# Patient Record
Sex: FEMALE | Race: WHITE | NOT HISPANIC OR LATINO | Employment: OTHER | URBAN - METROPOLITAN AREA
[De-identification: names, ages, dates, MRNs, and addresses within clinical notes are randomized per-mention and may not be internally consistent; named-entity substitution may affect disease eponyms.]

---

## 2017-01-20 ENCOUNTER — COMMUNICATION - HEALTHEAST (OUTPATIENT)
Dept: FAMILY MEDICINE | Facility: CLINIC | Age: 52
End: 2017-01-20

## 2017-01-23 ENCOUNTER — COMMUNICATION - HEALTHEAST (OUTPATIENT)
Dept: FAMILY MEDICINE | Facility: CLINIC | Age: 52
End: 2017-01-23

## 2017-01-23 ENCOUNTER — COMMUNICATION - HEALTHEAST (OUTPATIENT)
Dept: NURSING | Facility: CLINIC | Age: 52
End: 2017-01-23

## 2017-01-23 DIAGNOSIS — E11.9 TYPE 2 DIABETES MELLITUS (H): ICD-10-CM

## 2017-01-23 DIAGNOSIS — E11.65 TYPE 2 DIABETES MELLITUS WITH HYPERGLYCEMIA (H): ICD-10-CM

## 2017-01-31 ENCOUNTER — COMMUNICATION - HEALTHEAST (OUTPATIENT)
Dept: NURSING | Facility: CLINIC | Age: 52
End: 2017-01-31

## 2017-01-31 ENCOUNTER — OFFICE VISIT - HEALTHEAST (OUTPATIENT)
Dept: FAMILY MEDICINE | Facility: CLINIC | Age: 52
End: 2017-01-31

## 2017-01-31 ENCOUNTER — RECORDS - HEALTHEAST (OUTPATIENT)
Dept: GENERAL RADIOLOGY | Facility: CLINIC | Age: 52
End: 2017-01-31

## 2017-01-31 DIAGNOSIS — L84 CALLUS OF HEEL: ICD-10-CM

## 2017-01-31 DIAGNOSIS — M79.89 FOOT SWELLING: ICD-10-CM

## 2017-01-31 DIAGNOSIS — N95.2 ATROPHIC VAGINITIS: ICD-10-CM

## 2017-01-31 DIAGNOSIS — K50.90 REGIONAL ENTERITIS (H): ICD-10-CM

## 2017-01-31 DIAGNOSIS — M79.89 OTHER SPECIFIED SOFT TISSUE DISORDERS: ICD-10-CM

## 2017-01-31 DIAGNOSIS — R30.0 DYSURIA: ICD-10-CM

## 2017-01-31 DIAGNOSIS — Z71.89 COUNSELING AND COORDINATION OF CARE: ICD-10-CM

## 2017-01-31 ASSESSMENT — MIFFLIN-ST. JEOR: SCORE: 1297.92

## 2017-02-01 ENCOUNTER — COMMUNICATION - HEALTHEAST (OUTPATIENT)
Dept: FAMILY MEDICINE | Facility: CLINIC | Age: 52
End: 2017-02-01

## 2017-02-17 ENCOUNTER — COMMUNICATION - HEALTHEAST (OUTPATIENT)
Dept: NURSING | Facility: CLINIC | Age: 52
End: 2017-02-17

## 2017-02-17 DIAGNOSIS — K50.90 ARTHRITIS IN CROHN'S DISEASE (H): ICD-10-CM

## 2017-02-17 DIAGNOSIS — M07.60 ARTHRITIS IN CROHN'S DISEASE (H): ICD-10-CM

## 2017-03-10 ENCOUNTER — COMMUNICATION - HEALTHEAST (OUTPATIENT)
Dept: NURSING | Facility: CLINIC | Age: 52
End: 2017-03-10

## 2017-03-28 ENCOUNTER — RECORDS - HEALTHEAST (OUTPATIENT)
Dept: ADMINISTRATIVE | Facility: OTHER | Age: 52
End: 2017-03-28

## 2017-03-31 ENCOUNTER — COMMUNICATION - HEALTHEAST (OUTPATIENT)
Dept: NURSING | Facility: CLINIC | Age: 52
End: 2017-03-31

## 2017-03-31 ENCOUNTER — OFFICE VISIT - HEALTHEAST (OUTPATIENT)
Dept: FAMILY MEDICINE | Facility: CLINIC | Age: 52
End: 2017-03-31

## 2017-03-31 DIAGNOSIS — G56.03 BILATERAL CARPAL TUNNEL SYNDROME: ICD-10-CM

## 2017-03-31 DIAGNOSIS — E66.3 OVERWEIGHT: ICD-10-CM

## 2017-03-31 DIAGNOSIS — K50.90 REGIONAL ENTERITIS (H): ICD-10-CM

## 2017-03-31 DIAGNOSIS — M19.90 OSTEOARTHRITIS: ICD-10-CM

## 2017-03-31 DIAGNOSIS — F30.9 BIPOLAR I DISORDER, SINGLE MANIC EPISODE (H): ICD-10-CM

## 2017-04-04 ENCOUNTER — RECORDS - HEALTHEAST (OUTPATIENT)
Dept: ADMINISTRATIVE | Facility: OTHER | Age: 52
End: 2017-04-04

## 2017-04-05 ENCOUNTER — COMMUNICATION - HEALTHEAST (OUTPATIENT)
Dept: NURSING | Facility: CLINIC | Age: 52
End: 2017-04-05

## 2017-04-07 ENCOUNTER — COMMUNICATION - HEALTHEAST (OUTPATIENT)
Dept: NURSING | Facility: CLINIC | Age: 52
End: 2017-04-07

## 2017-04-13 ENCOUNTER — COMMUNICATION - HEALTHEAST (OUTPATIENT)
Dept: NURSING | Facility: CLINIC | Age: 52
End: 2017-04-13

## 2017-04-17 ENCOUNTER — AMBULATORY - HEALTHEAST (OUTPATIENT)
Dept: FAMILY MEDICINE | Facility: CLINIC | Age: 52
End: 2017-04-17

## 2017-04-17 ENCOUNTER — AMBULATORY - HEALTHEAST (OUTPATIENT)
Dept: NURSING | Facility: CLINIC | Age: 52
End: 2017-04-17

## 2017-04-17 ENCOUNTER — COMMUNICATION - HEALTHEAST (OUTPATIENT)
Dept: FAMILY MEDICINE | Facility: CLINIC | Age: 52
End: 2017-04-17

## 2017-04-17 DIAGNOSIS — M19.90 OSTEOARTHRITIS: ICD-10-CM

## 2017-04-17 DIAGNOSIS — G56.03 BILATERAL CARPAL TUNNEL SYNDROME: ICD-10-CM

## 2017-04-17 DIAGNOSIS — J45.40 MODERATE PERSISTENT ASTHMA WITHOUT COMPLICATION: ICD-10-CM

## 2017-04-17 DIAGNOSIS — E55.9 UNSPECIFIED VITAMIN D DEFICIENCY: ICD-10-CM

## 2017-04-28 ENCOUNTER — COMMUNICATION - HEALTHEAST (OUTPATIENT)
Dept: FAMILY MEDICINE | Facility: CLINIC | Age: 52
End: 2017-04-28

## 2017-05-04 ENCOUNTER — COMMUNICATION - HEALTHEAST (OUTPATIENT)
Dept: FAMILY MEDICINE | Facility: CLINIC | Age: 52
End: 2017-05-04

## 2017-05-04 DIAGNOSIS — K21.9 ACID REFLUX: ICD-10-CM

## 2017-05-04 DIAGNOSIS — E78.5 HYPERLIPEMIA: ICD-10-CM

## 2017-05-09 ENCOUNTER — OFFICE VISIT - HEALTHEAST (OUTPATIENT)
Dept: FAMILY MEDICINE | Facility: CLINIC | Age: 52
End: 2017-05-09

## 2017-05-09 ENCOUNTER — COMMUNICATION - HEALTHEAST (OUTPATIENT)
Dept: FAMILY MEDICINE | Facility: CLINIC | Age: 52
End: 2017-05-09

## 2017-05-09 DIAGNOSIS — D50.9 IRON DEFICIENCY ANEMIA, UNSPECIFIED: ICD-10-CM

## 2017-05-09 DIAGNOSIS — F30.9 BIPOLAR I DISORDER, SINGLE MANIC EPISODE (H): ICD-10-CM

## 2017-05-09 DIAGNOSIS — K50.90 REGIONAL ENTERITIS (H): ICD-10-CM

## 2017-05-09 DIAGNOSIS — E66.3 OVERWEIGHT: ICD-10-CM

## 2017-05-09 DIAGNOSIS — J45.40 MODERATE PERSISTENT ASTHMA WITHOUT COMPLICATION: ICD-10-CM

## 2017-05-09 DIAGNOSIS — E11.65 TYPE 2 DIABETES MELLITUS WITH HYPERGLYCEMIA (H): ICD-10-CM

## 2017-05-09 DIAGNOSIS — E78.2 MIXED HYPERLIPIDEMIA: ICD-10-CM

## 2017-05-09 DIAGNOSIS — Z20.2 POSSIBLE EXPOSURE TO STD: ICD-10-CM

## 2017-05-09 LAB
CHOLEST SERPL-MCNC: 185 MG/DL
FASTING STATUS PATIENT QL REPORTED: NO
HBA1C MFR BLD: 6.4 % (ref 3.5–6)
HDLC SERPL-MCNC: 52 MG/DL
HIV 1+2 AB+HIV1 P24 AG SERPL QL IA: NEGATIVE
LDLC SERPL CALC-MCNC: 107 MG/DL
TRIGL SERPL-MCNC: 131 MG/DL

## 2017-05-12 ENCOUNTER — COMMUNICATION - HEALTHEAST (OUTPATIENT)
Dept: FAMILY MEDICINE | Facility: CLINIC | Age: 52
End: 2017-05-12

## 2017-06-07 ENCOUNTER — COMMUNICATION - HEALTHEAST (OUTPATIENT)
Dept: NURSING | Facility: CLINIC | Age: 52
End: 2017-06-07

## 2017-06-20 ENCOUNTER — OFFICE VISIT - HEALTHEAST (OUTPATIENT)
Dept: FAMILY MEDICINE | Facility: CLINIC | Age: 52
End: 2017-06-20

## 2017-06-20 DIAGNOSIS — F30.9 BIPOLAR I DISORDER, SINGLE MANIC EPISODE (H): ICD-10-CM

## 2017-06-20 DIAGNOSIS — R07.89 ATYPICAL CHEST PAIN: ICD-10-CM

## 2017-06-20 DIAGNOSIS — F51.01 PRIMARY INSOMNIA: ICD-10-CM

## 2017-06-22 ENCOUNTER — COMMUNICATION - HEALTHEAST (OUTPATIENT)
Dept: NURSING | Facility: CLINIC | Age: 52
End: 2017-06-22

## 2017-06-23 ENCOUNTER — RECORDS - HEALTHEAST (OUTPATIENT)
Dept: ADMINISTRATIVE | Facility: OTHER | Age: 52
End: 2017-06-23

## 2017-06-25 ENCOUNTER — RECORDS - HEALTHEAST (OUTPATIENT)
Dept: ADMINISTRATIVE | Facility: OTHER | Age: 52
End: 2017-06-25

## 2017-06-26 ENCOUNTER — COMMUNICATION - HEALTHEAST (OUTPATIENT)
Dept: NURSING | Facility: CLINIC | Age: 52
End: 2017-06-26

## 2017-07-07 ENCOUNTER — COMMUNICATION - HEALTHEAST (OUTPATIENT)
Dept: NURSING | Facility: CLINIC | Age: 52
End: 2017-07-07

## 2017-07-07 ENCOUNTER — COMMUNICATION - HEALTHEAST (OUTPATIENT)
Dept: FAMILY MEDICINE | Facility: CLINIC | Age: 52
End: 2017-07-07

## 2017-07-07 ENCOUNTER — RECORDS - HEALTHEAST (OUTPATIENT)
Dept: ADMINISTRATIVE | Facility: OTHER | Age: 52
End: 2017-07-07

## 2017-07-07 DIAGNOSIS — J45.40 MODERATE PERSISTENT ASTHMA WITHOUT COMPLICATION: ICD-10-CM

## 2017-07-12 ENCOUNTER — RECORDS - HEALTHEAST (OUTPATIENT)
Dept: ADMINISTRATIVE | Facility: OTHER | Age: 52
End: 2017-07-12

## 2017-07-18 ENCOUNTER — COMMUNICATION - HEALTHEAST (OUTPATIENT)
Dept: FAMILY MEDICINE | Facility: CLINIC | Age: 52
End: 2017-07-18

## 2017-07-24 ENCOUNTER — COMMUNICATION - HEALTHEAST (OUTPATIENT)
Dept: NURSING | Facility: CLINIC | Age: 52
End: 2017-07-24

## 2017-07-31 ENCOUNTER — COMMUNICATION - HEALTHEAST (OUTPATIENT)
Dept: SCHEDULING | Facility: CLINIC | Age: 52
End: 2017-07-31

## 2017-08-14 ENCOUNTER — COMMUNICATION - HEALTHEAST (OUTPATIENT)
Dept: FAMILY MEDICINE | Facility: CLINIC | Age: 52
End: 2017-08-14

## 2017-08-14 DIAGNOSIS — J45.909 ASTHMA: ICD-10-CM

## 2017-08-29 ENCOUNTER — COMMUNICATION - HEALTHEAST (OUTPATIENT)
Dept: BEHAVIORAL HEALTH | Facility: CLINIC | Age: 52
End: 2017-08-29

## 2017-08-31 ENCOUNTER — COMMUNICATION - HEALTHEAST (OUTPATIENT)
Dept: NURSING | Facility: CLINIC | Age: 52
End: 2017-08-31

## 2017-09-05 ENCOUNTER — COMMUNICATION - HEALTHEAST (OUTPATIENT)
Dept: NURSING | Facility: CLINIC | Age: 52
End: 2017-09-05

## 2017-09-09 ENCOUNTER — HEALTH MAINTENANCE LETTER (OUTPATIENT)
Age: 52
End: 2017-09-09

## 2017-09-11 ENCOUNTER — COMMUNICATION - HEALTHEAST (OUTPATIENT)
Dept: NURSING | Facility: CLINIC | Age: 52
End: 2017-09-11

## 2017-09-15 ENCOUNTER — OFFICE VISIT (OUTPATIENT)
Dept: FAMILY MEDICINE | Facility: CLINIC | Age: 52
End: 2017-09-15

## 2017-09-15 VITALS
OXYGEN SATURATION: 95 % | WEIGHT: 163 LBS | HEIGHT: 61 IN | SYSTOLIC BLOOD PRESSURE: 130 MMHG | DIASTOLIC BLOOD PRESSURE: 79 MMHG | BODY MASS INDEX: 30.78 KG/M2 | TEMPERATURE: 98.2 F | HEART RATE: 75 BPM

## 2017-09-15 DIAGNOSIS — J20.9 ACUTE BRONCHITIS, UNSPECIFIED ORGANISM: Primary | ICD-10-CM

## 2017-09-15 RX ORDER — AZITHROMYCIN 250 MG/1
TABLET, FILM COATED ORAL
Qty: 6 TABLET | Refills: 0 | Status: SHIPPED | OUTPATIENT
Start: 2017-09-15 | End: 2017-09-22

## 2017-09-15 NOTE — MR AVS SNAPSHOT
"              After Visit Summary   9/15/2017    Mary Ann Olson    MRN: [id number]           Patient Information     Date Of Birth          1965        Visit Information        Provider Department      9/15/2017 1:20 PM Joanna Farah MD [facility]        Today's Diagnoses     Acute bronchitis, unspecified organism    -  1       Follow-ups after your visit        Your next 10 appointments already scheduled     Oct 03, 2017  2:00 PM CDT   PHYSICAL with Joanna Farah MD   [facility] ([facility])    [address] [place] [address]   [phone]              Who to contact     Please call your clinic at [phone] to:    Ask questions about your health    Make or cancel appointments    Discuss your medicines    Learn about your test results    Speak to your doctor   If you have compliments or concerns about an experience at your clinic, or if you wish to file a complaint, please contact [facility] Patient Relations at [phone] or email us at [email or web address]         Additional Information About Your Visit        Care EveryWhere ID     This is your Care EveryWhere ID. This could be used by other organizations to access your [place] medical records  [id number]        Your Vitals Were     Pulse Temperature Height Pulse Oximetry BMI (Body Mass Index)       75 98.2  F (36.8  C) (Oral) 5' 1\" (154.9 cm) 95% 30.8 kg/m2        Blood Pressure from Last 3 Encounters:   09/15/17 130/79   01/04/16 122/81   08/29/14 100/58    Weight from Last 3 Encounters:   09/15/17 163 lb (73.9 kg)   08/29/14 164 lb (74.4 kg)              Today, you had the following     No orders found for display         Today's Medication Changes          These changes are accurate as of: 9/15/17 11:59 PM.  If you have any questions, ask your nurse or doctor.               Start taking these medicines.        Dose/Directions    azithromycin 250 " MG tablet   Commonly known as:  ZITHROMAX   Used for:  Acute bronchitis, unspecified organism   Started by:  Joanna Farah MD        Two tablets first day, then one tablet daily for four days.   Quantity:  6 tablet   Refills:  0            Where to get your medicines      These medications were sent to [facility] [address] [place] [address] & [address] [address]    Hours:  24-hours Phone:  [phone]     azithromycin 250 MG tablet                Primary Care Provider Office Phone # Fax #    Joanna Farah [phone]       [facility] [address] [place] [address]        Equal Access to Services     LUCIANO MCKEON : Hadii aad ku hadasho Soomaali, waaxda luqadaha, qaybta kaalmada adeegyada, waxay idiin hayaan adehector khalix dimas . So [facility] [phone].    ATENCIÓN: Si habla español, tiene a judge disposición servicios gratuitos de asistencia lingüística. [facility] [phone].    We comply with applicable federal civil rights laws and [place] laws. We do not discriminate on the basis of race, color, national origin, age, disability sex, sexual orientation or gender identity.            Thank you!     Thank you for choosing [facility]  for your care. Our goal is always to provide you with excellent care. Hearing back from our patients is one way we can continue to improve our services. Please take a few minutes to complete the written survey that you may receive in the mail after your visit with us. Thank you!             Your Updated Medication List - Protect others around you: Learn how to safely use, store and throw away your medicines at www.disposemymeds.org.          This list is accurate as of: 9/15/17 11:59 PM.  Always use your most recent med list.                   Brand Name Dispense Instructions for use Diagnosis    acetaminophen 325 MG tablet    TYLENOL      Take 650 mg by mouth every 6 hours as needed        azithromycin 250 MG tablet    ZITHROMAX    6 tablet    Two tablets first day, then one tablet daily for four days.    Acute bronchitis, unspecified organism       beclomethasone 80 MCG/ACT Inhaler    QVAR     Inhale 1 puff into the lungs 2 times daily        blood glucose monitoring lancets      Test Blood glucose twice daily        blood glucose monitoring meter device kit    no brand specified     Dispense glucose meter, test strips and lancets covered by the patient insurance. Test 2 times per day.        carBAMazepine 100 MG 12 hr tablet    TEGretol XR     Take 100 mg by mouth At Bedtime        metFORMIN 500 MG tablet    GLUCOPHAGE     Take 500 mg by mouth 2 times daily (with meals)        omeprazole 20 MG CR capsule    priLOSEC     Take 20 mg by mouth daily        ONE TOUCH ULTRA test strip   Generic drug:  blood glucose monitoring      Dispense test strips covered by the patient insurance. Test 2 times per day.        PA VITAMIN D-3 2000 UNITS Caps   Generic drug:  cholecalciferol      Take 2,000 Units by mouth daily        polyethylene glycol Packet    MIRALAX/GLYCOLAX     Take 17 g by mouth daily as needed for constipation        PROAIR  (90 BASE) MCG/ACT Inhaler   Generic drug:  albuterol      Inhale 2 puffs into the lungs every 6 hours as needed for shortness of breath / dyspnea or wheezing        tacrolimus 0.1 % ointment    PROTOPIC    30 g    Apply 30 g topically 2 times daily        vitamin D 37991 UNIT capsule    ERGOCALCIFEROL     Take 50,000 Units by mouth twice a week

## 2017-09-15 NOTE — PROGRESS NOTES
HPI:       Mary Ann Olson is a 52 year old  female who presents to address the following concerns:    Cough:  -has been sick with cough for 2 weeks  -found prednisone of her boyfriends and has been taking that.  The prednisone makes it better.   -has a qvar inhaler as well as an albuterol inhaler.   -+ sweats and chills but not sure if because of menopause  -is SOB with activity    Tobacco dependence:  -smokes 1/2 ppd  -at her most she smoked 1ppd  -started smoking at 15  -has tried several times to quit but has been unsuccessful at complete smoking cessation    Chron's disease:  -Is currently on Humira and is at increased   -due for colonoscopy follow up  -[facility] primary manager of this.     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    -transferring from [facility] hx and personal hx updated today         PMHX:     There is no problem list on file for this patient.      Current Outpatient Prescriptions   Medication Sig Dispense Refill     tacrolimus (PROTOPIC) 0.1 % ointment Apply 30 g topically 2 times daily 30 g 0     carBAMazepine (TEGRETOL XR) 100 MG 12 hr tablet Take 100 mg by mouth At Bedtime       polyethylene glycol (MIRALAX/GLYCOLAX) packet Take 17 g by mouth daily as needed for constipation       omeprazole (PRILOSEC) 20 MG capsule Take 20 mg by mouth daily       vitamin D (ERGOCALCIFEROL) 68598 UNIT capsule Take 50,000 Units by mouth twice a week       acetaminophen (TYLENOL) 325 MG tablet Take 650 mg by mouth every 6 hours as needed        albuterol (ALBUTEROL) 108 (90 BASE) MCG/ACT inhaler Inhale 2 puffs into the lungs every 6 hours as needed for shortness of breath / dyspnea or wheezing        beclomethasone (QVAR) 80 MCG/ACT Inhaler Inhale 1 puff into the lungs 2 times daily        blood glucose (ONE TOUCH ULTRA) test strip  "Dispense test strips covered by the patient insurance. Test 2 times per day.       blood glucose meter (NO BRAND SPECIFIED) meter device kit Dispense glucose meter, test strips and lancets covered by the patient insurance. Test 2 times per day.       cholecalciferol (PA VITAMIN D-3) 2000 UNITS CAPS Take 2,000 Units by mouth daily        blood glucose monitoring (ONE TOUCH DELICA) lancets Test Blood glucose twice daily       metFORMIN (GLUCOPHAGE) 500 MG tablet Take 500 mg by mouth 2 times daily (with meals)             Allergies   Allergen Reactions     Keflex [Cephalexin] Shortness Of Breath and Rash     Cefuroxime Itching     Cheese GI Disturbance     Contrast Dye Hives     IV     Diatrizoate Hives     Nitrofurantoin Itching     Septra [Sulfamethoxazole W/Trimethoprim] Hives     Sulfa Drugs Hives     Quinolones Rash       No results found for this or any previous visit (from the past 24 hour(s)).    Current Outpatient Prescriptions   Medication     tacrolimus (PROTOPIC) 0.1 % ointment     carBAMazepine (TEGRETOL XR) 100 MG 12 hr tablet     polyethylene glycol (MIRALAX/GLYCOLAX) packet     omeprazole (PRILOSEC) 20 MG capsule     vitamin D (ERGOCALCIFEROL) 10144 UNIT capsule     acetaminophen (TYLENOL) 325 MG tablet     albuterol (ALBUTEROL) 108 (90 BASE) MCG/ACT inhaler     beclomethasone (QVAR) 80 MCG/ACT Inhaler     blood glucose (ONE TOUCH ULTRA) test strip     blood glucose meter (NO BRAND SPECIFIED) meter device kit     cholecalciferol (PA VITAMIN D-3) 2000 UNITS CAPS     blood glucose monitoring (ONE TOUCH DELICA) lancets     metFORMIN (GLUCOPHAGE) 500 MG tablet     No current facility-administered medications for this visit.               Review of Systems:   ROS as described above.            Physical Exam:     Vitals:    09/15/17 1314   BP: 130/79   Pulse: 75   Temp: 98.2  F (36.8  C)   TempSrc: Oral   SpO2: 95%   Weight: 163 lb (73.9 kg)   Height: 5' 1\" (154.9 cm)     Body mass index is 30.8 " kg/(m^2).    GEN: patient sitting comfortably in NAD, intermittent cough  HEEN: Head is atraumatic, normocephalic, eyes anicteric, mucous membranes moist  CV: RRR w/o M/R/G  PULM: poor air movement throughout without wheeze or rhonchi,   ABD: soft, nontender, bowel sounds present  NEURO: Alert and oriented x3.  No focal motor abnormalities.  Face symmetric.  PSYCH: appropriate  SKIN: No rashes, bruising, or other lesions      Assessment and Plan     Bronchitis-sub-acute.  Continues to be sx after prednisone of boyfriend.  Advised to come in prior to using BF med next time.  Patient in agrement.  Given recent steroids, continued sx, and risk factors (COPD, ongoing tobacco exposure, immune suppressed status with Humira) will rx a z-pack for patient.  She should return if sx persist.      Return 2-3 weeks physical    Problem List Items Addressed This Visit     None      Visit Diagnoses     Acute bronchitis, unspecified organism    -  Primary    Relevant Medications    azithromycin (ZITHROMAX) 250 MG tablet        Options for treatment and follow-up care were reviewed with the patient and/or guardian. Mary Ann Olson and/or guardian engaged in the decision making process and verbalized understanding of the options discussed and agreed with the final plan.    Joanna Farah MD

## 2017-09-22 ENCOUNTER — OFFICE VISIT (OUTPATIENT)
Dept: FAMILY MEDICINE | Facility: CLINIC | Age: 52
End: 2017-09-22

## 2017-09-22 VITALS
DIASTOLIC BLOOD PRESSURE: 82 MMHG | RESPIRATION RATE: 18 BRPM | TEMPERATURE: 98.1 F | HEART RATE: 80 BPM | SYSTOLIC BLOOD PRESSURE: 126 MMHG | OXYGEN SATURATION: 98 %

## 2017-09-22 DIAGNOSIS — J44.1 CHRONIC OBSTRUCTIVE PULMONARY DISEASE WITH ACUTE EXACERBATION (H): Primary | ICD-10-CM

## 2017-09-22 DIAGNOSIS — R05.9 COUGH: ICD-10-CM

## 2017-09-22 DIAGNOSIS — Z13.9 SCREENING FOR CONDITION: ICD-10-CM

## 2017-09-22 RX ORDER — PREDNISONE 20 MG/1
40 TABLET ORAL DAILY
Qty: 10 TABLET | Refills: 0 | Status: SHIPPED | OUTPATIENT
Start: 2017-09-22 | End: 2017-09-27

## 2017-09-22 RX ORDER — GUAIFENESIN/DEXTROMETHORPHAN 100-10MG/5
5 SYRUP ORAL EVERY 4 HOURS PRN
Qty: 560 ML | Refills: 0 | Status: SHIPPED | OUTPATIENT
Start: 2017-09-22 | End: 2017-10-18

## 2017-09-22 NOTE — PATIENT INSTRUCTIONS
Referral for Gastroenterology faxed to [facility] and pt will be contacted to schedule appointment.  KIZZY

## 2017-09-22 NOTE — MR AVS SNAPSHOT
After Visit Summary   9/22/2017    Mary Ann Olson    MRN: [id number]           Patient Information     Date Of Birth          1965        Visit Information        Provider Department      9/22/2017 4:20 PM Nova Thomas MD [facility]        Today's Diagnoses     Chronic obstructive pulmonary disease with acute exacerbation (H)    -  1    Cough        Screening for condition          Care Instructions    Referral for Gastroenterology faxed to [facility] and pt will be contacted to schedule appointment.  LK          Follow-ups after your visit        Additional Services     GASTROENTEROLOGY ADULT REF PROCEDURE ONLY       Last Lab Result: Creatinine (mg/dL)       Date                     Value                 08/29/2014               0.69             ----------  There is no height or weight on file to calculate BMI.     Needed:  No  Language:  English    Patient will be contacted to schedule procedure.     Please be aware that coverage of these services is subject to the terms and limitations of your health insurance plan.  Call member services at your health plan with any benefit or coverage questions.  Any procedures must be performed at a [place] facility OR coordinated by your clinic's referral office.    Please bring the following with you to your appointment:    (1) Any X-Rays, CTs or MRIs which have been performed.  Contact the facility where they were done to arrange for  prior to your scheduled appointment.    (2) List of current medications   (3) This referral request   (4) Any documents/labs given to you for this referral                  Your next 10 appointments already scheduled     Oct 02, 2017 11:00 AM CDT   PHYSICAL with Joanna Farah MD   [facility] ([facility])    [address] [place] [address]   [phone]              Who to contact     Please call your clinic at [phone] to:    Ask questions about  your health    Make or cancel appointments    Discuss your medicines    Learn about your test results    Speak to your doctor   If you have compliments or concerns about an experience at your clinic, or if you wish to file a complaint, please contact [facility] Patient Relations at [phone] or email us at [email or web address]         Additional Information About Your Visit        Care EveryWhere ID     This is your Care EveryWhere ID. This could be used by other organizations to access your [place] medical records  [id number]        Your Vitals Were     Pulse Temperature Respirations Pulse Oximetry          80 98.1  F (36.7  C) (Oral) 18 98%         Blood Pressure from Last 3 Encounters:   09/22/17 126/82   09/15/17 130/79   01/04/16 122/81    Weight from Last 3 Encounters:   09/15/17 163 lb (73.9 kg)   08/29/14 164 lb (74.4 kg)              We Performed the Following     GASTROENTEROLOGY ADULT REF PROCEDURE ONLY     MA SCREENING DIGITAL BILAT          Today's Medication Changes          These changes are accurate as of: 9/22/17 11:59 PM.  If you have any questions, ask your nurse or doctor.               Start taking these medicines.        Dose/Directions    guaiFENesin-dextromethorphan 100-10 MG/5ML syrup   Commonly known as:  ROBITUSSIN DM   Used for:  Cough   Started by:  Nova Thomas MD        Dose:  5 mL   Take 5 mLs by mouth every 4 hours as needed for cough   Quantity:  560 mL   Refills:  0       mometasone-formoterol 100-5 MCG/ACT oral inhaler   Commonly known as:  DULERA   Used for:  Chronic obstructive pulmonary disease with acute exacerbation (H)   Started by:  Nova Thomas MD        Dose:  2 puff   Inhale 2 puffs into the lungs 2 times daily   Quantity:  13 g   Refills:  3       predniSONE 20 MG tablet   Commonly known as:  DELTASONE   Used for:  Chronic obstructive pulmonary disease with acute exacerbation (H)   Started by:  William  Nova David MD        Dose:  40 mg   Take 2 tablets (40 mg) by mouth daily for 5 days   Quantity:  10 tablet   Refills:  0       Spacer/Aero Chamber Mouthpiece Misc   Used for:  Chronic obstructive pulmonary disease with acute exacerbation (H)   Started by:  Nova Thomas MD        Dose:  1 Units   1 Units 2 times daily   Quantity:  1 each   Refills:  1         Stop taking these medicines if you haven't already. Please contact your care team if you have questions.     azithromycin 250 MG tablet   Commonly known as:  ZITHROMAX   Stopped by:  Nova Thomas MD           beclomethasone 80 MCG/ACT Inhaler   Commonly known as:  QVAR   Stopped by:  Nova Thomas MD                Where to get your medicines      These medications [facility] to [facility] [address] [place] [address] & [address] [address]    Hours:  24-hours Phone:  [phone]     guaiFENesin-dextromethorphan 100-10 MG/5ML syrup    mometasone-formoterol 100-5 MCG/ACT oral inhaler    predniSONE 20 MG tablet    Spacer/Aero Chamber Mouthpiece Misc                Primary Care Provider Office Phone # Fax #    Joanna Polina Farah [phone]       [facility] [address] [place] [address]        Equal Access to Services     LUCIANO [facility]idianAL[facility] AH: Hadii aad ku hadasho Soomaali, waaxda luqadaha, qaybta kaalmada adeegyada, grace rowland hayalda dimas . So [facility] [phone].    ATENCIÓN: Si habla español, tiene a judge disposición servicios gratuitos de asistencia lingüística. Mark al [phone].    We comply with applicable federal civil rights laws and [place] laws. We do not discriminate on the basis of race, color, national origin, age, disability sex, sexual orientation or gender identity.            Thank you!     Thank you for choosing [facility]  for your care. Our goal is always to provide you  with excellent care. Hearing back from our patients is one way we can continue to improve our services. Please take a few minutes to complete the written survey that you may receive in the mail after your visit with us. Thank you!             Your Updated Medication List - Protect others around you: Learn how to safely use, store and throw away your medicines at www.disposemymeds.org.          This list is accurate as of: 9/22/17 11:59 PM.  Always use your most recent med list.                   Brand Name Dispense Instructions for use Diagnosis    acetaminophen 325 MG tablet    TYLENOL     Take 650 mg by mouth every 6 hours as needed        blood glucose monitoring lancets      Test Blood glucose twice daily        blood glucose monitoring meter device kit    no brand specified     Dispense glucose meter, test strips and lancets covered by the patient insurance. Test 2 times per day.        carBAMazepine 100 MG 12 hr tablet    TEGretol XR     Take 100 mg by mouth At Bedtime        guaiFENesin-dextromethorphan 100-10 MG/5ML syrup    ROBITUSSIN DM    560 mL    Take 5 mLs by mouth every 4 hours as needed for cough    Cough       metFORMIN 500 MG tablet    GLUCOPHAGE     Take 500 mg by mouth 2 times daily (with meals)        mometasone-formoterol 100-5 MCG/ACT oral inhaler    DULERA    13 g    Inhale 2 puffs into the lungs 2 times daily    Chronic obstructive pulmonary disease with acute exacerbation (H)       omeprazole 20 MG CR capsule    priLOSEC     Take 20 mg by mouth daily        ONE TOUCH ULTRA test strip   Generic drug:  blood glucose monitoring      Dispense test strips covered by the patient insurance. Test 2 times per day.        PA VITAMIN D-3 2000 UNITS Caps   Generic drug:  cholecalciferol      Take 2,000 Units by mouth daily        polyethylene glycol Packet    MIRALAX/GLYCOLAX     Take 17 g by mouth daily as needed for constipation        predniSONE 20 MG tablet    DELTASONE    10 tablet    Take 2  tablets (40 mg) by mouth daily for 5 days    Chronic obstructive pulmonary disease with acute exacerbation (H)       PROAIR  (90 BASE) MCG/ACT Inhaler   Generic drug:  albuterol      Inhale 2 puffs into the lungs every 6 hours as needed for shortness of breath / dyspnea or wheezing        Spacer/Aero Chamber Mouthpiece Misc     1 each    1 Units 2 times daily    Chronic obstructive pulmonary disease with acute exacerbation (H)       tacrolimus 0.1 % ointment    PROTOPIC    30 g    Apply 30 g topically 2 times daily        vitamin D 34644 UNIT capsule    ERGOCALCIFEROL     Take 50,000 Units by mouth twice a week

## 2017-09-22 NOTE — PROGRESS NOTES
ROCIO       Mary Ann Olson is a 52 year old female who presents for:  Chief Complaint   Patient presents with     Follow Up For     cough, not completely better     Cough  Patient was seen on 9/15/17 for a 2 week cough. Was taking qvar 1 puff bid w/o spacer, albuterol, and oral prednisone from her boyfriends friend. Tobacco use; 1/2 ppd. Given azithromycin as she (COPD, ongoing tobacco exposure, immune suppressed status with Humira).    After completing the treatment, symptoms initially improved. A few days ago symptoms returned. Dry cough, no fevers/chills. Cough keeping her up at night. Warm shower improved, but then cough returns. OTC cough syrup and hot lemon water did help, but again cough returns. She feels like there is a lot of mucus in her chest that she cannot get up. Worse when exerting herself.    Using qvar 1 puff bid  Using albuterol inhaler every 30 minutes.    ROS: Complete 6 point ROS completed and negative other than stated above.    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 [phone].         Physical Exam:     Vitals:    09/22/17 1614   BP: 126/82   Pulse: 80   Resp: 18   Temp: 98.1  F (36.7  C)   TempSrc: Oral   SpO2: 98%     There is no height or weight on file to calculate BMI.  Vitals were reviewed and were normal    General: Alert and orientated in NAD. Pleasant and cooperative.   HEENT: EOMI, sclera without injection or icterus. Mucus membranes moist  Cards: RRR, no murmurs, rubs or gallops. No lower extremity edema  Resp: diminished breath sounds bilaterally with inspiratory wheezing. Mild increased work of breathing  MSK: moving all extremities w/o difficulty or deficit  Skin: no rashes, lesions, or lacerations  Psych: mood good, affect appropriate    Assessment and Plan     1. Chronic obstructive pulmonary disease with acute exacerbation (H)  Cough worse with exertion, night-time wakening. Diminished breath sounds, inspiratory wheezing. SpO2 98% today  in clinic. Will d/c qvar and start moderate dose steroid (qvar dose was small dose steroid) + LABA for maintenance with the addition of a steroid burst for symptomatic improvement.  - discontinue Qvar  - mometasone-formoterol (DULERA) 100-5 MCG/ACT oral inhaler; Inhale 2 puffs into the lungs 2 times daily  Dispense: 13 g; Refill: 3  - predniSONE (DELTASONE) 20 MG tablet; Take 2 tablets (40 mg) by mouth daily for 5 days  Dispense: 10 tablet; Refill: 0  - Spacer/Aero Chamber Mouthpiece MISC; 1 Units 2 times daily  Dispense: 1 each; Refill: 1  - Reviewed tobacco cessation    2. Cough  Most likely d/t COPD exacerbation. Unlikely PNA (afebrile, hemodynamically stable, not hypoxic, looking well overall). Unlikely d/t PE given prolonged onset and pulmonary examination.  - guaiFENesin-dextromethorphan (ROBITUSSIN DM) 100-10 MG/5ML syrup; Take 5 mLs by mouth every 4 hours as needed for cough  Dispense: 560 mL; Refill: 0    3. Screening for condition  - GASTROENTEROLOGY ADULT REF PROCEDURE ONLY  - MA SCREENING DIGITAL BILAT      Options for treatment and follow-up care were reviewed with the patient. Mary Ann Olson  engaged in the decision making process and verbalized understanding of the options discussed and agreed with the final plan.    Precepted with: MD Nova Mcdowell MD (PGY2)  Pager: [phone]  [facility] Resident

## 2017-09-22 NOTE — NURSING NOTE
Labs due:Lipids/ hep c screen   Mammogram-referral  Colon Cancer screening-referral  Cervical Cancer Screening-will come back for CPE  Offer Flu-will see depending on how she is not well today

## 2017-09-25 NOTE — PROGRESS NOTES
Preceptor Attestation:  Patient's case reviewed and discussed with Nova Thomas MD.  Patient seen and discussed with the resident.  I agree with assessment and plan of care.  Supervising Physician:  Susana Ortiz MD  [facility]

## 2017-09-29 ENCOUNTER — COMMUNICATION - HEALTHEAST (OUTPATIENT)
Dept: FAMILY MEDICINE | Facility: CLINIC | Age: 52
End: 2017-09-29

## 2017-09-29 DIAGNOSIS — J45.40 MODERATE PERSISTENT ASTHMA WITHOUT COMPLICATION: ICD-10-CM

## 2017-10-03 ENCOUNTER — COMMUNICATION - HEALTHEAST (OUTPATIENT)
Dept: NURSING | Facility: CLINIC | Age: 52
End: 2017-10-03

## 2017-10-17 ENCOUNTER — OFFICE VISIT (OUTPATIENT)
Dept: FAMILY MEDICINE | Facility: CLINIC | Age: 52
End: 2017-10-17

## 2017-10-17 VITALS
RESPIRATION RATE: 22 BRPM | HEART RATE: 83 BPM | TEMPERATURE: 97.9 F | HEIGHT: 62 IN | SYSTOLIC BLOOD PRESSURE: 123 MMHG | DIASTOLIC BLOOD PRESSURE: 7 MMHG | BODY MASS INDEX: 30.59 KG/M2 | WEIGHT: 166.2 LBS | OXYGEN SATURATION: 96 %

## 2017-10-17 DIAGNOSIS — J44.1 CHRONIC OBSTRUCTIVE PULMONARY DISEASE WITH ACUTE EXACERBATION (H): ICD-10-CM

## 2017-10-17 DIAGNOSIS — R30.0 DYSURIA: Primary | ICD-10-CM

## 2017-10-17 LAB
BACTERIA: NORMAL
BILIRUBIN UR: NEGATIVE
BLOOD UR: NEGATIVE
CASTS: NORMAL /LPF
CLARITY, URINE: CLEAR
COLOR UR: YELLOW
CRYSTAL URINE: NORMAL /LPF
EPITHELIAL CELLS UR: NORMAL /LPF (ref 0–2)
GLUCOSE URINE: NEGATIVE
KETONES UR QL: NEGATIVE
LEUKOCYTE ESTERASE UR: ABNORMAL
MUCOUS URINE: NORMAL LPF
NITRITE UR QL STRIP: NEGATIVE
PH UR STRIP: 5.5 [PH] (ref 5–7)
PROTEIN UR: NEGATIVE
RBC URINE: NORMAL /HPF
SP GR UR STRIP: 1.01
UROBILINOGEN UR STRIP-ACNC: ABNORMAL
WBC URINE: NORMAL /HPF

## 2017-10-17 RX ORDER — DOXYCYCLINE 100 MG/1
100 CAPSULE ORAL 2 TIMES DAILY
Qty: 20 CAPSULE | Refills: 0 | Status: SHIPPED | OUTPATIENT
Start: 2017-10-17 | End: 2017-12-15

## 2017-10-17 RX ORDER — DOXYCYCLINE 100 MG/1
100 CAPSULE ORAL 2 TIMES DAILY
Qty: 20 CAPSULE | Refills: 0 | Status: SHIPPED | OUTPATIENT
Start: 2017-10-17 | End: 2017-10-17

## 2017-10-17 RX ORDER — PREDNISONE 20 MG/1
40 TABLET ORAL DAILY
Qty: 10 TABLET | Refills: 0 | Status: CANCELLED | OUTPATIENT
Start: 2017-10-17 | End: 2017-10-22

## 2017-10-17 RX ORDER — BENZONATATE 100 MG/1
100 CAPSULE ORAL 3 TIMES DAILY PRN
Qty: 42 CAPSULE | Refills: 1 | Status: SHIPPED | OUTPATIENT
Start: 2017-10-17 | End: 2017-10-17

## 2017-10-17 RX ORDER — BENZONATATE 100 MG/1
100 CAPSULE ORAL 3 TIMES DAILY PRN
Qty: 42 CAPSULE | Refills: 1 | Status: SHIPPED | OUTPATIENT
Start: 2017-10-17 | End: 2017-12-15

## 2017-10-17 NOTE — MR AVS SNAPSHOT
"              After Visit Summary   10/17/2017    Mary Ann Olson    MRN: [id number]           Patient Information     Date Of Birth          1965        Visit Information        Provider Department      10/17/2017 2:00 PM Dali Major MD [facility]        Today's Diagnoses     Dysuria    -  1    Chronic obstructive pulmonary disease with acute exacerbation (H)           Follow-ups after your visit        Follow-up notes from your care team     Return in about 2 years (around 10/17/2019).      Your next 10 appointments already scheduled     Oct 27, 2017  2:00 PM CDT   Return Visit with Joanna Farah MD   [facility] ([facility])    [address] [place] [id number]   [phone]              Who to contact     Please call your clinic at [phone] to:    Ask questions about your health    Make or cancel appointments    Discuss your medicines    Learn about your test results    Speak to your doctor   If you have compliments or concerns about an experience at your clinic, or if you wish to file a complaint, please contact [facility] Patient Relations at [phone] or email us at [email or web address]         Additional Information About Your Visit        Care EveryWhere ID     This is your Care EveryWhere ID. This could be used by other organizations to access your [place] medical records  [id number]        Your Vitals Were     Pulse Temperature Respirations Height Pulse Oximetry BMI (Body Mass Index)    83 97.9  F (36.6  C) (Oral) 22 5' 1.5\" (156.2 cm) 96% 30.89 kg/m2       Blood Pressure from Last 3 Encounters:   10/17/17 (!) 123/7   09/22/17 126/82   09/15/17 130/79    Weight from Last 3 Encounters:   10/17/17 166 lb 3.2 oz (75.4 kg)   09/15/17 163 lb (73.9 kg)   08/29/14 164 lb (74.4 kg)              We Performed the Following     Urinalysis, Micro If ([facility])     Urine Culture ([facility])     Urine Microscopic " (P )          Today's Medication Changes          These changes are accurate as of: 10/17/17 11:59 PM.  If you have any questions, ask your nurse or doctor.               Start taking these medicines.        Dose/Directions    benzonatate 100 MG capsule   Commonly known as:  TESSALON   Used for:  Chronic obstructive pulmonary disease with acute exacerbation (H)   Started by:  Dali Major MD        Dose:  100 mg   Take 1 capsule (100 mg) by mouth 3 times daily as needed for cough   Quantity:  42 capsule   Refills:  1       doxycycline 100 MG capsule   Commonly known as:  VIBRAMYCIN   Used for:  Dysuria, Chronic obstructive pulmonary disease with acute exacerbation (H)   Started by:  Dali Major MD        Dose:  100 mg   Take 1 capsule (100 mg) by mouth 2 times daily   Quantity:  20 capsule   Refills:  0            Where to get your medicines      These medications were sent to [facility] [address] [place] [address] & [address] [address]    Hours:  24-hours Phone:  [phone]     benzonatate 100 MG capsule    doxycycline 100 MG capsule                Primary Care Provider Office Phone # Fax #    Joanna Polina Farah [phone]       [facility] [address] [place] [address]        Equal Access to Services     MADHAV MCKEON AH: Hadii loreta ku hadasho Soomaali, waaxda luqadaha, qaybta kaalmada adeegyada, waxay idiin hayjessican william rm. So [facility] [phone].    ATENCIÓN: Si habla español, tiene a judge disposición servicios gratuitos de asistencia lingüística. Llame al [phone].    We comply with applicable federal civil rights laws and [place] laws. We do not discriminate on the basis of race, color, national origin, age, disability, sex, sexual orientation, or gender identity.            Thank you!     Thank you for choosing [facility]  for your care. Our goal is  always to provide you with excellent care. Hearing back from our patients is one way we can continue to improve our services. Please take a few minutes to complete the written survey that you may receive in the mail after your visit with us. Thank you!             Your Updated Medication List - Protect others around you: Learn how to safely use, store and throw away your medicines at www.disposemymeds.org.          This list is accurate as of: 10/17/17 11:59 PM.  Always use your most recent med list.                   Brand Name Dispense Instructions for use Diagnosis    acetaminophen 325 MG tablet    TYLENOL     Take 650 mg by mouth every 6 hours as needed        benzonatate 100 MG capsule    TESSALON    42 capsule    Take 1 capsule (100 mg) by mouth 3 times daily as needed for cough    Chronic obstructive pulmonary disease with acute exacerbation (H)       blood glucose monitoring lancets      Test Blood glucose twice daily        blood glucose monitoring meter device kit    no brand specified     Dispense glucose meter, test strips and lancets covered by the patient insurance. Test 2 times per day.        carBAMazepine 100 MG 12 hr tablet    TEGretol XR     Take 100 mg by mouth At Bedtime        doxycycline 100 MG capsule    VIBRAMYCIN    20 capsule    Take 1 capsule (100 mg) by mouth 2 times daily    Dysuria, Chronic obstructive pulmonary disease with acute exacerbation (H)       metFORMIN 500 MG tablet    GLUCOPHAGE     Take 500 mg by mouth 2 times daily (with meals)        mometasone-formoterol 100-5 MCG/ACT oral inhaler    DULERA    13 g    Inhale 2 puffs into the lungs 2 times daily    Chronic obstructive pulmonary disease with acute exacerbation (H)       omeprazole 20 MG CR capsule    priLOSEC     Take 20 mg by mouth daily        ONE TOUCH ULTRA test strip   Generic drug:  blood glucose monitoring      Dispense test strips covered by the patient insurance. Test 2 times per day.        PA VITAMIN D-3 2000  UNITS Caps   Generic drug:  cholecalciferol      Take 2,000 Units by mouth daily        polyethylene glycol Packet    MIRALAX/GLYCOLAX     Take 17 g by mouth daily as needed for constipation        PROAIR  (90 BASE) MCG/ACT Inhaler   Generic drug:  albuterol      Inhale 2 puffs into the lungs every 6 hours as needed for shortness of breath / dyspnea or wheezing        Spacer/Aero Chamber Mouthpiece Misc     1 each    1 Units 2 times daily    Chronic obstructive pulmonary disease with acute exacerbation (H)       tacrolimus 0.1 % ointment    PROTOPIC    30 g    Apply 30 g topically 2 times daily        vitamin D 94527 UNIT capsule    ERGOCALCIFEROL     Take 50,000 Units by mouth twice a week

## 2017-10-17 NOTE — PROGRESS NOTES
"S:  Mary Ann Olson is a 52 year old year old female who was seen last month for cough who presents to discuss:    1. Cough F/U   - Received 5 days course of steroid (9/22), which helped relief her coughs but then it returns  - Albuterol and Dulera inhaler didn't help   - Reports having dry-coughs and SOB waking her up 3-4x per night; constant coughs during the day; Sx lasting over a month now  - Used to smoke half a pack per day, now decreases to 1 cig/day due to Sx  - Now avoids smoke exposure   - Reports feeling feverish and chills, headaches, congestion, occasional nosebleed, and nightsweat   - Nobody is sick at home  - Not on Humira at this time due to Sx    Patient reports history of asthma, thinks she had spirometry many years ago.    2. UTI  - Reports having foul smell urine, increased frequency of urination, dysuria, and lower back pain for a week now  - Had hx of UTI; states current Sx are similar to previous UTI    Complete ROS otherwise negative.     O:  Vitals: BP (!) 123/7 (BP Location: Right arm, Patient Position: Sitting, Cuff Size: Adult Regular)  Pulse 83  Temp 97.9  F (36.6  C) (Oral)  Resp 22  Ht 5' 1.5\" (156.2 cm)  Wt 166 lb 3.2 oz (75.4 kg)  SpO2 96%  BMI 30.89 kg/m2    General: Alert, oriented, not in appearance distress  HEENT: PERRL, moist oral mucus membranes  Pulm: Mild inspiratory wheezing, coughs, no crackles, no tachypnea, speaking in full sentences. Coughs occasionally.  CV: RRR, no murmur  Abd: Soft, NTND, no masses  Ext: Warm, well perfused, no LE edema  Skin: mild erythema rash on upper chest  Psych: Mood appropriate to visit content, full affect, rational thought content and process      A/P:  1. Cough F/U - Cough is worsening with exertion and associated with night-time awakening and inspiratory wheezing. Sx has been persistent for over a month now. LABA + steroid and Albuterol inhalers didn't result in Sx improvement. SpO2 96% today.  At this time, recommend Tessalon for " cough suppressant and will treat COPD vs asthma exacerbation with doxycyline as patient is hesitant to take steroids again. Return to clinic in 2 weeks to assess for COPD and baseline spirometry.   -doxycycline 10 day course for COPD exacerbation   -called and requested a different medication due to insurance. I reordered previously used guafenesin dextrpmethorphan  -follow up in 2 weeks for spirometry  -would consider increasing dulera dose at next visit    2. Dysuria - consistent with UTI. Prescribe 10 days course of Doxycycline - patient had allergy to many antibiotics; may help improve her coughs if it truly is a COPD exacerbation.       This note is scribed by Padma Mistry, medical student on behalf of RONAN MACIEL.    In supervising the medical student, I saw and evaluated the patient with the student and personally performed all aspects of the history and physical.  I have reviewed and verified that the documentation is correct and complete.    Ronan Maciel MD  [facility] Resident  Pager     Precepted with: Sergo Marie MD

## 2017-10-18 ENCOUNTER — TELEPHONE (OUTPATIENT)
Dept: FAMILY MEDICINE | Facility: CLINIC | Age: 52
End: 2017-10-18

## 2017-10-18 DIAGNOSIS — R05.9 COUGH: ICD-10-CM

## 2017-10-18 PROBLEM — T74.11XA ADULT PHYSICAL ABUSE: Status: ACTIVE | Noted: 2017-10-18

## 2017-10-18 PROBLEM — T45.0X2A: Status: ACTIVE | Noted: 2017-07-15

## 2017-10-18 PROBLEM — Z14.01 ASYMPTOMATIC HEMOPHILIA A CARRIER: Status: ACTIVE | Noted: 2017-10-18

## 2017-10-18 PROBLEM — K50.90 REGIONAL ENTERITIS (H): Status: ACTIVE | Noted: 2017-10-18

## 2017-10-18 LAB — CULTURE: NORMAL

## 2017-10-18 RX ORDER — GUAIFENESIN/DEXTROMETHORPHAN 100-10MG/5
5 SYRUP ORAL EVERY 4 HOURS PRN
Qty: 560 ML | Refills: 0 | Status: SHIPPED | OUTPATIENT
Start: 2017-10-18 | End: 2017-11-28

## 2017-10-18 RX ORDER — GUAIFENESIN 600 MG/1
600 TABLET, EXTENDED RELEASE ORAL 2 TIMES DAILY PRN
Qty: 20 TABLET | Refills: 0 | Status: SHIPPED | OUTPATIENT
Start: 2017-10-18 | End: 2017-11-28

## 2017-10-18 NOTE — TELEPHONE ENCOUNTER
Sure, I sent an order for cough syrup to her pharmacy. Let me know if there are any questions.     Thanks  Dali Major

## 2017-10-18 NOTE — TELEPHONE ENCOUNTER
[facility] phone call message- medication clarification/question:    Full Medication Name: benzonatate (TESSALON) 100 MG capsule    Question: Pt states that her pharmacy is not covering this medication, and is wondering if she could prescribed her something else for her cold.     Pharmacy confirmed as    [facility] [address] [place] [address]: Yes    OK to leave a message on voice mail? Yes    Primary language: English      needed? No    Call taken on October 18, 2017 at 10:35 AM by Teresa Castañeda

## 2017-10-18 NOTE — TELEPHONE ENCOUNTER
Called to inform Mary Ann of replacement for Tessalon Perles. She states she has taken Waltussin DM without help at all. Requesting stronger medication for cough. Pasha SMITH

## 2017-10-18 NOTE — TELEPHONE ENCOUNTER
I called Mary Ann but there was no answer. I have sent a script for guafenesin tablets which should be taken with plenty of water and should help with the congestion/cough. Her inhaler will also help with cough so she should keep using that. Another helpful cough treatment is tea with honey. I would be happy to see her in clinic to discuss further if she would like.    Thanks,   Dali Major MD  [facility] Medicine Resident  Pager

## 2017-10-19 ENCOUNTER — COMMUNICATION - HEALTHEAST (OUTPATIENT)
Dept: FAMILY MEDICINE | Facility: CLINIC | Age: 52
End: 2017-10-19

## 2017-10-19 DIAGNOSIS — E11.9 TYPE 2 DIABETES MELLITUS (H): ICD-10-CM

## 2017-10-19 NOTE — TELEPHONE ENCOUNTER
Patient is calling back stating that the Rx sent yesterday is not covered through her insurance so would need something different. She state she has been having this cough for a while and wakes up at the middle of the night coughing and then hard for her sleep. She was told by the pharmacy that anything OTC her insurance does not cover. Please call and advise.

## 2017-10-19 NOTE — TELEPHONE ENCOUNTER
I called and spoke with Mary Ann. She reports that she is taking her doxycycline and not noticing any improvement in cough. The tessalon is not covered by insurance and she is declining any OTC products. I asked if she could come in to clinic sooner since she is not getting any better, and she reports that she feels this would be a waste of time. I think that the cough is a symptom of her asthma (vs COPD, spirometry not done but hx asthma documented in care everywhere) and I asked if she was using her inhalers. She says yes, but they do not help, so she has not used her albuterol in a while. I asked that she continue to take the doxycyline and try using her albuterol inhaler every 4 hours scheduled for a couple of days. I would like to see her in clinic again to assess her respiratory function and once again discuss the possibility of steroid medication for this flare (which she declined at her last visit). At that point she became frustrated and hung up.     If she calls back I would recommend that she be seen as soon as she thinks would be useful. I would also consider another burst of steroid medicine if she is willing, and if she feels that she is having difficulty breathing she should be seen in the clinic, urgent care, or emergency room.    Dali Major MD  [facility] Resident  Pager

## 2017-10-26 NOTE — PROGRESS NOTES
"Preceptor Attestation:  Patient's case reviewed and discussed with Dr Major. Patient seen and discussed with the resident.\"}.  I agree with written assessment and plan of care.  Supervising Physician:  Sergo Marie MD  [facility]    "

## 2017-10-27 ENCOUNTER — OFFICE VISIT (OUTPATIENT)
Dept: FAMILY MEDICINE | Facility: CLINIC | Age: 52
End: 2017-10-27

## 2017-10-27 VITALS
TEMPERATURE: 98.2 F | RESPIRATION RATE: 22 BRPM | WEIGHT: 170 LBS | DIASTOLIC BLOOD PRESSURE: 79 MMHG | SYSTOLIC BLOOD PRESSURE: 111 MMHG | OXYGEN SATURATION: 96 % | HEART RATE: 95 BPM | BODY MASS INDEX: 31.28 KG/M2 | HEIGHT: 62 IN

## 2017-10-27 DIAGNOSIS — R05.9 COUGH: Primary | ICD-10-CM

## 2017-10-27 LAB — BNP SERPL-MCNC: 30 PG/ML (ref 0–78)

## 2017-10-27 RX ORDER — MONTELUKAST SODIUM 10 MG/1
10 TABLET ORAL AT BEDTIME
Qty: 90 TABLET | Refills: 3 | Status: SHIPPED | OUTPATIENT
Start: 2017-10-27 | End: 2018-06-12

## 2017-10-27 RX ORDER — ALBUTEROL SULFATE 0.83 MG/ML
1 SOLUTION RESPIRATORY (INHALATION) EVERY 6 HOURS PRN
Qty: 25 VIAL | Refills: 1 | Status: SHIPPED | OUTPATIENT
Start: 2017-10-27 | End: 2018-02-12
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
## 2017-10-27 NOTE — PATIENT INSTRUCTIONS
- Start using your inhaler - dulera 2 puffs 2 time a day   - Complete your course of antibiotics  - You can use a humidifier to help clear the air  - Dr. Farah will refill your nebulizer medications  - Once you feel better, we should consider a spirometry   - Your chest film looked good and no sounds of wheezing  - Follow-up in 2 weeks           Referral for (Test): Mammogram Referral  Location/Place/Provider: [facility]    Date/Time:  11/20/17 @ 1pm  Phone: [phone]   Fax: [phone]  Additional information/prep.: No soaps, powder, deodorants, perfumes, or any other scented items.   Scheduled by: Shekhar Rod, CMA      Your medication list is printed, please keep this with you, it is helpful to bring this current list to any other medical appointments, the emergency room or hospital.    If you had lab testing today and your results are reassuring or normal they will be be mailed to you within 7 days.     If the lab tests need quick action we will call you with the results.   The phone number we will call with results is # [phone] (home) . If this is not the best number please call our clinic and change the number.    If you need any refills please call your pharmacy and they will contact us.    If you have any further concerns or wish to schedule another appointment you must call our office during normal business hours  [phone] (8-5:00 M-F)  If you have urgent medical questions that cannot wait  you may also call [phone] at any time of day.  If you have a medical emergency please call [phone].    Thank you for coming to [facility].

## 2017-10-27 NOTE — MR AVS SNAPSHOT
After Visit Summary   10/27/2017    Mary Ann Olson    MRN: [id number]           Patient Information     Date Of Birth          1965        Visit Information        Provider Department      10/27/2017 2:00 PM Joanna Farah MD [facility]        Today's Diagnoses     Cough    -  1      Care Instructions    - Start using your inhaler - dulera 2 puffs 2 time a day   - Complete your course of antibiotics  - You can use a humidifier to help clear the air  - Dr. Farah will refill your nebulizer medications  - Once you feel better, we should consider a spirometry   - Your chest film looked good and no sounds of wheezing  - Follow-up in 2 weeks           Referral for (Test): Mammogram Referral  Location/Place/Provider: [facility]    Date/Time:  11/20/17 @ 1pm  Phone: [phone]   Fax: [phone]  Additional information/prep.: No soaps, powder, deodorants, perfumes, or any other scented items.   Scheduled by: Shekhar Rod CMA      Your medication list is printed, please keep this with you, it is helpful to bring this current list to any other medical appointments, the emergency room or hospital.    If you had lab testing today and your results are reassuring or normal they will be be mailed to you within 7 days.     If the lab tests need quick action we will call you with the results.   The phone number we will call with results is # [phone] (home) . If this is not the best number please call our clinic and change the number.    If you need any refills please call your pharmacy and they will contact us.    If you have any further concerns or wish to schedule another appointment you must call our office during normal business hours  [phone] (8-5:00 M-F)  If you have urgent medical questions that cannot wait  you may also call [phone] at any time of day.  If you have a medical emergency please call [phone].    Thank you for coming to [facility].              Follow-ups after your visit        Your next 10 appointments already scheduled     Nov 10, 2017  2:20 PM CST   Return Visit with Joanna Farah MD   [facility] ([facility])    [address] [place] [address]   [phone]              Who to contact     Please call your clinic at [phone] to:    Ask questions about your health    Make or cancel appointments    Discuss your medicines    Learn about your test results    Speak to your doctor   If you have compliments or concerns about an experience at your clinic, or if you wish to file a complaint, please contact [facility] Patient Relations at [phone] or email us at [email or web address]         Additional Information About Your Visit        Care EveryWhere ID     This is your Care EveryWhere ID. This could be used by other organizations to access your [facility] medical records  [id number]        Your Vitals Were     Pulse Temperature Respirations Height Pulse Oximetry BMI (Body Mass Index)    95 98.2  F (36.8  C) (Oral) 22 5' 2\" (157.5 cm) 96% 31.09 kg/m2       Blood Pressure from Last 3 Encounters:   10/27/17 111/79   10/17/17 (!) 123/7   09/22/17 126/82    Weight from Last 3 Encounters:   10/27/17 170 lb (77.1 kg)   10/17/17 166 lb 3.2 oz (75.4 kg)   09/15/17 163 lb (73.9 kg)              We Performed the Following     BNP ([facility])     XR CHEST 2 VW          Today's Medication Changes          These changes are accurate as of: 10/27/17  2:44 PM.  If you have any questions, ask your nurse or doctor.               Start taking these medicines.        Dose/Directions    montelukast 10 MG tablet   Commonly known as:  SINGULAIR   Used for:  Cough   Started by:  Joanna Farah MD        Dose:  10 mg   Take 1 tablet (10 mg) by mouth At Bedtime   Quantity:  90 tablet   Refills:  3            Where to get your medicines      These medications were sent " to [facility] [address] [place] [address] & hospitals  [address] [place] [id number]    Hours:  24-hours Phone:  [phone]     montelukast 10 MG tablet                Primary Care Provider Office Phone # Fax #    Joanna Farah [phone]       [facility] [address] [place] [address]        Equal Access to Services     LUCIANO [facility] : Hadii aad ku hadasho Soomaali, waaxda luqadaha, qaybta kaalmada adeegyada, waxay idiin hayaan adeeg kharash la'alda . So [facility] [phone].    ATENCIÓN: Si habla español, tiene a judge disposición servicios gratuitos de asistencia lingüística. [facility] [phone].    We comply with applicable federal civil rights laws and [place] laws. We do not discriminate on the basis of race, color, national origin, age, disability, sex, sexual orientation, or gender identity.            Thank you!     Thank you for choosing [facility]  for your care. Our goal is always to provide you with excellent care. Hearing back from our patients is one way we can continue to improve our services. Please take a few minutes to complete the written survey that you may receive in the mail after your visit with us. Thank you!             Your Updated Medication List - Protect others around you: Learn how to safely use, store and throw away your medicines at www.disposemymeds.org.          This list is accurate as of: 10/27/17  2:44 PM.  Always use your most recent med list.                   Brand Name Dispense Instructions for use Diagnosis    acetaminophen 325 MG tablet    TYLENOL     Take 650 mg by mouth every 6 hours as needed        benzonatate 100 MG capsule    TESSALON    42 capsule    Take 1 capsule (100 mg) by mouth 3 times daily as needed for cough    Chronic obstructive pulmonary disease with acute exacerbation (H)       blood glucose monitoring lancets      Test Blood glucose twice  daily        blood glucose monitoring meter device kit    no brand specified     Dispense glucose meter, test strips and lancets covered by the patient insurance. Test 2 times per day.        carBAMazepine 100 MG 12 hr tablet    TEGretol XR     Take 100 mg by mouth At Bedtime        doxycycline 100 MG capsule    VIBRAMYCIN    20 capsule    Take 1 capsule (100 mg) by mouth 2 times daily    Dysuria, Chronic obstructive pulmonary disease with acute exacerbation (H)       guaiFENesin 600 MG 12 hr tablet    MUCINEX    20 tablet    Take 1 tablet (600 mg) by mouth 2 times daily as needed for congestion    Cough       guaiFENesin-dextromethorphan 100-10 MG/5ML syrup    ROBITUSSIN DM    560 mL    Take 5 mLs by mouth every 4 hours as needed for cough    Cough       metFORMIN 500 MG tablet    GLUCOPHAGE     Take 500 mg by mouth 2 times daily (with meals)        mometasone-formoterol 100-5 MCG/ACT oral inhaler    DULERA    13 g    Inhale 2 puffs into the lungs 2 times daily    Chronic obstructive pulmonary disease with acute exacerbation (H)       montelukast 10 MG tablet    SINGULAIR    90 tablet    Take 1 tablet (10 mg) by mouth At Bedtime    Cough       omeprazole 20 MG CR capsule    priLOSEC     Take 20 mg by mouth daily        ONE TOUCH ULTRA test strip   Generic drug:  blood glucose monitoring      Dispense test strips covered by the patient insurance. Test 2 times per day.        PA VITAMIN D-3 2000 UNITS Caps   Generic drug:  cholecalciferol      Take 2,000 Units by mouth daily        polyethylene glycol Packet    MIRALAX/GLYCOLAX     Take 17 g by mouth daily as needed for constipation        PROAIR  (90 BASE) MCG/ACT Inhaler   Generic drug:  albuterol      Inhale 2 puffs into the lungs every 6 hours as needed for shortness of breath / dyspnea or wheezing        Spacer/Aero Chamber Mouthpiece Misc     1 each    1 Units 2 times daily    Chronic obstructive pulmonary disease with acute exacerbation (H)        tacrolimus 0.1 % ointment    PROTOPIC    30 g    Apply 30 g topically 2 times daily        vitamin D 70346 UNIT capsule    ERGOCALCIFEROL     Take 50,000 Units by mouth twice a week

## 2017-10-27 NOTE — NURSING NOTE
10/23/2017 [facility] Previsit Plan  Pap due--set up upon arrival  Foot exam--will set up upon arrival  ACT--will give upon arrival  AAP--MD to address (none in chart)  Eye referral  Mammogram referral - Referral placed on 09/22/17 - Called and scheduled appointment 1pm at [facility]   Colonoscopy or fit test - Referral placed on 09/22/17  A1C?  TSH?  Lipid?  Microalbumin?  Creatinine?  Offer flu vaccine - Declined until feeling well   HHer, CMA

## 2017-10-27 NOTE — LETTER
October 31, 2017      Mary Ann Olson  [address]  [place] [address]        Dear Mary Ann,    Please see below for your test results.  They are normal    Resulted Orders   BNP ([facility])   Result Value Ref Range    BNP 30 0 - 78 pg/mL    Narrative    Test performed by:  [facility]  [address] [place] [address]       If you have any questions, please call the clinic to make an appointment.    Sincerely,    Joanna Farah MD

## 2017-11-28 ENCOUNTER — AMBULATORY - HEALTHEAST (OUTPATIENT)
Dept: PULMONOLOGY | Facility: OTHER | Age: 52
End: 2017-11-28

## 2017-11-28 ENCOUNTER — COMMUNICATION - HEALTHEAST (OUTPATIENT)
Dept: PULMONOLOGY | Facility: OTHER | Age: 52
End: 2017-11-28

## 2017-11-28 ENCOUNTER — OFFICE VISIT (OUTPATIENT)
Dept: FAMILY MEDICINE | Facility: CLINIC | Age: 52
End: 2017-11-28

## 2017-11-28 VITALS
DIASTOLIC BLOOD PRESSURE: 86 MMHG | RESPIRATION RATE: 18 BRPM | OXYGEN SATURATION: 95 % | HEART RATE: 86 BPM | WEIGHT: 174.8 LBS | HEIGHT: 61 IN | BODY MASS INDEX: 33 KG/M2 | SYSTOLIC BLOOD PRESSURE: 129 MMHG | TEMPERATURE: 97.8 F

## 2017-11-28 DIAGNOSIS — R06.02 SOB (SHORTNESS OF BREATH): ICD-10-CM

## 2017-11-28 DIAGNOSIS — R05.9 COUGH: Primary | ICD-10-CM

## 2017-11-28 DIAGNOSIS — E11.9 TYPE 2 DIABETES MELLITUS WITHOUT COMPLICATION, WITHOUT LONG-TERM CURRENT USE OF INSULIN (H): ICD-10-CM

## 2017-11-28 LAB
ERYTHROCYTE [DISTWIDTH] IN BLOOD BY AUTOMATED COUNT: 12.5 % (ref 11–14.5)
HBA1C MFR BLD: 6.5 % (ref 4.1–5.7)
HCT VFR BLD AUTO: 43.7 % (ref 35–47)
HGB BLD-MCNC: 14.9 G/DL (ref 12–16)
MCH RBC QN AUTO: 29.9 PG (ref 27–34)
MCHC RBC AUTO-ENTMCNC: 34.1 G/DL (ref 32–36)
MCV RBC AUTO: 88 FL (ref 80–100)
PLATELET # BLD AUTO: 241 THOU/UL (ref 140–440)
PMV BLD AUTO: 10.4 FL (ref 8.5–12.5)
RBC # BLD AUTO: 4.99 MILL/UL (ref 3.8–5.4)
WBC # BLD AUTO: 10.3 THOU/UL (ref 4–11)

## 2017-11-28 RX ORDER — PREDNISONE 20 MG/1
40 TABLET ORAL DAILY
Qty: 6 TABLET | Refills: 0 | Status: SHIPPED | OUTPATIENT
Start: 2017-11-28 | End: 2017-12-01

## 2017-11-28 RX ORDER — AZITHROMYCIN 250 MG/1
TABLET, FILM COATED ORAL
Qty: 6 TABLET | Refills: 0 | Status: SHIPPED | OUTPATIENT
Start: 2017-11-28 | End: 2018-01-02
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
## 2017-11-28 NOTE — MR AVS SNAPSHOT
After Visit Summary   11/28/2017    Mary Ann Olson    MRN: [id number]           Patient Information     Date Of Birth          1965        Visit Information        Provider Department      11/28/2017 2:00 PM Joanna Farah MD [facility]        Today's Diagnoses     Cough    -  1    Type 2 diabetes mellitus without complication, without long-term current use of insulin (H)        SOB (shortness of breath)          Care Instructions    Cough:     Your medication list is printed, please keep this with you, it is helpful to bring this current list to any other medical appointments, the emergency room or hospital.    If you had lab testing today and your results are reassuring or normal they will be be mailed to you within 7 days.     If the lab tests need quick action we will call you with the results.   The phone number we will call with results is # [phone] (home) . If this is not the best number please call our clinic and change the number.    If you need any refills please call your pharmacy and they will contact us.    If you have any further concerns or wish to schedule another appointment you must call our office during normal business hours  [phone] (8-5:00 M-F)  If you have urgent medical questions that cannot wait  you may also call [phone] at any time of day.  If you have a medical emergency please call [phone].    Thank you for coming to [facility].      Referral for ( TEST )  :      PFT  LOCATION/PLACE/Provider :    [facility]  DATE & TIME :     12- at 1:30  PHONE :     [phone]  FAX :     [phone]  ADDITIONAL INFORMATION :       Appointment made by clinic staff/:    Gabbi    Referral for ( TEST )  :      Pulmonary Medicine  LOCATION/PLACE/Provider :     [facility]  DATE & TIME :     1-3-2018 at 11:00  PHONE :     [phone]  FAX :     [phone]  ADDITIONAL INFORMATION :       Appointment made by  "clinic staff/:    Gabbi              Follow-ups after your visit        Additional Services     PULMONARY MEDICINE REFERRAL       Patient is in room number: 11    Reason for Referral: Cough, needs full PFT      needed: No  Language: English    May leave message on voicemail: Yes    ([facility] Only) Referral should be tracked Yes                  Who to contact     Please call your clinic at [phone] to:    Ask questions about your health    Make or cancel appointments    Discuss your medicines    Learn about your test results    Speak to your doctor   If you have compliments or concerns about an experience at your clinic, or if you wish to file a complaint, please contact [facility] Patient Relations at [phone] or email us at [email or web address]         Additional Information About Your Visit        IntelliWheelshart Information     iovation is an electronic gateway that provides easy, online access to your medical records. With iovation, you can request a clinic appointment, read your test results, renew a prescription or communicate with your care team.     To sign up for iovation visit the website at [email or web address]   You will be asked to enter the access code listed below, as well as some personal information. Please follow the directions to create your username and password.     Your access code is: [email or web address]  Expires: 3/15/2018  1:18 PM     Your access code will  in 90 days. If you need help or a new code, please contact your [facility] or call [phone] for assistance.        Care EveryWhere ID     This is your Care EveryWhere ID. This could be used by other organizations to access your [facility] medical records  [id number]        Your Vitals Were     Pulse Temperature Respirations Height Pulse Oximetry BMI (Body Mass Index)    86 97.8  F (36.6  C) 18 5' 1\" (154.9 cm) 95% 33.03 kg/m2       Blood Pressure " from Last 3 Encounters:   11/28/17 129/86   10/27/17 111/79   10/17/17 (!) 123/7    Weight from Last 3 Encounters:   11/28/17 174 lb 12.8 oz (79.3 kg)   10/27/17 170 lb (77.1 kg)   10/17/17 166 lb 3.2 oz (75.4 kg)              We Performed the Following     CBC w/ Plt. ([facility])     Hemoglobin A1c ([facility])     PULMONARY MEDICINE REFERRAL     XR CHEST 2 VW          Today's Medication Changes          These changes are accurate as of: 11/28/17 11:59 PM.  If you have any questions, ask your nurse or doctor.               Start taking these medicines.        Dose/Directions    azithromycin 250 MG tablet   Commonly known as:  ZITHROMAX   Used for:  SOB (shortness of breath)   Started by:  Joanna Farah MD        Two tablets first day, then one tablet daily for four days.   Quantity:  6 tablet   Refills:  0       predniSONE 20 MG tablet   Commonly known as:  DELTASONE   Used for:  SOB (shortness of breath)   Started by:  Joanna Farah MD        Dose:  40 mg   Take 2 tablets (40 mg) by mouth daily for 3 days   Quantity:  6 tablet   Refills:  0         Stop taking these medicines if you haven't already. Please contact your care team if you have questions.     benzonatate 100 MG capsule   Commonly known as:  TESSALON   Stopped by:  Joanna Farah MD           doxycycline 100 MG capsule   Commonly known as:  VIBRAMYCIN   Stopped by:  Joanna Farah MD           guaiFENesin 600 MG 12 hr tablet   Commonly known as:  MUCINEX   Stopped by:  Joanna Farah MD           guaiFENesin-dextromethorphan 100-10 MG/5ML syrup   Commonly known as:  ROBITUSSIN DM   Stopped by:  Joanna Farah MD           tacrolimus 0.1 % ointment   Commonly known as:  PROTOPIC   Stopped by:  Joanna Farah MD                Where to get your medicines      These medications were sent to [facility] [address] [place] [address]   [address] [facility] [address]    Hours:  24-hours Phone:  [phone]     azithromycin 250 MG tablet    predniSONE 20 MG tablet                Primary Care Provider Office Phone # Fax #    Joanna Farah [phone]       [facility] [address] [facility] [address]        Equal Access to Services     [facility]: Hadii aad ku hadasho Soomaali, waaxda luqadaha, qaybta kaalmada adeegyada, waxay idiin hayaan adeeg kharash la'aan ah. So [facility] [place] [phone].    ATENCIÓN: Si habla español, tiene a judge disposición servicios gratuitos de asistencia lingüística. Mark al [phone].    We comply with applicable federal civil rights laws and [place] laws. We do not discriminate on the basis of race, color, national origin, age, disability, sex, sexual orientation, or gender identity.            Thank you!     Thank you for choosing [facility]  for your care. Our goal is always to provide you with excellent care. Hearing back from our patients is one way we can continue to improve our services. Please take a few minutes to complete the written survey that you may receive in the mail after your visit with us. Thank you!             Your Updated Medication List - Protect others around you: Learn how to safely use, store and throw away your medicines at www.disposemymeds.org.          This list is accurate as of: 11/28/17 11:59 PM.  Always use your most recent med list.                   Brand Name Dispense Instructions for use Diagnosis    acetaminophen 325 MG tablet    TYLENOL     Take 650 mg by mouth every 6 hours as needed        azithromycin 250 MG tablet    ZITHROMAX    6 tablet    Two tablets first day, then one tablet daily for four days.    SOB (shortness of breath)       blood glucose monitoring lancets      Test Blood glucose twice daily        blood glucose monitoring meter device kit    no brand specified     Dispense glucose meter, test strips and  lancets covered by the patient insurance. Test 2 times per day.        carBAMazepine 100 MG 12 hr tablet    TEGretol XR     Take 100 mg by mouth At Bedtime        metFORMIN 500 MG tablet    GLUCOPHAGE     Take 500 mg by mouth 2 times daily (with meals)        mometasone-formoterol 100-5 MCG/ACT oral inhaler    DULERA    13 g    Inhale 2 puffs into the lungs 2 times daily    Chronic obstructive pulmonary disease with acute exacerbation (H)       montelukast 10 MG tablet    SINGULAIR    90 tablet    Take 1 tablet (10 mg) by mouth At Bedtime    Cough       omeprazole 20 MG CR capsule    priLOSEC     Take 20 mg by mouth daily        ONETOUCH ULTRA test strip   Generic drug:  blood glucose monitoring      Dispense test strips covered by the patient insurance. Test 2 times per day.        PA VITAMIN D-3 2000 UNITS Caps   Generic drug:  cholecalciferol      Take 2,000 Units by mouth daily        polyethylene glycol Packet    MIRALAX/GLYCOLAX     Take 17 g by mouth daily as needed for constipation        predniSONE 20 MG tablet    DELTASONE    6 tablet    Take 2 tablets (40 mg) by mouth daily for 3 days    SOB (shortness of breath)       * PROAIR  (90 BASE) MCG/ACT Inhaler   Generic drug:  albuterol      Inhale 2 puffs into the lungs every 6 hours as needed for shortness of breath / dyspnea or wheezing        * albuterol (2.5 MG/3ML) 0.083% neb solution     25 vial    Take 1 vial (2.5 mg) by nebulization every 6 hours as needed for shortness of breath / dyspnea or wheezing    Cough       Spacer/Aero Chamber Mouthpiece Misc     1 each    1 Units 2 times daily    Chronic obstructive pulmonary disease with acute exacerbation (H)       vitamin D 14482 UNIT capsule    ERGOCALCIFEROL     Take 50,000 Units by mouth twice a week        * Notice:  This list has 2 medication(s) that are the same as other medications prescribed for you. Read the directions carefully, and ask your doctor or other care provider to review them  with you.

## 2017-11-28 NOTE — PATIENT INSTRUCTIONS
Cough:     Your medication list is printed, please keep this with you, it is helpful to bring this current list to any other medical appointments, the emergency room or hospital.    If you had lab testing today and your results are reassuring or normal they will be be mailed to you within 7 days.     If the lab tests need quick action we will call you with the results.   The phone number we will call with results is # [phone] (home) . If this is not the best number please call our clinic and change the number.    If you need any refills please call your pharmacy and they will contact us.    If you have any further concerns or wish to schedule another appointment you must call our office during normal business hours  [phone] (8-5:00 M-F)  If you have urgent medical questions that cannot wait  you may also call [phone] at any time of day.  If you have a medical emergency please call [phone].    Thank you for coming to [facility].      Referral for ( TEST )  :      PFT  LOCATION/PLACE/Provider :    [facility]  DATE & TIME :     12- at 1:30  PHONE :     [phone]  FAX :     [phone]  ADDITIONAL INFORMATION :       Appointment made by clinic staff/:    Gabbi    Referral for ( TEST )  :      Pulmonary Medicine  LOCATION/PLACE/Provider :    [facility]  DATE & TIME :     1-3-2018 at 11:00  PHONE :     [phone]  FAX :     [phone]  ADDITIONAL INFORMATION :       Appointment made by clinic staff/:    Gabbi

## 2017-12-13 DIAGNOSIS — J45.40 MODERATE PERSISTENT ASTHMA WITHOUT COMPLICATION: Primary | ICD-10-CM

## 2017-12-15 RX ORDER — ALBUTEROL SULFATE 90 UG/1
2 AEROSOL, METERED RESPIRATORY (INHALATION) EVERY 6 HOURS PRN
Qty: 3 INHALER | Refills: 0 | Status: SHIPPED | OUTPATIENT
Start: 2017-12-15 | End: 2018-03-12

## 2018-01-02 ENCOUNTER — OFFICE VISIT (OUTPATIENT)
Dept: FAMILY MEDICINE | Facility: CLINIC | Age: 53
End: 2018-01-02

## 2018-01-02 VITALS
HEIGHT: 61 IN | WEIGHT: 165.2 LBS | BODY MASS INDEX: 31.19 KG/M2 | OXYGEN SATURATION: 98 % | TEMPERATURE: 98.6 F | HEART RATE: 86 BPM | SYSTOLIC BLOOD PRESSURE: 125 MMHG | RESPIRATION RATE: 20 BRPM | DIASTOLIC BLOOD PRESSURE: 79 MMHG

## 2018-01-02 DIAGNOSIS — R10.9 FLANK PAIN: ICD-10-CM

## 2018-01-02 DIAGNOSIS — E11.9 TYPE 2 DIABETES MELLITUS WITHOUT COMPLICATION, WITHOUT LONG-TERM CURRENT USE OF INSULIN (H): ICD-10-CM

## 2018-01-02 DIAGNOSIS — K59.00 CONSTIPATION, UNSPECIFIED CONSTIPATION TYPE: Primary | ICD-10-CM

## 2018-01-02 LAB
BACTERIA: NORMAL
BILIRUBIN UR: NEGATIVE
BLOOD UR: NEGATIVE
CASTS: NORMAL /LPF
CLARITY, URINE: CLEAR
COLOR UR: YELLOW
CRYSTAL URINE: NORMAL /LPF
EPITHELIAL CELLS UR: NORMAL /LPF (ref 0–2)
GLUCOSE URINE: NEGATIVE
KETONES UR QL: NEGATIVE
LEUKOCYTE ESTERASE UR: ABNORMAL
MUCOUS URINE: NORMAL LPF
NITRITE UR QL STRIP: NEGATIVE
PH UR STRIP: 7 [PH] (ref 5–7)
PROTEIN UR: NEGATIVE
RBC URINE: NORMAL /HPF
SP GR UR STRIP: 1.01
UROBILINOGEN UR STRIP-ACNC: ABNORMAL
WBC URINE: NORMAL /HPF

## 2018-01-02 RX ORDER — POLYETHYLENE GLYCOL 3350 17 G/17G
1 POWDER, FOR SOLUTION ORAL DAILY
Qty: 510 G | Refills: 1 | Status: SHIPPED | OUTPATIENT
Start: 2018-01-02 | End: 2018-07-06
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
## 2018-01-02 NOTE — NURSING NOTE
Pap--pt will come back for CPE  Offer flu vaccine--will discuss with MD if want it  Mammogram / Colonoscopy / fit test--pt decline all  PHQ9 / ACT--pt decline all since did not bring glasses

## 2018-01-03 ENCOUNTER — TELEPHONE (OUTPATIENT)
Dept: FAMILY MEDICINE | Facility: CLINIC | Age: 53
End: 2018-01-03

## 2018-01-03 NOTE — PROGRESS NOTES
Preceptor Attestation:  Patient's case reviewed and discussed with Venita Garcia MD resident and I evaluated the patient. I agree with written assessment and plan of care.  Supervising Physician:  MILAN WHITMAN MD  [facility]

## 2018-01-03 NOTE — TELEPHONE ENCOUNTER
[facility] phone call message-patient reporting a symptom:     Symptom: chills    Same Day Visit Offered: no more openings    Additional comments: Calling to speak to a Nurse regarding symptoms, above. She states she came in yesterday and got a flu shot but now having chills and never had that before so she is wondering if it normal? Please call and advise.     OK to leave message on voice mail? Yes    Primary language: English      needed? No    Call taken on January 3, 2018 at 9:41 AM by Marshall Weeks

## 2018-01-03 NOTE — TELEPHONE ENCOUNTER
C/o chills throughout body, no measured temp. Reassurance given, reviewed s/s of common side effects from influenza vaccination- this can be experienced as the body is responding to vaccine. Common side effects from the flu shot include:    Soreness, redness, and/or swelling from the shot  Headache  Fever  Nausea  Muscle aches  fatigue    If she should experience Signs of a severe allergic reaction can include:    Difficulty breathing  Hoarseness or wheezing  Swelling around the eyes or lips  Hives  Paleness  Weakness  A fast heart beat or dizziness  She will need to be seen. Mary Ann states understanding. No further concerns. Pasha SMITH

## 2018-01-05 ENCOUNTER — COMMUNICATION - HEALTHEAST (OUTPATIENT)
Dept: FAMILY MEDICINE | Facility: CLINIC | Age: 53
End: 2018-01-05

## 2018-01-05 DIAGNOSIS — J45.40 MODERATE PERSISTENT ASTHMA WITHOUT COMPLICATION: ICD-10-CM

## 2018-01-19 ENCOUNTER — TELEPHONE (OUTPATIENT)
Dept: FAMILY MEDICINE | Facility: CLINIC | Age: 53
End: 2018-01-19

## 2018-01-19 NOTE — TELEPHONE ENCOUNTER
I called and spoke with Mary Ann. Finished a course of prednisone from [facility] three days ago and is wondering if it could still be effecting her blood sugar. Has been higher than usual at 150-170 after meals today. I told her I don't think the prednisone would still be having an effect 3 days out, but that she should continue her metformin and continue checking her blood sugar 1-2x per day. If it is elevated above 200 and not coming down I asked her to give us a call at the clinic.     Dali Major MD  [facility] Medicine Resident  Pager

## 2018-01-19 NOTE — TELEPHONE ENCOUNTER
Reason for call: Patient states that her blood sugars have been high, and that she is worried. They have been at 154, and that is after taking her Metformin.     Return call needed: Yes    OK to leave a message on voice mail? Yes    Primary language: English      needed? No    Call taken on January 19, 2018 at 1:48 PM by Teresa Castañeda

## 2018-01-19 NOTE — TELEPHONE ENCOUNTER
Karenjose alberto was concerned as to how long Prednisone would affect her bs levels. Was seen at [place] [facility] 1/11/2018 given a 5 days Prednisone course, last dose 1/16/2018. This morning fasting bs was 123, two hours after ate a bowl of cereal for breakfast bs was 154. Will route to STEPHANIE Major for review. Pasha SMITH

## 2018-01-23 ENCOUNTER — OFFICE VISIT (OUTPATIENT)
Dept: FAMILY MEDICINE | Facility: CLINIC | Age: 53
End: 2018-01-23

## 2018-01-23 VITALS
DIASTOLIC BLOOD PRESSURE: 92 MMHG | TEMPERATURE: 98 F | SYSTOLIC BLOOD PRESSURE: 142 MMHG | HEIGHT: 61 IN | HEART RATE: 96 BPM | OXYGEN SATURATION: 96 % | WEIGHT: 169 LBS | BODY MASS INDEX: 31.91 KG/M2

## 2018-01-23 DIAGNOSIS — B37.31 CANDIDIASIS OF VULVA AND VAGINA: Primary | ICD-10-CM

## 2018-01-23 PROBLEM — T14.91XA SUICIDE ATTEMPT (H): Status: ACTIVE | Noted: 2017-07-16

## 2018-01-23 PROBLEM — S09.90XA HEAD INJURY: Status: ACTIVE | Noted: 2018-01-23

## 2018-01-23 PROBLEM — F06.30 MOOD DISORDER DUE TO OLD HEAD INJURY: Status: ACTIVE | Noted: 2018-01-23

## 2018-01-23 PROBLEM — K58.9 IRRITABLE BOWEL SYNDROME: Status: ACTIVE | Noted: 2018-01-23

## 2018-01-23 PROBLEM — N20.0 NEPHROLITHIASIS: Status: ACTIVE | Noted: 2018-01-23

## 2018-01-23 PROBLEM — M19.90 OSTEOARTHROSIS: Status: ACTIVE | Noted: 2018-01-23

## 2018-01-23 PROBLEM — E66.3 OVERWEIGHT: Status: ACTIVE | Noted: 2018-01-23

## 2018-01-23 PROBLEM — N95.2 ATROPHIC VAGINITIS: Status: ACTIVE | Noted: 2017-01-31

## 2018-01-23 PROBLEM — S09.90XS MOOD DISORDER DUE TO OLD HEAD INJURY: Status: ACTIVE | Noted: 2018-01-23

## 2018-01-23 PROBLEM — T50.901A OVERDOSE: Status: ACTIVE | Noted: 2017-07-16

## 2018-01-23 PROBLEM — F17.200 NICOTINE DEPENDENCE: Status: ACTIVE | Noted: 2018-01-23

## 2018-01-23 PROBLEM — D50.9 IRON DEFICIENCY ANEMIA, UNSPECIFIED: Status: ACTIVE | Noted: 2018-01-23

## 2018-01-23 LAB
BACTERIA: NORMAL
CLUE CELLS: NEGATIVE
MOTILE TRICHOMONAS: NEGATIVE
ODOR: NEGATIVE
PH WET PREP: NORMAL
WBC WET PREP: NORMAL
YEAST: NEGATIVE

## 2018-01-23 RX ORDER — FLUCONAZOLE 150 MG/1
150 TABLET ORAL ONCE
Qty: 1 TABLET | Refills: 0 | Status: SHIPPED | OUTPATIENT
Start: 2018-01-23 | End: 2018-01-23

## 2018-01-23 NOTE — LETTER
January 24, 2018      Mary Ann Olson  [address]  [place] [address]        Dear Mary Ann,    We tested for sexually transmitted diseases called gonorrhea and chlamydia when you were in clinic and those tests were negative. We did not find any other type of infection besides the skin rash that you and I talked about. If you continue to have symptoms please return to clinic.     Please see below for your test results.    Resulted Orders   Wet Prep ([facility])   Result Value Ref Range    Yeast Wet Prep NEGATIVE none    Motile Trichomonas Wet Prep NEGATIVE Negative    Clue Cells Wet Prep NEGATIVE NONE    WBC WET PREP 10-25 2 - 5    Bacteria Wet Prep Few None    pH Wet Prep Not performed 3.8 - 4.5    Odor Wet Prep NEGATIVE NONE   Chlamydia/Gono Amplified ([facility])   Result Value Ref Range    Chlamydia trac,Amplified Prb Negative Negative    N gonorrhoeae,Amplified Prb Negative Negative    Narrative    Test performed by:  [facility]  [address] [place] [address]       Sincerely,    Valencia Brown MD
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
## 2018-01-23 NOTE — MR AVS SNAPSHOT
"              After Visit Summary   2018    Mary Ann Olson    MRN: [id number]           Patient Information     Date Of Birth          1965        Visit Information        Provider Department      2018 2:40 PM Valencia Brown MD [facility]        Today's Diagnoses     Candidiasis of vulva and vagina    -  1       Follow-ups after your visit        Who to contact     Please call your clinic at [phone] to:    Ask questions about your health    Make or cancel appointments    Discuss your medicines    Learn about your test results    Speak to your doctor   If you have compliments or concerns about an experience at your clinic, or if you wish to file a complaint, please contact [facility] Patient Relations at [phone] or email us at [email or web address]         Additional Information About Your Visit        MyChart Information     [employer] is an electronic gateway that provides easy, online access to your medical records. With [employer], you can request a clinic appointment, read your test results, renew a prescription or communicate with your care team.     To sign up for [employer] visit the website at [email or web address]   You will be asked to enter the access code listed below, as well as some personal information. Please follow the directions to create your username and password.     Your access code is: [email or web address]  Expires: 3/15/2018  1:18 PM     Your access code will  in 90 days. If you need help or a new code, please contact your [facility] Clinic or call [phone] for assistance.        Care EveryWhere ID     This is your Care EveryWhere ID. This could be used by other organizations to access your [facility] medical records  [id number]        Your Vitals Were     Pulse Temperature Height Pulse Oximetry BMI (Body Mass Index)       96 98  F (36.7  C) (Oral) 5' 1\" (154.9 cm) 96% 31.93 kg/m2        " Blood Pressure from Last 3 Encounters:   01/23/18 (!) 142/92   01/02/18 125/79   11/28/17 129/86    Weight from Last 3 Encounters:   01/23/18 169 lb (76.7 kg)   01/02/18 165 lb 3.2 oz (74.9 kg)   11/28/17 174 lb 12.8 oz (79.3 kg)              We Performed the Following     Chlamydia/Gono Amplified ([facility])     Wet Prep (P )          Today's Medication Changes          These changes are accurate as of: 1/23/18  4:13 PM.  If you have any questions, ask your nurse or doctor.               Start taking these medicines.        Dose/Directions    fluconazole 150 MG tablet   Commonly known as:  DIFLUCAN   Used for:  Candidiasis of vulva and vagina   Started by:  Valencia Brown MD        Dose:  150 mg   Take 1 tablet (150 mg) by mouth once for 1 dose   Quantity:  1 tablet   Refills:  0            Where to get your medicines      These medications were sent to [facility] [address] [place] [address] & [address] [address]    Hours:  24-hours Phone:  [phone]     fluconazole 150 MG tablet                Primary Care Provider Office Phone # Fax #    Joanna Polina Farah [phone]       [facility] [address] [place] [address]        Equal Access to Services     [facility]: Hadii aad ku hadasho Soomaali, waaxda luqadaha, qaybta kaalmada adeegyada, grace ajin hayalda dimas . So [facility] [phone].    ATENCIÓN: Si habla español, tiene a judge disposición servicios gratuitos de asistencia lingüística. Mark al [phone].    We comply with applicable federal civil rights laws and [place] laws. We do not discriminate on the basis of race, color, national origin, age, disability, sex, sexual orientation, or gender identity.            Thank you!     Thank you for choosing [facility]  for your care. Our goal is always to provide you with excellent care. Hearing back from  our patients is one way we can continue to improve our services. Please take a few minutes to complete the written survey that you may receive in the mail after your visit with us. Thank you!             Your Updated Medication List - Protect others around you: Learn how to safely use, store and throw away your medicines at www.disposemymeds.org.          This list is accurate as of: 1/23/18  4:13 PM.  Always use your most recent med list.                   Brand Name Dispense Instructions for use Diagnosis    acetaminophen 325 MG tablet    TYLENOL     Take 650 mg by mouth every 6 hours as needed        * albuterol (2.5 MG/3ML) 0.083% neb solution     25 vial    Take 1 vial (2.5 mg) by nebulization every 6 hours as needed for shortness of breath / dyspnea or wheezing    Cough       * albuterol 108 (90 BASE) MCG/ACT Inhaler    PROAIR HFA    3 Inhaler    Inhale 2 puffs into the lungs every 6 hours as needed for shortness of breath / dyspnea or wheezing    Moderate persistent asthma without complication       blood glucose monitoring lancets      Test Blood glucose twice daily        blood glucose monitoring meter device kit    no brand specified     Dispense glucose meter, test strips and lancets covered by the patient insurance. Test 2 times per day.        carBAMazepine 100 MG 12 hr tablet    TEGretol XR     Take 100 mg by mouth At Bedtime        fluconazole 150 MG tablet    DIFLUCAN    1 tablet    Take 1 tablet (150 mg) by mouth once for 1 dose    Candidiasis of vulva and vagina       metFORMIN 500 MG tablet    GLUCOPHAGE    180 tablet    Take 2 tablets (1,000 mg) by mouth 2 times daily (with meals)    Type 2 diabetes mellitus without complication, without long-term current use of insulin (H)       mometasone-formoterol 100-5 MCG/ACT oral inhaler    DULERA    13 g    Inhale 2 puffs into the lungs 2 times daily    Chronic obstructive pulmonary disease with acute exacerbation (H)       montelukast 10 MG tablet     SINGULAIR    90 tablet    Take 1 tablet (10 mg) by mouth At Bedtime    Cough       omeprazole 20 MG CR capsule    priLOSEC     Take 20 mg by mouth daily        ONETOUCH ULTRA test strip   Generic drug:  blood glucose monitoring      Dispense test strips covered by the patient insurance. Test 2 times per day.        PA VITAMIN D-3 2000 UNITS Caps   Generic drug:  cholecalciferol      Take 2,000 Units by mouth daily        * polyethylene glycol Packet    MIRALAX/GLYCOLAX     Take 17 g by mouth daily as needed for constipation        * polyethylene glycol powder    MIRALAX    510 g    Take 17 g (1 capful) by mouth daily    Constipation, unspecified constipation type       psyllium 0.52 G capsule     540 capsule    Take 1 capsule (0.52 g) by mouth daily    Constipation, unspecified constipation type       Spacer/Aero Chamber Mouthpiece Misc     1 each    1 Units 2 times daily    Chronic obstructive pulmonary disease with acute exacerbation (H)       vitamin D 02194 UNIT capsule    ERGOCALCIFEROL     Take 50,000 Units by mouth twice a week        * Notice:  This list has 4 medication(s) that are the same as other medications prescribed for you. Read the directions carefully, and ask your doctor or other care provider to review them with you.

## 2018-01-24 DIAGNOSIS — E11.9 TYPE 2 DIABETES MELLITUS WITHOUT COMPLICATION, WITHOUT LONG-TERM CURRENT USE OF INSULIN (H): Primary | ICD-10-CM

## 2018-01-24 LAB
C TRACH RRNA CVX QL NAA+PROBE: NEGATIVE
N GONORRHOEA RRNA SPEC QL NAA+PROBE: NEGATIVE

## 2018-01-24 NOTE — PROGRESS NOTES
Please send a letter to the patient with the following message:    Dear Mary Ann,    We tested for sexually transmitted diseases called gonorrhea and chlamydia when you were in clinic and those tests were negative. We did not find any other type of infection besides the skin rash that you and I talked about. If you continue to have symptoms please return to clinic.     Please call the clinic if you have any questions.     Sincerely,  Dr. Valencia Brown

## 2018-01-25 ENCOUNTER — TELEPHONE (OUTPATIENT)
Dept: FAMILY MEDICINE | Facility: CLINIC | Age: 53
End: 2018-01-25

## 2018-01-25 NOTE — TELEPHONE ENCOUNTER
Returned Mary Ann's call and spoke about symptoms. Mary Ann stated she is now in the bathroom having a bowel movement but stool is still passing with difficulty. Mary Ann stated she is aware of not straining during bowel movements. Mary Ann just began taking metamucil capsule to assist in passing stool, I advised her to continue taking and to give the medication a couple more days to work. I also advised to continue drinking prune juice and water. Asked Mary Ann to call the clinic if abdominal pain occurs, and to follow up on Monday to see if symptoms resolved. Mary Ann verbalized understanding. I will follow-up via phone on Monday if Mary Ann does not follow-up before then. Basilio SMITH

## 2018-01-25 NOTE — TELEPHONE ENCOUNTER
[facility] phone call message-patient reporting a symptom:     Symptom: Complains of not having a bowel movement since yesterday. Hard stool yesterday. States that she has been drinking some prune juice, taking miralax and metamucial type capsule. Drinking enough water. Patients doesn't have any current abdominal pain. Not sure what to do next.     Same Day Visit Offered: would like to speak to someone first    Additional comments:     OK to leave message on voice mail? Yes    Primary language: English      needed? No    Call taken on January 25, 2018 at 8:45 AM by Xiomara Willingham

## 2018-01-29 NOTE — TELEPHONE ENCOUNTER
Followed-up with Mary Ann via telephone regarding difficulty passing stool. Mary Ann stated she is still experiencing difficulties and is now having stabbing stomach pain. She reported the stool that she is passing is very small and hard. Mary Ann reports a bloated abdomen that is hard to the touch. I offered Mary Ann n appointment today but she only wanted to see  however  is unavailable today. Mary Ann stated she does not want to see any other physician at [facility]. I advised Mary Ann, due to abdomen being distended and hard, to seek Urgent Care or ER. Mary Ann stated she will go to [facility] after scheduling a ride through her insurance. Basilio SMITH

## 2018-02-12 ENCOUNTER — COMMUNICATION - HEALTHEAST (OUTPATIENT)
Dept: FAMILY MEDICINE | Facility: CLINIC | Age: 53
End: 2018-02-12

## 2018-02-12 DIAGNOSIS — E11.9 TYPE 2 DIABETES MELLITUS (H): ICD-10-CM

## 2018-02-16 ENCOUNTER — COMMUNICATION - HEALTHEAST (OUTPATIENT)
Dept: FAMILY MEDICINE | Facility: CLINIC | Age: 53
End: 2018-02-16

## 2018-02-16 DIAGNOSIS — E11.9 TYPE 2 DIABETES MELLITUS (H): ICD-10-CM

## 2018-03-12 ENCOUNTER — OFFICE VISIT (OUTPATIENT)
Dept: FAMILY MEDICINE | Facility: CLINIC | Age: 53
End: 2018-03-12
Payer: COMMERCIAL

## 2018-03-12 VITALS
DIASTOLIC BLOOD PRESSURE: 92 MMHG | SYSTOLIC BLOOD PRESSURE: 147 MMHG | BODY MASS INDEX: 31.6 KG/M2 | HEIGHT: 61 IN | RESPIRATION RATE: 20 BRPM | TEMPERATURE: 98.4 F | WEIGHT: 167.4 LBS | HEART RATE: 93 BPM | OXYGEN SATURATION: 97 %
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
## 2018-03-12 DIAGNOSIS — J45.40 MODERATE PERSISTENT ASTHMA WITHOUT COMPLICATION: ICD-10-CM

## 2018-03-12 DIAGNOSIS — J44.1 COPD EXACERBATION (H): ICD-10-CM

## 2018-03-12 RX ORDER — ALBUTEROL SULFATE 90 UG/1
2 AEROSOL, METERED RESPIRATORY (INHALATION) EVERY 6 HOURS PRN
Qty: 3 INHALER | Refills: 0 | Status: SHIPPED | OUTPATIENT
Start: 2018-03-12 | End: 2018-06-12

## 2018-03-12 RX ORDER — AZITHROMYCIN 250 MG/1
TABLET, FILM COATED ORAL
Qty: 6 TABLET | Refills: 0 | Status: SHIPPED | OUTPATIENT
Start: 2018-03-12 | End: 2018-06-12
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
## 2018-03-13 ENCOUNTER — TELEPHONE (OUTPATIENT)
Dept: FAMILY MEDICINE | Facility: CLINIC | Age: 53
End: 2018-03-13

## 2018-03-13 DIAGNOSIS — J45.40 MODERATE PERSISTENT ASTHMA WITHOUT COMPLICATION: ICD-10-CM

## 2018-03-13 NOTE — TELEPHONE ENCOUNTER
Albuterol inhaler not covered, preferred alternative is centolin HFA under plan.  Please send in new rx.

## 2018-03-14 RX ORDER — ALBUTEROL SULFATE 90 UG/1
2 AEROSOL, METERED RESPIRATORY (INHALATION) EVERY 6 HOURS PRN
Qty: 1 INHALER | Refills: 1 | Status: SHIPPED | OUTPATIENT
Start: 2018-03-14 | End: 2018-06-12

## 2018-03-19 ENCOUNTER — TELEPHONE (OUTPATIENT)
Dept: FAMILY MEDICINE | Facility: CLINIC | Age: 53
End: 2018-03-19

## 2018-03-19 DIAGNOSIS — J44.1 COPD EXACERBATION (H): Primary | ICD-10-CM

## 2018-03-19 RX ORDER — PREDNISONE 20 MG/1
40 TABLET ORAL DAILY
Qty: 10 TABLET | Refills: 0 | Status: SHIPPED | OUTPATIENT
Start: 2018-03-19 | End: 2018-03-24
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
## 2018-03-20 NOTE — TELEPHONE ENCOUNTER
Called patient to follow-up on conversation with . Patient did not answer, left message to call clinic back. Basilio SMITH

## 2018-03-21 NOTE — TELEPHONE ENCOUNTER
Called patient to follow up from no-show and to see if symptoms have improved. Mary Ann stated the prednisone has helped and she has been taking that. She states she is still experiencing SOB but not nearly as bad. Mary Ann was able to speak in full, long sentences without sounds SOB, no wheezing noted by patient. I advised Mary Ann to go to ED if symptoms don't continue to improve or if they worsen. Mary Ann verbalized understanding. Will route to both PCP and . Basilio SMITH

## 2018-03-22 ENCOUNTER — COMMUNICATION - HEALTHEAST (OUTPATIENT)
Dept: FAMILY MEDICINE | Facility: CLINIC | Age: 53
End: 2018-03-22

## 2018-03-22 DIAGNOSIS — N95.2 ATROPHIC VAGINITIS: ICD-10-CM

## 2018-03-25 ENCOUNTER — RECORDS - HEALTHEAST (OUTPATIENT)
Dept: ADMINISTRATIVE | Facility: OTHER | Age: 53
End: 2018-03-25

## 2018-03-27 ENCOUNTER — TELEPHONE (OUTPATIENT)
Dept: FAMILY MEDICINE | Facility: CLINIC | Age: 53
End: 2018-03-27

## 2018-03-27 NOTE — TELEPHONE ENCOUNTER
Patient is calling to ask for sleep aid to help with insomnia which has been an issues for number of weeks. She has tried mindfulness and good sleep hygiene without relief. Have tried over the counter sleep aid without relief and is ask specifically for Ambien. Denied any hallucination, suicidal or homicidal ideation. No other acute issues other than sleep. Discuss with patient importance of sleep hygiene and mindfulness which she will continue to work on. Discuss with patient that Ambien would need to be discuss with her PCP who she would like to see but unable to make appointment due to busy schedule. Will have  reach out to schedule appointment with Dr. Farah.    Lui Beltran MD  [facility]  Pager: [phone]

## 2018-04-15 ENCOUNTER — COMMUNICATION - HEALTHEAST (OUTPATIENT)
Dept: FAMILY MEDICINE | Facility: CLINIC | Age: 53
End: 2018-04-15

## 2018-04-15 DIAGNOSIS — E11.9 TYPE 2 DIABETES MELLITUS (H): ICD-10-CM

## 2018-04-16 DIAGNOSIS — E11.9 TYPE 2 DIABETES MELLITUS WITHOUT COMPLICATION, WITHOUT LONG-TERM CURRENT USE OF INSULIN (H): Primary | ICD-10-CM

## 2018-04-17 ENCOUNTER — TELEPHONE (OUTPATIENT)
Dept: FAMILY MEDICINE | Facility: CLINIC | Age: 53
End: 2018-04-17

## 2018-04-17 NOTE — TELEPHONE ENCOUNTER
I got the pt ED discharge paperwork, I called to check up on the pt and help setup a ED follow up.  The pt was at [place] ED for breathing issue.  Pt stated that she wants to follow up with her doctor, but she has some stuff that she has to take care of.  Pt stated that while in the ED they informed her that it had something to do with her COPD overlapping.  Pt stated that she needs some steroids, and was wondering if  will be willing to prescribe her some without her coming in to be seen.  I told her that I do not think that  would prescribe her steroids without her coming in.  Pt stated that she will call later, to setup a follow up.

## 2018-05-03 ENCOUNTER — TELEPHONE (OUTPATIENT)
Dept: FAMILY MEDICINE | Facility: CLINIC | Age: 53
End: 2018-05-03

## 2018-05-03 NOTE — TELEPHONE ENCOUNTER
Seen at [facility] ED on 4/29/2018, informed symptoms were related to Crohns flare up. [facility] saw patient in ED per pt report, they advised she increase her Miralax dose. Has not been seen in clinic for follow up. Yesterday used Humira for her Crohns associates abdomen feeling worse after administered Humira.  C/o very bloated and abdomen feels hard. C/o increase stress at work. BM- daily but very small amount only, soft formed. Denies nausea. Barriers to coming into clinic for follow up, transportation. During conversation, pt would prefer to be seen in ED, states her medical ride will take her only to ED visits promptly. Declined going into Urgent care as well.  Pasha SMITH

## 2018-05-04 ENCOUNTER — TELEPHONE (OUTPATIENT)
Dept: FAMILY MEDICINE | Facility: CLINIC | Age: 53
End: 2018-05-04

## 2018-05-04 NOTE — TELEPHONE ENCOUNTER
Ed follow up    Called to follow up  Pt was seen at [facility]    Left message to call and schedule appt    shavon

## 2018-05-04 NOTE — TELEPHONE ENCOUNTER
Ed follow up      Pt called back to say she is still feeling lousy - went to ed again she reports, said they gave her a laxative of sorts not sure what kind of a lemony seltzer water she has not yet begun to take it    She reports not feeling well, encouraged her to come in and see her pcp, she was receptive but wanted to call when she is at home     Lbetz

## 2018-05-06 ENCOUNTER — TELEPHONE (OUTPATIENT)
Dept: FAMILY MEDICINE | Facility: CLINIC | Age: 53
End: 2018-05-06

## 2018-05-06 DIAGNOSIS — K59.00 CONSTIPATION, UNSPECIFIED CONSTIPATION TYPE: Primary | ICD-10-CM

## 2018-05-06 NOTE — TELEPHONE ENCOUNTER
After hours clinic phone call for constipation.    Seen at [facility] ED on 4/29 for abdominal pain thought to be d/t Crohns flare up. [facility] advised increasing miralax.    Per review, she also went to [facility] [place] [facility] on 5/3 for abdominal bloating, given miralax and magnesium citrate. She has not received the magnesium citrate, but she has been using 51mg of miralax bid    Still having severe constipation. Has also been given suppositories by GI (last used last night) with no relief.    No N/V and has been able to eat an drink normally. No abdominal pain. Has been having small amounts of loose stool, but still feels very constipation. In the last 2 weeks the bloating has been getting worse. Has also been having chills.    In discussion she is unable to get to a pharmacy to  additional medication. Given her hx of Crohns disease and subjective chills, I advised her to be seen in the ED for possible infectious work up and bowel clean out. She was in agreement and understanding.    Nova Thomas MD ([id number])  Pager: [phone]  [facility]

## 2018-05-14 ENCOUNTER — TELEPHONE (OUTPATIENT)
Dept: FAMILY MEDICINE | Facility: CLINIC | Age: 53
End: 2018-05-14

## 2018-05-14 NOTE — TELEPHONE ENCOUNTER
Offered an appt for today, her transportation is not available to bring her in at the given appt time availability today. Scheduled an appt for tomorrow afternoon as requested. C/o cough associated with some shortness of breath, has been taking her Singulair, Dulera and using Albuterol neb soln. Denies fever, no chest discomfort/heaviness or tightness.  Cough- dry. Advised if breathing worsened to present to Urgent Care. Otherwise Mary Ann will plan on attending her scheduled appt at [facility]. Advised if needed to use Albuterol neb soln as prescribed every 6 hours as needed for shortness of breath/ wheezing. Pasha SMITH

## 2018-05-19 ENCOUNTER — TRANSFERRED RECORDS (OUTPATIENT)
Dept: HEALTH INFORMATION MANAGEMENT | Facility: CLINIC | Age: 53
End: 2018-05-19

## 2018-05-22 ENCOUNTER — COMMUNICATION - HEALTHEAST (OUTPATIENT)
Dept: FAMILY MEDICINE | Facility: CLINIC | Age: 53
End: 2018-05-22

## 2018-05-22 DIAGNOSIS — E78.5 HYPERLIPEMIA: ICD-10-CM

## 2018-05-22 DIAGNOSIS — K21.9 ACID REFLUX: ICD-10-CM

## 2018-05-25 ENCOUNTER — MEDICAL CORRESPONDENCE (OUTPATIENT)
Dept: HEALTH INFORMATION MANAGEMENT | Facility: CLINIC | Age: 53
End: 2018-05-25

## 2018-06-07 ENCOUNTER — TRANSFERRED RECORDS (OUTPATIENT)
Dept: HEALTH INFORMATION MANAGEMENT | Facility: CLINIC | Age: 53
End: 2018-06-07

## 2018-06-11 ENCOUNTER — COMMUNICATION - HEALTHEAST (OUTPATIENT)
Dept: FAMILY MEDICINE | Facility: CLINIC | Age: 53
End: 2018-06-11

## 2018-06-11 DIAGNOSIS — E55.9 VITAMIN D DEFICIENCY: ICD-10-CM

## 2018-06-12 ENCOUNTER — OFFICE VISIT (OUTPATIENT)
Dept: FAMILY MEDICINE | Facility: CLINIC | Age: 53
End: 2018-06-12
Payer: COMMERCIAL

## 2018-06-12 VITALS
BODY MASS INDEX: 29.64 KG/M2 | DIASTOLIC BLOOD PRESSURE: 89 MMHG | RESPIRATION RATE: 18 BRPM | HEART RATE: 96 BPM | HEIGHT: 61 IN | SYSTOLIC BLOOD PRESSURE: 132 MMHG | WEIGHT: 157 LBS | TEMPERATURE: 97.6 F | OXYGEN SATURATION: 95 %

## 2018-06-12 DIAGNOSIS — F60.9 PERSONALITY DISORDER (H): ICD-10-CM

## 2018-06-12 DIAGNOSIS — R45.851 SUICIDAL IDEATION: Primary | ICD-10-CM

## 2018-06-12 DIAGNOSIS — E11.9 TYPE 2 DIABETES MELLITUS WITHOUT COMPLICATION, WITHOUT LONG-TERM CURRENT USE OF INSULIN (H): ICD-10-CM

## 2018-06-12 DIAGNOSIS — Z11.1 SCREENING FOR TUBERCULOSIS: ICD-10-CM

## 2018-06-12 DIAGNOSIS — J45.40 MODERATE PERSISTENT ASTHMA WITHOUT COMPLICATION: ICD-10-CM

## 2018-06-12 DIAGNOSIS — R05.9 COUGH: ICD-10-CM

## 2018-06-12 DIAGNOSIS — J44.1 CHRONIC OBSTRUCTIVE PULMONARY DISEASE WITH ACUTE EXACERBATION (H): ICD-10-CM

## 2018-06-12 RX ORDER — ALBUTEROL SULFATE 90 UG/1
2 AEROSOL, METERED RESPIRATORY (INHALATION) EVERY 6 HOURS PRN
Qty: 1 INHALER | Refills: 1 | Status: SHIPPED | OUTPATIENT
Start: 2018-06-12 | End: 2018-08-30

## 2018-06-12 RX ORDER — CARBAMAZEPINE 200 MG/1
200 TABLET, EXTENDED RELEASE ORAL 2 TIMES DAILY
COMMUNITY
Start: 2018-06-09 | End: 2018-07-05

## 2018-06-12 RX ORDER — MONTELUKAST SODIUM 10 MG/1
10 TABLET ORAL AT BEDTIME
Qty: 90 TABLET | Refills: 3 | Status: SHIPPED | OUTPATIENT
Start: 2018-06-12 | End: 2018-07-16

## 2018-06-12 RX ORDER — ACETAMINOPHEN 325 MG/1
650 TABLET ORAL EVERY 6 HOURS PRN
Qty: 100 TABLET | Refills: 1 | Status: SHIPPED | OUTPATIENT
Start: 2018-06-12 | End: 2019-03-15

## 2018-06-12 RX ORDER — ALBUTEROL SULFATE 0.83 MG/ML
2.5 SOLUTION RESPIRATORY (INHALATION) EVERY 6 HOURS PRN
Qty: 30 VIAL | Refills: 1 | Status: SHIPPED | OUTPATIENT
Start: 2018-06-12 | End: 2018-09-21

## 2018-06-12 NOTE — PATIENT INSTRUCTIONS
-         Your medication list is printed, please keep this with you, it is helpful to bring this current list to any other medical appointments, the emergency room or hospital.    If you had lab testing today and your results are reassuring or normal they will be be mailed to you within 7 days.     If the lab tests need quick action we will call you with the results.   The phone number we will call with results is # [phone] (home) . If this is not the best number please call our clinic and change the number.    If you need any refills please call your pharmacy and they will contact us.    If you have any further concerns or wish to schedule another appointment you must call our office during normal business hours  [phone] (8-5:00 M-F)  If you have urgent medical questions that cannot wait  you may also call [phone] at any time of day.  If you have a medical emergency please call [phone].    Thank you for coming to [facility].

## 2018-06-12 NOTE — MR AVS SNAPSHOT
After Visit Summary   6/12/2018    Mary Ann Olson    MRN: [id number]           Patient Information     Date Of Birth          1965        Visit Information        Provider Department      6/12/2018 2:20 PM Joanna Farah MD [facility]        Today's Diagnoses     Screening for tuberculosis        Cough        Chronic obstructive pulmonary disease with acute exacerbation (H)        Moderate persistent asthma without complication        Type 2 diabetes mellitus without complication, without long-term current use of insulin (H)          Care Instructions    -         Your medication list is printed, please keep this with you, it is helpful to bring this current list to any other medical appointments, the emergency room or hospital.    If you had lab testing today and your results are reassuring or normal they will be be mailed to you within 7 days.     If the lab tests need quick action we will call you with the results.   The phone number we will call with results is # [phone] (home) . If this is not the best number please call our clinic and change the number.    If you need any refills please call your pharmacy and they will contact us.    If you have any further concerns or wish to schedule another appointment you must call our office during normal business hours  [phone] (8-5:00 M-F)  If you have urgent medical questions that cannot wait  you may also call [phone] at any time of day.  If you have a medical emergency please call [phone].    Thank you for coming to [facility].            Follow-ups after your visit        Your next 10 appointments already scheduled     Jun 27, 2018 11:00 AM CDT   PHYSICAL with Joanna Farah MD   [facility] ([facility])    [address] [place] [address]   [phone]              Who to contact     Please call your clinic at [phone] to:    Ask questions about your  "health    Make or cancel appointments    Discuss your medicines    Learn about your test results    Speak to your doctor            Additional Information About Your Visit        [facility] Information     [facility] is an electronic gateway that provides easy, online access to your medical records. With [facility], you can request a clinic appointment, read your test results, renew a prescription or communicate with your care team.     To sign up for [facility] visit the website at [email or web address]   You will be asked to enter the access code listed below, as well as some personal information. Please follow the directions to create your username and password.     Your access code is: [email or web address]  Expires: 2018  9:00 PM     Your access code will  in 90 days. If you need help or a new code, please contact your [facility] or call [phone] for assistance.        Care EveryWhere ID     This is your Care EveryWhere ID. This could be used by other organizations to access your [facility] medical records  [id number]        Your Vitals Were     Pulse Temperature Respirations Height Pulse Oximetry BMI (Body Mass Index)    96 97.6  F (36.4  C) (Oral) 18 5' 1\" (154.9 cm) 95% 29.66 kg/m2       Blood Pressure from Last 3 Encounters:   18 132/89   18 (!) 147/92   18 (!) 142/92    Weight from Last 3 Encounters:   18 157 lb (71.2 kg)   18 167 lb 6.4 oz (75.9 kg)   18 169 lb (76.7 kg)              We Performed the Following     Myc Tuberc Qtferon (britebill)          Today's Medication Changes          These changes are accurate as of 18  3:05 PM.  If you have any questions, ask your nurse or doctor.               These medicines have changed or have updated prescriptions.        Dose/Directions    * albuterol 108 (90 Base) MCG/ACT Inhaler   Commonly known as:  PROAIR HFA/PROVENTIL HFA/VENTOLIN HFA   This may have changed:  Another medication with " the same name was removed. Continue taking this medication, and follow the directions you see here.   Used for:  Moderate persistent asthma without complication   Changed by:  Joanna Farah MD        Dose:  2 puff   Inhale 2 puffs into the lungs every 6 hours as needed for shortness of breath / dyspnea or wheezing   Quantity:  1 Inhaler   Refills:  1       * albuterol (2.5 MG/3ML) 0.083% neb solution   This may have changed:  Another medication with the same name was removed. Continue taking this medication, and follow the directions you see here.   Used for:  Cough   Changed by:  Joanna Farah MD        Dose:  2.5 mg   Take 1 vial (2.5 mg) by nebulization every 6 hours as needed for shortness of breath / dyspnea or wheezing   Quantity:  30 vial   Refills:  1       carBAMazepine 200 MG 12 hr tablet   Commonly known as:  TEGretol XR   This may have changed:  Another medication with the same name was removed. Continue taking this medication, and follow the directions you see here.   Changed by:  Joanna Farah MD        Dose:  200 mg   Take 200 mg by mouth 2 times daily   Refills:  0       * Notice:  This list has 2 medication(s) that are the same as other medications prescribed for you. Read the directions carefully, and ask your doctor or other care provider to review them with you.      Stop taking these medicines if you haven't already. Please contact your care team if you have questions.     azithromycin 250 MG tablet   Commonly known as:  ZITHROMAX   Stopped by:  Joanna Farah MD PA VITAMIN D-3 2000 units Caps   Generic drug:  cholecalciferol   Stopped by:  Joanna Farah MD                Where to get your medicines      These medications were [place] [facility] [place] [address], [place] [address]     Phone:  [phone]     acetaminophen 325 MG tablet    albuterol (2.5 MG/3ML) 0.083% neb  solution    albuterol 108 (90 Base) MCG/ACT Inhaler    metFORMIN 500 MG tablet    mometasone-formoterol 100-5 MCG/ACT oral inhaler    montelukast 10 MG tablet    Spacer/Aero Chamber Mouthpiece [facility]                Primary Care Provider Office Phone # Fax #    Joanna Polina Farah [phone]       [facility] [address] [place] [address]        Equal Access to Services     [facility] : Hadii aad ku hadasho Soomaali, waaxda luqadaha, qaybta kaalmada adeegyada, waxay idiin hayaan adeeg kharash la'jessican ah. So [facility] [phone].    ATENCIÓN: Si linus espmaddie, tiene a judge disposición servicios gratuitos de asistencia lingüística. [facility] [phone].    We comply with applicable federal civil rights laws and [place] laws. We do not discriminate on the basis of race, color, national origin, age, disability, sex, sexual orientation, or gender identity.            Thank you!     Thank you for choosing [facility]  for your care. Our goal is always to provide you with excellent care. Hearing back from our patients is one way we can continue to improve our services. Please take a few minutes to complete the written survey that you may receive in the mail after your visit with us. Thank you!             Your Updated Medication List - Protect others around you: Learn how to safely use, store and throw away your medicines at www.disposemymeds.org.          This list is accurate as of 6/12/18  3:05 PM.  Always use your most recent med list.                   Brand Name Dispense Instructions for use Diagnosis    acetaminophen 325 MG tablet    TYLENOL    100 tablet    Take 2 tablets (650 mg) by mouth every 6 hours as needed    Cough       * albuterol 108 (90 Base) MCG/ACT Inhaler    PROAIR HFA/PROVENTIL HFA/VENTOLIN HFA    1 Inhaler    Inhale 2 puffs into the lungs every 6 hours as needed for shortness of breath / dyspnea or wheezing    Moderate persistent asthma without complication        * albuterol (2.5 MG/3ML) 0.083% neb solution     30 vial    Take 1 vial (2.5 mg) by nebulization every 6 hours as needed for shortness of breath / dyspnea or wheezing    Cough       blood glucose monitoring lancets     100 each    Test Blood glucose twice daily    Type 2 diabetes mellitus without complication, without long-term current use of insulin (H)       blood glucose monitoring meter device kit    no brand specified     Dispense glucose meter, test strips and lancets covered by the patient insurance. Test 2 times per day.        carBAMazepine 200 MG 12 hr tablet    TEGretol XR     Take 200 mg by mouth 2 times daily        metFORMIN 500 MG tablet    GLUCOPHAGE    180 tablet    Take 2 tablets (1,000 mg) by mouth 2 times daily (with meals)    Type 2 diabetes mellitus without complication, without long-term current use of insulin (H)       mometasone-formoterol 100-5 MCG/ACT oral inhaler    DULERA    13 g    Inhale 2 puffs into the lungs 2 times daily    Chronic obstructive pulmonary disease with acute exacerbation (H)       montelukast 10 MG tablet    SINGULAIR    90 tablet    Take 1 tablet (10 mg) by mouth At Bedtime    Cough       omeprazole 20 MG CR capsule    priLOSEC     Take 20 mg by mouth daily        ONETOUCH ULTRA test strip   Generic drug:  blood glucose monitoring      Dispense test strips covered by the patient insurance. Test 2 times per day.        * polyethylene glycol Packet    MIRALAX/GLYCOLAX     Take 17 g by mouth daily as needed for constipation        * polyethylene glycol powder    MIRALAX    510 g    Take 17 g (1 capful) by mouth daily    Constipation, unspecified constipation type       psyllium 0.52 g capsule     540 capsule    Take 1 capsule (0.52 g) by mouth daily    Constipation, unspecified constipation type       Spacer/Aero Chamber Mouthpiece Misc     1 each    1 Units 2 times daily    Chronic obstructive pulmonary disease with acute exacerbation (H)       STATIN NOT  PRESCRIBED (INTENTIONAL)      Please choose reason not prescribed, below    Type 2 diabetes mellitus without complication, without long-term current use of insulin (H)       vitamin D 37307 UNIT capsule    ERGOCALCIFEROL     Take 50,000 Units by mouth twice a week        * Notice:  This list has 4 medication(s) that are the same as other medications prescribed for you. Read the directions carefully, and ask your doctor or other care provider to review them with you.
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
## 2018-06-12 NOTE — LETTER
June 18, 2018      Mary Ann Olson  [address]   [facility] [address]        Dear Mary Ann,    Please see below for your test results.  Your screen for TB was negative.      Resulted Orders   Myc Tuberc Qtferon ([facility])   Result Value Ref Range    QTF Result Negative Negative    QTF Interpretation       No interferon-gamma response to M. tuberculosis antigens was detected.  Infecton with M.   tuberculosis is unlikely.  A negative result alone does not exclude infection with M.   tuberculosis      QTF Nil 0.07 IU/mL    QTF TB Antigen _ Nil 0.01 IU/mL    QTF Mitogen - Nil 7.11 IU/mL    Narrative    Test performed by:  [facility]  [address] [place] [address]       If you have any questions, please call the clinic to make an appointment.    Sincerely,    Joanna Farah MD

## 2018-06-15 LAB
QTF INTERPRETATION: NORMAL
QTF MITOGEN - NIL: 7.11 IU/ML
QTF NIL: 0.07 IU/ML
QTF RESULT: NEGATIVE
QTF TB ANTIGEN - NIL: 0.01 IU/ML

## 2018-06-18 ENCOUNTER — TELEPHONE (OUTPATIENT)
Dept: FAMILY MEDICINE | Facility: CLINIC | Age: 53
End: 2018-06-18

## 2018-06-18 NOTE — TELEPHONE ENCOUNTER
C/o painful sexual intercourse a couple of nights ago, has not had this before in the past but has also not been sexually active for a while. Denies vaginal discharge. C/o mild, vaginal itching-unable to determine if internal or external itching, increase in urination x 1 week, experienced dysuria only x 1 then no problems thereafter. No hematura. C/o mild low abdominal discomfort. No fever. Recommend and offered clinic appt, barrier- transportation. I called [employer] [facility], transportation has been arranged for tomorrow afternoon. Mary Ann informed [facility] [employer] will arrive at her home for  from 1- 1:30pm, round trip.  Unable to come in today for further assessment, has a home visit from a . Pasha SMITH

## 2018-06-18 NOTE — TELEPHONE ENCOUNTER
[facility] phone call message-patient reporting a symptom:     Symptom: Stinging during sex and frequent urination    Same Day Visit Offered: Yes, declined, no transportation    Additional comments: Pt states she would like to speak to nurse about symptoms above. Offered appt but states she has no transportation because medical rides need 2 days advance notice. Please call and advise.     OK to leave message on voice mail? Yes    Primary language: English      needed? No    Call taken on June 18, 2018 at 8:25 AM by Celia Villarreal

## 2018-06-19 ENCOUNTER — OFFICE VISIT (OUTPATIENT)
Dept: FAMILY MEDICINE | Facility: CLINIC | Age: 53
End: 2018-06-19
Payer: COMMERCIAL

## 2018-06-19 VITALS
SYSTOLIC BLOOD PRESSURE: 114 MMHG | HEART RATE: 92 BPM | BODY MASS INDEX: 29.12 KG/M2 | TEMPERATURE: 97.7 F | WEIGHT: 154.25 LBS | RESPIRATION RATE: 20 BRPM | HEIGHT: 61 IN | OXYGEN SATURATION: 96 % | DIASTOLIC BLOOD PRESSURE: 79 MMHG

## 2018-06-19 DIAGNOSIS — N89.8 VAGINAL DISCHARGE: ICD-10-CM

## 2018-06-19 DIAGNOSIS — N76.0 BACTERIAL VAGINOSIS: Primary | ICD-10-CM

## 2018-06-19 DIAGNOSIS — R30.0 DYSURIA: ICD-10-CM

## 2018-06-19 DIAGNOSIS — Z78.0 POSTMENOPAUSAL STATUS: ICD-10-CM

## 2018-06-19 DIAGNOSIS — N95.2 ATROPHIC VAGINITIS: ICD-10-CM

## 2018-06-19 DIAGNOSIS — B96.89 BACTERIAL VAGINOSIS: Primary | ICD-10-CM

## 2018-06-19 LAB
BACTERIA: NORMAL
BILIRUBIN UR: NEGATIVE
BLOOD UR: NEGATIVE
CLUE CELLS: NORMAL
GLUCOSE URINE: NEGATIVE
KETONES UR QL: NEGATIVE
LEUKOCYTE ESTERASE UR: ABNORMAL
MOTILE TRICHOMONAS: NEGATIVE
NITRITE UR QL STRIP: NEGATIVE
ODOR: NORMAL
PH UR STRIP: 7 [PH] (ref 5–7)
PH WET PREP: NORMAL
PROTEIN UR: NEGATIVE
SP GR UR STRIP: 1.01
UROBILINOGEN UR STRIP-ACNC: ABNORMAL
WBC WET PREP: NORMAL
YEAST: NORMAL

## 2018-06-19 RX ORDER — ERGOCALCIFEROL 1.25 MG/1
50000 CAPSULE, LIQUID FILLED ORAL
Qty: 30 CAPSULE | Refills: 3 | Status: SHIPPED | OUTPATIENT
Start: 2018-06-21 | End: 2018-10-01

## 2018-06-19 RX ORDER — METRONIDAZOLE 500 MG/1
500 TABLET ORAL 2 TIMES DAILY
Qty: 14 TABLET | Refills: 0 | Status: SHIPPED | OUTPATIENT
Start: 2018-06-19 | End: 2018-06-23

## 2018-06-19 NOTE — LETTER
June 28, 2018      Mary Ann Olson  [address]   [place] [address]        Dear Mary Ann,    Dear Mary Ann,    It was a pleasure to see you at clinic. As we discussed in clinic, your stinging and itching is likely due to bacterial vaginosis. The additional testing we did, gonorrhea and chlamydia were negative. Please complete the full course of your metronidazole. If you continue to have symptoms following completion, please let our clinic know and we may try the estrogen cream again.     Please call our clinic with any questions. We look forward to seeing you again    Please see below for your test results.    Resulted Orders   Urinalysis ([facility])   Result Value Ref Range    Specific Gravity Urine 1.010 1.005 - 1.030    pH Urine 7.0 4.5 - 8.0    Leukocyte Esterase UR 1+ (A) NEGATIVE    Nitrite Urine Negative NEGATIVE    Protein UR Negative NEGATIVE    Glucose Urine Negative NEGATIVE    Ketones Urine Negative NEGATIVE    Urobilinogen mg/dL 0.2 E.U./dL 0.2 E.U./dL    Bilirubin UR Negative NEGATIVE    Blood UR Negative NEGATIVE   Urine Culture ([facility])   Result Value Ref Range    Culture SEE RESULTS BELOW       Comment:      CULTURE, URINE   SOURCE: Urine, Random   CULTURE RESULTS:    No Growth      Narrative    Test performed by:  [facility]  [address] [place] [address]   Chlamydia/Gono Amplified ([facility])   Result Value Ref Range    Chlamydia trac,Amplified Prb Negative Negative    N gonorrhoeae,Amplified Prb Negative Negative    Narrative    Test performed by:  [facility] [address] [place] [address]   Wet Prep ([facility])   Result Value Ref Range    Yeast Wet Prep None none    Motile Trichomonas Wet Prep Negative Negative    Clue Cells Wet Prep Present >20% NONE    WBC WET PREP 5-10 2 - 5    Bacteria Wet Prep Moderate None    pH Wet Prep Not performed 3.8 - 4.5    Odor Wet Prep None NONE       If you have any questions, please call the clinic to make an  appointment.    Sincerely,    Ema Leal, DO

## 2018-06-19 NOTE — MR AVS SNAPSHOT
After Visit Summary   2018    Mary Ann Olson    MRN: [id number]           Patient Information     Date Of Birth          1965        Visit Information        Provider Department      2018 2:00 PM Ema Leal DO [facility]        Today's Diagnoses     Dysuria    -  1    Vaginal discharge        Atrophic vaginitis        Bacterial vaginosis          Care Instructions      Bacterial Vaginosis    You have a vaginal infection called bacterial vaginosis (BV). Both good and bad bacteria are present in a healthy vagina. BV occurs when these bacteria get out of balance. The number of bad bacteria increase. And the number of good bacteria decrease. Although BV is associated with sexual activity, it is not a sexually transmitted disease.  BV may or may not cause symptoms. If symptoms do occur, they can include:    Thin, gray, milky-white, or sometimes green discharge    Unpleasant odor or  fishy  smell    Itching, burning, or pain in or around the vagina  It is not known what causes BV, but certain factors can make the problem more likely. This can include:    Douching    Having sex with a new partner    Having sex with more than one partner  BV will sometimes go away on its own. But treatment is usually recommended. This is because untreated BV can increase the risk of more serious health problems such as:    Pelvic inflammatory disease (PID)     delivery (giving birth to a baby early if you re pregnant)    HIV and certain other sexually transmitted diseases (STDs)    Infection after surgery on the reproductive organs  Home care  General care    BV is most often treated with medicines called antibiotics. These may be given as pills or as a vaginal cream. If antibiotics are prescribed, be sure to use them exactly as directed. Also, be sure to complete all of the medicine, even if your symptoms go away.    Don't douche or having sex during treatment.    If you have sex with  a female partner, ask your healthcare provider if she should also be treated.  Prevention    Don't douche.    Don't have sex. If you do have sex, then take steps to lower your risk:  ? Use condoms when having sex.  ? Limit the number of sexual partners you have.  Follow-up care  Follow up with your healthcare provider, or as advised.  When to seek medical advice  Call your healthcare provider right away if:    You have a fever of 100.4 F (38 C) or higher, or as directed by your provider.    Your symptoms worsen, or they don t go away within a few days of starting treatment.    You have new pain in the lower belly or pelvic region.    You have side effects that bother you or a reaction to the pills or cream you re prescribed.    You or any partners you have sex with have new symptoms, such as a rash, joint pain, or sores.  Date Last Reviewed: 10/1/2017    4165-7098 [employer]. [address] [place] [address]. All rights reserved. This information is not intended as a substitute for professional medical care. Always follow your healthcare professional's instructions.                Follow-ups after your visit        Follow-up notes from your care team     Return if symptoms worsen or fail to improve.      Your next 10 appointments already scheduled     Jun 27, 2018 11:00 AM CDT   PHYSICAL with Joanna Farah MD   [facility] ([facility])    [address] [place] [address]   [phone]              Who to contact     Please call your clinic at [phone] to:    Ask questions about your health    Make or cancel appointments    Discuss your medicines    Learn about your test results    Speak to your doctor            Additional Information About Your Visit        Care EveryWhere ID     This is your Care EveryWhere ID. This could be used by other organizations to access your [place] medical records  [id number]        Your Vitals Were     Pulse  "Temperature Respirations Height Pulse Oximetry BMI (Body Mass Index)    92 97.7  F (36.5  C) (Oral) 20 5' 1.02\" (155 cm) 96% 29.12 kg/m2       Blood Pressure from Last 3 Encounters:   06/19/18 114/79   06/12/18 132/89   03/12/18 (!) 147/92    Weight from Last 3 Encounters:   06/19/18 154 lb 4 oz (70 kg)   06/12/18 157 lb (71.2 kg)   03/12/18 167 lb 6.4 oz (75.9 kg)              We Performed the Following     Chlamydia/Gono Amplified ([facility])     Urinalysis ([facility])     Urine Culture ([facility])     Wet Prep ([facility])          Today's Medication Changes          These changes are accurate as of 6/19/18  3:00 PM.  If you have any questions, ask your nurse or doctor.               Start taking these medicines.        Dose/Directions    metroNIDAZOLE 500 MG tablet   Commonly known as:  FLAGYL   Used for:  Bacterial vaginosis   Started by:  Ema Leal DO        Dose:  500 mg   Take 1 tablet (500 mg) by mouth 2 times daily   Quantity:  14 tablet   Refills:  0            Where to get your medicines      These medications were [place] [facility] [place] [address], [place] [address]     Phone:  [phone]     metroNIDAZOLE 500 MG tablet                Primary Care Provider Office Phone # Fax #    Joanna Polina Farah [phone]       [facility] [address] [place] [address]        Equal Access to Services     [facility]: Hadii aad ku hadasho Soomaali, waaxda luqadaha, qaybta kaalmada adeegyada, waxay kashif rm. So [facility] [phone].    ATENCIÓN: Si habla español, tiene a judge disposición servicios gratuitos de asistencia lingüística. Llame al [phone].    We comply with applicable federal civil rights laws and [place] laws. We do not discriminate on the basis of race, color, national origin, age, disability, sex, sexual orientation, or gender identity.            Thank you!     Thank you " for choosing [facility]  for your care. Our goal is always to provide you with excellent care. Hearing back from our patients is one way we can continue to improve our services. Please take a few minutes to complete the written survey that you may receive in the mail after your visit with us. Thank you!             Your Updated Medication List - Protect others around you: Learn how to safely use, store and throw away your medicines at www.disposemymeds.org.          This list is accurate as of 6/19/18  3:00 PM.  Always use your most recent med list.                   Brand Name Dispense Instructions for use Diagnosis    acetaminophen 325 MG tablet    TYLENOL    100 tablet    Take 2 tablets (650 mg) by mouth every 6 hours as needed    Cough       * albuterol 108 (90 Base) MCG/ACT Inhaler    PROAIR HFA/PROVENTIL HFA/VENTOLIN HFA    1 Inhaler    Inhale 2 puffs into the lungs every 6 hours as needed for shortness of breath / dyspnea or wheezing    Moderate persistent asthma without complication       * albuterol (2.5 MG/3ML) 0.083% neb solution     30 vial    Take 1 vial (2.5 mg) by nebulization every 6 hours as needed for shortness of breath / dyspnea or wheezing    Cough       blood glucose monitoring lancets     100 each    Test Blood glucose twice daily    Type 2 diabetes mellitus without complication, without long-term current use of insulin (H)       blood glucose monitoring meter device kit    no brand specified     Dispense glucose meter, test strips and lancets covered by the patient insurance. Test 2 times per day.        carBAMazepine 200 MG 12 hr tablet    TEGretol XR     Take 200 mg by mouth 2 times daily        metFORMIN 500 MG tablet    GLUCOPHAGE    180 tablet    Take 2 tablets (1,000 mg) by mouth 2 times daily (with meals)    Type 2 diabetes mellitus without complication, without long-term current use of insulin (H)       metroNIDAZOLE 500 MG tablet    FLAGYL    14 tablet    Take 1 tablet  (500 mg) by mouth 2 times daily    Bacterial vaginosis       mometasone-formoterol 100-5 MCG/ACT oral inhaler    DULERA    13 g    Inhale 2 puffs into the lungs 2 times daily    Chronic obstructive pulmonary disease with acute exacerbation (H)       montelukast 10 MG tablet    SINGULAIR    90 tablet    Take 1 tablet (10 mg) by mouth At Bedtime    Cough       omeprazole 20 MG CR capsule    priLOSEC     Take 20 mg by mouth daily        ONETOUCH ULTRA test strip   Generic drug:  blood glucose monitoring      Dispense test strips covered by the patient insurance. Test 2 times per day.        * polyethylene glycol Packet    MIRALAX/GLYCOLAX     Take 17 g by mouth daily as needed for constipation        * polyethylene glycol powder    MIRALAX    510 g    Take 17 g (1 capful) by mouth daily    Constipation, unspecified constipation type       psyllium 0.52 g capsule     540 capsule    Take 1 capsule (0.52 g) by mouth daily    Constipation, unspecified constipation type       Spacer/Aero Chamber Mouthpiece Misc     1 each    1 Units 2 times daily    Chronic obstructive pulmonary disease with acute exacerbation (H)       STATIN NOT PRESCRIBED (INTENTIONAL)      Please choose reason not prescribed, below    Type 2 diabetes mellitus without complication, without long-term current use of insulin (H)       vitamin D 61401 UNIT capsule    ERGOCALCIFEROL     Take 50,000 Units by mouth twice a week        * Notice:  This list has 4 medication(s) that are the same as other medications prescribed for you. Read the directions carefully, and ask your doctor or other care provider to review them with you.

## 2018-06-19 NOTE — PATIENT INSTRUCTIONS
Bacterial Vaginosis    You have a vaginal infection called bacterial vaginosis (BV). Both good and bad bacteria are present in a healthy vagina. BV occurs when these bacteria get out of balance. The number of bad bacteria increase. And the number of good bacteria decrease. Although BV is associated with sexual activity, it is not a sexually transmitted disease.  BV may or may not cause symptoms. If symptoms do occur, they can include:    Thin, gray, milky-white, or sometimes green discharge    Unpleasant odor or  fishy  smell    Itching, burning, or pain in or around the vagina  It is not known what causes BV, but certain factors can make the problem more likely. This can include:    Douching    Having sex with a new partner    Having sex with more than one partner  BV will sometimes go away on its own. But treatment is usually recommended. This is because untreated BV can increase the risk of more serious health problems such as:    Pelvic inflammatory disease (PID)     delivery (giving birth to a baby early if you re pregnant)    HIV and certain other sexually transmitted diseases (STDs)    Infection after surgery on the reproductive organs  Home care  General care    BV is most often treated with medicines called antibiotics. These may be given as pills or as a vaginal cream. If antibiotics are prescribed, be sure to use them exactly as directed. Also, be sure to complete all of the medicine, even if your symptoms go away.    Don't douche or having sex during treatment.    If you have sex with a female partner, ask your healthcare provider if she should also be treated.  Prevention    Don't douche.    Don't have sex. If you do have sex, then take steps to lower your risk:  ? Use condoms when having sex.  ? Limit the number of sexual partners you have.  Follow-up care  Follow up with your healthcare provider, or as advised.  When to seek medical advice  Call your healthcare provider right away if:    You  have a fever of 100.4 F (38 C) or higher, or as directed by your provider.    Your symptoms worsen, or they don t go away within a few days of starting treatment.    You have new pain in the lower belly or pelvic region.    You have side effects that bother you or a reaction to the pills or cream you re prescribed.    You or any partners you have sex with have new symptoms, such as a rash, joint pain, or sores.  Date Last Reviewed: 10/1/2017    2645-5911 [employer]. [address] [place] [address]. All rights reserved. This information is not intended as a substitute for professional medical care. Always follow your healthcare professional's instructions.
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
## 2018-06-20 LAB
C TRACH RRNA CVX QL NAA+PROBE: NEGATIVE
CULTURE: NORMAL
N GONORRHOEA RRNA SPEC QL NAA+PROBE: NEGATIVE

## 2018-06-21 PROBLEM — F39 EPISODIC MOOD DISORDER (H): Status: ACTIVE | Noted: 2018-06-08

## 2018-06-21 PROBLEM — F11.21 OPIOID USE DISORDER, SEVERE, IN SUSTAINED REMISSION (H): Status: ACTIVE | Noted: 2018-06-08

## 2018-06-21 PROBLEM — T50.901A OVERDOSE: Status: RESOLVED | Noted: 2017-07-16 | Resolved: 2018-06-21

## 2018-06-21 PROBLEM — F14.21 COCAINE USE DISORDER, SEVERE, IN SUSTAINED REMISSION (H): Status: ACTIVE | Noted: 2018-06-08

## 2018-06-21 PROBLEM — F60.9 PERSONALITY DISORDER (H): Status: ACTIVE | Noted: 2018-06-08

## 2018-06-21 PROBLEM — R45.851 SUICIDAL IDEATION: Status: ACTIVE | Noted: 2018-06-08

## 2018-06-21 PROBLEM — N20.0 NEPHROLITHIASIS: Status: RESOLVED | Noted: 2018-01-23 | Resolved: 2018-06-21

## 2018-06-21 RX ORDER — HYDROXYZINE HYDROCHLORIDE 50 MG/1
50 TABLET, FILM COATED ORAL 2 TIMES DAILY PRN
COMMUNITY
Start: 2018-06-16 | End: 2018-08-21

## 2018-06-21 RX ORDER — CARBAMAZEPINE 100 MG/1
300 TABLET, EXTENDED RELEASE ORAL 2 TIMES DAILY
COMMUNITY
Start: 2018-06-16 | End: 2018-07-06

## 2018-06-21 NOTE — PROGRESS NOTES
Preceptor Attestation:   Patient seen, evaluated and discussed with the resident. I have verified the content of the note, which accurately reflects my assessment of the patient and the plan of care.    Supervising Physician:Mian Gudino MD    [facility]

## 2018-06-22 ENCOUNTER — TELEPHONE (OUTPATIENT)
Dept: FAMILY MEDICINE | Facility: CLINIC | Age: 53
End: 2018-06-22

## 2018-06-22 NOTE — TELEPHONE ENCOUNTER
[facility] phone call message- general phone call:    Reason for call: BS reading 8:42am 126, then ate breakfast, checked her BS again and it was at 238 a few minutes ago. Did take her meds. No headache/dizziness    Return call needed: Yes    OK to leave a message on voice mail? Yes    Primary language: English      needed? No    Call taken on June 22, 2018 at 10:16 AM by Xiomara Willingham

## 2018-06-22 NOTE — TELEPHONE ENCOUNTER
Checked fasting bs this morning- 128 before ate breakfast. Ate a bowl of cereal- All Bran with Soymilk, two hours later checked her blood sugar 238. Took her Metformin at 840 am. Feels otherwise ok, no headache, no increase thirst, abdominal pain, fatigue or dizziness. Reviewing Mary Ann's chart she is due to come in for a follow up on her diabetes to ensure her current regimen is still appropriate for her. Unaaravind agrees an appointment is scheduled for 6/27/2018 with Dr Diallo. Pasha SMITH    One hour later received another call from Mary Ann, c/o tiredness. Had Mary Ann recheck her bs level while on the phone with her.  three hours after breakfast. Reassurance given. Mary Ann feels comfortable waiting until her appt time to review DM regimen. Pasha SMITH

## 2018-06-23 ENCOUNTER — TELEPHONE (OUTPATIENT)
Dept: FAMILY MEDICINE | Facility: CLINIC | Age: 53
End: 2018-06-23

## 2018-06-23 DIAGNOSIS — N76.0 BACTERIAL VAGINOSIS: Primary | ICD-10-CM

## 2018-06-23 DIAGNOSIS — T78.40XA ALLERGIC REACTION, INITIAL ENCOUNTER: ICD-10-CM

## 2018-06-23 DIAGNOSIS — B96.89 BACTERIAL VAGINOSIS: Primary | ICD-10-CM

## 2018-06-23 RX ORDER — CLINDAMYCIN HCL 300 MG
300 CAPSULE ORAL 2 TIMES DAILY
Qty: 14 CAPSULE | Refills: 0 | Status: SHIPPED | OUTPATIENT
Start: 2018-06-23 | End: 2018-07-06

## 2018-06-23 NOTE — TELEPHONE ENCOUNTER
"Patient called clinic line regarding allergic reaction to antibiotic. Started metronidazole for BV on 6/19. Noticed itching last night and this am, all over. Last night had red rash, now resolved. Is having some SOB; wonders if due to asthma. Did try taking albuterol with some benefit. Recommended going to ED due to allergic reaction with SOB. Patient is not agreeable to going to ED and does not think her SOB is related. On further discussion, she stopped taking her singulair about 5 days ago because it was \"drying her out\", and she noticed her breathing got worse around that time. Currently, it is not worse than it has been for the past 5 days. She denies swelling of lips or tongue or throat.   Her BV symptoms have not resolved after 3-4 days of metronidazole. She has many antibiotic allergies.   - recommended calling 911 if any worsening of SOB or mouth swelling/itching  - d/c metronidazole and added to allergy list  - started clindamycin 300 mg bid x 7 d for BV; patient declined vaginal clindamycin  - called assisted living ([phone]) and discussed with staff. They are not able to take verbal order.   - called patient's pharmacy; they will fax an order for benadryl 25-50 mg q6h prn for itching #15 to assisted living ([phone])    Lou Ha MD, MPH  [facility] Medicine Resident, PGY2      "

## 2018-06-29 ENCOUNTER — TELEPHONE (OUTPATIENT)
Dept: FAMILY MEDICINE | Facility: CLINIC | Age: 53
End: 2018-06-29

## 2018-06-29 DIAGNOSIS — Z78.0 POSTMENOPAUSAL STATUS: ICD-10-CM

## 2018-06-29 NOTE — TELEPHONE ENCOUNTER
[facility] Medicine phone call message- medication clarification/question:    Full Medication Name: Simvastatin   Dose:     Question:  Wondering if the patient is supposed to be on this medication. There was a med list sent to them yesterday stating that statin is not prescribed intentional please see reason below....   Will send over a med reconcile list as well.     Pharmacy confirmed as [facility] [place] [facility] [place] - [address]: Yes    OK to leave a message on voice mail? Yes    Primary language: English      needed? No    Call taken on June 29, 2018 at 10:19 AM by Xiomara Willingham

## 2018-06-29 NOTE — TELEPHONE ENCOUNTER
Per Dr. Farah called pt to verify if taking simvastatin since we do not have rx in med list unless prescribe by another provider. Will call again if no return call. --Varsha, CMA

## 2018-07-03 NOTE — TELEPHONE ENCOUNTER
"Caller called again, states that she hasn't hear anything back yet. Please call and advise on the statement on the patient's printed copy of med list. Statement is \"statin not prescribed, intentional, then on the right side it says please choose reason not prescribed below.\"  "

## 2018-07-05 DIAGNOSIS — F39 MOOD DISORDER (H): Primary | ICD-10-CM

## 2018-07-05 RX ORDER — CARBAMAZEPINE 200 MG/1
200 TABLET, EXTENDED RELEASE ORAL 2 TIMES DAILY
Qty: 46 TABLET | Refills: 0 | Status: SHIPPED | OUTPATIENT
Start: 2018-07-05 | End: 2018-08-02

## 2018-07-06 DIAGNOSIS — K21.9 GASTROESOPHAGEAL REFLUX DISEASE, ESOPHAGITIS PRESENCE NOT SPECIFIED: Primary | ICD-10-CM

## 2018-07-06 RX ORDER — DIPHENHYDRAMINE HCL 25 MG
25-50 TABLET ORAL EVERY 6 HOURS PRN
COMMUNITY
Start: 2018-07-06 | End: 2018-07-16

## 2018-07-06 NOTE — TELEPHONE ENCOUNTER
"Medication reconciliation completed with Evie at crisis center. She reports patient had stayed there x10 days and now back at apartment / supported living with [employer].     Patient had been taking Simvastatin. But then developed adverse effects. Refuses to take. Medication list accurate as \"statin not prescribed\".    Medication list updated locally. Faxed updated list to Evie at [phone]. She will relay to [facility].       "

## 2018-07-13 ENCOUNTER — TELEPHONE (OUTPATIENT)
Dept: FAMILY MEDICINE | Facility: CLINIC | Age: 53
End: 2018-07-13

## 2018-07-13 NOTE — TELEPHONE ENCOUNTER
Attempted to reach patient to follow up from recent discharge for COPD, LM for RC    Date of discharge: 7/12/2018  Facility of discharge: [facility]  Patient concerns about condition: No concerns at this time.  Patient concerns about medications: No concerns at this time.  Full med reconciliation will be completed at clinic visit.  Patient concerns about transitioning: Concerns include that she is worried about the mold in her home. She states that she has reported it to staff but has not been dealt with..  Clinic office visit appointment date: 7/16/2018  Dr Diallo and Valeria  Patient reminded to bring all medications (prescription and over-the-counter) to clinic appointment: Yes  Patient states that she cannot bring her medications. Call to [facility] and requested that they fax her med list to me.    Using the date of discharge as day 1, the 30th day post discharge is 8/11/2018.

## 2018-07-16 ENCOUNTER — TELEPHONE (OUTPATIENT)
Dept: FAMILY MEDICINE | Facility: CLINIC | Age: 53
End: 2018-07-16

## 2018-07-16 ENCOUNTER — RECORDS - HEALTHEAST (OUTPATIENT)
Dept: ADMINISTRATIVE | Facility: OTHER | Age: 53
End: 2018-07-16

## 2018-07-16 ENCOUNTER — OFFICE VISIT (OUTPATIENT)
Dept: FAMILY MEDICINE | Facility: CLINIC | Age: 53
End: 2018-07-16
Payer: COMMERCIAL

## 2018-07-16 ENCOUNTER — OFFICE VISIT (OUTPATIENT)
Dept: PHARMACY | Facility: CLINIC | Age: 53
End: 2018-07-16
Payer: COMMERCIAL

## 2018-07-16 VITALS
DIASTOLIC BLOOD PRESSURE: 76 MMHG | TEMPERATURE: 98.1 F | BODY MASS INDEX: 30 KG/M2 | SYSTOLIC BLOOD PRESSURE: 113 MMHG | WEIGHT: 163 LBS | OXYGEN SATURATION: 93 % | HEIGHT: 62 IN | HEART RATE: 103 BPM

## 2018-07-16 DIAGNOSIS — Z71.89 ENCOUNTER FOR MEDICATION REVIEW AND COUNSELING: Primary | ICD-10-CM

## 2018-07-16 DIAGNOSIS — J44.1 CHRONIC OBSTRUCTIVE PULMONARY DISEASE WITH ACUTE EXACERBATION (H): Primary | ICD-10-CM

## 2018-07-16 DIAGNOSIS — E11.9 TYPE 2 DIABETES MELLITUS WITHOUT COMPLICATION, WITHOUT LONG-TERM CURRENT USE OF INSULIN (H): ICD-10-CM

## 2018-07-16 DIAGNOSIS — E78.2 MIXED HYPERLIPIDEMIA: ICD-10-CM

## 2018-07-16 LAB
CHOLEST SERPL-MCNC: 234 MG/DL
CHOLEST/HDLC SERPL: 2.6 RATIO
HBA1C MFR BLD: 6.3 % (ref 4.1–5.7)
HDLC SERPL-MCNC: 92 MG/DL
LDLC SERPL CALC-MCNC: 123 MG/DL (ref 0–99)
TRIGL SERPL-MCNC: 98 MG/DL
VLDL-CHOLESTEROL: 20 MG/DL (ref 7–32)

## 2018-07-16 RX ORDER — BISACODYL 10 MG
10 SUPPOSITORY, RECTAL RECTAL DAILY PRN
COMMUNITY
End: 2019-01-14
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
## 2018-07-16 NOTE — PROGRESS NOTES
"    Hospitalization Follow-up Visit         HPI       Hospital Follow-up Visit:    Hospital:  [facility]   Date of Admission: 7/11/2018  Date of Discharge: 7/12/2018  Reason(s) for Admission: COPD exacerbation             Problems taking medications regularly:  Yes, patient often forgets and refuses medication by group home staff.        Post Discharge Medication Reconciliation: discharge medications reconciled and changed, per note/orders (see AVS).       Problems adhering to non-medication therapy:  Yes, still smoking, little activity.        Medications reviewed by: by PharmD    Summary of hospitalization: [facility] discharge summary reviewed. Patient admitted with COPD exacerbation. She was discharged on her home dulera, montelukast, and albuterol, along with a 5 day course of oral prednisone (40mg daily).   Diagnostic Tests/Treatments reviewed.  Follow up needed: PFTs  Other Healthcare Providers Involved in Patient s Care:         None  Update since discharge: improved.   Plan of care communicated with patient            Review of Systems:   CONSTITUTIONAL: no fatigue, no unexpected change in weight  SKIN: no worrisome rashes, no worrisome moles, no worrisome lesions  EYES: no acute vision problems or changes  ENT: no ear problems, no mouth problems, no throat problems  RESP: no significant cough, +SOB  CV: no chest pain, no palpitations, no new or worsening peripheral edema  GI: no nausea, no vomiting, no constipation, no diarrhea            Physical Exam:     Vitals:    07/16/18 1522   BP: 113/76   Pulse: 103   Temp: 98.1  F (36.7  C)   TempSrc: Oral   SpO2: 93%   Weight: 163 lb (73.9 kg)   Height: 5' 1.61\" (156.5 cm)     Body mass index is 30.19 kg/(m^2).    GENERAL: alert, well nourished, well hydrated, no distress  RESP: lungs clear to auscultation - no rales, no rhonchi, no wheezes  CV: regular rates and rhythm, normal S1 S2, no murmur  PSYCH: appears slightly delayed, rational though " processing, normal affect         Results:   Results from the last 24 hours No results found for this or any previous visit (from the past 24 hour(s)).    Assessment and Plan   1. Chronic obstructive pulmonary disease with acute exacerbation (H)  Patient satting low 90s on room air, lungs are clear on exam without wheezing. Symptoms improved slightly from hospital admission.  Significant medical non-compliance and inappropriate use of rescue inhaler. Unclear diagnosis of asthma vs COPD vs mix of both. She continues to use tobacco products, despite encouragement to quit. Initial plan was to maximize current therapy and take dulera BID (instead of intermittently as patient had been doing). However, patient became very upset and tearful stating it does not work and she needs something else. After long discussion it was agreed to try the Spiriva inhaler and follow back up in 2 weeks.  -Finish prednisone course  - nicotine polacrilex ([employer]S NICOTINE POLACRILEX) 4 MG lozenge; Place 1 lozenge (4 mg) inside cheek as needed for smoking cessation  Dispense: 360 tablet; Refill: 3  - Spacer/Aero Chamber Mouthpiece MISC; 1 Units 2 times daily  Dispense: 1 each; Refill: 0  - tiotropium (SPIRIVA RESPIMAT) 2.5 MCG/ACT inhalation aerosol; Inhale 2 puffs into the lungs daily  Dispense: 12 g; Refill: 3  -Stop singulair (had been refusing it)  -Obtain PFTs at beginning of next visit     2. Mixed hyperlipidemia: Last lipids were in 2014. She had been prescribed simvastatin but has not been taking it due to adverse side effects (lower extremity edema, muscle pains). Will get lipid panel to reassess need. Could try a different statin if needed.   - Lipid Panel ([facility])    3. Type 2 diabetes mellitus without complication, without long-term current use of insulin (H): Hemoglobin A1c 6.5 on 11/28/2017. She is prescribed metformin 1000 mg BID but per her group home records, she has been primarily refusing it. Obtain Hgb A1c to assess.  -  Hemoglobin A1c ([facility])    4. Bipolar disorder: History of bipolar and is prescribed carbamazepine, however, patient has been refusing this medication. She is supposed to be following with psychiatry. Next visit, will need to clarify psych history and medication regimen.    E&M code to be billed if TCM cannot be: 85588    Type of decision making: Moderate complexity (10811)    Options for treatment and follow-up care were reviewed with the patient  Mary Ann Olson   engaged in the decision making process and verbalized understanding of the options discussed and agreed with the final plan.      Shanthi Diallo DO    Precepted patient with Dr. Brown

## 2018-07-16 NOTE — PROGRESS NOTES
Preceptor Attestation:   Patient seen, evaluated and discussed with the resident. I have verified the content of the note, which accurately reflects my assessment of the patient and the plan of care.    Supervising Physician:Valencia Brown MD    [facility]

## 2018-07-16 NOTE — MR AVS SNAPSHOT
"              After Visit Summary   7/16/2018    Mary Ann Olson    MRN: [id number]           Patient Information     Date Of Birth          1965        Visit Information        Provider Department      7/16/2018 3:20 PM Shanthi Diallo DO [facility]        Today's Diagnoses     Mixed hyperlipidemia    -  1    Chronic obstructive pulmonary disease with acute exacerbation (H)        Type 2 diabetes mellitus without complication, without long-term current use of insulin (H)          Care Instructions    Start using your inhalers (Dulera and spiriva) in the morning with your breakfast  Keep trying to take the Dulera again in the evening.    You do not need to check your blood sugars regularly. Only check if you are having symptoms.       Medicine changes:  - Stop Omeprazole  - Stop Singulair  - Stop nicotine patch  - Stop Simvastatin  -Start spiriva daily  - Start nicotine lozenge PRN          Follow-ups after your visit        Follow-up notes from your care team     Return in about 2 weeks (around 7/30/2018).      Your next 10 appointments already scheduled     Aug 28, 2018  2:00 PM CDT   PHYSICAL with Joanna Farah MD   [facility] ([facility])    [address] [place] [address]   [phone]              Who to contact     Please call your clinic at [phone] to:    Ask questions about your health    Make or cancel appointments    Discuss your medicines    Learn about your test results    Speak to your doctor            Additional Information About Your Visit        Care EveryWhere ID     This is your Care EveryWhere ID. This could be used by other organizations to access your [facility] medical records  [id number]        Your Vitals Were     Pulse Temperature Height Pulse Oximetry BMI (Body Mass Index)       103 98.1  F (36.7  C) (Oral) 5' 1.61\" (156.5 cm) 93% 30.19 kg/m2        Blood Pressure from Last 3 Encounters:   07/16/18 113/76   06/19/18 114/79 "   06/12/18 132/89    Weight from Last 3 Encounters:   07/16/18 163 lb (73.9 kg)   06/19/18 154 lb 4 oz (70 kg)   06/12/18 157 lb (71.2 kg)              We Performed the Following     Hemoglobin A1c ([facility])     Lipid Panel ([facility])          Today's Medication Changes          These changes are accurate as of 7/16/18  4:56 PM.  If you have any questions, ask your nurse or doctor.               Start taking these medicines.        Dose/Directions    nicotine polacrilex 4 MG lozenge   Commonly known as:  [employer] NICOTINE POLACRILEX   Used for:  Chronic obstructive pulmonary disease with acute exacerbation (H)   Started by:  Shanthi Diallo DO        Dose:  4 mg   Place 1 lozenge (4 mg) inside cheek as needed for smoking cessation   Quantity:  360 tablet   Refills:  3       tiotropium 2.5 MCG/ACT inhalation aerosol   Commonly known as:  SPIRIVA RESPIMAT   Used for:  Chronic obstructive pulmonary disease with acute exacerbation (H)   Started by:  Shanthi Diallo DO        Dose:  2 puff   Inhale 2 puffs into the lungs daily   Quantity:  12 g   Refills:  3            Where to get your medicines      These medications were sent to [facility] [id number] - [place], [place] - [address], [place] [address]     Phone:  [phone]     nicotine polacrilex 4 MG lozenge    Spacer/Aero Chamber Mouthpiece Misc    tiotropium 2.5 MCG/ACT inhalation aerosol                Primary Care Provider Office Phone # Fax #    Joanna Polina Farah [phone]       [facility] [address] [facility] [address]        Equal Access to Services     LUCIANO MCKEON AH: Hadii aad ku hadasho Soomaali, waaxda luqadaha, qaybta kaalmada adeegyada, grace rm. So [facility] [phone].    ATENCIÓN: Si habla español, tiene a judge disposición servicios gratuitos de asistencia lingüística. Llame al [phone].    We comply with applicable federal civil rights laws and [place]  laws. We do not discriminate on the basis of race, color, national origin, age, disability, sex, sexual orientation, or gender identity.            Thank you!     Thank you for choosing [facility]  for your care. Our goal is always to provide you with excellent care. Hearing back from our patients is one way we can continue to improve our services. Please take a few minutes to complete the written survey that you may receive in the mail after your visit with us. Thank you!             Your Updated Medication List - Protect others around you: Learn how to safely use, store and throw away your medicines at www.disposemymeds.org.          This list is accurate as of 7/16/18  4:56 PM.  Always use your most recent med list.                   Brand Name Dispense Instructions for use Diagnosis    acetaminophen 325 MG tablet    TYLENOL    100 tablet    Take 2 tablets (650 mg) by mouth every 6 hours as needed    Cough       adalimumab 40 MG/0.8ML pen kit    HUMIRA     Inject 0.8 mLs (40 mg) Subcutaneous every 14 days        * albuterol 108 (90 Base) MCG/ACT Inhaler    PROAIR HFA/PROVENTIL HFA/VENTOLIN HFA    1 Inhaler    Inhale 2 puffs into the lungs every 6 hours as needed for shortness of breath / dyspnea or wheezing    Moderate persistent asthma without complication       * albuterol (2.5 MG/3ML) 0.083% neb solution     30 vial    Take 1 vial (2.5 mg) by nebulization every 6 hours as needed for shortness of breath / dyspnea or wheezing    Cough       bisacodyl 10 MG Suppository    DULCOLAX     Place 10 mg rectally daily as needed        calcium-vitamin D 600-400 MG-UNIT per tablet    calcium 600 + D    60 tablet    Take 1 tablet by mouth 2 times daily    Postmenopausal status       carBAMazepine 200 MG 12 hr tablet    TEGretol XR    46 tablet    Take 1 tablet (200 mg) by mouth 2 times daily    Mood disorder (H)       hydrOXYzine 50 MG tablet    ATARAX     Take 50 mg by mouth 2 times daily as needed         metFORMIN 500 MG tablet    GLUCOPHAGE    180 tablet    Take 2 tablets (1,000 mg) by mouth 2 times daily (with meals)    Type 2 diabetes mellitus without complication, without long-term current use of insulin (H)       mometasone-formoterol 100-5 MCG/ACT oral inhaler    DULERA    13 g    Inhale 2 puffs into the lungs 2 times daily    Chronic obstructive pulmonary disease with acute exacerbation (H)       nicotine polacrilex 4 MG lozenge    CVS NICOTINE POLACRILEX    360 tablet    Place 1 lozenge (4 mg) inside cheek as needed for smoking cessation    Chronic obstructive pulmonary disease with acute exacerbation (H)       polyethylene glycol Packet    MIRALAX/GLYCOLAX     Take 17 g by mouth daily as needed for constipation        Spacer/Aero Chamber Mouthpiece Misc     1 each    1 Units 2 times daily    Chronic obstructive pulmonary disease with acute exacerbation (H)       STATIN NOT PRESCRIBED (INTENTIONAL)      Please choose reason not prescribed, below    Type 2 diabetes mellitus without complication, without long-term current use of insulin (H)       tiotropium 2.5 MCG/ACT inhalation aerosol    SPIRIVA RESPIMAT    12 g    Inhale 2 puffs into the lungs daily    Chronic obstructive pulmonary disease with acute exacerbation (H)       vitamin D 73648 UNIT capsule    ERGOCALCIFEROL    30 capsule    Take 1 capsule (50,000 Units) by mouth twice a week    Postmenopausal status       * Notice:  This list has 2 medication(s) that are the same as other medications prescribed for you. Read the directions carefully, and ask your doctor or other care provider to review them with you.

## 2018-07-16 NOTE — Clinical Note
Just as a slight adjustment- we don't do PFTs here but we do spirometry so we could obtain spirometry next visit. If you want formal PFTs on someone they do that in pulm clinic. :) Nice job with a hard patient encounter.

## 2018-07-16 NOTE — PATIENT INSTRUCTIONS
Start using your inhalers (Dulera and spiriva) in the morning with your breakfast  Keep trying to take the Dulera again in the evening.    You do not need to check your blood sugars regularly. Only check if you are having symptoms.       Medicine changes:  - Stop Omeprazole  - Stop Singulair  - Stop nicotine patch  - Stop Simvastatin  -Start spiriva daily  - Start nicotine lozenge PRN

## 2018-07-16 NOTE — TELEPHONE ENCOUNTER
[facility] phone call message- medication clarification/question:    Full Medication Name: omeprazole 20 mg capsules, simvastatin 40 mg tablet, Nicoderm patch 14 mg patch, fiber laxtiv capsule 0.52 mg    Strength:     Have you contacted your pharmacy about this refill request?     If  Yes,  which pharmacy?    When did you contact the pharmacy?    Additional comments/concerns from call to pharmacy:    Reason for call to clinic: Calling to get order for above to discontinue medications as Patient refused to take them. Needs order in paper fax to . Told her it could take up to two business days for a response. Please call and advise.       OK to leave a message on voice mail?     Primary language: English      needed? No    Call taken on July 16, 2018 at 3:38 PM by Marshall Weeks

## 2018-07-16 NOTE — MR AVS SNAPSHOT
After Visit Summary   7/16/2018    Mary Ann Olson    MRN: [id number]           Patient Information     Date Of Birth          1965        Visit Information        Provider Department      7/16/2018 3:20 PM Valeria Govea RHIANNON [facility]        Today's Diagnoses     Encounter for medication review and counseling    -  1       Follow-ups after your visit        Your next 10 appointments already scheduled     Jul 30, 2018  3:40 PM CDT   Return Visit with Shanthi Diallo DO   [facility] ([facility])    [address] [place] [address]   [phone]            Aug 28, 2018  2:00 PM CDT   PHYSICAL with Joanna Farah MD   [facility] ([facility])    [address] [place] [address]   [phone]              Who to contact     Please call your clinic at [phone] to:    Ask questions about your health    Make or cancel appointments    Discuss your medicines    Learn about your test results    Speak to your doctor            Additional Information About Your Visit        Care EveryWhere ID     This is your Care EveryWhere ID. This could be used by other organizations to access your [facility] medical records  [id number]         Blood Pressure from Last 3 Encounters:   07/16/18 113/76   06/19/18 114/79   06/12/18 132/89    Weight from Last 3 Encounters:   07/16/18 163 lb (73.9 kg)   06/19/18 154 lb 4 oz (70 kg)   06/12/18 157 lb (71.2 kg)              Today, you had the following     No orders found for display         Today's Medication Changes          These changes are accurate as of 7/16/18 11:59 PM.  If you have any questions, ask your nurse or doctor.               Start taking these medicines.        Dose/Directions    nicotine polacrilex 4 MG lozenge   Commonly known as:  [employer] NICOTINE POLACRILEX   Used for:  Chronic obstructive pulmonary disease with acute exacerbation (H)   Started by:  Shanthi Diallo DO         Dose:  4 mg   Place 1 lozenge (4 mg) inside cheek as needed for smoking cessation   Quantity:  360 tablet   Refills:  3       tiotropium 2.5 MCG/ACT inhalation aerosol   Commonly known as:  SPIRIVA RESPIMAT   Used for:  Chronic obstructive pulmonary disease with acute exacerbation (H)   Started by:  Shanthi Diallo DO        Dose:  2 puff   Inhale 2 puffs into the lungs daily   Quantity:  12 g   Refills:  3            Where to get your medicines      These medications were sent to [facility] [id number] - [place], [place] - [address], [place] [address]     Phone:  [phone]     nicotine polacrilex 4 MG lozenge    Spacer/Aero Chamber Mouthpiece Misc    tiotropium 2.5 MCG/ACT inhalation aerosol                Primary Care Provider Office Phone # Fax #    Joanna Farah [phone]       [facility] [address] [facility] [address]        Equal Access to Services     [facility] Center[facility] AH: Hadii aad ku hadasho Soomaali, waaxda luqadaha, qaybta kaalmada adeegyada, waxay idiin hayaan adeeg kharash la'jessican . So [facility] [phone].    ATENCIÓN: Si habla español, tiene a judge disposición servicios gratuitos de asistencia lingüística. Llame al [phone].    We comply with applicable federal civil rights laws and [place] laws. We do not discriminate on the basis of race, color, national origin, age, disability, sex, sexual orientation, or gender identity.            Thank you!     Thank you for choosing [facility]  for your care. Our goal is always to provide you with excellent care. Hearing back from our patients is one way we can continue to improve our services. Please take a few minutes to complete the written survey that you may receive in the mail after your visit with us. Thank you!             Your Updated Medication List - Protect others around you: Learn how to safely use, store and throw away your medicines at  www.disposemymeds.org.          This list is accurate as of 7/16/18 11:59 PM.  Always use your most recent med list.                   Brand Name Dispense Instructions for use Diagnosis    acetaminophen 325 MG tablet    TYLENOL    100 tablet    Take 2 tablets (650 mg) by mouth every 6 hours as needed    Cough       adalimumab 40 MG/0.8ML pen kit    HUMIRA     Inject 0.8 mLs (40 mg) Subcutaneous every 14 days        * albuterol 108 (90 Base) MCG/ACT Inhaler    PROAIR HFA/PROVENTIL HFA/VENTOLIN HFA    1 Inhaler    Inhale 2 puffs into the lungs every 6 hours as needed for shortness of breath / dyspnea or wheezing    Moderate persistent asthma without complication       * albuterol (2.5 MG/3ML) 0.083% neb solution     30 vial    Take 1 vial (2.5 mg) by nebulization every 6 hours as needed for shortness of breath / dyspnea or wheezing    Cough       bisacodyl 10 MG Suppository    DULCOLAX     Place 10 mg rectally daily as needed        calcium-vitamin D 600-400 MG-UNIT per tablet    calcium 600 + D    60 tablet    Take 1 tablet by mouth 2 times daily    Postmenopausal status       carBAMazepine 200 MG 12 hr tablet    TEGretol XR    46 tablet    Take 1 tablet (200 mg) by mouth 2 times daily    Mood disorder (H)       hydrOXYzine 50 MG tablet    ATARAX     Take 50 mg by mouth 2 times daily as needed        metFORMIN 500 MG tablet    GLUCOPHAGE    180 tablet    Take 2 tablets (1,000 mg) by mouth 2 times daily (with meals)    Type 2 diabetes mellitus without complication, without long-term current use of insulin (H)       mometasone-formoterol 100-5 MCG/ACT oral inhaler    DULERA    13 g    Inhale 2 puffs into the lungs 2 times daily    Chronic obstructive pulmonary disease with acute exacerbation (H)       nicotine polacrilex 4 MG lozenge    CVS NICOTINE POLACRILEX    360 tablet    Place 1 lozenge (4 mg) inside cheek as needed for smoking cessation    Chronic obstructive pulmonary disease with acute exacerbation (H)        polyethylene glycol Packet    MIRALAX/GLYCOLAX     Take 17 g by mouth daily as needed for constipation        Spacer/Aero Chamber Mouthpiece Misc     1 each    1 Units 2 times daily    Chronic obstructive pulmonary disease with acute exacerbation (H)       STATIN NOT PRESCRIBED (INTENTIONAL)      Please choose reason not prescribed, below    Type 2 diabetes mellitus without complication, without long-term current use of insulin (H)       tiotropium 2.5 MCG/ACT inhalation aerosol    SPIRIVA RESPIMAT    12 g    Inhale 2 puffs into the lungs daily    Chronic obstructive pulmonary disease with acute exacerbation (H)       vitamin D 15244 UNIT capsule    ERGOCALCIFEROL    30 capsule    Take 1 capsule (50,000 Units) by mouth twice a week    Postmenopausal status       * Notice:  This list has 2 medication(s) that are the same as other medications prescribed for you. Read the directions carefully, and ask your doctor or other care provider to review them with you.

## 2018-07-17 ASSESSMENT — ASTHMA QUESTIONNAIRES: ACT_TOTALSCORE: 5

## 2018-07-17 NOTE — PROGRESS NOTES
Lipid panel returned and patient has a calculated 10-year ASCVD risk score of 4.8%. Her Hemoglobin A1c also returned at 6.3 which is in the pre-diabetic range. These labs reflect being essentially off her medications (she has been refusing metformin and statin at the group home). In order to simplify her medication regimen we stopped her statin at the last visit due to lower extremity swelling/myalgias. Given her ASCVD risk, she can likely stay off the statin and recheck lipids in 5 years. With regards to her diabetes, she technically is in the pre-diabetic range. It should be discussed with the patient at her follow up visit in 2 weeks whether to stop/decrease the metformin (given risk for hypoglycemia if she were to actually take the prescribed dose).     Shanthi Diallo DO  [facility] Resident  Pager #[phone]

## 2018-07-17 NOTE — TELEPHONE ENCOUNTER
Mariajose calling back stating she called yesterday requesting an order of discontinuation for 4 medications and received a fax yesterday but a signature was missing. States that on that fax it was stated that Singulair was discontinued but that was not one she had requested. Please call and advise.

## 2018-07-23 ENCOUNTER — TELEPHONE (OUTPATIENT)
Dept: FAMILY MEDICINE | Facility: CLINIC | Age: 53
End: 2018-07-23

## 2018-07-23 NOTE — TELEPHONE ENCOUNTER
TCM APPOINTMENT FOLLOW UP    Language:English    Medication questions: no    Specialist follow up: no    Concerns since last visit: no    Next appointment at [facility]:Yes    Comments: Pt stated that she is not feeling any better,  She stated that she scheduled to come back in to be seen on 07/26/18 at 11:20am with .      @\[facility] [place] [facility]\""ignacio@ Yes   Prashanth Toney

## 2018-07-24 NOTE — TELEPHONE ENCOUNTER
Home care has my verbal permission to discontinue the stated medications.     Valencia Brown MD  Family Medicine

## 2018-07-25 NOTE — TELEPHONE ENCOUNTER
Routed message to  who is seeing patient tomorrow. Requested the discontinued medications be added to printed AVS. Called marlene and was routed to nurse line. No answer, left VM with detailed information that we will update discontinue orders at patient's appointment tomorrow. Basilio SMITH

## 2018-07-25 NOTE — TELEPHONE ENCOUNTER
Calling regarding message below, she states she didn't get a call back yet about it. Please call and advise.

## 2018-07-26 ENCOUNTER — TELEPHONE (OUTPATIENT)
Dept: FAMILY MEDICINE | Facility: CLINIC | Age: 53
End: 2018-07-26

## 2018-07-26 DIAGNOSIS — J44.1 COPD EXACERBATION (H): Primary | ICD-10-CM

## 2018-07-26 RX ORDER — GUAIFENESIN 200 MG/1
400 TABLET ORAL EVERY 6 HOURS PRN
Qty: 30 TABLET | Refills: 0 | Status: SHIPPED | OUTPATIENT
Start: 2018-07-26 | End: 2019-01-28

## 2018-07-26 NOTE — TELEPHONE ENCOUNTER
Mary Ann had an appointment scheduled for this morning with Dr Leal for EDF follow up (seen 7/21/2018 by ED)  on cough/COPD/shortness of breath. No transportation available to come in for appointment, had to reschedule for Monday, 7/30/2018 with Dr Diallo. Continues to take Doxycycline 100 mg twice a day, today is the last day of Prednisone. C/o some chest tightness, minimal relief with use of Albuterol neb solution. Continues to use her Dulera and Spiriva daily as prescribed. No noted fever but no thermometer to check temp. There is a nurse on site at her resident will have nurse when available assess her lungs and O2 sats. C/o some difficulty breathing worse at night time with coughing which interrupts or keeps her up at night. Since she is not able to attend her clinic appt today, can she be prescribed a cough medication that will help loosen or bring up phlegm. Cough- majority of the time dry, occasionally will be able to bring up phlegm- brown to yellowish green.     Informed Mary Ann if her breathing should worsen, have fever she will need to return to ED for re- evaluation and not wait until her scheduled clinic appt. Mary Ann states understanding, would like to request for cough medication be sent to her local pharmacy. Encounter sent to Urgent Task Physician- Dr Ocampo.  Pasha SMITH

## 2018-07-26 NOTE — TELEPHONE ENCOUNTER
Spoke to Mary Ann, informed of cough medication prescribed. Mary Ann informs me her symptoms have worsened, with occasional feeling burning sensation in her lung. Advised- go to ED. Pasha SMITH

## 2018-07-26 NOTE — TELEPHONE ENCOUNTER
Patient canceled today. Please forward to whoever is seeing her next.  (Routing comment) ]  Routed message to  who is seeing the patient Monday. Basilio SMITH

## 2018-07-27 ENCOUNTER — TELEPHONE (OUTPATIENT)
Dept: FAMILY MEDICINE | Facility: CLINIC | Age: 53
End: 2018-07-27

## 2018-07-28 NOTE — TELEPHONE ENCOUNTER
Patient called regarding excessive coughing after recent COPD exacerbation. Was seen on 7/21 in ED and started on 10 days doxycycline and 5 days prednisone, which she has been taking. Cough and shortness of breath have been worsening since then, bringing up brown sputum, thinks she may have a fever. No chest pain. Gets short of breath talking on the phone. She was advised to go to the ED yesterday, but didn't go. Requested a cough medicine and guaifenesin was sent but she didn't get it. She has been using her neb machine at home without much benefit.     I advised patient that she needs to be evaluated in the ED. She lives alone and does not drive, so I encouraged her to call EMS if she can't easily get a ride. She was very frustrated with this as she is tired and doesn't want to be up all night in the ED. I explained concern for possibly needing inpatient treatment for COPD exacerbation and for ruling out pneumonia. She reluctantly agreed to go to ED.     Lou Ha MD, MPH  [facility] Resident, PGY3

## 2018-07-31 ENCOUNTER — TELEPHONE (OUTPATIENT)
Dept: FAMILY MEDICINE | Facility: CLINIC | Age: 53
End: 2018-07-31

## 2018-07-31 NOTE — TELEPHONE ENCOUNTER
Attempted to reach patient to follow up from recent discharge for COPD, patients phone is not active. Called and LM for nurse at [facility] [phone] as sounds as if patient is involved with [employer]

## 2018-08-02 ENCOUNTER — TELEPHONE (OUTPATIENT)
Dept: FAMILY MEDICINE | Facility: CLINIC | Age: 53
End: 2018-08-02

## 2018-08-02 DIAGNOSIS — F39 MOOD DISORDER (H): ICD-10-CM

## 2018-08-02 NOTE — TELEPHONE ENCOUNTER
Message left from Nurse at [facility] stating that patient is refusing to take her antibiotics and steroids prescribed at the hospital. I have been unable to reach her to follow up from discharge. LM for RC from patient.

## 2018-08-02 NOTE — TELEPHONE ENCOUNTER
[facility] phone call message- patient requesting a refill:    Full Medication Name: Carbamazepine    Dose: 200mg    Pharmacy confirmed as   [facility] [place] [address] - [place], [place] - [address] [place] [address]  Phone: [phone] Fax: [phone]  : Yes    Additional Comments:      OK to leave a message on voice mail? Yes    Primary language: English      needed? No    Call taken on August 2, 2018 at 9:03 AM by Xiomara Willingham

## 2018-08-03 NOTE — TELEPHONE ENCOUNTER
LM for RC from patient. LM for Nurse that I have sent a note to the provider and that should she be able to assist with scheduling an appointment for patient to please call me back as I have had no response from patient.

## 2018-08-05 ENCOUNTER — TELEPHONE (OUTPATIENT)
Dept: FAMILY MEDICINE | Facility: CLINIC | Age: 53
End: 2018-08-05

## 2018-08-05 NOTE — TELEPHONE ENCOUNTER
At [facility]. Calling to let me know that she was not able to get her meds yet today. She overslept this am and when she called the pharmacy, they were willing to delivery her morning meds but not the evenings meds as it would be too close together. She was angry and refused this. Now she has to get them by crossing the street, but is concerned her breathing will worsen due to heat and humidity. She is currently on bid doxycycline and every day steroids for COPD exacerbation. Due to already being afternoon, I encouraged her to take her meds just once today, as soon as she is able, and to call and see if they will deliver them now. Patient agrees to do this and plans to call clinic tomorrow to have all meds sent to a different pharmacy where she can be responsible for distributing them herself. The number she called from today is [phone] (noted recent note that number had been changed in chart, and this number is not the one listed).   Lou Ha MD, MPH  [facility] Resident

## 2018-08-06 RX ORDER — CARBAMAZEPINE 200 MG/1
200 TABLET, EXTENDED RELEASE ORAL 2 TIMES DAILY
Qty: 60 TABLET | Refills: 0 | Status: SHIPPED | OUTPATIENT
Start: 2018-08-06 | End: 2018-08-21

## 2018-08-08 ENCOUNTER — TELEPHONE (OUTPATIENT)
Dept: FAMILY MEDICINE | Facility: CLINIC | Age: 53
End: 2018-08-08

## 2018-08-08 DIAGNOSIS — R06.02 SHORTNESS OF BREATH: Primary | ICD-10-CM

## 2018-08-09 ENCOUNTER — OFFICE VISIT (OUTPATIENT)
Dept: FAMILY MEDICINE | Facility: CLINIC | Age: 53
End: 2018-08-09
Payer: COMMERCIAL

## 2018-08-09 ENCOUNTER — RECORDS - HEALTHEAST (OUTPATIENT)
Dept: ADMINISTRATIVE | Facility: OTHER | Age: 53
End: 2018-08-09

## 2018-08-09 ENCOUNTER — OFFICE VISIT (OUTPATIENT)
Dept: PHARMACY | Facility: CLINIC | Age: 53
End: 2018-08-09
Payer: COMMERCIAL

## 2018-08-09 VITALS
OXYGEN SATURATION: 93 % | WEIGHT: 169.2 LBS | HEIGHT: 61 IN | BODY MASS INDEX: 31.95 KG/M2 | RESPIRATION RATE: 20 BRPM | DIASTOLIC BLOOD PRESSURE: 80 MMHG | SYSTOLIC BLOOD PRESSURE: 130 MMHG | TEMPERATURE: 98.2 F | HEART RATE: 98 BPM

## 2018-08-09 DIAGNOSIS — J45.901 PERSISTENT ASTHMA WITH ACUTE EXACERBATION, UNSPECIFIED ASTHMA SEVERITY: ICD-10-CM

## 2018-08-09 DIAGNOSIS — R05.9 COUGH: ICD-10-CM

## 2018-08-09 DIAGNOSIS — Z71.89 ENCOUNTER FOR MEDICATION REVIEW AND COUNSELING: Primary | ICD-10-CM

## 2018-08-09 DIAGNOSIS — J45.41 MODERATE PERSISTENT ASTHMA WITH ACUTE EXACERBATION: Primary | ICD-10-CM

## 2018-08-09 DIAGNOSIS — K21.9 GASTROESOPHAGEAL REFLUX DISEASE, ESOPHAGITIS PRESENCE NOT SPECIFIED: ICD-10-CM

## 2018-08-09 DIAGNOSIS — Z12.39 SCREENING FOR BREAST CANCER: ICD-10-CM

## 2018-08-09 RX ORDER — BENZONATATE 100 MG/1
100 CAPSULE ORAL 3 TIMES DAILY PRN
Qty: 42 CAPSULE | Refills: 0 | Status: SHIPPED | OUTPATIENT
Start: 2018-08-09 | End: 2019-02-16

## 2018-08-09 RX ORDER — PREDNISONE 10 MG/1
TABLET ORAL
COMMUNITY
Start: 2018-07-30 | End: 2018-08-28

## 2018-08-09 NOTE — LETTER
2018    Regarding:  Mary Ann Olson  [address]   [facility] [address]  : 2018      To: [facility]    Requested changes to medication administration:     Elena is allowed:  - To take Prednisone dose late. She can take this any time during the day.  - To take the Doxycycline doses late. She can take the morning dose up until 12 pm and the evening dose up until 12 am.   - To take any morning medicine can be given up until 12 pm  - To take any evening medicine can be given up until 12 am    Discontinue the following medications:  - Omeprazole  - Famotidine  - Montelukast (Singulair)  - Simvastatin    Please see the enclosed medication list.     Sincerely,    MD Valeria Winston, [facility]

## 2018-08-09 NOTE — TELEPHONE ENCOUNTER
Patient called on-call provided for SOB. Trouble breathing, productive cough, waking up in the middle of the night hyperventilating. She thinks she has dust in her apartment that is exacerbating her symptoms. When asked if she could stay with a friend tonight, she replied she had no where else to stay. She has been using albuterol nebulizer multiple times per day with some relief. Also has been using OTC cough suppressant. Currently on doxycycline and prednisone taper since being discharged from the hospital on 7/30/18 for COPD. No fevers, chills, chest pain or lower extremity edema. History of poorly controlled COPD and numerous ED visits for SOB. Patient has an appointment at [facility] tomorrow, 8/9/18. On the phone patient was speaking in lengthy sentences without dyspnea. Discussed to continue daily inhaler/predinisone/antibiotics and use albuterol every 4 hours until seen in clinic tomorrow. If patient remains short of breath despite albuterol, advised patient to go to the ED. Patient was agreeable to plan.     Shanthi Diallo DO  [facility] Resident  Pager #[phone]

## 2018-08-09 NOTE — MR AVS SNAPSHOT
After Visit Summary   8/9/2018    Mary Ann Olson    MRN: [id number]           Patient Information     Date Of Birth          1965        Visit Information        Provider Department      8/9/2018 1:00 PM Valeria Govea RHIANNON [facility]        Today's Diagnoses     Encounter for medication review and counseling    -  1    Persistent asthma with acute exacerbation, unspecified asthma severity        Gastroesophageal reflux disease, esophagitis presence not specified           Follow-ups after your visit        Your next 10 appointments already scheduled     Aug 28, 2018  2:00 PM CDT   PHYSICAL with Joanna Farah MD   [facility] ([facility])    [address] [place] [address]   [phone]              Who to contact     Please call your clinic at [phone] to:    Ask questions about your health    Make or cancel appointments    Discuss your medicines    Learn about your test results    Speak to your doctor            Additional Information About Your Visit        Care EveryWhere ID     This is your Care EveryWhere ID. This could be used by other organizations to access your [facility] medical records  [id number]         Blood Pressure from Last 3 Encounters:   08/09/18 130/80   07/16/18 113/76   06/19/18 114/79    Weight from Last 3 Encounters:   08/09/18 169 lb 3.2 oz (76.7 kg)   07/16/18 163 lb (73.9 kg)   06/19/18 154 lb 4 oz (70 kg)              Today, you had the following     No orders found for display         Today's Medication Changes          These changes are accurate as of 8/9/18 11:59 PM.  If you have any questions, ask your nurse or doctor.               Start taking these medicines.        Dose/Directions    benzonatate 100 MG capsule   Commonly known as:  TESSALON   Used for:  Cough   Started by:  Marysol Collins MD        Dose:  100 mg   Take 1 capsule (100 mg) by mouth 3 times daily as needed for cough   Quantity:   42 capsule   Refills:  0            Where to get your medicines      These medications were sent to [facility] [id number] [address] AT [facility]  [address] [facility] [address]     Phone:  [phone]     benzonatate 100 MG capsule                Primary Care Provider Office Phone # Fax #    Joanna Polina Farah [phone]       [facility] [address] [place] [address]        Equal Access to Services     LUCIANO [facility] : Hadii aad ku hadasho Soomaali, waaxda luqadaha, qaybta kaalmada adeegyada, waxay idiin hayaan adeeg kharanelsy dimas . So [facility] [phone].    ATENCIÓN: Si habla español, tiene a judge disposición servicios gratuitos de asistencia lingüística. [facility] [phone].    We comply with applicable federal civil rights laws and [place] laws. We do not discriminate on the basis of race, color, national origin, age, disability, sex, sexual orientation, or gender identity.            Thank you!     Thank you for choosing [facility]  for your care. Our goal is always to provide you with excellent care. Hearing back from our patients is one way we can continue to improve our services. Please take a few minutes to complete the written survey that you may receive in the mail after your visit with us. Thank you!             Your Updated Medication List - Protect others around you: Learn how to safely use, store and throw away your medicines at www.disposemymeds.org.          This list is accurate as of 8/9/18 11:59 PM.  Always use your most recent med list.                   Brand Name Dispense Instructions for use Diagnosis    acetaminophen 325 MG tablet    TYLENOL    100 tablet    Take 2 tablets (650 mg) by mouth every 6 hours as needed    Cough       adalimumab 40 MG/0.8ML pen kit    HUMIRA     Inject 0.8 mLs (40 mg) Subcutaneous every 14 days        * albuterol 108 (90 Base) MCG/ACT inhaler    PROAIR HFA/PROVENTIL  HFA/VENTOLIN HFA    1 Inhaler    Inhale 2 puffs into the lungs every 6 hours as needed for shortness of breath / dyspnea or wheezing    Moderate persistent asthma without complication       * albuterol (2.5 MG/3ML) 0.083% neb solution     30 vial    Take 1 vial (2.5 mg) by nebulization every 6 hours as needed for shortness of breath / dyspnea or wheezing    Cough       benzonatate 100 MG capsule    TESSALON    42 capsule    Take 1 capsule (100 mg) by mouth 3 times daily as needed for cough    Cough       bisacodyl 10 MG Suppository    DULCOLAX     Place 10 mg rectally daily as needed        calcium-vitamin D 600-400 MG-UNIT per tablet    calcium 600 + D    60 tablet    Take 1 tablet by mouth 2 times daily    Postmenopausal status       carBAMazepine 200 MG 12 hr tablet    TEGretol XR    60 tablet    Take 1 tablet (200 mg) by mouth 2 times daily    Mood disorder (H)       guaiFENesin 200 MG Tabs tablet    ORGANIDIN    30 tablet    Take 2 tablets (400 mg) by mouth every 6 hours as needed for cough    COPD exacerbation (H)       hydrOXYzine 50 MG tablet    ATARAX     Take 50 mg by mouth 2 times daily as needed        metFORMIN 500 MG tablet    GLUCOPHAGE    180 tablet    Take 2 tablets (1,000 mg) by mouth 2 times daily (with meals)    Type 2 diabetes mellitus without complication, without long-term current use of insulin (H)       mometasone-formoterol 100-5 MCG/ACT oral inhaler    DULERA    13 g    Inhale 2 puffs into the lungs 2 times daily    Chronic obstructive pulmonary disease with acute exacerbation (H)       polyethylene glycol Packet    MIRALAX/GLYCOLAX     Take 17 g by mouth daily as needed for constipation        predniSONE 10 MG tablet    DELTASONE     Take 4 tabs daily for 2 days, then 3 tabs daily for 3 days, then 2 tabs daily for 3 days, then 1 tab daily for 3 days, then stop.        tiotropium 2.5 MCG/ACT inhalation aerosol    SPIRIVA RESPIMAT    12 g    Inhale 2 puffs into the lungs daily    Chronic  obstructive pulmonary disease with acute exacerbation (H)       vitamin D 26917 UNIT capsule    ERGOCALCIFEROL    30 capsule    Take 1 capsule (50,000 Units) by mouth twice a week    Postmenopausal status       * Notice:  This list has 2 medication(s) that are the same as other medications prescribed for you. Read the directions carefully, and ask your doctor or other care provider to review them with you.

## 2018-08-09 NOTE — MR AVS SNAPSHOT
After Visit Summary   8/9/2018    Mary Ann Olson    MRN: [id number]           Patient Information     Date Of Birth          1965        Visit Information        Provider Department      8/9/2018 1:20 PM Marysol Collins MD [facility]        Today's Diagnoses     Screening for breast cancer    -  1    Moderate persistent asthma with acute exacerbation        Cough          Care Instructions    Keep using your nebulizer when you are home and having trouble breathing. Try to complete the entire treatment to ensure you get the full dose.     Put your Dulera by your Spiriva. Use these together. These are the two most important medicines to keep your breathing under control.     Keep up the great work not smoking!!    Please return to clinic in about 1-2 weeks so we can follow up on your breathing. We will discuss scheduling the lung function tests at that time.           Follow-ups after your visit        Follow-up notes from your care team     Return in about 2 weeks (around 8/23/2018).      Your next 10 appointments already scheduled     Aug 28, 2018  2:00 PM CDT   PHYSICAL with Joanna Farah MD   [facility] ([facility])    [address] [place] [address]   [phone]              Future tests that were ordered for you today     Open Future Orders        Priority Expected Expires Ordered    MA SCREENING DIGITAL BILAT Routine  8/9/2019 8/9/2018            Who to contact     Please call your clinic at [phone] to:    Ask questions about your health    Make or cancel appointments    Discuss your medicines    Learn about your test results    Speak to your doctor            Additional Information About Your Visit        Care EveryWhere ID     This is your Care EveryWhere ID. This could be used by other organizations to access your [facility] medical records  [id number]        Your Vitals Were     Pulse Temperature Respirations Height Pulse Oximetry  "BMI (Body Mass Index)    98 98.2  F (36.8  C) 20 5' 1\" (154.9 cm) 93% 31.97 kg/m2       Blood Pressure from Last 3 Encounters:   08/09/18 130/80   07/16/18 113/76   06/19/18 114/79    Weight from Last 3 Encounters:   08/09/18 169 lb 3.2 oz (76.7 kg)   07/16/18 163 lb (73.9 kg)   06/19/18 154 lb 4 oz (70 kg)                 Today's Medication Changes          These changes are accurate as of 8/9/18  2:50 PM.  If you have any questions, ask your nurse or doctor.               Start taking these medicines.        Dose/Directions    benzonatate 100 MG capsule   Commonly known as:  TESSALON   Used for:  Cough   Started by:  Marysol Collins MD        Dose:  100 mg   Take 1 capsule (100 mg) by mouth 3 times daily as needed for cough   Quantity:  42 capsule   Refills:  0            Where to get your medicines      These medications were sent to [facility] [address] [place] [address] & [address] [address]     Phone:  [phone]     benzonatate 100 MG capsule                Primary Care Provider Office Phone # Fax #    Joanna Polina Farah [phone]       [facility] [address] [place] [address]        Equal Access to Services     LUCIANO MCKOEN AH: Hadii loreta ku hadasho Soomaali, waaxda luqadaha, qaybta kaalmada adeegyada, grace dimas . So [facility] [phone].    ATENCIÓN: Si habla español, tiene a judge disposición servicios gratuitos de asistencia lingüística. Llame al [phone].    We comply with applicable federal civil rights laws and [place] laws. We do not discriminate on the basis of race, color, national origin, age, disability, sex, sexual orientation, or gender identity.            Thank you!     Thank you for choosing [facility]  for your care. Our goal is always to provide you with excellent care. Hearing back from our patients is one way we can continue to improve our services. " Please take a few minutes to complete the written survey that you may receive in the mail after your visit with us. Thank you!             Your Updated Medication List - Protect others around you: Learn how to safely use, store and throw away your medicines at www.disposemymeds.org.          This list is accurate as of 8/9/18  2:50 PM.  Always use your most recent med list.                   Brand Name Dispense Instructions for use Diagnosis    acetaminophen 325 MG tablet    TYLENOL    100 tablet    Take 2 tablets (650 mg) by mouth every 6 hours as needed    Cough       adalimumab 40 MG/0.8ML pen kit    HUMIRA     Inject 0.8 mLs (40 mg) Subcutaneous every 14 days        * albuterol 108 (90 Base) MCG/ACT Inhaler    PROAIR HFA/PROVENTIL HFA/VENTOLIN HFA    1 Inhaler    Inhale 2 puffs into the lungs every 6 hours as needed for shortness of breath / dyspnea or wheezing    Moderate persistent asthma without complication       * albuterol (2.5 MG/3ML) 0.083% neb solution     30 vial    Take 1 vial (2.5 mg) by nebulization every 6 hours as needed for shortness of breath / dyspnea or wheezing    Cough       benzonatate 100 MG capsule    TESSALON    42 capsule    Take 1 capsule (100 mg) by mouth 3 times daily as needed for cough    Cough       bisacodyl 10 MG Suppository    DULCOLAX     Place 10 mg rectally daily as needed        calcium-vitamin D 600-400 MG-UNIT per tablet    calcium 600 + D    60 tablet    Take 1 tablet by mouth 2 times daily    Postmenopausal status       carBAMazepine 200 MG 12 hr tablet    TEGretol XR    60 tablet    Take 1 tablet (200 mg) by mouth 2 times daily    Mood disorder (H)       guaiFENesin 200 MG Tabs tablet    ORGANIDIN    30 tablet    Take 2 tablets (400 mg) by mouth every 6 hours as needed for cough    COPD exacerbation (H)       hydrOXYzine 50 MG tablet    ATARAX     Take 50 mg by mouth 2 times daily as needed        metFORMIN 500 MG tablet    GLUCOPHAGE    180 tablet    Take 2 tablets  (1,000 mg) by mouth 2 times daily (with meals)    Type 2 diabetes mellitus without complication, without long-term current use of insulin (H)       mometasone-formoterol 100-5 MCG/ACT oral inhaler    DULERA    13 g    Inhale 2 puffs into the lungs 2 times daily    Chronic obstructive pulmonary disease with acute exacerbation (H)       polyethylene glycol Packet    MIRALAX/GLYCOLAX     Take 17 g by mouth daily as needed for constipation        predniSONE 10 MG tablet    DELTASONE     Take 4 tabs daily for 2 days, then 3 tabs daily for 3 days, then 2 tabs daily for 3 days, then 1 tab daily for 3 days, then stop.        tiotropium 2.5 MCG/ACT inhalation aerosol    SPIRIVA RESPIMAT    12 g    Inhale 2 puffs into the lungs daily    Chronic obstructive pulmonary disease with acute exacerbation (H)       vitamin D 77750 UNIT capsule    ERGOCALCIFEROL    30 capsule    Take 1 capsule (50,000 Units) by mouth twice a week    Postmenopausal status       * Notice:  This list has 2 medication(s) that are the same as other medications prescribed for you. Read the directions carefully, and ask your doctor or other care provider to review them with you.
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
## 2018-08-09 NOTE — PATIENT INSTRUCTIONS
Keep using your nebulizer when you are home and having trouble breathing. Try to complete the entire treatment to ensure you get the full dose.     Put your Dulera by your Spiriva. Use these together. These are the two most important medicines to keep your breathing under control.     Keep up the great work not smoking!!    Please return to clinic in about 1-2 weeks so we can follow up on your breathing. We will discuss scheduling the lung function tests at that time.
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
## 2018-08-10 ASSESSMENT — ASTHMA QUESTIONNAIRES: ACT_TOTALSCORE: 6

## 2018-08-17 ENCOUNTER — CARE COORDINATION (OUTPATIENT)
Dept: FAMILY MEDICINE | Facility: CLINIC | Age: 53
End: 2018-08-17

## 2018-08-17 RX ORDER — FAMOTIDINE 20 MG/1
20 TABLET, FILM COATED ORAL 2 TIMES DAILY
Qty: 60 TABLET | OUTPATIENT
Start: 2018-08-17

## 2018-08-17 NOTE — PROGRESS NOTES
Date of discharge: 8/16/2018  Facility of discharge: [facility]  Patient concerns about condition: No concerns at this time.  Patient concerns about medications: Patient has not yet seen new med from discharge. Call to [facility], they have not received any new Rx's.  Patient also requests Hydroxyzine 50 mg 1 bid prn for anxiety. Will page Dr Beltran as discharging provider. .  Full med reconciliation will be completed at clinic visit.  Call from Dr Beltran who states that patient was provided with hard copy Rx's as well as a coupon for the Insulin. LM for patient and nurse at [facility] to call me should they not be able to find them. Dr Beltran did discontinue her Hydroxyzine and will need to see Dr Farah to determine if appropriate. I nor the nurses at [facility] can see the quantity prescribed on the new Rx's,  Paged Dr Beltran to please resend these to [facility]. Verbal quantities given and called Rx's to [facility], Delsym and Humulin  cannot be delivered until Monday, Advised not to deliver prednisone until the others are delivered. RC to patient. She now states that she had prednisone and Humulin filled at [facility]. Cancelled at [facility]   Patient concerns about transitioning: No concerns at this time.  Clinic office visit appointment date: 8/21/2018  Dr Cespedes  Patient reminded to bring all medications (prescription and over-the-counter) to clinic appointment: No   Nursing staff holds meds, unable to bring in physical bottles but will bring list.     Using the date of discharge as day 1, the 30th day post discharge is 9/15/2018.

## 2018-08-20 NOTE — TELEPHONE ENCOUNTER
Called and informed the [place] pharmacy for the denied of Famotidine and patient has appointment on 08/21/18 the provider will discuss with patient at that visit

## 2018-08-21 ENCOUNTER — RECORDS - HEALTHEAST (OUTPATIENT)
Dept: ADMINISTRATIVE | Facility: OTHER | Age: 53
End: 2018-08-21

## 2018-08-21 ENCOUNTER — OFFICE VISIT (OUTPATIENT)
Dept: FAMILY MEDICINE | Facility: CLINIC | Age: 53
End: 2018-08-21
Payer: COMMERCIAL

## 2018-08-21 VITALS
WEIGHT: 167 LBS | OXYGEN SATURATION: 96 % | BODY MASS INDEX: 31.53 KG/M2 | TEMPERATURE: 98.5 F | RESPIRATION RATE: 18 BRPM | HEART RATE: 92 BPM | SYSTOLIC BLOOD PRESSURE: 118 MMHG | DIASTOLIC BLOOD PRESSURE: 82 MMHG | HEIGHT: 61 IN

## 2018-08-21 DIAGNOSIS — R06.02 SOB (SHORTNESS OF BREATH): Primary | ICD-10-CM

## 2018-08-21 DIAGNOSIS — F41.9 ANXIETY: ICD-10-CM

## 2018-08-21 DIAGNOSIS — E66.09 CLASS 1 OBESITY DUE TO EXCESS CALORIES WITH SERIOUS COMORBIDITY AND BODY MASS INDEX (BMI) OF 31.0 TO 31.9 IN ADULT: ICD-10-CM

## 2018-08-21 DIAGNOSIS — T14.8XXA WOUND OF SKIN: ICD-10-CM

## 2018-08-21 DIAGNOSIS — R06.81 EPISODE OF APNEA: ICD-10-CM

## 2018-08-21 DIAGNOSIS — F39 MOOD DISORDER (H): ICD-10-CM

## 2018-08-21 DIAGNOSIS — R53.83 FATIGUE, UNSPECIFIED TYPE: ICD-10-CM

## 2018-08-21 DIAGNOSIS — E11.9 TYPE 2 DIABETES MELLITUS WITHOUT COMPLICATION, WITHOUT LONG-TERM CURRENT USE OF INSULIN (H): ICD-10-CM

## 2018-08-21 DIAGNOSIS — E66.811 CLASS 1 OBESITY DUE TO EXCESS CALORIES WITH SERIOUS COMORBIDITY AND BODY MASS INDEX (BMI) OF 31.0 TO 31.9 IN ADULT: ICD-10-CM

## 2018-08-21 RX ORDER — HYDROXYZINE HYDROCHLORIDE 50 MG/1
25 TABLET, FILM COATED ORAL 2 TIMES DAILY PRN
Qty: 60 TABLET | Refills: 0 | Status: SHIPPED | OUTPATIENT
Start: 2018-08-21 | End: 2018-08-28

## 2018-08-21 NOTE — MR AVS SNAPSHOT
After Visit Summary   8/21/2018    Mary Ann Olson    MRN: [id number]           Patient Information     Date Of Birth          1965        Visit Information        Provider Department      8/21/2018 11:00 AM Bernice Cespedes, DO [facility]        Today's Diagnoses     SOB (shortness of breath)    -  1    Type 2 diabetes mellitus without complication, without long-term current use of insulin (H)        Episode of apnea        Fatigue, unspecified type        Class 1 obesity due to excess calories with serious comorbidity and body mass index (BMI) of 31.0 to 31.9 in adult        Anxiety        Wound of skin          Care Instructions    Referral for ( TEST )  :      Sleep Evaluation & Management   LOCATION/PLACE/Provider :     [facility] Center  [address] [place] [address], [address], [place] [address]  DATE & TIME :     10-3-2018 at 11:00  PHONE :     [phone]  FAX :     [phone]  Appointment made by clinic staff/:    Gabbi            Follow-ups after your visit        Additional Services     DIABETES EDUCATOR REFERRAL       Date of Diagnosis: remote  A1c 6.7 on 8/14/18    Diabetes Education Order:  Diabetes Self Management Class  One on One with Diabetes Educator:  Medical Nutrition Therapy, Meal Planning  and Weight Management/Exercise      needed: No  Language: English    Recent labs including A1C, Lipid panel, FBS or casual glucose or GCT:    Lab Results       Component                Value               Date                       A1C                      6.3                 07/16/2018                 A1C                      6.5                 11/28/2017            Lab Results       Component                Value               Date                       CHOL                     234.0               07/16/2018            Lab Results       Component                Value               Date                       HDL                      92.0                07/16/2018             Lab Results       Component                Value               Date                       LDL                      123.0               07/16/2018            Lab Results       Component                Value               Date                       TRIG                     98.0                07/16/2018            Lab Results       Component                Value               Date                       CHOLHDLRATIO             2.6                 07/16/2018            No results found for: GLC]    ([facility]) Referral should be tracked (Yes/No)?            Pulmonary Function Test (PFT) Referral       Reason for Referral: shortness of breath, recent dyspnea, history of tobacco use     needed: No  Language: English    May leave message on voicemail: unknown    ([facility] Only) Referral should be tracked (Yes/No)? - YES            SLEEP EVALUATION & MANAGEMENT REFERRAL - ADULT -Other (Respond in commments) ([facility])       Please be aware that coverage of these services is subject to the terms and limitations of your health insurance plan.  Call member services at your health plan with any benefit or coverage questions.      Please bring the following to your appointment:    >>   List of current medications   >>   This referral request   >>   Any documents/labs given to you for this referral                      Follow-up notes from your care team     Return in about 1 week (around 8/28/2018) for Visit with PCP - discuss difficulty swallowing/incontinence.      Your next 10 appointments already scheduled     Aug 28, 2018  2:00 PM CDT   PHYSICAL with Joanna Farah MD   [facility] ([facility])    [address] [place] [address]   [phone]              Future tests that were ordered for you today     Open Future Orders        Priority Expected Expires Ordered    SLEEP EVALUATION & MANAGEMENT REFERRAL - ADULT -Other (Respond in commments) ([facility]) Routine   "8/21/2019 8/21/2018    Pulmonary Function Test (PFT) Referral Routine  8/21/2019 8/21/2018    DIABETES EDUCATOR REFERRAL Routine  8/21/2019 8/21/2018            Who to contact     Please call your clinic at [phone] to:    Ask questions about your health    Make or cancel appointments    Discuss your medicines    Learn about your test results    Speak to your doctor            Additional Information About Your Visit        Care EveryWhere ID     This is your Care EveryWhere ID. This could be used by other organizations to access your [facility] medical records  [id number]        Your Vitals Were     Pulse Temperature Respirations Height Pulse Oximetry BMI (Body Mass Index)    92 98.5  F (36.9  C) (Oral) 18 5' 1.02\" (155 cm) 96% 31.53 kg/m2       Blood Pressure from Last 3 Encounters:   08/21/18 118/82   08/09/18 130/80   07/16/18 113/76    Weight from Last 3 Encounters:   08/21/18 167 lb (75.8 kg)   08/09/18 169 lb 3.2 oz (76.7 kg)   07/16/18 163 lb (73.9 kg)                 Today's Medication Changes          These changes are accurate as of 8/21/18  2:52 PM.  If you have any questions, ask your nurse or doctor.               These medicines have changed or have updated prescriptions.        Dose/Directions    hydrOXYzine 50 MG tablet   Commonly known as:  ATARAX   This may have changed:  how much to take   Used for:  Anxiety   Changed by:  Bernice Cespedes,         Dose:  25 mg   Take 0.5 tablets (25 mg) by mouth 2 times daily as needed   Quantity:  60 tablet   Refills:  0            Where to get your medicines      These medications were sent to [id number] - [place], [place] - [address] [place] [address]  [address], [place] [address]     Phone:  [phone]     hydrOXYzine 50 MG tablet                Primary Care Provider Office Phone # Fax #    Joanna Farah [phone]       [facility] [address] [facility] [address]        Equal Access to Services "     [facility] : Hadii aad ku nikhil Wang, waaxda luqadaha, qaybta kaalmada adejosué, grace madaiin hayaaesperanza quanhector engel wing . So [facility] [phone].    ATENCIÓN: Si habla español, tiene a judge disposición servicios gratuitos de asistencia lingüística. Llame al [phone].    We comply with applicable federal civil rights laws and [place] laws. We do not discriminate on the basis of race, color, national origin, age, disability, sex, sexual orientation, or gender identity.            Thank you!     Thank you for choosing [facility]  for your care. Our goal is always to provide you with excellent care. Hearing back from our patients is one way we can continue to improve our services. Please take a few minutes to complete the written survey that you may receive in the mail after your visit with us. Thank you!             Your Updated Medication List - Protect others around you: Learn how to safely use, store and throw away your medicines at www.disposemymeds.org.          This list is accurate as of 8/21/18  2:52 PM.  Always use your most recent med list.                   Brand Name Dispense Instructions for use Diagnosis    acetaminophen 325 MG tablet    TYLENOL    100 tablet    Take 2 tablets (650 mg) by mouth every 6 hours as needed    Cough       adalimumab 40 MG/0.8ML pen kit    HUMIRA     Inject 0.8 mLs (40 mg) Subcutaneous every 14 days        * albuterol 108 (90 Base) MCG/ACT inhaler    PROAIR HFA/PROVENTIL HFA/VENTOLIN HFA    1 Inhaler    Inhale 2 puffs into the lungs every 6 hours as needed for shortness of breath / dyspnea or wheezing    Moderate persistent asthma without complication       * albuterol (2.5 MG/3ML) 0.083% neb solution     30 vial    Take 1 vial (2.5 mg) by nebulization every 6 hours as needed for shortness of breath / dyspnea or wheezing    Cough       benzonatate 100 MG capsule    TESSALON    42 capsule    Take 1 capsule (100 mg) by mouth 3 times daily as needed for  cough    Cough       bisacodyl 10 MG Suppository    DULCOLAX     Place 10 mg rectally daily as needed        calcium-vitamin D 600-400 MG-UNIT per tablet    calcium 600 + D    60 tablet    Take 1 tablet by mouth 2 times daily    Postmenopausal status       carBAMazepine 200 MG 12 hr tablet    TEGretol XR    60 tablet    Take 1 tablet (200 mg) by mouth 2 times daily    Mood disorder (H)       guaiFENesin 200 MG Tabs tablet    ORGANIDIN    30 tablet    Take 2 tablets (400 mg) by mouth every 6 hours as needed for cough    COPD exacerbation (H)       hydrOXYzine 50 MG tablet    ATARAX    60 tablet    Take 0.5 tablets (25 mg) by mouth 2 times daily as needed    Anxiety       metFORMIN 500 MG tablet    GLUCOPHAGE    180 tablet    Take 2 tablets (1,000 mg) by mouth 2 times daily (with meals)    Type 2 diabetes mellitus without complication, without long-term current use of insulin (H)       mometasone-formoterol 100-5 MCG/ACT oral inhaler    DULERA    13 g    Inhale 2 puffs into the lungs 2 times daily    Chronic obstructive pulmonary disease with acute exacerbation (H)       polyethylene glycol Packet    MIRALAX/GLYCOLAX     Take 17 g by mouth daily as needed for constipation        predniSONE 10 MG tablet    DELTASONE     Take 4 tabs daily for 2 days, then 3 tabs daily for 3 days, then 2 tabs daily for 3 days, then 1 tab daily for 3 days, then stop.        tiotropium 2.5 MCG/ACT inhalation aerosol    SPIRIVA RESPIMAT    12 g    Inhale 2 puffs into the lungs daily    Chronic obstructive pulmonary disease with acute exacerbation (H)       vitamin D 24729 UNIT capsule    ERGOCALCIFEROL    30 capsule    Take 1 capsule (50,000 Units) by mouth twice a week    Postmenopausal status       * Notice:  This list has 2 medication(s) that are the same as other medications prescribed for you. Read the directions carefully, and ask your doctor or other care provider to review them with you.

## 2018-08-21 NOTE — NURSING NOTE
Eye Exam referral  Labs due:Hep C and HIV screen, Microalbumin  DM Foot exam  AAP  Mammogram- ave number again   Cervical Cancer Screening- has appt already

## 2018-08-21 NOTE — PATIENT INSTRUCTIONS
Referral for ( TEST )  :      Sleep Evaluation & Management   LOCATION/PLACE/Provider :     [facility] Center  [address], [address], [place] [address]  DATE & TIME :     10-3-2018 at 11:00  PHONE :     [phone]  FAX :     [phone]  Appointment made by clinic staff/:    Gabbi      Referral for Diabetes Educator along with OV notes and labs faxed to  [facility]  [phone]  [phone]  Clinic will contact patient to schedule appointment

## 2018-08-21 NOTE — PROGRESS NOTES
Preceptor Attestation:   Patient seen, evaluated and discussed with the resident, Dr. Bernice Cespedes. I have verified the content of the note, which accurately reflects my assessment of the patient and the plan of care.  Supervising Physician:Susana Ortiz MD  [facility]

## 2018-08-21 NOTE — PROGRESS NOTES
Hospitalization Follow-up Visit         HPI       Hospital Follow-up Visit:    Hospital:  [facility]   Date of Admission: 8/14/18  Date of Discharge: 8/16/18  Reason(s) for Admission: asthma exacerbation            Problems taking medications regularly:  Reports difficulty swallowing large pills, but is able to swallow, no other concerns.        Post Discharge Medication Reconciliation: discharge medications reconciled, continue medications without change.       Problems adhering to non-medication therapy:  None       Medications reviewed by: by myself.    Summary of hospitalization:  [facility] discharge summary reviewed  Diagnostic Tests/Treatments reviewed.  Follow up needed: Sleep study, diabetic educator  Other Healthcare Providers Involved in Patient s Care:         None  Update since discharge: improved.   Plan of care communicated with patient     1. Shortness of Breath: Breathing improved. Has completed course of prednisone burst. Reports compliance with Dulera and Spiriva. Is also taking Singulair. Utilizing albuterol 1-2 times per day. No acute shortness of breath, cough, or wheeze. Never picked up cough syrup as pharmacy would not cover this. Is not using Tessalon Perrles. Cough has resolved. Has been referred to pulm clinic, but has not gone due to ride issues. Denies completing PFT's in the past.     2. Diabetes/Hyperglycemia: Has completed NPH coverage of prednisone burst. Did have some bruising at site of insulin injection. No other local irritation. Is not regularly checking her blood sugars, sometimes checks 1-2 times per day. Is compliant with metformin. A1c checked in hospital and was 6.7.     3. Requests refill of hydroxyzine, utilizes this for her anxiety.     4. Reports small sore in skin fold inferior to left breast. No drainage. Mildly sore.     5. Reports difficulty swallowing large pills per above. Reflux well controlled. Taking both omeprazole and ranitidine.     6. Reports  "urinary incontinence, wondering if she can get some incontinence pads.     7. Not ready to stop smoking.     8. Reports episodes in hospital of waking up gasping for breath. STOPBANG questionnaire reviewed, score of 4.          Review of Systems:   CONSTITUTIONAL: No fevers, no chills.   SKIN: Lesion below breast per above.   EYES: no acute vision problems or changes  ENT: no ear problems, no mouth problems, no throat problems  RESP: no significant cough, no shortness of breath, no wheezing  CV: no chest pain, no palpitations, no new or worsening peripheral edema  GI: difficulty swallowing per above. No abdominal pain, no changes in bowels.   : Urinary frequency and incontinence.   HEME: Bruising at site of NPH injection.   PSYCHIATRIC: NEGATIVE for changes in mood or affect  SLEEP: Waking up gasping for breath in hospital, daytime sleepiness.             Physical Exam:     Vitals:    08/21/18 1122   BP: 118/82   Pulse: 92   Resp: 18   Temp: 98.5  F (36.9  C)   TempSrc: Oral   SpO2: 96%   Weight: 167 lb (75.8 kg)   Height: 5' 1.02\" (155 cm)     Body mass index is 31.53 kg/(m^2).    GENERAL: Mary Ann is a pleasant obese middle aged white female, appears stated age, in no acute distress.  HEART: Regular rate and rhythm, no murmurs.   LUNGS: Clear to auscultation bilaterally, unlabored, good air movement. No wheezes or crackles present.   ABDOMEN: Obese  DERM: ecchymosis present RLQ abdomen, ulcerated lesion present inframammary fold without drainage, warmth, or erythema.   EXTREMITIES: No lower extremity edema.   PSYCH: Mood is good, affect congruent with mood.          Results:   Reviewed CXR results from hospital 8/15/18  - negative chest.   Reviewed A1c from hospital 8/14/18 - 6.7     Assessment and Plan      1. SOB (shortness of breath)  - Pulmonary Function Test (PFT) Referral; Future    Was hospitalized for \"asthma exacerbation\". Possible mixed picture. Seems to have allergic component with Singulair, history of " tobacco abuse. I do not see history of formal PFT testing being completed. Will refer back for formal PFT's. Continue Dulera, Spiriva, Albuterol PRN, and Singulair. Encouraged to quit smoking, declines resources at this time. Reports stressful court dates coming up. Will consider quitting after court dates.     2. Type 2 diabetes mellitus without complication, without long-term current use of insulin (H)  - DIABETES EDUCATOR REFERRAL; Future\    Diabetes is well controlled with maximum metformin monotherapy. Desires diabetic educator referral, so will refer for further education regarding disease process, possible complications, and lifestyle modification recommendations.     3. Episode of apnea  4. Fatigue, unspecified type  5. Class 1 obesity due to excess calories with serious comorbidity and body mass index (BMI) of 31.0 to 31.9 in adult  - SLEEP EVALUATION & MANAGEMENT REFERRAL - ADULT -Other (Respond in commments) ([facility]); Future    Was recommended to have referral for sleep apnea during hospitalization. STOP BANG score 4, intermediate risk of BE. Will refer for sleep study. Consider cardiac workup if negative sleep apnea, may have underlying pulmonary congestion as source of paroxysmal nocturnal dyspnea.     6. Anxiety  - hydrOXYzine (ATARAX) 50 MG tablet; Take 0.5 tablets (25 mg) by mouth 2 times daily as needed  Dispense: 60 tablet; Refill: 0     Requests refill of hydroxyzine at end of visit today, stating she is out. Will renew. Did lower dose however and gave 1 month supply given problem list including previous antihistamine overdose with intention for self-harm. May discuss further with PCP.     7. Wound of Skin  Does not seem consistent with yeast/intertrigo. Suspect irritation in hospital, possibly from EKG electrode placement. Encouraged to keep area clean, air out as able. Apply topical bacitracin to help with barrier. Monitor for erythema, drainage, warmth, spreading redness. Re-evaluate with  Dr. Farah next week vs return sooner if worsening.     Encouraged Mary Ann to address difficulty swallowing and urinary incontinence with Dr. Farah at visit next week.     E&M code to be billed if TCM cannot be: 67772    Type of decision making: High complexity (61837)    Options for treatment and follow-up care were reviewed with the patient  Mary Ann Olson   engaged in the decision making process and verbalized understanding of the options discussed and agreed with the final plan.    Staffed with Dr. Susana Cespedes,

## 2018-08-21 NOTE — Clinical Note
I saw Mary Ann today for hospital follow up. We covered a lot. Just wanted to touch base with you as I sent multiple referrals and she had a few unaddressed concerns today.  - Sent referral for PFT's, don't see this was ever done for formal eval.  - Sent for sleep study, was recommended at discharge, reports of episodes waking up gasping for breath - referred to diabetic ed. Her diabetes is actually well controlled, but she requested referral, reasonable given obesity, discuss lifestyle changes.  - She is concerned about urinary incontinence and dysphagia. Did not address today as not related to hospital stay. - She requested refill of hydroxyzine. I was hesitant given problem list reports history of intentional overdose. I decreased dose and gave 1 month supply, as I don't know her well.   Let me know if you have any feedback.   Thanks,  Bernice

## 2018-08-22 ENCOUNTER — COMMUNICATION - HEALTHEAST (OUTPATIENT)
Dept: ADMINISTRATIVE | Facility: CLINIC | Age: 53
End: 2018-08-22

## 2018-08-22 RX ORDER — CARBAMAZEPINE 200 MG/1
200 TABLET, EXTENDED RELEASE ORAL 2 TIMES DAILY
Qty: 60 TABLET | Refills: 1 | Status: SHIPPED | OUTPATIENT
Start: 2018-08-22 | End: 2018-08-28

## 2018-08-23 ENCOUNTER — TELEPHONE (OUTPATIENT)
Dept: FAMILY MEDICINE | Facility: CLINIC | Age: 53
End: 2018-08-23

## 2018-08-23 NOTE — TELEPHONE ENCOUNTER
[facility] phone call message- medication clarification/question:    Full Medication Name: Omeprazole, famotidine, montelukasat, simvastatin   Dose:     Question: These medication were listed from when she was in the hospital at the beginning of August. States that the patient says she is supposed to take them. Supportive living facility can't give them if they don't have the order/doctors okay. Wondering if we can fax the med's to them. Patient usually doesn't give them information(AVS) after she has been seen by the doctor.     Pharmacy confirmed as    [id number] [address] AT [address] [facility] [place] [place] [id number] - [place], [place] - [address]: Yes    OK to leave a message on voice mail? Yes    Primary language: English      needed? No    Call taken on August 23, 2018 at 10:49 AM by Xiomara Willingham

## 2018-08-24 NOTE — TELEPHONE ENCOUNTER
No fax number left on initial message. Called, left Mariajose a message, need to obtain fax number. Pasha RN

## 2018-08-24 NOTE — TELEPHONE ENCOUNTER
Mariajose returned call, fax number is [phone]. Faxed medication list as of 8/21/2018 to attn Mariajose. Pasha RN

## 2018-08-28 ENCOUNTER — OFFICE VISIT (OUTPATIENT)
Dept: FAMILY MEDICINE | Facility: CLINIC | Age: 53
End: 2018-08-28
Payer: COMMERCIAL

## 2018-08-28 ENCOUNTER — TELEPHONE (OUTPATIENT)
Dept: FAMILY MEDICINE | Facility: CLINIC | Age: 53
End: 2018-08-28

## 2018-08-28 VITALS
WEIGHT: 168 LBS | HEART RATE: 91 BPM | TEMPERATURE: 98.6 F | BODY MASS INDEX: 31.72 KG/M2 | HEIGHT: 61 IN | RESPIRATION RATE: 18 BRPM | OXYGEN SATURATION: 95 % | DIASTOLIC BLOOD PRESSURE: 77 MMHG | SYSTOLIC BLOOD PRESSURE: 114 MMHG

## 2018-08-28 DIAGNOSIS — N39.46 MIXED STRESS AND URGE URINARY INCONTINENCE: Primary | ICD-10-CM

## 2018-08-28 DIAGNOSIS — F41.9 ANXIETY: ICD-10-CM

## 2018-08-28 DIAGNOSIS — Z00.00 ROUTINE GENERAL MEDICAL EXAMINATION AT A HEALTH CARE FACILITY: ICD-10-CM

## 2018-08-28 DIAGNOSIS — E11.9 TYPE 2 DIABETES MELLITUS WITHOUT COMPLICATION, WITHOUT LONG-TERM CURRENT USE OF INSULIN (H): ICD-10-CM

## 2018-08-28 DIAGNOSIS — F39 MOOD DISORDER (H): ICD-10-CM

## 2018-08-28 DIAGNOSIS — K21.9 GASTROESOPHAGEAL REFLUX DISEASE, ESOPHAGITIS PRESENCE NOT SPECIFIED: Primary | ICD-10-CM

## 2018-08-28 DIAGNOSIS — K64.4 EXTERNAL HEMORRHOIDS: ICD-10-CM

## 2018-08-28 LAB
HBA1C MFR BLD: 6.6 % (ref 4.1–5.7)
TSH SERPL DL<=0.05 MIU/L-ACNC: 1.42 UIU/ML (ref 0.3–5)

## 2018-08-28 RX ORDER — MONTELUKAST SODIUM 10 MG/1
10 TABLET ORAL AT BEDTIME
Qty: 90 TABLET | Refills: 1 | Status: SHIPPED | OUTPATIENT
Start: 2018-08-28 | End: 2019-02-11

## 2018-08-28 RX ORDER — WHEAT DEXTRIN 3 G/3.8 G
POWDER (GRAM) ORAL
Qty: 500 G | Refills: 1 | Status: SHIPPED | OUTPATIENT
Start: 2018-08-28 | End: 2018-09-13

## 2018-08-28 RX ORDER — ATORVASTATIN CALCIUM 40 MG/1
40 TABLET, FILM COATED ORAL DAILY
Qty: 90 TABLET | Refills: 3 | Status: SHIPPED | OUTPATIENT
Start: 2018-08-28 | End: 2019-09-03

## 2018-08-28 RX ORDER — HYDROXYZINE HYDROCHLORIDE 50 MG/1
25 TABLET, FILM COATED ORAL 2 TIMES DAILY PRN
Qty: 60 TABLET | Refills: 0 | Status: SHIPPED | OUTPATIENT
Start: 2018-08-28 | End: 2019-02-11

## 2018-08-28 RX ORDER — CARBAMAZEPINE 200 MG/1
200 TABLET, EXTENDED RELEASE ORAL 2 TIMES DAILY
Qty: 60 TABLET | Refills: 1 | Status: SHIPPED | OUTPATIENT
Start: 2018-08-28 | End: 2018-10-01

## 2018-08-28 RX ORDER — FAMOTIDINE 40 MG/1
40 TABLET, FILM COATED ORAL AT BEDTIME
Qty: 90 TABLET | Refills: 1 | Status: SHIPPED | OUTPATIENT
Start: 2018-08-28 | End: 2019-02-11

## 2018-08-28 NOTE — TELEPHONE ENCOUNTER
TCM APPOINTMENT FOLLOW UP    Language:English    Medication questions: yes:  Explain: Wondering if a humidifier could be prescribed or covered by insurance, or maybe something for dryness mouth and nose    Specialist follow up: no    Concerns since last visit: yes:  Explain: Dryness, hard to breathe    Next appointment at [facility]:Yes- today    Comments: Being seen today by Dr Farah    [facility]   [place] No
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
## 2018-08-28 NOTE — PATIENT INSTRUCTIONS
I recommend that you take your Humira today        Preventive Health Recommendations  Female Ages 50 - 64    Yearly exam: See your health care provider every year in order to  o Review health changes.   o Discuss preventive care.    o Review your medicines if your doctor has prescribed any.      Get a Pap test every three years (unless you have an abnormal result and your provider advises testing more often).    If you get Pap tests with HPV test, you only need to test every 5 years, unless you have an abnormal result.     You do not need a Pap test if your uterus was removed (hysterectomy) and you have not had cancer.    You should be tested each year for STDs (sexually transmitted diseases) if you're at risk.     Have a mammogram every 1 to 2 years.    Have a colonoscopy at age 50, or have a yearly FIT test (stool test). These exams screen for colon cancer.      Have a cholesterol test every 5 years, or more often if advised.    Have a diabetes test (fasting glucose) every three years. If you are at risk for diabetes, you should have this test more often.     If you are at risk for osteoporosis (brittle bone disease), think about having a bone density scan (DEXA).    Shots: Get a flu shot each year. Get a tetanus shot every 10 years.    Nutrition:     Eat at least 5 servings of fruits and vegetables each day.    Eat whole-grain bread, whole-wheat pasta and brown rice instead of white grains and rice.    Get adequate Calcium and Vitamin D.     Lifestyle    Exercise at least 150 minutes a week (30 minutes a day, 5 days a week). This will help you control your weight and prevent disease.    Limit alcohol to one drink per day.    No smoking.     Wear sunscreen to prevent skin cancer.     See your dentist every six months for an exam and cleaning.    See your eye doctor every 1 to 2 years.

## 2018-08-28 NOTE — LETTER
August 30, 2018      Mary Ann Olson  [address]   [facility] [address]        Dear Mary Ann,  Your blood type is A+.  Everything else is stable.    Please see below for your test results.    Resulted Orders   Hemoglobin A1c (P )   Result Value Ref Range    Hemoglobin A1C 6.6 (H) 4.1 - 5.7 %   TSH  Sensitive ([facility])   Result Value Ref Range    TSH 1.42 0.30 - 5.00 uIU/mL    Narrative    Test performed by:  [facility]  [address] [place] [address]   ABO/Rh Type-HML ([facility])   Result Value Ref Range    ABO/Rh(D) A POS     Repeat ABO/Rh Typing (L) A POS     Narrative    Test performed by:   [facility]  [address], [place] [address]       If you have any questions, please call the clinic to make an appointment.    Sincerely,    Joanna Farah MD

## 2018-08-28 NOTE — LETTER
September 4, 2018      Mary Ann Olson  [address]   [facility] [address]        Dear Mary Ann,  Tested positive for high risk HPV today. We'll need an appointment for colposcopy in clinic. Please call as soon as possible to set that up. Thank you               Please see below for your test results.    Resulted Orders   Hemoglobin A1c ([facility])   Result Value Ref Range    Hemoglobin A1C 6.6 (H) 4.1 - 5.7 %   TSH  Sensitive ([facility])   Result Value Ref Range    TSH 1.42 0.30 - 5.00 uIU/mL    Narrative    Test performed by:  [facility]  [address] [place] [address]   ABO/Rh Type-HML ([facility])   Result Value Ref Range    ABO/Rh(D) A POS     Repeat ABO/Rh Typing ([facility]) A POS     Narrative    Test performed by:   [facility]  [address], [place] [address]   HPV Wolfe PCR ([facility])   Result Value Ref Range    HPV Source SurePath     HPV 16 DNA Negative NEG    HPV 18 DNA Negative NEG    Other HR HPV Positive (A) NEG    Final Diagnosis SEE NOTES       Comment:      This patient's sample is positive for other HR HPV DNA (types 31, 33, 35, 39,   45,  51, 52, 56, 58, 59, 66 or 68), not HPV 16 or HPV 18 DNA. This result requires  clinical correlation with concurrent cytology findings.  This test was developed and its performance characteristics determined by the  [facility]. It  has not been cleared or approved by the FDA. The laboratory is regulated under  CLIA as qualified to perform high-complexity testing. This test is used for  clinical purposes. It should not be regarded as investigational or for  research.  (Note)  METHODOLOGY:  The Roche ezra 4800 system uses automated extraction,  simultaneous amplification of HPV (L1 region) and beta-globin,  followed by  real time detection of fluorescent labeled HPV and beta  globin using specific oligonucleotide probes . The test specifically  identifies types HPV 16 DNA  and HPV 18 DNA while concurrently  detecting the rest of the high risk t  ypes (31, 33, 35, 39, 45, 51,  52, 56, 58, 59, 66 or 68).     COMMENTS:  This test is not intended for use as a screening device  for women under age 30 with normal cervical cytology.  Results should  be correlated with cytologic and histologic findings. Close clinical  followup is recommended.         Specimen Description Cervical Cells       Comment:      PERFORMED AT  [place] [id number]       Narrative    Test performed by:  [facility]  [address], [place] [address]       If you have any questions, please call the clinic to make an appointment.    Sincerely,    Joanna Farah MD

## 2018-08-28 NOTE — TELEPHONE ENCOUNTER
[facility] phone call message- medication clarification/question:    Full Medication Name: Poise Pads     Question: Pt calling stating she wanted to know if Dr Farah was going to order the pads for incontinence for her. She stated they spoke about it but doesn't remember if she said if she was going to order them or not. Please call and advise.      Pharmacy confirmed as      OK to leave a message on voice mail? Yes    Primary language: English      needed? No    Call taken on August 28, 2018 at 5:02 PM by Celia Villarreal

## 2018-08-28 NOTE — MR AVS SNAPSHOT
After Visit Summary   8/28/2018    Mary Ann Olson    MRN: [id number]           Patient Information     Date Of Birth          1965        Visit Information        Provider Department      8/28/2018 2:00 PM Joanna Farah MD [facility]        Today's Diagnoses     Gastroesophageal reflux disease, esophagitis presence not specified    -  1    Anxiety        Mood disorder (H)        Type 2 diabetes mellitus without complication, without long-term current use of insulin (H)        Routine general medical examination at a health care facility        External hemorrhoids          Care Instructions    I recommend that you take your Humira today        Preventive Health Recommendations  Female Ages 50 - 64    Yearly exam: See your health care provider every year in order to  o Review health changes.   o Discuss preventive care.    o Review your medicines if your doctor has prescribed any.      Get a Pap test every three years (unless you have an abnormal result and your provider advises testing more often).    If you get Pap tests with HPV test, you only need to test every 5 years, unless you have an abnormal result.     You do not need a Pap test if your uterus was removed (hysterectomy) and you have not had cancer.    You should be tested each year for STDs (sexually transmitted diseases) if you're at risk.     Have a mammogram every 1 to 2 years.    Have a colonoscopy at age 50, or have a yearly FIT test (stool test). These exams screen for colon cancer.      Have a cholesterol test every 5 years, or more often if advised.    Have a diabetes test (fasting glucose) every three years. If you are at risk for diabetes, you should have this test more often.     If you are at risk for osteoporosis (brittle bone disease), think about having a bone density scan (DEXA).    Shots: Get a flu shot each year. Get a tetanus shot every 10 years.    Nutrition:     Eat at least 5 servings of  "fruits and vegetables each day.    Eat whole-grain bread, whole-wheat pasta and brown rice instead of white grains and rice.    Get adequate Calcium and Vitamin D.     Lifestyle    Exercise at least 150 minutes a week (30 minutes a day, 5 days a week). This will help you control your weight and prevent disease.    Limit alcohol to one drink per day.    No smoking.     Wear sunscreen to prevent skin cancer.     See your dentist every six months for an exam and cleaning.    See your eye doctor every 1 to 2 years.                Follow-ups after your visit        Future tests that were ordered for you today     Open Future Orders        Priority Expected Expires Ordered    Fecal Occult Blood - FIT, iFOB (P ) Routine  9/4/2018 8/28/2018            Who to contact     Please call your clinic at [phone] to:    Ask questions about your health    Make or cancel appointments    Discuss your medicines    Learn about your test results    Speak to your doctor            Additional Information About Your Visit        Care EveryWhere ID     This is your Care EveryWhere ID. This could be used by other organizations to access your [place] medical records  [id number]        Your Vitals Were     Pulse Temperature Respirations Height Pulse Oximetry BMI (Body Mass Index)    91 98.6  F (37  C) (Oral) 18 5' 0.83\" (154.5 cm) 95% 31.92 kg/m2       Blood Pressure from Last 3 Encounters:   08/28/18 114/77   08/21/18 118/82   08/09/18 130/80    Weight from Last 3 Encounters:   08/28/18 168 lb (76.2 kg)   08/21/18 167 lb (75.8 kg)   08/09/18 169 lb 3.2 oz (76.7 kg)              We Performed the Following     ABO/Rh Type-HML ([facility])     GYN Cytology ([facility])     Hemoglobin A1c ([facility])     TSH  Sensitive ([facility])          Today's Medication Changes          These changes are accurate as of 8/28/18  2:53 PM.  If you have any questions, ask your nurse or doctor.               Start taking these medicines.        " Dose/Directions    atorvastatin 40 MG tablet   Commonly known as:  LIPITOR   Used for:  Type 2 diabetes mellitus without complication, without long-term current use of insulin (H)   Started by:  Joanna Farah MD        Dose:  40 mg   Take 1 tablet (40 mg) by mouth daily   Quantity:  90 tablet   Refills:  3       BENEFIBER Powd   Used for:  External hemorrhoids   Started by:  Joanna Farah MD        Dissolve one cap in 8oz liquid and drink once daily   Quantity:  500 g   Refills:  1       famotidine 40 MG tablet   Commonly known as:  PEPCID   Used for:  Gastroesophageal reflux disease, esophagitis presence not specified   Started by:  Joanna Farah MD        Dose:  40 mg   Take 1 tablet (40 mg) by mouth At Bedtime   Quantity:  90 tablet   Refills:  1       montelukast 10 MG tablet   Commonly known as:  SINGULAIR   Used for:  Gastroesophageal reflux disease, esophagitis presence not specified   Started by:  Joanna Farah MD        Dose:  10 mg   Take 1 tablet (10 mg) by mouth At Bedtime   Quantity:  90 tablet   Refills:  1       omeprazole 20 MG CR capsule   Commonly known as:  priLOSEC   Used for:  Gastroesophageal reflux disease, esophagitis presence not specified   Started by:  Joanna Farah MD        Dose:  20 mg   Take 1 capsule (20 mg) by mouth daily   Quantity:  90 capsule   Refills:  1         Stop taking these medicines if you haven't already. Please contact your care team if you have questions.     predniSONE 10 MG tablet   Commonly known as:  DELTASONE   Stopped by:  Joanna Farah MD                Where to get your medicines      These medications were sent to [facility] [place] [place] [facility] - [place] - [address], [place] [address]     Phone:  [phone]     atorvastatin 40 MG tablet    BENEFIBER Powd    carBAMazepine 200 MG 12 hr tablet    famotidine 40 MG tablet    hydrOXYzine 50 MG tablet    montelukast  10 MG tablet    omeprazole 20 MG CR capsule                Primary Care Provider Office Phone # Fax #    Joanna Farah [phone]       [facility] [address] [place] [address]        Equal Access to Services     MADHAV MIKE : Hadii aad ku hadasho Soomaali, waaxda luqadaha, qaybta kaalmada adeegyada, waxay idiin hayaan adehector engel laAliaalda mr. So [facility] [phone].    ATENCIÓN: Si habla español, tiene a judge disposición servicios gratuitos de asistencia lingüística. Llame al [phone].    We comply with applicable federal civil rights laws and [place] laws. We do not discriminate on the basis of race, color, national origin, age, disability, sex, sexual orientation, or gender identity.            Thank you!     Thank you for choosing [facility]  for your care. Our goal is always to provide you with excellent care. Hearing back from our patients is one way we can continue to improve our services. Please take a few minutes to complete the written survey that you may receive in the mail after your visit with us. Thank you!             Your Updated Medication List - Protect others around you: Learn how to safely use, store and throw away your medicines at www.disposemymeds.org.          This list is accurate as of 8/28/18  2:53 PM.  Always use your most recent med list.                   Brand Name Dispense Instructions for use Diagnosis    acetaminophen 325 MG tablet    TYLENOL    100 tablet    Take 2 tablets (650 mg) by mouth every 6 hours as needed    Cough       adalimumab 40 MG/0.8ML pen kit    HUMIRA     Inject 0.8 mLs (40 mg) Subcutaneous every 14 days        * albuterol 108 (90 Base) MCG/ACT inhaler    PROAIR HFA/PROVENTIL HFA/VENTOLIN HFA    1 Inhaler    Inhale 2 puffs into the lungs every 6 hours as needed for shortness of breath / dyspnea or wheezing    Moderate persistent asthma without complication       * albuterol (2.5 MG/3ML) 0.083% neb solution      30 vial    Take 1 vial (2.5 mg) by nebulization every 6 hours as needed for shortness of breath / dyspnea or wheezing    Cough       atorvastatin 40 MG tablet    LIPITOR    90 tablet    Take 1 tablet (40 mg) by mouth daily    Type 2 diabetes mellitus without complication, without long-term current use of insulin (H)       BENEFIBER Powd     500 g    Dissolve one cap in 8oz liquid and drink once daily    External hemorrhoids       benzonatate 100 MG capsule    TESSALON    42 capsule    Take 1 capsule (100 mg) by mouth 3 times daily as needed for cough    Cough       bisacodyl 10 MG Suppository    DULCOLAX     Place 10 mg rectally daily as needed        calcium carbonate 600 mg-vitamin D 400 units 600-400 MG-UNIT per tablet    calcium 600 + D    60 tablet    Take 1 tablet by mouth 2 times daily    Postmenopausal status       carBAMazepine 200 MG 12 hr tablet    TEGretol XR    60 tablet    Take 1 tablet (200 mg) by mouth 2 times daily    Mood disorder (H)       famotidine 40 MG tablet    PEPCID    90 tablet    Take 1 tablet (40 mg) by mouth At Bedtime    Gastroesophageal reflux disease, esophagitis presence not specified       guaiFENesin 200 MG Tabs tablet    ORGANIDIN    30 tablet    Take 2 tablets (400 mg) by mouth every 6 hours as needed for cough    COPD exacerbation (H)       hydrOXYzine 50 MG tablet    ATARAX    60 tablet    Take 0.5 tablets (25 mg) by mouth 2 times daily as needed    Anxiety       metFORMIN 500 MG tablet    GLUCOPHAGE    180 tablet    Take 2 tablets (1,000 mg) by mouth 2 times daily (with meals)    Type 2 diabetes mellitus without complication, without long-term current use of insulin (H)       mometasone-formoterol 100-5 MCG/ACT oral inhaler    DULERA    13 g    Inhale 2 puffs into the lungs 2 times daily    Chronic obstructive pulmonary disease with acute exacerbation (H)       montelukast 10 MG tablet    SINGULAIR    90 tablet    Take 1 tablet (10 mg) by mouth At Bedtime     Gastroesophageal reflux disease, esophagitis presence not specified       omeprazole 20 MG CR capsule    priLOSEC    90 capsule    Take 1 capsule (20 mg) by mouth daily    Gastroesophageal reflux disease, esophagitis presence not specified       polyethylene glycol Packet    MIRALAX/GLYCOLAX     Take 17 g by mouth daily as needed for constipation        tiotropium 2.5 MCG/ACT inhalation aerosol    SPIRIVA RESPIMAT    12 g    Inhale 2 puffs into the lungs daily    Chronic obstructive pulmonary disease with acute exacerbation (H)       vitamin D 09210 UNIT capsule    ERGOCALCIFEROL    30 capsule    Take 1 capsule (50,000 Units) by mouth twice a week    Postmenopausal status       * Notice:  This list has 2 medication(s) that are the same as other medications prescribed for you. Read the directions carefully, and ask your doctor or other care provider to review them with you.

## 2018-08-29 LAB
ABO + RH BLD: NORMAL
FINAL DIAGNOSIS: ABNORMAL
HPV 16 DNA: NEGATIVE
HPV 18 DNA: NEGATIVE
HPV SOURCE: ABNORMAL
OTHER HR HPV: POSITIVE
REPEAT ABO/RH TYPING (HML): NORMAL
SPECIMEN DESCRIPTION: ABNORMAL

## 2018-08-30 DIAGNOSIS — J45.40 MODERATE PERSISTENT ASTHMA WITHOUT COMPLICATION: ICD-10-CM

## 2018-09-04 RX ORDER — ALBUTEROL SULFATE 90 UG/1
2 AEROSOL, METERED RESPIRATORY (INHALATION) EVERY 6 HOURS PRN
Qty: 18 G | Refills: 1 | Status: SHIPPED | OUTPATIENT
Start: 2018-09-04 | End: 2018-11-28

## 2018-09-05 ENCOUNTER — RECORDS - HEALTHEAST (OUTPATIENT)
Dept: ADMINISTRATIVE | Facility: OTHER | Age: 53
End: 2018-09-05

## 2018-09-05 LAB
CYTOLOGY CVX/VAG DOC THIN PREP: NORMAL
HIGH RISK?: NO
HPV REFLEX?: NORMAL
LAB AP ABNORMAL BLEEDING: NO
LAB AP BIRTH CONTROL/HORMONES: NORMAL
LAB AP CASE REPORT: NORMAL
LAB AP CERVICAL APPEARANCE: NORMAL
LAB AP MALIGNANT?: NORMAL
LAB AP PATIENT STATUS: NORMAL
LAB AP PREVIOUS ABNL: NORMAL
LAB AP PREVIOUS NORMAL: NORMAL
LAST MENS PERIOD: NORMAL
SPECIMEN ADEQUACY:: NORMAL

## 2018-09-12 ENCOUNTER — TELEPHONE (OUTPATIENT)
Dept: FAMILY MEDICINE | Facility: CLINIC | Age: 53
End: 2018-09-12

## 2018-09-12 NOTE — TELEPHONE ENCOUNTER
[facility] phone call message-patient reporting a symptom:     Symptom: DIARRHEA, BLOATED, NAUSEA      Same Day Visit Offered: None available     Additional comments:  has had symptoms for 2 days. States is having hard time eatIng. Would like a call back for advice on what to do. Also has question about fiber medication. States she was recently informed she had a positive pap and wanted to know if she needs to schedule that right away or can it wait to be discussed wocarin Farah at her next appointment. Please call and advise.     OK to leave message on voice mail? Yes    Primary language: English      needed? No    Call taken on September 12, 2018 at 11:08 AM by Celia Villarreal

## 2018-09-12 NOTE — TELEPHONE ENCOUNTER
Tried calling given return phone number and the number listed in demographics information- both are not working numbers. RLee RN

## 2018-09-13 NOTE — TELEPHONE ENCOUNTER
Tried calling Mary Ann again, this morning phone number [phone] was valid. Call went to a voicemail, left message for Mary Ann to call me back. Pasha SMITH

## 2018-09-21 DIAGNOSIS — E11.9 TYPE 2 DIABETES MELLITUS WITHOUT COMPLICATION, WITHOUT LONG-TERM CURRENT USE OF INSULIN (H): Primary | ICD-10-CM

## 2018-09-21 DIAGNOSIS — R05.9 COUGH: ICD-10-CM

## 2018-09-21 RX ORDER — ALBUTEROL SULFATE 0.83 MG/ML
2.5 SOLUTION RESPIRATORY (INHALATION) EVERY 6 HOURS PRN
Qty: 90 VIAL | Refills: 11 | Status: SHIPPED | OUTPATIENT
Start: 2018-09-21 | End: 2019-09-13

## 2018-09-21 NOTE — TELEPHONE ENCOUNTER
[facility] phone call message- patient requesting a refill:    Full Medication Name: one touch delica lancets & albuterol neb solution 0.083%    Dose:     Pharmacy confirmed as   [facility] [address] [facility] [address] [place] [address] AT [facility] [address]  [address] [facility] [address]  Phone: [phone] Fax: [phone]    [facility] [place] [facility] - [place] - [address] [place] [address]  Phone: [phone] Fax: [phone]  : Yes    Additional Comments: Patient is out of the medications above.      OK to leave a message on voice mail? Yes    Primary language: English      needed? No    Call taken on September 21, 2018 at 10:45 AM by Xiomara Willingham

## 2018-09-21 NOTE — TELEPHONE ENCOUNTER
Message to physician:     Date of last visit: 8/28/2018     Date of next visit if scheduled: Visit date 09/25/18    Last Comprehensive Metabolic Panel:  Creatinine   Date Value Ref Range Status   08/29/2014 0.69 0.52 - 1.04 mg/dL Final     GFR Estimate   Date Value Ref Range Status   08/29/2014 90 >60 mL/min/1.7m2 Final     Comment:     Non  GFR Calc       BP Readings from Last 3 Encounters:   08/28/18 114/77   08/21/18 118/82   08/09/18 130/80       Lab Results   Component Value Date    A1C 6.6 08/28/2018    A1C 6.3 07/16/2018    A1C 6.5 11/28/2017                Please complete refill and CLOSE ENCOUNTER.  Closing the encounter signifies the refill is complete.

## 2018-09-21 NOTE — TELEPHONE ENCOUNTER
Patient is calling for refill below, she states she is out of her nebulizer solution and needs it asap. Please call and advise.

## 2018-09-25 ENCOUNTER — OFFICE VISIT (OUTPATIENT)
Dept: FAMILY MEDICINE | Facility: CLINIC | Age: 53
End: 2018-09-25
Payer: COMMERCIAL

## 2018-09-25 VITALS
HEIGHT: 61 IN | HEART RATE: 96 BPM | TEMPERATURE: 98.1 F | WEIGHT: 172.4 LBS | OXYGEN SATURATION: 97 % | SYSTOLIC BLOOD PRESSURE: 116 MMHG | RESPIRATION RATE: 16 BRPM | DIASTOLIC BLOOD PRESSURE: 79 MMHG | BODY MASS INDEX: 32.55 KG/M2

## 2018-09-25 DIAGNOSIS — E11.9 TYPE 2 DIABETES MELLITUS WITHOUT COMPLICATION, WITHOUT LONG-TERM CURRENT USE OF INSULIN (H): ICD-10-CM

## 2018-09-25 DIAGNOSIS — J30.81 DANDER (ANIMAL) ALLERGY: Primary | ICD-10-CM

## 2018-09-25 DIAGNOSIS — F31.81 BIPOLAR II DISORDER (H): ICD-10-CM

## 2018-09-25 DIAGNOSIS — N39.46 MIXED STRESS AND URGE URINARY INCONTINENCE: ICD-10-CM

## 2018-09-25 RX ORDER — CETIRIZINE HYDROCHLORIDE 10 MG/1
10 TABLET ORAL EVERY EVENING
Qty: 90 TABLET | Refills: 1 | Status: SHIPPED | OUTPATIENT
Start: 2018-09-25 | End: 2019-01-14

## 2018-09-25 NOTE — MR AVS SNAPSHOT
After Visit Summary   9/25/2018    Mary Ann Olson    MRN: [id number]           Patient Information     Date Of Birth          1965        Visit Information        Provider Department      9/25/2018 2:00 PM Joanna Farah MD [facility]        Today's Diagnoses     Dander (animal) allergy    -  1    Mixed stress and urge urinary incontinence        Type 2 diabetes mellitus without complication, without long-term current use of insulin (H)        Bipolar II disorder (H)          Care Instructions    Recommended moving jennifer (cockatool) into a room you do not sleep in.    Singular - try using zyrtec ([employer] have them in bulk for cheap)     Dulera- make sure you ALWAYS take 2 puffs, two times a day (you can do 2 puff in morning and 2 puff in evening)               Follow-ups after your visit        Additional Services     MENTAL HEALTH REFERRAL  -       Use this form for in clinic and community psychiatry and behavioral health consults. The referral coordinator will help to determine whether patients are best served by clinic behavioral health staff or by community providers.    Reason for referral: medication management,     Please provide data for below screening tools if available.   PHQ-9 Score:   Bipolar Screen:   ROHAN & Score:  PC-PTSD Score:   MMSE:   Carney (ADHD):   MCHAT (Autism Screen):  Pediatric Symptom Checklist (PSC):   Other Screening measures used:     Type of referral requested (indicate all that apply):    Adult Psychiatry--for diagnosis and medication management.  Hx suicidal ideation and bipolar disorder.       needed: No  Language: English  ([facility] Only) Referral should be tracked (Yes/No)?                  Who to contact     Please call your clinic at [phone] to:    Ask questions about your health    Make or cancel appointments    Discuss your medicines    Learn about your test results    Speak to your doctor             "Additional Information About Your Visit        Care EveryWhere ID     This is your Care EveryWhere ID. This could be used by other organizations to access your [facility] medical records  [id number]        Your Vitals Were     Pulse Temperature Respirations Height Pulse Oximetry BMI (Body Mass Index)    96 98.1  F (36.7  C) (Oral) 16 5' 1.22\" (155.5 cm) 97% 32.34 kg/m2       Blood Pressure from Last 3 Encounters:   09/25/18 116/79   08/28/18 114/77   08/21/18 118/82    Weight from Last 3 Encounters:   09/25/18 172 lb 6.4 oz (78.2 kg)   08/28/18 168 lb (76.2 kg)   08/21/18 167 lb (75.8 kg)              We Performed the Following     C FOOT EXAM  NO CHARGE     MENTAL HEALTH REFERRAL  -          Today's Medication Changes          These changes are accurate as of 9/25/18  2:36 PM.  If you have any questions, ask your nurse or doctor.               Start taking these medicines.        Dose/Directions    cetirizine 10 MG tablet   Commonly known as:  zyrTEC   Used for:  Dander (animal) allergy   Started by:  Joanna Farah MD        Dose:  10 mg   Take 1 tablet (10 mg) by mouth every evening   Quantity:  90 tablet   Refills:  1         These medicines have changed or have updated prescriptions.        Dose/Directions    order for DME   This may have changed:  additional instructions   Used for:  Mixed stress and urge urinary incontinence   Changed by:  Joanna Farah MD        Equipment being ordered: incontinence pads  Please fax to [facility]   Quantity:  1 Box   Refills:  3            Where to get your medicines      These medications were sent to [facility] [id number] - [place], [place] - [address], [place] [address]     Phone:  [phone]     cetirizine 10 MG tablet         Some of these will need a paper prescription and others can be bought over the counter.  Ask your nurse if you have questions.     Bring a paper prescription for each of these medications "     order for DME                Primary Care Provider Office Phone # Fax #    Joanna Farah [phone]       [facility] [address] [place] [address]        Equal Access to Services     MADHAV MCKEON : Hadii aad ku hadjagdeepo Soomaali, waaxda luqadaha, qaybta kaalmada adeegyada, waxguero idiin everardon adehector engel laAliaalda rm. So [facility] [phone].    ATENCIÓN: Si habla español, tiene a judge disposición servicios gratuitos de asistencia lingüística. Llame al [phone].    We comply with applicable federal civil rights laws and [place] laws. We do not discriminate on the basis of race, color, national origin, age, disability, sex, sexual orientation, or gender identity.            Thank you!     Thank you for choosing [facility]  for your care. Our goal is always to provide you with excellent care. Hearing back from our patients is one way we can continue to improve our services. Please take a few minutes to complete the written survey that you may receive in the mail after your visit with us. Thank you!             Your Updated Medication List - Protect others around you: Learn how to safely use, store and throw away your medicines at www.disposemymeds.org.          This list is accurate as of 9/25/18  2:36 PM.  Always use your most recent med list.                   Brand Name Dispense Instructions for use Diagnosis    acetaminophen 325 MG tablet    TYLENOL    100 tablet    Take 2 tablets (650 mg) by mouth every 6 hours as needed    Cough       adalimumab 40 MG/0.8ML pen kit    HUMIRA     Inject 0.8 mLs (40 mg) Subcutaneous every 14 days        * albuterol 108 (90 Base) MCG/ACT inhaler    PROAIR HFA/PROVENTIL HFA/VENTOLIN HFA    18 g    Inhale 2 puffs into the lungs every 6 hours as needed for shortness of breath / dyspnea or wheezing    Moderate persistent asthma without complication       * albuterol (2.5 MG/3ML) 0.083% neb solution     90 vial    Take 1 vial (2.5  mg) by nebulization every 6 hours as needed for shortness of breath / dyspnea or wheezing    Cough       atorvastatin 40 MG tablet    LIPITOR    90 tablet    Take 1 tablet (40 mg) by mouth daily    Type 2 diabetes mellitus without complication, without long-term current use of insulin (H)       benzonatate 100 MG capsule    TESSALON    42 capsule    Take 1 capsule (100 mg) by mouth 3 times daily as needed for cough    Cough       bisacodyl 10 MG Suppository    DULCOLAX     Place 10 mg rectally daily as needed        blood glucose monitoring lancets     100 each    Use to test blood sugars 4 times daily or as directed.    Type 2 diabetes mellitus without complication, without long-term current use of insulin (H)       calcium carbonate 600 mg-vitamin D 400 units 600-400 MG-UNIT per tablet    calcium 600 + D    60 tablet    Take 1 tablet by mouth 2 times daily    Postmenopausal status       carBAMazepine 200 MG 12 hr tablet    TEGretol XR    60 tablet    Take 1 tablet (200 mg) by mouth 2 times daily    Mood disorder (H)       cetirizine 10 MG tablet    zyrTEC    90 tablet    Take 1 tablet (10 mg) by mouth every evening    Dander (animal) allergy       famotidine 40 MG tablet    PEPCID    90 tablet    Take 1 tablet (40 mg) by mouth At Bedtime    Gastroesophageal reflux disease, esophagitis presence not specified       guaiFENesin 200 MG Tabs tablet    ORGANIDIN    30 tablet    Take 2 tablets (400 mg) by mouth every 6 hours as needed for cough    COPD exacerbation (H)       hydrOXYzine 50 MG tablet    ATARAX    60 tablet    Take 0.5 tablets (25 mg) by mouth 2 times daily as needed    Anxiety       metFORMIN 500 MG tablet    GLUCOPHAGE    180 tablet    Take 2 tablets (1,000 mg) by mouth 2 times daily (with meals)    Type 2 diabetes mellitus without complication, without long-term current use of insulin (H)       mometasone-formoterol 100-5 MCG/ACT oral inhaler    DULERA    13 g    Inhale 2 puffs into the lungs 2 times  daily    Chronic obstructive pulmonary disease with acute exacerbation (H)       montelukast 10 MG tablet    SINGULAIR    90 tablet    Take 1 tablet (10 mg) by mouth At Bedtime    Gastroesophageal reflux disease, esophagitis presence not specified       omeprazole 20 MG CR capsule    priLOSEC    90 capsule    Take 1 capsule (20 mg) by mouth daily    Gastroesophageal reflux disease, esophagitis presence not specified       order for DME     1 Box    Equipment being ordered: incontinence pads  [facility] fax [facility]    Mixed stress and urge urinary incontinence       polyethylene glycol Packet    MIRALAX/GLYCOLAX     Take 17 g by mouth daily as needed for constipation        tiotropium 2.5 MCG/ACT inhalation aerosol    SPIRIVA RESPIMAT    12 g    Inhale 2 puffs into the lungs daily    Chronic obstructive pulmonary disease with acute exacerbation (H)       vitamin D 90710 UNIT capsule    ERGOCALCIFEROL    30 capsule    Take 1 capsule (50,000 Units) by mouth twice a week    Postmenopausal status       * Notice:  This list has 2 medication(s) that are the same as other medications prescribed for you. Read the directions carefully, and ask your doctor or other care provider to review them with you.
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
## 2018-09-25 NOTE — PATIENT INSTRUCTIONS
Recommended moving jennifer (cockatool) into a room you do not sleep in.    Singular - try using zyrtec ([employer] have them in bulk for cheap)     Dulera- make sure you ALWAYS take 2 puffs, two times a day (you can do 2 puff in morning and 2 puff in evening)

## 2018-10-01 ENCOUNTER — OFFICE VISIT (OUTPATIENT)
Dept: PSYCHOLOGY | Facility: CLINIC | Age: 53
End: 2018-10-01
Payer: COMMERCIAL

## 2018-10-01 ENCOUNTER — DOCUMENTATION ONLY (OUTPATIENT)
Dept: FAMILY MEDICINE | Facility: CLINIC | Age: 53
End: 2018-10-01

## 2018-10-01 ENCOUNTER — TELEPHONE (OUTPATIENT)
Dept: FAMILY MEDICINE | Facility: CLINIC | Age: 53
End: 2018-10-01

## 2018-10-01 VITALS
DIASTOLIC BLOOD PRESSURE: 82 MMHG | SYSTOLIC BLOOD PRESSURE: 116 MMHG | OXYGEN SATURATION: 96 % | RESPIRATION RATE: 18 BRPM | HEART RATE: 99 BPM | TEMPERATURE: 97.3 F

## 2018-10-01 DIAGNOSIS — S06.9XAS MOOD DISORDER AS LATE EFFECT OF TRAUMATIC BRAIN INJURY (H): Primary | ICD-10-CM

## 2018-10-01 DIAGNOSIS — F39 MOOD DISORDER (H): ICD-10-CM

## 2018-10-01 DIAGNOSIS — F06.30 MOOD DISORDER AS LATE EFFECT OF TRAUMATIC BRAIN INJURY (H): Primary | ICD-10-CM

## 2018-10-01 DIAGNOSIS — Z78.0 POSTMENOPAUSAL STATUS: ICD-10-CM

## 2018-10-01 RX ORDER — CARBAMAZEPINE 200 MG/1
200 TABLET, EXTENDED RELEASE ORAL DAILY
Qty: 30 TABLET | Refills: 1 | Status: SHIPPED | OUTPATIENT
Start: 2018-10-01 | End: 2018-12-21

## 2018-10-01 RX ORDER — LURASIDONE HYDROCHLORIDE 20 MG/1
20 TABLET, FILM COATED ORAL AT BEDTIME
Qty: 30 TABLET | Refills: 1 | Status: SHIPPED | OUTPATIENT
Start: 2018-10-01 | End: 2018-12-11

## 2018-10-01 NOTE — PROGRESS NOTES
Referral for (Test): Neuropsychology Testing   Location/Place/Provider: [facility], [address] [place] [address] [address], [place] [address]  Date/Time: 11/05/18 at 10:00am with Jason Osorio   Phone: [phone]   Fax:   Additional information/prep.: The pt  Kaylie Dinh will schedule this appointment, and call me with the time and date.    Scheduled by: VALERIO Peña

## 2018-10-01 NOTE — TELEPHONE ENCOUNTER
Patient requesting letter specifically stating all medication changes on the written letter. Patient stated AVS is not accepted. Patient also stated letter can only be signed by a physician and not an RN on behalf of physician. She requested the letter be printed and faxed but unable to provide fax number to facility. Phone number to facility is [phone]. Advised patient once letter is complete I will call facility and retrieve fax number. Will route to initial physician who wrote the letter for review to write new letter. Basilio SMITH

## 2018-10-01 NOTE — MR AVS SNAPSHOT
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 The referral coordinator will help to determine whether patients are best served by clinic behavioral health staff or by community providers.    Type of referral(s) requested (indicate all that apply):  Adult Psychiatry--for diagnosis and medication management    Reason for referral: Saw Dr. Jason at [facility]. Wants patient to be reffered to community psychiatrist for assessment and long term medication management =. Diagnosis not clear at this time but empirically treating for bipolar with starting latuda and titrating down tegretal.     Currently receiving mental health services (if 'Yes', what services and why today's referral?): Yes: Sees therapist but does not know where  Currently having suicidal thoughts: Yes  Previous psych hospitalization: Yes    Please provide data for below screening tools if available.   PHQ-9 Score: unknown  GAD7 Score: unknown  PC-PTSD Score:  Bipolar Screen:  Ramy (ADHD):   MCHAT (Autism Screen):   Pediatric Symptom Checklist (PSC):      needed: No  Language: English            NEUROPSYCHOLOGY REFERRAL       Your provider has referred you to:    [facility]: Adult Neuropsychology Clinic - [place] [phone] Preferred Provider: No preferred provider   [email or web address]    Hx of fall after heat stroke as child. Would like full neuropsych assessment including MMPI testing.     All  scheduling is subject to the client's specific insurance plan & benefits, provider/location availability, and provider clinical specialities.  Please arrive 15 minutes early for your first appointment and bring your completed paperwork.    Please be aware that coverage of these services is subject to the terms and limitations of your health insurance plan.  Call member services at your health plan with any benefit or coverage questions.    Please bring the following to your appointment:  >>   Any x-rays, CTs or MRIs which have been performed.  Contact the facility where they were done to arrange for  prior to your scheduled appointment.  Any new CT, MRI or other procedures ordered by your specialist must be performed at a [facility] facility or coordinated by your clinic's referral office.    >>   List of current medications   >>   This referral request   >>   Any documents/labs given to you for this referral                  Your next 10 appointments already scheduled     Oct 15, 2018 10:20 AM CDT   COLPOSCOPY with Joanna Farah MD, [facility] COLPOSCOPY ROOM   [facility] ([facility])    [address] [place] [address]   [phone]              Who to contact     Please call your clinic at [phone] to:    Ask questions about your health    Make or cancel appointments    Discuss your medicines    Learn about your test results    Speak to your doctor            Additional Information About Your Visit        Care EveryWhere ID     This is your Care EveryWhere ID. This could be used by other organizations to access your [facility] medical records  [id number]        Your Vitals Were     Pulse Temperature Respirations Pulse Oximetry          99 97.3  F (36.3  C) (Oral) 18 96%         Blood Pressure from Last 3 Encounters:   10/01/18 116/82   09/25/18 116/79   08/28/18 114/77    Weight from Last 3 Encounters:   09/25/18 172 lb 6.4 oz (78.2 kg)   08/28/18 168 lb (76.2 kg)    08/21/18 167 lb (75.8 kg)              We Performed the Following     MENTAL HEALTH REFERRAL  -     NEUROPSYCHOLOGY REFERRAL          Today's Medication Changes          These changes are accurate as of 10/1/18 11:59 PM.  If you have any questions, ask your nurse or doctor.               Start taking these medicines.        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        Dose/Directions    carBAMazepine 200 MG 12 hr tablet   Commonly known as:  TEGretol XR   This may have changed:  when to take this   Used for:  Mood disorder (H)   Changed by:  Marlene Jason MD        Dose:  200 mg   Take 1 tablet (200 mg) by mouth daily   Quantity:  30 tablet   Refills:  1       vitamin D 86960 UNIT capsule   Commonly known as:  ERGOCALCIFEROL   This may have changed:  when to take this   Used for:  Postmenopausal status   Changed by:  Felicity Harris RN        Dose:  23012 Units   Take 1 capsule (50,000 Units) by mouth once a week   Quantity:  12 capsule   Refills:  3            Where to get your medicines      These medications were sent to [facility] [place] [id number] - [place], [place] - [address], [place] [address]     Phone:  [phone]     carBAMazepine 200 MG 12 hr tablet    lurasidone 20 MG Tabs tablet    vitamin D 91639 UNIT capsule                Primary Care Provider Office Phone # Fax #    Joanna Polina Farah [phone]       [facility] [address] [facility] [address]        Equal Access to Services     MADHAV MCKEON AH: Karly Wang, mackenzie luqopal, qaybta kagrace cardona. So [facility]  [phone].    ATENCIÓN: Si linus winters, tiene a judge disposición servicios gratuitos de asistencia lingüística. Mark galindo [phone].    We comply with applicable federal civil rights laws and [place] laws. We do not discriminate on the basis of race, color, national origin, age, disability, sex, sexual orientation, or gender identity.            Thank you!     Thank you for choosing [facility]  for your care. Our goal is always to provide you with excellent care. Hearing back from our patients is one way we can continue to improve our services. Please take a few minutes to complete the written survey that you may receive in the mail after your visit with us. Thank you!             Your Updated Medication List - Protect others around you: Learn how to safely use, store and throw away your medicines at www.disposemymeds.org.          This list is accurate as of 10/1/18 11:59 PM.  Always use your most recent med list.                   Brand Name Dispense Instructions for use Diagnosis    acetaminophen 325 MG tablet    TYLENOL    100 tablet    Take 2 tablets (650 mg) by mouth every 6 hours as needed    Cough       adalimumab 40 MG/0.8ML pen kit    HUMIRA     Inject 0.8 mLs (40 mg) Subcutaneous every 14 days        * albuterol 108 (90 Base) MCG/ACT inhaler    PROAIR HFA/PROVENTIL HFA/VENTOLIN HFA    18 g    Inhale 2 puffs into the lungs every 6 hours as needed for shortness of breath / dyspnea or wheezing    Moderate persistent asthma without complication       * albuterol (2.5 MG/3ML) 0.083% neb solution     90 vial    Take 1 vial (2.5 mg) by nebulization every 6 hours as needed for shortness of breath / dyspnea or wheezing    Cough       atorvastatin 40 MG tablet    LIPITOR    90 tablet    Take 1 tablet (40 mg) by mouth daily    Type 2 diabetes mellitus without complication, without long-term current use of insulin (H)       benzonatate 100 MG capsule    TESSALON    42 capsule    Take 1 capsule (100  mg) by mouth 3 times daily as needed for cough    Cough       bisacodyl 10 MG Suppository    DULCOLAX     Place 10 mg rectally daily as needed        blood glucose monitoring lancets     100 each    Use to test blood sugars 4 times daily or as directed.    Type 2 diabetes mellitus without complication, without long-term current use of insulin (H)       calcium carbonate 600 mg-vitamin D 400 units 600-400 MG-UNIT per tablet    calcium 600 + D    60 tablet    Take 1 tablet by mouth 2 times daily    Postmenopausal status       carBAMazepine 200 MG 12 hr tablet    TEGretol XR    30 tablet    Take 1 tablet (200 mg) by mouth daily    Mood disorder (H)       cetirizine 10 MG tablet    zyrTEC    90 tablet    Take 1 tablet (10 mg) by mouth every evening    Dander (animal) allergy       famotidine 40 MG tablet    PEPCID    90 tablet    Take 1 tablet (40 mg) by mouth At Bedtime    Gastroesophageal reflux disease, esophagitis presence not specified       guaiFENesin 200 MG Tabs tablet    ORGANIDIN    30 tablet    Take 2 tablets (400 mg) by mouth every 6 hours as needed for cough    COPD exacerbation (H)       hydrOXYzine 50 MG tablet    ATARAX    60 tablet    Take 0.5 tablets (25 mg) by mouth 2 times daily as needed    Anxiety       lurasidone 20 MG Tabs tablet    LATUDA    30 tablet    Take 1 tablet (20 mg) by mouth At Bedtime    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)       metFORMIN 500 MG tablet    GLUCOPHAGE    180 tablet    Take 2 tablets (1,000 mg) by mouth 2 times daily (with meals)    Type 2 diabetes mellitus without complication, without long-term current use of insulin (H)       mometasone-formoterol 100-5 MCG/ACT oral inhaler    DULERA    13 g    Inhale 2 puffs into the lungs 2 times daily    Chronic obstructive pulmonary disease with acute exacerbation (H)       montelukast 10 MG tablet    SINGULAIR    90 tablet    Take 1 tablet (10 mg) by mouth At Bedtime    Gastroesophageal reflux disease,  esophagitis presence not specified       omeprazole 20 MG CR capsule    priLOSEC    90 capsule    Take 1 capsule (20 mg) by mouth daily    Gastroesophageal reflux disease, esophagitis presence not specified       order for DME     1 Box    Equipment being ordered: incontinence pads  Please fax to [facility]    Mixed stress and urge urinary incontinence       polyethylene glycol Packet    MIRALAX/GLYCOLAX     Take 17 g by mouth daily as needed for constipation        tiotropium 2.5 MCG/ACT inhalation aerosol    SPIRIVA RESPIMAT    12 g    Inhale 2 puffs into the lungs daily    Chronic obstructive pulmonary disease with acute exacerbation (H)       vitamin D 17627 UNIT capsule    ERGOCALCIFEROL    12 capsule    Take 1 capsule (50,000 Units) by mouth once a week    Postmenopausal status       * Notice:  This list has 2 medication(s) that are the same as other medications prescribed for you. Read the directions carefully, and ask your doctor or other care provider to review them with you.

## 2018-10-01 NOTE — LETTER
October 1, 2018      Mary Ann Olson  [address]   [facility] [address]        To whom it may concern,    Mary Ann was seen at [facility] on 10/1/18. She had various med changes as noted in her AVS including starting 20 mg Latuda daily. Please allow her greater time to come access her medications in order to take them consistently.        Sincerely,    Dr. Armando Joseph

## 2018-10-01 NOTE — PROGRESS NOTES
Procedure Requested        9019.000 NEUROPSYCHOLOGY REFERRAL              [#[id number]]         Priority: Routine  Class: External referral         Comment:Your provider has referred you to:                    [facility]: Adult Neuropsychology Clinic - [place] [phone]                   Preferred Provider: No preferred provider                     [email or web address]                                    Hx of fall after heat stroke as child. Would like full neuropsych                   assessment including MMPI testing.                                     All scheduling is subject to the client's specific insurance plan                   & benefits, provider/location availability, and provider clinical                   specialities.  Please arrive 15 minutes early for your first                   appointment and bring your completed paperwork.                                    Please be aware that coverage of these services is subject to the                   terms and limitations of your health insurance plan.  Call member                   services at your health plan with any benefit or coverage                   questions.                                    Please bring the following to your appointment:                  >>   Any x-rays, CTs or MRIs which have been performed.  Contact                   the facility where they were done to arrange for  prior to                   your scheduled appointment.  Any new CT, MRI or other procedures                   ordered by your specialist must be performed at a [facility]                   facility or coordinated by your clinic's referral office.                    

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
## 2018-10-02 ENCOUNTER — TELEPHONE (OUTPATIENT)
Dept: FAMILY MEDICINE | Facility: CLINIC | Age: 53
End: 2018-10-02

## 2018-10-02 DIAGNOSIS — R05.9 COUGH: Primary | ICD-10-CM

## 2018-10-02 RX ORDER — ERGOCALCIFEROL 1.25 MG/1
50000 CAPSULE, LIQUID FILLED ORAL WEEKLY
Qty: 12 CAPSULE | Refills: 3 | Status: SHIPPED | OUTPATIENT
Start: 2018-10-02 | End: 2019-02-11

## 2018-10-02 NOTE — TELEPHONE ENCOUNTER
[facility] phone call message- general phone call:    Reason for call: Pt called requesting if PCP could send a prescription to pharmacy for a cough she has. Notified pt if she could come in today to be seen, pt declined due to not having a ride today. Notified pt I would put a message in, pt was upset that I offered appointment, she stated she just wanted a prescription for cough medicine and isn't able to be seen and hung up. Please call and advise.     Return call needed:     OK to leave a message on voice mail? Yes    Primary language: English      needed? No    Call taken on October 2, 2018 at 11:13 AM by Sayra Mueller

## 2018-10-04 RX ORDER — DEXTROMETHORPHAN POLISTIREX 30 MG/5ML
30 SUSPENSION ORAL 2 TIMES DAILY
Qty: 89 ML | Refills: 0 | Status: SHIPPED | OUTPATIENT
Start: 2018-10-04 | End: 2019-01-14

## 2018-10-04 NOTE — PROGRESS NOTES
Referral for (Test): Psychiatry   Location/Place/Provider: [facility] [address], [place] [address]    Date/Time: 10/17/18 at 10:30am    Phone: [phone]  Fax:   Additional information/prep.: Pt stated that her  from [facility] had scheduled this appointment, she is not sure what time the appointment is, but will call her .    Scheduled by: VALERIO Peña

## 2018-10-05 NOTE — TELEPHONE ENCOUNTER
Letter sent to [facility] fax# [phone] as listed in chart. Called [facility] RN to verify if all they need was a letter from the physician, no answer and left VM to call clinic back. Basilio SMITH

## 2018-10-05 NOTE — TELEPHONE ENCOUNTER
"Worker from facility called back, stated she received the fax but still needs a prior authorization for the medication \"Latuda\". Call back number is [phone].  "

## 2018-10-10 ENCOUNTER — TELEPHONE (OUTPATIENT)
Dept: FAMILY MEDICINE | Facility: CLINIC | Age: 53
End: 2018-10-10

## 2018-10-10 NOTE — TELEPHONE ENCOUNTER
I got a call from the pt today, pt called about her neuropsychology testing for 11/05/18 at 10:00am with [facility].  The pt stated that this will not work for her, she does not do any morning appointments.  Pt wanted the phone number to call and schedule it herself, but did not want to deal with it, right now.  Pt stated that she will have her psychiatrist deal with it.  Pt wanted me to call and cancel this appointment.

## 2018-10-12 ENCOUNTER — TELEPHONE (OUTPATIENT)
Dept: FAMILY MEDICINE | Facility: CLINIC | Age: 53
End: 2018-10-12

## 2018-10-12 NOTE — TELEPHONE ENCOUNTER
PA information provided per CMM:     Indications and Usage:    LATUDA  (lurasidone hydrochloride) is indicated for treatment of adult and adolescent patients age 13 to 17 years with schizophrenia and in adult patients with major depressive episodes associated with bipolar I disorder (bipolar depression) as monotherapy and as adjunctive therapy with lithium or valproate.      WARNING: INCREASED MORTALITY IN ELDERLY PATIENTS WITH DEMENTIA-RELATED PSYCHOSIS; and SUICIDAL THOUGHTS AND BEHAVIORS    Increased Mortality in Elderly Patients with Dementia-Related Psychosis    Elderly patients with dementia-related psychosis treated with antipsychotic drugs are at an increased risk of death. LATUDA is not approved for the treatment of patients with dementia-related psychosis.    Suicidal Thoughts and Behaviors    Antidepressants increased the risk of suicidal thoughts and behaviors in patients aged 24 years and younger. Monitor for clinical worsening and emergence of suicidal thoughts and behavior. LATUDA is not approved for use in pediatric patients with depression.      Dosage and Administration:    LATUDA should be taken with food (at least 350 calories). Administration with food substantially increases the absorption of LATUDA    Schizophrenia - Recommended starting dose is 40 mg per day, and the maximum recommended dose is 160 mg per day.    Schizophrenia in adolescent (13 to 17 years) - Recommended starting dose is 40 mg per day, and the maximum recommended dose is 80 mg per day.    Bipolar Depression - Recommended starting dose is 20 mg per day, and the maximum recommended dose is 120 mg per day.    The effectiveness of LATUDA for longer-term use, that is, for more than 6 weeks, has not been established in controlled studies. Therefore, the physician who elects to use LATUDA for extended periods should periodically re-evaluate the long-term usefulness of the drug for the individual patient.    The efficacy of LATUDA in  the treatment of izzy associated with bipolar disorder has not been established.    Dosage Forms and Strengths:    Tablets: 20 mg, 40 mg, 60 mg, 80 mg and 120 mg      Previously Tried and Failed:    Please indicate the patient s previous medication history in the free text field above (if available) for any of the following medications:    Aripiprazole (Abilify)  Clozapine (Clozaril)  Haloperidol (Haldol)  Olanzapine (Zyprexa)  Paliperidone ER (Invega)  Quetiapine IR/ER (Seroquel, Seroquel XR)  Risperidone (Risperdal)  Ziprasidone (Geodon)  Other antipsychotic agent
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
## 2018-10-12 NOTE — TELEPHONE ENCOUNTER
Routed to Dr. Jason to assist in completing PA.     Valeria Hall, PharmD  [facility]  Phone: [phone]  October 12, 2018 at 2:22 PM

## 2018-10-12 NOTE — TELEPHONE ENCOUNTER
Prior Authorization needed on:      Medication:  Latuda Dose:  20 mg    Pharmacy confirmed as   [facility] [id number] - [place], [place] - [address] [place] [address]  Phone: [phone] Fax: [phone]  : Yes    Insurance Name:  Herb Govea October 12, 2018 at 1:59 PM

## 2018-10-14 ENCOUNTER — MEDICAL CORRESPONDENCE (OUTPATIENT)
Dept: HEALTH INFORMATION MANAGEMENT | Facility: CLINIC | Age: 53
End: 2018-10-14

## 2018-10-15 ENCOUNTER — OFFICE VISIT (OUTPATIENT)
Dept: FAMILY MEDICINE | Facility: CLINIC | Age: 53
End: 2018-10-15
Payer: COMMERCIAL

## 2018-10-15 VITALS
SYSTOLIC BLOOD PRESSURE: 137 MMHG | HEART RATE: 88 BPM | WEIGHT: 169 LBS | TEMPERATURE: 98.1 F | OXYGEN SATURATION: 97 % | HEIGHT: 61 IN | DIASTOLIC BLOOD PRESSURE: 81 MMHG | BODY MASS INDEX: 31.91 KG/M2

## 2018-10-15 DIAGNOSIS — Z78.0 POSTMENOPAUSAL STATUS: ICD-10-CM

## 2018-10-15 DIAGNOSIS — B97.7 HIGH RISK HPV INFECTION: Primary | ICD-10-CM

## 2018-10-15 LAB — HCG UR QL: NEGATIVE

## 2018-10-15 NOTE — TELEPHONE ENCOUNTER
Message to physician:     Date of last visit: 10/15/2018     Date of next visit if scheduled: Visit date 10/30/18    Last Comprehensive Metabolic Panel:  Creatinine   Date Value Ref Range Status   08/29/2014 0.69 0.52 - 1.04 mg/dL Final     GFR Estimate   Date Value Ref Range Status   08/29/2014 90 >60 mL/min/1.7m2 Final     Comment:     Non  GFR Calc       BP Readings from Last 3 Encounters:   10/15/18 137/81   10/01/18 116/82   09/25/18 116/79       Lab Results   Component Value Date    A1C 6.6 08/28/2018    A1C 6.3 07/16/2018    A1C 6.5 11/28/2017                Please complete refill and CLOSE ENCOUNTER.  Closing the encounter signifies the refill is complete.

## 2018-10-15 NOTE — LETTER
October 31, 2018      Mary Ann Olson  [address]   [facility] [address]        Dear Mary Ann,    Your biopsy was negative for cancer but positive for persistence of HPV.  Continue to work on your smoking cessation and we will recheck a pap smear in 1 year.     Please see below for your test results.    Resulted Orders   HCG Qualitative Urine (UPT)  ([facility])   Result Value Ref Range    HCG Qual Urine NEGATIVE Negative   Pathology  ([facility])   Result Value Ref Range    Lab AP Case Report SEE RESULTS BELOW       Comment:      [facility] Surgical Pathology                            Case: [id number]                                      Authorizing Provider:  oJanna Farah MD Collected:             10/15/2018 1210              Pathologist:           Mich Eduardo MD     Received:              10/15/2018 1342              Specimens:   A) - Cervix @ 06:00, 6:00 BX                                                                          B) - Cervix, Endocervical, ENDOCERVIX BRUSH                                                  Lab AP Final Diagnosis SEE RESULTS BELOW       Comment:      A) CERVICAL BIOPSY AT 6 O'CLOCK:       - ECTOCERVICAL EPITHELIUM SHOWING ATYPIA WITH FOCAL KOILOCYTOSIS,   SUSPICIOUS           BUT NOT DIAGNOSTIC FOR HPV CYTOPATHIC EFFECT       - NEGATIVE FOR HIGH-GRADE DYSPLASIA       - NO ENDOCERVICAL EPITHELIUM OR GLANDS PRESENT IN BIOPSY     B) ENDOCERVICAL BRUSHINGS:       - FRAGMENTS OF ECTOCERVICAL EPITHELIUM WITH FOCAL ATYPIA       - SMALL PARTICLES OF NORMAL ENDOCERVICAL EPITHELIUM  Electronically signed by Mich Eduardo MD on 10/17/2018 at  4:55 PM      Lab AP Diagnosis Comment SEE RESULTS BELOW       Comment:      HISTOLOGY-CYTOLOGY CORRELATION:  No atypical or dysplastic cells were identified in Pap smear [id number], but   the Pap smear material was positive for high-risk HPV DNA. Atypical epithelium   is found in the cervical biopsy and the endocervical brushings,  "and in the   biopsy tissue the appearance is suspicious for the presence of HPV.      Lab AP Microscopic Description       Microscopic examination performed, substantiating the above diagnosis.    Lab AP Clinical Information SEE RESULTS BELOW       Comment:      Clinical history: Abnl Pap +High Risk HPV  Reason for procedure: Abnl Pap +High Risk HPV      Lab AP Gross Description SEE RESULTS BELOW       Comment:      A) Submitted in formalin in a container labeled with the patient's name, and   designated \"6:00,\" are scanty fragments of gray-tan tissue, in aggregate   measuring 0.4 x 0.3 x 0.1 cm in greatest dimensions. TE-1C     B) Submitted in formalin in a container labeled with the patient's name, and   designated \"endocervix brush,\" is a biopsy brush with embedded, blood-tinged,   tan-maroon to pink tissue. The tissue is scraped from the biopsy brush, and   the fragments are found to measure 0.5 x 0.5 x 0.2 cm in greatest aggregate   dimensions. F&TE-1C  [facility]:latrell      Lab AP Charges SEE RESULTS BELOW       Comment:      CPT: 88305 x2  ICD-10: B97.7      Lab AP Malignant? See Note. Normal      Comment:         SPECIMEN PROCESSING:     All histology slide preparation and stains; and cytology slide preparation,   staining, and cytotechnologist screening done at [facility] are performed at   [facility], [address] [place] [address], [facility], with final   interpretation, frozen section analysis, and cytology adequacy assessment at   indicated laboratory.         Narrative    Test performed by:  [facility] [address] [place] [address]       If you have any questions, please call the clinic to make an appointment.    Sincerely,    Joanna Farah MD  "

## 2018-10-15 NOTE — NURSING NOTE
"Chief Complaint   Patient presents with     Colposcopy     Medication Reconciliation     not completed.        /81  Pulse 88  Temp 98.1  F (36.7  C) (Oral)  Ht 5' 1.02\" (155 cm)  Wt 169 lb (76.7 kg)  SpO2 97%  BMI 31.91 kg/m2    "

## 2018-10-15 NOTE — MR AVS SNAPSHOT
"              After Visit Summary   10/15/2018    Mary Ann Olson    MRN: [id number]           Patient Information     Date Of Birth          1965        Visit Information        Provider Department      10/15/2018 10:20 AM Joanna Farah MD; [facility] COLPOSCOPY ROOM [facility]        Today's Diagnoses     Abnormal Pap smear of cervix    -  1       Follow-ups after your visit        Your next 10 appointments already scheduled     Oct 30, 2018 11:00 AM CDT   Return Visit with Joanna Farah MD   [facility] ([facility])    [address] [place], [place] [id number] [phone]              Who to contact     Please call your clinic at [phone] to:    Ask questions about your health    Make or cancel appointments    Discuss your medicines    Learn about your test results    Speak to your doctor            Additional Information About Your Visit        Care EveryWhere ID     This is your Care EveryWhere ID. This could be used by other organizations to access your [facility] medical records  [id number]        Your Vitals Were     Pulse Temperature Height Pulse Oximetry BMI (Body Mass Index)       88 98.1  F (36.7  C) (Oral) 5' 1.02\" (155 cm) 97% 31.91 kg/m2        Blood Pressure from Last 3 Encounters:   10/15/18 137/81   10/01/18 116/82   09/25/18 116/79    Weight from Last 3 Encounters:   10/15/18 169 lb (76.7 kg)   09/25/18 172 lb 6.4 oz (78.2 kg)   08/28/18 168 lb (76.2 kg)              We Performed the Following     HCG Qualitative Urine (UPT)  ([facility])        Primary Care Provider Office Phone # Fax #    Joanna Farah [phone]       [facility] [address] [facility] [address]        Equal Access to Services     MADHAV MCKEON : Hadii loreta palencia hadasho Soomaali, waaxda luqadaha, qaybta kaalmada adeegyada, grace mr. So [facility] [phone].    ATENCIÓN: Si vanessa preston a judge " disposición servicios gratuitos de asistencia lingüística. Mark galindo [phone].    We comply with applicable federal civil rights laws and [place] laws. We do not discriminate on the basis of race, color, national origin, age, disability, sex, sexual orientation, or gender identity.            Thank you!     Thank you for choosing [facility]  for your care. Our goal is always to provide you with excellent care. Hearing back from our patients is one way we can continue to improve our services. Please take a few minutes to complete the written survey that you may receive in the mail after your visit with us. Thank you!             Your Updated Medication List - Protect others around you: Learn how to safely use, store and throw away your medicines at www.disposemymeds.org.          This list is accurate as of 10/15/18 11:49 AM.  Always use your most recent med list.                   Brand Name Dispense Instructions for use Diagnosis    acetaminophen 325 MG tablet    TYLENOL    100 tablet    Take 2 tablets (650 mg) by mouth every 6 hours as needed    Cough       adalimumab 40 MG/0.8ML pen kit    HUMIRA     Inject 0.8 mLs (40 mg) Subcutaneous every 14 days        * albuterol 108 (90 Base) MCG/ACT inhaler    PROAIR HFA/PROVENTIL HFA/VENTOLIN HFA    18 g    Inhale 2 puffs into the lungs every 6 hours as needed for shortness of breath / dyspnea or wheezing    Moderate persistent asthma without complication       * albuterol (2.5 MG/3ML) 0.083% neb solution     90 vial    Take 1 vial (2.5 mg) by nebulization every 6 hours as needed for shortness of breath / dyspnea or wheezing    Cough       atorvastatin 40 MG tablet    LIPITOR    90 tablet    Take 1 tablet (40 mg) by mouth daily    Type 2 diabetes mellitus without complication, without long-term current use of insulin (H)       benzonatate 100 MG capsule    TESSALON    42 capsule    Take 1 capsule (100 mg) by mouth 3 times daily as needed for cough    Cough        bisacodyl 10 MG Suppository    DULCOLAX     Place 10 mg rectally daily as needed        blood glucose monitoring lancets     100 each    Use to test blood sugars 4 times daily or as directed.    Type 2 diabetes mellitus without complication, without long-term current use of insulin (H)       calcium carbonate 600 mg-vitamin D 400 units 600-400 MG-UNIT per tablet    calcium 600 + D    60 tablet    Take 1 tablet by mouth 2 times daily    Postmenopausal status       carBAMazepine 200 MG 12 hr tablet    TEGretol XR    30 tablet    Take 1 tablet (200 mg) by mouth daily    Mood disorder (H)       cetirizine 10 MG tablet    zyrTEC    90 tablet    Take 1 tablet (10 mg) by mouth every evening    Dander (animal) allergy       dextromethorphan 30 MG/5ML liquid    DELSYM    89 mL    Take 5 mLs (30 mg) by mouth 2 times daily    Cough       famotidine 40 MG tablet    PEPCID    90 tablet    Take 1 tablet (40 mg) by mouth At Bedtime    Gastroesophageal reflux disease, esophagitis presence not specified       guaiFENesin 200 MG Tabs tablet    ORGANIDIN    30 tablet    Take 2 tablets (400 mg) by mouth every 6 hours as needed for cough    COPD exacerbation (H)       hydrOXYzine 50 MG tablet    ATARAX    60 tablet    Take 0.5 tablets (25 mg) by mouth 2 times daily as needed    Anxiety       lurasidone 20 MG Tabs tablet    LATUDA    30 tablet    Take 1 tablet (20 mg) by mouth At Bedtime    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)       metFORMIN 500 MG tablet    GLUCOPHAGE    180 tablet    Take 2 tablets (1,000 mg) by mouth 2 times daily (with meals)    Type 2 diabetes mellitus without complication, without long-term current use of insulin (H)       mometasone-formoterol 100-5 MCG/ACT oral inhaler    DULERA    13 g    Inhale 2 puffs into the lungs 2 times daily    Chronic obstructive pulmonary disease with acute exacerbation (H)       montelukast 10 MG tablet    SINGULAIR    90 tablet    Take 1 tablet (10 mg) by  mouth At Bedtime    Gastroesophageal reflux disease, esophagitis presence not specified       omeprazole 20 MG CR capsule    priLOSEC    90 capsule    Take 1 capsule (20 mg) by mouth daily    Gastroesophageal reflux disease, esophagitis presence not specified       order for DME     1 Box    Equipment being ordered: incontinence pads  Please fax to [facility]    Mixed stress and urge urinary incontinence       polyethylene glycol Packet    MIRALAX/GLYCOLAX     Take 17 g by mouth daily as needed for constipation        tiotropium 2.5 MCG/ACT inhalation aerosol    SPIRIVA RESPIMAT    12 g    Inhale 2 puffs into the lungs daily    Chronic obstructive pulmonary disease with acute exacerbation (H)       vitamin D 74053 UNIT capsule    ERGOCALCIFEROL    12 capsule    Take 1 capsule (50,000 Units) by mouth once a week    Postmenopausal status       * Notice:  This list has 2 medication(s) that are the same as other medications prescribed for you. Read the directions carefully, and ask your doctor or other care provider to review them with you.

## 2018-10-15 NOTE — PROGRESS NOTES
Colposcopy Procedure Note    History:  Mary Ann Olson is a patient of Joanna Henderson that  presents for colposcopy for advanced age and + high risk HPV    Contraception  none  Smoking?  Yes  Taking folate?   No  ASA in last week? No  NSAID taken today? No    Consent: Affirmation of informed consent signed and scanned into medical record. Risks, benefits and alternatives discussed. Patient's questions were elicited and answered.  Procedure safety checklist was completed:  Yes  Time Out (Pause for the Cause) completed: Yes    Labs:   UPT:  Negative    Procedure:  Patient positioned for colposcopy. Acetic acid applied. Lugol's applied.   SCJ seen entirely? Yes  [place] was satisfactory with biopsy and with ECC  cervix swabbed with Lugol's solution, SCJ visualized 360 degrees without lesions, endocervical curettage performed, cervical biopsies taken at 6 o'clock and specimen labelled and sent to pathology.  NOTE: stenotic external os.  Limited ECC  Bleeding controlled with: with simple pressure  EBL: <5ml    Findings:   Vagina   vaginal colposcopy not performed    Vulva  vulvar colposcopy not performed    Cervix  Color:  Zero (0) Points - Shiny, snow white.  Transient, indistinct acetowhite, semitransparent.  Margin: Zero (0) Points - Condylomatous or micropapillary contour.  Indistinct borders.   Vessels: Zero (0) Points - Fine, punctuation or fine mosaic.  Uniform, fine caliber, nondilated capillary loops.  Narrow intercapillary distance.  Lugol's: Zero (0) Points - Positive iodine uptake, producing a jose brown color OR Negative iodine uptake (mustard yellow) of a jose brown color lesion recognized as low grade by above criteria.  Total Pts: 0      (0-2 mild, 3-5 mod, 6-8 severe)     Colposcopic Impression: Dysplasia None    Tolerance:   Patient tolerated procedure well    Plan:  Follow up in 2 weeks for biopsy results.  Discouraged smoking - Quit Plan Referral Yes    Discharge instructions  discussed including RTC or call if bleeding >1pph, fever, abdominal pain or foul smelling discharge.       Resident: Joanna Farah  Faculty: Joanna Farah MD present for and supervised this entire procedure

## 2018-10-16 ENCOUNTER — TRANSFERRED RECORDS (OUTPATIENT)
Dept: HEALTH INFORMATION MANAGEMENT | Facility: CLINIC | Age: 53
End: 2018-10-16

## 2018-10-16 NOTE — PROGRESS NOTES
I have personally reviewed the history and examination as documented by Dr. Jason.  I was present during key portions of the visit and agree with the assessment and plan as documented for 53 yr old female with likely bipolar 2 disorder here for psych eval.   Case reviewed w/ Dr Collado of psychiatry over the phone as well. Will add Latuda to current regimen. Precautions given. Anticipatory guidance given.     Kenneth Jackson MD  October 16, 2018  5:15 PM

## 2018-10-17 ENCOUNTER — RECORDS - HEALTHEAST (OUTPATIENT)
Dept: ADMINISTRATIVE | Facility: OTHER | Age: 53
End: 2018-10-17

## 2018-10-17 LAB
LAB AP CASE REPORT: NORMAL
LAB AP CHARGES: NORMAL
LAB AP CLINICAL INFORMATION: NORMAL
LAB AP DIAGNOSIS COMMENT: NORMAL
LAB AP FINAL DIAGNOSIS: NORMAL
LAB AP GROSS DESCRIPTION: NORMAL
LAB AP MALIGNANT?: NORMAL
LAB AP MICROSCOPIC DESCRIPTION: NORMAL

## 2018-10-18 ENCOUNTER — TELEPHONE (OUTPATIENT)
Dept: FAMILY MEDICINE | Facility: CLINIC | Age: 53
End: 2018-10-18

## 2018-10-18 NOTE — TELEPHONE ENCOUNTER
ED follow up- abe covarrubias    Pt was seen at [facility] for tonisilitis  Reports throat and ear are still bothersome, but has only been on amox for a couple of days, encouraged her to continue and if worses to call us and we can get her in.    Reminded on 24 hr line    Molly

## 2018-10-24 PROBLEM — S09.90XA HEAD INJURY: Status: RESOLVED | Noted: 2018-01-23 | Resolved: 2018-10-24

## 2018-10-24 PROBLEM — D50.9 IRON DEFICIENCY ANEMIA, UNSPECIFIED: Status: RESOLVED | Noted: 2018-01-23 | Resolved: 2018-10-24

## 2018-10-24 PROBLEM — T45.0X2A: Status: RESOLVED | Noted: 2017-07-15 | Resolved: 2018-10-24

## 2018-10-24 PROBLEM — S09.90XS MOOD DISORDER DUE TO OLD HEAD INJURY: Status: RESOLVED | Noted: 2018-01-23 | Resolved: 2018-10-24

## 2018-10-24 PROBLEM — F06.30 MOOD DISORDER DUE TO OLD HEAD INJURY: Status: RESOLVED | Noted: 2018-01-23 | Resolved: 2018-10-24

## 2018-10-24 PROBLEM — F39 EPISODIC MOOD DISORDER (H): Status: RESOLVED | Noted: 2018-06-08 | Resolved: 2018-10-24

## 2018-10-24 PROBLEM — T14.91XA SUICIDE ATTEMPT (H): Status: RESOLVED | Noted: 2017-07-16 | Resolved: 2018-10-24

## 2018-10-24 PROBLEM — E66.3 OVERWEIGHT: Status: RESOLVED | Noted: 2018-01-23 | Resolved: 2018-10-24

## 2018-10-24 PROBLEM — N95.2 ATROPHIC VAGINITIS: Status: RESOLVED | Noted: 2017-01-31 | Resolved: 2018-10-24

## 2018-10-24 PROBLEM — F17.200 NICOTINE DEPENDENCE: Status: RESOLVED | Noted: 2018-01-23 | Resolved: 2018-10-24

## 2018-10-24 PROBLEM — K50.90 REGIONAL ENTERITIS (H): Status: RESOLVED | Noted: 2017-10-18 | Resolved: 2018-10-24

## 2018-10-24 NOTE — PROGRESS NOTES
Please call patient with result    Birgid: your biopsy was negative for cancer but positive for persistence of HPV.  COntinue to work on your smoking cessation and we will recheck a pap smear in 1 year.

## 2018-10-24 NOTE — TELEPHONE ENCOUNTER
Prior Authorization: Approved (Case ID: [id number])    Approved as of: 09/24/2018, through 10/24/2019    Valeria Govea October 24, 2018 at 2:32 PM

## 2018-10-24 NOTE — TELEPHONE ENCOUNTER
Message from Dr. Jason: We chose Latuda because it is first line for bipolar 2 depression (which is the diagnosis we will continue to work with). She has tried mood stabilizers (depakote, lamictial), antipsychotics (risperidone, quetiapine), and antidepressants (Wellbutrin).     Completed PA in CMM.

## 2018-10-26 ENCOUNTER — TELEPHONE (OUTPATIENT)
Dept: FAMILY MEDICINE | Facility: CLINIC | Age: 53
End: 2018-10-26

## 2018-10-26 NOTE — TELEPHONE ENCOUNTER
[facility] phone call message- general phone call:    Reason for call: Pt calling in requesting to speak to nurse.  was in ED today at [facility] and they informed her they suspect she has c. Diff. States she was referred ti Gastroenterology but she doesn't have time right now. States she wants to go and spend time with someone but wants to know if and how contagious it is. Please call and advise.     Return call needed: Yes    Primary language: English      needed? No    Call taken on October 26, 2018 at 3:55 PM by Celia Villarreal

## 2018-10-26 NOTE — TELEPHONE ENCOUNTER
Attempted to call patient, busy signal during call not able to leave VM. Will attempt again today. Basilio SMITH

## 2018-10-30 ENCOUNTER — OFFICE VISIT (OUTPATIENT)
Dept: FAMILY MEDICINE | Facility: CLINIC | Age: 53
End: 2018-10-30
Payer: COMMERCIAL

## 2018-10-30 VITALS
TEMPERATURE: 97.7 F | OXYGEN SATURATION: 96 % | HEIGHT: 62 IN | SYSTOLIC BLOOD PRESSURE: 117 MMHG | DIASTOLIC BLOOD PRESSURE: 78 MMHG | BODY MASS INDEX: 30.59 KG/M2 | WEIGHT: 166.2 LBS | RESPIRATION RATE: 18 BRPM | HEART RATE: 86 BPM

## 2018-10-30 DIAGNOSIS — R93.89 THICKENED ENDOMETRIUM: Primary | ICD-10-CM

## 2018-10-30 NOTE — MR AVS SNAPSHOT
"              After Visit Summary   10/30/2018    Mary Ann Olson    MRN: [id number]           Patient Information     Date Of Birth          1965        Visit Information        Provider Department      10/30/2018 11:00 AM Joanna Farah MD [facility]        Today's Diagnoses     Thickened endometrium    -  1      Care Instructions    Referral           Follow-ups after your visit        Additional Services     OB/GYN REFERRAL       Thickened endometrial strip, pelvic pain with intercourse.  Note some stenosis external os.                  Who to contact     Please call your clinic at [phone] to:    Ask questions about your health    Make or cancel appointments    Discuss your medicines    Learn about your test results    Speak to your doctor            Additional Information About Your Visit        Care EveryWhere ID     This is your Care EveryWhere ID. This could be used by other organizations to access your [facility] medical records  [id number]        Your Vitals Were     Pulse Temperature Respirations Height Pulse Oximetry BMI (Body Mass Index)    86 97.7  F (36.5  C) (Oral) 18 5' 1.5\" (156.2 cm) 96% 30.89 kg/m2       Blood Pressure from Last 3 Encounters:   10/30/18 117/78   10/15/18 137/81   10/01/18 116/82    Weight from Last 3 Encounters:   10/30/18 166 lb 3.2 oz (75.4 kg)   10/15/18 169 lb (76.7 kg)   09/25/18 172 lb 6.4 oz (78.2 kg)               Primary Care Provider Office Phone # Fax #    Joanna Farah [phone]       [facility] [address] [place] [address]        Equal Access to Services     [facility] AH: Hadii aad ku hadasho Soomaali, waaxda luqadaha, qaybta kaalmada adeegyada, waxay idiin hayjessican william tolliveraranelsy la'alda . So [facility] [phone].    ATENCIÓN: Si habla español, tiene a judge disposición servicios gratuitos de asistencia lingüística. Llame al [phone].    We comply with applicable federal civil rights laws and " [place] laws. We do not discriminate on the basis of race, color, national origin, age, disability, sex, sexual orientation, or gender identity.            Thank you!     Thank you for choosing [facility]  for your care. Our goal is always to provide you with excellent care. Hearing back from our patients is one way we can continue to improve our services. Please take a few minutes to complete the written survey that you may receive in the mail after your visit with us. Thank you!             Your Updated Medication List - Protect others around you: Learn how to safely use, store and throw away your medicines at www.disposemymeds.org.          This list is accurate as of 10/30/18 11:59 PM.  Always use your most recent med list.                   Brand Name Dispense Instructions for use Diagnosis    acetaminophen 325 MG tablet    TYLENOL    100 tablet    Take 2 tablets (650 mg) by mouth every 6 hours as needed    Cough       adalimumab 40 MG/0.8ML prefilled syringe kit    HUMIRA     Inject 40 mg Subcutaneous every 14 days        * albuterol 108 (90 Base) MCG/ACT inhaler    PROAIR HFA/PROVENTIL HFA/VENTOLIN HFA    18 g    Inhale 2 puffs into the lungs every 6 hours as needed for shortness of breath / dyspnea or wheezing    Moderate persistent asthma without complication       * albuterol (2.5 MG/3ML) 0.083% neb solution     90 vial    Take 1 vial (2.5 mg) by nebulization every 6 hours as needed for shortness of breath / dyspnea or wheezing    Cough       atorvastatin 40 MG tablet    LIPITOR    90 tablet    Take 1 tablet (40 mg) by mouth daily    Type 2 diabetes mellitus without complication, without long-term current use of insulin (H)       benzonatate 100 MG capsule    TESSALON    42 capsule    Take 1 capsule (100 mg) by mouth 3 times daily as needed for cough    Cough       bisacodyl 10 MG Suppository    DULCOLAX     Place 10 mg rectally daily as needed        blood glucose monitoring lancets     100  each    Use to test blood sugars 4 times daily or as directed.    Type 2 diabetes mellitus without complication, without long-term current use of insulin (H)       calcium carbonate 600 mg-vitamin D 400 units 600-400 MG-UNIT per tablet    calcium 600 + D    60 tablet    Take 1 tablet by mouth 2 times daily    Postmenopausal status       carBAMazepine 200 MG 12 hr tablet    TEGretol XR    30 tablet    Take 1 tablet (200 mg) by mouth daily    Mood disorder (H)       cetirizine 10 MG tablet    zyrTEC    90 tablet    Take 1 tablet (10 mg) by mouth every evening    Dander (animal) allergy       dextromethorphan 30 MG/5ML liquid    DELSYM    89 mL    Take 5 mLs (30 mg) by mouth 2 times daily    Cough       famotidine 40 MG tablet    PEPCID    90 tablet    Take 1 tablet (40 mg) by mouth At Bedtime    Gastroesophageal reflux disease, esophagitis presence not specified       guaiFENesin 200 MG Tabs tablet    ORGANIDIN    30 tablet    Take 2 tablets (400 mg) by mouth every 6 hours as needed for cough    COPD exacerbation (H)       hydrOXYzine 50 MG tablet    ATARAX    60 tablet    Take 0.5 tablets (25 mg) by mouth 2 times daily as needed    Anxiety       lurasidone 20 MG Tabs tablet    LATUDA    30 tablet    Take 1 tablet (20 mg) by mouth At Bedtime    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)       metFORMIN 500 MG tablet    GLUCOPHAGE    180 tablet    Take 2 tablets (1,000 mg) by mouth 2 times daily (with meals)    Type 2 diabetes mellitus without complication, without long-term current use of insulin (H)       mometasone-formoterol 100-5 MCG/ACT oral inhaler    DULERA    13 g    Inhale 2 puffs into the lungs 2 times daily    Chronic obstructive pulmonary disease with acute exacerbation (H)       montelukast 10 MG tablet    SINGULAIR    90 tablet    Take 1 tablet (10 mg) by mouth At Bedtime    Gastroesophageal reflux disease, esophagitis presence not specified       omeprazole 20 MG CR capsule    priLOSEC     90 capsule    Take 1 capsule (20 mg) by mouth daily    Gastroesophageal reflux disease, esophagitis presence not specified       order for DME     1 Box    Equipment being ordered: incontinence pads  Please fax to [facility]    Mixed stress and urge urinary incontinence       polyethylene glycol Packet    MIRALAX/GLYCOLAX     Take 17 g by mouth daily as needed for constipation        tiotropium 2.5 MCG/ACT inhalation aerosol    SPIRIVA RESPIMAT    12 g    Inhale 2 puffs into the lungs daily    Chronic obstructive pulmonary disease with acute exacerbation (H)       vitamin D2 30731 UNIT capsule    ERGOCALCIFEROL    12 capsule    Take 1 capsule (50,000 Units) by mouth once a week    Postmenopausal status       * Notice:  This list has 2 medication(s) that are the same as other medications prescribed for you. Read the directions carefully, and ask your doctor or other care provider to review them with you.
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
## 2018-10-30 NOTE — NURSING NOTE
10/29/2018 PCS Previsit Plan   DUE FOR:  Declined HM  Juan  Foot ex  Eye ref  MACR urine  AAP  ACT-given  HIV screen  Hep c screen  Mammogram  Colon/FIT  RODRIGO Martines    W/ girl friend Aditi today

## 2018-10-30 NOTE — PATIENT INSTRUCTIONS
Referral for OB/GYN along with OV notes and labs faxed to [facility]  [phone]  [phone]  Clinic will contact patient to schedule appointment.

## 2018-11-11 ENCOUNTER — TELEPHONE (OUTPATIENT)
Dept: FAMILY MEDICINE | Facility: CLINIC | Age: 53
End: 2018-11-11

## 2018-11-11 DIAGNOSIS — E11.9 TYPE 2 DIABETES MELLITUS WITHOUT COMPLICATION, WITHOUT LONG-TERM CURRENT USE OF INSULIN (H): ICD-10-CM

## 2018-11-11 DIAGNOSIS — E11.9 TYPE 2 DIABETES MELLITUS WITHOUT COMPLICATION, WITH LONG-TERM CURRENT USE OF INSULIN (H): Primary | ICD-10-CM

## 2018-11-11 DIAGNOSIS — Z79.4 TYPE 2 DIABETES MELLITUS WITHOUT COMPLICATION, WITH LONG-TERM CURRENT USE OF INSULIN (H): Primary | ICD-10-CM

## 2018-11-11 NOTE — TELEPHONE ENCOUNTER
Patient called on-call provider. States she misplaced her glucometer and hasn't checked blood sugars for 2 days. Also recently ran out of her metformin. Order sent to pharmacy for glucometer, test strips, and metformin refill.    Shanthi Diallo DO  [facility] Medicine Resident  Pager #[phone]

## 2018-12-11 DIAGNOSIS — F06.30 MOOD DISORDER AS LATE EFFECT OF TRAUMATIC BRAIN INJURY (H): ICD-10-CM

## 2018-12-11 DIAGNOSIS — S06.9XAS MOOD DISORDER AS LATE EFFECT OF TRAUMATIC BRAIN INJURY (H): ICD-10-CM

## 2018-12-11 NOTE — TELEPHONE ENCOUNTER
Message to physician: N/A  Date of last visit: 10/30/2018     Date of next visit if scheduled: Visit date not found      Last Comprehensive Metabolic Panel:  Creatinine   Date Value Ref Range Status   08/29/2014 0.69 0.52 - 1.04 mg/dL Final     GFR Estimate   Date Value Ref Range Status   08/29/2014 90 >60 mL/min/1.7m2 Final     Comment:     Non  GFR Calc       BP Readings from Last 3 Encounters:   10/30/18 117/78   10/15/18 137/81   10/01/18 116/82       Lab Results   Component Value Date    A1C 6.6 08/28/2018    A1C 6.3 07/16/2018    A1C 6.5 11/28/2017                Please complete refill and CLOSE ENCOUNTER.  Closing the encounter signifies the refill is complete.

## 2018-12-19 RX ORDER — LURASIDONE HYDROCHLORIDE 20 MG/1
20 TABLET, FILM COATED ORAL AT BEDTIME
Qty: 30 TABLET | Refills: 0 | Status: SHIPPED | OUTPATIENT
Start: 2018-12-19 | End: 2019-01-05

## 2018-12-21 DIAGNOSIS — Z78.0 POSTMENOPAUSAL STATUS: ICD-10-CM

## 2018-12-21 DIAGNOSIS — F39 MOOD DISORDER (H): ICD-10-CM

## 2018-12-26 ENCOUNTER — TELEPHONE (OUTPATIENT)
Dept: FAMILY MEDICINE | Facility: CLINIC | Age: 53
End: 2018-12-26

## 2018-12-26 DIAGNOSIS — F06.30 MOOD DISORDER AS LATE EFFECT OF TRAUMATIC BRAIN INJURY (H): ICD-10-CM

## 2018-12-26 DIAGNOSIS — S06.9XAS MOOD DISORDER AS LATE EFFECT OF TRAUMATIC BRAIN INJURY (H): ICD-10-CM

## 2018-12-26 NOTE — TELEPHONE ENCOUNTER
[facility] phone call message- general phone call:    Reason for call: Calling asking to speak to a Nurse regarding her blood sugar, she states it is high, has been at 230 since 11:58 am and has not gone down. Please call and advise.     Return call needed: Yes    OK to leave a message on voice mail?     Primary language: English      needed? No    Call taken on December 26, 2018 at 1:09 PM by Marshall Weeks

## 2018-12-26 NOTE — TELEPHONE ENCOUNTER
[facility] Medicine phone call message - order or referral request for patient:     Order or referral being requested: Looking for orders to start her on the latuda and discontinue the tegratol.       Additional Comments: Calling for order above. Mariajose states Patient doesn't want to take the Tegratol. Needs order fax if changed to . Please call and advise.    OK to leave a message on voice mail? Yes    Primary language: English      needed? No    Call taken on December 26, 2018 at 10:50 AM by Marshall Weeks

## 2018-12-26 NOTE — TELEPHONE ENCOUNTER
Spoke with Mary Ann regarding a blood glucose concern noted in other encounter. Mary Ann also wanted to know what time of day to take the Latuda. Advised Latuda may cause drowsiness, verbalized nighttime is best if she notices drowsiness after taking. Also advised to take with food to help with possible upset stomach. Mary Ann verbalized understanding. Basilio SMITH

## 2018-12-27 RX ORDER — CARBAMAZEPINE 200 MG/1
200 TABLET, EXTENDED RELEASE ORAL DAILY
Qty: 30 TABLET | Refills: 1 | Status: SHIPPED | OUTPATIENT
Start: 2018-12-27 | End: 2019-01-09

## 2019-01-03 ENCOUNTER — TELEPHONE (OUTPATIENT)
Dept: FAMILY MEDICINE | Facility: CLINIC | Age: 54
End: 2019-01-03

## 2019-01-03 NOTE — TELEPHONE ENCOUNTER
Calling back regarding message from 12/26, still waiting on the order to start latuda and discontinue tegratol. Please call and advise.

## 2019-01-05 RX ORDER — CARBAMAZEPINE 200 MG/1
200 TABLET, EXTENDED RELEASE ORAL 2 TIMES DAILY
Qty: 180 TABLET | Refills: 3 | Status: SHIPPED | OUTPATIENT
Start: 2019-01-05 | End: 2019-01-09

## 2019-01-05 RX ORDER — LURASIDONE HYDROCHLORIDE 20 MG/1
20 TABLET, FILM COATED ORAL AT BEDTIME
Qty: 60 TABLET | Refills: 0 | Status: SHIPPED | OUTPATIENT
Start: 2019-01-05 | End: 2019-03-15

## 2019-01-05 NOTE — TELEPHONE ENCOUNTER
Patient should continue Latuda 20mg daily.  She should continue her Tegretol 200mg BID.  Called and left message with nurse for [employer]

## 2019-01-07 ENCOUNTER — TELEPHONE (OUTPATIENT)
Dept: FAMILY MEDICINE | Facility: CLINIC | Age: 54
End: 2019-01-07

## 2019-01-07 NOTE — TELEPHONE ENCOUNTER
Patient called, left a voice mail to call her back on her cell phone and not the home phone regarding her medications. Please call and advise.

## 2019-01-07 NOTE — TELEPHONE ENCOUNTER
Called Mary Ann to discuss. Unaaravind stated she is not going to take tegretol and will refuse it when offered. Wants another alternative from PCP. Will route to PCP for review, Mary Ann coming in 01/14 but wants this taken care of before then. Basilio SMITH

## 2019-01-09 NOTE — TELEPHONE ENCOUNTER
Please pass along this physician order:  -Patient should continue Latuda as prescribed:       20mg oral daily (currently taken at bedtime)  -Patient may discontinue Tegretol.  We will reassess at next clinic visit    Thank you.      Joanna Farah

## 2019-01-09 NOTE — TELEPHONE ENCOUNTER
Faxed order to discontinue to Mariajose at [employer] to fax # [phone]. Basilio SMITH

## 2019-01-14 ENCOUNTER — OFFICE VISIT (OUTPATIENT)
Dept: FAMILY MEDICINE | Facility: CLINIC | Age: 54
End: 2019-01-14
Payer: COMMERCIAL

## 2019-01-14 ENCOUNTER — RECORDS - HEALTHEAST (OUTPATIENT)
Dept: ADMINISTRATIVE | Facility: OTHER | Age: 54
End: 2019-01-14

## 2019-01-14 VITALS
HEART RATE: 86 BPM | DIASTOLIC BLOOD PRESSURE: 85 MMHG | TEMPERATURE: 98.5 F | HEIGHT: 61 IN | BODY MASS INDEX: 32.06 KG/M2 | SYSTOLIC BLOOD PRESSURE: 125 MMHG | OXYGEN SATURATION: 100 % | RESPIRATION RATE: 18 BRPM | WEIGHT: 169.8 LBS

## 2019-01-14 DIAGNOSIS — K59.04 CHRONIC IDIOPATHIC CONSTIPATION: Primary | ICD-10-CM

## 2019-01-14 DIAGNOSIS — Z00.00 PREVENTATIVE HEALTH CARE: ICD-10-CM

## 2019-01-14 DIAGNOSIS — T78.40XA ALLERGIC REACTION, INITIAL ENCOUNTER: ICD-10-CM

## 2019-01-14 RX ORDER — DOCUSATE SODIUM 100 MG/1
100 CAPSULE, LIQUID FILLED ORAL 2 TIMES DAILY
Qty: 180 CAPSULE | Refills: 0 | Status: SHIPPED | OUTPATIENT
Start: 2019-01-14 | End: 2019-01-14

## 2019-01-14 RX ORDER — DOCUSATE SODIUM 100 MG/1
100 CAPSULE, LIQUID FILLED ORAL 2 TIMES DAILY PRN
Qty: 180 CAPSULE | Refills: 0 | Status: SHIPPED | OUTPATIENT
Start: 2019-01-14 | End: 2019-09-30

## 2019-01-14 ASSESSMENT — MIFFLIN-ST. JEOR: SCORE: 1307.97
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
## 2019-01-14 NOTE — LETTER
February 19, 2019      Mary Ann Olson  [address]   [facility] [address]        Dear Mary Ann,  The testing we did for allergies last month showed a moderate response to hazlenuts.  I would avoid these nuts for now.  You can go to an allergist to further understand your possible nut allergy.  Until then I can send an epi-pen for severe reactions if they occur.     Please see below for your test results.    Resulted Orders   Misc. Wild Test ([facility])   Result Value Ref Range    Department Chemistry Reference Test     Note: Allergens, Food, Nuts      Performing Lab SEE RESULTS BELOW       Comment:      [facility]  [address] [place] [address]      Scan Result See [facility] Miscellaneous Test for results     Narrative    Test performed by:  [facility]ORAT[facility]  [address] [place] [address]   [facility] Miscellaneous Test ([facility])   Result Value Ref Range    [facility] SEE [facility]       Comment:      Test name                     Result Flag Units  RefIntvl    -------------------------------------------------------------  Allergen, Food, [place] IgE     <0.10        kU/L <=0.34  Allergen, Food, [place] Nut IgE                                 <0.10        kU/L <=0.34      Allergen, Food, Cashew IgE     <0.10        kU/L <=0.34     Allergen, Food, Hazelnut IgE    1.01 H      kU/L <=0.34     Allergen, Food, Peanut IgE     <0.10        kU/L <=0.34     Allergen, Food, Pecan IgE      <0.10        kU/L <=0.34     Allergen, Food, [place] IgE   <0.10        kU/L <=0.34     Allergen, Food, [place] (Juglans spp) IgE                                 <0.10        kU/L <=0.34      Allergen, Interp, Immunocap Score IgE                              See Note                         REFERENCE INTERVAL: Allergen, Interpretation      Less than 0.10 kU/L......Class 0.....No significant level detected   0.10-0.34 kU/L...........Class 0/1...Clinical relevance   undetermined   0.35-0.70  kU/L...........Class   1.....Low   0.71-3.50 kU/L...........Class 2.....Moderate   3.51-17.50 kU/L..........Class 3.....High   17.51-50.00 kU/L.........Class 4.....Very High   50..00 kU/L........Class 5.....Very High   Greater than 100.00kU/L..Class 6.....Very High     Allergen results of 0.10-0.34 kU/L are intended for specialist use   as the clinical relevance is undetermined. Even though increasing   ranges are reflective of increasing concentrations of   allergen-specific IgE, these concentrations may not correlate with   the degree of clinical response or skin testing results when   challenged with a specific allergen. The correlation of allergy   laboratory results with clinical history and in vivo reactivity to   specific allergens is essential. A negative test may not rule out   clinical allergy or even anaphylaxis.  Performed by [facility],  [address] [place] [address] [id number] [phone]  [email or web address], Ian Kemp MD, Lab. Director      Narrative    Test performed by:  [email or web address]  [address] [place] [id number]       If you have any questions, please call the clinic to make an appointment.    Sincerely,    Joanna Farah MD

## 2019-01-14 NOTE — LETTER
January 14, 2019      Mary Ann Olson  [address]   [facility] [address]      Dear Mary Ann,    To whom it may concern, Karli is no longer taking Tegretol or any allergy pill.      Sincerely,    Joanna Farah MD

## 2019-01-15 LAB — ARUP MISCELLANEOUS TEST: ABNORMAL

## 2019-01-16 LAB
DEPARTMENT: NORMAL
PERFORMING LAB: NORMAL
SCAN RESULT: NORMAL
SEE NOTE: NORMAL

## 2019-01-22 ENCOUNTER — MEDICAL CORRESPONDENCE (OUTPATIENT)
Dept: HEALTH INFORMATION MANAGEMENT | Facility: CLINIC | Age: 54
End: 2019-01-22

## 2019-01-24 ENCOUNTER — TELEPHONE (OUTPATIENT)
Dept: FAMILY MEDICINE | Facility: CLINIC | Age: 54
End: 2019-01-24

## 2019-01-24 DIAGNOSIS — R06.02 SHORTNESS OF BREATH: ICD-10-CM

## 2019-01-24 DIAGNOSIS — R04.2 HEMOPTYSIS: Primary | ICD-10-CM

## 2019-01-25 NOTE — TELEPHONE ENCOUNTER
"Patient called on-call provider because she is having difficulty breathing. She states she has been going into coughing spells and she began coughing up blood. It started as streaks of blood but now it has become thicker which is scary to her. She has been sick \"for awhile\" with nasal and sinus congestion. She is unsure if she has associated fevers. She received her flu shot this year. While on the phone, patient is noted to have audible increased respiratory effort with expiratory wheezing. She is speaking in short phrases. She has history of COPD and states her inhalers haven't been helping her dyspnea. She continues to smoke. Discussed with patient that she should be evaluated and patient agreed to go to the ED for further workup.     Shanthi Diallo DO  [facility] Resident  Pager #[phone]    "

## 2019-01-25 NOTE — TELEPHONE ENCOUNTER
Reviewed.      PV yellow pool.  Please call patient to check on status.    She can be seen in clinic if she did not go to ED.

## 2019-01-28 ENCOUNTER — OFFICE VISIT (OUTPATIENT)
Dept: FAMILY MEDICINE | Facility: CLINIC | Age: 54
End: 2019-01-28
Payer: COMMERCIAL

## 2019-01-28 VITALS
DIASTOLIC BLOOD PRESSURE: 80 MMHG | HEART RATE: 90 BPM | BODY MASS INDEX: 32.02 KG/M2 | OXYGEN SATURATION: 96 % | TEMPERATURE: 98.1 F | HEIGHT: 61 IN | RESPIRATION RATE: 18 BRPM | SYSTOLIC BLOOD PRESSURE: 123 MMHG | WEIGHT: 169.6 LBS

## 2019-01-28 DIAGNOSIS — J45.40 MODERATE PERSISTENT ASTHMA WITHOUT COMPLICATION: ICD-10-CM

## 2019-01-28 RX ORDER — ALBUTEROL SULFATE 90 UG/1
2 AEROSOL, METERED RESPIRATORY (INHALATION) EVERY 6 HOURS PRN
Qty: 18 G | Refills: 0 | Status: SHIPPED | OUTPATIENT
Start: 2019-01-28 | End: 2019-01-31

## 2019-01-28 ASSESSMENT — MIFFLIN-ST. JEOR: SCORE: 1306.68

## 2019-01-28 NOTE — NURSING NOTE
1/25/2019 PCS Previsit Plan   Discuss with MD  DUE FOR:  Declined HM many times in the past  RODRIGO Martines

## 2019-01-29 NOTE — PROGRESS NOTES
53-year-old female who presents in follow-up of a asthma exacerbation diagnosed in the emergency department on January 24, 2019.    She reports a week of cough, and developed blood-streaked sputum last Thursday which prompted her emergency visit the.  She noted at the time of occasional nosebleeds which lasted less than a minute.  She was coughing frequently and coughing up sputum.  She was evaluated in the emergency department with a chest x-ray which did not show an infiltrate.  A negative d-dimer.  A negative pro calcitonin.  And a normal basic metabolic profile.    She was discharged from the emergency department with a prescription for prednisone 40 mg x 5 days and instructions to use albuterol inhaler as needed.  She has been using and them butyryl inhaler until yesterday when she lost her inhaler.    Her cough has significantly improved since last Thursday.  She has an occasional nonproductive cough.  She has not had blood streaked sputum in over 2 days.  Her shortness of breath has resolved.  No fevers or chills.    Review of systems: No nosebleeds in the last 3 days.  Eating a normal diet.  Blood glucoses have been in the 160s 170s since starting prednisone.  No polyuria    Exam  Vitals are reviewed and are normal, with normal oxygen saturations  General: Alert and in no distress  HEENT: Eyes sclera nonicteric noninjected.  Nose shows slightly swollen inferior nasal turbinates.  No tonsillar hypertrophy or exudate.  Moist mucous membranes.  No cervical adenopathy  Cardiovascular: Regular rate and rhythm without murmur  Respiratory: Lungs clear bilaterally  Extremities are warm    Assessment and plan  1) asthma exacerbation: Significantly improved after course of prednisone.  I have refilled her albuterol inhaler she may use 2 puffs 3 times daily scheduled until her cough resolves, then back to as needed.  Recommend smoking cessation    2) Type 2 diabetes: I anticipate her blood glucoses will reduce as she  completes her prednisone course tomorrow.  I filled out a [facility] food assistance form for her stating the benefits of a diabetic diet

## 2019-01-31 DIAGNOSIS — J45.40 MODERATE PERSISTENT ASTHMA WITHOUT COMPLICATION: ICD-10-CM

## 2019-01-31 RX ORDER — ALBUTEROL SULFATE 90 UG/1
2 AEROSOL, METERED RESPIRATORY (INHALATION) EVERY 6 HOURS PRN
Qty: 18 G | Refills: 0 | Status: SHIPPED | OUTPATIENT
Start: 2019-01-31 | End: 2019-02-11

## 2019-01-31 NOTE — TELEPHONE ENCOUNTER
Patient is calling to see if anyone can refill medication for her as she had lost this medication and need it today. She did ask if Dr. Farah is in today and told her that she is not. Patient wondering if any other doctor is able to do it as it's urgent, told her I am not sure but will take a message. Please call and advise.

## 2019-02-08 DIAGNOSIS — K21.9 GASTROESOPHAGEAL REFLUX DISEASE, ESOPHAGITIS PRESENCE NOT SPECIFIED: ICD-10-CM

## 2019-02-09 LAB — HBA1C MFR BLD: 6.7 % (ref 0–5.6)

## 2019-02-11 ENCOUNTER — OFFICE VISIT (OUTPATIENT)
Dept: FAMILY MEDICINE | Facility: CLINIC | Age: 54
End: 2019-02-11
Payer: COMMERCIAL

## 2019-02-11 ENCOUNTER — COMMUNICATION - HEALTHEAST (OUTPATIENT)
Dept: PULMONOLOGY | Facility: OTHER | Age: 54
End: 2019-02-11

## 2019-02-11 ENCOUNTER — TELEPHONE (OUTPATIENT)
Dept: FAMILY MEDICINE | Facility: CLINIC | Age: 54
End: 2019-02-11

## 2019-02-11 VITALS
HEIGHT: 61 IN | RESPIRATION RATE: 20 BRPM | DIASTOLIC BLOOD PRESSURE: 79 MMHG | SYSTOLIC BLOOD PRESSURE: 121 MMHG | HEART RATE: 103 BPM | BODY MASS INDEX: 32.21 KG/M2 | WEIGHT: 170.6 LBS | TEMPERATURE: 98.5 F | OXYGEN SATURATION: 97 %

## 2019-02-11 DIAGNOSIS — J45.40 MODERATE PERSISTENT ASTHMA WITHOUT COMPLICATION: ICD-10-CM

## 2019-02-11 DIAGNOSIS — R30.0 DYSURIA: Primary | ICD-10-CM

## 2019-02-11 DIAGNOSIS — F06.30 MOOD DISORDER AS LATE EFFECT OF TRAUMATIC BRAIN INJURY (H): ICD-10-CM

## 2019-02-11 DIAGNOSIS — K21.9 GASTROESOPHAGEAL REFLUX DISEASE, ESOPHAGITIS PRESENCE NOT SPECIFIED: ICD-10-CM

## 2019-02-11 DIAGNOSIS — Z71.6 ENCOUNTER FOR TOBACCO USE CESSATION COUNSELING: ICD-10-CM

## 2019-02-11 DIAGNOSIS — F41.9 ANXIETY: ICD-10-CM

## 2019-02-11 DIAGNOSIS — Z78.0 POSTMENOPAUSAL STATUS: ICD-10-CM

## 2019-02-11 DIAGNOSIS — S06.9XAS MOOD DISORDER AS LATE EFFECT OF TRAUMATIC BRAIN INJURY (H): ICD-10-CM

## 2019-02-11 LAB
BILIRUBIN UR: NEGATIVE
BLOOD UR: NEGATIVE
GLUCOSE URINE: NEGATIVE
KETONES UR QL: NEGATIVE
LEUKOCYTE ESTERASE UR: ABNORMAL
NITRITE UR QL STRIP: NEGATIVE
PH UR STRIP: 5.5 [PH] (ref 5–7)
PROTEIN UR: NEGATIVE
SP GR UR STRIP: 1.01
UROBILINOGEN UR STRIP-ACNC: ABNORMAL

## 2019-02-11 RX ORDER — HYDROXYZINE HYDROCHLORIDE 50 MG/1
25 TABLET, FILM COATED ORAL 2 TIMES DAILY PRN
Qty: 60 TABLET | Refills: 0 | Status: SHIPPED | OUTPATIENT
Start: 2019-02-11 | End: 2019-04-16

## 2019-02-11 RX ORDER — ALBUTEROL SULFATE 90 UG/1
2 AEROSOL, METERED RESPIRATORY (INHALATION) EVERY 6 HOURS PRN
Qty: 18 G | Refills: 0 | Status: SHIPPED | OUTPATIENT
Start: 2019-02-11 | End: 2019-03-26

## 2019-02-11 RX ORDER — ERGOCALCIFEROL 1.25 MG/1
50000 CAPSULE, LIQUID FILLED ORAL WEEKLY
Qty: 12 CAPSULE | Refills: 3 | Status: SHIPPED | OUTPATIENT
Start: 2019-02-11 | End: 2019-09-04

## 2019-02-11 RX ORDER — FAMOTIDINE 40 MG/1
40 TABLET, FILM COATED ORAL AT BEDTIME
Qty: 90 TABLET | Refills: 1 | Status: SHIPPED | OUTPATIENT
Start: 2019-02-11 | End: 2019-08-26

## 2019-02-11 RX ORDER — MONTELUKAST SODIUM 10 MG/1
10 TABLET ORAL AT BEDTIME
Qty: 90 TABLET | Refills: 1 | Status: SHIPPED | OUTPATIENT
Start: 2019-02-11 | End: 2019-03-06

## 2019-02-11 ASSESSMENT — MIFFLIN-ST. JEOR: SCORE: 1311.22

## 2019-02-11 NOTE — LETTER
February 13, 2019      Mary Ann Olson  [address]   [facility] [address]        Dear Mary Ann,    Please see below for your test results.  Your urine tests were normal    Resulted Orders   Urinalysis (P )   Result Value Ref Range    Specific Gravity Urine 1.010 1.005 - 1.030    pH Urine 5.5 4.5 - 8.0    Leukocyte Esterase UR 2+ (A) NEGATIVE    Nitrite Urine Negative NEGATIVE    Protein UR Negative NEGATIVE    Glucose Urine Negative NEGATIVE    Ketones Urine Negative NEGATIVE    Urobilinogen mg/dL 0.2 E.U./dL 0.2 E.U./dL    Bilirubin UR Negative NEGATIVE    Blood UR Negative NEGATIVE   Urine Culture ([facility])   Result Value Ref Range    Culture SEE RESULTS BELOW       Comment:      CULTURE, URINE   SOURCE: Urine, Clean Catch   CULTURE RESULTS:    No Growth      Narrative    Test performed by:  [facility]  [address] [place] [address]       If you have any questions, please call the clinic to make an appointment.    Sincerely,    Joanna Farah MD
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
## 2019-02-11 NOTE — TELEPHONE ENCOUNTER
[facility] Family Medicine phone call message-patient reporting a symptom:     Symptom: Dizzy spells    Same Day Visit Offered: 02/11/19 at 2:20PM    Additional comments: Patient states she is having dizzy spells. When she checked her blood sugar this morning it was at 70, she then had half a cup of orange juice and her blood sugar went up to 90. She states was told to eat something by a nurse. She states she ate something but still feels tired and dizzy. She states she was not able to do her laundry since she's having dizzy spells. She states she had an appointment already scheduled for today with Dr. Farah in the afternoon and would like a call back from a nurse to see what is recommended in the mean time. Please call and advise.     OK to leave message on voice mail? Yes    Primary language: English      needed? No    Call taken on February 11, 2019 at 8:59 AM by Sayra Mueller

## 2019-02-12 LAB — CULTURE: NORMAL

## 2019-02-16 DIAGNOSIS — T78.40XA ALLERGIC REACTION, INITIAL ENCOUNTER: Primary | ICD-10-CM

## 2019-02-16 RX ORDER — EPINEPHRINE 0.3 MG/.3ML
0.3 INJECTION SUBCUTANEOUS PRN
Qty: 0.6 ML | Refills: 0 | Status: SHIPPED | OUTPATIENT
Start: 2019-02-16 | End: 2019-09-30

## 2019-02-19 ENCOUNTER — OFFICE VISIT (OUTPATIENT)
Dept: FAMILY MEDICINE | Facility: CLINIC | Age: 54
End: 2019-02-19
Payer: COMMERCIAL

## 2019-02-19 VITALS
TEMPERATURE: 98.3 F | BODY MASS INDEX: 32.89 KG/M2 | HEIGHT: 61 IN | HEART RATE: 90 BPM | SYSTOLIC BLOOD PRESSURE: 128 MMHG | RESPIRATION RATE: 20 BRPM | OXYGEN SATURATION: 97 % | WEIGHT: 174.2 LBS | DIASTOLIC BLOOD PRESSURE: 81 MMHG

## 2019-02-19 DIAGNOSIS — J45.901 PERSISTENT ASTHMA WITH ACUTE EXACERBATION, UNSPECIFIED ASTHMA SEVERITY: Primary | ICD-10-CM

## 2019-02-19 DIAGNOSIS — F17.200 TOBACCO USE DISORDER: ICD-10-CM

## 2019-02-19 ASSESSMENT — MIFFLIN-ST. JEOR: SCORE: 1327.55
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
## 2019-02-28 ENCOUNTER — TELEPHONE (OUTPATIENT)
Dept: FAMILY MEDICINE | Facility: CLINIC | Age: 54
End: 2019-02-28

## 2019-02-28 NOTE — TELEPHONE ENCOUNTER
[facility] Medicine phone call message- medication clarification/question:    Full Medication Name: Zyrtec   Strength: 10 mg tablet take 1 every evening    Have you contacted your pharmacy about this refill request?     If  Yes,  which pharmacy?    When did you contact the pharmacy?    Additional comments/concerns from call to pharmacy:    Reason for call to clinic: Calling to check if this is a current medication for Patient. Told her it could take up to two business days for a response. Please call and advise.       OK to leave a message on voice mail? Yes    Primary language: English      needed? No    Call taken on February 28, 2019 at 3:22 PM by Marshall Weeks

## 2019-03-01 NOTE — TELEPHONE ENCOUNTER
Patient was discontinued on 01/14/2019 from ztec. Mariajose stated they received another prescription from pharmacy, and need another order to discontinue the medication. Will route to PCP for review. Basilio SMITH

## 2019-03-01 NOTE — TELEPHONE ENCOUNTER
[facility] phone call message- medication clarification/question:    Full Medication Name: montelukast (SINGULAIR)    Dose: 10 MG tablet    Question: Caller states she needs a discontinued order for the medication listed above. She states a new order was placed on 2/11/19 and needs a discontinued order. Please call and advise.     Pharmacy confirmed as Data Unavailable: No    OK to leave a message on voice mail? Yes    Primary language: English      needed? No    Call taken on March 1, 2019 at 10:29 AM by Sayra Mueller

## 2019-03-04 RX ORDER — LURASIDONE HYDROCHLORIDE 20 MG/1
20 TABLET, FILM COATED ORAL AT BEDTIME
Qty: 60 TABLET | Refills: 0 | Status: CANCELLED | OUTPATIENT
Start: 2019-03-04

## 2019-03-04 RX ORDER — CETIRIZINE HYDROCHLORIDE 10 MG/1
10 TABLET ORAL DAILY
COMMUNITY
End: 2019-03-06

## 2019-03-04 NOTE — TELEPHONE ENCOUNTER
Message to physician:     Date of last visit: 1/28/2019     Date of next visit if scheduled: 2/11/2019       Last Comprehensive Metabolic Panel:  Creatinine   Date Value Ref Range Status   08/29/2014 0.69 0.52 - 1.04 mg/dL Final     GFR Estimate   Date Value Ref Range Status   08/29/2014 90 >60 mL/min/1.7m2 Final     Comment:     Non  GFR Calc       BP Readings from Last 3 Encounters:   02/19/19 128/81   02/11/19 121/79   01/28/19 123/80       Lab Results   Component Value Date    A1C 6.6 08/28/2018    A1C 6.3 07/16/2018    A1C 6.5 11/28/2017                Please complete refill and CLOSE ENCOUNTER.  Closing the encounter signifies the refill is complete.

## 2019-03-05 ENCOUNTER — TELEPHONE (OUTPATIENT)
Dept: FAMILY MEDICINE | Facility: CLINIC | Age: 54
End: 2019-03-05

## 2019-03-05 NOTE — TELEPHONE ENCOUNTER
[facility] phone call message- medication clarification/question:    Full Medication Name: Montelukast   Dose: 10mg    Question: Needs an order to discontinue this per the patient's request.  Send it to fax number [phone]    Pharmacy confirmed as Data Unavailable: Yes    OK to leave a message on voice mail? Yes    Primary language: English      needed? No    Call taken on March 5, 2019 at 8:44 AM by Xiomara Willingham

## 2019-03-07 ENCOUNTER — COMMUNICATION - HEALTHEAST (OUTPATIENT)
Dept: RESPIRATORY THERAPY | Facility: HOSPITAL | Age: 54
End: 2019-03-07

## 2019-03-07 ENCOUNTER — COMMUNICATION - HEALTHEAST (OUTPATIENT)
Dept: FAMILY MEDICINE | Facility: CLINIC | Age: 54
End: 2019-03-07

## 2019-03-07 ENCOUNTER — TELEPHONE (OUTPATIENT)
Dept: FAMILY MEDICINE | Facility: CLINIC | Age: 54
End: 2019-03-07

## 2019-03-07 DIAGNOSIS — E11.9 TYPE 2 DIABETES MELLITUS WITHOUT COMPLICATION, WITH LONG-TERM CURRENT USE OF INSULIN (H): ICD-10-CM

## 2019-03-07 DIAGNOSIS — Z79.4 TYPE 2 DIABETES MELLITUS WITHOUT COMPLICATION, WITH LONG-TERM CURRENT USE OF INSULIN (H): ICD-10-CM

## 2019-03-07 NOTE — TELEPHONE ENCOUNTER
Date of discharge: 03/06/2019  Facility of discharge: [facility]  Patient concerns about condition: No concerns at this time.  Patient concerns about medications: No concerns at this time.  Full med reconciliation will be completed at clinic visit.  Patient concerns about transitioning: No concerns at this time.  Clinic office visit appointment date: 03/12/2019  Patient reminded to bring all medications (prescription and over-the-counter) to clinic appointment: Yes    Using the date of discharge as day 1, the 30th day post discharge is 04/06/2019.

## 2019-03-07 NOTE — TELEPHONE ENCOUNTER
Printed discontinued order from Dr Farah and current medication list as of 3/6/2019 to [phone]. Pasha RN

## 2019-03-08 ENCOUNTER — COMMUNICATION - HEALTHEAST (OUTPATIENT)
Dept: RESPIRATORY THERAPY | Facility: HOSPITAL | Age: 54
End: 2019-03-08

## 2019-03-13 ENCOUNTER — COMMUNICATION - HEALTHEAST (OUTPATIENT)
Dept: RESPIRATORY THERAPY | Facility: HOSPITAL | Age: 54
End: 2019-03-13

## 2019-03-15 ENCOUNTER — COMMUNICATION - HEALTHEAST (OUTPATIENT)
Dept: RESPIRATORY THERAPY | Facility: HOSPITAL | Age: 54
End: 2019-03-15

## 2019-03-15 ENCOUNTER — OFFICE VISIT (OUTPATIENT)
Dept: PHARMACY | Facility: CLINIC | Age: 54
End: 2019-03-15
Payer: COMMERCIAL

## 2019-03-15 ENCOUNTER — OFFICE VISIT (OUTPATIENT)
Dept: FAMILY MEDICINE | Facility: CLINIC | Age: 54
End: 2019-03-15
Payer: COMMERCIAL

## 2019-03-15 VITALS
HEIGHT: 61 IN | HEART RATE: 99 BPM | WEIGHT: 171 LBS | OXYGEN SATURATION: 96 % | RESPIRATION RATE: 22 BRPM | DIASTOLIC BLOOD PRESSURE: 81 MMHG | TEMPERATURE: 98 F | BODY MASS INDEX: 32.28 KG/M2 | SYSTOLIC BLOOD PRESSURE: 139 MMHG

## 2019-03-15 DIAGNOSIS — J44.9 COPD (CHRONIC OBSTRUCTIVE PULMONARY DISEASE) (H): ICD-10-CM

## 2019-03-15 DIAGNOSIS — J41.0 SIMPLE CHRONIC BRONCHITIS (H): ICD-10-CM

## 2019-03-15 DIAGNOSIS — F31.81 BIPOLAR II DISORDER (H): ICD-10-CM

## 2019-03-15 DIAGNOSIS — R05.9 COUGH: ICD-10-CM

## 2019-03-15 DIAGNOSIS — F41.9 ANXIETY: ICD-10-CM

## 2019-03-15 DIAGNOSIS — E11.9 TYPE 2 DIABETES MELLITUS WITHOUT COMPLICATION, WITHOUT LONG-TERM CURRENT USE OF INSULIN (H): ICD-10-CM

## 2019-03-15 DIAGNOSIS — E55.9 HYPOVITAMINOSIS D: Primary | ICD-10-CM

## 2019-03-15 DIAGNOSIS — Z71.89 ENCOUNTER FOR MEDICATION REVIEW AND COUNSELING: Primary | ICD-10-CM

## 2019-03-15 DIAGNOSIS — R79.89 LOW VITAMIN D LEVEL: ICD-10-CM

## 2019-03-15 RX ORDER — ACETAMINOPHEN 500 MG
1000 TABLET ORAL EVERY 6 HOURS PRN
Qty: 100 TABLET | Refills: 1 | Status: SHIPPED | OUTPATIENT
Start: 2019-03-15 | End: 2020-03-30

## 2019-03-15 ASSESSMENT — MIFFLIN-ST. JEOR: SCORE: 1313.41
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
## 2019-03-15 NOTE — PATIENT INSTRUCTIONS
Call and schedule appointment with Rheumatology    Call Valeria on 3/20/19:  -tell her how many times you missed your Dulera    Keep up the good work with your smoking cessation     If you notice your breathing is getting bad you can double your Dulera for 3-5 days until things calm down.
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
## 2019-03-16 ENCOUNTER — TELEPHONE (OUTPATIENT)
Dept: FAMILY MEDICINE | Facility: CLINIC | Age: 54
End: 2019-03-16

## 2019-03-16 NOTE — TELEPHONE ENCOUNTER
After hours clinic call.     Patient calling stating her facility is refusing to give her her metformin because she was too late to go get her medications this morning. She is wondering if she is able to take legal action.    I discussed with patient that I am a medical professional and am unable to assess this issue. I advised her to try to get to her medications on time next time. She threatened to leave the facility and I encouraged her to stay as it seems she was otherwise receiving good care there. I advised that she talk to her  which she will do about this issue.    Nova Thomas MD ([id number])  Pager: [phone]  [facility] Resident

## 2019-03-19 ENCOUNTER — COMMUNICATION - HEALTHEAST (OUTPATIENT)
Dept: RESPIRATORY THERAPY | Facility: HOSPITAL | Age: 54
End: 2019-03-19

## 2019-03-19 LAB — 25(OH)D3 SERPL-MCNC: 43.3 NG/ML (ref 30–80)

## 2019-03-21 ENCOUNTER — TELEPHONE (OUTPATIENT)
Dept: FAMILY MEDICINE | Facility: CLINIC | Age: 54
End: 2019-03-21

## 2019-03-21 NOTE — TELEPHONE ENCOUNTER
Date of discharge: 03/21/2019  Facility of discharge: [facility]  Patient concerns about condition: No concerns at this time.  Patient concerns about medications: No concerns at this time.  Full med reconciliation will be completed at clinic visit.  Patient concerns about transitioning: No concerns at this time.  Clinic office visit appointment date: 03/25/2019  Patient reminded to bring all medications (prescription and over-the-counter) to clinic appointment: Yes    Using the date of discharge as day 1, the 30th day post discharge is 04/21/2019.

## 2019-03-22 ENCOUNTER — COMMUNICATION - HEALTHEAST (OUTPATIENT)
Dept: RESPIRATORY THERAPY | Facility: HOSPITAL | Age: 54
End: 2019-03-22

## 2019-03-25 ENCOUNTER — COMMUNICATION - HEALTHEAST (OUTPATIENT)
Dept: RESPIRATORY THERAPY | Facility: HOSPITAL | Age: 54
End: 2019-03-25

## 2019-03-25 ENCOUNTER — TELEPHONE (OUTPATIENT)
Dept: FAMILY MEDICINE | Facility: CLINIC | Age: 54
End: 2019-03-25

## 2019-03-25 PROBLEM — F41.9 ANXIETY: Status: ACTIVE | Noted: 2019-03-25

## 2019-03-25 PROBLEM — J41.0 SIMPLE CHRONIC BRONCHITIS (H): Status: ACTIVE | Noted: 2019-03-25

## 2019-03-25 NOTE — TELEPHONE ENCOUNTER
Mary Ann was seen in ED 3/23/2019 for shortness of breath, also given dx of UTI. Prescribed Augmentin and Prednisone course. Started both Augmentin and Prednisone 3/24/2019. C/o generalized moderate to severe itching throughout body last night, few hours after had taken 2nd dose yesterday- full day dose. Itching relived on its own. Denies use of new products- body lotion,laundry detergent, shampoo or soaps. No apparent rash. No changes in breathing,denies throat irritation/itching or tightening. No previous allergic reaction to penicillin, does have multiple medications listed in drug allergy list. She has taken first dose of Augmentin today about 30 mins ago and has noticed any itching yet. No appts available in clinic for today. Barrier if there were clinic appts available, no transportation. Will send encounter to urgent task physician to review and further advise/give recommendations. Thank you for your help Dr Gonzales. Pasha SMITH

## 2019-03-25 NOTE — TELEPHONE ENCOUNTER
Sergo Gonzales MD Lee, Renette, RN   Caller: Unspecified (Today, 10:40 AM)             I reviewed the patient's chart and her urine culture from the ED did not grow bacteria - if her problems persist I would recommend stopping the Augmentin, otherwise finish the Augmentin and Prednisone as instructed from the ED. I do not believe this would be from the Prednisone as she has tolerated this many times in the past.      Elena informed of above information, she will stop taking Augmentin to see if she continues symptom of itchiness. Pasha SMITH

## 2019-03-25 NOTE — TELEPHONE ENCOUNTER
[facility] phone call message-patient reporting a symptom:     Symptom: itchy    Same Day Visit Offered: Yes, but Patient cancel due to no ride scheduled    Additional comments: Patient request to take a message for a Nurse to call her regarding symptom above. She thinks it's an allergic reaction to medication. Please call and advise.     OK to leave message on voice mail?     Primary language: English      needed? No    Call taken on March 25, 2019 at 10:40 AM by Marshall Weeks

## 2019-03-29 ENCOUNTER — DOCUMENTATION ONLY (OUTPATIENT)
Dept: FAMILY MEDICINE | Facility: CLINIC | Age: 54
End: 2019-03-29

## 2019-03-29 NOTE — PROGRESS NOTES
"Received call after hours from patient while on call. Describes dizziness for the last hour or so. Was cleaning her bathroom with \"chemicals\". No new medications given at recent admission, discharged today. Feels like she may pass out at times when she changes positions. Does not describe vertigo symptoms. Denies nausea, chest pain, shortness of breath, nausea/vomiting, or worsening of breathing symptoms.    Recommended resting, drinking water, and getting fresh air away from any fumes in her bathroom. If not improving and is persistent  would recommend re-evaluation as this is not her typical anxiety/breathing related symptoms.    Duane Feldman MD  [facility] Resident  Pager# [phone]    "

## 2019-04-01 ENCOUNTER — TELEPHONE (OUTPATIENT)
Dept: FAMILY MEDICINE | Facility: CLINIC | Age: 54
End: 2019-04-01

## 2019-04-01 NOTE — TELEPHONE ENCOUNTER
Date of discharge: 03/29/2019  Facility of discharge: [facility]  Patient concerns about condition: No concerns at this time.  Patient concerns about medications: No concerns at this time.  Full med reconciliation will be completed at clinic visit.  Patient concerns about transitioning: No concerns at this time.  Clinic office visit appointment date: 04/02/2019  Patient reminded to bring all medications (prescription and over-the-counter) to clinic appointment: Yes    Using the date of discharge as day 1, the 30th day post discharge is 04/29/2019.

## 2019-04-02 ENCOUNTER — COMMUNICATION - HEALTHEAST (OUTPATIENT)
Dept: RESPIRATORY THERAPY | Facility: HOSPITAL | Age: 54
End: 2019-04-02

## 2019-04-05 ENCOUNTER — COMMUNICATION - HEALTHEAST (OUTPATIENT)
Dept: RESPIRATORY THERAPY | Facility: HOSPITAL | Age: 54
End: 2019-04-05

## 2019-04-06 ENCOUNTER — TELEPHONE (OUTPATIENT)
Dept: FAMILY MEDICINE | Facility: CLINIC | Age: 54
End: 2019-04-06

## 2019-04-07 NOTE — TELEPHONE ENCOUNTER
Patient called stating blood glucose >300 last night, >200 today. States her facility is not giving her Metformin. She is on Prednisone given to her in the ED 4/2.    She is now done with the prednisone, following her last admission she has gotten a new nebulizer, Duo-Neb vials, and has Atarax PRN in her room. She just took an Atarax now as she is feeling anxious.    While we were on the phone, a voice asked if she wanted to set up her medications and I told her that now that she is off steroids she should just take her Metformin and her glucoses should improve.    Patient then called back about 1 hour later and stated that her facility is not giving her Metformin. Apparently there is no number for me to call, they want something faxed to them at [phone]. I don't know what the problem would be or why they are not giving her scheduled DM2 medications, unable to contact anyone at her facility and I have no idea what I would need to fax. Will route to clinic to get taken care of during business hours Monday.    Sergo Gonzales MD  [facility] Residency Program, PGY-2

## 2019-04-12 ENCOUNTER — COMMUNICATION - HEALTHEAST (OUTPATIENT)
Dept: RESPIRATORY THERAPY | Facility: HOSPITAL | Age: 54
End: 2019-04-12

## 2019-04-16 ENCOUNTER — OFFICE VISIT (OUTPATIENT)
Dept: FAMILY MEDICINE | Facility: CLINIC | Age: 54
End: 2019-04-16
Payer: COMMERCIAL

## 2019-04-16 ENCOUNTER — TELEPHONE (OUTPATIENT)
Dept: FAMILY MEDICINE | Facility: CLINIC | Age: 54
End: 2019-04-16

## 2019-04-16 VITALS
OXYGEN SATURATION: 94 % | TEMPERATURE: 97.9 F | RESPIRATION RATE: 22 BRPM | BODY MASS INDEX: 33.23 KG/M2 | HEIGHT: 61 IN | DIASTOLIC BLOOD PRESSURE: 94 MMHG | SYSTOLIC BLOOD PRESSURE: 146 MMHG | WEIGHT: 176 LBS | HEART RATE: 125 BPM

## 2019-04-16 DIAGNOSIS — F41.9 ANXIETY: Primary | ICD-10-CM

## 2019-04-16 RX ORDER — HYDROXYZINE HYDROCHLORIDE 25 MG/1
25 TABLET, FILM COATED ORAL 2 TIMES DAILY PRN
Qty: 28 TABLET | Refills: 0 | Status: SHIPPED | OUTPATIENT
Start: 2019-04-16 | End: 2019-06-25

## 2019-04-16 RX ORDER — HYDROXYZINE PAMOATE 25 MG/1
25 CAPSULE ORAL 2 TIMES DAILY PRN
Qty: 28 CAPSULE | Refills: 0 | Status: SHIPPED | OUTPATIENT
Start: 2019-04-16 | End: 2019-04-16

## 2019-04-16 ASSESSMENT — MIFFLIN-ST. JEOR: SCORE: 1335.71
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
## 2019-04-16 NOTE — TELEPHONE ENCOUNTER
[facility] phone call message- general phone call:    Reason for call: Nursing supervisor wants to know how much medication to give patient as she is being discharged from the program today 4/16/19. Patient has a history of overdose attempts.     Return call needed: Yes    OK to leave a message on voice mail? Yes    Primary language: English      needed? No    Call taken on April 16, 2019 at 11:36 AM by Noah Felipe
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
## 2019-04-16 NOTE — TELEPHONE ENCOUNTER
Called nursing supervisor at [facility] to discuss concerns. She stated Mary Ann made statements about taking all of her medications upon discharge from the program. Nursing supervisor stated she will not send her home with her Tylenol and hydroxyzine r/t suicidal threats and history of attempts. Patient is scheduled to see PCP today in clinic, will route to PCP for review and FYI. To contact staff at the facility we are to call [phone] and follow prompt to speak to any staff. Called will not be available after 12:30pm today. Basilio SMITH

## 2019-04-16 NOTE — PATIENT INSTRUCTIONS
Bring all of your medicines to your next appointment so we can work together to figure out what refills you need!

## 2019-04-17 ENCOUNTER — TELEPHONE (OUTPATIENT)
Dept: FAMILY MEDICINE | Facility: CLINIC | Age: 54
End: 2019-04-17

## 2019-04-17 NOTE — TELEPHONE ENCOUNTER
Spoke with patient regarding symptoms. Symptoms began approximately two hours ago, constant left sided sharp chest pain with no other symptoms. Patient denies referred pain, SOB, dizziness, palpitations, headache, numbness, tingling. No cardiac hx, no family hx of sudden cardiac death. Patient asked if her anxiety can cause chest pain, advised anxiety can bring on chest pain as a symptom. Asked patient if she took her hydroxyzine yet, patient stated she has not. Asked patient about recent stressors, patient stated her anxiety has been increased the last couple days r/t leaving her program. Advised patient to take one tablet of hydroxyzine and call the clinic back if symptoms do not resolve after her medication. Patient verbalized understanding. Basilio SMITH

## 2019-04-17 NOTE — TELEPHONE ENCOUNTER
Patient called and stated she is experiencing chest pain, Notified SARAH Blevins. Call was transferred.

## 2019-04-18 ENCOUNTER — TELEPHONE (OUTPATIENT)
Dept: FAMILY MEDICINE | Facility: CLINIC | Age: 54
End: 2019-04-18

## 2019-04-18 DIAGNOSIS — K59.00 CONSTIPATION, UNSPECIFIED CONSTIPATION TYPE: Primary | ICD-10-CM

## 2019-04-18 RX ORDER — POLYETHYLENE GLYCOL 3350 17 G/17G
1 POWDER, FOR SOLUTION ORAL DAILY PRN
Qty: 10 PACKET | Refills: 3 | Status: SHIPPED | OUTPATIENT
Start: 2019-04-18 | End: 2019-09-09

## 2019-04-18 NOTE — TELEPHONE ENCOUNTER
Called regarding constipation. Taken colace twice today with 1 hard stool. No bleeding. Feeling slightly bloated. Would like something else. Ordered miralax to confirmed pharmacy. Instructed on use. Patient will pick it up.    Duane Feldman MD  [facility] Medicine Resident  Pager# [phone]

## 2019-04-18 NOTE — TELEPHONE ENCOUNTER
On call doctor took call from the patient. She is concerned about her sugars even though she is taking Metformin 1000mg BID.    Reports morning sugars ~140s-180s and 2 hour postprandials around 200s. Eating only 2 meals daily.    She is wondering if she should take more Metformin.    I told her to keep taking her prescribed dose as this is the maximum dose and it would be harmful to take more. I saw she is being seen Monday and that this is appropriate, her sugar levels are not dangerous.    Sergo Gonzales MD  [facility] Res[facility] Program, PGY-2

## 2019-04-18 NOTE — TELEPHONE ENCOUNTER
Spoke with patient regarding blood sugars. Explained how metformin works, and her blood sugars are higher than desired but not dangerous. Advised patient to continue to log sugars and bring to appointment on Monday, patient verbalized understanding.  Patient stated she needs more test strips. Will route to PCP to refill.   Basilio SMITH

## 2019-04-18 NOTE — TELEPHONE ENCOUNTER
Patient states she would like to speak with a nurse. She states her blood sugar won't go down and she does not understand why. Please call and advise.

## 2019-04-19 ENCOUNTER — TELEPHONE (OUTPATIENT)
Dept: FAMILY MEDICINE | Facility: CLINIC | Age: 54
End: 2019-04-19

## 2019-04-19 NOTE — TELEPHONE ENCOUNTER
I called to check up on the pt, pt stated that she is doing not so well.  Pt stated that she is coming in this Monday the 22nd to see .  Pt stated that she has some questions, but will wait to ask  on Monday.  Pt is doing ok, for now.  Pt does not have any other issue or concerns right now.

## 2019-04-22 ENCOUNTER — OFFICE VISIT (OUTPATIENT)
Dept: FAMILY MEDICINE | Facility: CLINIC | Age: 54
End: 2019-04-22
Payer: COMMERCIAL

## 2019-04-22 ENCOUNTER — OFFICE VISIT (OUTPATIENT)
Dept: PHARMACY | Facility: CLINIC | Age: 54
End: 2019-04-22
Payer: COMMERCIAL

## 2019-04-22 ENCOUNTER — RECORDS - HEALTHEAST (OUTPATIENT)
Dept: PULMONOLOGY | Facility: OTHER | Age: 54
End: 2019-04-22

## 2019-04-22 ENCOUNTER — OFFICE VISIT - HEALTHEAST (OUTPATIENT)
Dept: PULMONOLOGY | Facility: OTHER | Age: 54
End: 2019-04-22

## 2019-04-22 ENCOUNTER — RECORDS - HEALTHEAST (OUTPATIENT)
Dept: ADMINISTRATIVE | Facility: OTHER | Age: 54
End: 2019-04-22

## 2019-04-22 ENCOUNTER — AMBULATORY - HEALTHEAST (OUTPATIENT)
Dept: SLEEP MEDICINE | Facility: CLINIC | Age: 54
End: 2019-04-22

## 2019-04-22 VITALS
OXYGEN SATURATION: 95 % | WEIGHT: 170.6 LBS | SYSTOLIC BLOOD PRESSURE: 119 MMHG | HEART RATE: 79 BPM | DIASTOLIC BLOOD PRESSURE: 79 MMHG | BODY MASS INDEX: 32.21 KG/M2 | RESPIRATION RATE: 20 BRPM | HEIGHT: 61 IN | TEMPERATURE: 98.8 F

## 2019-04-22 DIAGNOSIS — E11.9 TYPE 2 DIABETES MELLITUS WITHOUT COMPLICATION, WITHOUT LONG-TERM CURRENT USE OF INSULIN (H): ICD-10-CM

## 2019-04-22 DIAGNOSIS — F41.9 ANXIETY: ICD-10-CM

## 2019-04-22 DIAGNOSIS — J45.40 MODERATE PERSISTENT ASTHMA WITHOUT COMPLICATION: ICD-10-CM

## 2019-04-22 DIAGNOSIS — G47.30 SLEEP APNEA: ICD-10-CM

## 2019-04-22 DIAGNOSIS — J44.9 CHRONIC OBSTRUCTIVE PULMONARY DISEASE, UNSPECIFIED (H): ICD-10-CM

## 2019-04-22 DIAGNOSIS — Z87.891 PERSONAL HISTORY OF TOBACCO USE, PRESENTING HAZARDS TO HEALTH: ICD-10-CM

## 2019-04-22 DIAGNOSIS — Z53.21 PATIENT LEFT WITHOUT BEING SEEN: Primary | ICD-10-CM

## 2019-04-22 DIAGNOSIS — R94.2 DECREASED DIFFUSION CAPACITY: ICD-10-CM

## 2019-04-22 DIAGNOSIS — J45.40 MODERATE PERSISTENT ASTHMA WITHOUT COMPLICATION: Primary | ICD-10-CM

## 2019-04-22 DIAGNOSIS — R14.0 ABDOMINAL BLOATING: ICD-10-CM

## 2019-04-22 ASSESSMENT — MIFFLIN-ST. JEOR: SCORE: 1311.22

## 2019-04-22 NOTE — PATIENT INSTRUCTIONS
Rheumatology appt: Tuesday 5/21/19 at 8:20 AM with Dr. Connell at  [facility] [phone]      Referral for ( TEST )  :      Sleep Eval  LOCATION/PLACE/Provider :     [facility]   DATE & TIME :     7- at 1:00  PHONE :     [phone]  FAX :     [phone]  Appointment made by clinic staff/:    Gabbi
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
## 2019-04-22 NOTE — PROGRESS NOTES
Preceptor Attestation:   Patient seen, evaluated and discussed with the resident. I have verified the content of the note, which accurately reflects my assessment of the patient and the plan of care.  Supervising Physician:Pacheco Garcia MD  [facility]

## 2019-04-29 ENCOUNTER — COMMUNICATION - HEALTHEAST (OUTPATIENT)
Dept: RESPIRATORY THERAPY | Facility: HOSPITAL | Age: 54
End: 2019-04-29

## 2019-05-29 DIAGNOSIS — E11.9 TYPE 2 DIABETES MELLITUS WITHOUT COMPLICATION, WITHOUT LONG-TERM CURRENT USE OF INSULIN (H): ICD-10-CM

## 2019-05-29 NOTE — TELEPHONE ENCOUNTER
[facility] Medicine phone call message- patient requesting a refill:    Full Medication Name: metFORMIN (GLUCOPHAGE)     Dose: 1000 MG tablet    Pharmacy confirmed as   [id number] - [place], [place] - [address] [place] [address] & [address] [place] [address]  Phone: [phone] Fax: [phone]    [facility] [address] [place] [address] & [address] [address]  Phone: [phone] Fax: [phone]  : No    [facility] [place] [address]:  YES    Additional Comments: Patient is requesting a medication refill for the medication listed above. She states she is taking the last of this medication right now. She states if Dr. Farah is unable to fill the medication that if someone else can send a prescription for her otherwise her blood sugar will go up. Please call and advise.       OK to leave a message on voice mail? Yes    Primary language: English      needed? No    Call taken on May 29, 2019 at 4:41 PM by Sayra Mueller

## 2019-05-31 ENCOUNTER — COMMUNICATION - HEALTHEAST (OUTPATIENT)
Dept: RESPIRATORY THERAPY | Facility: HOSPITAL | Age: 54
End: 2019-05-31

## 2019-06-06 DIAGNOSIS — E11.9 TYPE 2 DIABETES MELLITUS WITHOUT COMPLICATION, WITH LONG-TERM CURRENT USE OF INSULIN (H): ICD-10-CM

## 2019-06-06 DIAGNOSIS — Z79.4 TYPE 2 DIABETES MELLITUS WITHOUT COMPLICATION, WITH LONG-TERM CURRENT USE OF INSULIN (H): ICD-10-CM

## 2019-06-07 ENCOUNTER — TRANSFERRED RECORDS (OUTPATIENT)
Dept: HEALTH INFORMATION MANAGEMENT | Facility: CLINIC | Age: 54
End: 2019-06-07

## 2019-06-07 ENCOUNTER — TELEPHONE (OUTPATIENT)
Dept: FAMILY MEDICINE | Facility: CLINIC | Age: 54
End: 2019-06-07

## 2019-06-07 NOTE — TELEPHONE ENCOUNTER
Called patient to discuss concerns. Patient c/o brown discharge and abdominal pain for the last few days. Patient stated she is going to the emergency room and wanted to know her medication allergies. Provided all drug allergies to patient including correct spelling with read-back. Basilio SMITH

## 2019-06-07 NOTE — TELEPHONE ENCOUNTER
[facility] Family Medicine phone call message-patient reporting a symptom:     Symptom: Pain, abdominal pain, discharge    Same Day Visit Offered: No, patient wants a nurse to call back    Additional comments: Patient states she is having pain and abdominal pain with discharge. Patient states she needs a call back as soon as possible. She states she also needs to know her medication allergies. Please call and advise.     OK to leave message on voice mail?     Primary language: English      needed? No    Call taken on June 7, 2019 at 4:38 PM by Sayra Mueller

## 2019-06-10 ENCOUNTER — TELEPHONE (OUTPATIENT)
Dept: FAMILY MEDICINE | Facility: CLINIC | Age: 54
End: 2019-06-10

## 2019-06-10 DIAGNOSIS — E11.9 TYPE 2 DIABETES MELLITUS WITHOUT COMPLICATION, WITHOUT LONG-TERM CURRENT USE OF INSULIN (H): ICD-10-CM

## 2019-06-10 NOTE — TELEPHONE ENCOUNTER
[facility] Family Medicine phone call message- medication clarification/question:    Full Medication Name: blood glucose (NO BRAND SPECIFIED) test strip   Dose: 100 strips     Question: Pharmacy called stating there was no direction as to how many times a day the patient needs to use the strip for, and if you could either call pharmacy or send over a new rx informing how patient should use strip    Pharmacy confirmed as    [id number] - [place], [place] - [address] AT [facility] [address] [facility] [address] [place] AT [facility] [address] [place], [place] - [address] AT [facility]: No, USE [facility] to leave a message on voice mail? Yes    Primary language: English      needed? No    Call taken on Serina 10, 2019 at 1:29 PM by Sheri Weeks

## 2019-06-12 ENCOUNTER — TELEPHONE (OUTPATIENT)
Dept: FAMILY MEDICINE | Facility: CLINIC | Age: 54
End: 2019-06-12

## 2019-06-12 NOTE — TELEPHONE ENCOUNTER
Called to f/u on pt's 6/7/2019 ED visit to [place]. Went straight to . Left message w/ clinic contact info.

## 2019-06-12 NOTE — TELEPHONE ENCOUNTER
Caller wants to know what is going on with the status of this. Caller would like a new Rx sent over as patient has already tried to  medication yesterday 6/11/19 and would likely try to again today 6/12/19. Caller needs to know the frequency of these test strips.

## 2019-06-13 NOTE — TELEPHONE ENCOUNTER
Called to f/u on pt's 6/7/2019 ED visit to [place]. Went straight to . Left message w/ clinic contact info.

## 2019-06-13 NOTE — TELEPHONE ENCOUNTER
Called and confirmed with pharmacy. Attempted to contact patient, went to . Left VM to call clinic back, unidentified VM so unable to leave detailed message. Basilio SMITH

## 2019-06-13 NOTE — TELEPHONE ENCOUNTER
Changed Rx for test strips to once daily as likely to be covered amount per insurance. Please call and inform patient of change.     Valeria Hall, PharmD  [facility]  Phone: [phone]  June 13, 2019 at 9:02 AM

## 2019-06-14 NOTE — TELEPHONE ENCOUNTER
Spoke w/ pt who said she is doing fine since her 6/7/2019 ED visit to [place]. Pt reported feeling fine since her discharge and declined a f/u in clinic.

## 2019-06-25 ENCOUNTER — TELEPHONE (OUTPATIENT)
Dept: FAMILY MEDICINE | Facility: CLINIC | Age: 54
End: 2019-06-25

## 2019-06-25 DIAGNOSIS — F41.9 ANXIETY: ICD-10-CM

## 2019-06-25 NOTE — TELEPHONE ENCOUNTER
[facility] Family Medicine phone call message-patient reporting a symptom:     Symptom: Abdominal pain, sore left side, bloated, might have fever    Same Day Visit Offered: Yes, declined    Additional comments: Patient states she wants to speak with a nurse regarding her symptoms. She states she has been having abdominal pain, sore left side, feeling bloated, abdomin hurts when eating and might have a fever. Notified patient if able to schedule an appointment but patient states due to transportation she is unable to. Please call and advise.    OK to leave message on voice mail? Yes    Primary language: English      needed? No    Call taken on June 25, 2019 at 8:57 AM by Sayra Mueller

## 2019-06-25 NOTE — TELEPHONE ENCOUNTER
Called patient to discuss concerns. Patient stated symptoms began 6 days ago. Patient stated she was seen in the ED for the same issues at the beginning of the month. History of chron's disease. Advised patient to be seen in clinic, patient stated transportation is difficult and she only wants to see a female faculty provider. No other female faculty available until Friday, requested patient see female resident tomorrow. Patient agreeable and stated she needs to be out of the clinic by 5:15 r/t medical ride. Patient stated if she needs medications, she will need time after the appointment before 5:15 to  the medications. Basilio SMITH

## 2019-06-26 ENCOUNTER — OFFICE VISIT (OUTPATIENT)
Dept: FAMILY MEDICINE | Facility: CLINIC | Age: 54
End: 2019-06-26
Payer: COMMERCIAL

## 2019-06-26 VITALS
TEMPERATURE: 98.8 F | OXYGEN SATURATION: 96 % | SYSTOLIC BLOOD PRESSURE: 133 MMHG | HEART RATE: 95 BPM | HEIGHT: 61 IN | RESPIRATION RATE: 20 BRPM | DIASTOLIC BLOOD PRESSURE: 75 MMHG | WEIGHT: 161 LBS | BODY MASS INDEX: 30.4 KG/M2

## 2019-06-26 DIAGNOSIS — A59.01 TRICHOMONAL VAGINITIS: ICD-10-CM

## 2019-06-26 DIAGNOSIS — N89.8 VAGINAL DISCHARGE: ICD-10-CM

## 2019-06-26 DIAGNOSIS — R30.0 DYSURIA: ICD-10-CM

## 2019-06-26 DIAGNOSIS — R10.32 ABDOMINAL PAIN, LEFT LOWER QUADRANT: Primary | ICD-10-CM

## 2019-06-26 PROBLEM — J96.01 ACUTE RESPIRATORY FAILURE WITH HYPOXEMIA (H): Status: ACTIVE | Noted: 2019-02-08

## 2019-06-26 LAB
BACTERIA: NORMAL
BACTERIA: NORMAL
BILIRUBIN UR: ABNORMAL
BLOOD UR: ABNORMAL
CASTS: NORMAL /LPF
CLARITY, URINE: CLEAR
CLUE CELLS: NORMAL
COLOR UR: YELLOW
CRYSTAL URINE: NORMAL /LPF
EPITHELIAL CELLS UR: NORMAL /LPF (ref 0–2)
GLUCOSE URINE: NEGATIVE
KETONES UR QL: ABNORMAL
LEUKOCYTE ESTERASE UR: ABNORMAL
MOTILE TRICHOMONAS: NEGATIVE
MUCOUS URINE: NORMAL LPF
NITRITE UR QL STRIP: NEGATIVE
ODOR: NORMAL
PH UR STRIP: 5 [PH] (ref 4.5–8)
PH WET PREP: NORMAL (ref 3.8–4.5)
PROTEIN UR: NEGATIVE
RBC URINE: NORMAL /HPF
SP GR UR STRIP: >=1.03 (ref 1–1.03)
UROBILINOGEN UR STRIP-ACNC: ABNORMAL
WBC URINE: NORMAL /HPF
WBC WET PREP: NORMAL (ref 2–5)
YEAST: NORMAL

## 2019-06-26 RX ORDER — DIPHENHYDRAMINE HCL 25 MG
50 TABLET ORAL EVERY 6 HOURS PRN
Qty: 30 TABLET | Refills: 0 | Status: SHIPPED | OUTPATIENT
Start: 2019-06-26 | End: 2019-09-14

## 2019-06-26 RX ORDER — METRONIDAZOLE 500 MG/1
2000 TABLET ORAL ONCE
Qty: 4 TABLET | Refills: 0 | Status: SHIPPED | OUTPATIENT
Start: 2019-06-26 | End: 2019-08-13

## 2019-06-26 RX ORDER — HYDROXYZINE HYDROCHLORIDE 25 MG/1
25 TABLET, FILM COATED ORAL 2 TIMES DAILY PRN
Qty: 28 TABLET | Refills: 1 | Status: SHIPPED | OUTPATIENT
Start: 2019-06-26 | End: 2019-09-14

## 2019-06-26 ASSESSMENT — PAIN SCALES - GENERAL: PAINLEVEL: SEVERE PAIN (6)

## 2019-06-26 ASSESSMENT — MIFFLIN-ST. JEOR: SCORE: 1262.67
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
## 2019-06-26 NOTE — PROGRESS NOTES
ACT 14, no recent ED/hospitalizations, smokes 0.5-1.5 ppd, triggers: animals, air pollution, exercise, pollens, weather.    PHQ9= 24, no SI/HI

## 2019-06-26 NOTE — PROGRESS NOTES
Preceptor Attestation:   Patient seen, evaluated and discussed with the resident. I have verified the content of the note, which accurately reflects my assessment of the patient and the plan of care.  Supervising Physician:Rafiq Krause MD  [facility]

## 2019-06-27 DIAGNOSIS — F41.9 ANXIETY: Primary | ICD-10-CM

## 2019-06-27 ASSESSMENT — PATIENT HEALTH QUESTIONNAIRE - PHQ9: SUM OF ALL RESPONSES TO PHQ QUESTIONS 1-9: 24

## 2019-06-27 NOTE — TELEPHONE ENCOUNTER
Message to physician: HYDROXYZINE TABS IS ON BACK ORDER AND THE PHARMACY HAS THE CAPSULE IN STOCK.    Date of last visit: 6/26/2019     Date of next visit if scheduled: Visit date not found      Last Comprehensive Metabolic Panel:  Creatinine   Date Value Ref Range Status   08/29/2014 0.69 0.52 - 1.04 mg/dL Final     GFR Estimate   Date Value Ref Range Status   08/29/2014 90 >60 mL/min/1.7m2 Final     Comment:     Non  GFR Calc       BP Readings from Last 3 Encounters:   06/26/19 133/75   04/22/19 119/79   04/16/19 (!) 146/94       Lab Results   Component Value Date    A1C 6.7 02/09/2019    A1C 6.6 08/28/2018    A1C 6.3 07/16/2018    A1C 6.5 11/28/2017                Please complete refill and CLOSE ENCOUNTER.  Closing the encounter signifies the refill is complete.

## 2019-06-28 ASSESSMENT — ASTHMA QUESTIONNAIRES: ACT_TOTALSCORE: 12

## 2019-07-01 RX ORDER — HYDROXYZINE PAMOATE 25 MG/1
25 CAPSULE ORAL 3 TIMES DAILY PRN
Qty: 28 CAPSULE | Refills: 1 | Status: SHIPPED | OUTPATIENT
Start: 2019-07-01 | End: 2019-09-09

## 2019-08-08 DIAGNOSIS — E11.9 TYPE 2 DIABETES MELLITUS WITHOUT COMPLICATION, WITHOUT LONG-TERM CURRENT USE OF INSULIN (H): ICD-10-CM

## 2019-08-08 NOTE — TELEPHONE ENCOUNTER
[facility] Family Medicine phone call message- patient requesting a refill:    Full Medication Name: Glucose Monitor    Dose:     Pharmacy confirmed as   [facility] #[id number] - [place], [place] - [address] [place] [address] & [address] [place] [address]  Phone: [phone] Fax: [phone]    [facility][id number] [address] & [address] [address]  Phone: [phone] Fax: [phone]    [facility] #[id number] - [facility] [address] [address]  Phone: [phone] Fax: [phone]  : No:     Please send to [facility] on [address] [facility]    Additional Comments: Patient states she lost her glucose monitor and needs a new one. Please call and advise.      OK to leave a message on voice mail? Yes    Primary language: English      needed? No    Call taken on August 8, 2019 at 10:44 AM by Sayra Mueller

## 2019-08-13 ENCOUNTER — OFFICE VISIT (OUTPATIENT)
Dept: FAMILY MEDICINE | Facility: CLINIC | Age: 54
End: 2019-08-13
Payer: COMMERCIAL

## 2019-08-13 VITALS
OXYGEN SATURATION: 95 % | WEIGHT: 165 LBS | DIASTOLIC BLOOD PRESSURE: 72 MMHG | SYSTOLIC BLOOD PRESSURE: 125 MMHG | HEART RATE: 87 BPM | HEIGHT: 61 IN | RESPIRATION RATE: 18 BRPM | BODY MASS INDEX: 31.15 KG/M2 | TEMPERATURE: 98 F

## 2019-08-13 DIAGNOSIS — Z00.00 HEALTHCARE MAINTENANCE: ICD-10-CM

## 2019-08-13 DIAGNOSIS — Z02.9 ADMINISTRATIVE ENCOUNTER: Primary | ICD-10-CM

## 2019-08-13 DIAGNOSIS — M79.672 LEFT FOOT PAIN: ICD-10-CM

## 2019-08-13 ASSESSMENT — MIFFLIN-ST. JEOR: SCORE: 1280.82
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
## 2019-08-14 ENCOUNTER — RECORDS - HEALTHEAST (OUTPATIENT)
Dept: ADMINISTRATIVE | Facility: OTHER | Age: 54
End: 2019-08-14

## 2019-08-26 DIAGNOSIS — K21.9 GASTROESOPHAGEAL REFLUX DISEASE, ESOPHAGITIS PRESENCE NOT SPECIFIED: ICD-10-CM

## 2019-08-27 ENCOUNTER — TELEPHONE (OUTPATIENT)
Dept: FAMILY MEDICINE | Facility: CLINIC | Age: 54
End: 2019-08-27

## 2019-08-27 DIAGNOSIS — Z78.0 POSTMENOPAUSAL STATUS: ICD-10-CM

## 2019-08-27 DIAGNOSIS — E11.9 TYPE 2 DIABETES MELLITUS WITHOUT COMPLICATION, WITHOUT LONG-TERM CURRENT USE OF INSULIN (H): Primary | ICD-10-CM

## 2019-08-27 DIAGNOSIS — K21.9 GASTROESOPHAGEAL REFLUX DISEASE, ESOPHAGITIS PRESENCE NOT SPECIFIED: ICD-10-CM

## 2019-08-27 RX ORDER — FAMOTIDINE 40 MG/1
40 TABLET, FILM COATED ORAL AT BEDTIME
Qty: 90 TABLET | Refills: 3 | Status: SHIPPED | OUTPATIENT
Start: 2019-08-27 | End: 2020-03-30

## 2019-08-27 NOTE — TELEPHONE ENCOUNTER
[facility] phone call message- medication clarification/question:    Full Medication Name: New kit instead of one touch ultra kit        Question: Patient states [employer] will no longer cover the brand one touch ultra. Patient states pharmacy told her she would need to change the kit brand. Patient states she has been unable to check her sugars since she can't get a refill. Please call and advise.     Pharmacy confirmed as    [facility] #[id number] - [place], [place] - [address]: Yes    OK to leave a message on voice mail? Yes    Primary language: English      needed? No    Call taken on August 27, 2019 at 2:46 PM by Sayra Mueller

## 2019-08-27 NOTE — TELEPHONE ENCOUNTER
[facility] Family Medicine phone call message- patient requesting a refill:    Full Medication Name: vitamin D2 (ERGOCALCIFEROL) 34498 units (1250 mcg) capsule AND omeprazole (PRILOSEC) 20 MG DR capsule    Dose:     Pharmacy confirmed as   [facility] #[id number] - [place], [place] - [address] AT [facility] & [facility]  [address] [place] [address]  Phone: [phone] Fax: [phone]    [facility] #[id number] - [facility] [address] [facility] [address] [address]  Phone: [phone] Fax: [phone]    [facility] #[id number] - [place], [place] - [address] [facility] AT [facility] [address] [place] [address] [place]  [address] [facility] [address]  Phone: [phone] Fax: [phone]    [facility] #[id number] - [place], [place] - [address]  [address]  [place] [address]  Phone: [phone] Fax: [phone]  : Yes    Additional Comments: Patient states she needs a refill for the medications listed above. Notified it may take 2 business days for a response. Please call and advise.      OK to leave a message on voice mail? Yes    Primary language: English      needed? No    Call taken on August 27, 2019 at 2:43 PM by Sayra Mueller

## 2019-08-29 RX ORDER — ERGOCALCIFEROL 1.25 MG/1
50000 CAPSULE, LIQUID FILLED ORAL WEEKLY
Qty: 12 CAPSULE | Status: CANCELLED | OUTPATIENT
Start: 2019-08-29

## 2019-08-29 NOTE — TELEPHONE ENCOUNTER
"Patient wants to know why this has not been done yet as she stated she was \"promised\" to get this  by today. Patient would like a call back to her nurse at [phone] Marlene as they are currently together. Patient wants PCP to know that it has been a few days since she is able to check her blood sugar and that they want this right away.   "

## 2019-08-29 NOTE — TELEPHONE ENCOUNTER
Generic rxs populated for you. Mary Ann called again this morning for follow up. Thank you. Pasha SMITH

## 2019-09-01 ENCOUNTER — TELEPHONE (OUTPATIENT)
Dept: FAMILY MEDICINE | Facility: CLINIC | Age: 54
End: 2019-09-01

## 2019-09-01 NOTE — TELEPHONE ENCOUNTER
"Returned call regarding    Fever, congestion, poor appetite.    For the last day has been very stuffy and congested in her sinuses, having frequent harsh cough, and feels like she has a poor appetite. Feeling subjectively febrile. Denies shortness of breath except harder to breath through her nose. Tried chamomille tea \"to cure it\" but has not felt better.     Seems likely to be viral URI with cough. Does sound congested over the phone. Recommended maintaining adequate fluids and diet as tolerated. Had concerns with her diabetes, noted she is not on insulin and can continue to monitor blood sugars. Recommended alternating tylenol and ibuprofen. Reviewed reasons to seek emergency medical attentions and she understood.     Benjamin Rosenstein, MD, MA  [facility]  P: [phone]    "

## 2019-09-03 DIAGNOSIS — E11.9 TYPE 2 DIABETES MELLITUS WITHOUT COMPLICATION, WITHOUT LONG-TERM CURRENT USE OF INSULIN (H): ICD-10-CM

## 2019-09-04 RX ORDER — ATORVASTATIN CALCIUM 40 MG/1
40 TABLET, FILM COATED ORAL DAILY
Qty: 90 TABLET | Refills: 3 | Status: SHIPPED | OUTPATIENT
Start: 2019-09-04 | End: 2020-03-30

## 2019-09-04 RX ORDER — ERGOCALCIFEROL 1.25 MG/1
50000 CAPSULE, LIQUID FILLED ORAL WEEKLY
Qty: 12 CAPSULE | Refills: 3 | Status: SHIPPED | OUTPATIENT
Start: 2019-09-04 | End: 2020-03-30

## 2019-09-09 ENCOUNTER — OFFICE VISIT (OUTPATIENT)
Dept: FAMILY MEDICINE | Facility: CLINIC | Age: 54
End: 2019-09-09
Payer: COMMERCIAL

## 2019-09-09 VITALS
WEIGHT: 158 LBS | BODY MASS INDEX: 29.83 KG/M2 | DIASTOLIC BLOOD PRESSURE: 70 MMHG | RESPIRATION RATE: 20 BRPM | HEIGHT: 61 IN | OXYGEN SATURATION: 98 % | SYSTOLIC BLOOD PRESSURE: 118 MMHG | TEMPERATURE: 98.8 F | HEART RATE: 99 BPM

## 2019-09-09 DIAGNOSIS — J06.9 VIRAL UPPER RESPIRATORY TRACT INFECTION: ICD-10-CM

## 2019-09-09 DIAGNOSIS — E11.9 TYPE 2 DIABETES MELLITUS WITHOUT COMPLICATION, WITHOUT LONG-TERM CURRENT USE OF INSULIN (H): ICD-10-CM

## 2019-09-09 DIAGNOSIS — K59.00 CONSTIPATION, UNSPECIFIED CONSTIPATION TYPE: ICD-10-CM

## 2019-09-09 DIAGNOSIS — F41.9 ANXIETY: ICD-10-CM

## 2019-09-09 DIAGNOSIS — G56.02 CARPAL TUNNEL SYNDROME OF LEFT WRIST: Primary | ICD-10-CM

## 2019-09-09 RX ORDER — HYDROXYZINE PAMOATE 25 MG/1
25 CAPSULE ORAL 3 TIMES DAILY PRN
Qty: 28 CAPSULE | Refills: 0 | Status: SHIPPED | OUTPATIENT
Start: 2019-09-09 | End: 2019-10-24

## 2019-09-09 RX ORDER — POLYETHYLENE GLYCOL 3350 17 G/17G
1 POWDER, FOR SOLUTION ORAL DAILY PRN
Qty: 10 PACKET | Refills: 0 | Status: SHIPPED | OUTPATIENT
Start: 2019-09-09 | End: 2019-11-20

## 2019-09-09 ASSESSMENT — MIFFLIN-ST. JEOR: SCORE: 1243.18

## 2019-09-10 ENCOUNTER — RECORDS - HEALTHEAST (OUTPATIENT)
Dept: MAMMOGRAPHY | Facility: CLINIC | Age: 54
End: 2019-09-10

## 2019-09-10 DIAGNOSIS — Z12.31 ENCOUNTER FOR SCREENING MAMMOGRAM FOR MALIGNANT NEOPLASM OF BREAST: ICD-10-CM

## 2019-09-12 ENCOUNTER — TELEPHONE (OUTPATIENT)
Dept: FAMILY MEDICINE | Facility: CLINIC | Age: 54
End: 2019-09-12

## 2019-09-12 DIAGNOSIS — R05.9 COUGH: ICD-10-CM

## 2019-09-12 NOTE — TELEPHONE ENCOUNTER
[facility] Family Medicine phone call message- general phone call:    Reason for call: Patient state seeing Dr. Kong few days ago and was told to give it a week for her virus to clear up but patient is stating she can not wait another week. Patient feels the virus is straining her energy. Patient would like a return call to discuss further.    Return call needed: Yes    OK to leave a message on voice mail? Yes    Primary language: English      needed? No    Call taken on September 12, 2019 at 11:49 AM by Noah Felipe

## 2019-09-13 RX ORDER — ALBUTEROL SULFATE 0.83 MG/ML
2.5 SOLUTION RESPIRATORY (INHALATION) EVERY 6 HOURS PRN
Qty: 90 VIAL | Refills: 11 | Status: SHIPPED | OUTPATIENT
Start: 2019-09-13 | End: 2019-11-18

## 2019-09-13 NOTE — TELEPHONE ENCOUNTER
Called patient, phone went to identified VM. Left VM stating nebulizer solution was prescribed and sent. Advise to be seen at urgent care for assessment per 's request. No appointments available today in clinic. Also requested call back with questions or concerns. Will attempt again later today to reach patient. Basilio SMITH

## 2019-09-13 NOTE — TELEPHONE ENCOUNTER
I reordered nebs but recommend patient be seen in clinic in next 24 hours if possible.  History of multiple hospitalizations for respiratory issues.

## 2019-09-14 NOTE — PROGRESS NOTES
History  54-year-old female with a complex past medical history.  She presents with 3 concerns today.  She reports a past history of bilateral carpal tunnel syndrome.  In the past she reports good relief using braces a.  Her left wrist has been sore over the past 2 weeks with some tingling into her thumb and index finger.  She previously had a brace but lost it during a move.  She is interested in using a removable Velcro wrist splint.  She denies any strength change in her hand or wrist.  No injury.    She has used hydroxyzine 25 mg tablets for intermittent anxiety in the past.  She lost her bottle of hydroxyzine.  She does not currently feel anxious, and uses this infrequently but she would like to have a refill.  She has not noticed any association of hydroxyzine use with constipation.    She has intermittent constipation.  She has used MiraLAX in the past which is been effective for softening her stools.  She would like to get a refill of MiraLAX.    History of type 2 diabetes, requests refill on her Metformin.  She does not have a glucometer or glucose log with her today.    Over the last 3 days she has had nasal congestion.  She has had clear to yellow runny nose.  She has occasionally coughed up some yellow phlegm.  She has a history of COPD but denies any shortness of breath or difficulty with breathing.  No fevers or chills.  No chest pain.  No orthopnea, palpitations, racing heart.  She has not tried using albuterol over the past couple of days.    Review of systems:  No fever chills  No abdominal pain, nausea or vomiting.  No blood in stool or black stool  No neck pain.  No weakness in the hands.  Remainder of the review of systems outlined above    Social history: She has had past difficulty finding a stable living arrangement.  She has been in a shelter over the past approximately month which is an independent apartment.  She appreciates the independent apartment but has some concerns about the  .    She continues to smoke cigarettes    Exam  Vitals are as above, she is satting 98% on room air  General: She is alert and in no distress.  Her breathing is nonlabored.  HEENT: Head is normocephalic.  Eyes sclera nonicteric noninjected.  Ear canals are normal.  Tympanic membranes partially obstructed by cerumen, visible portion white.  Moderate congestion of the inferior nasal turbinates with clear rhinorrhea.  No tonsillar hypertrophy or exudate.  No cervical adenopathy.  Respiratory: Lungs are clear bilaterally with fair air exchange.  Cardiovascular: Regular rate and rhythm without murmur  Abdomen: Soft and nontender  Examination of the left wrist shows no gross deformity.  She has intact flexion and extension.  Intact  strength, wrist flexion, extension, supination and pronation.  Phalen's exam elicits wrist pain and tingling, Tinel's is negative.    Assessment and plan  1) carpal tunnel, left wrist: She previously had good symptomatic relief with a removable cock-up wrist splint.  We discussed options and she elected to restart splint.  She was placed in a removable Velcro splint.  I recommend using this on a daily basis over the next 3 to 4 weeks then reevaluate.  Earlier reevaluation if she develops increasing symptoms, weakness or other concerns    2) constipation: Likely multifactorial.  Trial of MiraLAX 17 g as needed.  If this is not effective for softening stools within 1 week recommend reevaluation.  Discussed that medications like Benadryl and hydroxyzine can exacerbate constipation.    3) upper respiratory infection: Despite her history of COPD she has minimal respiratory symptoms today..  Symptomatic cares for now with fluids and close monitoring.  Recommend use of albuterol up to 4 times daily as needed.  She should seek prompt reevaluation if she develops cough, shortness of breath, fevers, chills or other new concerns.  She is somewhat reluctant to pursue prednisone  due to concerns of increased blood sugars.    4) intermittent anxiety: She requests refill of hydroxyzine 25 mg tablets.  She is use this on an as needed basis in the past.  Recommended not using more than every 8 hours, and if she is needing the medication more than a couple of times per week reconsider treatment plan    5) DM2:  She is on Metformin monotherapy.  Last A1c 6.7% in Feb.   Refilled Metformin today, but recommend more comprehensive diabetic visit within the next month, bring glucometer.

## 2019-09-16 ENCOUNTER — TRANSFERRED RECORDS (OUTPATIENT)
Dept: HEALTH INFORMATION MANAGEMENT | Facility: CLINIC | Age: 54
End: 2019-09-16

## 2019-09-30 ENCOUNTER — OFFICE VISIT (OUTPATIENT)
Dept: FAMILY MEDICINE | Facility: CLINIC | Age: 54
End: 2019-09-30
Payer: COMMERCIAL

## 2019-09-30 VITALS
DIASTOLIC BLOOD PRESSURE: 75 MMHG | RESPIRATION RATE: 20 BRPM | HEART RATE: 91 BPM | SYSTOLIC BLOOD PRESSURE: 111 MMHG | BODY MASS INDEX: 28.32 KG/M2 | WEIGHT: 150 LBS | OXYGEN SATURATION: 99 % | TEMPERATURE: 98.2 F | HEIGHT: 61 IN

## 2019-09-30 DIAGNOSIS — J44.1 CHRONIC OBSTRUCTIVE PULMONARY DISEASE WITH ACUTE EXACERBATION (H): ICD-10-CM

## 2019-09-30 DIAGNOSIS — J45.40 MODERATE PERSISTENT ASTHMA WITHOUT COMPLICATION: Primary | ICD-10-CM

## 2019-09-30 DIAGNOSIS — T78.40XA ALLERGIC REACTION, INITIAL ENCOUNTER: ICD-10-CM

## 2019-09-30 RX ORDER — ALBUTEROL SULFATE 90 UG/1
2 AEROSOL, METERED RESPIRATORY (INHALATION) EVERY 6 HOURS PRN
Qty: 18 G | Refills: 1 | Status: SHIPPED | OUTPATIENT
Start: 2019-09-30 | End: 2020-03-30

## 2019-09-30 RX ORDER — EPINEPHRINE 0.3 MG/.3ML
0.3 INJECTION SUBCUTANEOUS PRN
Qty: 0.6 ML | Refills: 0 | Status: SHIPPED | OUTPATIENT
Start: 2019-09-30 | End: 2022-11-17

## 2019-09-30 ASSESSMENT — MIFFLIN-ST. JEOR: SCORE: 1210.03
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
## 2019-10-01 LAB
C TRACH RRNA SPEC QL NAA+PROBE: NEGATIVE
N GONORRHOEA RRNA SPEC QL NAA+PROBE: NEGATIVE

## 2019-10-10 ENCOUNTER — TELEPHONE (OUTPATIENT)
Dept: FAMILY MEDICINE | Facility: CLINIC | Age: 54
End: 2019-10-10

## 2019-10-10 NOTE — TELEPHONE ENCOUNTER
[facility] phone call message- medication clarification/question:    Full Medication Name: antibiotic   Dose:     Question: Patient state she thinks she has a bladder infection. When patient is urinating patient states it burns and stinks and has had it for the last couple of days. Patient wants PCP aware that patient is allergic to many antibiotics so a call back would be great to discuss what she is allergic to.      Pharmacy confirmed as    [facility] #[id number] - [place], [place] - [address] AT [facility] #[id number] - [place], [place] - [address] AT [facility] #[id number] [place], [place] - [address] AT SEC OF [facility] #[id number] - [place], [place] - [address] AT SEC OF [address]: Yes    OK to leave a message on voice mail? Yes    Primary language: English      needed? No    Call taken on October 10, 2019 at 11:39 AM by Noah Fleipe

## 2019-10-10 NOTE — TELEPHONE ENCOUNTER
"Called patient to discuss concerns. Advised would require an appointment in clinic. Patient began yelling on the phone and stated \" transportation takes two days, I cannot get there. I will not come into clinic when I know I have a bladder infection.\" Advised patient PCP is not in clinic. Patient began yelling again \"send to it whoever, I already told the  I will not come in\". Advised can route to covering physician but cannot guarantee antibiotics will be prescribed. Patient then stated \" fine, if not I will just go to the emergency room\". Patient then stated \"I am sick your clinic and making me come in for this stuff, blah tom santos, walk a day in my shoes\". Patient then ended call. Will route to covering physician for review of symptoms and further advisement. Note, patient has significant antibiotic allergies. Basilio SMITH  "

## 2019-10-10 NOTE — TELEPHONE ENCOUNTER
Reviewed patient's chart and numerous cultures with no growth for the last couple years. Am not sure this represents a UTI. Needs Appt.     Dr. Armando Joseph

## 2019-10-13 ENCOUNTER — TELEPHONE (OUTPATIENT)
Dept: FAMILY MEDICINE | Facility: CLINIC | Age: 54
End: 2019-10-13
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
## 2019-10-24 DIAGNOSIS — F41.9 ANXIETY: ICD-10-CM

## 2019-10-24 DIAGNOSIS — Z78.0 POSTMENOPAUSAL STATUS: ICD-10-CM

## 2019-10-24 NOTE — TELEPHONE ENCOUNTER
[facility] phone call message- medication clarification/question:    Full Medication Name: Hydroxyzine 25 mg and calcium carbonate 600 mg vitamin D   Strength:     Have you contacted your pharmacy about this refill request? Yes    If  Yes,  which [facility]    When did you contact the pharmacy? toay    Additional comments/concerns from call to pharmacy:    Reason for call to clinic: Calling to request refill on medication she states someone took her medications from the house. Told her it could take up to two business days for a response. Please call and advsie.       Pharmacy confirmed as    [facility] #[id number] - [place], [place] - [address] AT [address] OF [address]: Yes    OK to leave a message on voice mail?     Primary language: English      needed? No    Call taken on October 24, 2019 at 2:29 PM by Marshall Weeks

## 2019-10-25 RX ORDER — HYDROXYZINE PAMOATE 25 MG/1
25 CAPSULE ORAL 3 TIMES DAILY PRN
Qty: 28 CAPSULE | Refills: 3 | Status: SHIPPED | OUTPATIENT
Start: 2019-10-25 | End: 2020-03-30

## 2019-11-14 ENCOUNTER — TELEPHONE (OUTPATIENT)
Dept: FAMILY MEDICINE | Facility: CLINIC | Age: 54
End: 2019-11-14

## 2019-11-14 NOTE — TELEPHONE ENCOUNTER
Patient c/o chronic arthritic pain in left and right hand, endorses left worse than right. Patient has appointment this Monday 11/18/19 with PCP for the concern, endorses it has been ongoing but intermittent for months. Advised can try hot and cold therapy, and also advised to use hands once loose from hot and cold therapy. Patient verbalized some numbness in left hand when it is at its worst. Denies any other symptoms. Advised to call clinic if numbness worsens or radiates up the arm, or if new symptoms occur. Patient verbalized understanding.

## 2019-11-14 NOTE — TELEPHONE ENCOUNTER
[facility] phone call message- general phone call:    Reason for call: Patient state that her hands are hardening and painful. Possible arthritis per patient. Patient is working tomorrow doing a dishwashing position and wants to know what she should do. Patient state that she is having sharp pain and almost dropping things.     Return call needed: Yes    OK to leave a message on voice mail? Yes    Primary language: English      needed? No    Call taken on November 14, 2019 at 4:04 PM by Noah Felipe

## 2019-11-18 ENCOUNTER — OFFICE VISIT (OUTPATIENT)
Dept: FAMILY MEDICINE | Facility: CLINIC | Age: 54
End: 2019-11-18
Payer: COMMERCIAL

## 2019-11-18 VITALS
WEIGHT: 147.4 LBS | OXYGEN SATURATION: 97 % | RESPIRATION RATE: 16 BRPM | HEART RATE: 102 BPM | DIASTOLIC BLOOD PRESSURE: 86 MMHG | HEIGHT: 61 IN | BODY MASS INDEX: 27.83 KG/M2 | TEMPERATURE: 99.1 F | SYSTOLIC BLOOD PRESSURE: 124 MMHG

## 2019-11-18 DIAGNOSIS — Z13.9 SCREENING FOR CONDITION: ICD-10-CM

## 2019-11-18 DIAGNOSIS — J44.1 CHRONIC OBSTRUCTIVE PULMONARY DISEASE WITH ACUTE EXACERBATION (H): ICD-10-CM

## 2019-11-18 DIAGNOSIS — E11.9 TYPE 2 DIABETES MELLITUS WITHOUT COMPLICATION, WITHOUT LONG-TERM CURRENT USE OF INSULIN (H): Primary | ICD-10-CM

## 2019-11-18 DIAGNOSIS — R05.9 COUGH: ICD-10-CM

## 2019-11-18 DIAGNOSIS — Z23 NEED FOR PROPHYLACTIC VACCINATION AND INOCULATION AGAINST INFLUENZA: ICD-10-CM

## 2019-11-18 LAB
BUN SERPL-MCNC: 14 MG/DL (ref 7–30)
CALCIUM SERPL-MCNC: 9.6 MG/DL (ref 8.5–10.4)
CHLORIDE SERPLBLD-SCNC: 104 MMOL/L (ref 94–109)
CO2 SERPL-SCNC: 25 MMOL/L (ref 20–32)
CREAT SERPL-MCNC: 0.3 MG/DL (ref 0.6–1.3)
EGFR CALCULATED (BLACK REFERENCE): >90 ML/MIN
EGFR CALCULATED (NON BLACK REFERENCE): >90 ML/MIN
GLUCOSE SERPL-MCNC: 109 MG/DL (ref 60–109)
HBA1C MFR BLD: 6.3 % (ref 4.1–5.7)
POTASSIUM SERPL-SCNC: 3.3 MMOL/L (ref 3.4–5.3)
SODIUM SERPL-SCNC: 140 MMOL/L (ref 133–144)

## 2019-11-18 RX ORDER — ALBUTEROL SULFATE 0.83 MG/ML
2.5 SOLUTION RESPIRATORY (INHALATION) EVERY 6 HOURS PRN
Qty: 90 VIAL | Refills: 11 | Status: SHIPPED | OUTPATIENT
Start: 2019-11-18 | End: 2020-03-30

## 2019-11-18 ASSESSMENT — MIFFLIN-ST. JEOR: SCORE: 1198.23
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
## 2019-11-18 NOTE — LETTER
November 19, 2019      Mary Ann Olson  [address] [place] [address]        Dear Mary Ann,    Your kidney function is stable and your A1c is 6.3 (goal less than 7).  You should keep taking your metformin and check you sugars if you feel like it helps you keep you sugars in control.  Goal sugars are between 90 and 150.     Please see below for your test results.    Resulted Orders   Hemoglobin A1c ([facility])   Result Value Ref Range    Hemoglobin A1C 6.3 (H) 4.1 - 5.7 %   Basic Metabolic Panel ([facility])   Result Value Ref Range    Glucose 109.0 60.0 - 109.0 mg/dL    Urea Nitrogen 14.0 7.0 - 30.0 mg/dL    Creatinine 0.3 (L) 0.6 - 1.3 mg/dL    Sodium 140.0 133.0 - 144.0 mmol/L    Potassium 3.3 (L) 3.4 - 5.3 mmol/L    Chloride 104.0 94.0 - 109.0 mmol/L    Carbon Dioxide 25.0 20.0 - 32.0 mmol/L    Calcium 9.6 8.5 - 10.4 mg/dL    eGFR Calculated (Non Black Reference) >90 >60.0 mL/min    eGFR Calculated (Black Reference) >90 >60.0 mL/min       If you have any questions, please call the clinic to make an appointment.    Sincerely,    Joanna Farah MD

## 2019-11-19 DIAGNOSIS — K59.00 CONSTIPATION, UNSPECIFIED CONSTIPATION TYPE: ICD-10-CM

## 2019-11-19 NOTE — TELEPHONE ENCOUNTER
Message to physician:     Date of last visit: 11/18/2019     Date of next visit if scheduled: Visit date not found       Last Comprehensive Metabolic Panel:  Sodium   Date Value Ref Range Status   11/18/2019 140.0 133.0 - 144.0 mmol/L Final     Potassium   Date Value Ref Range Status   11/18/2019 3.3 (L) 3.4 - 5.3 mmol/L Final     Chloride   Date Value Ref Range Status   11/18/2019 104.0 94.0 - 109.0 mmol/L Final     Carbon Dioxide   Date Value Ref Range Status   11/18/2019 25.0 20.0 - 32.0 mmol/L Final     Glucose   Date Value Ref Range Status   11/18/2019 109.0 60.0 - 109.0 mg/dL Final     Urea Nitrogen   Date Value Ref Range Status   11/18/2019 14.0 7.0 - 30.0 mg/dL Final     Creatinine   Date Value Ref Range Status   11/18/2019 0.3 (L) 0.6 - 1.3 mg/dL Final     GFR Estimate   Date Value Ref Range Status   08/29/2014 90 >60 mL/min/1.7m2 Final     Comment:     Non  GFR Calc     Calcium   Date Value Ref Range Status   11/18/2019 9.6 8.5 - 10.4 mg/dL Final       BP Readings from Last 3 Encounters:   11/18/19 124/86   09/30/19 111/75   09/09/19 118/70       Lab Results   Component Value Date    A1C 6.3 11/18/2019    A1C 6.7 02/09/2019    A1C 6.6 08/28/2018    A1C 6.3 07/16/2018    A1C 6.5 11/28/2017                Please complete refill and CLOSE ENCOUNTER.  Closing the encounter signifies the refill is complete.

## 2019-11-20 RX ORDER — POLYETHYLENE GLYCOL 3350 17 G/17G
1 POWDER, FOR SOLUTION ORAL DAILY PRN
Qty: 30 PACKET | Refills: 0 | Status: SHIPPED | OUTPATIENT
Start: 2019-11-20 | End: 2020-03-30

## 2019-11-24 ENCOUNTER — TELEPHONE (OUTPATIENT)
Dept: FAMILY MEDICINE | Facility: CLINIC | Age: 54
End: 2019-11-24

## 2019-11-24 NOTE — TELEPHONE ENCOUNTER
Patient called answering service to discuss vaginal spotting despite no longer getting a period.     Attempted to call patient x3 without response.     Left voicemail for patient to call the answering service again if she would like to discuss tonight or call the clinic in the morning to get an appointment to be seen.    Marysol Sanders MD

## 2019-11-25 ENCOUNTER — TELEPHONE (OUTPATIENT)
Dept: FAMILY MEDICINE | Facility: CLINIC | Age: 54
End: 2019-11-25

## 2019-11-25 NOTE — TELEPHONE ENCOUNTER
[facility] phone call message-patient reporting a symptom:     Symptom: Spotting    Same Day Visit Offered: Yes, declined    Additional comments: Patient states she started spotting yesterday. She states she is still spotting today and is confused why she is spotting since she doesn't get her menstrual cycle no more. Patient declined an appointment and prefers to speak with a nurse first. Please call and advise.     OK to leave message on voice mail? Yes    Primary language: English      needed? No    Call taken on November 25, 2019 at 12:44 PM by Sayra Mueller

## 2019-11-25 NOTE — TELEPHONE ENCOUNTER
Called patient and advised an appointment is required. Advised could be something such as UTI or polyps. Advised patient would require assessment. Patient would like female MD only, declines resident physician. Scheduled patient for 12/03 with PCP. Advised to call clinic if experiencing abdominal cramping, fever, or increased bleeding. Patient verbalized understanding. Basilio SMITH

## 2019-12-03 ENCOUNTER — OFFICE VISIT (OUTPATIENT)
Dept: FAMILY MEDICINE | Facility: CLINIC | Age: 54
End: 2019-12-03
Payer: COMMERCIAL

## 2019-12-03 VITALS
WEIGHT: 152.2 LBS | RESPIRATION RATE: 16 BRPM | OXYGEN SATURATION: 97 % | DIASTOLIC BLOOD PRESSURE: 71 MMHG | TEMPERATURE: 98.1 F | BODY MASS INDEX: 28.01 KG/M2 | HEART RATE: 78 BPM | SYSTOLIC BLOOD PRESSURE: 110 MMHG | HEIGHT: 62 IN

## 2019-12-03 DIAGNOSIS — N89.8 VAGINAL DISCHARGE: Primary | ICD-10-CM

## 2019-12-03 DIAGNOSIS — A59.09 TRICHOMONAL CERVICITIS: ICD-10-CM

## 2019-12-03 DIAGNOSIS — Z12.4 SCREENING FOR CERVICAL CANCER: ICD-10-CM

## 2019-12-03 LAB
BACTERIA: NORMAL
CLUE CELLS: NORMAL
HCG UR QL: NEGATIVE
MOTILE TRICHOMONAS: POSITIVE
ODOR: POSITIVE
PH WET PREP: >4.5 (ref 3.8–4.5)
WBC WET PREP: NORMAL (ref 2–5)
YEAST: NORMAL

## 2019-12-03 ASSESSMENT — MIFFLIN-ST. JEOR: SCORE: 1238.62

## 2019-12-03 NOTE — LETTER
December 13, 2019      Mary Ann Olson  [address] [place] [address]        Dear Mary Ann,    Please see below for your test results.  THey were normal with exception of trichomonas.  Your pap was also normal.  We need to repeat co-testing in 3 years.     Resulted Orders   Wet Prep ([facility])   Result Value Ref Range    Yeast Wet Prep None none    Motile Trichomonas Wet Prep Positive Negative    Clue Cells Wet Prep Present <20% NONE    WBC WET PREP 25-50 2 - 5    Bacteria Wet Prep Many None    pH Wet Prep >4.5 3.8 - 4.5    Odor Wet Prep Positive NONE   HCG Qualitative Urine ([facility])   Result Value Ref Range    HCG Qual Urine Negative Negative   [facility]   Result Value Ref Range    Chlamydia trac,Amplified Prb Negative Negative    N gonorrhoeae,Amplified Prb Negative Negative    Narrative    Test performed by:  [facility] [address] [place] [address]   GYN Cytology ([facility])   Result Value Ref Range    Lab AP Case Report SEE RESULTS BELOW       Comment:      Gynecologic Cytology Report                       Case: [id number]                                     Authorizing Provider:  Joanna Farah,   Collected:             12/03/2019 1148                                     MD                                                                             Ordering Location:      [facility] Received:              12/04/2019 0837              First Screen:          Le White CT                                                                                 (ASCP)                                                                         Rescreen:              Joanie Yeager CT                                                                              (ASCP)                                                                         Specimen:    SUREPATH PAP, SCREENING, Endocervical/cervical                                                Lab AP Gyn Interpretation SEE RESULTS BELOW Negative for squamous intraepithelial le      Comment:      Negative for squamous intraepithelial lesion or malignancy  Electronically signed by Joanie Yeager CT (ASCP) on 12/11/2019 at    9:43 AM      Lab AP Malignant? Normal Normal    Lab AP Gyn Other Findings Trichomonas vaginalis     Specimen adequacy:       Satisfactory for evaluation, endocervical/transformation zone component present    HPV Reflex? Yes regardless of result     High Risk? No     Last Mens Period UNKNOWN     Lab AP Abnormal Bleeding Yes     Lab AP Patient Status NA     Lab AP Birth Control/Hormones None     Lab AP Previous Normal reports + HPV     Lab AP Previous Abnl UNKNOWN     Lab AP Cervical Appearance NORMAL, SOME BLEEDING WITH COLLECTION     Narrative    Test performed by:  [facility]  [address] [place] [address]   HPV [place] PCR (in2apps)   Result Value Ref Range    HPV Source SurePath     HPV 16 DNA Negative NEG    HPV 18 DNA Negative NEG    Other HR HPV Negative NEG    Final Diagnosis SEE NOTES       Comment:      This patient's sample is negative for HPV DNA.  This test was developed and its performance characteristics determined by the  [facility]. It  has not been cleared or approved by the FDA. The laboratory is regulated under  CLIA as qualified to perform high-complexity testing. This test is used for  clinical purposes. It should not be regarded as investigational or for  research.  (Note)  METHODOLOGY:  The Roche ezra 4800 system uses automated extraction,  simultaneous amplification of HPV (L1 region) and beta-globin,  followed by  real time detection of fluorescent labeled HPV and beta  globin using specific oligonucleotide probes . The test specifically  identifies types HPV 16 DNA and HPV 18 DNA while concurrently  detecting the rest of the high risk types (31, 33, 35, 39, 45,  51,  52, 56, 58, 59, 66 or 68).     COMMENTS:  This test is not intended for use as a screening device  for women under age 30 with normal cervical   cytology.  Results should  be correlated with cytologic and histologic findings. Close clinical  followup is recommended.         Specimen Description Cervical Cells       Comment:         Performed and/or entered by:  [address] [address]       Narrative    Test performed by:  [facility]  [address], [place] [address]       If you have any questions, please call the clinic to make an appointment.    Sincerely,    Joanna Farah MD
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
## 2019-12-04 ENCOUNTER — TELEPHONE (OUTPATIENT)
Dept: FAMILY MEDICINE | Facility: CLINIC | Age: 54
End: 2019-12-04

## 2019-12-04 DIAGNOSIS — A59.9 TRICHIMONIASIS: Primary | ICD-10-CM

## 2019-12-04 LAB
C TRACH RRNA CVX QL NAA+PROBE: NEGATIVE
FINAL DIAGNOSIS: NORMAL
HPV 16 DNA: NEGATIVE
HPV 18 DNA: NEGATIVE
HPV SOURCE: NORMAL
N GONORRHOEA RRNA SPEC QL NAA+PROBE: NEGATIVE
OTHER HR HPV: NEGATIVE
SPECIMEN DESCRIPTION: NORMAL

## 2019-12-04 RX ORDER — METRONIDAZOLE 500 MG/1
2000 TABLET ORAL ONCE
Qty: 4 TABLET | Refills: 0 | Status: SHIPPED | OUTPATIENT
Start: 2019-12-04 | End: 2020-03-12

## 2019-12-04 NOTE — TELEPHONE ENCOUNTER
[facility] phone call message- patient requesting results:    Test: Lab    Date of test: 12/03/2019    Additional Comments: Calling to check on her results to see if they are in? Told her it could take up to two business days for a response. Please call and advise.     OK to leave a message on voice mail? Yes    Primary language: English      needed? No    Call taken on December 4, 2019 at 10:31 AM by Marshall Weeks

## 2019-12-04 NOTE — TELEPHONE ENCOUNTER
Please call patient.  Test was positive for trichomonas which is a sexually transmitted disease.  Patient and her partner should be treated.  I have sent prescription for Birgid Her partner should be seen and treated before they have sex again.

## 2019-12-06 ENCOUNTER — TELEPHONE (OUTPATIENT)
Dept: FAMILY MEDICINE | Facility: CLINIC | Age: 54
End: 2019-12-06

## 2019-12-06 NOTE — TELEPHONE ENCOUNTER
[facility] phone call message- medication clarification/question:    Full Medication Name: metFORMIN (GLUCOPHAGE)   Dose: 1000 MG tablet     Question: Patient states she is having a colonoscopy done on 12/13/19 at 10AM. She would like to know if she needs to take her metformin or skip it that morning since the medication needs to be taken with food. Please call and advise.      Pharmacy confirmed as [id number] - [place], [place] - [address]: Yes    OK to leave a message on voice mail? Yes    Primary language: English      needed? No    Call taken on December 6, 2019 at 2:17 PM by Sayra Mueller

## 2019-12-11 ENCOUNTER — RECORDS - HEALTHEAST (OUTPATIENT)
Dept: ADMINISTRATIVE | Facility: OTHER | Age: 54
End: 2019-12-11

## 2019-12-11 LAB
CYTOLOGY CVX/VAG DOC THIN PREP: NORMAL
HIGH RISK?: NO
HPV REFLEX?: NORMAL
LAB AP ABNORMAL BLEEDING: YES
LAB AP BIRTH CONTROL/HORMONES: NORMAL
LAB AP CERVICAL APPEARANCE: NORMAL
LAB AP GYN OTHER FINDINGS: NORMAL
LAB AP PATIENT STATUS: NORMAL
LAB AP PREVIOUS ABNL: NORMAL
LAB AP PREVIOUS NORMAL: NORMAL
LAST MENS PERIOD: NORMAL
PATH REPORT.COMMENTS IMP SPEC: NORMAL
PATH REPORT.COMMENTS IMP SPEC: NORMAL
SPECIMEN ADEQUACY:: NORMAL

## 2019-12-19 ENCOUNTER — MEDICAL CORRESPONDENCE (OUTPATIENT)
Dept: HEALTH INFORMATION MANAGEMENT | Facility: CLINIC | Age: 54
End: 2019-12-19

## 2019-12-30 ENCOUNTER — TELEPHONE (OUTPATIENT)
Dept: FAMILY MEDICINE | Facility: CLINIC | Age: 54
End: 2019-12-30

## 2019-12-30 ENCOUNTER — OFFICE VISIT - HEALTHEAST (OUTPATIENT)
Dept: FAMILY MEDICINE | Facility: CLINIC | Age: 54
End: 2019-12-30

## 2019-12-30 DIAGNOSIS — M79.644 PAIN OF FINGER OF RIGHT HAND: ICD-10-CM

## 2019-12-30 DIAGNOSIS — T74.91XA DOMESTIC VIOLENCE OF ADULT, INITIAL ENCOUNTER: ICD-10-CM

## 2019-12-30 DIAGNOSIS — S62.654A CLOSED NONDISPLACED FRACTURE OF MIDDLE PHALANX OF RIGHT RING FINGER, INITIAL ENCOUNTER: ICD-10-CM
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
## 2019-12-30 NOTE — TELEPHONE ENCOUNTER
Called patient to discuss concerns. Patient c/o middle finger pain. Stated it is black and blue, swollen, and painful. Denies change of temperature to extremity. Advised okay to come into clinic, patient then stated she cannot get a ride and can only go to emergency room. Advised would need to assess if finger is broken, patient stated she will go to the emergency room. Basilio SMITH

## 2019-12-31 ENCOUNTER — COMMUNICATION - HEALTHEAST (OUTPATIENT)
Dept: FAMILY MEDICINE | Facility: CLINIC | Age: 54
End: 2019-12-31

## 2020-01-16 ENCOUNTER — OFFICE VISIT (OUTPATIENT)
Dept: FAMILY MEDICINE | Facility: CLINIC | Age: 55
End: 2020-01-16
Payer: COMMERCIAL

## 2020-01-16 VITALS
BODY MASS INDEX: 27.72 KG/M2 | OXYGEN SATURATION: 98 % | WEIGHT: 146.8 LBS | HEIGHT: 61 IN | DIASTOLIC BLOOD PRESSURE: 92 MMHG | TEMPERATURE: 97.7 F | RESPIRATION RATE: 18 BRPM | SYSTOLIC BLOOD PRESSURE: 140 MMHG | HEART RATE: 98 BPM

## 2020-01-16 DIAGNOSIS — E11.9 TYPE 2 DIABETES MELLITUS WITHOUT COMPLICATION, WITHOUT LONG-TERM CURRENT USE OF INSULIN (H): Primary | ICD-10-CM

## 2020-01-16 DIAGNOSIS — N89.8 VAGINAL ITCHING: ICD-10-CM

## 2020-01-16 LAB
BACTERIA: NORMAL
CLUE CELLS: NORMAL
MOTILE TRICHOMONAS: NEGATIVE
ODOR: NORMAL
PH WET PREP: NORMAL (ref 3.8–4.5)
WBC WET PREP: NORMAL (ref 2–5)
YEAST: NORMAL

## 2020-01-16 RX ORDER — HYDROXYZINE HYDROCHLORIDE 25 MG/1
TABLET, FILM COATED ORAL
Refills: 0 | COMMUNITY
Start: 2019-04-16 | End: 2020-03-30

## 2020-01-16 RX ORDER — LANCETS 23 GAUGE
EACH MISCELLANEOUS
COMMUNITY
Start: 2014-05-28 | End: 2021-04-05

## 2020-01-16 RX ORDER — ONDANSETRON 4 MG/1
4 TABLET, ORALLY DISINTEGRATING ORAL
COMMUNITY
Start: 2019-12-04 | End: 2020-03-30

## 2020-01-16 RX ORDER — OMEPRAZOLE 20 MG/1
20 TABLET, DELAYED RELEASE ORAL
COMMUNITY
End: 2020-03-30

## 2020-01-16 RX ORDER — NEBULIZER ACCESSORIES
KIT MISCELLANEOUS
COMMUNITY
Start: 2019-03-31 | End: 2023-01-16

## 2020-01-16 RX ORDER — POLYETHYLENE GLYCOL-3350 AND ELECTROLYTES 236; 6.74; 5.86; 2.97; 22.74 G/274.31G; G/274.31G; G/274.31G; G/274.31G; G/274.31G
POWDER, FOR SOLUTION ORAL
COMMUNITY
Start: 2020-01-02 | End: 2020-03-30

## 2020-01-16 RX ORDER — LANCING DEVICE/LANCETS
KIT MISCELLANEOUS SEE ADMIN INSTRUCTIONS
Refills: 0 | COMMUNITY
Start: 2019-08-09 | End: 2022-12-26

## 2020-01-16 RX ORDER — PEAK FLOW METER
EACH MISCELLANEOUS
COMMUNITY
Start: 2018-06-27 | End: 2023-01-16

## 2020-01-16 RX ORDER — PEAK FLOW METER
EACH MISCELLANEOUS
Refills: 0 | COMMUNITY
Start: 2019-10-06 | End: 2023-01-16

## 2020-01-16 ASSESSMENT — ASTHMA QUESTIONNAIRES
QUESTION_3 LAST FOUR WEEKS HOW OFTEN DID YOUR ASTHMA SYMPTOMS (WHEEZING, COUGHING, SHORTNESS OF BREATH, CHEST TIGHTNESS OR PAIN) WAKE YOU UP AT NIGHT OR EARLIER THAN USUAL IN THE MORNING: NOT AT ALL
QUESTION_5 LAST FOUR WEEKS HOW WOULD YOU RATE YOUR ASTHMA CONTROL: SOMEWHAT CONTROLLED
QUESTION_4 LAST FOUR WEEKS HOW OFTEN HAVE YOU USED YOUR RESCUE INHALER OR NEBULIZER MEDICATION (SUCH AS ALBUTEROL): TWO OR THREE TIMES PER WEEK
ACT_TOTALSCORE: 18
QUESTION_1 LAST FOUR WEEKS HOW MUCH OF THE TIME DID YOUR ASTHMA KEEP YOU FROM GETTING AS MUCH DONE AT WORK, SCHOOL OR AT HOME: SOME OF THE TIME
QUESTION_2 LAST FOUR WEEKS HOW OFTEN HAVE YOU HAD SHORTNESS OF BREATH: ONCE OR TWICE A WEEK

## 2020-01-16 ASSESSMENT — MIFFLIN-ST. JEOR: SCORE: 1198.26
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
## 2020-01-17 LAB
C TRACH RRNA CVX QL NAA+PROBE: NEGATIVE
N GONORRHOEA RRNA SPEC QL NAA+PROBE: NEGATIVE

## 2020-01-17 ASSESSMENT — ASTHMA QUESTIONNAIRES: ACT_TOTALSCORE: 18

## 2020-01-19 PROBLEM — R94.31 PROLONGED Q-T INTERVAL ON ECG: Status: ACTIVE | Noted: 2019-07-01

## 2020-01-19 PROBLEM — J44.9 COPD (CHRONIC OBSTRUCTIVE PULMONARY DISEASE) (H): Status: ACTIVE | Noted: 2018-07-28

## 2020-01-19 PROBLEM — F19.10 POLYSUBSTANCE ABUSE (H): Status: ACTIVE | Noted: 2019-07-01

## 2020-01-20 ENCOUNTER — COMMUNICATION - HEALTHEAST (OUTPATIENT)
Dept: PULMONOLOGY | Facility: OTHER | Age: 55
End: 2020-01-20

## 2020-01-20 ENCOUNTER — TELEPHONE (OUTPATIENT)
Dept: FAMILY MEDICINE | Facility: CLINIC | Age: 55
End: 2020-01-20

## 2020-01-20 DIAGNOSIS — J45.40 MODERATE PERSISTENT ASTHMA WITHOUT COMPLICATION: ICD-10-CM

## 2020-01-20 NOTE — TELEPHONE ENCOUNTER
Called patient to discuss referral, she stated she can't do that right now and that she will take care of it.   States that she need's refills on her inhaler and etc, she said she called the pharmacy and that they would request it.

## 2020-01-20 NOTE — TELEPHONE ENCOUNTER
[facility] phone call message- medication clarification/question:    Full Medication Name:benzocaine-resorcinol (VAGISIL)      Dose:     Question: Patient does not like cream as it gets messy and wants to request for pills    Pharmacy confirmed as [id number] - [place], [place] - [address] AT [address]: Yes    OK to leave a message on voice mail? Yes    Primary language: English      needed? No    Call taken on January 20, 2020 at 8:38 AM by Noah Felipe

## 2020-01-21 NOTE — PROGRESS NOTES
Preceptor Attestation:   Patient seen, evaluated and discussed with the resident. I have verified the content of the note, which accurately reflects my assessment of the patient and the plan of care.  Supervising Physician:Venita Sorenson DO [facility]

## 2020-01-24 ENCOUNTER — RECORDS - HEALTHEAST (OUTPATIENT)
Dept: ADMINISTRATIVE | Facility: OTHER | Age: 55
End: 2020-01-24

## 2020-01-26 ENCOUNTER — TELEPHONE (OUTPATIENT)
Dept: FAMILY MEDICINE | Facility: CLINIC | Age: 55
End: 2020-01-26

## 2020-01-28 ENCOUNTER — OFFICE VISIT (OUTPATIENT)
Dept: FAMILY MEDICINE | Facility: CLINIC | Age: 55
End: 2020-01-28
Payer: COMMERCIAL

## 2020-01-28 VITALS
BODY MASS INDEX: 26.09 KG/M2 | OXYGEN SATURATION: 96 % | HEIGHT: 62 IN | DIASTOLIC BLOOD PRESSURE: 77 MMHG | SYSTOLIC BLOOD PRESSURE: 111 MMHG | TEMPERATURE: 98 F | WEIGHT: 141.8 LBS | HEART RATE: 101 BPM | RESPIRATION RATE: 16 BRPM

## 2020-01-28 DIAGNOSIS — J06.9 VIRAL UPPER RESPIRATORY TRACT INFECTION: Primary | ICD-10-CM

## 2020-01-28 DIAGNOSIS — F33.40 RECURRENT MAJOR DEPRESSIVE DISORDER, IN REMISSION (H): ICD-10-CM

## 2020-01-28 RX ORDER — FLUTICASONE PROPIONATE 50 MCG
1 SPRAY, SUSPENSION (ML) NASAL DAILY
Qty: 11.1 ML | Refills: 1 | Status: SHIPPED | OUTPATIENT
Start: 2020-01-28 | End: 2020-03-30

## 2020-01-28 ASSESSMENT — MIFFLIN-ST. JEOR: SCORE: 1191.58
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
## 2020-01-28 NOTE — LETTER
RETURN TO WORK/SCHOOL FORM    1/28/2020    Re: Mary Ann Olson  1965      To Whom It May Concern:     Mary Ann Olson was seen in clinic today..  She may return to work without restrictions on 2/3/20.      Joanna Farah MD  1/28/2020 12:16 PM

## 2020-01-31 ENCOUNTER — DOCUMENTATION ONLY (OUTPATIENT)
Dept: PSYCHOLOGY | Facility: CLINIC | Age: 55
End: 2020-01-31

## 2020-01-31 NOTE — PROGRESS NOTES
Per PCP, patient has long hx struggling with impulse control and inappropriately escalating situations. She has a history of bipolar disorder and is primarily depressed, lonely. She struggles with transportation so is interested more in in-home therapy. Please consider the following:    [facility]  [address] [place] [address], [address]  [place], [place]   [address]  Office:   [phone]  Fax:   [phone]    [facility]  [phone]  Option available to medical assistance recipients      [facility]is [facility] Zoraida  [phone]            ===View-only below this line===  ----- Message -----  From: Mavis Farah MD  Sent: 2020  11:59 AM CST  To:  Mental Health  Subject: Order for GUADALUPE FRANKLIN          [id number]               : 1965  F     [address] [place] [address] [address]                             PCP: [id number] [id number]*  [place] [address]                                CTR: [facility]        Name: GUADALUPE FRANKLIN Date: 2020    Home: [phone]    Payor:              [facility]  Plan:                 ARE CONNECT MA ONLY  Sponsor Code:       [id number]  Subscriber ID:      [id number]  Subscriber Name:    GUADALUPE FRANKLIN  Subscriber Address: [address] [place] [address] [address]                      [place] [address]    Effective From:     18  Effective To:         Group Number:       [id number]  Group Name  : Not Available      Date       Provider                   Department   Center         2020  20113-MAPRRMAVIS FARAH*[facility] Owned    Order Date:2020  Ordering User:MAVIS FARAH [[id number]]  Encounter Provider:Mavis Farah MD [[id number]]  Authorizing Provider: Mavis Farah MD [[id number]]  Department:[facility][[id number]]    Ordering Provider NPI: [id number]  Mavis Farah  [facility]~[address], [place] [address]  Phone:  [phone]        Procedure   Requested    9035.005 [facility] REFER* [#[id number]]      Priority: Routine  Class: External referral      Comment: needed: No               Language: English                              Patient requesting in home therapy.  Major depression    Associated Diagnoses      F33.40 Recurrent major depressive disorder, in remission (H)      Reason for Referral:  Depression         Adult or Chil  d/Adolescent:  Adult         Type of Referral (Indicate all that apply):  Psychotherapy - for diagnosis                                                   and non-pharmacological                                                   treatment         Currently receiving mental health services (if 'Yes', use free franca box -   what services and why today's referral?)  No         Previous psych hospitalization:  No         Mary Ann Olson          [id number]  [id number]               : 1965  F     [address]                             PCP: MAVIS ROMO  [place] [address]                                CTR: [facility]      Comments

## 2020-02-03 ENCOUNTER — TELEPHONE (OUTPATIENT)
Dept: FAMILY MEDICINE | Facility: CLINIC | Age: 55
End: 2020-02-03

## 2020-02-04 NOTE — TELEPHONE ENCOUNTER
SW found Special Diet form in pt's media. Attempted to fax form to Luisa ([phone]) but fax would not go through.     Called Luisa ([phone]) and verified Sw had correct fax #. Also verified pt needed to submit special diet form and account verification.

## 2020-02-04 NOTE — TELEPHONE ENCOUNTER
Incoming call from pt, who said she could not make an appt due to inactive insurance. Received contact info for pt's financial worker, Luisa Weeks (P: [phone], F: [phone]).     Pt said Luisa needed special diet form before MA is back active. SW said he was unsure of this form, but would call pt back.

## 2020-02-05 ENCOUNTER — TELEPHONE (OUTPATIENT)
Dept: FAMILY MEDICINE | Facility: CLINIC | Age: 55
End: 2020-02-05

## 2020-02-05 NOTE — LETTER
February 13, 2020       TO: Mary Ann Olson  [address]  [place] [address]         Dear Mary Ann,    Here is some information for your review in regards to the mental health referral we discussed on 2/13/20. I have also included the [place] Dept [place] Human [place] Eligibility form showing you are eligible for services until 2/29/20. Please contact me directly to discuss.      [facility]  [address], [address]  [place], [place]   [address]  Office:   [phone]       [facility]  [address] [place] [address] [address], [place] [address]  Office: [phone]          [facility]  [address] [place] [address] [address], [place] [address]  Office: [phone]    Sincerely,      Meeta Woods CMA, Care Coordinator  Direct Phone: [phone]

## 2020-02-06 NOTE — TELEPHONE ENCOUNTER
Called to provide pt w/ contact info for mental health clinics. Pt said she did not need any help and would just go to [facility] for a mental emergency. Pt then hung up the phone.

## 2020-02-06 NOTE — TELEPHONE ENCOUNTER
Patient returned call but did not want to be transferred to [facility] because she stated she already left a voicemail. Patient states she would like a call back. Please call and advise.

## 2020-02-12 DIAGNOSIS — Z78.0 POSTMENOPAUSAL STATUS: ICD-10-CM

## 2020-02-13 NOTE — TELEPHONE ENCOUNTER
Out going call made to Mary Ann to f/u on mental health referral. Patient very upset as she reports not having any insurance and wants help however, she has no money to pay. Offered to reach out to her [facility] worker Luisa to see if I could provided any assistance getting the insurance active, patient is agreeable and would like me to call her back to inform her with what happened.     Mario VARELA () accessed [place] Department of Human Services to review eligibility, report reflects active date from 07/01/2018 to 02/29/2020. Return call made to Norm to inform. Patient verbalized understanding and is interested in mental health therapy but she is not available to discuss right now and has requested referral information be mailed to confirmed home address listed in chart. Mary Ann will contact me once reviewed.

## 2020-02-18 ENCOUNTER — OFFICE VISIT (OUTPATIENT)
Dept: FAMILY MEDICINE | Facility: CLINIC | Age: 55
End: 2020-02-18

## 2020-02-18 VITALS
TEMPERATURE: 98.2 F | OXYGEN SATURATION: 97 % | WEIGHT: 136.6 LBS | RESPIRATION RATE: 18 BRPM | DIASTOLIC BLOOD PRESSURE: 87 MMHG | BODY MASS INDEX: 25.79 KG/M2 | SYSTOLIC BLOOD PRESSURE: 125 MMHG | HEIGHT: 61 IN | HEART RATE: 101 BPM

## 2020-02-18 DIAGNOSIS — M79.645 PAIN OF LEFT THUMB: Primary | ICD-10-CM

## 2020-02-18 ASSESSMENT — MIFFLIN-ST. JEOR: SCORE: 1152.37

## 2020-02-18 NOTE — LETTER
RETURN TO WORK/SCHOOL FORM    2/18/2020    Re: Mary Ann Olson  1965      To Whom It May Concern:     Mary Ann Olson was seen in clinic after missing work 2/17/2020.  She was experiencing some hand numbness which has improved.  I have evaluated the patient and she is safe to return to work without restrictions      Joanna Farah MD  2/18/2020 3:27 PM
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
## 2020-03-06 DIAGNOSIS — J44.1 CHRONIC OBSTRUCTIVE PULMONARY DISEASE WITH ACUTE EXACERBATION (H): ICD-10-CM

## 2020-03-07 ENCOUNTER — TELEPHONE (OUTPATIENT)
Dept: FAMILY MEDICINE | Facility: CLINIC | Age: 55
End: 2020-03-07

## 2020-03-07 NOTE — TELEPHONE ENCOUNTER
On call returning page regarding: sever pain, need to talk to on call physician    Says she has been having worsening abdominal pain for past 2-3 days. Overnight had trouble sleeping and was crouched over on the couch. Notes she has Crohn's disease and does feel like a flare. Having subjective fevers and chills - though noted going through menopause. Also having some blood in stool. Has had pretty poor appetite. Says pain is not improving.     Giving these findings, I recommended in person evaluation for possibility of Crohn's flare versus other intra-abdominal process (stricture, abscess, GIB). She agreed with this and will go to [facility].    Benjamin Rosenstein, MD, MA  [facility]  P: [phone]

## 2020-03-12 ENCOUNTER — TELEPHONE (OUTPATIENT)
Dept: FAMILY MEDICINE | Facility: CLINIC | Age: 55
End: 2020-03-12

## 2020-03-12 ENCOUNTER — OFFICE VISIT (OUTPATIENT)
Dept: FAMILY MEDICINE | Facility: CLINIC | Age: 55
End: 2020-03-12

## 2020-03-12 VITALS
SYSTOLIC BLOOD PRESSURE: 113 MMHG | WEIGHT: 130.4 LBS | HEART RATE: 104 BPM | OXYGEN SATURATION: 95 % | BODY MASS INDEX: 24.62 KG/M2 | RESPIRATION RATE: 20 BRPM | TEMPERATURE: 98.1 F | DIASTOLIC BLOOD PRESSURE: 88 MMHG

## 2020-03-12 DIAGNOSIS — R68.83 CHILLS (WITHOUT FEVER): ICD-10-CM

## 2020-03-12 DIAGNOSIS — J10.1 INFLUENZA A: ICD-10-CM

## 2020-03-12 DIAGNOSIS — R05.9 COUGH: Primary | ICD-10-CM

## 2020-03-12 DIAGNOSIS — K50.10 CROHN'S DISEASE OF LARGE INTESTINE WITHOUT COMPLICATION (H): ICD-10-CM

## 2020-03-12 PROBLEM — K58.9 IRRITABLE BOWEL SYNDROME: Status: RESOLVED | Noted: 2018-01-23 | Resolved: 2020-03-12

## 2020-03-12 LAB
FLUAV AG UPPER RESP QL IA.RAPID: POSITIVE
FLUBV AG UPPER RESP QL IA.RAPID: NEGATIVE

## 2020-03-12 RX ORDER — OSELTAMIVIR PHOSPHATE 75 MG/1
75 CAPSULE ORAL 2 TIMES DAILY
Qty: 10 CAPSULE | Refills: 0 | Status: SHIPPED | OUTPATIENT
Start: 2020-03-12 | End: 2020-03-30

## 2020-03-12 RX ORDER — DEXTROMETHORPHAN POLISTIREX 30 MG/5ML
60 SUSPENSION ORAL 2 TIMES DAILY
Qty: 89 ML | Refills: 0 | Status: SHIPPED | OUTPATIENT
Start: 2020-03-12 | End: 2020-03-30

## 2020-03-12 RX ORDER — PREDNISONE 20 MG/1
TABLET ORAL
COMMUNITY
Start: 2020-03-07 | End: 2020-03-30

## 2020-03-12 NOTE — LETTER
RETURN TO WORK FORM    3/12/2020    Re: Mary Ann Olson  1965      To Whom It May Concern:     Mary Ann Olson was seen in clinic today. Please excuse missed work due to illness  She may return to work without restrictions on 3/18/20          Restrictions:  None      Berta Leonard MD  3/12/2020 5:20 PM

## 2020-03-12 NOTE — RESULT ENCOUNTER NOTE
Informed at visit,as symptoms began 48 hours ago and with underlying lung disease and humira will treat.
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
## 2020-03-20 ENCOUNTER — DOCUMENTATION ONLY (OUTPATIENT)
Dept: FAMILY MEDICINE | Facility: CLINIC | Age: 55
End: 2020-03-20

## 2020-03-20 NOTE — PROGRESS NOTES
"Patient calling clinic with concern of brown phlegm.  Last seen here 3/12/20 by Dr. Leonard for dyspnea.  CXR was negative.  Tamiflu initiated at that time for Influenza A.  Today states that she is no longer coughing, just occasionally brings up this brown phlegm.  Finished Tamiflu course.  Quit smoking cigarettes 1 week ago, and congratulated for this.  Admits weakness.  Feels a little short of breath, which she states is new, although review of last clinic note reveals dyspnea at that time on and off for weeks.  No chest pain.  States that she went to the store yesterday for cat litter and wonders if she picked something up in light of current COVID-19 pandemic.  Given symptoms are mild at this time, only new complaint is brown phlegm, and no new fever, advised patient remain at home, rest and stay well-hydrated.  Patient states staff at [facility] will bring groceries to her.  Given current COVID-19 pandemic, would not recommend clinic visit.  Patient reassured brown phlegm may be from resolving infection in the setting of recent prior tobacco use.    Patient also reports she has been on prednisone 21-day course for Crohn's flare, that she has one week remaining and she would \"just like to be done.\"  I advised that she complete the prednisone course.  Patient advised to call clinic again if new or worsening symptoms.    Negar Patel MD  [facility] Medicine Resident          "

## 2020-03-30 ENCOUNTER — VIRTUAL VISIT (OUTPATIENT)
Dept: FAMILY MEDICINE | Facility: CLINIC | Age: 55
End: 2020-03-30

## 2020-03-30 DIAGNOSIS — R10.32 ABDOMINAL PAIN, LEFT LOWER QUADRANT: ICD-10-CM

## 2020-03-30 DIAGNOSIS — Z78.0 POSTMENOPAUSAL STATUS: ICD-10-CM

## 2020-03-30 DIAGNOSIS — R05.9 COUGH: ICD-10-CM

## 2020-03-30 DIAGNOSIS — J06.9 VIRAL UPPER RESPIRATORY TRACT INFECTION: ICD-10-CM

## 2020-03-30 DIAGNOSIS — F41.9 ANXIETY: ICD-10-CM

## 2020-03-30 DIAGNOSIS — E11.9 TYPE 2 DIABETES MELLITUS WITHOUT COMPLICATION, WITHOUT LONG-TERM CURRENT USE OF INSULIN (H): ICD-10-CM

## 2020-03-30 DIAGNOSIS — K59.01 SLOW TRANSIT CONSTIPATION: Primary | ICD-10-CM

## 2020-03-30 DIAGNOSIS — J44.1 CHRONIC OBSTRUCTIVE PULMONARY DISEASE WITH ACUTE EXACERBATION (H): ICD-10-CM

## 2020-03-30 DIAGNOSIS — K59.00 CONSTIPATION, UNSPECIFIED CONSTIPATION TYPE: ICD-10-CM

## 2020-03-30 DIAGNOSIS — J45.40 MODERATE PERSISTENT ASTHMA WITHOUT COMPLICATION: ICD-10-CM

## 2020-03-30 RX ORDER — ALBUTEROL SULFATE 0.83 MG/ML
2.5 SOLUTION RESPIRATORY (INHALATION) EVERY 6 HOURS PRN
Qty: 90 VIAL | Refills: 11 | Status: SHIPPED | OUTPATIENT
Start: 2020-03-30 | End: 2020-07-03

## 2020-03-30 RX ORDER — FLUTICASONE PROPIONATE 50 MCG
1 SPRAY, SUSPENSION (ML) NASAL DAILY
Qty: 11.1 ML | Refills: 3 | Status: SHIPPED | OUTPATIENT
Start: 2020-03-30 | End: 2020-09-09

## 2020-03-30 RX ORDER — ATORVASTATIN CALCIUM 40 MG/1
40 TABLET, FILM COATED ORAL DAILY
Qty: 90 TABLET | Refills: 3 | Status: SHIPPED | OUTPATIENT
Start: 2020-03-30 | End: 2021-01-25

## 2020-03-30 RX ORDER — ERGOCALCIFEROL 1.25 MG/1
50000 CAPSULE, LIQUID FILLED ORAL WEEKLY
Qty: 12 CAPSULE | Refills: 3 | Status: SHIPPED | OUTPATIENT
Start: 2020-03-30 | End: 2020-05-29

## 2020-03-30 RX ORDER — ALBUTEROL SULFATE 90 UG/1
2 AEROSOL, METERED RESPIRATORY (INHALATION) EVERY 6 HOURS PRN
Qty: 18 G | Refills: 3 | Status: SHIPPED | OUTPATIENT
Start: 2020-03-30 | End: 2020-11-16

## 2020-03-30 RX ORDER — POLYETHYLENE GLYCOL 3350 17 G/17G
1 POWDER, FOR SOLUTION ORAL DAILY PRN
Qty: 30 PACKET | Refills: 0 | Status: SHIPPED | OUTPATIENT
Start: 2020-03-30 | End: 2023-01-16

## 2020-03-30 RX ORDER — ACETAMINOPHEN 500 MG
1000 TABLET ORAL EVERY 6 HOURS PRN
Qty: 100 TABLET | Refills: 3 | Status: SHIPPED | OUTPATIENT
Start: 2020-03-30 | End: 2020-06-04

## 2020-03-30 RX ORDER — AMOXICILLIN 250 MG
1 CAPSULE ORAL 2 TIMES DAILY
Qty: 60 TABLET | Refills: 0 | Status: SHIPPED | OUTPATIENT
Start: 2020-03-30 | End: 2020-04-17

## 2020-03-30 RX ORDER — HYDROXYZINE PAMOATE 25 MG/1
25 CAPSULE ORAL 3 TIMES DAILY PRN
Qty: 28 CAPSULE | Refills: 3 | Status: SHIPPED | OUTPATIENT
Start: 2020-03-30 | End: 2021-04-05

## 2020-03-30 RX ORDER — OMEPRAZOLE 20 MG/1
20 TABLET, DELAYED RELEASE ORAL DAILY
Qty: 30 TABLET | Refills: 3 | Status: SHIPPED | OUTPATIENT
Start: 2020-03-30 | End: 2020-06-04

## 2020-03-30 NOTE — PROGRESS NOTES
"Family Medicine Telephone Visit Note           Telephone Visit Consent   Patient was verbally read the following and verbal consent was obtained.  \"I understand that I may revoke this request for a phone visit at any time.  This consent will automatically  3 months from the signed date and time.\"    Name person giving consent:  Patient   Date verbal consent given:  3/30/2020  Time verbal consent given:  8:54 AM      Chief Complaint   Patient presents with     Colitis     acting up, cant poop, no appitite, weight loss. abdominal pain     Medication Reconciliation     completed, needs attention      Medication Request     refill on albuterol     Current Outpatient Medications   Medication Sig Dispense Refill     acetaminophen (TYLENOL) 500 MG tablet Take 2 tablets (1,000 mg) by mouth every 6 hours as needed for pain 100 tablet 1     adalimumab (HUMIRA) 40 MG/0.8ML prefilled syringe kit Inject 0.8 mLs (40 mg) Subcutaneous every 14 days       albuterol (PROAIR HFA/PROVENTIL HFA/VENTOLIN HFA) 108 (90 Base) MCG/ACT inhaler Inhale 2 puffs into the lungs every 6 hours as needed for shortness of breath / dyspnea or wheezing 18 g 1     albuterol (PROVENTIL) (2.5 MG/3ML) 0.083% neb solution Take 1 vial (2.5 mg) by nebulization every 6 hours as needed for shortness of breath / dyspnea or wheezing 90 vial 11     atorvastatin (LIPITOR) 40 MG tablet Take 1 tablet (40 mg) by mouth daily 90 tablet 3     blood glucose (NO BRAND SPECIFIED) test strip Use to test blood sugar 1 times daily or as directed. 50 each 11     blood glucose (NO BRAND SPECIFIED) test strip Use to test blood sugars as directed. 100 strip 1     blood glucose (ONE TOUCH DELICA) lancing device See Admin Instructions  0     blood glucose monitoring (NO BRAND SPECIFIED) meter device kit Use to test blood sugar 1 times daily or as directed. 1 kit 0     blood glucose monitoring (NO BRAND SPECIFIED) meter device kit Preferred blood glucose meter OR supplies to " accompany: Blood Glucose Monitor Brands: One Touch Delica 1 kit 0     blood glucose monitoring (ONE TOUCH DELICA) lancets Use to test blood sugars 1 time daily 50 each 11     calcium carbonate 600 mg-vitamin D 400 units (CALCIUM 600 + D) 600-400 MG-UNIT per tablet Take 1 tablet by mouth 2 times daily 60 tablet 1     EPINEPHrine (EPIPEN/ADRENACLICK/OR ANY BX GENERIC EQUIV) 0.3 MG/0.3ML injection 2-pack Inject 0.3 mLs (0.3 mg) into the muscle as needed for anaphylaxis 0.6 mL 0     fluticasone (FLONASE) 50 MCG/ACT nasal spray Spray 1 spray into both nostrils daily 11.1 mL 1     hydrOXYzine (VISTARIL) 25 MG capsule Take 1 capsule (25 mg) by mouth 3 times daily as needed for itching 28 capsule 3     lancets 28G MISC Test 2 times per day.       metFORMIN (GLUCOPHAGE) 1000 MG tablet Take 1 tablet (1,000 mg) by mouth 2 times daily (with meals) 300 tablet 0     mometasone-formoterol (DULERA) 100-5 MCG/ACT inhaler Inhale 2 puffs into the lungs 2 times daily 13 g 3     Nebulizers (VIOS AEROSOL DELIVERY SYSTEM) [facility] USE UTD  0     Nebulizers (VIOS LC SPRINT DELUXE) MISC Needs nebulizer and all accessories.       omeprazole (PRILOSEC OTC) 20 MG EC tablet Take 20 mg by mouth       order for DME Equipment being ordered: Nebulizer Machine with mask and tubing. 1 Units 0     order for DME Equipment being ordered: incontinence pads    Please fax to [facility] 1 Box 3     polyethylene glycol (MIRALAX/GLYCOLAX) packet Take 17 g by mouth daily as needed for constipation 30 packet 0     Respiratory Therapy Supplies (CHIDI BABY CONVERSION KIT) MISC To use with albuterol solution       senna-docusate (SENOKOT-S/PERICOLACE) 8.6-50 MG tablet Take 1 tablet by mouth 2 times daily 60 tablet 0     tiotropium (SPIRIVA RESPIMAT) 2.5 MCG/ACT inhaler Inhale 2 puffs into the lungs daily 12 g 3     vitamin D2 (ERGOCALCIFEROL) 77982 units (1250 mcg) capsule Take 1 capsule (50,000 Units) by mouth once a week 12 capsule 3     acetaminophen (TYLENOL)  160 MG/5ML elixir as needed.       blood glucose (NO BRAND SPECIFIED) lancets standard Use to test blood sugar 1 times daily or as directed. 50 each 11     Calcium-Phosphorus-Vitamin D (GELATIN/CALCIUM/VITAMIN D OR) 50,000 Units       hydrOXYzine (ATARAX) 25 MG tablet   0     Allergies   Allergen Reactions     Keflex [Cephalexin] Shortness Of Breath and Rash     Aspirin      Cefuroxime Itching     Cheese GI Disturbance     Contrast Dye Hives     IV     Diagnostic X-Ray Materials Hives     Diatrizoate Hives     Gadolinium Derivatives Hives     Hazelnuts [Nuts]      Iodixanol Unknown     Nitrofurantoin Itching     Septra [Sulfamethoxazole W/Trimethoprim] Hives     Sulfa Drugs Hives     Metronidazole Itching and Rash     Quinolones Rash             HPI   Patients name: Mary Ann  Appointment start time:  9:08 AM    ABDOMINAL   Pain - abdominal pain started at the beginning of March, problems having a bowel movement, weight loss, just finished 21 day course of prednisone     Onset: Started a few weeks ago, when patient's last normal BM. Patient has noticed some weight loss as well. Went to the ER at the beginning of March. GI started prednisone for Crohn's flare. Patient had a GI appointment this morning but canceled it due to the coronavirus.    Description:   Character: Sharp  Location: left upper quadrant left lower quadrant  Radiation: None    Intensity: 10/10 this morning, now it is more of a 7/10    Progression of Symptoms:  same and constant    Accompanying Signs & Symptoms:  Fever/Chills?: No   Gas/Bloating:  YES - lots of gas  Dysuria:No   Hematuria: No   Nausea: No   Vomiting: No   Diarrhea:No   Constipation: YES - last normal bowel movement was several weeks ago, and patient has been extremely constipated since then. States that she sees undigested food in her bowel movements, and she has had to wipe stool out of her with toilet paper.     Therapies Tried and outcome: Patient has tried Miralax but has not tried  this for a while      Patient has not been moving around much, and she feels like her diet has been very poor as well. Has had trouble getting to the pharmacy due to transportation issues. Patient is very afraid that she has cancer. Tried to get a colonoscopy but was not able to do the prep.         Assessment and Plan   (K59.01) Slow transit constipation  (primary encounter diagnosis)  Comment: Patient has had constipation for the last 2-3 weeks, abdominal pain likely related to this. Some weight loss related to poor appetite, and patient has had poor diet and low water intake. Has also recently finished 21 day course of prednisone for Crohn's flare which may be contributing.   Plan: senna-docusate (SENOKOT-S/PERICOLACE) 8.6-50 MG        Tablet, scheduled BID        Increase water and fiber intake    (R10.32) Abdominal pain, left lower quadrant  Comment: Abdominal pain started around the time patient's constipation began. Has not had any blood in her bowel movements and is constipated rather than having diarrhea, which argues against crohn's flare. Most likely related to constipation and inability to have normal BM. Patient canceled her GI appointment scheduled for today due to fear of coronavirus.   Plan: Patient will contact GI providers and discuss abdominal pain if this worsens to determine whether she needs to reschedule her appointment/get additional dedicated imaging or colonoscopy.    Refilled medications that would be required in the next 3 months.     After Visit Information:  Will print and mail AVS     Appointment end time: 9:36 AM  This is a telephone visit that took 28 minutes.      Clinician location:  [facility]    GREER MONTEIRO      Precepted with Dr. Jackson.

## 2020-03-30 NOTE — PROGRESS NOTES
I have personally reviewed the telephone encounter as documented by Dr. Mustafa.  I spoke with the patient and agree with the assessment and plan as documented for 54 yr old female with hx of Crohn's dz evaluated for constipation, abdominal pain. Is s/p 21 day taper of prednisone for suspected Crohn's flare. Had appt w/ GI this am which she cancelled. Denies blood in stool. Able to tolerate PO. Will trial bowel regimen. Precautions given to have office visit with us or GI is pain worsens. Anticipatory guidance given.     Kenneth Jackson MD  March 30, 2020  9:40 AM

## 2020-04-09 ENCOUNTER — TELEPHONE (OUTPATIENT)
Dept: FAMILY MEDICINE | Facility: CLINIC | Age: 55
End: 2020-04-09

## 2020-04-09 ENCOUNTER — VIRTUAL VISIT (OUTPATIENT)
Dept: FAMILY MEDICINE | Facility: CLINIC | Age: 55
End: 2020-04-09
Payer: COMMERCIAL

## 2020-04-09 DIAGNOSIS — E11.9 TYPE 2 DIABETES MELLITUS WITHOUT COMPLICATION, WITHOUT LONG-TERM CURRENT USE OF INSULIN (H): ICD-10-CM

## 2020-04-09 DIAGNOSIS — F41.1 ANXIETY IN ACUTE STRESS REACTION: ICD-10-CM

## 2020-04-09 DIAGNOSIS — F43.0 ANXIETY IN ACUTE STRESS REACTION: ICD-10-CM

## 2020-04-09 DIAGNOSIS — R63.4 WEIGHT LOSS: ICD-10-CM

## 2020-04-09 DIAGNOSIS — F31.81 BIPOLAR II DISORDER (H): ICD-10-CM

## 2020-04-09 DIAGNOSIS — K50.10 CROHN'S DISEASE OF LARGE INTESTINE WITHOUT COMPLICATION (H): ICD-10-CM

## 2020-04-09 DIAGNOSIS — R10.84 ABDOMINAL PAIN, GENERALIZED: Primary | ICD-10-CM

## 2020-04-09 ASSESSMENT — PATIENT HEALTH QUESTIONNAIRE - PHQ9: SUM OF ALL RESPONSES TO PHQ QUESTIONS 1-9: 16

## 2020-04-09 NOTE — PATIENT INSTRUCTIONS
Call [facility] as we discussed. They will help arrange follow-up as they are able    See us in about 1 week for a lab visit and weight. Or, if your symptoms are not better, make an in person visit and we will do labs as well.     You can use vistaril as needed for worsening anxiety. Use Headspace to try guided meditation. Contact the therapy group for follow-up

## 2020-04-09 NOTE — PROGRESS NOTES
"Family Medicine Telephone Visit Note           Telephone Visit Consent   Patient was verbally read the following and verbal consent was obtained.  \"I understand that I may revoke this request for a phone visit at any time.  This consent will automatically  3 months from the signed date and time.\"    Name person giving consent:  Patient   Date verbal consent given:  2020  Time verbal consent given:  9:57 AM         HPI   Patients name: Mary Ann  Appointment start time:  10:06 AM      Chief Complaint   Patient presents with     RECHECK     f/u stomach pain Pt stated she is still struggling with constipation and is still loosing weight     Stomach Pain  -Reports still having belly pain, similar to last week  -Does say belly pain seemed to get worse after restarting Humira in February. Says she has stopped it, last dose was about . Was receiving it every 2 weeks (has missed 2 doses)  -Was recently on 21 day steroid taper due to concerns for Crohn's flare when seen in ED on 3/7/2020. Has completed this and happy too as messed with her sleep.  -She tried the senna from last visit but did not like it  -Says feels constipated still. Last BM about 2 days ago  -States it was watery, like diarrhea, because she had had both prune juice and used miralax  -Uses miralax as needed, never taken it daily.   -Because of belly pain and constipation, reports decreased appetite. Concerning about this with recent weight loss - though doesn't have a scale at home.   -Denies any melena or hematochezia  -Takes omeprazole daily. Denies heartburn, nausea, or vomiting  -Does feel the abdominal pain is worse following meal and especially after eating vegetables  -No dysuria or heamturia  -Has not had follow-up with GI recently    -Does report feeling dehydrated. States is trying to drink plenty of water  -Urinating more frequently as well  -Only takes metformin for diabetes   -Says 114 before breakfast today. Will skip meals due " to loss of appetite though too  -No fevers or chills. Recently was diagnosed with Flu A on 3/20    Mood  -States mood recently down   -Mainly due to symptoms above, but also with anxiety around COVID19  -Is currently not working as well and feels more isolated than normal, and she says she likes to be around people  -Has not been on medications for this, says she's very concerned about side-effects  -Reports [facility], has not yet started      Current Outpatient Medications   Medication Sig Dispense Refill     acetaminophen (TYLENOL) 500 MG tablet Take 2 tablets (1,000 mg) by mouth every 6 hours as needed for pain 100 tablet 3     adalimumab (HUMIRA) 40 MG/0.8ML prefilled syringe kit Inject 0.8 mLs (40 mg) Subcutaneous every 14 days       albuterol (PROAIR HFA/PROVENTIL HFA/VENTOLIN HFA) 108 (90 Base) MCG/ACT inhaler Inhale 2 puffs into the lungs every 6 hours as needed for shortness of breath / dyspnea or wheezing 18 g 3     albuterol (PROVENTIL) (2.5 MG/3ML) 0.083% neb solution Take 1 vial (2.5 mg) by nebulization every 6 hours as needed for shortness of breath / dyspnea or wheezing 90 vial 11     atorvastatin (LIPITOR) 40 MG tablet Take 1 tablet (40 mg) by mouth daily 90 tablet 3     blood glucose (NO BRAND SPECIFIED) lancets standard Use to test blood sugar 1 times daily or as directed. 50 each 11     blood glucose (NO BRAND SPECIFIED) test strip Use to test blood sugar 1 times daily or as directed. 50 each 11     blood glucose (NO BRAND SPECIFIED) test strip Use to test blood sugars as directed. 100 strip 1     blood glucose (ONE TOUCH DELICA) lancing device See Admin Instructions  0     blood glucose monitoring (NO BRAND SPECIFIED) meter device kit Use to test blood sugar 1 times daily or as directed. 1 kit 0     blood glucose monitoring (NO BRAND SPECIFIED) meter device kit Preferred blood glucose meter OR supplies to accompany: Blood Glucose Monitor Brands: One Touch Delica 1  kit 0     blood glucose monitoring (ONE TOUCH DELICA) lancets Use to test blood sugars 1 time daily 50 each 11     calcium carbonate 600 mg-vitamin D 400 units (CALCIUM 600 + D) 600-400 MG-UNIT per tablet Take 1 tablet by mouth 2 times daily 60 tablet 3     Calcium-Phosphorus-Vitamin D (GELATIN/CALCIUM/VITAMIN D OR) 50,000 Units       EPINEPHrine (EPIPEN/ADRENACLICK/OR ANY BX GENERIC EQUIV) 0.3 MG/0.3ML injection 2-pack Inject 0.3 mLs (0.3 mg) into the muscle as needed for anaphylaxis 0.6 mL 0     fluticasone (FLONASE) 50 MCG/ACT nasal spray Spray 1 spray into both nostrils daily 11.1 mL 3     hydrOXYzine (VISTARIL) 25 MG capsule Take 1 capsule (25 mg) by mouth 3 times daily as needed for itching 28 capsule 3     lancets 28G MISC Test 2 times per day.       metFORMIN (GLUCOPHAGE) 1000 MG tablet Take 1 tablet (1,000 mg) by mouth 2 times daily (with meals) 300 tablet 3     mometasone-formoterol (DULERA) 100-5 MCG/ACT inhaler Inhale 2 puffs into the lungs 2 times daily 13 g 3     Nebulizers (VIOS AEROSOL DELIVERY SYSTEM) [facility] USE UTD  0     Nebulizers (VIOS LC SPRINT DELUXE) MISC Needs nebulizer and all accessories.       omeprazole (PRILOSEC OTC) 20 MG EC tablet Take 1 tablet (20 mg) by mouth daily 30 tablet 3     order for DME Equipment being ordered: Nebulizer Machine with mask and tubing. 1 Units 0     order for DME Equipment being ordered: incontinence pads    Please fax to [facility] 1 Box 3     polyethylene glycol (MIRALAX) packet Take 17 g by mouth daily as needed for constipation 30 packet 0     Respiratory Therapy Supplies (CHIDI BABY CONVERSION KIT) MISC To use with albuterol solution       senna-docusate (SENOKOT-S/PERICOLACE) 8.6-50 MG tablet Take 1 tablet by mouth 2 times daily 60 tablet 0     tiotropium (SPIRIVA RESPIMAT) 2.5 MCG/ACT inhaler Inhale 2 puffs into the lungs daily 12 g 3     vitamin D2 (ERGOCALCIFEROL) 79881 units (1250 mcg) capsule Take 1 capsule (50,000 Units) by mouth once a week 12  capsule 3     Allergies   Allergen Reactions     Keflex [Cephalexin] Shortness Of Breath and Rash     Aspirin      Cefuroxime Itching     Cheese GI Disturbance     Contrast Dye Hives     IV     Diagnostic X-Ray Materials Hives     Diatrizoate Hives     Gadolinium Derivatives Hives     Hazelnuts [Nuts]      Iodixanol Unknown     Nitrofurantoin Itching     Septra [Sulfamethoxazole W/Trimethoprim] Hives     Sulfa Drugs Hives     Metronidazole Itching and Rash     Quinolones Rash              Review of Systems:     5 point ROS negative except as noted above in HPI, including Gen., Resp, CV, GI &  system review.         Assessment and Plan     Mary Ann Olson is a 54 year old female with complex medical hx including Crohn's, T2DM, substance use, and bipolar who is evaluated over phone for multiple concerns as addressed below.     1. Abdominal pain, generalized  Etiology unclear. Possibly due to constipation. However, concern for possible subacute symptoms of Crohn's (not flare) given timing since stopping Humira. Reviewed CT from 3/7/2020 from ED visit and reassuring read with no acute findings such as mass or obstruction though noted stool burden.    -Recommend to take miralax daily until having regular BM daily (she was concerned about using more than this)  -Should follow-up in clinic either here or with GI if symptoms continue  -See next    2. Crohn's disease of large intestine without complication (H)  Unclear if current symptoms related to underlying Crohn's disease and uncontrolled symptoms. Also concern for stricture or polyp given stooling symtpoms, though CT was reassuring. Unclear when last colonoscopy was performed in our chart. Recommended she randy [facility] today to discuss follow-up and resumption of Humira    3. Type 2 diabetes mellitus without complication, without long-term current use of insulin (H)  Reports recent worsening of thirst and polyuria. Last A1c in 11/2019 at 6.3%. Possibly worsening diabetes  leading to dehydration which may contribute to primary concern. Advised to have lab visit.  - Basic Metabolic Panel ([facility]) - Results < 1 hr; Future  - Hemoglobin A1c ([facility]); Future    4. Weight loss  Concern for relation to Crohn's (either directly, poor intake, or even neoplasm) vs. Worsening diabetes as above. Will obtain weight at lab visit  - Basic Metabolic Panel ([facility]) - Results < 1 hr; Future  - Hemoglobin A1c ([facility]); Future    5. Bipolar II disorder (H)  6. Anxiety in acute stress reaction  Currently not on any medication. Is trying to establish therapy. Would need to have specific visit for better evaluation, but would not start serotonin specific reuptake inhibitor in context of bipolar without better established follow-up. Recommended vistaril as needed for severe anxiety and discussed use of free aspects of Headspace marcel. Recommended contacting therapy group again as likely able to perform virtual visits now.       Refilled medications that would be required in the next 3 months.     After Visit Information:  Will print and mail AVS     Appointment end time: 10:27 AM  This is a telephone visit that took 21 minutes.      Clinician location:  [facility]     Benjamin Rosenstein, MD, MA  [facility]  P: [phone]    Precepted today with: Mian Gudino III, MD

## 2020-04-09 NOTE — PROGRESS NOTES
Preceptor Attestation:  I discussed the patient with the resident. I have verified the content of the note, which accurately reflects my assessment of the patient and the plan of care.  Supervising Physician:Mian Gudino MD  [facility]

## 2020-04-10 ENCOUNTER — TRANSFERRED RECORDS (OUTPATIENT)
Dept: HEALTH INFORMATION MANAGEMENT | Facility: CLINIC | Age: 55
End: 2020-04-10

## 2020-04-10 ENCOUNTER — TELEPHONE (OUTPATIENT)
Dept: FAMILY MEDICINE | Facility: CLINIC | Age: 55
End: 2020-04-10

## 2020-04-10 NOTE — TELEPHONE ENCOUNTER
Reviewed the patient's chart, it appears that she had an US 10/2018 which showed the below:    Interface, Rad Results In - 10/27/2018  7:49 PM CDT  US PELVIS WITH TRANSVAGINAL NON OB  10/27/2018 7:39 PM    INDICATION: Llq pain  TECHNIQUE: Transabdominal scans were performed. Endovaginal ultrasound was performed to better visualize the adnexa.  COMPARISON: None.     FINDINGS:  Uterus measures 5.9 x 3.5 x 4 point cm. Normal uterus with no masses.    Endometrial thickness is 11 mm. The endometrial stripe appears heterogeneous with multiple cystic areas. Internal blood flow is seen within the endometrial stripe.    Right ovary measures 2.4 x 1.5 x 1.9 cm. Normal right ovary. Normal arterial duplex.    Left ovary measures 2.2 x 1.8 x 1.4 cm. Normal left ovary. Normal arterial duplex.    A trace of free fluid is present, within normal physiologic limits.    IMPRESSION:   CONCLUSION:  1.  Abnormal appearing endometrial stripe. Clinical correlation is recommended. Consider endometrial biopsy.    It looks like following this, she was referred to OB 10/30/2018 by Dr. Farah but there is no note from OB.    Dr. Farah called the patient and discussed the above, she (the patient) does not think she ever went to OB.    Patient is coming into clinic on Monday and we will be attempting endometrial biopsy.    Sergo Gonzales MD  [facility], PGY-3

## 2020-04-10 NOTE — TELEPHONE ENCOUNTER
[facility] Family Medicine phone call message- general phone call:    Reason for call: Patient called stating she had telephone visit with Provider yesterday 4/8/2020 and today she notice vaginal bleeding when using the bathroom and feels warm. Patient was unable to get temperature and states she no longer has her menstrual period.Please call and advise.    Return call needed: Yes    OK to leave a message on voice mail? Yes    Primary language: English      needed? No    Call taken on April 10, 2020 at 11:44 AM by Sheri Weeks

## 2020-04-13 ENCOUNTER — OFFICE VISIT (OUTPATIENT)
Dept: FAMILY MEDICINE | Facility: CLINIC | Age: 55
End: 2020-04-13
Payer: COMMERCIAL

## 2020-04-13 VITALS
RESPIRATION RATE: 20 BRPM | OXYGEN SATURATION: 99 % | TEMPERATURE: 98 F | DIASTOLIC BLOOD PRESSURE: 85 MMHG | HEART RATE: 81 BPM | SYSTOLIC BLOOD PRESSURE: 123 MMHG

## 2020-04-13 DIAGNOSIS — R93.89 ENDOMETRIAL THICKENING ON ULTRASOUND: Primary | ICD-10-CM

## 2020-04-13 DIAGNOSIS — Z11.4 SCREENING FOR HIV (HUMAN IMMUNODEFICIENCY VIRUS): ICD-10-CM

## 2020-04-13 DIAGNOSIS — E11.9 TYPE 2 DIABETES MELLITUS WITHOUT COMPLICATION, WITHOUT LONG-TERM CURRENT USE OF INSULIN (H): ICD-10-CM

## 2020-04-13 DIAGNOSIS — N95.0 POSTMENOPAUSAL BLEEDING: ICD-10-CM

## 2020-04-13 DIAGNOSIS — R63.4 WEIGHT LOSS: ICD-10-CM

## 2020-04-13 LAB
BUN SERPL-MCNC: 13 MG/DL (ref 7–30)
CALCIUM SERPL-MCNC: 10 MG/DL (ref 8.5–10.4)
CHLORIDE SERPLBLD-SCNC: 102 MMOL/L (ref 94–109)
CO2 SERPL-SCNC: 28 MMOL/L (ref 20–32)
CREAT SERPL-MCNC: 0.3 MG/DL (ref 0.6–1.3)
EGFR CALCULATED (BLACK REFERENCE): >90 ML/MIN
EGFR CALCULATED (NON BLACK REFERENCE): >90 ML/MIN
GLUCOSE SERPL-MCNC: 88 MG/DL (ref 60–109)
HBA1C MFR BLD: 6 % (ref 4.1–5.7)
HIV 1+2 AB+HIV1 P24 AG SERPL QL IA: NEGATIVE
POTASSIUM SERPL-SCNC: 3.5 MMOL/L (ref 3.4–5.3)
SODIUM SERPL-SCNC: 139 MMOL/L (ref 133–144)

## 2020-04-13 NOTE — PROGRESS NOTES
PRE-OP DIAGNOSIS: Postmenopausal vaginal bleeding; thickened endometrial stripe on US  POST-OP DIAGNOSIS: Same   PROCEDURE: endometrial biopsy   Performing Physician: Ema Leal DO    Supervising Physician (if applicable): Joanna Farah MD     PROCEDURE:   Consent was obtained prior to procedure after discussion of risks and benefits   A timeout protocol was performed prior to initiating the procedure   Bimanual examination was done to determine the position of the uterus.   Vaginal speculum was inserted, and the cervix was visualized.   The cervix was cleansed with antiseptic solution.   The device was inserted through the cervical canal, into the uterine cavity, and up to the fundus. A tenaculum may be placed on the cervix.   The depth of the uterus was determined with the instrument   The instrument was withdrawn as it was rotated 3-4 times to obtain the sample which was sent in formalin to pathology     Followup: The patient tolerated the procedure well without complications. Standard post-procedure care is explained and return precautions are given.      Dr. Joanna Farah was present during the entire procedure     Ema Leal DO (PGY3)  [facility] Resident  Pager: [phone]

## 2020-04-14 NOTE — RESULT ENCOUNTER NOTE
Called and left voicemail regarding results. Advised to call with further questions.     Benjamin Rosenstein, MD, MA

## 2020-04-14 NOTE — RESULT ENCOUNTER NOTE
Called and LM with negative results patient requested from her visit yesterday. To call me back if needed.     [address] ([facility]) [place] [facility] [address] [place] [id number] [phone]

## 2020-04-15 ENCOUNTER — RECORDS - HEALTHEAST (OUTPATIENT)
Dept: ADMINISTRATIVE | Facility: OTHER | Age: 55
End: 2020-04-15

## 2020-04-15 ENCOUNTER — VIRTUAL VISIT (OUTPATIENT)
Dept: FAMILY MEDICINE | Facility: CLINIC | Age: 55
End: 2020-04-15
Payer: COMMERCIAL

## 2020-04-15 DIAGNOSIS — K50.10 CROHN'S DISEASE OF LARGE INTESTINE WITHOUT COMPLICATION (H): Primary | ICD-10-CM

## 2020-04-15 LAB
PATH REPORT.COMMENTS IMP SPEC: NORMAL
PATH REPORT.FINAL DX SPEC: NORMAL
PATH REPORT.GROSS SPEC: NORMAL
PATH REPORT.RELEVANT HX SPEC: NORMAL

## 2020-04-15 ASSESSMENT — PATIENT HEALTH QUESTIONNAIRE - PHQ9: SUM OF ALL RESPONSES TO PHQ QUESTIONS 1-9: 21

## 2020-04-15 NOTE — PROGRESS NOTES
"Family Medicine Telephone Visit Note           Telephone Visit Consent   Patient was verbally read the following and verbal consent was obtained.    \"This telephone visit will be conducted via a call between you and your physician/provider. We have found that certain health care needs can be provided without the need for a physical exam.  This service lets us provide the care you need with a short phone conversation.  If a prescription is necessary we can send it directly to your pharmacy.  If lab work is needed we can place an order for that and you can then stop by our lab to have the test done at a later time.    Telephone visits are billed at different rates depending on your insurance coverage. During this emergency period, for some insurers they may be billed the same as an in-person visit.  Please reach out to your insurance provider with any questions.    If during the course of the call the physician/provider feels a telephone visit is not appropriate, you will not be charged for this service.\"    Name person giving consent:  Patient   Date verbal consent given:  4/15/2020  Time verbal consent given:  9:58 AM       No chief complaint on file.           HPI   Patients name: Mary Ann  Appointment start time:  10:15    Mood:  Feeling depressed and alone.  Does not feel like hurting herself.  Very agitated by the corona virus.  Feels like she is being treated differently by the staff.  Reports that they are taking others to the food shelf but not her.  When asked if she has asked if she has asked to go to the food shelf, she reports that she does not want to go to the foodshelf because she doesn't cook.      Reports that staff can only go grocery shopping once every 2 weeks.      Doesn't have anyone to talk to and \"no one\" calls.    Has been admitted to hospital in the past for SI but denies currently.  Reports that she is not willing to take medications for mood.      History of Chron's Weight Loss:   On chart " review patient was seen by GI with [facility] 1/10/2020.   Recommendation at that time was to obtain repeat EGD and Colonoscopy which patient did not complete.  Also was instructed to take Humira but never initiated. Thinks she was seen by [facility] a few years ago but cannot remember by whom or when.  Patient is convinced that she has cancer because of her recent weight loss.      Does not get diarrhea when she eats.  Unsure if she has blood in her stool.  Left side hurts after she eats.  Eating 2-3 times per day.  Usually a TV dinner.  Poor appetite and restricted access to food.  Denies bloating or fullness.     Recent Endometrial Biopsy:   History hyperplasia on US 10/2019 did not follow up with OB.  Recent vaginal bleeding.  Recommended endometrial biopsy which was completed in clinic 4/13/20.  Results from biopsy are pending.     Current Outpatient Medications   Medication Sig Dispense Refill     acetaminophen (TYLENOL) 500 MG tablet Take 2 tablets (1,000 mg) by mouth every 6 hours as needed for pain 100 tablet 3     adalimumab (HUMIRA) 40 MG/0.8ML prefilled syringe kit Inject 0.8 mLs (40 mg) Subcutaneous every 14 days       albuterol (PROAIR HFA/PROVENTIL HFA/VENTOLIN HFA) 108 (90 Base) MCG/ACT inhaler Inhale 2 puffs into the lungs every 6 hours as needed for shortness of breath / dyspnea or wheezing 18 g 3     albuterol (PROVENTIL) (2.5 MG/3ML) 0.083% neb solution Take 1 vial (2.5 mg) by nebulization every 6 hours as needed for shortness of breath / dyspnea or wheezing 90 vial 11     atorvastatin (LIPITOR) 40 MG tablet Take 1 tablet (40 mg) by mouth daily 90 tablet 3     blood glucose (NO BRAND SPECIFIED) lancets standard Use to test blood sugar 1 times daily or as directed. 50 each 11     blood glucose (NO BRAND SPECIFIED) test strip Use to test blood sugar 1 times daily or as directed. 50 each 11     blood glucose (NO BRAND SPECIFIED) test strip Use to test blood sugars as directed. 100 strip 1     blood glucose (ONE  TOUCH DELICA) lancing device See Admin Instructions  0     blood glucose monitoring (NO BRAND SPECIFIED) meter device kit Use to test blood sugar 1 times daily or as directed. 1 kit 0     blood glucose monitoring (NO BRAND SPECIFIED) meter device kit Preferred blood glucose meter OR supplies to accompany: Blood Glucose Monitor Brands: One Touch Delica 1 kit 0     blood glucose monitoring (ONE TOUCH DELICA) lancets Use to test blood sugars 1 time daily 50 each 11     calcium carbonate 600 mg-vitamin D 400 units (CALCIUM 600 + D) 600-400 MG-UNIT per tablet Take 1 tablet by mouth 2 times daily 60 tablet 3     Calcium-Phosphorus-Vitamin D (GELATIN/CALCIUM/VITAMIN D OR) 50,000 Units       EPINEPHrine (EPIPEN/ADRENACLICK/OR ANY BX GENERIC EQUIV) 0.3 MG/0.3ML injection 2-pack Inject 0.3 mLs (0.3 mg) into the muscle as needed for anaphylaxis 0.6 mL 0     fluticasone (FLONASE) 50 MCG/ACT nasal spray Spray 1 spray into both nostrils daily 11.1 mL 3     hydrOXYzine (VISTARIL) 25 MG capsule Take 1 capsule (25 mg) by mouth 3 times daily as needed for itching 28 capsule 3     lancets 28G MISC Test 2 times per day.       metFORMIN (GLUCOPHAGE) 1000 MG tablet Take 1 tablet (1,000 mg) by mouth 2 times daily (with meals) 300 tablet 3     mometasone-formoterol (DULERA) 100-5 MCG/ACT inhaler Inhale 2 puffs into the lungs 2 times daily 13 g 3     Nebulizers (VIOS AEROSOL DELIVERY SYSTEM) [facility] USE UTD  0     Nebulizers (VIOS LC SPRINT DELUXE) MISC Needs nebulizer and all accessories.       omeprazole (PRILOSEC OTC) 20 MG EC tablet Take 1 tablet (20 mg) by mouth daily 30 tablet 3     order for DME Equipment being ordered: Nebulizer Machine with mask and tubing. 1 Units 0     order for DME Equipment being ordered: incontinence pads    Please fax to [facility] 1 Box 3     polyethylene glycol (MIRALAX) packet Take 17 g by mouth daily as needed for constipation 30 packet 0     Respiratory Therapy Supplies (CHIDI BABY CONVERSION KIT) MISC To  "use with albuterol solution       senna-docusate (SENOKOT-S/PERICOLACE) 8.6-50 MG tablet Take 1 tablet by mouth 2 times daily 60 tablet 0     tiotropium (SPIRIVA RESPIMAT) 2.5 MCG/ACT inhaler Inhale 2 puffs into the lungs daily 12 g 3     vitamin D2 (ERGOCALCIFEROL) 72401 units (1250 mcg) capsule Take 1 capsule (50,000 Units) by mouth once a week 12 capsule 3     Allergies   Allergen Reactions     Keflex [Cephalexin] Shortness Of Breath and Rash     Aspirin      Cefuroxime Itching     Cheese GI Disturbance     Contrast Dye Hives     IV     Diagnostic X-Ray Materials Hives     Diatrizoate Hives     Gadolinium Derivatives Hives     Hazelnuts [Nuts]      Iodixanol Unknown     Nitrofurantoin Itching     Septra [Sulfamethoxazole W/Trimethoprim] Hives     Sulfa Drugs Hives     Metronidazole Itching and Rash     Quinolones Rash              Review of Systems:     ROS COMP: see above         Physical Exam:     There were no vitals taken for this visit.  Estimated body mass index is 24.62 kg/m  as calculated from the following:    Height as of 2/18/20: 1.55 m (5' 1.02\").    Weight as of 3/12/20: 59.1 kg (130 lb 6.4 oz).     Wt Readings from Last 10 Encounters:   03/12/20 59.1 kg (130 lb 6.4 oz)   02/18/20 62 kg (136 lb 9.6 oz)   01/28/20 64.3 kg (141 lb 12.8 oz)   01/16/20 66.6 kg (146 lb 12.8 oz)   12/03/19 69 kg (152 lb 3.2 oz)   11/18/19 66.9 kg (147 lb 6.4 oz)   09/30/19 68 kg (150 lb)   09/09/19 71.7 kg (158 lb)   08/13/19 74.8 kg (165 lb)   06/26/19 73 kg (161 lb)         Exam:  Constitutional: healthy, alert and moderate distress  Psychiatric: mentation appears normal and affect normal/bright    Interface, Powerscribe - 03/07/2020  7:38 PM CST  EXAM: CT ABDOMEN PELVIS WO  LOCATION: [facility] MEDICAL IMAG[facility]  DATE/TIME: 3/7/2020 7:15 PM    INDICATION: Abdominal Pain  COMPARISON: 06/07/2019  TECHNIQUE: CT scan of the abdomen and pelvis was performed without IV contrast.  Multiplanar reformats were obtained. Dose reduction " techniques were used.  CONTRAST: None.    FINDINGS:   LOWER CHEST: Normal.    HEPATOBILIARY: Normal.    PANCREAS: Normal.    SPLEEN: Normal.    ADRENAL GLANDS: In the left adrenal gland, there is a 2.0 cm nodule measuring -7  Hounsfield units.    KIDNEYS/BLADDER: Normal.    BOWEL: Stomach and small bowel unremarkable. Appendix is visualized, and within  normal limits. Mild stool burden in the colon.    LYMPH NODES: No abdominopelvic lymphadenopathy. No ascites. No free air.    VASCULATURE: No abdominal aortic aneurysm.    PELVIC ORGANS: Urinary bladder is unremarkable. Uterus is within normal limits.    MUSCULOSKELETAL: Normal.    IMPRESSION:   1.  By CT, no acute findings in the visualized abdomen or pelvis.    2.  No change in a 2.0 cm left adrenal adenoma, not requiring additional  follow-up imaging.        Assessment and Plan   Mood: denies SI today.  Could not convince patient to start medicine for mood.  Reviewed actions by staff and helped reset interpretation as many changes made by staff are covid related and not personal.  This seemed to help patient.  Strongly encourage ongoing psychotherapy  -referral for MH has been placed  -messaged [facility] to help set up virtual therapy    History of Chron's: accompanied by recent weight loss.  Discussed with patient that recommendation will likely be to take Humira and to follow through with plan for colonoscopy and EGD daniela given documented weight loss.  Recent CT with no obvious mass but this does not rule out smaller lesions.     Vaginal bleeding/endometrial hyperplasia:   Endometrial biopsy pending.     After Visit Information:  None today     Appointment end time: 10:35  This is a telephone visit that took 20 minutes.      Clinician location:  [facility]    MD AUSTIN Causey

## 2020-04-15 NOTE — PROGRESS NOTES
Preceptor Attestation:  I was present for and supervised entirety of uncomplicated procedure as documented by resident physician.  Please see note for details.  Supervising Physician:  Joanna Farah MD  [facility]

## 2020-04-16 NOTE — PATIENT INSTRUCTIONS
Referral for :     Gastroenterology    LOCATION/PLACE/Provider :    [facility]   DATE & TIME :    Faxed referral, location will reach out to patient   PHONE :     [phone]  FAX :    [phone]  Appointment made by clinic staff/:    Xiomara

## 2020-04-17 ENCOUNTER — VIRTUAL VISIT (OUTPATIENT)
Dept: FAMILY MEDICINE | Facility: CLINIC | Age: 55
End: 2020-04-17
Payer: COMMERCIAL

## 2020-04-17 ENCOUNTER — TELEPHONE (OUTPATIENT)
Dept: FAMILY MEDICINE | Facility: CLINIC | Age: 55
End: 2020-04-17

## 2020-04-17 DIAGNOSIS — R42 LIGHTHEADEDNESS: Primary | ICD-10-CM

## 2020-04-17 NOTE — PROGRESS NOTES
"Family Medicine Telephone Visit Note           Telephone Visit Consent   Patient was verbally read the following and verbal consent was obtained.    \"This telephone visit will be conducted via a call between you and your physician/provider. We have found that certain health care needs can be provided without the need for a physical exam.  This service lets us provide the care you need with a short phone conversation.  If a prescription is necessary we can send it directly to your pharmacy.  If lab work is needed we can place an order for that and you can then stop by our lab to have the test done at a later time.    Telephone visits are billed at different rates depending on your insurance coverage. During this emergency period, for some insurers they may be billed the same as an in-person visit.  Please reach out to your insurance provider with any questions.    If during the course of the call the physician/provider feels a telephone visit is not appropriate, you will not be charged for this service.\"    Name person giving consent:  Patient   Date verbal consent given:  4/17/2020  Time verbal consent given:  3:04 PM     Chief Complaint   Patient presents with     Dizziness     x days, when getting up the room spins and when walking around     Medication Reconciliation     completed needs attention      Biopsy     stopped bleeding now bleeding again from monday, Endo Bx 4/13 bleeding more after                HPI   Patients name: Mary Ann  Appointment start time:  3:15 PM    Dizziness:  -Onset: \"A couple days ago\"  -Thought maybe could be related to melatonin that she has been taking, took some this morning. Taking 2 at night, 2 per dose (5 mg dosing). Lightheadedness is somewhat related to time of day when she takes melatonin  -Describes dizziness as lightheadedness  -Happens when going from sitting to standing  -Denies syncope  -Denies that it happens when sitting still  -Did have recent endometrial biopsy on 4/13, " and cramping  -Before biopsy, light bleeding, since the biopsy, it has been more heavy bleeding and cramping  -Has been going through about 2 pads per day (minimal amount)  -States that she has not been drinking much water, feels dehydrated, maybe related to cramping  -No rectal bleeding, no melena  -Checked BG on the phone and was 121      Current Outpatient Medications   Medication Sig Dispense Refill     acetaminophen (TYLENOL) 500 MG tablet Take 2 tablets (1,000 mg) by mouth every 6 hours as needed for pain 100 tablet 3     adalimumab (HUMIRA) 40 MG/0.8ML prefilled syringe kit Inject 0.8 mLs (40 mg) Subcutaneous every 14 days       albuterol (PROAIR HFA/PROVENTIL HFA/VENTOLIN HFA) 108 (90 Base) MCG/ACT inhaler Inhale 2 puffs into the lungs every 6 hours as needed for shortness of breath / dyspnea or wheezing 18 g 3     albuterol (PROVENTIL) (2.5 MG/3ML) 0.083% neb solution Take 1 vial (2.5 mg) by nebulization every 6 hours as needed for shortness of breath / dyspnea or wheezing 90 vial 11     atorvastatin (LIPITOR) 40 MG tablet Take 1 tablet (40 mg) by mouth daily 90 tablet 3     blood glucose (NO BRAND SPECIFIED) lancets standard Use to test blood sugar 1 times daily or as directed. 50 each 11     blood glucose (NO BRAND SPECIFIED) test strip Use to test blood sugar 1 times daily or as directed. 50 each 11     blood glucose (NO BRAND SPECIFIED) test strip Use to test blood sugars as directed. 100 strip 1     blood glucose (ONE TOUCH DELICA) lancing device See Admin Instructions  0     blood glucose monitoring (NO BRAND SPECIFIED) meter device kit Use to test blood sugar 1 times daily or as directed. 1 kit 0     blood glucose monitoring (NO BRAND SPECIFIED) meter device kit Preferred blood glucose meter OR supplies to accompany: Blood Glucose Monitor Brands: One Touch Delica 1 kit 0     blood glucose monitoring (ONE TOUCH DELICA) lancets Use to test blood sugars 1 time daily 50 each 11     calcium carbonate 600  mg-vitamin D 400 units (CALCIUM 600 + D) 600-400 MG-UNIT per tablet Take 1 tablet by mouth 2 times daily 60 tablet 3     Calcium-Phosphorus-Vitamin D (GELATIN/CALCIUM/VITAMIN D OR) 50,000 Units       EPINEPHrine (EPIPEN/ADRENACLICK/OR ANY BX GENERIC EQUIV) 0.3 MG/0.3ML injection 2-pack Inject 0.3 mLs (0.3 mg) into the muscle as needed for anaphylaxis 0.6 mL 0     fluticasone (FLONASE) 50 MCG/ACT nasal spray Spray 1 spray into both nostrils daily 11.1 mL 3     hydrOXYzine (VISTARIL) 25 MG capsule Take 1 capsule (25 mg) by mouth 3 times daily as needed for itching 28 capsule 3     lancets 28G MISC Test 2 times per day.       metFORMIN (GLUCOPHAGE) 1000 MG tablet Take 1 tablet (1,000 mg) by mouth 2 times daily (with meals) 300 tablet 3     mometasone-formoterol (DULERA) 100-5 MCG/ACT inhaler Inhale 2 puffs into the lungs 2 times daily 13 g 3     Nebulizers (Jelly HQ AEROSOL DELIVERY SYSTEM) [facility] USE UTD  0     Nebulizers (VIOS LC SPRINT DELUXE) MISC Needs nebulizer and all accessories.       omeprazole (PRILOSEC OTC) 20 MG EC tablet Take 1 tablet (20 mg) by mouth daily 30 tablet 3     order for DME Equipment being ordered: Nebulizer Machine with mask and tubing. 1 Units 0     order for DME Equipment being ordered: incontinence pads    Please fax to [facility] 1 Box 3     polyethylene glycol (MIRALAX) packet Take 17 g by mouth daily as needed for constipation 30 packet 0     Respiratory Therapy Supplies (CHIDI BABY CONVERSION KIT) MISC To use with albuterol solution       senna-docusate (SENOKOT-S/PERICOLACE) 8.6-50 MG tablet Take 1 tablet by mouth 2 times daily 60 tablet 0     tiotropium (SPIRIVA RESPIMAT) 2.5 MCG/ACT inhaler Inhale 2 puffs into the lungs daily 12 g 3     vitamin D2 (ERGOCALCIFEROL) 55112 units (1250 mcg) capsule Take 1 capsule (50,000 Units) by mouth once a week 12 capsule 3     Allergies   Allergen Reactions     Keflex [Cephalexin] Shortness Of Breath and Rash     Aspirin      Cefuroxime Itching      "Cheese GI Disturbance     Contrast Dye Hives     IV     Diagnostic X-Ray Materials Hives     Diatrizoate Hives     Gadolinium Derivatives Hives     Hazelnuts [Nuts]      Iodixanol Unknown     Nitrofurantoin Itching     Septra [Sulfamethoxazole W/Trimethoprim] Hives     Sulfa Drugs Hives     Metronidazole Itching and Rash     Quinolones Rash              Review of Systems:     ROS COMP: A 10 point review of systems including constitutional, cardiovascular, respiratory, HEENT, GI, , neurological, MSK, skin, endocrine, is negative unless stated in HPI           Physical Exam:     There were no vitals taken for this visit.  Estimated body mass index is 24.62 kg/m  as calculated from the following:    Height as of 2/18/20: 1.55 m (5' 1.02\").    Weight as of 3/12/20: 59.1 kg (130 lb 6.4 oz).    Exam:  Constitutional: healthy, alert and no distress  Psychiatric: mentation appears normal and affect normal/bright          Assessment and Plan   1. Lightheadedness  Patient describes orthostatic hypotension. Not on any anti-HTNs. BG good. Low intake of fluids since biopsy, possibly 2/2 to cramping, so possibly dehydration. Discussed increasing fluids. Doubt anemia, with normal Hgb 3/31/2020, and what sounds like an insignificant amount of bleeding since biopsy. Certainly could be related to excess melatonin + vistaril. Discussed onlly using melatonin at night. Call back or to the ED if symptoms worsen. She will call in to schedule telephone follow up next week.    After Visit Information:  Will print and mail AVS     No follow-ups on file.    Appointment end time: 3:35 PM  This is a telephone visit that took 20 minutes.      Clinician location:  [place]    Mich Plasencia MD  I precepted today with Dr. Jackson.        "

## 2020-04-17 NOTE — TELEPHONE ENCOUNTER
[facility] Family Medicine phone call message- general phone call:    Reason for call: Patient state she had a Biopsy done recently and since then she has been spotting. Patient states that she is bleeding and not sure if it has to do with the biopsy. Patient also indicate that she is starting to have dizziness as well.    Return call needed: Yes    OK to leave a message on voice mail? Yes    Primary language: English      needed? No    Call taken on April 17, 2020 at 2:07 PM by Noah Felipe

## 2020-04-20 ENCOUNTER — VIRTUAL VISIT (OUTPATIENT)
Dept: FAMILY MEDICINE | Facility: CLINIC | Age: 55
End: 2020-04-20
Payer: COMMERCIAL

## 2020-04-20 DIAGNOSIS — N95.0 POSTMENOPAUSAL BLEEDING: ICD-10-CM

## 2020-04-20 DIAGNOSIS — R63.4 WEIGHT LOSS: Primary | ICD-10-CM

## 2020-04-20 NOTE — PROGRESS NOTES
I have personally reviewed the telephone encounter as documented by Dr. Plasencia.  I agree with the assessment and plan as documented for 54 yr old female evaluated for dizziness. Possible orthostasis vs dehydration. Is s/p endometrial bx w/ minimal bleeding. Blood sugars stable. Recent normal Hgb. Could be related to meds. Precautions given. Anticipatory guidance given. Will follow-up via phone visit in 1 week.    Kenneth Jackson MD  April 20, 2020  10:43 AM

## 2020-04-20 NOTE — TELEPHONE ENCOUNTER
Gabbi, would you be bale to add Mary Ann on for phone therapy? She currently has MA or should I move forward and discuss your earlier recommendations?

## 2020-04-20 NOTE — PROGRESS NOTES
"Family Medicine Telephone Visit Note           Telephone Visit Consent   Patient was verbally read the following and verbal consent was obtained.    \"This telephone visit will be conducted via a call between you and your physician/provider. We have found that certain health care needs can be provided without the need for a physical exam.  This service lets us provide the care you need with a short phone conversation.  If a prescription is necessary we can send it directly to your pharmacy.  If lab work is needed we can place an order for that and you can then stop by our lab to have the test done at a later time.    Telephone visits are billed at different rates depending on your insurance coverage. During this emergency period, for some insurers they may be billed the same as an in-person visit.  Please reach out to your insurance provider with any questions.    If during the course of the call the physician/provider feels a telephone visit is not appropriate, you will not be charged for this service.\"    Name person giving consent:  Patient   Date verbal consent given:  4/20/2020  Time verbal consent given:  3:55 PM       Chief Complaint   Patient presents with     mucus     no cough, drainage in back of throat     Results     Biopsy      Breathing Problem     when laying down harder to breathe, took mucinex but didnt help               HPI   Patients name: Mary Ann  Appointment start time:  4:00    Increase mucous production:  Increased mucous in the chest that is making it harder for her to breathe.  Nebs don't help.  Feels like she needs to cough it up.  Is feeling short of breath when the mucous is bad but not when walking around.  When she lays down she's worried she is going to \"choke to death\".  Sputum is brown/green.  Reports that she is not SOB with activity today but was on previous phone call.  Reports that she cannot come in for assessment today or tomorrow but could do a phone call.  Denies fevers.  "     Has been taking inhalers as prescribed which have not helped much.  Has been using mucinex without significant relief.     Follow up biopsy results:  -endometrial bx inconclusive    Follow up GI:  -patient had virtual visit with GI  -TSH recommend  -upper endoscopy and colonoscopy recommended       Current Outpatient Medications   Medication Sig Dispense Refill     acetaminophen (TYLENOL) 500 MG tablet Take 2 tablets (1,000 mg) by mouth every 6 hours as needed for pain 100 tablet 3     adalimumab (HUMIRA) 40 MG/0.8ML prefilled syringe kit Inject 0.8 mLs (40 mg) Subcutaneous every 14 days       albuterol (PROAIR HFA/PROVENTIL HFA/VENTOLIN HFA) 108 (90 Base) MCG/ACT inhaler Inhale 2 puffs into the lungs every 6 hours as needed for shortness of breath / dyspnea or wheezing 18 g 3     albuterol (PROVENTIL) (2.5 MG/3ML) 0.083% neb solution Take 1 vial (2.5 mg) by nebulization every 6 hours as needed for shortness of breath / dyspnea or wheezing 90 vial 11     atorvastatin (LIPITOR) 40 MG tablet Take 1 tablet (40 mg) by mouth daily 90 tablet 3     blood glucose (NO BRAND SPECIFIED) lancets standard Use to test blood sugar 1 times daily or as directed. 50 each 11     blood glucose (NO BRAND SPECIFIED) test strip Use to test blood sugar 1 times daily or as directed. 50 each 11     blood glucose (NO BRAND SPECIFIED) test strip Use to test blood sugars as directed. 100 strip 1     blood glucose (ONE TOUCH DELICA) lancing device See Admin Instructions  0     blood glucose monitoring (NO BRAND SPECIFIED) meter device kit Use to test blood sugar 1 times daily or as directed. 1 kit 0     blood glucose monitoring (NO BRAND SPECIFIED) meter device kit Preferred blood glucose meter OR supplies to accompany: Blood Glucose Monitor Brands: One Touch Delica 1 kit 0     blood glucose monitoring (ONE TOUCH DELICA) lancets Use to test blood sugars 1 time daily 50 each 11     calcium carbonate 600 mg-vitamin D 400 units (CALCIUM 600 +  D) 600-400 MG-UNIT per tablet Take 1 tablet by mouth 2 times daily 60 tablet 3     Calcium-Phosphorus-Vitamin D (GELATIN/CALCIUM/VITAMIN D OR) 50,000 Units       EPINEPHrine (EPIPEN/ADRENACLICK/OR ANY BX GENERIC EQUIV) 0.3 MG/0.3ML injection 2-pack Inject 0.3 mLs (0.3 mg) into the muscle as needed for anaphylaxis 0.6 mL 0     fluticasone (FLONASE) 50 MCG/ACT nasal spray Spray 1 spray into both nostrils daily 11.1 mL 3     hydrOXYzine (VISTARIL) 25 MG capsule Take 1 capsule (25 mg) by mouth 3 times daily as needed for itching 28 capsule 3     lancets 28G MISC Test 2 times per day.       metFORMIN (GLUCOPHAGE) 1000 MG tablet Take 1 tablet (1,000 mg) by mouth 2 times daily (with meals) 300 tablet 3     mometasone-formoterol (DULERA) 100-5 MCG/ACT inhaler Inhale 2 puffs into the lungs 2 times daily 13 g 3     Nebulizers (Admify AEROSOL DELIVERY SYSTEM) [facility] USE UTD  0     Nebulizers (VIOS LC SPRINT DELUXE) MISC Needs nebulizer and all accessories.       omeprazole (PRILOSEC OTC) 20 MG EC tablet Take 1 tablet (20 mg) by mouth daily 30 tablet 3     order for DME Equipment being ordered: Nebulizer Machine with mask and tubing. 1 Units 0     order for DME Equipment being ordered: incontinence pads    Please fax to [facility] 1 Box 3     polyethylene glycol (MIRALAX) packet Take 17 g by mouth daily as needed for constipation 30 packet 0     Respiratory Therapy Supplies (CHIDI BABY CONVERSION KIT) MISC To use with albuterol solution       tiotropium (SPIRIVA RESPIMAT) 2.5 MCG/ACT inhaler Inhale 2 puffs into the lungs daily 12 g 3     vitamin D2 (ERGOCALCIFEROL) 95532 units (1250 mcg) capsule Take 1 capsule (50,000 Units) by mouth once a week 12 capsule 3     Allergies   Allergen Reactions     Keflex [Cephalexin] Shortness Of Breath and Rash     Aspirin      Cefuroxime Itching     Cheese GI Disturbance     Contrast Dye Hives     IV     Diagnostic X-Ray Materials Hives     Diatrizoate Hives     Gadolinium Derivatives Hives      "Hazelnuts [Nuts]      Iodixanol Unknown     Nitrofurantoin Itching     Septra [Sulfamethoxazole W/Trimethoprim] Hives     Sulfa Drugs Hives     Metronidazole Itching and Rash     Quinolones Rash              Review of Systems:     ROS COMP: see above         Physical Exam:     There were no vitals taken for this visit.  Estimated body mass index is 24.62 kg/m  as calculated from the following:    Height as of 2/18/20: 1.55 m (5' 1.02\").    Weight as of 3/12/20: 59.1 kg (130 lb 6.4 oz).    Exam:  Constitutional: healthy, alert and no distress  PULM: speaking in full sentences and rapidly without SOB.  Psychiatric: mentation appears normal and affect normal/bright          Assessment and Plan   Increased mucous production:   cannot rule out COPD exacerbation.  COVID would also be on the differential. Discussed antibiotics but patient with numerous listed allergies some of which include SOB with administration for third generation cephalosporins, reports severed reaction to levofloxacin, does have prolonged QT which would make azithromycin more risky.  Also could take doxycyline but reports that this made her feel more ill last time she took it.  On the phone patient is not in respiratory distress but I am limited by my inability to do a physical exam.  Speaking in full sentences without needing to take breaks or demonstrating SOB.  Patient coughed only once in our 20 minute visit.  Offered in patient visist tomorrow morning but patient reports she has no way to get here.  Did consent to phone visit.  Until then will increase dose of dulera.  Recommend she call EMS if her condition were to deteriorate.     Postmenopausal bleeding:  Referral to OB/GYN placed for vaginal bleeding and history thickened endometrial stripe    History chrons/weight loss:  Encouraged patient to follow through with recommendations from GI    Can obtain TSH here.  Given history of group housing and intermittent homelessness should also consider " a TB quant daniela given recent weight loss, this has been ordered.       Refilled medications that would be required in the next 3 months.     After Visit Information:  Patient will have visit tomorrow    Appointment end time: 420pm  This is a telephone visit that took 40 minutes.      Clinician location:  WENCESLAO Farah MD

## 2020-04-21 ENCOUNTER — TELEPHONE (OUTPATIENT)
Dept: FAMILY MEDICINE | Facility: CLINIC | Age: 55
End: 2020-04-21

## 2020-04-21 ENCOUNTER — VIRTUAL VISIT (OUTPATIENT)
Dept: FAMILY MEDICINE | Facility: CLINIC | Age: 55
End: 2020-04-21
Payer: COMMERCIAL

## 2020-04-21 DIAGNOSIS — J44.9 CHRONIC OBSTRUCTIVE PULMONARY DISEASE, UNSPECIFIED COPD TYPE (H): Primary | ICD-10-CM

## 2020-04-21 RX ORDER — DOCUSATE SODIUM 50 MG AND SENNOSIDES 8.6 MG 8.6; 5 MG/1; MG/1
TABLET, FILM COATED ORAL
COMMUNITY
Start: 2020-03-30 | End: 2020-11-16

## 2020-04-21 NOTE — PROGRESS NOTES
"Family Medicine Telephone Visit Note    I called pt            Telephone Visit Consent   Patient was verbally read the following and verbal consent was obtained.    \"This telephone visit will be conducted via a call between you and your physician/provider. We have found that certain health care needs can be provided without the need for a physical exam.  This service lets us provide the care you need with a short phone conversation.  If a prescription is necessary we can send it directly to your pharmacy.  If lab work is needed we can place an order for that and you can then stop by our lab to have the test done at a later time.    Telephone visits are billed at different rates depending on your insurance coverage. During this emergency period, for some insurers they may be billed the same as an in-person visit.  Please reach out to your insurance provider with any questions.    If during the course of the call the physician/provider feels a telephone visit is not appropriate, you will not be charged for this service.\"    Name person giving consent:  Patient   Date verbal consent given:  4/21/2020  Time verbal consent given:  11:20 AM    Chief Complaint   Patient presents with     Cough     Coughing up mucus, shortness of breathe, headache     Due to patient being non-English speaking/uses sign language, an  was used for this visit. Only for face-to-face interpretation by an external agency, date and length of interpretation can be found on the scanned worksheet.         HPI   Patients name: Mary Ann  Appointment start time:  11:25 AM    Pt talked to Dr. Farah yesterday and has been spitting up a lot of mucous.  She quit smoking a month ago and has been having trouble breathing because of the spitting up mucous.  Her breathing is a little bit better since yesterday.  Denies fevers, maybe some chills.  Mucous is brown/green but spitting out but more clear/milky in the last few days.  Still feels it in her " lungs.  Doubled up Mucinex recently and it has been helping.    Current Outpatient Medications   Medication Sig Dispense Refill     acetaminophen (TYLENOL) 500 MG tablet Take 2 tablets (1,000 mg) by mouth every 6 hours as needed for pain 100 tablet 3     adalimumab (HUMIRA) 40 MG/0.8ML prefilled syringe kit Inject 0.8 mLs (40 mg) Subcutaneous every 14 days       albuterol (PROAIR HFA/PROVENTIL HFA/VENTOLIN HFA) 108 (90 Base) MCG/ACT inhaler Inhale 2 puffs into the lungs every 6 hours as needed for shortness of breath / dyspnea or wheezing 18 g 3     albuterol (PROVENTIL) (2.5 MG/3ML) 0.083% neb solution Take 1 vial (2.5 mg) by nebulization every 6 hours as needed for shortness of breath / dyspnea or wheezing 90 vial 11     atorvastatin (LIPITOR) 40 MG tablet Take 1 tablet (40 mg) by mouth daily 90 tablet 3     blood glucose (NO BRAND SPECIFIED) lancets standard Use to test blood sugar 1 times daily or as directed. 50 each 11     blood glucose (NO BRAND SPECIFIED) test strip Use to test blood sugar 1 times daily or as directed. 50 each 11     blood glucose (NO BRAND SPECIFIED) test strip Use to test blood sugars as directed. 100 strip 1     blood glucose (ONE TOUCH DELICA) lancing device See Admin Instructions  0     blood glucose monitoring (NO BRAND SPECIFIED) meter device kit Use to test blood sugar 1 times daily or as directed. 1 kit 0     blood glucose monitoring (NO BRAND SPECIFIED) meter device kit Preferred blood glucose meter OR supplies to accompany: Blood Glucose Monitor Brands: One Touch Delica 1 kit 0     blood glucose monitoring (ONE TOUCH DELICA) lancets Use to test blood sugars 1 time daily 50 each 11     calcium carbonate 600 mg-vitamin D 400 units (CALCIUM 600 + D) 600-400 MG-UNIT per tablet Take 1 tablet by mouth 2 times daily 60 tablet 3     Calcium-Phosphorus-Vitamin D (GELATIN/CALCIUM/VITAMIN D OR) 50,000 Units       EPINEPHrine (EPIPEN/ADRENACLICK/OR ANY BX GENERIC EQUIV) 0.3 MG/0.3ML injection  2-pack Inject 0.3 mLs (0.3 mg) into the muscle as needed for anaphylaxis 0.6 mL 0     fluticasone (FLONASE) 50 MCG/ACT nasal spray Spray 1 spray into both nostrils daily 11.1 mL 3     hydrOXYzine (VISTARIL) 25 MG capsule Take 1 capsule (25 mg) by mouth 3 times daily as needed for itching 28 capsule 3     lancets 28G MISC Test 2 times per day.       metFORMIN (GLUCOPHAGE) 1000 MG tablet Take 1 tablet (1,000 mg) by mouth 2 times daily (with meals) 300 tablet 3     mometasone-formoterol (DULERA) 100-5 MCG/ACT inhaler Inhale 2 puffs into the lungs 2 times daily 13 g 3     Nebulizers (Rentobo AEROSOL DELIVERY SYSTEM) [facility] USE UTD  0     Nebulizers (VIOS LC SPRINT DELUXE) MISC Needs nebulizer and all accessories.       omeprazole (PRILOSEC OTC) 20 MG EC tablet Take 1 tablet (20 mg) by mouth daily 30 tablet 3     order for DME Equipment being ordered: Nebulizer Machine with mask and tubing. 1 Units 0     order for DME Equipment being ordered: incontinence pads    Please fax to [facility] 1 Box 3     polyethylene glycol (MIRALAX) packet Take 17 g by mouth daily as needed for constipation 30 packet 0     Respiratory Therapy Supplies (CHIDI BABY CONVERSION KIT) MISC To use with albuterol solution       STIMULANT LAXATIVE 8.6-50 MG tablet        tiotropium (SPIRIVA RESPIMAT) 2.5 MCG/ACT inhaler Inhale 2 puffs into the lungs daily 12 g 3     vitamin D2 (ERGOCALCIFEROL) 70749 units (1250 mcg) capsule Take 1 capsule (50,000 Units) by mouth once a week 12 capsule 3     Allergies   Allergen Reactions     Keflex [Cephalexin] Shortness Of Breath and Rash     Aspirin      Cefuroxime Itching     Cheese GI Disturbance     Contrast Dye Hives     IV     Diagnostic X-Ray Materials Hives     Diatrizoate Hives     Gadolinium Derivatives Hives     Hazelnuts [Nuts]      Iodixanol Unknown     Nitrofurantoin Itching     Septra [Sulfamethoxazole W/Trimethoprim] Hives     Sulfa Drugs Hives     Metronidazole Itching and Rash     Quinolones Rash  "             Review of Systems:     ROS COMP: Constitutional, HEENT, cardiovascular, pulmonary, gi and gu systems are negative, except as otherwise noted.         Physical Exam:     There were no vitals taken for this visit.  Estimated body mass index is 24.62 kg/m  as calculated from the following:    Height as of 2/18/20: 1.55 m (5' 1.02\").    Weight as of 3/12/20: 59.1 kg (130 lb 6.4 oz).    Exam:  Constitutional: healthy, alert and no distress  Psychiatric: mentation appears normal and affect normal/bright     Results from the last 24 hoursNo results found for this or any previous visit (from the past 24 hour(s)).        Assessment and Plan   (J44.9) Chronic obstructive pulmonary disease, unspecified COPD type (H)  (primary encounter diagnosis)  Comment: Patient seems to be slightly improved since yesterday and has no signs or symptoms of an acute disorder such as pneumonia that would require an in-person visit or hospitalization.  Recommended taking Dulera 3 times daily and follow-up phone visit next week.  Plan: As above.    Refilled medications that would be required in the next 3 months.     After Visit Information:  Will print and mail AVS     No follow-ups on file.    Appointment end time: 11:34 AM  This is a telephone visit that took 9 minutes.      Clinician location:  [facility].    Charlie Moon MD  I precepted today with Dr. Joanna Farah.  "

## 2020-04-21 NOTE — PATIENT INSTRUCTIONS
Referral for :     OB/GYN    LOCATION/PLACE/Provider :    [facility]  DATE & TIME :    Faxed referral and notes. Called patient, left message to help schedule.  PHONE :     [phone]  FAX :   [phone]  Appointment made by clinic staff/:    Xiomara

## 2020-04-22 ENCOUNTER — TELEPHONE (OUTPATIENT)
Dept: FAMILY MEDICINE | Facility: CLINIC | Age: 55
End: 2020-04-22

## 2020-04-22 NOTE — TELEPHONE ENCOUNTER
Mary Ann Olson called to schedule an appointment for TB screening.        TB Screening questions  1. Have you had recent contact with a person with active tuberculosis (TB)?  No, continue to next question.  2. Have you ever been treated for tuberculosis (TB) or latent TB before?  No, continue to next question.  3. Has a county worker or another healthcare worker (not your employer) told you to come in to be tested for TB? Patient was told by Dr. Farah to come in for her TB per telephone visit 04/20.  Yes or patient unsure, screening questions stopped and provider visit scheduled.  4. Have you had a live vaccine (smallpox, flumist, MMR, varicella, oral polio and/or yellow fever) in the last 4 weeks?  No, lab visit scheduled.       Lab visit scheduled for 04/27/2020.    Marshall Weeks

## 2020-04-24 NOTE — PROGRESS NOTES
Preceptor Attestation:  Patient's case reviewed and discussed with  Patient seen and discussed with the resident..  I agree with written assessment and plan of care.  Supervising Physician:  Joanna Farah MD  [facility]

## 2020-04-27 DIAGNOSIS — R63.4 WEIGHT LOSS: ICD-10-CM

## 2020-04-27 LAB — TSH SERPL DL<=0.05 MIU/L-ACNC: 2.29 UIU/ML (ref 0.3–5)

## 2020-04-27 NOTE — LETTER
May 4, 2020      Mary Ann Olson  [address]  [place] [address]        Dear ,  Your screen for tuberculosis was negative      Resulted Orders   TSH  Sensitive (myinfoQ)   Result Value Ref Range    TSH 2.29 0.30 - 5.00 uIU/mL    Narrative    Test performed by:  [facility]  [address] [place] [address]   TB Quantiferon Gold Plus (Campus [facility])   Result Value Ref Range    QTF Result Negative Negative    QTF Interpretation       No interferon-gamma response to M. tuberculosis antigens was detected.  Infecton with M.   tuberculosis is unlikely.  A negative result alone does not exclude infection with M.   tuberculosis      QTF Nil 0.01 IU/mL    QTF Antigen TB1-NIL 0.00 IU/mL    QTF Antigen TB2-NIL 0.00 IU/mL    QTF Mitogen - Nil 9.05 IU/mL    Narrative    Test performed by:  [facility]  [address] [place] [address]       If you have any questions or concerns, please call the clinic at the number listed above.       Sincerely,        [facility]

## 2020-04-27 NOTE — LETTER
April 28, 2020      Mary Ann Olson  [address] [place] [address]        Dear ,    We are writing to inform you of your test results.    Your thyroid function was normal last visit.      Dr Farah     Resulted Orders   TSH  Sensitive ([facility])   Result Value Ref Range    TSH 2.29 0.30 - 5.00 uIU/mL    Narrative    Test performed by:  [facility] [address] [place] [address]       If you have any questions or concerns, please call the clinic at the number listed above.       Sincerely,        [facility]

## 2020-04-28 NOTE — TELEPHONE ENCOUNTER
Spoke to patient when she was in clinic, she states that she wants to wait on this referral. Notified her to just let us know when she is ready to schedule.

## 2020-04-29 ENCOUNTER — TELEPHONE (OUTPATIENT)
Dept: FAMILY MEDICINE | Facility: CLINIC | Age: 55
End: 2020-04-29

## 2020-05-01 LAB
QTF ANTIGEN TB1-NIL: 0 IU/ML
QTF ANTIGEN TB2-NIL: 0 IU/ML
QTF INTERPRETATION: NORMAL
QTF MITOGEN - NIL: 9.05 IU/ML
QTF NIL: 0.01 IU/ML
QTF RESULT: NEGATIVE

## 2020-05-20 ENCOUNTER — VIRTUAL VISIT (OUTPATIENT)
Dept: FAMILY MEDICINE | Facility: CLINIC | Age: 55
End: 2020-05-20
Payer: COMMERCIAL

## 2020-05-20 DIAGNOSIS — S80.12XA CONTUSION OF LEFT LEG, INITIAL ENCOUNTER: ICD-10-CM

## 2020-05-20 DIAGNOSIS — J30.1 SEASONAL ALLERGIC RHINITIS DUE TO POLLEN: Primary | ICD-10-CM

## 2020-05-20 RX ORDER — CETIRIZINE HYDROCHLORIDE 10 MG/1
10 TABLET ORAL DAILY
Qty: 90 TABLET | Refills: 1 | Status: SHIPPED | OUTPATIENT
Start: 2020-05-20 | End: 2021-03-23

## 2020-05-20 NOTE — PROGRESS NOTES
"Family Medicine Telephone Visit Note         Telephone Visit Consent   Patient was verbally read the following and verbal consent was obtained.    \"Telephone visits are billed at different rates depending on your insurance coverage. During this emergency period, for some insurers they may be billed the same as an in-person visit.  Please reach out to your insurance provider with any qSestions.  If during the course of the call the physician/provider feels a telephone visit is not appropriate, you will not be charged for this service.\"    Name person giving consent:  Patient   Date verbal consent given:  5/20/2020  Time verbal consent given:  1:19 PM      Chief Complaint   Patient presents with     Musculoskeletal Problem     left leg pain, bump. hit the  earlier     Allergies     requesting allergy meds. sneezing, itchy eyes, stuffy nose.      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   - History of seasonal allergies this time of year   - Has taken Zyrtec in past, which was helpful for her symptoms     Left leg bump   - Walked into a mowing trailer and hit her shin on the metal when she was at work    - Hit the anterior portion of her lower leg and now has a bump in the region   - Has been able to walk since that time   - Is able to move toes normally and sensation is normal   - Has been putting ice on it        Current Outpatient Medications   Medication Sig Dispense Refill     acetaminophen (TYLENOL) 500 MG tablet Take 2 tablets (1,000 mg) by mouth every 6 hours as needed for pain 100 tablet 3     adalimumab (HUMIRA) 40 MG/0.8ML prefilled syringe kit Inject 0.8 mLs (40 mg) Subcutaneous every 14 days       albuterol (PROAIR HFA/PROVENTIL HFA/VENTOLIN HFA) 108 (90 Base) " MCG/ACT inhaler Inhale 2 puffs into the lungs every 6 hours as needed for shortness of breath / dyspnea or wheezing 18 g 3     albuterol (PROVENTIL) (2.5 MG/3ML) 0.083% neb solution Take 1 vial (2.5 mg) by nebulization every 6 hours as needed for shortness of breath / dyspnea or wheezing 90 vial 11     atorvastatin (LIPITOR) 40 MG tablet Take 1 tablet (40 mg) by mouth daily 90 tablet 3     blood glucose (NO BRAND SPECIFIED) lancets standard Use to test blood sugar 1 times daily or as directed. 50 each 11     blood glucose (NO BRAND SPECIFIED) test strip Use to test blood sugar 1 times daily or as directed. 50 each 11     blood glucose (NO BRAND SPECIFIED) test strip Use to test blood sugars as directed. 100 strip 1     blood glucose (ONE TOUCH DELICA) lancing device See Admin Instructions  0     blood glucose monitoring (NO BRAND SPECIFIED) meter device kit Use to test blood sugar 1 times daily or as directed. 1 kit 0     blood glucose monitoring (NO BRAND SPECIFIED) meter device kit Preferred blood glucose meter OR supplies to accompany: Blood Glucose Monitor Brands: One Touch Delica 1 kit 0     blood glucose monitoring (ONE TOUCH DELICA) lancets Use to test blood sugars 1 time daily 50 each 11     calcium carbonate 600 mg-vitamin D 400 units (CALCIUM 600 + D) 600-400 MG-UNIT per tablet Take 1 tablet by mouth 2 times daily 60 tablet 3     Calcium-Phosphorus-Vitamin D (GELATIN/CALCIUM/VITAMIN D OR) 50,000 Units       EPINEPHrine (EPIPEN/ADRENACLICK/OR ANY BX GENERIC EQUIV) 0.3 MG/0.3ML injection 2-pack Inject 0.3 mLs (0.3 mg) into the muscle as needed for anaphylaxis 0.6 mL 0     fluticasone (FLONASE) 50 MCG/ACT nasal spray Spray 1 spray into both nostrils daily 11.1 mL 3     hydrOXYzine (VISTARIL) 25 MG capsule Take 1 capsule (25 mg) by mouth 3 times daily as needed for itching 28 capsule 3     lancets 28G MISC Test 2 times per day.       metFORMIN (GLUCOPHAGE) 1000 MG tablet Take 1 tablet (1,000 mg) by mouth 2  "times daily (with meals) 300 tablet 3     mometasone-formoterol (DULERA) 100-5 MCG/ACT inhaler Inhale 2 puffs into the lungs 2 times daily 13 g 3     Nebulizers (VIOS AEROSOL DELIVERY SYSTEM) [facility] USE UTD  0     Nebulizers (VIOS LC SPRINT DELUXE) MISC Needs nebulizer and all accessories.       omeprazole (PRILOSEC OTC) 20 MG EC tablet Take 1 tablet (20 mg) by mouth daily 30 tablet 3     order for DME Equipment being ordered: Nebulizer Machine with mask and tubing. 1 Units 0     order for DME Equipment being ordered: incontinence pads    Please fax to [employer] 1 Box 3     polyethylene glycol (MIRALAX) packet Take 17 g by mouth daily as needed for constipation 30 packet 0     Respiratory Therapy Supplies (CHIDI BABY CONVERSION KIT) MISC To use with albuterol solution       STIMULANT LAXATIVE 8.6-50 MG tablet        tiotropium (SPIRIVA RESPIMAT) 2.5 MCG/ACT inhaler Inhale 2 puffs into the lungs daily 12 g 3     vitamin D2 (ERGOCALCIFEROL) 48471 units (1250 mcg) capsule Take 1 capsule (50,000 Units) by mouth once a week 12 capsule 3     Allergies   Allergen Reactions     Keflex [Cephalexin] Shortness Of Breath and Rash     Aspirin      Cefuroxime Itching     Cheese GI Disturbance     Contrast Dye Hives     IV     Diagnostic X-Ray Materials Hives     Diatrizoate Hives     Gadolinium Derivatives Hives     Hazelnuts [Nuts]      Iodixanol Unknown     Nitrofurantoin Itching     Septra [Sulfamethoxazole W/Trimethoprim] Hives     Sulfa Drugs Hives     Metronidazole Itching and Rash     Quinolones Rash            Review of Systems:     Constitutional, HEENT, cardiovascular, pulmonary, gi and gu systems are negative, except as otherwise noted.         Physical Exam:     There were no vitals taken for this visit.  Estimated body mass index is 24.62 kg/m  as calculated from the following:    Height as of 2/18/20: 1.55 m (5' 1.02\").    Weight as of 3/12/20: 59.1 kg (130 lb 6.4 oz).    Exam:  Constitutional: healthy, alert and " no distress  Psychiatric: mentation appears normal and affect normal/bright        Assessment and Plan     1. Seasonal allergic rhinitis due to pollen  Has had improvement from zyrtec in the past with seasonal allergies so will restart this today.    - cetirizine (ZYRTEC) 10 MG tablet; Take 1 tablet (10 mg) by mouth daily  Dispense: 90 tablet; Refill: 1    2. Contusion of left leg, initial encounter  History most consistent with contusion of left anterior shin. Able to ambulate and no red flag symptoms over the phone concerning for immediate imaging or evaluation in person. Discussed with patient that as this injury occurred at work it needs to be filed as workers comp, patient refuses to go to her employer for it to be billed as such. Discussed that this may then not be covered by her healthcare insurance. Reviewed precautions and indications to present to clinic including acute worsening of symptoms and inability to walk on leg.   - Follow up next week with PCP       After Visit Information:  Patient declined AVS     Appointment end time: 2:47 PM  This is a telephone visit that took 11 minutes.      Clinician location:  [facility]     Ema Leal DO (PGY3)  [facility] Resident  Pager: [phone]    I precepted today with Rafiq Krause MD

## 2020-05-20 NOTE — PROGRESS NOTES
Preceptor Attestation:  I discussed the patient with the resident. I have verified the content of the note, which accurately reflects my assessment of the patient and the plan of care.  Supervising Physician:Rafiq Krause MD  [facility]

## 2020-05-28 DIAGNOSIS — Z78.0 POSTMENOPAUSAL STATUS: ICD-10-CM

## 2020-05-28 NOTE — TELEPHONE ENCOUNTER
Message to physician:     Date of last visit: 4/13/2020     Date of next visit if scheduled: Visit date not found       Last Comprehensive Metabolic Panel:  Sodium   Date Value Ref Range Status   04/13/2020 139.0 133.0 - 144.0 mmol/L Final     Potassium   Date Value Ref Range Status   04/13/2020 3.5 3.4 - 5.3 mmol/L Final     Chloride   Date Value Ref Range Status   04/13/2020 102.0 94.0 - 109.0 mmol/L Final     Carbon Dioxide   Date Value Ref Range Status   04/13/2020 28.0 20.0 - 32.0 mmol/L Final     Glucose   Date Value Ref Range Status   04/13/2020 88.0 60.0 - 109.0 mg/dL Final     Urea Nitrogen   Date Value Ref Range Status   04/13/2020 13.0 7.0 - 30.0 mg/dL Final     Creatinine   Date Value Ref Range Status   04/13/2020 0.3 (L) 0.6 - 1.3 mg/dL Final     GFR Estimate   Date Value Ref Range Status   08/29/2014 90 >60 mL/min/1.7m2 Final     Comment:     Non  GFR Calc     Calcium   Date Value Ref Range Status   04/13/2020 10.0 8.5 - 10.4 mg/dL Final       BP Readings from Last 3 Encounters:   04/13/20 123/85   03/12/20 113/88   02/18/20 125/87       Lab Results   Component Value Date    A1C 6.0 04/13/2020    A1C 6.3 11/18/2019    A1C 6.7 02/09/2019    A1C 6.6 08/28/2018    A1C 6.3 07/16/2018                Please complete refill and CLOSE ENCOUNTER.  Closing the encounter signifies the refill is complete.

## 2020-05-29 DIAGNOSIS — Z78.0 POSTMENOPAUSAL STATUS: ICD-10-CM

## 2020-05-29 RX ORDER — ERGOCALCIFEROL 1.25 MG/1
50000 CAPSULE, LIQUID FILLED ORAL WEEKLY
Qty: 12 CAPSULE | Refills: 3 | Status: SHIPPED | OUTPATIENT
Start: 2020-05-29 | End: 2020-12-22

## 2020-06-01 RX ORDER — ERGOCALCIFEROL 1.25 MG/1
50000 CAPSULE, LIQUID FILLED ORAL WEEKLY
Qty: 12 CAPSULE | Refills: 3 | Status: SHIPPED | OUTPATIENT
Start: 2020-06-01 | End: 2020-11-16

## 2020-06-02 ENCOUNTER — OFFICE VISIT (OUTPATIENT)
Dept: FAMILY MEDICINE | Facility: CLINIC | Age: 55
End: 2020-06-02
Payer: COMMERCIAL

## 2020-06-02 VITALS
BODY MASS INDEX: 26.88 KG/M2 | HEART RATE: 92 BPM | WEIGHT: 142.4 LBS | DIASTOLIC BLOOD PRESSURE: 78 MMHG | HEIGHT: 61 IN | SYSTOLIC BLOOD PRESSURE: 115 MMHG | TEMPERATURE: 98.4 F | OXYGEN SATURATION: 96 % | RESPIRATION RATE: 16 BRPM

## 2020-06-02 DIAGNOSIS — E11.9 TYPE 2 DIABETES MELLITUS WITHOUT COMPLICATION, WITHOUT LONG-TERM CURRENT USE OF INSULIN (H): ICD-10-CM

## 2020-06-02 ASSESSMENT — MIFFLIN-ST. JEOR: SCORE: 1181.79
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
## 2020-06-04 DIAGNOSIS — R10.32 ABDOMINAL PAIN, LEFT LOWER QUADRANT: ICD-10-CM

## 2020-06-04 DIAGNOSIS — J44.1 CHRONIC OBSTRUCTIVE PULMONARY DISEASE WITH ACUTE EXACERBATION (H): ICD-10-CM

## 2020-06-04 DIAGNOSIS — R05.9 COUGH: ICD-10-CM

## 2020-06-04 DIAGNOSIS — Z78.0 POSTMENOPAUSAL STATUS: ICD-10-CM

## 2020-06-05 RX ORDER — ACETAMINOPHEN 500 MG
1000 TABLET ORAL EVERY 6 HOURS PRN
Qty: 100 TABLET | Refills: 1 | Status: SHIPPED | OUTPATIENT
Start: 2020-06-05 | End: 2020-08-17

## 2020-06-05 RX ORDER — MECOBALAMIN 5000 MCG
15 TABLET,DISINTEGRATING ORAL DAILY
Qty: 90 CAPSULE | Refills: 3 | Status: SHIPPED | OUTPATIENT
Start: 2020-06-05 | End: 2020-12-22

## 2020-06-09 ENCOUNTER — COMMUNICATION - HEALTHEAST (OUTPATIENT)
Dept: PULMONOLOGY | Facility: OTHER | Age: 55
End: 2020-06-09

## 2020-06-09 DIAGNOSIS — J45.40 MODERATE PERSISTENT ASTHMA WITHOUT COMPLICATION: ICD-10-CM

## 2020-06-10 ENCOUNTER — TELEPHONE (OUTPATIENT)
Dept: FAMILY MEDICINE | Facility: CLINIC | Age: 55
End: 2020-06-10

## 2020-06-10 NOTE — TELEPHONE ENCOUNTER
"On call physician returning clinic page from [phone] \"I am experiencing some chest pain. Now I have been consuming a lot of caffeine today, but I'm unsure if it's related. I'm concerned as it won't go away.\"    Discussed with the patient, she states she drank 2-3 cups this AM, 2 more at work and then a large energy drink too. The chest pain started right after she finished a 16oz Red Bull at 1500. The chest pain is located on the L side of her chest, she describes it as a sharp pain \"like an ice pick in me\". She would rate it as 8-9/10. No radiation. Since starting, the chest pain is unchanged. She has not tried anything to treat this. The pain is constant. She feels like she may have some palpitations. She endorses SOB, more than usual. She feels the chest pain gets a bit worse with walking or other activity.    No documented episodes of past ACS.    Discussed with the patient that she does have risk factors in that she is diabetic (although last A1c 6.0 4/2020) and has elevated LDL (123 7/2018) and that I cannot rule out MI over the phone, she would need to go to the ED for bloodwork.    However, I did say it is encouraging that it started right after the energy drink.    I gave her the option of calling 911 to go to the ED now (she does not have other transportation) vs giving it some additional time and trying Tylenol/Ibuprofen. Patient wants to wait until 2200 after some Tylenol and if not significantly improved she will page/call me back which I am in agreement with.    Sergo Gonzales MD  [facility], PGY-3          "

## 2020-06-15 ENCOUNTER — NURSE TRIAGE (OUTPATIENT)
Dept: FAMILY MEDICINE | Facility: CLINIC | Age: 55
End: 2020-06-15

## 2020-06-15 NOTE — TELEPHONE ENCOUNTER
"  Additional Information    Negative: Severe difficulty breathing (e.g., struggling for each breath, speaks in single words)    Negative: Passed out (i.e., fainted, collapsed and was not responding)    Negative: Chest pain lasting longer than 5 minutes and ANY of the following:* Over 50 years old* Over 30 years old and at least one cardiac risk factor (i.e., high blood pressure, diabetes, high cholesterol, obesity, smoker or strong family history of heart disease)* Pain is crushing, pressure-like, or heavy * Took nitroglycerin and chest pain was not relieved* History of heart disease (i.e., angina, heart attack, bypass surgery, angioplasty, CHF)    Negative: Visible sweat on face or sweat dripping down face    Negative: Sounds like a life-threatening emergency to the triager    Negative: Followed an injury to chest    Dizziness or lightheadedness    Intermittent chest pain and pain has been increasing in severity or frequency    Chest pain lasting longer than 5 minutes    Negative: Coughing up blood    Negative: Patient sounds very sick or weak to the triager    Answer Assessment - Initial Assessment Questions  1. LOCATION: midsternum to left chest    2. RADIATION: occasional radiate to left arm. Denies radiation to jaw/neck    3. ONSET: has been constantly experienced since onset 3 days ago.    4. PATTERN: reports constant sharp pain in chest.    5. DURATION: ongoing x 3 days.    6. SEVERITY: \"How bad is the pain?\"  (e.g., Scale 1-10; mild, moderate, or severe)     - SEVERE (8-10): excruciating pain, unable to do any normal activities          7. CARDIAC RISK FACTORS: smoking- has been increased for past week, smoking more= 1ppd and drinking more caffeine= 1/2 pot/ 6 cups a day x 1 week, + family medical- mother had heart attack    8. PULMONARY RISK FACTORS:  Hx- asthma, tried use of albuterol inhaler without much effectiveness    9. 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 Self propelling mower no longer working.    10. OTHER SYMPTOMS:  C/o dizziness, has not been drinking a good amount of water. May drink one bottle a day.          11. PREGNANCY: no    Protocols used: CHEST PAIN-A-OH

## 2020-06-15 NOTE — TELEPHONE ENCOUNTER
[facility] phone call message-patient reporting a symptom:     Symptom: CHEST PAIN ALMOST EVERY DAY AND A LOT OF SOB    Same Day Visit Offered:Yes declined she states she is govind of at work.     Additional comments: Calling to schedule an appt with Dr. Farah for reasons above. Tried to get an RN to speak to her but no one was avail. Scheduled an appt with Dr. Leonard on Thursday 6/18 as she can't wait that long to see her and 6/23 next week with Dr. Farah doesn't work for her. Patient states her family has history of heart problem. Please call and advise.     OK to leave message on voice mail?     Primary language: English      needed? No    Call taken on Serina 15, 2020 at 12:54 PM by Marshall Weeks

## 2020-06-18 ENCOUNTER — OFFICE VISIT (OUTPATIENT)
Dept: FAMILY MEDICINE | Facility: CLINIC | Age: 55
End: 2020-06-18
Payer: COMMERCIAL

## 2020-06-18 VITALS
WEIGHT: 144 LBS | BODY MASS INDEX: 27.19 KG/M2 | OXYGEN SATURATION: 97 % | TEMPERATURE: 99 F | HEIGHT: 61 IN | HEART RATE: 84 BPM | DIASTOLIC BLOOD PRESSURE: 83 MMHG | SYSTOLIC BLOOD PRESSURE: 123 MMHG | RESPIRATION RATE: 16 BRPM

## 2020-06-18 DIAGNOSIS — R07.89 OTHER CHEST PAIN: Primary | ICD-10-CM

## 2020-06-18 DIAGNOSIS — E11.9 TYPE 2 DIABETES MELLITUS WITHOUT COMPLICATION, WITHOUT LONG-TERM CURRENT USE OF INSULIN (H): ICD-10-CM

## 2020-06-18 DIAGNOSIS — Z13.9 SCREENING FOR CONDITION: ICD-10-CM

## 2020-06-18 LAB
CREAT UR-MCNC: 56.1 MG/DL
MICROALBUMIN UR-MCNC: 3.74 MG/DL (ref 0–1.99)
MICROALBUMIN/CREAT UR: 66.7 MG/G

## 2020-06-18 ASSESSMENT — MIFFLIN-ST. JEOR: SCORE: 1185.94
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
## 2020-06-19 LAB — HCV AB SER QL: NEGATIVE

## 2020-06-19 NOTE — RESULT ENCOUNTER NOTE
Called but no answer. Left message on identified voicemail.    LANE Stringer (Tarah) [facility] [address] [place] [address]  [phone]

## 2020-06-19 NOTE — RESULT ENCOUNTER NOTE
Call patient:    Good results: never been exposed to hepatitis C virus.  Urine studies show very mild diabetic kidneys.  Keep up the good diabetes control.

## 2020-06-26 NOTE — RESULT ENCOUNTER NOTE
Indication:Chest pain    Interpretation: Normal Sinus Rhythm, rate 85,  First degree heart block, normal axis, no acute ST/T changes c/w ischemia, no change from previous: first degree heart block present.    Patient informed at visit.

## 2020-06-30 ENCOUNTER — OFFICE VISIT (OUTPATIENT)
Dept: FAMILY MEDICINE | Facility: CLINIC | Age: 55
End: 2020-06-30
Payer: COMMERCIAL

## 2020-06-30 VITALS
RESPIRATION RATE: 20 BRPM | WEIGHT: 142.4 LBS | HEIGHT: 61 IN | HEART RATE: 93 BPM | BODY MASS INDEX: 26.88 KG/M2 | DIASTOLIC BLOOD PRESSURE: 82 MMHG | TEMPERATURE: 98.3 F | OXYGEN SATURATION: 95 % | SYSTOLIC BLOOD PRESSURE: 128 MMHG

## 2020-06-30 DIAGNOSIS — G56.03 BILATERAL CARPAL TUNNEL SYNDROME: Primary | ICD-10-CM

## 2020-06-30 ASSESSMENT — MIFFLIN-ST. JEOR: SCORE: 1173.3
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
## 2020-06-30 NOTE — PATIENT INSTRUCTIONS
For your breathing:  -Set a timer for your Dulera.  Need to take twice a day  -can use as a rescue inhaler if needed  -get your cat OUT OF YOUR BEDROOM  -You need to quit smoking.  TRY COLD TURKEY    We are referring you to orthopedics for your hands

## 2020-07-02 ENCOUNTER — NURSE TRIAGE (OUTPATIENT)
Dept: FAMILY MEDICINE | Facility: CLINIC | Age: 55
End: 2020-07-02

## 2020-07-02 DIAGNOSIS — R05.9 COUGH: ICD-10-CM

## 2020-07-02 NOTE — TELEPHONE ENCOUNTER
Additional Information    Negative: Bluish (or gray) lips or face    Negative: Severe difficulty breathing (e.g., struggling for each breath, speaks in single words)    Negative: Rapid onset of cough and has hives    Negative: Coughing started suddenly after medicine, an allergic food or bee sting    Negative: Difficulty breathing after exposure to flames, smoke, or fumes    Negative: Sounds like a life-threatening emergency to the triager    Negative: Previous asthma attacks and this feels like asthma attack    Negative: Chest pain present when not coughing    Negative: Difficulty breathing    Negative: Passed out (i.e., fainted, collapsed and was not responding)    Negative: Patient sounds very sick or weak to the triager    Negative: Coughed up > 1 tablespoon (15 ml) blood (Exception: blood-tinged sputum)    Negative: Fever > 103 F (39.4 C)    Negative: Fever > 101 F (38.3 C) and over 60 years of age    Negative: Fever > 100.0 F (37.8 C) and has diabetes mellitus or a weak immune system (e.g., HIV positive, cancer chemotherapy, organ transplant, splenectomy, chronic steroids)    Negative: Fever > 100.0 F (37.8 C) and bedridden (e.g., nursing home patient, stroke, chronic illness, recovering from surgery)    Negative: Increasing ankle swelling    Negative: Wheezing is present    Negative: SEVERE coughing spells (e.g., whooping sound after coughing, vomiting after coughing)    Negative: Coughing up jak-colored (reddish-brown) or blood-tinged sputum    Negative: Fever present > 3 days (72 hours)    Negative: Fever returns after gone for over 24 hours and symptoms worse or not improved    Negative: Using nasal washes and pain medicine > 24 hours and sinus pain persists    Negative: Known COPD or other severe lung disease (i.e., bronchiectasis, cystic fibrosis, lung surgery) and worsening symptoms (i.e., increased sputum purulence or amount, increased breathing difficulty)    Negative: Continuous (nonstop)  coughing interferes with work or school and no improvement using cough treatment per Care Advice    Negative: Patient wants to be seen    Negative: Cough has been present for > 3 weeks    Negative: Allergy symptoms are also present (e.g., itchy eyes, clear nasal discharge, postnasal drip)    Negative: Nasal discharge present > 10 days    Negative: Exposure to TB (Tuberculosis)    Negative: Taking an ACE Inhibitor medication (e.g., benazepril/LOTENSIN, captopril/CAPOTEN, enalapril/VASOTEC, lisinopril/ZESTRIL)    Cough with no complications    Answer Assessment - Initial Assessment Questions  1. ONSET: intermittent ongoing, hx COPD and Asthma, recently seen for asthma follow up 6/2020.    2. SEVERITY: cough not currently of concern. Reporting x 1 hemoptysis this morning.    3. RESPIRATORY DISTRESS: stable, has had more shortness of breath which was reported to physician 6/23/2020, secondary to asthma. Reports has been smoking more.    4. FEVER: none    5. HEMOPTYSIS: small amount but was concerning to patient has it was bright red.    6. TREATMENT: use inhalers and neb treatment as prescribed.  Denies recent nose bleeds, no mouth sores or soreness of throat.    7. CARDIAC HISTORY: no    8. LUNG HISTORY: asthma, COPD and is a smoker    9. PE RISK FACTORS: no    10. OTHER SYMPTOMS: denies chest pain/discomfort. Has had head, nasal and facial congestion. Reports use of Afrin and Flonase alternating every other day x last month.    11. PREGNANCY: no    12. TRAVEL: no    Protocols used: COUGH-A-OH

## 2020-07-02 NOTE — TELEPHONE ENCOUNTER
[facility] phone call message- general phone call:    Reason for call: Patient request nurse call back stating she woke up to spit and had bloody mucus.     Return call needed: Yes    OK to leave a message on voice mail? Yes    Primary language: English      needed? No    Call taken on July 2, 2020 at 9:13 AM by Noah Felipe

## 2020-07-02 NOTE — TELEPHONE ENCOUNTER
Message to physician:     Date of last visit: 6/30/2020     Date of next visit if scheduled: Visit date not found       Last Comprehensive Metabolic Panel:  Sodium   Date Value Ref Range Status   04/13/2020 139.0 133.0 - 144.0 mmol/L Final     Potassium   Date Value Ref Range Status   04/13/2020 3.5 3.4 - 5.3 mmol/L Final     Chloride   Date Value Ref Range Status   04/13/2020 102.0 94.0 - 109.0 mmol/L Final     Carbon Dioxide   Date Value Ref Range Status   04/13/2020 28.0 20.0 - 32.0 mmol/L Final     Glucose   Date Value Ref Range Status   04/13/2020 88.0 60.0 - 109.0 mg/dL Final     Urea Nitrogen   Date Value Ref Range Status   04/13/2020 13.0 7.0 - 30.0 mg/dL Final     Creatinine   Date Value Ref Range Status   04/13/2020 0.3 (L) 0.6 - 1.3 mg/dL Final     GFR Estimate   Date Value Ref Range Status   08/29/2014 90 >60 mL/min/1.7m2 Final     Comment:     Non  GFR Calc     Calcium   Date Value Ref Range Status   04/13/2020 10.0 8.5 - 10.4 mg/dL Final       BP Readings from Last 3 Encounters:   06/30/20 128/82   06/18/20 123/83   06/02/20 115/78       Lab Results   Component Value Date    A1C 6.0 04/13/2020    A1C 6.3 11/18/2019    A1C 6.7 02/09/2019    A1C 6.6 08/28/2018    A1C 6.3 07/16/2018                Please complete refill and CLOSE ENCOUNTER.  Closing the encounter signifies the refill is complete.

## 2020-07-03 RX ORDER — ALBUTEROL SULFATE 0.83 MG/ML
2.5 SOLUTION RESPIRATORY (INHALATION) EVERY 6 HOURS PRN
Qty: 90 VIAL | Refills: 11 | Status: SHIPPED | OUTPATIENT
Start: 2020-07-03 | End: 2021-01-06

## 2020-07-06 ENCOUNTER — TELEPHONE (OUTPATIENT)
Dept: FAMILY MEDICINE | Facility: CLINIC | Age: 55
End: 2020-07-06

## 2020-07-06 NOTE — TELEPHONE ENCOUNTER
[facility] phone call message- medication clarification/question:    Full Medication Name: LANsoprazole (PREVACID) 15 MG DR capsule AND omeprazole (PRILOSEC) 20 MG DR capsule       Question: Pharmacy states they need clarification on whether the patient should be taking both of the medication listed. Please call and advise.     Pharm[facility] confirmed as    [id number] [place], [place] - [address] AT [facility] [address]  [facility] [address] [facility]: Yes    OK to leave a message on voice mail? Yes    Primary language: English      needed? No    Call taken on July 6, 2020 at 10:13 AM by Sayra Mueller

## 2020-07-06 NOTE — TELEPHONE ENCOUNTER
Please verify which medication Birgid should continue Prevacid was on patient's med list 6/30/2020 during visit med reconciliation, on 7/3/2020 Omeprazole refilled. Pasha SMITH

## 2020-07-09 NOTE — TELEPHONE ENCOUNTER
"Called pharmacy to advise will need to await PCP return on Monday. Pharmacy techBess, stated \"So patient needs to wait until Monday for any medication?\" Advised patient should still have plenty prevacid left as she was prescribed 90 tablets last month with one refill and patient to only take one daily. Bess then stated \"so she should go without this new prescription until Monday\". Advised okay to wait until Monday since these refill requests were sent from the pharmacy. Will route again to PCP for review. Basilio SMITH  "

## 2020-07-09 NOTE — TELEPHONE ENCOUNTER
Called and spoke with pharmacy tech, Aimee, and advised to discontinue omeprazole and continue prevacid. Aimee verbalized understanding with read-back method. Basilio SMITH

## 2020-07-31 DIAGNOSIS — Z78.0 POSTMENOPAUSAL STATUS: ICD-10-CM

## 2020-07-31 DIAGNOSIS — J44.1 CHRONIC OBSTRUCTIVE PULMONARY DISEASE WITH ACUTE EXACERBATION (H): ICD-10-CM

## 2020-08-04 ENCOUNTER — TELEPHONE (OUTPATIENT)
Dept: FAMILY MEDICINE | Facility: CLINIC | Age: 55
End: 2020-08-04

## 2020-08-04 ENCOUNTER — VIRTUAL VISIT (OUTPATIENT)
Dept: FAMILY MEDICINE | Facility: CLINIC | Age: 55
End: 2020-08-04
Payer: COMMERCIAL

## 2020-08-04 DIAGNOSIS — G47.9 SLEEP DIFFICULTIES: Primary | ICD-10-CM

## 2020-08-04 NOTE — TELEPHONE ENCOUNTER
Out going call made to patient to assist scheduling a diabetic clinic visit. Patient is due for A1C, referral for diabetic eye exam, diabetic foot exam. LMTCB to schedule visit.

## 2020-08-04 NOTE — PROGRESS NOTES
"Family Medicine Telephone Visit Note           Telephone Visit Consent   Patient was verbally read the following and verbal consent was obtained.    \"Telephone visits are billed at different rates depending on your insurance coverage. During this emergency period, for some insurers they may be billed the same as an in-person visit.  Please reach out to your insurance provider with any questions.  If during the course of the call the physician/provider feels a telephone visit is not appropriate, you will not be charged for this service.\"    Name person giving consent:  Patient   Date verbal consent given:  8/4/2020  Time verbal consent given:  2:11 PM     Chief Complaint   Patient presents with     RECHECK     breathing- is better but thinks its from smoking      Referral     sleep study wakes up alot and snore talks in sleep not sleeping well     Medication Reconciliation     not completed       patient at work unable to obtain vitals         HPI   Patients name: Mary Ann  Appointment start time:  2:16pm    Sleeping issues.  Feeling drained all the time.  Wakes from 8 hours of rest fatigued.  Stress level is high lately.  Reports that she has tried to go to sleep earlier but just wakes up earlier instead.   Reports that she has been drinking \"a lot\" of caffeine.  Has moved to Red Bull and has been drinking throughout the day.     Goal from last visit was to quit smoking.  Reports that her breathing is stable.     Has a new job making face masks    Current Outpatient Medications   Medication Sig Dispense Refill     acetaminophen (TYLENOL) 500 MG tablet Take 2 tablets (1,000 mg) by mouth every 6 hours as needed for pain 100 tablet 1     adalimumab (HUMIRA) 40 MG/0.8ML prefilled syringe kit Inject 0.8 mLs (40 mg) Subcutaneous every 14 days       albuterol (PROAIR HFA/PROVENTIL HFA/VENTOLIN HFA) 108 (90 Base) MCG/ACT inhaler Inhale 2 puffs into the lungs every 6 hours as needed for shortness of breath / dyspnea or " wheezing 18 g 3     albuterol (PROVENTIL) (2.5 MG/3ML) 0.083% neb solution Take 1 vial (2.5 mg) by nebulization every 6 hours as needed for shortness of breath / dyspnea or wheezing 90 vial 11     atorvastatin (LIPITOR) 40 MG tablet Take 1 tablet (40 mg) by mouth daily 90 tablet 3     blood glucose (NO BRAND SPECIFIED) lancets standard Use to test blood sugar 1 times daily or as directed. 50 each 11     blood glucose (NO BRAND SPECIFIED) test strip Use to test blood sugar 1 times daily or as directed. 50 each 11     blood glucose (NO BRAND SPECIFIED) test strip Use to test blood sugars as directed. 100 strip 1     blood glucose (ONE TOUCH DELICA) lancing device See Admin Instructions  0     blood glucose monitoring (NO BRAND SPECIFIED) meter device kit Use to test blood sugar 1 times daily or as directed. 1 kit 0     blood glucose monitoring (NO BRAND SPECIFIED) meter device kit Preferred blood glucose meter OR supplies to accompany: Blood Glucose Monitor Brands: One Touch Delica 1 kit 0     blood glucose monitoring (ONE TOUCH DELICA) lancets Use to test blood sugars 1 time daily 50 each 11     calcium carbonate 600 mg-vitamin D 400 units (CALCIUM 600 + D) 600-400 MG-UNIT per tablet Take 1 tablet by mouth 2 times daily 60 tablet 3     Calcium-Phosphorus-Vitamin D (GELATIN/CALCIUM/VITAMIN D OR) 50,000 Units       cetirizine (ZYRTEC) 10 MG tablet Take 1 tablet (10 mg) by mouth daily 90 tablet 1     EPINEPHrine (EPIPEN/ADRENACLICK/OR ANY BX GENERIC EQUIV) 0.3 MG/0.3ML injection 2-pack Inject 0.3 mLs (0.3 mg) into the muscle as needed for anaphylaxis 0.6 mL 0     fluticasone (FLONASE) 50 MCG/ACT nasal spray Spray 1 spray into both nostrils daily 11.1 mL 3     hydrOXYzine (VISTARIL) 25 MG capsule Take 1 capsule (25 mg) by mouth 3 times daily as needed for itching 28 capsule 3     lancets 28G MISC Test 2 times per day.       LANsoprazole (PREVACID) 15 MG DR capsule Take 1 capsule (15 mg) by mouth daily 90 capsule 3      "metFORMIN (GLUCOPHAGE) 1000 MG tablet Take 1 tablet (1,000 mg) by mouth 2 times daily (with meals) 180 tablet 3     mometasone-formoterol (DULERA) 100-5 MCG/ACT inhaler Inhale 2 puffs into the lungs 2 times daily 13 g 3     Nebulizers (VIOS AEROSOL DELIVERY SYSTEM) [facility] [place]  0     Nebulizers (VIOS LC SPRINT DELUXE) MISC Needs nebulizer and all accessories.       order for DME Equipment being ordered: Nebulizer Machine with mask and tubing. 1 Units 0     order for DME Equipment being ordered: incontinence pads    Please fax to [employer] 1 Box 3     polyethylene glycol (MIRALAX) packet Take 17 g by mouth daily as needed for constipation 30 packet 0     Respiratory Therapy Supplies (CHIDI BABY CONVERSION KIT) MISC To use with albuterol solution       STIMULANT LAXATIVE 8.6-50 MG tablet        tiotropium (SPIRIVA RESPIMAT) 2.5 MCG/ACT inhaler Inhale 2 puffs into the lungs daily 12 g 3     vitamin D2 (ERGOCALCIFEROL) 70966 units (1250 mcg) capsule Take 1 capsule (50,000 Units) by mouth once a week 12 capsule 3     vitamin D2 (ERGOCALCIFEROL) 93973 units (1250 mcg) capsule Take 1 capsule (50,000 Units) by mouth once a week 12 capsule 3     Allergies   Allergen Reactions     Keflex [Cephalexin] Shortness Of Breath and Rash     Aspirin      Cefuroxime Itching     Cheese GI Disturbance     Contrast Dye Hives     IV     Diagnostic X-Ray Materials Hives     Diatrizoate Hives     Gadolinium Derivatives Hives     Hazelnuts [Nuts]      Iodixanol Unknown     Nitrofurantoin Itching     Septra [Sulfamethoxazole W/Trimethoprim] Hives     Sulfa Drugs Hives     Metronidazole Itching and Rash     Quinolones Rash              Review of Systems:     Denies new cough, sob, fever         Physical Exam:     There were no vitals taken for this visit.  Estimated body mass index is 26.91 kg/m  as calculated from the following:    Height as of 6/30/20: 1.549 m (5' 1\").    Weight as of 6/30/20: 64.6 kg (142 lb 6.4 " oz).    Exam:  Constitutional: healthy, alert and no distress  Psychiatric: mentation appears normal and affect normal/bright          Assessment and Plan     Sleep issues:  This is new issue for patient  No red flag sx for BE and with difficulty obtaining sleep study with COVID recommend that we start with sleep hygeine and a sleep diary.  Patient OK with this.  Will mail to patient.  Patient also with significant caffeine consumption likely affecting sleep.  Recommend that she reduce caffeine intake and abstain from caffeine after 12pm.     Tobacco dependence:  -ongoing    COPD/Asthma overlap:  Reportedly stable today  No recent hospitalizations or acute care visits for breathing    Refilled medications that would be required in the next 3 months.     After Visit Information:  Will mail sleep diary to patient    Appointment end time: 2:26pm  This is a telephone visit that took 10 minutes.      Clinician location:  [facility]     Joanna Farah MD  I

## 2020-08-12 DIAGNOSIS — R05.9 COUGH: ICD-10-CM

## 2020-08-14 ENCOUNTER — TELEPHONE (OUTPATIENT)
Dept: FAMILY MEDICINE | Facility: CLINIC | Age: 55
End: 2020-08-14

## 2020-08-14 NOTE — TELEPHONE ENCOUNTER
"Incoming call received from patient wanting to establish care via phone with Alvarez Canseco for depression and anxiety. Patient reports increased stress levels due to current  living conditions and reports having a \"break down\" at work. Patient has been scheduled with Dr. Alvarez Canseco for phone visit, denies having video ability.   Mary Ann would also like to be rescheduled for sleep study as she was unable to make it to one she had scheduled \"not long ago\".    Patient has been redirected to continue working with her [facility] , [facility] or [employer] worker for assistance w/ finding a solution.   "

## 2020-08-14 NOTE — TELEPHONE ENCOUNTER
[facility] Family Medicine phone call message - order or referral request for patient:     Order or referral being requested: hose/tube for nebulizer      Additional Comments: Patient states the hose/tube that connects to her nebulizer is ruined and would like a new one asap if possible. Patient wanted to rush order but I let patient know would have to go through pcp first. Please call and advise. Send to [facility] on [address] if approved.    OK to leave a message on voice mail? Yes    Primary language: English      needed? No    Call taken on August 14, 2020 at 2:05 PM by Sheri Weeks

## 2020-08-17 RX ORDER — ACETAMINOPHEN 500 MG
1000 TABLET ORAL EVERY 6 HOURS PRN
Qty: 100 TABLET | Refills: 1 | Status: SHIPPED | OUTPATIENT
Start: 2020-08-17 | End: 2021-05-03

## 2020-08-17 NOTE — TELEPHONE ENCOUNTER
I received message from Meeta asking to help schedule a sleep study for the patient, could you put in a order for her to do so.   Thanks!

## 2020-08-25 ENCOUNTER — TELEPHONE (OUTPATIENT)
Dept: FAMILY MEDICINE | Facility: CLINIC | Age: 55
End: 2020-08-25

## 2020-08-25 DIAGNOSIS — J44.1 CHRONIC OBSTRUCTIVE PULMONARY DISEASE WITH ACUTE EXACERBATION (H): Primary | ICD-10-CM

## 2020-08-25 NOTE — TELEPHONE ENCOUNTER
[facility] Family Medicine phone call message- general phone call:    Reason for call: Patient states she needs new tubing for neb machine. She states her old tubing was ruined and needs a new as soon as possible. Patient states she called in on the 14th and has not heard back yet. Please call and advise.     Return call needed: Yes    OK to leave a message on voice mail? Yes    Primary language: English      needed? No    Call taken on August 25, 2020 at 2:56 PM by Sayra Mueller

## 2020-08-26 ENCOUNTER — VIRTUAL VISIT (OUTPATIENT)
Dept: PSYCHOLOGY | Facility: CLINIC | Age: 55
End: 2020-08-26
Payer: COMMERCIAL

## 2020-08-26 DIAGNOSIS — F33.1 MODERATE EPISODE OF RECURRENT MAJOR DEPRESSIVE DISORDER (H): Primary | ICD-10-CM
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
## 2020-08-31 DIAGNOSIS — J44.1 CHRONIC OBSTRUCTIVE PULMONARY DISEASE WITH ACUTE EXACERBATION (H): ICD-10-CM

## 2020-09-09 ENCOUNTER — OFFICE VISIT (OUTPATIENT)
Dept: FAMILY MEDICINE | Facility: CLINIC | Age: 55
End: 2020-09-09
Payer: COMMERCIAL

## 2020-09-09 VITALS
DIASTOLIC BLOOD PRESSURE: 76 MMHG | SYSTOLIC BLOOD PRESSURE: 123 MMHG | OXYGEN SATURATION: 96 % | RESPIRATION RATE: 18 BRPM | TEMPERATURE: 98.2 F | HEART RATE: 97 BPM

## 2020-09-09 DIAGNOSIS — M79.672 LEFT FOOT PAIN: Primary | ICD-10-CM

## 2020-09-09 DIAGNOSIS — J01.90 ACUTE SINUSITIS WITH SYMPTOMS > 10 DAYS: ICD-10-CM

## 2020-09-09 DIAGNOSIS — J06.9 VIRAL UPPER RESPIRATORY TRACT INFECTION: ICD-10-CM

## 2020-09-09 DIAGNOSIS — E11.9 TYPE 2 DIABETES MELLITUS WITHOUT COMPLICATION, WITHOUT LONG-TERM CURRENT USE OF INSULIN (H): ICD-10-CM

## 2020-09-09 LAB — GLUCOSE CASUAL: 141 MG/DL (ref 51–200)

## 2020-09-09 RX ORDER — DOXYCYCLINE 100 MG/1
100 CAPSULE ORAL 2 TIMES DAILY
Qty: 14 CAPSULE | Refills: 0 | Status: SHIPPED | OUTPATIENT
Start: 2020-09-13 | End: 2020-11-16

## 2020-09-09 RX ORDER — FLUTICASONE PROPIONATE 50 MCG
1 SPRAY, SUSPENSION (ML) NASAL DAILY
Qty: 11.1 ML | Refills: 3 | Status: SHIPPED | OUTPATIENT
Start: 2020-09-09 | End: 2021-06-25
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
## 2020-09-15 NOTE — PROGRESS NOTES
Preceptor Attestation:   Patient seen, evaluated and discussed with the resident. I personally reviewed the imaging and agree with the interpretation documented by the resident. I have verified the content of the note, which accurately reflects my assessment of the patient and the plan of care.  Supervising Physician:Mian Gudino MD  [facility]

## 2020-09-16 ENCOUNTER — TELEPHONE (OUTPATIENT)
Dept: PSYCHOLOGY | Facility: CLINIC | Age: 55
End: 2020-09-16

## 2020-09-16 NOTE — TELEPHONE ENCOUNTER
Attempted telephone contact with patient at appx 3:00-3:05PM; no answer; left message    Attempted telephone contact again with patient at appx 3:15-3:20PM; she picked up, said that she just woke up and could not talk, and then hung up.

## 2020-09-28 ENCOUNTER — DOCUMENTATION ONLY (OUTPATIENT)
Dept: FAMILY MEDICINE | Facility: CLINIC | Age: 55
End: 2020-09-28

## 2020-09-28 ENCOUNTER — TELEPHONE (OUTPATIENT)
Dept: FAMILY MEDICINE | Facility: CLINIC | Age: 55
End: 2020-09-28

## 2020-09-28 NOTE — TELEPHONE ENCOUNTER
[facility] phone call message- general phone call:    Reason for call: Patient states [facility]. Please call and advise.     Return call needed: Yes    OK to leave a message on voice mail? Yes    Primary language: English      needed? No    Call taken on September 28, 2020 at 1:12 PM by Sayra Mueller

## 2020-09-28 NOTE — TELEPHONE ENCOUNTER
I got a message routed to me from Felicity about the pt. It seems like the pt was having some mouse urine in her apartment building and did not know who to call about it. She has tried telling the , but it seems to not help. I called the pt back to give her [facility] [employer]pection phone number. I left a vm for the pt with their phone number to call.

## 2020-09-28 NOTE — PROGRESS NOTES
Patient had c/o mouse urine throughout her apartment building and now exhibiting worsening breathing, fatigue, weakness. Advised of hantavirus and correlation to mouse urine. Patient unsure how to report these findings to the appropriate resources to have mice eradicated from apartment complex, patient stated landlord is not helping. Advised can discuss with SW on resources and avenues patient can take to get the mouse issue resolved. Basilio SMITH

## 2020-09-28 NOTE — TELEPHONE ENCOUNTER
Spoke with PCP who agrees with recommendation. Called patient and advised, patient scheduled appointment for this Wednesday (09/30). Advised to call the clinic if symptoms worsen or new symptoms arise. Patient verbalized understanding. Basilio SMITH

## 2020-09-28 NOTE — TELEPHONE ENCOUNTER
Called patient to discuss concerns. Patient requesting referral to OBGYn r/t new onset cramping of her vaginal muscles which began two days ago. Patient stated it is a stabbingfeeling. Patient also endorses new onset urinary urgency as of the last week. Patient also endorses foul odor that has been present for one week. Denies fever, cough, SOB, or flank pain. Advised patient sounds like UTI and would want her in clinic for a UA/UC for possible antibiotics. Patient declined and requested this be routed to PCP first.     Patient also had c/o mouse urine throughout her apartment building and now exhibiting worsening breathing, fatigue, weakness. Advised of hantavirus and correlation to mouse urine. Patient unsure how to report these findings to the appropriate resources to have mice eradicated from apartment complex, patient stated landlord is not helping. Advised can discuss with SW on resources and avenues patient can take to get the mouse issue resolved. Will route to PCP for symptoms and SW for mouse urine concerns. Basilio SMITH

## 2020-09-28 NOTE — TELEPHONE ENCOUNTER
[facility] Family [facility] phone call message- general phone call:    Reason for call: Patient states she needs to speak with a nurse regarding female concerns. Please call and advise.     Return call needed: Yes    OK to leave a message on voice mail? Yes    Primary language: English      needed? No    Call taken on September 28, 2020 at 1:29 PM by Sayra Mueller

## 2020-09-30 ENCOUNTER — OFFICE VISIT (OUTPATIENT)
Dept: FAMILY MEDICINE | Facility: CLINIC | Age: 55
End: 2020-09-30
Payer: COMMERCIAL

## 2020-09-30 VITALS
DIASTOLIC BLOOD PRESSURE: 88 MMHG | BODY MASS INDEX: 26.15 KG/M2 | RESPIRATION RATE: 18 BRPM | OXYGEN SATURATION: 100 % | TEMPERATURE: 98 F | HEART RATE: 100 BPM | WEIGHT: 138.4 LBS | SYSTOLIC BLOOD PRESSURE: 123 MMHG

## 2020-09-30 DIAGNOSIS — R30.0 DYSURIA: Primary | ICD-10-CM

## 2020-09-30 LAB
BACTERIA: NORMAL
BILIRUBIN UR: NEGATIVE MG/DL
BLOOD UR: ABNORMAL MG/DL
CASTS: NORMAL /LPF
CLARITY, URINE: CLEAR
COLOR UR: YELLOW
CRYSTAL URINE: NORMAL /LPF
EPITHELIAL CELLS UR: NORMAL /LPF (ref 0–2)
GLUCOSE URINE: NEGATIVE
KETONES UR QL: NEGATIVE MG/DL
LEUKOCYTE ESTERASE UR: ABNORMAL
MUCOUS URINE: NORMAL LPF
NITRITE UR QL STRIP: NEGATIVE MG/DL
PH UR STRIP: 7 [PH] (ref 4.5–8)
PROTEIN UR: NEGATIVE MG/DL
RBC URINE: <2 /HPF
SP GR UR STRIP: 1.02 (ref 1–1.03)
UROBILINOGEN UR STRIP-ACNC: ABNORMAL E.U./DL
WBC URINE: NORMAL /HPF

## 2020-09-30 NOTE — PROGRESS NOTES
Pt left prior to being seen. U/A sent. Await urine cx. Will contact pt w/ tx lisa Jackson MD  September 30, 2020  3:34 PM

## 2020-09-30 NOTE — RESULT ENCOUNTER NOTE
(R30.0) Dysuria  (primary encounter diagnosis)    Noted the UA results. Patient left the before being seen.    Plan:  -Dr. Jackson (attending) is aware of this  -Will await urine culture results; if positive will follow-up with patient regarding antibiotic treatment    Babita Del Rio MD, MPH (PGY 3)  [facility] Resident  Pager: [phone]

## 2020-09-30 NOTE — PROGRESS NOTES
Patient left before being seen. No charge to visit.    Discussed with: MD Babita Luz MD, MPH (PGY 3)  [facility] Family Medicine Resident  Pager: [phone]

## 2020-10-01 LAB — CULTURE: NORMAL

## 2020-10-15 ENCOUNTER — TELEPHONE (OUTPATIENT)
Dept: FAMILY MEDICINE | Facility: CLINIC | Age: 55
End: 2020-10-15

## 2020-10-15 NOTE — TELEPHONE ENCOUNTER
I got the pt ED discharge paperwork, I called to check up on the pt and help her setup a ED follow up. The pt was at [facility] for finger pain. I talked to the pt, pt stated that she is doing ok. Pt stated that she only wants to see , but there was no visit available. I checked and she was doing virtual visit, but did not have any available. Pt had some concerns that she wants to talk to  about. She stated that if  can give her a call. Pt stated that whoever prescribed her, her inhaler, they need to prescribed it so that she gets it one a month. I told the pt that I will send a message to , and what she can do. Pt also stated that she needs another referral for OB/GYN.

## 2020-10-16 ENCOUNTER — TELEPHONE (OUTPATIENT)
Dept: FAMILY MEDICINE | Facility: CLINIC | Age: 55
End: 2020-10-16

## 2020-10-16 NOTE — TELEPHONE ENCOUNTER
----- Message from Joanna Farah MD sent at 10/16/2020 10:35 AM CDT -----  Please call and put patient on my schedule for next week.  Currently held because of pregnancy but I think would be OK to have as phone visit on Monday or Tuesday next week.       KB

## 2020-10-19 NOTE — TELEPHONE ENCOUNTER
Called patient, per patient she is having some personal problems and is not able to schedule at the moment. Notified patient that Dr. Farah would like her to schedule a telephone visit. Patient states she will reach out tomorrow morning to schedule appt since she has an incoming call right now.

## 2020-10-20 ENCOUNTER — VIRTUAL VISIT (OUTPATIENT)
Dept: FAMILY MEDICINE | Facility: CLINIC | Age: 55
End: 2020-10-20
Payer: COMMERCIAL

## 2020-10-20 DIAGNOSIS — F31.81 BIPOLAR II DISORDER (H): ICD-10-CM

## 2020-10-20 DIAGNOSIS — J44.1 CHRONIC OBSTRUCTIVE PULMONARY DISEASE WITH ACUTE EXACERBATION (H): Primary | ICD-10-CM

## 2020-10-20 DIAGNOSIS — J41.0 SIMPLE CHRONIC BRONCHITIS (H): ICD-10-CM

## 2020-10-20 DIAGNOSIS — F60.9 PERSONALITY DISORDER (H): ICD-10-CM

## 2020-10-20 DIAGNOSIS — Z87.891 PERSONAL HISTORY OF TOBACCO USE, PRESENTING HAZARDS TO HEALTH: ICD-10-CM

## 2020-10-20 PROCEDURE — 99213 OFFICE O/P EST LOW 20 MIN: CPT | Mod: 95 | Performed by: STUDENT IN AN ORGANIZED HEALTH CARE EDUCATION/TRAINING PROGRAM
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
## 2020-10-27 ENCOUNTER — TELEPHONE (OUTPATIENT)
Dept: FAMILY MEDICINE | Facility: CLINIC | Age: 55
End: 2020-10-27

## 2020-10-27 RX ORDER — MONTELUKAST SODIUM 10 MG/1
10 TABLET ORAL AT BEDTIME
Qty: 90 TABLET | Refills: 1 | Status: SHIPPED | OUTPATIENT
Start: 2020-10-27 | End: 2020-11-16

## 2020-10-27 NOTE — TELEPHONE ENCOUNTER
----- Message from Joanna Farah MD sent at 10/27/2020  9:40 AM CDT -----  Please call to offer patient visit with someone on the yellow team to check in on mood and breathing. Anytime the patient would like a visit would be fine.  Dr Farah out on leave     Patient prefers female.

## 2020-10-28 ENCOUNTER — TELEPHONE (OUTPATIENT)
Dept: FAMILY MEDICINE | Facility: CLINIC | Age: 55
End: 2020-10-28

## 2020-10-28 NOTE — TELEPHONE ENCOUNTER
Upon further chart review, noted April 2020 per Xiomara's note, pt wanted to hold off on referral. Now would like to fulfill referral. Will route to referral,Xiomara Willingham. Thank you. Pasha SMITH

## 2020-10-28 NOTE — TELEPHONE ENCOUNTER
[facility] phone call message - order or referral request for patient:     Order or referral being requested: [facility]      Additional Comments: Patient states she spoke with Dr. Farah at her last visit about getting a referral to [facility] and has not heard back. Please call and advise.     OK to leave a message on voice mail? Yes    Primary language: English      needed? No    Call taken on October 28, 2020 at 2:07 PM by Sayra Mueller

## 2020-10-30 NOTE — TELEPHONE ENCOUNTER
Patient left me a message that she would like to go to [facility]. I will fax over her referral to the location, they will reach out to her to schedule an appointment.

## 2020-11-03 ENCOUNTER — VIRTUAL VISIT (OUTPATIENT)
Dept: FAMILY MEDICINE | Facility: CLINIC | Age: 55
End: 2020-11-03
Payer: COMMERCIAL

## 2020-11-03 DIAGNOSIS — Z53.9 NO SHOW: Primary | ICD-10-CM

## 2020-11-03 PROCEDURE — 99207 PR NO BILLABLE SERVICE THIS VISIT: CPT | Mod: TEL | Performed by: FAMILY MEDICINE

## 2020-11-16 ENCOUNTER — TELEPHONE (OUTPATIENT)
Dept: FAMILY MEDICINE | Facility: CLINIC | Age: 55
End: 2020-11-16

## 2020-11-16 ENCOUNTER — OFFICE VISIT (OUTPATIENT)
Dept: FAMILY MEDICINE | Facility: CLINIC | Age: 55
End: 2020-11-16
Payer: COMMERCIAL

## 2020-11-16 VITALS
BODY MASS INDEX: 26.45 KG/M2 | WEIGHT: 140 LBS | SYSTOLIC BLOOD PRESSURE: 119 MMHG | HEART RATE: 87 BPM | OXYGEN SATURATION: 98 % | TEMPERATURE: 98.6 F | DIASTOLIC BLOOD PRESSURE: 81 MMHG

## 2020-11-16 DIAGNOSIS — L30.9 ECZEMA, UNSPECIFIED TYPE: ICD-10-CM

## 2020-11-16 DIAGNOSIS — Z01.818 PRE-OP EXAM: Primary | ICD-10-CM

## 2020-11-16 DIAGNOSIS — S06.9XAS MOOD DISORDER AS LATE EFFECT OF TRAUMATIC BRAIN INJURY (H): ICD-10-CM

## 2020-11-16 DIAGNOSIS — F06.30 MOOD DISORDER AS LATE EFFECT OF TRAUMATIC BRAIN INJURY (H): ICD-10-CM

## 2020-11-16 DIAGNOSIS — F31.81 BIPOLAR II DISORDER (H): ICD-10-CM

## 2020-11-16 DIAGNOSIS — F11.21 OPIOID USE DISORDER, SEVERE, IN SUSTAINED REMISSION (H): ICD-10-CM

## 2020-11-16 DIAGNOSIS — K50.10 CROHN'S DISEASE OF LARGE INTESTINE WITHOUT COMPLICATION (H): ICD-10-CM

## 2020-11-16 DIAGNOSIS — J45.40 MODERATE PERSISTENT ASTHMA WITHOUT COMPLICATION: ICD-10-CM

## 2020-11-16 DIAGNOSIS — Z87.891 PERSONAL HISTORY OF TOBACCO USE, PRESENTING HAZARDS TO HEALTH: ICD-10-CM

## 2020-11-16 DIAGNOSIS — J44.1 CHRONIC OBSTRUCTIVE PULMONARY DISEASE WITH ACUTE EXACERBATION (H): ICD-10-CM

## 2020-11-16 PROBLEM — J96.01 ACUTE RESPIRATORY FAILURE WITH HYPOXEMIA (H): Status: RESOLVED | Noted: 2019-02-08 | Resolved: 2020-11-16

## 2020-11-16 LAB
ALBUMIN SERPL-MCNC: 3.7 G/DL (ref 3.2–4.5)
ALP SERPL-CCNC: 60 U/L (ref 40–150)
ALT SERPL-CCNC: 34 U/L (ref 0–45)
AST SERPL-CCNC: 31 U/L (ref 0–45)
BILIRUB SERPL-MCNC: 0.5 MG/DL (ref 0.2–1.3)
BUN SERPL-MCNC: 18 MG/DL (ref 7–30)
CALCIUM SERPL-MCNC: 9.6 MG/DL (ref 8.5–10.4)
CHLORIDE SERPLBLD-SCNC: 100 MMOL/L (ref 94–109)
CO2 SERPL-SCNC: 30 MMOL/L (ref 20–32)
COVID-19 VIRUS PCR TO U OF MN - SOURCE: NORMAL
CREAT SERPL-MCNC: 0.5 MG/DL (ref 0.6–1.3)
EGFR CALCULATED (BLACK REFERENCE): >90 ML/MIN
EGFR CALCULATED (NON BLACK REFERENCE): >90 ML/MIN
GLUCOSE SERPL-MCNC: 113 MG/DL (ref 60–109)
HBA1C MFR BLD: 5.9 % (ref 4.1–5.7)
HCT VFR BLD AUTO: 42.6 % (ref 35–47)
HEMOGLOBIN: 13.6 G/DL (ref 11.7–15.7)
MCH RBC QN AUTO: 30.6 PG (ref 26.5–35)
MCHC RBC AUTO-ENTMCNC: 31.9 G/DL (ref 32–36)
MCV RBC AUTO: 95.7 FL (ref 78–100)
PLATELET # BLD AUTO: 223 K/UL (ref 150–450)
POTASSIUM SERPL-SCNC: 4.5 MMOL/L (ref 3.4–5.3)
PROT SERPL-MCNC: 6.7 G/DL (ref 6.6–8.8)
RBC # BLD AUTO: 4.45 M/UL (ref 3.8–5.2)
SARS-COV-2 RNA SPEC QL NAA+PROBE: NOT DETECTED
SODIUM SERPL-SCNC: 140 MMOL/L (ref 133–144)
WBC # BLD AUTO: 6.6 K/UL (ref 4–11)

## 2020-11-16 PROCEDURE — 93000 ELECTROCARDIOGRAM COMPLETE: CPT | Performed by: FAMILY MEDICINE

## 2020-11-16 PROCEDURE — 83036 HEMOGLOBIN GLYCOSYLATED A1C: CPT | Performed by: FAMILY MEDICINE

## 2020-11-16 PROCEDURE — 99214 OFFICE O/P EST MOD 30 MIN: CPT | Performed by: FAMILY MEDICINE

## 2020-11-16 PROCEDURE — 85027 COMPLETE CBC AUTOMATED: CPT | Performed by: FAMILY MEDICINE

## 2020-11-16 PROCEDURE — 36415 COLL VENOUS BLD VENIPUNCTURE: CPT | Performed by: FAMILY MEDICINE

## 2020-11-16 PROCEDURE — 87635 SARS-COV-2 COVID-19 AMP PRB: CPT | Performed by: FAMILY MEDICINE

## 2020-11-16 PROCEDURE — 80053 COMPREHEN METABOLIC PANEL: CPT | Performed by: FAMILY MEDICINE

## 2020-11-16 RX ORDER — TRIAMCINOLONE ACETONIDE 1 MG/G
CREAM TOPICAL 2 TIMES DAILY
Qty: 80 G | Refills: 1 | Status: SHIPPED | OUTPATIENT
Start: 2020-11-16 | End: 2023-01-16

## 2020-11-16 RX ORDER — ALBUTEROL SULFATE 90 UG/1
2 AEROSOL, METERED RESPIRATORY (INHALATION) EVERY 6 HOURS PRN
Qty: 18 G | Refills: 3 | Status: SHIPPED | OUTPATIENT
Start: 2020-11-16 | End: 2020-12-22

## 2020-11-16 NOTE — PROGRESS NOTES
[facility] Referral    ?Ortho on 11-5-2020, carpal tunnel release, is this for the same pain she went to ED for on 10-, right thumb pain
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
## 2020-11-16 NOTE — TELEPHONE ENCOUNTER
Out going call follow up and assist scheduling mental health Consult with Dr. Nance. Patient has already been scheduled by .

## 2020-11-18 ENCOUNTER — TELEPHONE (OUTPATIENT)
Dept: FAMILY MEDICINE | Facility: CLINIC | Age: 55
End: 2020-11-18

## 2020-11-18 NOTE — TELEPHONE ENCOUNTER
Normal results from 11/16/2020 given to patient. Patient is calling to get covid test result and her other labs, gave her negative and normal lab result.

## 2020-11-19 ENCOUNTER — DOCUMENTATION ONLY (OUTPATIENT)
Dept: PSYCHOLOGY | Facility: CLINIC | Age: 55
End: 2020-11-19

## 2020-11-19 NOTE — PROGRESS NOTES
Per note:    Plan to meet with Dr. Nance at our clinic in between her surgeries. She is not currently on a mood stabilizer and she feels she may benefit from onePlan to meet with Dr. Nance at our clinic in between her surgeries. She is not currently on a mood stabilizer and she feels she may benefit from one.      ===View-only below this line===  ----- Message -----  From: Venita Sorenson DO  Sent: 2020  10:43 AM CST  To:  Mental Health  Subject: Order for GUADALUPE FRANKLIN          [id number]               : 1965  F     [address]                             PCP: MAVIS ROMO  [place] [address]                                CTR: [facility]        Name: GUADALUPE FRANKLIN Date: 2020    Home: [phone]    Payor:              [facility]  Plan:                 UCARE CONNECT [place] ONLY  Sponsor Code:       [id number]  Subscriber ID:      [id number]  Subscriber Name:    GUADALUPE FRANKLIN  Subscriber Address: [address] [place] [address] [address]                      [place] [address]    Effective From:     20  Effective To:         Group Number:       [id number]  Group Name  : Not Available      Date       Provider                   Department   Center         2020 [id number] SORENSONFA [facility]    Order Date:2020  Ordering User:VENITA SORENSON [[email or web address]]  Encounter Provider:Venita Sorenson DO [[id number]]  Authorizing Provider: Venita Sorenson DO [[id number]]  Department:\[facility]\"" FAMILY MEDICINE[[id number]]    Ordering Provider NPI: [id number]  Venita [facility]~[address], [place]   [address]  Phone: [phone]          BProcedure Requested    6931.196 [facility] REFER* [#[id number]]      Priority: Routine  Class: External referral      Comment: needed: No               Language: English                              Would like to see  Dr. Nance    Associated Diagnoses      F31.81 Bipolar II disorder (H)      Reason for Referral:  Bipolar         Mary Ann Olson          [id number]               :   F     [address]                             PCP: MAVIS ROMO  [place] [address]                                CTR: [facility]      Comments

## 2020-11-20 ENCOUNTER — TRANSFERRED RECORDS (OUTPATIENT)
Dept: HEALTH INFORMATION MANAGEMENT | Facility: CLINIC | Age: 55
End: 2020-11-20

## 2020-11-20 ENCOUNTER — TEAM CONFERENCE (OUTPATIENT)
Dept: FAMILY MEDICINE | Facility: CLINIC | Age: 55
End: 2020-11-20

## 2020-11-21 NOTE — TELEPHONE ENCOUNTER
Patient called clinic answering service about post-op pain.    She had endoscopic carpal tunnel release surgery today.  The base of her palm is black and blue, and her hand is in pain.  She wants to know if this is anything serious.  Discussed that this is to be expected after this type of surgery.  Continue with current cares recommended by surgeon.    Alexey Bowie MD  PGY-3

## 2020-11-22 ENCOUNTER — NURSE TRIAGE (OUTPATIENT)
Dept: NURSING | Facility: CLINIC | Age: 55
End: 2020-11-22

## 2020-11-22 NOTE — TELEPHONE ENCOUNTER
"Patient calling reporting having carpel tunnel surgery on left wrist 11/20/20.  Patient reporting, \"It is extremely swollen.\" Increased pain starting yesterday. Stating her pain level is a \"10\" on 1-10 pain scale. Patient took 2 Tylenol tablets 30 minutes ago with no improvement. Stating she is shaking from pain. Reports pain was worse yesterday then today. Patient reports sleeping through night.   Afebrile. Left wrist has ace wrap in place. Patient denies any visible drainage through bandage.   Patient is questioning if swelling is normal and to know if something more could be prescribed for pain control?    Patient is followed by Dr Syed Shepherd through [facility] for carpel tunnel surgery.    Provided patient with phone number to Dr Avila office/after hours line to reach on call provider.    Giovanna Rodriguez RN  [facility]      COVID 19 Nurse Triage Plan/Patient Instructions    Please be aware that novel coronavirus (COVID-19) may be circulating in the community. If you develop symptoms such as fever, cough, or SOB or if you have concerns about the presence of another infection including coronavirus (COVID-19), please contact your health care provider or visit [email or web address].     Disposition/Instructions    Additional COVID19 information to add for patients.   How can I protect others?  If you have symptoms (fever, cough, body aches or trouble breathing): Stay home and away from others (self-isolate) until:    At least 10 days have passed since your symptoms started, And     You ve had no fever--and no medicine that reduces fever--for 1 full day (24 hours), And      Your other symptoms have resolved (gotten better).     If you don t have symptoms, but a test showed that you have COVID-19 (you tested positive):    Stay home and away from others (self-isolate). Follow the tips under \"How do I self-isolate?\" below for 10 days (20 days if you have a weak immune system).    You don't need to " be retested for COVID-19 before going back to school or work. As long as you're fever-free and feeling better, you can go back to school, work and other activities after waiting the 10 or 20 days.     How do I self-isolate?    Stay in your own room, even for meals. Use your own bathroom if you can.     Stay away from others in your home. No hugging, kissing or shaking hands. No visitors.    Don t go to work, school or anywhere else.     Clean  high touch  surfaces often (doorknobs, counters, handles, etc.). Use a household cleaning spray or wipes. You ll find a full list on the EPA website:  www.epa.gov/pesticide-registration/list-n-disinfectants-use-against-sars-cov-2.    Cover your mouth and nose with a mask, tissue or washcloth to avoid spreading germs.    Wash your hands and face often. Use soap and water.    Caregivers in these groups are at risk for severe illness due to COVID-19:  o People 65 years and older  o People who live in a nursing home or long-term care facility  o People with chronic disease (lung, heart, cancer, diabetes, kidney, liver, immunologic)  o People who have a weakened immune system, including those who:  - Are in cancer treatment  - Take medicine that weakens the immune system, such as corticosteroids  - Had a bone marrow or organ transplant  - Have an immune deficiency  - Have poorly controlled HIV or AIDS  - Are obese (body mass index of 40 or higher)  - Smoke regularly    Caregivers should wear gloves while washing dishes, handling laundry and cleaning bedrooms and bathrooms.    Use caution when washing and drying laundry: Don t shake dirty laundry, and use the warmest water setting that you can.    For more tips, go to www.cdc.gov/coronavirus/2019-ncov/downloads/10Things.pdf.    How can I take care of myself?  1. Get lots of rest. Drink extra fluids (unless a doctor has told you not to).     2. Take Tylenol (acetaminophen) for fever or pain. If you have liver or kidney problems, ask  your family doctor if it s okay to take Tylenol.     Adults can take either:     650 mg (two 325 mg pills) every 4 to 6 hours, or     1,000 mg (two 500 mg pills) every 8 hours as needed.     Note: Don t take more than 3,000 mg in one day.   Acetaminophen is found in many medicines (both prescribed and over-the-counter medicines). Read all labels to be sure you don t take too much.     For children, check the Tylenol bottle for the right dose. The dose is based on the child s age or weight.    3. If you have other health problems (like cancer, heart failure, an organ transplant or severe kidney disease): Call your specialty clinic if you don t feel better in the next 2 days.    4. Know when to call 911: Emergency warning signs include:    Trouble breathing or shortness of breath    Pain or pressure in the chest that doesn t go away    Feeling confused like you haven t felt before, or not being able to wake up    Bluish-colored lips or face    What are the symptoms of COVID-19?     The most common symptoms are cough, fever and trouble breathing.     Less common symptoms include body aches, chills, diarrhea (loose, watery poops), fatigue (feeling very tired), headache, runny nose, sore throat and loss of smell.    COVID-19 can cause severe coughing (bronchitis) and lung infection (pneumonia).    How does it spread?     The virus may spread when a person coughs or sneezes into the air. The virus can travel about 6 feet this way, and it can live on surfaces.      Common  (household disinfectants) will kill the virus.    Who is at risk?  Anyone can catch COVID-19 if they re around someone who has the virus.    How can others protect themselves?     Stay away from people who have COVID-19 (or symptoms of COVID-19).    Wash hands often with soap and water. Or, use hand  with at least 60% alcohol.    Avoid touching the eyes, nose or mouth.     Wear a face mask when you go out in public, when sick or when  caring for a sick person.    Where can I get more information?    [facility]: About COVID-19: [email or web address]    CDC: What to Do If You re Sick: www.cdc.gov/coronavirus/2019-ncov/about/steps-when-sick.html    CDC: Ending Home Isolation: www.cdc.gov/coronavirus/2019-ncov/hcp/disposition-in-home-patients.html     CDC: Caring for Someone: www.cdc.gov/coronavirus/2019-ncov/if-you-are-sick/care-for-someone.html     [facility]: Interim Guidance for Hospital Discharge to Home: www.[facility]/diseases/coronavirus/hcp/hospdischarge.pdf    [facility] clinical trials (COVID-19 research studies): clinicalaffairs.[facility]-clinical-trials     Below are the COVID-19 hotlines at the [place] Department of [facility] ([facility]). Interpreters are available.   o For health questions: Call 263-501-0888 or 1-218.438.3574 (7 a.m. to 7 p.m.)  o For questions about schools and childcare: Call 330-075-3173 or 1-823.690.6859 (7 a.m. to 7 p.m.)          Thank you for taking steps to prevent the spread of this virus.  o Limit your contact with others.  o Wear a simple mask to cover your cough.  o Wash your hands well and often.    Resources    [facility]: About COVID-19: [email or web address]    CDC: What to Do If You're Sick: www.cdc.gov/coronavirus/2019-ncov/about/steps-when-sick.html    CDC: Ending Home Isolation: www.cdc.gov/coronavirus/2019-ncov/hcp/disposition-in-home-patients.html     CDC: Caring for Someone: www.cdc.gov/coronavirus/2019-ncov/if-you-are-sick/care-for-someone.html     [facility]: Interim Guidance for Hospital Discharge to Home: www.[facility]/diseases/coronavirus/hcp/hospdischarge.pdf    [facility] clinical trials (COVID-19 research studies): clinicalaffairs.[facility]-clinical-trials     Below are the COVID-19 hotlines at the [facility] ([facility]). Interpreters are available.   o For health questions: Call 017-353-4668 or 1-931.403.3182 (7  a.m. to 7 p.m.)  o For questions about schools and childcare: Call 009-728-1308 or 1-557.191.6805 (7 a.m. to 7 p.m.)                      Additional Information    Negative: Sounds like a life-threatening emergency to the triager    Negative: Chest pain    Negative: Difficulty breathing    Negative: Surgical incision symptoms and questions    Negative: Discomfort (pain, burning or stinging) when passing urine and male    Negative: Discomfort (pain, burning or stinging) when passing urine and female    Negative: Constipation    Negative: New or worsening leg (calf, thigh) pain    Negative: New or worsening leg swelling    Negative: Dizziness is severe, or persists > 24 hours after surgery    Negative: Symptoms arising from use of a urinary catheter (Beckwith or Coude)    Negative: Cast problems or questions    Negative: Bright red, wide-spread, sunburn-like rash    Negative: SEVERE headache and after spinal (epidural) anesthesia    Negative: Vomiting and persists > 4 hours    Negative: Vomiting and abdomen looks much more swollen than usual    Negative: Drinking very little and dehydration suspected (e.g., no urine > 12 hours, very dry mouth, very lightheaded)    Negative: Patient sounds very sick or weak to the triager    Negative: Sounds like a serious complication to the triager    Negative: Fever > 100.4 F (38.0 C)    Negative: Caller has URGENT question and triager unable to answer question    SEVERE post-op pain (e.g., excruciating, pain scale 8-10) that is not controlled with pain medications    Protocols used: POST-OP SYMPTOMS AND [id number]

## 2020-11-23 ENCOUNTER — TELEPHONE (OUTPATIENT)
Dept: FAMILY MEDICINE | Facility: CLINIC | Age: 55
End: 2020-11-23

## 2020-11-23 DIAGNOSIS — J44.1 CHRONIC OBSTRUCTIVE PULMONARY DISEASE WITH ACUTE EXACERBATION (H): ICD-10-CM

## 2020-11-23 NOTE — TELEPHONE ENCOUNTER
[facility] phone call message- general phone call:    Reason for call: Called Mary Ann to inform that pre-op appt for 11/23/20 is not needed as insurance may not cover and all she needs to do is sched for covid test 3 days before surgery. Patient also wanted a flu shot so we can sched her on the ann for flu shot but not for today.     Return call needed: Yes    OK to leave a message on voice mail? Yes    Primary language: English      needed? No    Call taken on November 23, 2020 at 10:36 AM by Wendy Castillo

## 2020-11-24 NOTE — TELEPHONE ENCOUNTER
Notified patient of Wendy's message below, patient states she no longer wants the flu shot and will like a call back to schedule a covid test since she had an important call coming in.

## 2020-12-02 ENCOUNTER — TELEPHONE (OUTPATIENT)
Dept: FAMILY MEDICINE | Facility: CLINIC | Age: 55
End: 2020-12-02

## 2020-12-02 NOTE — TELEPHONE ENCOUNTER
[facility] phone call message- patient requesting a refill:    Full Medication Name: mometasone-formoterol (DULERA)     Dose: 100-5 MCG/ACT inhaler    Pharmacy confirmed as   [id number] - [place], [place] - [address]  [address]  [place] [address]  Phone: [phone] Fax: [phone]    [facility] [address] [address]  Phone: [phone] Fax: [phone]  : Yes    Additional Comments: Patient called to get appt time and date. Patient also requesting refill for med listed above as she is completely out of them. Pls call if needed.     OK to leave a message on voice mail? Yes    Primary language: English      needed? No    Call taken on December 2, 2020 at 4:03 PM by Wendy Castillo

## 2020-12-04 DIAGNOSIS — Q87.89: Primary | ICD-10-CM

## 2020-12-04 DIAGNOSIS — Z20.822 COVID-19 RULED OUT: ICD-10-CM

## 2020-12-04 DIAGNOSIS — Z20.822 EXPOSURE TO 2019 NOVEL CORONAVIRUS: ICD-10-CM

## 2020-12-04 LAB
COVID-19 VIRUS PCR TO U OF MN - SOURCE: NORMAL
SARS-COV-2 RNA SPEC QL NAA+PROBE: NOT DETECTED

## 2020-12-04 PROCEDURE — 99207 PR NO BILLABLE SERVICE THIS VISIT: CPT

## 2020-12-04 PROCEDURE — 87635 SARS-COV-2 COVID-19 AMP PRB: CPT | Mod: 90

## 2020-12-07 ENCOUNTER — TELEPHONE (OUTPATIENT)
Dept: FAMILY MEDICINE | Facility: CLINIC | Age: 55
End: 2020-12-07

## 2020-12-07 NOTE — TELEPHONE ENCOUNTER
Normal results from 12/04/2020 given to patient. Patient calling for covid result, gave her negative result.

## 2020-12-22 DIAGNOSIS — J45.40 MODERATE PERSISTENT ASTHMA WITHOUT COMPLICATION: ICD-10-CM

## 2020-12-22 DIAGNOSIS — J44.1 CHRONIC OBSTRUCTIVE PULMONARY DISEASE WITH ACUTE EXACERBATION (H): ICD-10-CM

## 2020-12-22 DIAGNOSIS — Z78.0 POSTMENOPAUSAL STATUS: ICD-10-CM

## 2020-12-22 DIAGNOSIS — R10.32 ABDOMINAL PAIN, LEFT LOWER QUADRANT: ICD-10-CM

## 2020-12-26 RX ORDER — ERGOCALCIFEROL 1.25 MG/1
50000 CAPSULE, LIQUID FILLED ORAL WEEKLY
Qty: 12 CAPSULE | Refills: 1 | Status: SHIPPED | OUTPATIENT
Start: 2020-12-26 | End: 2021-04-05

## 2020-12-26 RX ORDER — ALBUTEROL SULFATE 90 UG/1
2 AEROSOL, METERED RESPIRATORY (INHALATION) EVERY 6 HOURS PRN
Qty: 18 G | Refills: 1 | Status: SHIPPED | OUTPATIENT
Start: 2020-12-26 | End: 2021-01-28

## 2020-12-26 RX ORDER — MECOBALAMIN 5000 MCG
15 TABLET,DISINTEGRATING ORAL DAILY
Qty: 90 CAPSULE | Refills: 0 | Status: SHIPPED | OUTPATIENT
Start: 2020-12-26 | End: 2021-11-15

## 2021-01-06 DIAGNOSIS — R05.9 COUGH: ICD-10-CM

## 2021-01-06 NOTE — TELEPHONE ENCOUNTER
Message to physician:     Date of last visit: 11/16/2020     Date of next visit if scheduled:none    Last Comprehensive Metabolic Panel:  Sodium   Date Value Ref Range Status   11/16/2020 140.0 133.0 - 144.0 mmol/L Final     Potassium   Date Value Ref Range Status   11/16/2020 4.5 3.4 - 5.3 mmol/L Final     Chloride   Date Value Ref Range Status   11/16/2020 100.0 94.0 - 109.0 mmol/L Final     Carbon Dioxide   Date Value Ref Range Status   11/16/2020 30.0 20.0 - 32.0 mmol/L Final     Glucose   Date Value Ref Range Status   11/16/2020 113.0 (H) 60.0 - 109.0 mg/dL Final     Urea Nitrogen   Date Value Ref Range Status   11/16/2020 18.0 7.0 - 30.0 mg/dL Final     Creatinine   Date Value Ref Range Status   11/16/2020 0.5 (L) 0.6 - 1.3 mg/dL Final     GFR Estimate   Date Value Ref Range Status   08/29/2014 90 >60 mL/min/1.7m2 Final     Comment:     Non  GFR Calc     Calcium   Date Value Ref Range Status   11/16/2020 9.6 8.5 - 10.4 mg/dL Final       BP Readings from Last 3 Encounters:   11/16/20 119/81   09/30/20 123/88   09/09/20 123/76       Lab Results   Component Value Date    A1C 5.9 11/16/2020    A1C 6.0 04/13/2020    A1C 6.3 11/18/2019    A1C 6.7 02/09/2019    A1C 6.6 08/28/2018                Please complete refill and CLOSE ENCOUNTER.  Closing the encounter signifies the refill is complete.

## 2021-01-08 RX ORDER — ALBUTEROL SULFATE 0.83 MG/ML
2.5 SOLUTION RESPIRATORY (INHALATION) EVERY 6 HOURS PRN
Qty: 90 VIAL | Refills: 1 | Status: SHIPPED | OUTPATIENT
Start: 2021-01-08 | End: 2021-11-16

## 2021-01-18 ENCOUNTER — OFFICE VISIT (OUTPATIENT)
Dept: FAMILY MEDICINE | Facility: CLINIC | Age: 56
End: 2021-01-18
Payer: COMMERCIAL

## 2021-01-18 VITALS
SYSTOLIC BLOOD PRESSURE: 125 MMHG | TEMPERATURE: 98.1 F | HEART RATE: 90 BPM | DIASTOLIC BLOOD PRESSURE: 85 MMHG | OXYGEN SATURATION: 99 %

## 2021-01-18 DIAGNOSIS — Z11.52 ENCOUNTER FOR SCREENING FOR COVID-19: Primary | ICD-10-CM

## 2021-01-18 DIAGNOSIS — J01.90 ACUTE SINUSITIS WITH SYMPTOMS > 10 DAYS: ICD-10-CM

## 2021-01-18 DIAGNOSIS — J43.9 PULMONARY EMPHYSEMA, UNSPECIFIED EMPHYSEMA TYPE (H): ICD-10-CM

## 2021-01-18 DIAGNOSIS — R05.9 COUGH: ICD-10-CM

## 2021-01-18 LAB
SARS-COV-2 RNA RESP QL NAA+PROBE: NOT DETECTED
SPECIMEN SOURCE: NORMAL

## 2021-01-18 PROCEDURE — 87635 SARS-COV-2 COVID-19 AMP PRB: CPT | Performed by: FAMILY MEDICINE

## 2021-01-18 PROCEDURE — 99213 OFFICE O/P EST LOW 20 MIN: CPT | Mod: CS | Performed by: FAMILY MEDICINE

## 2021-01-18 RX ORDER — DOXYCYCLINE 100 MG/1
100 CAPSULE ORAL 2 TIMES DAILY
Qty: 10 CAPSULE | Refills: 0 | Status: SHIPPED | OUTPATIENT
Start: 2021-01-18 | End: 2021-01-23

## 2021-01-18 NOTE — PROGRESS NOTES
Assessment & Plan     Encounter for screening for COVID-19  Cough  Pulmonary emphysema, unspecified type  2+ weeks of dry cough and congestion with known exposure from her boyfriend with similar symptoms. COVID is in the differential, though this may represent another viral respiratory infection as well. Minimal increased wheezing and albuterol does not seem helpful at alleviating the cough, which she describes as a tickle and seems to be more in the upper respiratory tract. Benign lung exam today and normal range vitals.  - COVID-19 Virus PCR [facility] ([facility])  - Continue albuterol as needed and regular Dulera.   - Encouraged smoking cessation.  - Work letter provided.    Acute sinusitis with symptoms > 10 days  Worsening frontal sinus pain after initial improvement is concerning for bacterial sinusitis. Given the patient's numerous allergies, will complete a short course of doxycycline.   - doxycycline hyclate (VIBRAMYCIN) 100 MG capsule; Take 1 capsule (100 mg) by mouth 2 times daily for 5 days  - Reviewed other symptomatic cares.       23 minutes spent on the date of the encounter doing chart review, patient visit and documentation          Tobacco Cessation:   reports that she has been smoking cigarettes. She has been smoking about 0.50 packs per day. She has never used smokeless tobacco.  Tobacco Cessation Action Plan: Information offered: Patient not interested at this time    Follow-up if symptoms are not improving as expected.     Monique Mcghee MD  [facility]    Rasheeda Reese is a 55 year old with a PMH significant for COPD, DM II, Crohn's disease on Humira, and multiple medication allergies who presents to clinic today for the following health issues:    HPI      She reports 2+ weeks of congestion in her nose and pressure in her forehead. Some dry cough that is not worsening but is not getting better. Feels like a tickle. Minimal wheezing. Some chills and sweats  but no fever. She thought that she was getting better and now it seems to be getting worse, especially the facial pain. She has a lot of phlegm dripping down the back of her throat. Mucinex has not helped. She wonders if she has a mouse problem at home that could be making this all worse. She has been using her albuterol with minimal relief. Is otherwise taking her medications. Energy level has been low, has SAD. She is still smoking but hasn't been able to handle her cigarettes as much due to the cough.     Boyfriend had similar symptoms 2 weeks ago. He was not tested for COVID. He still has a cough.    She[place] in [place] [employer] [place]. She's missed work related to this illness and requests a letter.    Review of Systems   As per HPI      Objective    /85   Pulse 90   Temp 98.1  F (36.7  C) (Oral)   SpO2 99%   There is no height or weight on file to calculate BMI.  Physical Exam   GENERAL: healthy, alert and no distress  HEENT: Normocephalic. PERRL. EOMI. No conjunctival inflammation. Nares patent with slightly boggy and swollen turbinates. Moist mucous membranes. Oropharynx clear. Tenderness to palpation and light repercussion of bilateral frontal sinuses. Maxillary sinuses unremarkable. Increased frontal sinus discomfort when bending forward.  NECK: no adenopathy, supple.  RESP: lungs clear to auscultation - no rales, rhonchi or wheezes. Good air movement to bases. No coughing during assessment.  CV: regular rate and rhythm, normal S1 S2, no S3 or S4, no murmur, click or rub, no peripheral edema and peripheral pulses strong.  ABDOMEN: soft, nontender, no hepatosplenomegaly, no masses and bowel sounds normal  MS: no gross musculoskeletal defects noted, no edema

## 2021-01-18 NOTE — LETTER
January 18, 2021      Mary Ann Olson  [address] [place] [address]        To Whom It May Concern:    Mary Ann Olson  was seen on 1/18/2021 and received a test for COVID-19.  Please excuse her  until she receives her results due to illness. She can return 24 hours after symptoms improve and the results of her COVID-19 test will determine next steps.        Sincerely,        Monique Mcghee MD

## 2021-01-19 ENCOUNTER — TELEPHONE (OUTPATIENT)
Dept: FAMILY MEDICINE | Facility: CLINIC | Age: 56
End: 2021-01-19

## 2021-01-19 NOTE — TELEPHONE ENCOUNTER
[facility] Medicine phone call message- patient requesting results:    Test: Covid-19 test    Date of test: 01/18/2021    Additional Comments: Patient states she would like a call back once her covid-19 results are available. Please call and advise.     OK to leave a message on voice mail? Yes    Primary language: English      needed? No    Call taken on January 19, 2021 at 8:14 AM by Sayra Mueller

## 2021-01-20 NOTE — TELEPHONE ENCOUNTER
Normal results from 01/18/2021 given to patient. Patient calling to see if result is back, gave her negative covid result.

## 2021-01-25 DIAGNOSIS — E11.9 TYPE 2 DIABETES MELLITUS WITHOUT COMPLICATION, WITHOUT LONG-TERM CURRENT USE OF INSULIN (H): ICD-10-CM

## 2021-01-25 DIAGNOSIS — Z78.0 POSTMENOPAUSAL STATUS: ICD-10-CM

## 2021-01-25 NOTE — TELEPHONE ENCOUNTER
Pharmacy also requesting calcium  600+d (400u) tabs one tab by mouth twice daily- I am unable to pend it as its historical for you

## 2021-01-26 RX ORDER — ATORVASTATIN CALCIUM 40 MG/1
40 TABLET, FILM COATED ORAL DAILY
Qty: 90 TABLET | Refills: 0 | Status: SHIPPED | OUTPATIENT
Start: 2021-01-26 | End: 2021-03-24

## 2021-01-28 DIAGNOSIS — J45.40 MODERATE PERSISTENT ASTHMA WITHOUT COMPLICATION: ICD-10-CM

## 2021-01-29 RX ORDER — ALBUTEROL SULFATE 90 UG/1
2 AEROSOL, METERED RESPIRATORY (INHALATION) EVERY 6 HOURS PRN
Qty: 18 G | Refills: 0 | Status: SHIPPED | OUTPATIENT
Start: 2021-01-29 | End: 2021-04-09

## 2021-02-02 ENCOUNTER — TELEPHONE (OUTPATIENT)
Dept: FAMILY MEDICINE | Facility: CLINIC | Age: 56
End: 2021-02-02

## 2021-02-02 NOTE — TELEPHONE ENCOUNTER
[facility] Medicine phone call message- patient requesting a refill:    Full Medication Name: calcium carbonate 600 mg-vitamin D 400 units (CALCIUM 600 + D) 600-400 MG-UNIT per tablet      Pharmacy confirmed as  [facility][id number] - [place], [place] - [address]  [address]  [place] [address]  Phone: [phone] Fax: [phone]   : Yes    Additional Comments: Patient states refill is needed, Please call and advise. Notified it may take 2 business days for a response.      OK to leave a message on voice mail? Yes    Primary language: English      needed? No    Call taken on February 2, 2021 at 8:34 AM by Sayra Mueller

## 2021-02-24 ENCOUNTER — OFFICE VISIT (OUTPATIENT)
Dept: FAMILY MEDICINE | Facility: CLINIC | Age: 56
End: 2021-02-24
Payer: COMMERCIAL

## 2021-02-24 VITALS
OXYGEN SATURATION: 96 % | RESPIRATION RATE: 20 BRPM | TEMPERATURE: 97.6 F | DIASTOLIC BLOOD PRESSURE: 84 MMHG | BODY MASS INDEX: 28.17 KG/M2 | WEIGHT: 149.2 LBS | SYSTOLIC BLOOD PRESSURE: 118 MMHG | HEART RATE: 107 BPM | HEIGHT: 61 IN

## 2021-02-24 DIAGNOSIS — S63.501A SPRAIN OF RIGHT WRIST, INITIAL ENCOUNTER: Primary | ICD-10-CM

## 2021-02-24 DIAGNOSIS — L21.9 SEBORRHEIC DERMATITIS: ICD-10-CM

## 2021-02-24 PROCEDURE — 99214 OFFICE O/P EST MOD 30 MIN: CPT | Mod: GC | Performed by: STUDENT IN AN ORGANIZED HEALTH CARE EDUCATION/TRAINING PROGRAM

## 2021-02-24 ASSESSMENT — ANXIETY QUESTIONNAIRES
3. WORRYING TOO MUCH ABOUT DIFFERENT THINGS: NEARLY EVERY DAY
IF YOU CHECKED OFF ANY PROBLEMS ON THIS QUESTIONNAIRE, HOW DIFFICULT HAVE THESE PROBLEMS MADE IT FOR YOU TO DO YOUR WORK, TAKE CARE OF THINGS AT HOME, OR GET ALONG WITH OTHER PEOPLE: EXTREMELY DIFFICULT
GAD7 TOTAL SCORE: 13
7. FEELING AFRAID AS IF SOMETHING AWFUL MIGHT HAPPEN: NOT AT ALL
5. BEING SO RESTLESS THAT IT IS HARD TO SIT STILL: SEVERAL DAYS
1. FEELING NERVOUS, ANXIOUS, OR ON EDGE: NEARLY EVERY DAY
2. NOT BEING ABLE TO STOP OR CONTROL WORRYING: NEARLY EVERY DAY
6. BECOMING EASILY ANNOYED OR IRRITABLE: MORE THAN HALF THE DAYS

## 2021-02-24 ASSESSMENT — PATIENT HEALTH QUESTIONNAIRE - PHQ9
5. POOR APPETITE OR OVEREATING: SEVERAL DAYS
SUM OF ALL RESPONSES TO PHQ QUESTIONS 1-9: 22

## 2021-02-24 ASSESSMENT — MIFFLIN-ST. JEOR: SCORE: 1204.15

## 2021-02-24 NOTE — PROGRESS NOTES
Assessment & Plan     Sprain of right wrist, initial encounter  Likely right ulnar carpi extensor ligament strain.   - Recommend wearing a splint for a couple of weeks with activity and/or at night if waking up with pain.   - Continue Tylenol PRN for mild-moderate pain  - Reassured strain should resolve in a couple of weeks as long as the wrist is not re-injured.     Seborrheic dermatitis, minimal  - Recommend switching shampoo/conditioner to Head and Shoulders which contains zinc oxide.     Follow up as needed if wrist pain worsens or fails to improve    Lazara Bush, MS4    I was present with the medical student who participated in the service and in the documentation of the note. I have verified the history and personally performed the physical exam and medical decision making, and have verified the content of the note, which accurately reflects my assessment of the patient and the plan of care    Options for treatment and follow-up care were reviewed with the patient and/or guardian. Pt and/or guardian engaged in the decision making process and verbalized understanding of the options discussed and agreed with the final plan.    Precepted today with: MD Babita Luz MD, MPH (PGY 3)  [facility] Family Medicine Resident  Pager: [phone] [facility] Reese is a 55 year old who presents for the following health issues     HPI   Musculoskeletal problem/pain  Onset/Duration: 3-4 days ago she was holding her dog's leash, black lab, when the dog pulled suddenly to greet another person.   Description  Location: R wrist, distal ulna   Joint Swelling: at first, resolved  Redness: no  Pain: YES  Warmth: no  Intensity:  Moderate, now better.   Progression of Symptoms: improving  Accompanying signs and symptoms:   Fevers: no  Numbness/tingling/weakness: no  History  Trauma to the area: YES  Recent illness:   "no  Previous similar problem: no  Previous evaluation:  no  Precipitating or alleviating factors:  Aggravating factors include: rotating wrist  Therapies tried and outcome: Ace wrap and Tylenol helps    Skin Lesion  Patient was told she has eczema at her posterior hairline. She wants this re-evaluated. Patient states area is itchy, so she frequently scratches at it. Uses Suave 2 in 1 shampoo/conditioner.     Review of Systems   Constitutional, HEENT, cardiovascular, pulmonary, gi and gu systems are negative, except as otherwise noted.      Objective    /84   Pulse 107   Temp 97.6  F (36.4  C) (Oral)   Resp 20   Ht 1.549 m (5' 1\")   Wt 67.7 kg (149 lb 3.2 oz)   SpO2 96%   BMI 28.19 kg/m    Body mass index is 28.19 kg/m .  Physical Exam   GENERAL: healthy, alert and no distress  RESP: lungs clear to auscultation - no rales, rhonchi or wheezes  CV: regular rate and rhythm, normal S1 S2, no S3 or S4, no murmur, click or rub, no peripheral edema and peripheral pulses strong  MS: Point tenderness with palpation of the distal ulna of the right wrist. No bruising, erythema, swelling, popping, cracking, or crepitus. Normal ROM, but pain with extension and ulnar deviation. Bilateral hand and arm strength +5/5.   SKIN: 2-3 cm erythematous plaque with minimal scaling of the posterior hairline. Appears to be recently scratched with linear erythematous areas.     No labs or imaging this visit.   "

## 2021-02-24 NOTE — PATIENT INSTRUCTIONS
dPatient Education     Hand Sprain  A sprain is a stretching or tearing of the ligaments that hold a joint together. There are no broken bones. Sprains take 3 to 6 weeks, or longer to heal. A sprained hand may be treated with a splint or elastic wrap for support.  Home care    Keep your arm elevated to reduce pain and swelling. This is most important during the first 48 hours.    Apply an ice pack over the injured area for 15 to 20 minutes every 3 to 6 hours. You should do this for the first 24 to 48 hours. You can make an ice pack by filling a plastic bag that seals at the top with ice cubes and then wrapping it with a thin towel. Continue the use of ice packs for relief of pain and swelling as needed. As the ice melts, be careful to avoid getting any wrap or splint wet. After 48 hours, apply heat (warm shower or warm bath) for 15 to 20 minutes several times a day, or alternate ice and heat.    You may use over-the-counter pain medicine to control pain, unless another pain medicine was prescribed. If you have chronic liver or kidney disease or ever had a stomach ulcer or gastrointestinal bleeding, talk with your healthcare provider before using these medicines.    If you were given a splint or elastic wrap, wear it until your pain improves.  Follow-up care  Follow up with your healthcare provider, or as advised. Sometimes fractures don t show up on the first X-ray. Bruises and sprains can sometimes hurt as much as a fracture. These injuries can take time to heal completely. If your symptoms don t improve or they get worse, talk with your healthcare provider. You may need a repeat X-ray or other tests.  When to seek medical advice  Call your healthcare provider right away if any of these occur:    Pain or swelling increases    Fingers or hand becomes cold, blue, numb, or tingly  [employer] last reviewed this educational content on 5/1/2018 2000-2020 [employer]. [address] [facility] [address], [place]  [address]. All rights reserved. This information is not intended as a substitute for professional medical care. Always follow your healthcare professional's instructions.

## 2021-02-24 NOTE — PROGRESS NOTES
I have personally reviewed the history and examination as documented by Dr. Del Rio and [facility].  I was present during key portions of the visit and agree with the assessment and plan as documented for 55 yr old female here for R forearm strain. Exam consistent w/ ECU strain. Will immobilize for short course. Precautions given. Anticipatory guidance given.     Kenneth Jackson MD  February 24, 2021  2:34 PM

## 2021-02-25 ASSESSMENT — ANXIETY QUESTIONNAIRES: GAD7 TOTAL SCORE: 13

## 2021-03-23 ENCOUNTER — OFFICE VISIT (OUTPATIENT)
Dept: FAMILY MEDICINE | Facility: CLINIC | Age: 56
End: 2021-03-23
Payer: COMMERCIAL

## 2021-03-23 VITALS
BODY MASS INDEX: 27.64 KG/M2 | HEART RATE: 95 BPM | OXYGEN SATURATION: 94 % | HEIGHT: 61 IN | RESPIRATION RATE: 20 BRPM | TEMPERATURE: 98.1 F | SYSTOLIC BLOOD PRESSURE: 122 MMHG | DIASTOLIC BLOOD PRESSURE: 81 MMHG | WEIGHT: 146.4 LBS

## 2021-03-23 DIAGNOSIS — F06.30 MOOD DISORDER AS LATE EFFECT OF TRAUMATIC BRAIN INJURY (H): Primary | ICD-10-CM

## 2021-03-23 DIAGNOSIS — F31.81 BIPOLAR II DISORDER (H): ICD-10-CM

## 2021-03-23 DIAGNOSIS — S06.9XAS MOOD DISORDER AS LATE EFFECT OF TRAUMATIC BRAIN INJURY (H): Primary | ICD-10-CM

## 2021-03-23 DIAGNOSIS — J30.1 SEASONAL ALLERGIC RHINITIS DUE TO POLLEN: ICD-10-CM

## 2021-03-23 DIAGNOSIS — E11.9 TYPE 2 DIABETES MELLITUS WITHOUT COMPLICATION, WITHOUT LONG-TERM CURRENT USE OF INSULIN (H): ICD-10-CM

## 2021-03-23 DIAGNOSIS — R09.81 CONGESTION OF PARANASAL SINUS: ICD-10-CM

## 2021-03-23 DIAGNOSIS — J44.1 CHRONIC OBSTRUCTIVE PULMONARY DISEASE WITH ACUTE EXACERBATION (H): ICD-10-CM

## 2021-03-23 PROCEDURE — 99214 OFFICE O/P EST MOD 30 MIN: CPT | Performed by: STUDENT IN AN ORGANIZED HEALTH CARE EDUCATION/TRAINING PROGRAM

## 2021-03-23 RX ORDER — CETIRIZINE HYDROCHLORIDE 10 MG/1
10 TABLET ORAL DAILY
Qty: 90 TABLET | Refills: 1 | Status: SHIPPED | OUTPATIENT
Start: 2021-03-23 | End: 2021-06-25

## 2021-03-23 RX ORDER — GUAIFENESIN AND DEXTROMETHORPHAN HYDROBROMIDE 600; 30 MG/1; MG/1
1 TABLET, EXTENDED RELEASE ORAL EVERY 12 HOURS
Qty: 20 TABLET | Refills: 0 | Status: SHIPPED | OUTPATIENT
Start: 2021-03-23 | End: 2021-06-25

## 2021-03-23 ASSESSMENT — MIFFLIN-ST. JEOR: SCORE: 1191.45

## 2021-03-23 ASSESSMENT — PATIENT HEALTH QUESTIONNAIRE - PHQ9: SUM OF ALL RESPONSES TO PHQ QUESTIONS 1-9: 22
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
## 2021-03-24 DIAGNOSIS — E11.9 TYPE 2 DIABETES MELLITUS WITHOUT COMPLICATION, WITHOUT LONG-TERM CURRENT USE OF INSULIN (H): ICD-10-CM

## 2021-03-24 DIAGNOSIS — J44.1 CHRONIC OBSTRUCTIVE PULMONARY DISEASE WITH ACUTE EXACERBATION (H): ICD-10-CM

## 2021-03-24 ASSESSMENT — ASTHMA QUESTIONNAIRES: ACT_TOTALSCORE: 14

## 2021-03-26 ENCOUNTER — TELEPHONE (OUTPATIENT)
Dept: FAMILY MEDICINE | Facility: CLINIC | Age: 56
End: 2021-03-26

## 2021-03-26 ENCOUNTER — DOCUMENTATION ONLY (OUTPATIENT)
Dept: PSYCHOLOGY | Facility: CLINIC | Age: 56
End: 2021-03-26

## 2021-03-26 RX ORDER — ATORVASTATIN CALCIUM 40 MG/1
40 TABLET, FILM COATED ORAL DAILY
Qty: 90 TABLET | Refills: 1 | Status: SHIPPED | OUTPATIENT
Start: 2021-03-26 | End: 2022-05-16

## 2021-03-26 NOTE — TELEPHONE ENCOUNTER
Referral for (Test): Mental Health  Location/Place/Provider:Video   Date/Time: 04/12/2021 at 10:00am with    Phone: [phone]  Fax:   Additional information/prep.:   Scheduled by: VALERIO ePña

## 2021-03-26 NOTE — PROGRESS NOTES
Patient with long history of mental health concerns, personality disorder. Currently worsening depressive symptoms and not on any medications. At appt with Dr. Farah, patient was amenable to 1x appt with Dr. Nance for medication management suggestions. Patient is very well known to Dr. Farah.    Patient Demographics    Patient Name   Mary Ann Olson MRN   [id number] Sex   Female    1965 [facility]   [id number] Address   [address]   [place] [address] Phone   [phone] (Home)   [phone] (Mobile) *Preferred*   Primary Visit Coverage    Payer Plan Sponsor Code Group Number Group Name Payer Address Payer Phone   [facility] [address] [facility]   [phone]   Primary Visit Coverage Subscriber    Subscriber ID Subscriber Name Subscriber SSN Subscriber Address   [id number] MARY ANN OLSON [id number] [facility]      [place] [address]   Order Information    Date Department Ordering/Authorizing   3/23/2021 [facility] [place] Joanna Farah MD   Order Providers    Authorizing Provider Encounter Provider   Joanna Farah MD Brown, Kathryn Michelle, MD   Associated Diagnoses    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

## 2021-04-05 DIAGNOSIS — J44.1 CHRONIC OBSTRUCTIVE PULMONARY DISEASE WITH ACUTE EXACERBATION (H): ICD-10-CM

## 2021-04-05 DIAGNOSIS — E11.9 TYPE 2 DIABETES MELLITUS WITHOUT COMPLICATION, WITHOUT LONG-TERM CURRENT USE OF INSULIN (H): Primary | ICD-10-CM

## 2021-04-05 DIAGNOSIS — Z78.0 POSTMENOPAUSAL STATUS: ICD-10-CM

## 2021-04-05 DIAGNOSIS — F41.9 ANXIETY: ICD-10-CM

## 2021-04-06 RX ORDER — ERGOCALCIFEROL 1.25 MG/1
50000 CAPSULE, LIQUID FILLED ORAL WEEKLY
Qty: 12 CAPSULE | Refills: 0 | Status: SHIPPED | OUTPATIENT
Start: 2021-04-06 | End: 2021-07-05

## 2021-04-06 RX ORDER — LANCETS 23 GAUGE
EACH MISCELLANEOUS
Qty: 60 EACH | Refills: 1 | Status: SHIPPED | OUTPATIENT
Start: 2021-04-06 | End: 2022-12-26

## 2021-04-06 RX ORDER — HYDROXYZINE PAMOATE 25 MG/1
25 CAPSULE ORAL 3 TIMES DAILY PRN
Qty: 28 CAPSULE | Refills: 1 | Status: SHIPPED | OUTPATIENT
Start: 2021-04-06 | End: 2023-01-16

## 2021-04-07 ENCOUNTER — NURSE TRIAGE (OUTPATIENT)
Dept: NURSING | Facility: CLINIC | Age: 56
End: 2021-04-07

## 2021-04-07 ENCOUNTER — COMMUNICATION - HEALTHEAST (OUTPATIENT)
Dept: SCHEDULING | Facility: CLINIC | Age: 56
End: 2021-04-07

## 2021-04-07 NOTE — TELEPHONE ENCOUNTER
Seen at ED at [facility] and is now wondering what her risk of COVID19 is from being there.     States she followed precautions and so did staff.     No symptoms    Triaged to a disposition of Home Care. Advice given. Patient is agreeable.     COVID 19 Nurse Triage Plan/Patient Instructions    Please be aware that novel coronavirus (COVID-19) may be circulating in the community. If you develop symptoms such as fever, cough, or SOB or if you have concerns about the presence of another infection including coronavirus (COVID-19), please contact your health care provider or visit [email or web address].     Disposition/Instructions    Home care recommended. Follow home care protocol based instructions.    Thank you for taking steps to prevent the spread of this virus.  o Limit your contact with others.  o Wear a simple mask to cover your cough.  o Wash your hands well and often.    Resources    [facility]: About COVID-19: [email or web address]    CDC: What to Do If You're Sick: www.cdc.gov/coronavirus/2019-ncov/about/steps-when-sick.html    CDC: Ending Home Isolation: www.cdc.gov/coronavirus/2019-ncov/hcp/disposition-in-home-patients.html     CDC: Caring for Someone: www.cdc.gov/coronavirus/2019-ncov/if-you-are-sick/care-for-someone.html     [facility]: Interim Guidance for Hospital Discharge to Home: www.health.[facility].[place].us/diseases/coronavirus/hcp/hospdischarge.pdf    [facility] clinical trials (COVID-19 research studies): [email or web address]     Below are the COVID-19 hotlines at the [facility] of Health ([facility]). Interpreters are available.   o For health questions: Call 386-234-3593 or 1-532.123.9039 (7 a.m. to 7 p.m.)  o For questions about schools and childcare: Call 838-343-0741 or 1-110.579.8971 (7 a.m. to 7 p.m.)   Reason for Disposition    [1] Living in area with community spread (identified by local PHD) BUT [2] NO symptoms    Additional  Information    Negative: COVID-19 lab test positive    Negative: [1] Lives with someone known to have influenza (flu test positive) AND [2] flu-like symptoms (e.g., cough, runny nose, sore throat, SOB; with or without fever)    Negative: [1] Symptoms of COVID-19 (e.g., cough, fever, SOB, or others) AND [2] HCP diagnosed COVID-19 based on symptoms    Negative: [1] Symptoms of COVID-19 (e.g., cough, fever, SOB, or others) AND [2] lives in an area with community spread    Negative: [1] Symptoms of COVID-19 (e.g., cough, fever, SOB, or others) AND [2] within 14 days of EXPOSURE (close contact) with diagnosed or suspected COVID-19 patient    Negative: [1] Symptoms of COVID-19 (e.g., cough, fever, SOB, or others) AND [2] within 14 days of travel from high-risk area for COVID-19 community spread (identified by CDC)    Negative: [1] Difficulty breathing (shortness of breath) occurs AND [2] onset > 14 days after COVID-19 EXPOSURE (Close Contact)    Negative: [1] Dry cough occurs AND [2] onset > 14 days after COVID-19 EXPOSURE    Negative: [1] Wet cough (i.e., white-yellow, yellow, green, or jak colored sputum) AND [2] onset > 14 days after COVID-19 EXPOSURE    Negative: [1] Common cold symptoms AND [2] onset > 14 days after COVID-19 EXPOSURE    Negative: COVID-19 vaccine reaction suspected (e.g., fever, headache, muscle aches) occurring during days 1-3 after getting vaccine    Negative: COVID-19 vaccine, questions about    Negative: [1] CLOSE CONTACT COVID-19 EXPOSURE within last 14 days AND [2] needs COVID-19 lab test to return to work AND [3] NO symptoms    Negative: [1] CLOSE CONTACT COVID-19 EXPOSURE within last 14 days AND [2] exposed person is a  (e.g., police or paramedic) AND [3] NO symptoms    Negative: [1] CLOSE CONTACT COVID-19 EXPOSURE within last 14 days AND [2] exposed person is a healthcare worker who was NOT using all recommended personal protective equipment (i.e., a respirator-N95 mask, eye  protection, gloves, and gown) AND [3] NO symptoms    Negative: [1] Living or working in a correctional facility, long-term care facility, or shelter (i.e., congregate setting; densely populated) AND [2] where an outbreak has occurred AND [3] NO symptoms    Negative: [1] CLOSE CONTACT COVID-19 EXPOSURE within last 14 days AND [2] NO symptoms    Negative: [1] COVID-19 EXPOSURE AND [2] 15 or more days ago AND [3] NO symptoms    Protocols used: CORONAVIRUS (COVID-19) EXPOSURE-A- 1.3    Marlene Starks RN on 4/7/2021 at 7:10 AM

## 2021-04-09 DIAGNOSIS — J45.40 MODERATE PERSISTENT ASTHMA WITHOUT COMPLICATION: ICD-10-CM

## 2021-04-09 RX ORDER — ALBUTEROL SULFATE 90 UG/1
2 AEROSOL, METERED RESPIRATORY (INHALATION) EVERY 6 HOURS PRN
Qty: 18 G | Refills: 0 | Status: SHIPPED | OUTPATIENT
Start: 2021-04-09 | End: 2021-05-02
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
## 2021-04-12 ENCOUNTER — VIRTUAL VISIT (OUTPATIENT)
Dept: PSYCHOLOGY | Facility: CLINIC | Age: 56
End: 2021-04-12
Payer: COMMERCIAL

## 2021-04-12 DIAGNOSIS — F41.9 ANXIETY: Primary | ICD-10-CM

## 2021-04-12 PROCEDURE — 99207 PR NO BILLABLE SERVICE THIS VISIT: CPT | Performed by: STUDENT IN AN ORGANIZED HEALTH CARE EDUCATION/TRAINING PROGRAM

## 2021-04-12 NOTE — PROGRESS NOTES
I called Mary Ann for our scheduled Psychiatry consultation today. She stated she had other obligations this morning, and she would prefer to reschedule our visit for next week. I will see her next Monday at 10 am.    Ajit Nance MD  Psychiatry PGY4

## 2021-05-02 ENCOUNTER — TELEPHONE (OUTPATIENT)
Dept: FAMILY MEDICINE | Facility: CLINIC | Age: 56
End: 2021-05-02

## 2021-05-02 DIAGNOSIS — J45.40 MODERATE PERSISTENT ASTHMA WITHOUT COMPLICATION: ICD-10-CM

## 2021-05-02 RX ORDER — ALBUTEROL SULFATE 90 UG/1
2 AEROSOL, METERED RESPIRATORY (INHALATION) EVERY 6 HOURS PRN
Qty: 18 G | Refills: 3 | Status: SHIPPED | OUTPATIENT
Start: 2021-05-02 | End: 2021-07-14

## 2021-05-02 NOTE — TELEPHONE ENCOUNTER
House officer responding to after hours clinic page.   Ran out of pro-air rescue inhaler.   Was outside walking her dog, the humid air has triggered her asthma. Unfortunately she doesn't have a pro-air inhaler so was calling to get a refill   Reassuring that she is talking in full sentences, laughing, so does not seem to be in respiratory distress.   I did educate her on signs and symptoms of when to go to the ED for sob  Refill sent to her pharmacy of choice      Da Bergeron MD   [facility] Resident   Pager: [phone]

## 2021-05-03 DIAGNOSIS — R05.9 COUGH: ICD-10-CM

## 2021-05-03 RX ORDER — ACETAMINOPHEN 500 MG
1000 TABLET ORAL EVERY 6 HOURS PRN
Qty: 100 TABLET | Refills: 1 | Status: SHIPPED | OUTPATIENT
Start: 2021-05-03 | End: 2022-06-07

## 2021-05-10 ENCOUNTER — VIRTUAL VISIT (OUTPATIENT)
Dept: PSYCHOLOGY | Facility: CLINIC | Age: 56
End: 2021-05-10
Payer: COMMERCIAL

## 2021-05-10 DIAGNOSIS — F31.81 BIPOLAR II DISORDER (H): Primary | ICD-10-CM

## 2021-05-10 PROCEDURE — 99205 OFFICE O/P NEW HI 60 MIN: CPT | Mod: 95 | Performed by: STUDENT IN AN ORGANIZED HEALTH CARE EDUCATION/TRAINING PROGRAM
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
## 2021-05-11 DIAGNOSIS — F31.81 BIPOLAR II DISORDER (H): Primary | ICD-10-CM

## 2021-05-11 NOTE — PROGRESS NOTES
Preceptor Attestation:  Patient's case reviewed and discussed with the resident, Ajit Nance MD, and I personally evaluated the patient. I agree with written assessment and plan of care.    Supervising Physician:  Monique Mcghee MD   [facility]

## 2021-05-13 ENCOUNTER — TELEPHONE (OUTPATIENT)
Dept: FAMILY MEDICINE | Facility: CLINIC | Age: 56
End: 2021-05-13

## 2021-05-13 NOTE — TELEPHONE ENCOUNTER
Prior Authorization needed on:  5/13/21    Medication:  Vraylar Dose:  1.5mg    Pharmacy confirmed as   [id number] - [place], [place] - [address]  [address]  [place] [address]  Phone: [phone] Fax: [phone]  : Yes    Insurance Name:    Insurance Phone:   Insurance Patient ID:     Alternatives Suggested:  Aripirazole, Fanapt, Latuda, Olanzapine, Quetiapine, Risperidone    Julienne Mccall MA May 13, 2021 at 2:11 PM

## 2021-05-14 NOTE — TELEPHONE ENCOUNTER
Per Mary Ann's recent visit with Dr. Nance, she has previously trialed the following medications: lurasidone, lamotrigine, tegretol, quetiapine, olanzapine, fluoxetine, risperidone, ziprasidone, wellbutrin, remeron, and citalopram. Rather than another trial, she would like to continue to pursue a prior authorization for Dev.     Monique Mcghee MD

## 2021-05-17 NOTE — TELEPHONE ENCOUNTER
Central Prior Authorization Team   Phone: [phone]    PA Initiation    Medication: Vraylar 1.5 mg capsule  Insurance Company: [employer] - Phone [phone] Fax [phone]  Pharmacy Filling the Rx: [id number] - [place], [place] - [facility]  Filling Pharmacy Phone: [phone]  Filling Pharmacy Fax: [phone]  Start Date: 5/17/2021

## 2021-05-20 ENCOUNTER — TELEPHONE (OUTPATIENT)
Dept: FAMILY MEDICINE | Facility: CLINIC | Age: 56
End: 2021-05-20

## 2021-05-20 ENCOUNTER — DOCUMENTATION ONLY (OUTPATIENT)
Dept: PSYCHOLOGY | Facility: CLINIC | Age: 56
End: 2021-05-20

## 2021-05-20 NOTE — TELEPHONE ENCOUNTER
Outreach Date: 05/20/21 Time: 0951.  Able to be Reached: No  Voicemail Left: Yes  Reason for Referral: Psychiatry and DBT program  Location of appointment: TBD  Appointment Date: TBD  Appointment Time: TBD  Phone number of location:   Fax number of location:   Additional Information: VM left for patient to call back regarding referral. Basilio SMITH

## 2021-05-20 NOTE — PROGRESS NOTES
Patient recently seen by consulting psychiatrist in primary care (please see attached note from that encounter). Recommendation made to establish with psychiatry full time as well as begin a DBT program. Please assist getting her connected with the following locations that offer both psychiatry and DBT programming:    DBT Referrals:     [facility] (Multiple locations available)  [address]  [address]  [place] [address]  [phone]     [address] , [address]   [place] [address]  [phone]      [facility]  [address] [place] [address].  [address]  [place], [place] [address]  [phone]        Patient Demographics    Patient Name   Guadalupe Franklin MRN   [id number] Sex   Female    1965 [facility]   [id number] Address   [address] [place] [address] [address]   [place] [address] Phone   [phone] (Home)   [phone] (Mobile) *Preferred*   Primary Coverage    Payer Plan Sponsor Code Group Number Group Name Payer Address Payer Phone   [facility] [address] [facility]   [phone]   Primary Subscriber    Subscriber ID Subscriber Name Subscriber SSN Subscriber Address   [id number] GUADALUPE FRANKLIN [id number] [address] [place] [address] [address]      [place] [address]   Order Information    Date Department Ordering/Authorizing   2021 [facility] Gabbi Durham LMFT   Order Providers    Authorizing Provider Encounter Provider   Gabbi Durham LMFT Zak-Hunter, Lisa M, LMFT   Associated Diagnoses    Bipolar II disorder (H)  - Primary       Comments     needed: No   Language: English          Order Questions    Question Answer Comment   Reason for Referral: Bipolar long history of depression with possible elevated mood, TBI, BPD traits, and suididal ideation and attempt   Adult or Child/Adolescent: Adult    Type of Referral (Indicate all that apply): Psychiatry - for diagnosis and medication management     Psychotherapy -  for diagnosis and non-pharmacological treatment    Currently receiving mental health services (if 'Yes', use free franca box - what services and why today's referral?) No    Previous psych hospitalization:

## 2021-05-20 NOTE — LETTER
May 28, 2021      Mary Ann Olson  [address]  [place] [address]        Dear Mary Ann,    We have been trying to contact you regarding a mental health referral. There are a couple locations you could go to. Please review the options below and choose a location you would like to be seen at. The phone numbers are provided for you to call and schedule (they will not allow us to schedule on your behalf). Please contact me once you choose a location so I can fax the information to that location ([phone]).     [facility] (Multiple locations available)  [address]  [address]  [place] [address]  [phone]      [address] [facility]   [place] [address]  [phone]        [facility]  [address]  [address]  [place], [place] [address] [phone]    Sincerely,    Felicity Harris RN

## 2021-05-20 NOTE — TELEPHONE ENCOUNTER
Patient returning Phillip's call, told her she is not available will take a message for a call back. Patient states to call her back tomorrow. Please call and advise.

## 2021-05-21 NOTE — TELEPHONE ENCOUNTER
Prior Authorization Approval    Authorization Effective Date: 4/19/2021  Authorization Expiration Date: 5/19/2022  Medication: Vraylar 1.5 mg capsule-APPROVED  Approved Dose/Quantity:    Reference #:     Insurance Company: [employer] - Phone [phone] Fax [phone]  Expected CoPay:       CoPay Card Available:      Foundation Assistance Needed:    Which Pharmacy is filling the prescription (Not needed for infusion/clinic administered): [id number] - [place], [place] - [address]  Pharmacy Notified: Yes  Patient Notified: Yes  **Instructed pharmacy to notify patient when script is ready to /ship.**

## 2021-05-26 ENCOUNTER — RECORDS - HEALTHEAST (OUTPATIENT)
Dept: ADMINISTRATIVE | Facility: CLINIC | Age: 56
End: 2021-05-26

## 2021-05-26 NOTE — TELEPHONE ENCOUNTER
COPD Education    Called patient and left message. Told patient to call if she had any questions and that we would call again next Monday.   Our phone number is [phone].    BRANDY Montgomery, COPD educator

## 2021-05-27 ENCOUNTER — RECORDS - HEALTHEAST (OUTPATIENT)
Dept: ADMINISTRATIVE | Facility: CLINIC | Age: 56
End: 2021-05-27

## 2021-05-27 NOTE — TELEPHONE ENCOUNTER
COPD Education    Called patient and left message. Told patient to call if she had any questions and that we would call again next week.   Our phone number is [phone].    BRANDY Montgomery, COPD educator

## 2021-05-28 ENCOUNTER — RECORDS - HEALTHEAST (OUTPATIENT)
Dept: ADMINISTRATIVE | Facility: CLINIC | Age: 56
End: 2021-05-28
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
## 2021-05-28 NOTE — PATIENT INSTRUCTIONS - HE
Thank you for coming in for your appointment to discuss your lung disease. Based on testing you have asthma. It seems to be made worse by your smoking and your anxiety.     Your lung function testing showed that you have asthma, normal lung volumes, & reduced diffusion capacity (measure of how well oxygen diffuses through your lungs). I think that this might be due to possible sleep problem that should be checked out.    If you have any questions or concerns, please, call our clinic at [phone].     Asthma Action Plan    Patient Name: Mary Ann Olson  Patient YOB: 1965    Doctor's Name: Claritza Herrera    Emergency Contact:              Severity Classification: Persistent    What triggers my asthma: colds, dust, smoke and exercise    GREEN ZONE: Doing WellQuick Reli   No cough, wheeze, chest tightness or shortness of breath during the day or night  Can do your usual activities    Take these long-term-control medicines every day  Medicine How Much to Take When to take it   Spiriva 1 puff Once daily   Dulera 2 puffs 2 times daily     Take these medicines before exercise if your asthma is exercise-induced:  Medicine How Much to Take When to take it   albuterol  (also known as ProAir, Ventolin and Proventil) 2 puffs 15-30 minutes prior to exercise or sports     YELLOW ZONE: Asthma is Getting Worse   Cough, wheeze, chest tightness or shortness of breath or  Waking at night due to asthma, or  Can do some, but not all, usual activities.    Keep taking green zone medications and add quick-relief medicine:  Quick Relief Medicine How Much to Take When to take it   Albuterol  Reuq  (also known as ProAir, Ventolin and Proventil) 2 puffs every 4 hours as needed     If you do not feel better and your symptoms do not return to the green zone after one hour of the quick relief medication, then:    Take quick relief treatment again. Call your clinician within 1 hour.    Contact your clinician if  you are using quick relief medication more than 2 times per week.    RED ZONE: Medical Alert!   Very short of breath, or  Quick relief medications have not helped, or  Cannot do usual activities, or  Symptoms are same or worse after 24 hours in the Yellow Zone.    Continue green zone medicines and add:  Quick Relief Medicine Dose When to take it   albuterol  (also known as ProAir, Ventolin and Proventil) 2 puffs may repeat every 20 minutes for up to 1 hour       IF ANY OF THESE ARE HAPPENING, SEEK EMERGENCY HELP AND CALL 911!   You are struggling to breathe and are uncomfortable or  There is simply no clear improvement and you are worried about how to get through the next 30 minutes or  Trouble walking and talking due to shortness of breath, or  Lips or fingernails are blue    Provider signature:  Electronically Signed by Claritza Herrera   Date: 04/22/19

## 2021-05-28 NOTE — TELEPHONE ENCOUNTER
Letter sent to patient after three attempts to contact. Letter contains referral information. Basilio SMITH

## 2021-05-29 ENCOUNTER — RECORDS - HEALTHEAST (OUTPATIENT)
Dept: ADMINISTRATIVE | Facility: CLINIC | Age: 56
End: 2021-05-29

## 2021-05-29 NOTE — TELEPHONE ENCOUNTER
COPD Education Note    5/31/2019    Attempted to call [employer] today but both phones are not in service.    SHU MontgomeryT, COPD Educator

## 2021-05-30 ENCOUNTER — RECORDS - HEALTHEAST (OUTPATIENT)
Dept: ADMINISTRATIVE | Facility: CLINIC | Age: 56
End: 2021-05-30

## 2021-05-30 VITALS — BODY MASS INDEX: 33.17 KG/M2 | WEIGHT: 177 LBS

## 2021-05-30 VITALS — HEIGHT: 61 IN | WEIGHT: 169 LBS | BODY MASS INDEX: 31.91 KG/M2

## 2021-05-30 VITALS — WEIGHT: 176.3 LBS | BODY MASS INDEX: 33.31 KG/M2

## 2021-05-31 ENCOUNTER — RECORDS - HEALTHEAST (OUTPATIENT)
Dept: ADMINISTRATIVE | Facility: CLINIC | Age: 56
End: 2021-05-31

## 2021-05-31 VITALS — WEIGHT: 163.8 LBS | BODY MASS INDEX: 30.95 KG/M2

## 2021-06-01 ENCOUNTER — RECORDS - HEALTHEAST (OUTPATIENT)
Dept: ADMINISTRATIVE | Facility: CLINIC | Age: 56
End: 2021-06-01

## 2021-06-01 VITALS — BODY MASS INDEX: 32.12 KG/M2 | WEIGHT: 170 LBS

## 2021-06-02 ENCOUNTER — RECORDS - HEALTHEAST (OUTPATIENT)
Dept: ADMINISTRATIVE | Facility: CLINIC | Age: 56
End: 2021-06-02

## 2021-06-02 VITALS — WEIGHT: 173.3 LBS | BODY MASS INDEX: 32.74 KG/M2

## 2021-06-04 VITALS
SYSTOLIC BLOOD PRESSURE: 105 MMHG | BODY MASS INDEX: 27.33 KG/M2 | HEART RATE: 84 BPM | OXYGEN SATURATION: 96 % | TEMPERATURE: 98.7 F | DIASTOLIC BLOOD PRESSURE: 73 MMHG | WEIGHT: 149.4 LBS

## 2021-06-04 NOTE — TELEPHONE ENCOUNTER
Controlled substance will not be refilled.  Patient will need to follow-up with her primary care provider if she has ongoing need for pain medication.    Anastacio Guerra MD 01/02/20 7:55 AM

## 2021-06-04 NOTE — TELEPHONE ENCOUNTER
Medication Question or Clarification  Who is calling: Patient  What medication are you calling about? (include dose and sig)   oxyCODONE (ROXICODONE) 5 MG immediate release tablet 12 tablet 0 12/30/2019     Sig - Route: Take 1 tablet (5 mg total) by mouth every 6 (six) hours as needed for pain. - Oral      Who prescribed the medication?:   Donna Bundy MD  What is your question/concern?: Patient states she accidentally dropped her bottle of pill in toilet requesting for ne Rx to send [facility]   . Please advise   Pharmacy: [facility] # [id number]  Okay to leave a detailed message?: No  Site CMT - Please call the pharmacy to obtain any additional needed information.

## 2021-06-04 NOTE — TELEPHONE ENCOUNTER
Call and left message for patient to return call regarding provider's message. Callback number provided.    RODRIGO Evans   11:48 AM

## 2021-06-08 ENCOUNTER — TELEPHONE (OUTPATIENT)
Dept: FAMILY MEDICINE | Facility: CLINIC | Age: 56
End: 2021-06-08

## 2021-06-08 DIAGNOSIS — Z00.00 PREVENTATIVE HEALTH CARE: Primary | ICD-10-CM
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
## 2021-06-08 NOTE — TELEPHONE ENCOUNTER
[facility] phone call message- general phone call:    Reason for call: Patient called stating she has been waiting for a call to schedule 2 appointments, one was for ultrasound and the other was a lucille. Please call and advise.     Return call needed: Yes    OK to leave a message on voice mail? Yes    Primary language: English      needed? No    Call taken on June 8, 2021 at 2:50 PM by Sheri Weeks

## 2021-06-08 NOTE — PROGRESS NOTES
1/23/17: patient left a message stating she needed refills on her test strips  Checking blood sugars bid due to fluctuating results  [facility]  Gave the information to Dr. Ashley Khan's Clinical Assistant    Possible housing option is [employer]  Has not begun packing yet  Has to be out by 12/31/16         Pending Appointments:   None  _______________________________________  Planned Outreach Frequency: once a month  Preferred Phone Number: [phone]    Chronic Medical Diagnosis:  Pancreatitis  Diabetes Mellitis  Sacroiliitis  Asthma  Chron's Disease  Pyogenic Granuloma    Mental Health/Stress:  Bipolar Disorder  Mental Health Clinic: [facility]  Therapist: Name Unknown  Psychiatry: Jacquelyn Galarza RN, CNS     Housing:  Rents an apartment   Section 8 voucher     Financial:  Rep-Payee began in March 2016  Rep-Payee: Xavier from [employer]  Energy Assistance approved 1/2016    Substance:  Smokes cigarettes  Smokes 2 ppd (5/4/15)    Exercise:  Has membership at [employer]  Will [facility] appointments at [facility]    Medications:  Preferred Pharmacy: [facility]--meds to be delivered to patient    Medical Supplies--filled at [facility]  Adult Brief-Large    Preventative Measures:  Health Insurance: [facility]  Dental Clinic: [facility]  Annual Physical Exam: 9/24/14  Diabetic Eye Exam: 7/16/15  Colonoscopy: Due (patient has declined)    Current Services/Support:  Mental Health Targeted : Venita Schofield  Phone: [phone]  \[facility]\"" Worker: Adelaida Zavala from [facility]  Phone: [phone]  [facility]  through [place]: Viola Hanley  Phone: [phone]  [place] Care Coordinator: Zachary (currently on a leave of absence) covering [place] Care Coordinator: Maggy Gleason Phone: [phone]

## 2021-06-08 NOTE — PROGRESS NOTES
Moved into an apartment through [employer]  Is not happy with the place  Discussed focusing on the positives  Completed the goal around finding a place to live    Dr. Ramirez would like to see the patient back in 6 weeks  Patient requested the Care Guide follow up with the patient at that time as well  Assisted in scheduling joint appointments for the patient to see Dr. Ramirez and myself         Pending Appointments:   2/14/17 with [facility]  3/10/17 at 1:00 with Dr. Ramirez and Padma, Clinic Care Guide  _______________________________________  Planned Outreach Frequency: every 6 weeks  Preferred Phone Number: [phone]    Chronic Medical Diagnosis:  Pancreatitis  Diabetes Mellitis  Sacroiliitis  Asthma  Chron's Disease  Pyogenic Granuloma    Mental Health/Stress:  Bipolar Disorder    M[facility] Health Clinic: [facility]  Therapist: therapist left, not seeing anyone at this time  Psychiatry: Jacquelyn Galarza RN, CNS (declines medications, no longer seeing Jacquelyn)     Housing:  [employer]     Financial:  Rep-Payee began in March 2016  Rep-Payee: Xavier from [employer]  Energy Assistance approved 1/2016    Substance:  Smokes cigarettes  Smokes 2 ppd (5/4/15)    Exercise:  Has membership at [employer]  Will walk to appointments at [facility]    Medications:  Preferred Pharmacy: [facility]--meds to be delivered to patient    Medical Supplies--filled at [facility]  Adult Brief-Large    Preventative Measures:  Health Insurance: [facility]  Dental Clinic: [facility]  Annual Physical Exam: 9/24/14  Diabetic Eye Exam: 7/16/15  Colonoscopy: Due (patient has declined)    Current Services/Support:  Mental Health Targeted : Venita Schofield  Phone: [phone]  [facility] Worker: Adelaida Zavala from [facility]von Bergeron  Phone: [phone]  [facility]  through [place]: Viola Hanley  Phone: [phone]  [place] Care Coordinator: Zachary (currently on a leave of absence)  covering [place] Care Coordinator: Maggy Gleason Phone: [phone]
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
## 2021-06-09 NOTE — PROGRESS NOTES
Saw Dr. Joanie Connell, Rheumatologist with [facility]  Obtained NORIS's for Dr. Connell    Has pending appointment with ANIKA on 4/4/17    Boyfriend is looking into purchasing a house  Patient may plan on moving in with him    Plans on beginning school next month for her GED         Pending Appointments:   4/4/17 with ANIKA  6/5/17 at 1:00 with Dr. Ramirez  _______________________________________  Planned Outreach Frequency: every 6 weeks  Preferred Phone Number: [phone]    Chronic Medical Diagnosis:  Pancreatitis  Diabetes Mellitis  Sacroiliitis  Asthma  Chron's Disease  Pyogenic Granuloma    Mental Health/Stress:  Bipolar Disorder    Mental Health Clinic: N/A  Therapist: None  Psychiatry: declines medications     Housing:  [employer]     Financial:  Rep-Payee began in March 2016  Rep-Payee: Xavier from [employer]  Energy Assistance approved 1/2016    Substance:  Smokes cigarettes  Smokes 2 ppd (5/4/15)    Exercise:  Has membership at [employer]  Will walk to appointments at [facility]    Medications:  Preferred Pharmacy: [facility]--meds to be delivered to patient    Medical Supplies--filled at [facility]  Adult Brief-Large    Preventative Measures:  Health Insurance: [employer]  Dental Clinic: [facility]  Annual Physical Exam: 9/24/14  Diabetic Eye Exam: 7/16/15  Colonoscopy: Due (patient has declined)    Current Services/Support:  Mental Health Targeted : Venita Schofield  Phone: [phone]  [facility] Worker: Adelaida Zavala from [facility]von Bergeron  Phone: [phone]  [facility]  through [place]: Viola Hanley  Phone: [phone]  [place] Care Coordinator: Zachary (currently on a leave of absence) covering [place] Care Coordinator: Maggy Gleason Phone: [phone]

## 2021-06-09 NOTE — PROGRESS NOTES
Patient cut her finger on Sunday, 4/2/17  Hurts  Discussed it is too late for [facility]  Explained she should call the main clinic number and speak to a triage nurse for medical concerns  Informed her she should be seen if it begins to look infected  Patient acknowledged this    Patient went to the [facility] appointment yesterday  Is scheduled for her colonoscopy    Has not receivied a call in regards to the left wrist brace or the knee brace  Would like to know the status of these  Informed her I will speak with Dr. Ramirez's assistant         Pending Appointments:   6/5/17 at 1:00 with Dr. Ramirez  6/15/17 with [facility] for Colonoscopy  _______________________________________  Planned Outreach Frequency: every 6 weeks  Preferred Phone Number: [phone]    Chronic Medical Diagnosis:  Pancreatitis  Diabetes Mellitis  Sacroiliitis  Asthma  Chron's Disease  Pyogenic Granuloma    Mental Health/Stress:  Bipolar Disorder    Mental Health Clinic: N/A  Therapist: None  Psychiatry: declines medications     Housing:  [employer]     Financial:  Rep-Payee began in March 2016  Rep-Payee: Xavier from [employer]  Energy Assistance approved 1/2016    Substance:  Smokes cigarettes  Smokes 2 ppd (5/4/15)    Exercise:  Has membership at [employer]  Will walk to appointments at [facility]    Medications:  Preferred Pharmacy: [facility]--meds to be delivered to patient    Medical Supplies--filled at [facility]  Adult Brief-Large    Preventative Measures:  Health Insurance: [facility]  Dental Clinic: [facility]  Annual Physical Exam: 9/24/14  Diabetic Eye Exam: 7/16/15  Colonoscopy: pending for June 2017    Current Services/Support:  Mental Health Targeted : Venita Schofield  Phone: [phone]  [facility] Worker: Adelaida Zavala from [facility]von Bergeron  Phone: [phone]  [facility]  through [place]: Viola Hanley  Phone: [phone]  [place] Care Coordinator: Zachary (currently on a leave of absence) covering [place] Care  Coordinator: Maggy Gleason Phone: [phone]

## 2021-06-10 NOTE — TELEPHONE ENCOUNTER
I can't help the patient as she doesn't have any of these orders. If she is do for a mammogram, she can always call them to schedule (Phone number [phone] option 1).   And I don't see anything for any US currently, she would need to talk to her PCP in regards to that.

## 2021-06-10 NOTE — PROGRESS NOTES
11:54 AM patient left a message on my voicemail stating:    She hasn't received a call back in regards to the left wrist brace or knee brace    Is waking up in the middle of the night due to the pain in her wrist    She also mentioned something in regards to refills on medications (unsure what she was exactly requesting)    I spoke to Karrie Patrick, Clinical Assistant  The telephone encounter I started on 4/7/17 is still pending  Requested her to assist with it    Upon viewing the telephone encounter shortly after the conversation with Gudelia Yoon CMT attempted to contact the patient   No answer  She scheduled the patient for a nurse only appointment on 4/17/17 at 2:30 so she can be fitted for the braces    I called and left a detailed message for the patient re-iterating what Gudelia documented in the telephone encounter  Also mentioned in my message that if she needs medication refills, the best thing to do is contact her pharmacy  They will know if there are additional refills on the prescription or if the doctor needs to write a new Rx         Pending Appointments:   6/5/17 at 1:00 with Dr. Ramirez  6/15/17 with [facility] for Colonoscopy  _______________________________________  Planned Outreach Frequency: every 6 weeks  Preferred Phone Number: [phone]    Chronic Medical Diagnosis:  Pancreatitis  Diabetes Mellitis  Sacroiliitis  Asthma  Chron's Disease  Pyogenic Granuloma    Mental Health/Stress:  Bipolar Disorder    Mental Health Clinic: N/A  Therapist: None  Psychiatry: declines medications     Housing:  [employer]     Financial:  Rep-Payee began in March 2016  Rep-Payee: Xavier from [employer]  Energy Assistance approved 1/2016    Substance:  Smokes cigarettes  Smokes 2 ppd (5/4/15)    Exercise:  Has membership at [employer]  Will walk [facility] appointments at [facility]    Medications:  Preferred Pharmacy: [facility]--meds to be delivered to patient    Medical Supplies--filled at [facility]  Adult  Brief-Large    Preventative Measures:  Health Insurance: Herb RAMIREZ  Dental Clinic: [facility]  Annual Physical Exam: 9/24/14  Diabetic Eye Exam: 7/16/15  Colonoscopy: pending for June 2017    Current Services/Support:  Mental Health Targeted : Venita Schofield  Phone: [phone]  [facility] Worker: Adelaida Zavala from Yvon Rubio  Phone: [phone]  [facility]  through [place]: Viola Hanley  Phone: [phone]  [place] Care Coordinator: Zachary (currently on a leave of absence) covering [place] Care Coordinator: Maggy Gleason Phone: [phone]
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
## 2021-06-10 NOTE — PROGRESS NOTES
Juan M wrist brace Size Medium Left side fitted today with patient.  Elastic knee support band fitted today with patient size Medium.

## 2021-06-11 NOTE — PROGRESS NOTES
On 6/20/17 at 8:50 patient left a message for me stating:  Has an appointment with Dr. Ramirez today (6/20/17) and would like for me to attend it    Scheduled Follow Up Call: Attempt 1  Care Guide called the patient.  If this patient is returning my call, please transfer the patient to me, Padma Romero, at [phone].         Pending Appointments:   6/15/17 with [facility] for Colonoscopy  _______________________________________  Planned Outreach Frequency: every 6 weeks  Preferred Phone Number: [phone]    Chronic Medical Diagnosis:  Pancreatitis  Diabetes Mellitis  Sacroiliitis  Asthma  Chron's Disease  Pyogenic Granuloma    Mental Health/Stress:  Bipolar Disorder    Mental Health Clinic: N/A  Therapist: None  Psychiatry: declines medications     Housing:  [employer]     Financial:  Rep-Payee began in March 2016  Rep-Payee: Xavier from [employer]  Energy Assistance approved 1/2016    Substance:  Smokes cigarettes  Smokes 2 ppd (5/4/15)    Exercise:  Has membership at [employer]  Will walk to appointments at [facility]    Medications:  Preferred Pharmacy: [facility]--meds to be delivered to patient    Medical Supplies--filled at [facility]  Adult Brief-Large    Preventative Measures:  Health Insurance: [facility]  Dental Clinic: [facility]  Annual Physical Exam: 9/24/14  Diabetic Eye Exam: 7/16/15  Colonoscopy: pending for June 2017    Current Services/Support:  Mental Health Targeted : Venita Schofield  Phone: [phone]  [facility] Worker: Adelaida Zavala from Yvon Bergeron  Phone: [phone]  [facility]  through [place]: Viola Hanley  Phone: [phone]  [place] Care Coordinator: Zachary (currently on a leave of absence) covering [place] Care Coordinator: Maggy Gleason Phone: [phone]

## 2021-06-11 NOTE — PROGRESS NOTES
Scheduled Follow Up Call: Attempt 1  Care Guide called the patient.  She was not available to talk at this time and requested I call her back.  Patient and I agreed to touch base on 6/14/17 to review her goals  Patient also mentioned she would like a prescription sent to [facility] for Poise Bladder Control Pads  Will need to know quantity prior to sending the request to Dr. Ramirez  Informed patient we can discuss this further on the 14th         Pending Appointments:   6/15/17 with [facility] for Colonoscopy  _______________________________________  Planned Outreach Frequency: every 6 weeks  Preferred Phone Number: [phone]    Chronic Medical Diagnosis:  Pancreatitis  Diabetes Mellitis  Sacroiliitis  Asthma  Chron's Disease  Pyogenic Granuloma    Mental Health/Stress:  Bipolar Disorder    Mental Health Clinic: N/A  Therapist: None  Psychiatry: declines medications     Housing:  [employer]     Financial:  Rep-Payee began in March 2016  Rep-Payee: Xavier from [employer]  Energy Assistance approved 1/2016    Substance:  Smokes cigarettes  Smokes 2 ppd (5/4/15)    Exercise:  Has membership at [employer]  Will walk to appointments at [facility]    Medications:  Preferred Pharmacy: [facility]--meds to be delivered to patient    Medical Supplies--filled at [facility]  Adult Brief-Large    Preventative Measures:  Health Insurance: [facility]  [facility]  Annual Physical Exam: 9/24/14  Diabetic Eye Exam: 7/16/15  Colonoscopy: pending for June 2017    Current Services/Support:  Mental Health Targeted : Venita Schofield  Phone: [phone]  [facility] Worker: Adelaida Zavala from Yvon Bergeron  Phone: [phone]  [facility]  through [place]: Viola Hanley  Phone: [phone]  [place] Care Coordinator: Zachary (currently on a leave of absence) covering [place] Care Coordinator: Maggy Gleason Phone: [phone]

## 2021-06-11 NOTE — PROGRESS NOTES
"Patient states she has a new Targeted , Kelsey  She is replacing Venita Rusher  I have updated her Care Team with this information    Did not go to the Colonoscopy, didn't complete the prep    Placed her notice for her apartment  Has to be out by 7/31/17  No one is assisting her with finding housing  Researching places on her own  States the current place is a \"slum\"    Hasn't gotten her Humira shot in a while  Needs to contact [facility]; however, doesn't have the phone number  Checked online and found the phone number to [facility] [phone]  Patient will call the number and see what she can find out in regards to her shots         Pending Appointments:   None  _______________________________________  Planned Outreach Frequency: every 6 weeks  Preferred Phone Number: [phone]    Chronic Medical Diagnosis:  Pancreatitis  Diabetes Mellitis  Sacroiliitis  Asthma  Chron's Disease  Pyogenic Granuloma    Mental Health/Stress:  Bipolar Disorder    Mental Health Clinic: N/A  Therapist: None  Psychiatry: declines medications     Housing:  [employer]     Financial:  Rep-Payee began in March 2016  Rep-Payee: Xavier from [employer]  Energy Assistance approved 1/2016    Substance:  Smokes cigarettes  Smokes 2 ppd (5/4/15)    Exercise:  Has membership at [employer]  Will walk to appointments at [facility]    Medications:  [facility]--meds to be delivered to patient    Medical Supplies--filled at [facility]  Adult Brief-Large    Preventative Measures:  Health Insurance: [facility]  Dental Clinic: [facility]  Annual Physical Exam: 9/24/14  Diabetic Eye Exam: 7/16/15  Colonoscopy: pending for June 2017    Current Services/Support:  Mental Health Targeted : Kelsey  Phone: [phone]  [facility] Worker: Adelaida Zavala from Yvon Bergeron  Phone: [phone]  [facility]  through [place]: Viola Hanley  Phone: [phone]  [place] Care " Coordinator: Zachary (currently on a leave of absence) covering [place] Care Coordinator: Maggy Gleason Phone: [phone]
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
## 2021-06-12 NOTE — PROGRESS NOTES
Scheduled Follow Up Call: Attempt 1  Care Guide attempted to call the patient at [phone]; however, an automated message stated the number was not in service.  If the patient tries to call me, please transfer her to me, Padma Sierra, at [phone].    Per Triage note dated 7/31/17, patient moved on 7/31/17  Need an updated address         Pending Appointments:   8/29/17 at 1:00 with Maria Ines Dinh @ [facility] (initial appt)--[facility]  _______________________________________  Planned Outreach Frequency: every 6 weeks  Preferred Phone Number: [phone]    Chronic Medical Diagnosis:  Pancreatitis  Diabetes Mellitis  Sacroiliitis  Asthma  Chron's Disease  Pyogenic Granuloma    Mental Health/Stress:  Bipolar Disorder    Mental Health Clinic: N/A  Therapist: None  Psychiatry: declines medications     Housing:  [employer]     Financial:  Rep-Payee began in March 2016  Rep-Payee: Xavier from [employer]  Energy Assistance approved 1/2016    Substance:  Smokes cigarettes  Smokes 2 ppd (5/4/15)    Exercise:  Has membership at [employer]  Will walk to appointments at [facility]    Medications:  Preferred Pharmacy: [facility]--meds to be delivered to patient    Medical Supplies--filled at [facility]  Adult Brief-Large  Poise Pads through [employer]    Preventative Measures:  Health Insurance: [facility]  Dental Clinic: [facility]  Annual Physical Exam: 9/24/14  Diabetic Eye Exam: 7/16/15  Colonoscopy: pending for June 2017    Current Services/Support:  Mental Health Targeted : [place]  Phone: [phone]  [facility] Worker: Adelaida Zavala from [employer]  Phone: [phone]  [facility]  through [place]: Viola Hanley  Phone: [phone]  [place] Care Coordinator: Zachary (currently on a leave of absence) covering [place] Care Coordinator: Maggy Gleason Phone: [phone]

## 2021-06-12 NOTE — PROGRESS NOTES
Scheduled Follow Up Call: Attempt 2  Care Guide attempted to call the patient at [phone]; however, an automated message stated the number was not in service.  If the patient tries to call me, please transfer her to me, Padma Sierra, at [phone].    Per Triage note dated 7/31/17, patient moved on 7/31/17  Need an updated address         Pending Appointments:   8/29/17 at 1:00 with Maria Ines Dinh @ [facility] (initial appt)--[facility]  _______________________________________  Planned Outreach Frequency: every 6 weeks  Preferred Phone Number: [phone]    Chronic Medical Diagnosis:  Pancreatitis  Diabetes Mellitis  Sacroiliitis  Asthma  Chron's Disease  Pyogenic Granuloma    Mental Health/Stress:  Bipolar Disorder    Mental Health Clinic: N/A  Therapist: None  Psychiatry: declines medications     Housing:  [employer]     Financial:  Rep-Payee began in March 2016  Rep-Payee: Xavier from [employer]  [employer] Assistance approved 1/2016    Substance:  Smokes cigarettes  Smokes 2 ppd (5/4/15)    Exercise:  Has membership at [employer]  Will walk to appointments at [facility]    Medications:  Preferred Pharmacy: [facility]--meds to be delivered to patient    Medical Supplies--filled at [facility]  Adult Brief-Large  Poise Pads through [employer]    Preventative Measures:  Health Insurance: [facility]  Dental Clinic: [facility]  Annual Physical Exam: 9/24/14  Diabetic Eye Exam: 7/16/15  Colonoscopy: pending for June 2017    Current Services/Support:  Mental Health Targeted : [place]  Phone: [phone]  [facility] Worker: Adelaida Zavala from [employer]  Phone: [phone]  [facility]  through [place]: Viola Hanley  Phone: [phone]  [place] Care Coordinator: Zachary (currently on a leave of absence) covering [place] Care Coordinator: Maggy Gleason Phone: [phone]

## 2021-06-12 NOTE — PROGRESS NOTES
7/20/17 at 3:38 PM  Patient left a message stating:    Hasn't gotten the poise pads yet    Called patient and left a detailed message stating:    I [facility] with Jhony at [facility] on 7/17/17    Was informed they were shipped on 7/14/17    Once shipped, they should arrive within a day or two    If she hasn't received them, she should call [facility]    Their phone number is [phone]    If she has any difficulties, she can call me and we can do a conference call to [facility]         Pending Appointments:   8/29/17 at 1:00 with Maria Ines Dinh @ [facility] (initial appt)  _______________________________________  Planned Outreach Frequency: every 6 weeks  Preferred Phone Number: [phone]    Chronic Medical Diagnosis:  Pancreatitis  Diabetes Mellitis  Sacroiliitis  Asthma  Chron's Disease  Pyogenic Granuloma    Mental Health/Stress:  Bipolar Disorder    Mental Health Clinic: N/A  Therapist: None  Psychiatry: declines medications     Housing:  [employer]     Financial:  Rep-Payee began in March 2016  Rep-Payee: Xavier from [employer]  Energy Assistance approved 1/2016    Substance:  Smokes cigarettes  Smokes 2 ppd (5/4/15)    Exercise:  Has membership at [employer]  Will walk to appointments at [facility]    Medications:  Preferred Pharmacy: [facility]--meds to be delivered to patient    Medical Supplies--filled at [facility]  Adult Brief-Large    Preventative Measures:  Health Insurance: [facility]  Dental Clinic: [facility]  Annual Physical Exam: 9/24/14  Diabetic Eye Exam: 7/16/15  Colonoscopy: pending for June 2017    Current Services/Support:  Mental Health Targeted : [place]  Phone: [phone]  [facility] Worker: Adelaida Zavala from [employer]  Phone: [phone]  [facility]  through [place]: Viola Hanley  Phone: [phone]  [place] Care Coordinator: Zachary (currently on a leave of absence) covering [place] Care Coordinator: Maggy Gleason Phone:  [phone]

## 2021-06-12 NOTE — PROGRESS NOTES
Scheduled Follow Up Call: Attempt 3  Care Guide attempted to call the patient at [phone]; however, an automated message stated the number was not in service.  If the patient is returning my call, please transfer her to me, Padma Sierra, at [phone].  I have attempted to call the patient 3 times over the past two weeks and have been unsuccessful in reaching her.  The patient has not tried calling me.  I will continue attempting to reach out to the patient in one month.  I will also check the patient's chart for upcoming appointments, ER reports, or any other recent activity that may contain a new phone number.    Per Triage note dated 7/31/17, patient moved on 7/31/17  Need an updated address         Pending Appointments:   8/29/17 at 1:00 with Maria Ines Dinh @ [facility] (initial appt)--[facility]  _______________________________________  Planned Outreach Frequency: every 6 weeks  Preferred Phone Number: [phone]    Chronic Medical Diagnosis:  Pancreatitis  Diabetes Mellitis  Sacroiliitis  Asthma  Chron's Disease  Pyogenic Granuloma    Mental Health/Stress:  Bipolar Disorder    Mental Health Clinic: N/A  Therapist: None  Psychiatry: declines medications     Housing:  [employer]     Financial:  Rep-Payee began in March 2016  Rep-Payee: Xavier from [employer]  Energy Assistance approved 1/2016    Substance:  Smokes cigarettes  Smokes 2 ppd (5/4/15)    Exercise:  Has membership at [facility]  Will walk to appointments at [facility]    Medications:  Preferred Pharmacy: [facility]--meds to be delivered to patient    Medical Supplies--filled at [facility]  Adult Brief-Large  Poise Pads through [employer]    Preventative Measures:  Health Insurance: [facility]  Dental Clinic: [facility]  Annual Physical Exam: 9/24/14  Diabetic Eye Exam: 7/16/15  Colonoscopy: pending for June 2017    Current Services/Support:  Mental Health Targeted : [place]  Phone: [phone]  [facility] Worker:  Adelaida Zavala from [facility]  Phone: [phone]  [facility]  through [place]: Viola Hanley  Phone: [phone]  [place] Care Coordinator: Zachary (currently on a leave of absence) covering [place] Care Coordinator: Maggy Gleason Phone: [phone]

## 2021-06-13 NOTE — PROGRESS NOTES
Patient was recently in the ER  Upon reviewing the chart, it seems the patient's PCP has changed to Dr. Joanna Farah at [facility]    Called the patient  Patient confirmed she has changed clinics  I informed her I will discharge her from Clinic Care Coordination and notify Dr. Ramirez   _______________________________________  Planned Outreach Frequency: every 6 weeks  Preferred Phone Number: [phone]    Chronic Medical Diagnosis:  Pancreatitis  Diabetes Mellitis  Sacroiliitis  Asthma  Chron's Disease  Pyogenic Granuloma    Mental Health/Stress:  Bipolar Disorder    Mental Health Clinic: N/A  Therapist: None  Psychiatry: declines medications     Housing:  [employer]     Financial:  Rep-Payee began in March 2016  Rep-Payee: Xavier from [employer]  Energy Assistance approved 1/2016    Substance:  Smokes cigarettes  Smokes 2 ppd (5/4/15)    Exercise:  Has membership at [employer]  Will walk to appointments at [facility]    Medications:  Preferred Pharmacy: [facility]--meds to be delivered to patient    Medical Supplies--filled at [facility]  Adult Brief-Large  Poise Pads through [employer]    Preventative Measures:  Health Insurance: [facility]  [facility]  Annual Physical Exam: 9/24/14  Diabetic Eye Exam: 7/16/15  Colonoscopy: pending for June 2017    Current Services/Support:  Mental Health Targeted : [place]  Phone: [phone]  [facility] Worker: Adelaida Zavala from [employer]  Phone: [phone]  [facility]  through [place]: Viola Hanley  Phone: [phone]  [place] Care Coordinator: Zachary (currently on a leave of absence) covering [place] Care Coordinator: Maggy Gleason Phone: [phone]

## 2021-06-14 ENCOUNTER — RECORDS - HEALTHEAST (OUTPATIENT)
Dept: SCHEDULING | Facility: CLINIC | Age: 56
End: 2021-06-14

## 2021-06-14 ENCOUNTER — RECORDS - HEALTHEAST (OUTPATIENT)
Dept: ADMINISTRATIVE | Facility: OTHER | Age: 56
End: 2021-06-14

## 2021-06-14 DIAGNOSIS — Z12.31 VISIT FOR SCREENING MAMMOGRAM: ICD-10-CM

## 2021-06-14 DIAGNOSIS — Z00.00 PREVENTATIVE HEALTH CARE: ICD-10-CM

## 2021-06-14 NOTE — TELEPHONE ENCOUNTER
Referral for :     Mammogram    LOCATION/PLACE/Provider :    [facility]   DATE & TIME :    faxed referral, location will call pt.   PHONE :     [phone]  FAX :    [phone]  Appointment instructions: No deodorant, lotion or perfume   Appointment made by clinic staff/:    Xiomara

## 2021-06-17 NOTE — PATIENT INSTRUCTIONS - HE
Patient Instructions by Donna Bundy MD at 12/30/2019 11:20 AM     Author: Donna Bundy MD Service: -- Author Type: Physician    Filed: 12/30/2019  2:23 PM Encounter Date: 12/30/2019 Status: Addendum    : Donna Bundy MD (Physician)    Related Notes: Original Note by Donna Bundy MD (Physician) filed at 12/30/2019  2:23 PM       1. Avoid any activity which aggravates the pain   2. Call [facility] for an appointment as soon as possible at [phone]  3. May take 2 tabs of tylenol every 6 hours as needed for pain  4. Keep your appointment with primary care on 1/14  5. Call clinic number with any questions - answered 24/7    Patient Education     Finger Fracture, Closed  You have a broken finger (fracture). This causes local pain, swelling, and bruising. This injury usually takes about 4 to 6 weeks to heal, but can take longer in some cases. Finger injuries are often treated with a splint or cast, or by taping the injured finger to the next one (buddy taping). This protects the injured finger and holds the bone in position while it heals. More serious fractures may need surgery.     If the fingernail has been severely injured, it will probably fall off in 1 to 2 weeks. A new fingernail will usually start to grow back within a month.  Home care  Follow these guidelines when caring for yourself at home:    Keep your hand elevated to reduce pain and swelling. When sitting or lying down keep your arm above the level of your heart. You can do this by placing your arm on a pillow that rests on your chest or on a pillow at your side. This is most important during the first 2 days (48 hours) after the injury.    Put an ice pack on the injured area. Do this for 20 minutes every 1 to 2 hours the first day for pain relief. You can make an ice pack by wrapping a plastic bag of ice cubes in a thin towel. As the ice melts, be careful that the cast or splint doesnt get wet. Continue using the ice  pack 3 to 4 times a day until the pain and swelling go away.    Keep the cast or splint completely dry at all times. Bathe with your cast or splint out of the water. Protect it with a large plastic bag, rubber-banded at the top end. If a fiberglass cast or splint gets wet, you can dry it with a hair dryer.    If shaila tape was put on and it becomes wet or dirty, change it. You may replace it with paper, plastic, or cloth tape. Cloth tape and paper tapes must be kept dry. Keep the shaila tape in place for at least 4 weeks.    You may use acetaminophen or ibuprofen to control pain, unless another pain medicine was prescribed. If you have chronic liver or kidney disease, talk with your healthcare provider before using these medicines. Also talk with your provider if youve had a stomach ulcer or gastrointestinal bleeding.    Dont put creams or objects under the cast if you have itching.  Follow-up care  Follow up with your healthcare provider, or as advised. This is to make sure the bone is healing the way it should.  X-rays may be taken. You will be told of any new findings that may affect your care.  When to seek medical advice  Call your healthcare provider right away if any of these occur:    The plaster cast or splint becomes wet or soft    The cast or splint cracks    The fiberglass cast or splint stays wet for more than 24 hours    Pain or swelling gets worse    Redness, warmth, swelling, drainage from the wound, or foul odor from a cast or splint    Finger becomes more cold, blue, numb, or tingly    You cant move your finger    The skin around the cast or splint becomes red    Fever of 100.4 F (38 C) or higher, or as directed by your healthcare provider  Date Last Reviewed: 2/1/2017 2000-2017 [employer]. [address] [place] [address], [place] [address]. All rights reserved. This information is not intended as a substitute for professional medical care. Always follow your healthcare professional's  instructions.         Patient Education     Domestic Violence  If you are a victim of domestic violence (emotional, physical, or sexual abuse, or threat of such abuse), you may be feeling confused, frightened, sad, angry, or ashamed. You are not alone. Unfortunately, what happened to you is very common. Once it starts, domestic violence usually does not go away without help. It tends to get worse and more frequent over time.  There are people who can help you! If you want to begin talking about this problem, or need a safe place to stay, or want legal advice, contact our staff for a referral. Domestic violence is a crime and as a victim you have legal rights. If the police have not yet been involved, consider calling the police for assistance. You can also obtain a court order prohibiting your partner from contacting you in any way (including in person or by phone). Contact a local domestic violence program or an  for more information.  Before you leave here  Decide if it is safe to return home. If you know the situation is so dangerous that your life is in danger, let our staff know so that we can call one of the local domestic violence shelters or help you arrange to stay with a friend or relative. Domestic violence shelters can help with issues related to the safety of children and pets, housing, and financial issues.  When you get home    Develop an exit plan or a safety plan in advance. Know exactly where you could go even in the middle of the night.    Pack an overnight bag in case you have to leave home in a hurry. Either hide it yourself or give it to a friend to keep for you. This should include:  ? Toilet articles, medicines, extra set of keys to the house and car, extra set of clothing and a special toy for each child  ? Extra cash, checks or savings account book  ? Important papers, such as social security cards, birth certificates, green cards, passports, work authorization and any other  "immigration documents, medical cards, 's license, title to the car, proof of car insurance, etc.    If you ever feel your safety is in danger, get out of the home, even if you did not have a chance to plan the above.  Calling the police  When someone has injured you or violated a restraining order, a criminal stay away-order, or an emergency protective order, then do the following:    Call the police: use 911 if it is an emergency. Tell them you are in danger and you need help immediately. Let them know if you have a court order. If the police do not come quickly, call again and say, \"This is my second call.\" Take note of the time and date of your call(s) and who you spoke with.    When the police arrive, tell them only what the attacker did. Describe your injuries, how you were injured, if weapons were used, or if a restraining order was violated. Ask the police to file a report and give you a reporting number.    If you do not already have a restraining order, ask the officer for an emergency protective order. This is an order that may protect you until you obtain a criminal stay away order or restraining order.    Always get the police officers' names and badge numbers. If you have trouble with a , you can complain to the officer's supervisor.  Arrest  If the attacker is arrested and taken to the police station, he will probably be released with or without bail until the hearing. This may only take a few hours. Use this time to get to a safe place. Ask that a condition of his release be that he should not come near you.  No arrest    If the police refuse to make an arrest, you may ask to make a private citizen's arrest. Tell the officers that you fear the attacker will return and injure you unless an arrest is made.    Call the 's office or the Police Department about how to follow up with your complaint.    For more information, call the National Domestic Violence Hotline at " 0-312-183-SAFE 7287) or visit their website at www.[facility].org. They will make certain you are in a safe situation before talking with you.  Date Last Reviewed: 10/1/2017    2467-4986 [employer]. [address] [place] [address]. All rights reserved. This information is not intended as a substitute for professional medical care. Always follow your healthcare professional's instructions.
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
## 2021-06-20 NOTE — LETTER
Letter by Donna Bundy MD at      Author: Donna Bundy MD Service: -- Author Type: --    Filed:  Encounter Date: 12/30/2019 Status: Signed         December 30, 2019     Patient: Mary Ann Olson   YOB: 1965   Date of Visit: 12/30/2019       To Whom it May Concern:    Mary Ann Olson was seen in my clinic on 12/30/2019.    She has a fracture of her right ring finger.     She may return to work on 1-2-2020.     If you have any questions or concerns, please don't hesitate to call.    Sincerely,         Electronically signed by Donna Bundy MD

## 2021-06-24 NOTE — TELEPHONE ENCOUNTER
COPD educator note    Talked with Unaaravind today. She states she is tired and becomes a little short of breath with activity. We talked about purse lip breathing when she is moving around. Pt had no other questions at this time. We will call again next week.    BRANDY Montgomery

## 2021-06-24 NOTE — TELEPHONE ENCOUNTER
COPD educator note    Talked with Mary Ann today. She states she is ok. No problems. Pt had no questions at this time. We will call again next on Friday.    BRANDY Montgomery

## 2021-06-24 NOTE — TELEPHONE ENCOUNTER
Refused medication as no PCP within [facility]. PCP is Joanna Farah with [facility].     Evelyn Low RN BSBA [facility] Triage/Med Refill 3/12/2019 9:05 PM

## 2021-06-25 ENCOUNTER — OFFICE VISIT (OUTPATIENT)
Dept: FAMILY MEDICINE | Facility: CLINIC | Age: 56
End: 2021-06-25
Payer: COMMERCIAL

## 2021-06-25 VITALS
TEMPERATURE: 98.7 F | WEIGHT: 171 LBS | RESPIRATION RATE: 20 BRPM | HEART RATE: 93 BPM | HEIGHT: 61 IN | OXYGEN SATURATION: 98 % | BODY MASS INDEX: 32.28 KG/M2 | SYSTOLIC BLOOD PRESSURE: 105 MMHG | DIASTOLIC BLOOD PRESSURE: 63 MMHG

## 2021-06-25 DIAGNOSIS — J06.9 VIRAL UPPER RESPIRATORY TRACT INFECTION: ICD-10-CM

## 2021-06-25 DIAGNOSIS — J30.1 SEASONAL ALLERGIC RHINITIS DUE TO POLLEN: Primary | ICD-10-CM

## 2021-06-25 DIAGNOSIS — R05.9 COUGH: ICD-10-CM

## 2021-06-25 DIAGNOSIS — J44.1 CHRONIC OBSTRUCTIVE PULMONARY DISEASE WITH ACUTE EXACERBATION (H): ICD-10-CM

## 2021-06-25 PROCEDURE — 99203 OFFICE O/P NEW LOW 30 MIN: CPT | Mod: GC | Performed by: STUDENT IN AN ORGANIZED HEALTH CARE EDUCATION/TRAINING PROGRAM

## 2021-06-25 RX ORDER — LORATADINE 10 MG/1
10 TABLET ORAL DAILY
Qty: 90 TABLET | Refills: 0 | Status: SHIPPED | OUTPATIENT
Start: 2021-06-25 | End: 2022-03-14

## 2021-06-25 RX ORDER — FLUTICASONE PROPIONATE 50 MCG
1 SPRAY, SUSPENSION (ML) NASAL DAILY
Qty: 11.1 ML | Refills: 3 | Status: SHIPPED | OUTPATIENT
Start: 2021-06-25 | End: 2023-03-09

## 2021-06-25 ASSESSMENT — MIFFLIN-ST. JEOR: SCORE: 1298.03
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
## 2021-06-25 NOTE — TELEPHONE ENCOUNTER
7 Day follow up phone call for the COPD Program. No answer. Left message with call back number for any questions or concerns.
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
## 2021-06-29 ASSESSMENT — ASTHMA QUESTIONNAIRES: ACT_TOTALSCORE: 5

## 2021-07-03 NOTE — ADDENDUM NOTE
Addendum Note by Zoë Pineda MD at 2/20/2017  4:56 PM     Author: Zoë Pineda MD Service: -- Author Type: Physician    Filed: 2/20/2017  4:56 PM Encounter Date: 2/17/2017 Status: Signed    : Zoë Pineda MD (Physician)    Addended by: ZOË PINEDA on: 2/20/2017 04:56 PM        Modules accepted: Orders

## 2021-07-03 NOTE — ADDENDUM NOTE
Addendum Note by Zoë Pineda MD at 1/23/2017  5:22 PM     Author: Zoë Pineda MD Service: -- Author Type: Physician    Filed: 1/23/2017  5:22 PM Encounter Date: 1/23/2017 Status: Signed    : Zoë Pineda MD (Physician)    Addended by: ZOË PINEDA on: 1/23/2017 05:22 PM        Modules accepted: Orders

## 2021-07-03 NOTE — ADDENDUM NOTE
Addendum Note by Reny Antonio MA at 4/17/2017  2:48 PM     Author: Reny Antonio MA Service: -- Author Type: Medical Assistant    Filed: 4/17/2017  2:48 PM Encounter Date: 4/17/2017 Status: Signed    : Reny Antonio MA (Medical Assistant)    Addended by: RENY ANTONIO on: 4/17/2017 02:48 PM        Modules accepted: Orders

## 2021-07-05 DIAGNOSIS — F31.81 BIPOLAR II DISORDER (H): ICD-10-CM

## 2021-07-05 DIAGNOSIS — Z78.0 POSTMENOPAUSAL STATUS: ICD-10-CM

## 2021-07-05 NOTE — TELEPHONE ENCOUNTER
Message to physician:     Date of last visit: 6/25/2021     Date of next visit if scheduled:     Last Comprehensive Metabolic Panel:  Sodium   Date Value Ref Range Status   11/16/2020 140.0 133.0 - 144.0 mmol/L Final     Potassium   Date Value Ref Range Status   11/16/2020 4.5 3.4 - 5.3 mmol/L Final     Chloride   Date Value Ref Range Status   11/16/2020 100.0 94.0 - 109.0 mmol/L Final     Carbon Dioxide   Date Value Ref Range Status   11/16/2020 30.0 20.0 - 32.0 mmol/L Final     Glucose   Date Value Ref Range Status   11/16/2020 113.0 (H) 60.0 - 109.0 mg/dL Final     Urea Nitrogen   Date Value Ref Range Status   11/16/2020 18.0 7.0 - 30.0 mg/dL Final     Creatinine   Date Value Ref Range Status   11/16/2020 0.5 (L) 0.6 - 1.3 mg/dL Final     GFR Estimate   Date Value Ref Range Status   08/29/2014 90 >60 mL/min/1.7m2 Final     Comment:     Non  GFR Calc     Calcium   Date Value Ref Range Status   11/16/2020 9.6 8.5 - 10.4 mg/dL Final       BP Readings from Last 3 Encounters:   06/25/21 105/63   03/23/21 122/81   02/24/21 118/84       Lab Results   Component Value Date    A1C 5.9 11/16/2020    A1C 6.0 04/13/2020    A1C 6.3 11/18/2019    A1C 6.7 02/09/2019    A1C 6.6 08/28/2018                Please complete refill and CLOSE ENCOUNTER.  Closing the encounter signifies the refill is complete.

## 2021-07-06 RX ORDER — ERGOCALCIFEROL 1.25 MG/1
50000 CAPSULE, LIQUID FILLED ORAL WEEKLY
Qty: 12 CAPSULE | Refills: 0 | Status: SHIPPED | OUTPATIENT
Start: 2021-07-06 | End: 2022-06-07

## 2021-07-13 ENCOUNTER — RECORDS - HEALTHEAST (OUTPATIENT)
Dept: ADMINISTRATIVE | Facility: CLINIC | Age: 56
End: 2021-07-13

## 2021-07-14 PROBLEM — J45.901 ASTHMA EXACERBATION: Status: RESOLVED | Noted: 2018-07-11 | Resolved: 2018-07-12

## 2021-07-21 ENCOUNTER — RECORDS - HEALTHEAST (OUTPATIENT)
Dept: ADMINISTRATIVE | Facility: CLINIC | Age: 56
End: 2021-07-21

## 2021-07-22 ENCOUNTER — RECORDS - HEALTHEAST (OUTPATIENT)
Dept: LAB | Facility: CLINIC | Age: 56
End: 2021-07-22

## 2021-07-22 DIAGNOSIS — Z12.31 OTHER SCREENING MAMMOGRAM: ICD-10-CM

## 2021-08-10 ENCOUNTER — TELEPHONE (OUTPATIENT)
Dept: FAMILY MEDICINE | Facility: CLINIC | Age: 56
End: 2021-08-10

## 2021-08-10 NOTE — TELEPHONE ENCOUNTER
Due for diabetes visit.     Lab Results   Component Value Date    A1C 5.9 11/16/2020    A1C 6.0 04/13/2020    A1C 6.3 11/18/2019    A1C 6.7 02/09/2019    A1C 6.7 02/09/2019    A1C 6.6 08/28/2018     Joanna Farah MD

## 2021-08-12 ENCOUNTER — TELEPHONE (OUTPATIENT)
Dept: FAMILY MEDICINE | Facility: CLINIC | Age: 56
End: 2021-08-12

## 2021-08-13 ENCOUNTER — TELEPHONE (OUTPATIENT)
Dept: FAMILY MEDICINE | Facility: CLINIC | Age: 56
End: 2021-08-13

## 2021-08-13 NOTE — TELEPHONE ENCOUNTER
----- Message from Rafiq Concepcion MD sent at 8/12/2021 10:21 PM CDT -----  Regarding: Reaching out to patient who did not  after a page last night  Please reach out to patient this AM to see how she is doing and help her arrange appropriate follow up.  Paged house last night about some shortness of breath/not feeling well but then did not  after three separate calls.  Of note, her cell phone was not in service last night but her home phone has a voice mail that identifies the patient.      Thanks,  Rafiq Concepcion MD on 8/12/2021 at 10:23 PM

## 2021-08-13 NOTE — TELEPHONE ENCOUNTER
Called x 4 no answer. Cell is not going through. Home # no answer   Pt does have an appt withHarshil 9/27/21
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
## 2021-08-20 ENCOUNTER — TELEPHONE (OUTPATIENT)
Dept: FAMILY MEDICINE | Facility: CLINIC | Age: 56
End: 2021-08-20

## 2021-08-20 NOTE — TELEPHONE ENCOUNTER
Patient called stating she wanted to schedule a vaginal US. She stated the ED found a shadow when they did one.  She states we did a biopsy that came back in conclusive so she would like Dr Farah to order a vaginal US to see what is going on.    Please reach out to patient about how you would like to handle her request Dr Farah.  Thank you        Called pt with message, patient was a little upset due to MD wanted see her for further discussion. Pt does have an appointment on 9/27/21. Told her that will forward message to Dr. Farah to order for screenning mammogram refferral

## 2021-09-14 ENCOUNTER — TELEPHONE (OUTPATIENT)
Dept: FAMILY MEDICINE | Facility: CLINIC | Age: 56
End: 2021-09-14

## 2021-09-14 NOTE — TELEPHONE ENCOUNTER
Spoke with patient, recommendation is to be seen in clinic for further evaluation and assessment. Patient agreed, due to no available schedule and patient's transportation- requiring 48- 72 hours in advance notice, an appt has been scheduled for Friday, 9/17/2021. Would prefer to be seen in clinic versus Urgent Care.  Experiencing symptoms for past several days, vaginal discomfort/ sharp pains associated with urinary changes of increase frequency. Advised if worsening of symptoms will need to be seen sooner than 9/17/2021 and go into Urgent Care. Mary Ann voiced understanding. Pasha SMITH

## 2021-09-14 NOTE — TELEPHONE ENCOUNTER
PT has been experiencing sharp vaginal pain for the past several days. She insisted on speaking to a nurse regarding her concerns and symptoms. I advised I will send a message on her behalf to our nurses for further advice/instructions.

## 2021-09-17 ENCOUNTER — OFFICE VISIT (OUTPATIENT)
Dept: FAMILY MEDICINE | Facility: CLINIC | Age: 56
End: 2021-09-17
Payer: COMMERCIAL

## 2021-09-17 VITALS
WEIGHT: 153 LBS | DIASTOLIC BLOOD PRESSURE: 76 MMHG | RESPIRATION RATE: 16 BRPM | HEART RATE: 89 BPM | SYSTOLIC BLOOD PRESSURE: 117 MMHG | BODY MASS INDEX: 28.89 KG/M2 | TEMPERATURE: 98.1 F | OXYGEN SATURATION: 97 % | HEIGHT: 61 IN

## 2021-09-17 DIAGNOSIS — R10.2 PELVIC PAIN: ICD-10-CM

## 2021-09-17 DIAGNOSIS — R30.0 DYSURIA: Primary | ICD-10-CM

## 2021-09-17 DIAGNOSIS — N89.8 VAGINAL DISCHARGE: ICD-10-CM

## 2021-09-17 DIAGNOSIS — E11.9 TYPE 2 DIABETES MELLITUS WITHOUT COMPLICATION, WITHOUT LONG-TERM CURRENT USE OF INSULIN (H): ICD-10-CM

## 2021-09-17 DIAGNOSIS — N89.8 VAGINAL ITCHING: ICD-10-CM

## 2021-09-17 DIAGNOSIS — J42 CHRONIC BRONCHITIS, UNSPECIFIED CHRONIC BRONCHITIS TYPE (H): ICD-10-CM

## 2021-09-17 DIAGNOSIS — J45.40 MODERATE PERSISTENT ASTHMA WITHOUT COMPLICATION: ICD-10-CM

## 2021-09-17 LAB
ALBUMIN UR-MCNC: NEGATIVE MG/DL
APPEARANCE UR: CLEAR
BACTERIA #/AREA URNS HPF: ABNORMAL /HPF
BILIRUB UR QL STRIP: NEGATIVE
CLUE CELLS: ABNORMAL
COLOR UR AUTO: YELLOW
CREAT UR-MCNC: 71 MG/DL
GLUCOSE UR STRIP-MCNC: NEGATIVE MG/DL
HGB UR QL STRIP: NEGATIVE
KETONES UR STRIP-MCNC: ABNORMAL MG/DL
LEUKOCYTE ESTERASE UR QL STRIP: ABNORMAL
MICROALBUMIN UR-MCNC: 1.73 MG/DL (ref 0–1.99)
MICROALBUMIN/CREAT UR: 24.4 MG/G CR
NITRATE UR QL: NEGATIVE
PH UR STRIP: 5.5 [PH] (ref 5–7)
RBC #/AREA URNS AUTO: ABNORMAL /HPF
SP GR UR STRIP: 1.02 (ref 1–1.03)
SQUAMOUS #/AREA URNS AUTO: ABNORMAL /LPF
TRICHOMONAS, WET PREP: ABNORMAL
UROBILINOGEN UR STRIP-ACNC: 0.2 E.U./DL
WBC #/AREA URNS AUTO: ABNORMAL /HPF
WBC'S/HIGH POWER FIELD, WET PREP: ABNORMAL
YEAST, WET PREP: ABNORMAL

## 2021-09-17 PROCEDURE — 81001 URINALYSIS AUTO W/SCOPE: CPT | Performed by: FAMILY MEDICINE

## 2021-09-17 PROCEDURE — 87210 SMEAR WET MOUNT SALINE/INK: CPT | Performed by: FAMILY MEDICINE

## 2021-09-17 PROCEDURE — 82043 UR ALBUMIN QUANTITATIVE: CPT | Performed by: FAMILY MEDICINE

## 2021-09-17 PROCEDURE — 87086 URINE CULTURE/COLONY COUNT: CPT | Performed by: FAMILY MEDICINE

## 2021-09-17 PROCEDURE — 99214 OFFICE O/P EST MOD 30 MIN: CPT | Performed by: FAMILY MEDICINE

## 2021-09-17 RX ORDER — ALBUTEROL SULFATE 90 UG/1
2 AEROSOL, METERED RESPIRATORY (INHALATION) EVERY 6 HOURS PRN
Qty: 18 G | Refills: 0 | Status: SHIPPED | OUTPATIENT
Start: 2021-09-17 | End: 2021-10-13

## 2021-09-17 ASSESSMENT — MIFFLIN-ST. JEOR: SCORE: 1216.38

## 2021-09-17 NOTE — PATIENT INSTRUCTIONS
- I will call you with the results of your vaginal test and urine tests after clinic today.    - Refills sent for Dulera, albuterol, and metformin.     - Follow-up for those lab tests and your physical later this month.

## 2021-09-17 NOTE — PROGRESS NOTES
Assessment & Plan     1. Dysuria  2. Pelvic pain  3. Vaginal discharge  4. Vaginal itching  ~3 days of intermittent pelvic pain associated with dysuria and vaginal discharge/itching. Urinalysis not suggestive of a UTI but rather a contaminated specimen. Wet prep without clue cells or evidence of yeast or trichomonas. Patient declined GC/CT testing and reports these infections as unlikely. No recent vaginal bleeding, though she has an inconclusive endometrial biopsy from 2020 due to insufficient sampling and did not follow-up with Gyn.  - UA reflex to Microscopic; Future  - UA reflex to Microscopic  - Urine Microscopic Exam  - Urine Culture; Future  - Urine Culture  - Wet prep  - Given the inconclusive clinical picture and the patient's antibiotics allergies that make empiric treatment risky, will defer treatment of a potential acute cystitis until the urine culture results return.  - Advised returning for a urine GC/CT visit next week if symptoms persist and urine culture is negative.  - Low threshold for a pelvic ultrasound to further evaluate given history of vaginal bleeding and cramping.    5. Type 2 diabetes mellitus without complication, without long-term current use of insulin (H)  Due for DM II follow-up. Declined A1c and lipids today, but willing to do microalbumin along with urinalysis. Requested refills on metformin.   - Albumin Random Urine Quantitative with Creat Ratio; Future  - Albumin Random Urine Quantitative with Creat Ratio  - metFORMIN (GLUCOPHAGE) 1000 MG tablet; Take 1 tablet (1,000 mg) by mouth 2 times daily (with meals)  Dispense: 180 tablet; Refill: 0  - Follow-up later this month as scheduled with PCP.    6. Moderate persistent asthma without complication  Symptoms well-controlled per patient. Needs a refill today.   - albuterol (PROAIR HFA/PROVENTIL HFA/VENTOLIN HFA) 108 (90 Base) MCG/ACT inhaler; Inhale 2 puffs into the lungs every 6 hours as needed for shortness of breath / dyspnea or  "wheezing  Dispense: 18 g; Refill: 0  - Follow-up for dedicated asthma/COPD visit.    7. Chronic bronchitis, unspecified chronic bronchitis type (H)  Short-term refill provided per patient request.   - mometasone-formoterol (DULERA) 100-5 MCG/ACT inhaler; Inhale 2 puffs into the lungs 2 times daily  Dispense: 13 g; Refill: 0  - Follow-up for dedicated asthma/COPD visit.        38 minutes spent on the date of the encounter doing chart review, review of test results, interpretation of tests, patient visit and documentation        BMI:   Estimated body mass index is 28.91 kg/m  as calculated from the following:    Height as of this encounter: 1.549 m (5' 1\").    Weight as of this encounter: 69.4 kg (153 lb).   Weight management plan: Patient was referred to their PCP to discuss a diet and exercise plan.    Follow up as above.     Monique Mcghee MD  [facility]    Rasheeda Reese is a 56 year old who presents for the following health issues     HPI   Mary Ann reports 2-3 days of some suprapubic abdominal pain and pain radiating into the vagina intermittently with urination and maybe one other time per day. It feels like a stabbing, deep pain and lasts for 30-60 minutes. Some improvement with Tylenol. A little bit of vaginal itching and some yellow/brown discharge. No foul odor. No urgency but some increased urinary frequency. No other abdominal pain. No fevers/chills, low back or flank pain, nausea/vomiting, body aches, or changes to her appetite or energy level. No new sexual partners or exposure history. No irregular bleeding recently. She had a endometrial biopsy for irregular post-menopausal bleeding that was inconclusive in 2020 without appropriate follow-up with gyn. She hasn't been checking her blood glucoses regularly.     We discussed testing her A1c and lipids today, but she prefers to wait until her upcoming physical for blood tests. She requests a refill of her metformin today " "so she doesn't run out before her follow-up visit.    She also needs refills of her Dulera and albuterol. No recent worsening of her breathing symptoms. She reports that they are at her baseline.    She is exploring getting the 2nd COVID vaccine but is not yet ready to do so today.        Objective    /76   Pulse 89   Temp 98.1  F (36.7  C) (Oral)   Resp 16   Ht 1.549 m (5' 1\")   Wt 69.4 kg (153 lb)   SpO2 97%   BMI 28.91 kg/m    Body mass index is 28.91 kg/m .  Physical Exam   GENERAL: healthy, alert and no distress  RESP: normal rate and effort  ABDOMEN: soft, mild suprapubic tenderness.   : Normal female external genitalia with no rashes, lesions, or masses.  Speculum exam: vaginal mucosa slightly atrophic with a moderate amount of thin discharge present without a strong odor. Cervix pink, and smooth without ectropion present.  Bimanual exam reveals normal mobile uterus, no significant cervical motion tenderness, and normal adnexa without masses, fullness, or tenderness.    Results for orders placed or performed in visit on 09/17/21 (from the past 24 hour(s))   Wet prep    Specimen: Vagina; Swab   Result Value Ref Range    Trichomonas Absent Absent    Yeast Absent Absent    Clue Cells Absent Absent    WBCs/high power field 1+ (A) None    Narrative    No odor  No pH   UA reflex to Microscopic   Result Value Ref Range    Color Urine Yellow Colorless, Straw, Light Yellow, Yellow    Appearance Urine Clear Clear    Glucose Urine Negative Negative mg/dL    Bilirubin Urine Negative Negative    Ketones Urine Trace (A) Negative mg/dL    Specific Gravity Urine 1.020 1.003 - 1.035    Blood Urine Negative Negative    pH Urine 5.5 5.0 - 7.0    Protein Albumin Urine Negative Negative mg/dL    Urobilinogen Urine 0.2 0.2, 1.0 E.U./dL    Nitrite Urine Negative Negative    Leukocyte Esterase Urine Moderate (A) Negative   Urine Microscopic Exam   Result Value Ref Range    Bacteria Urine Few (A) None Seen /HPF    RBC " Urine None Seen 0-2 /HPF /HPF    WBC Urine 0-5 0-5 /HPF /HPF    Squamous Epithelials Urine Few (A) None Seen /LPF

## 2021-09-19 LAB — BACTERIA UR CULT: NO GROWTH

## 2021-09-20 ENCOUNTER — TELEPHONE (OUTPATIENT)
Dept: FAMILY MEDICINE | Facility: CLINIC | Age: 56
End: 2021-09-20

## 2021-09-20 DIAGNOSIS — R30.0 DYSURIA: Primary | ICD-10-CM

## 2021-09-20 DIAGNOSIS — R10.2 PELVIC PAIN: ICD-10-CM

## 2021-09-20 NOTE — TELEPHONE ENCOUNTER
Called patient with results and recommendations from Dr Mcghee. Pt did not want to schedule a lab only visit at this time.

## 2021-09-20 NOTE — TELEPHONE ENCOUNTER
Can we please call Mary Ann to share the following message and to coordinate follow-up.     1. Her urine culture did not grow out bacteria, which makes a urinary tract infection an unlikely cause of her symptoms.     2. I advise follow-up for a urine gonorrhea and chlamydia test at her earliest convenience this week. It can be done as a lab only visit. Orders are in.    3. If she continues to have symptoms and the gonorrhea/chlamydia testing is normal, she should return for a follow-up visit and pelvic ultrasound.    Thanks!    Monique Mcghee MD

## 2021-10-11 LAB — RETINOPATHY: NORMAL

## 2021-10-13 DIAGNOSIS — J42 CHRONIC BRONCHITIS, UNSPECIFIED CHRONIC BRONCHITIS TYPE (H): ICD-10-CM

## 2021-10-13 DIAGNOSIS — J45.40 MODERATE PERSISTENT ASTHMA WITHOUT COMPLICATION: ICD-10-CM

## 2021-10-13 RX ORDER — ALBUTEROL SULFATE 90 UG/1
2 AEROSOL, METERED RESPIRATORY (INHALATION) EVERY 6 HOURS PRN
Qty: 18 G | Refills: 3 | Status: SHIPPED | OUTPATIENT
Start: 2021-10-13 | End: 2022-01-28

## 2021-10-13 NOTE — TELEPHONE ENCOUNTER
"[facility] phone call message- medication clarification/question:    Full Medication Name: albuterol (PROAIR HFA/PROVENTIL HFA/VENTOLIN HFA) & mometasone-formoterol (DULERA) 100    Dose: 108 (90 Base) MCG/ACT inhaler & 100-5 MCG/ACT inhaler    Question: Patient is out and needing refills asap and informed me I better not tell her it's going to take 3 days as she is almost out informed patient it would be best next time to call to request refills before she is about to run out that way she will have refills, patient then complains and states \" well what am I supposed to do just die?\" I informed no that is not what we want but unfortunately due to clinic policy our refill request takes that 2-3 business days because our doctors are not always in clinic, patient then states we need to change that policy then. Informed that is something out of my hands I can not do.   Patient also requesting Neb tubing for her nebulizer and a medication dispenser, patient states the neb tubing is special order and going through mail order but forgot name of where it goes through. Call and advise if needed.     Pharmacy confirmed as    [id number] - [place], [place] - [address]  : Yes    OK to leave a message on voice mail? Yes    Primary language: English      needed? No    Call taken on October 13, 2021 at 8:20 AM by Sheri Weeks        "

## 2021-10-19 DIAGNOSIS — E11.9 TYPE 2 DIABETES MELLITUS WITHOUT COMPLICATION, WITHOUT LONG-TERM CURRENT USE OF INSULIN (H): ICD-10-CM

## 2021-10-19 NOTE — TELEPHONE ENCOUNTER
[facility] phone call message- medication clarification/question:    Full Medication Name: TEST STRIPS   Dose:     Question: patient called and left a voicemail on the clinic voicemail and states she is running low on her test strips and only has 3 left. Please call and advise if needed.      Pharmacy confirmed as    "[id number] - [place], [place] - [address] AT [address]  1: Yes    OK to leave a message on voice mail? Yes    Primary language: English      needed? No    Call taken on October 19, 2021 at 7:43 AM by Sheri Weeks

## 2021-11-02 ENCOUNTER — OFFICE VISIT (OUTPATIENT)
Dept: FAMILY MEDICINE | Facility: CLINIC | Age: 56
End: 2021-11-02
Payer: COMMERCIAL

## 2021-11-02 VITALS
WEIGHT: 156 LBS | OXYGEN SATURATION: 97 % | SYSTOLIC BLOOD PRESSURE: 108 MMHG | DIASTOLIC BLOOD PRESSURE: 73 MMHG | RESPIRATION RATE: 18 BRPM | HEART RATE: 96 BPM | BODY MASS INDEX: 29.48 KG/M2 | TEMPERATURE: 97 F

## 2021-11-02 DIAGNOSIS — Z11.3 ROUTINE SCREENING FOR STI (SEXUALLY TRANSMITTED INFECTION): ICD-10-CM

## 2021-11-02 DIAGNOSIS — Z12.11 SCREENING FOR COLORECTAL CANCER: ICD-10-CM

## 2021-11-02 DIAGNOSIS — J45.40 MODERATE PERSISTENT ASTHMA WITHOUT COMPLICATION: ICD-10-CM

## 2021-11-02 DIAGNOSIS — Z00.00 ROUTINE GENERAL MEDICAL EXAMINATION AT A HEALTH CARE FACILITY: Primary | ICD-10-CM

## 2021-11-02 DIAGNOSIS — Z12.4 CERVICAL CANCER SCREENING: ICD-10-CM

## 2021-11-02 DIAGNOSIS — Z12.12 SCREENING FOR COLORECTAL CANCER: ICD-10-CM

## 2021-11-02 DIAGNOSIS — Z23 NEED FOR PROPHYLACTIC VACCINATION AND INOCULATION AGAINST INFLUENZA: ICD-10-CM

## 2021-11-02 DIAGNOSIS — E11.9 TYPE 2 DIABETES MELLITUS WITHOUT COMPLICATION, WITHOUT LONG-TERM CURRENT USE OF INSULIN (H): ICD-10-CM

## 2021-11-02 LAB
CHOLEST SERPL-MCNC: 193 MG/DL
FASTING STATUS PATIENT QL REPORTED: NO
HBA1C MFR BLD: 6.4 % (ref 0–5.6)
HDLC SERPL-MCNC: 55 MG/DL
LDLC SERPL CALC-MCNC: 120 MG/DL
TRIGL SERPL-MCNC: 91 MG/DL

## 2021-11-02 PROCEDURE — 87624 HPV HI-RISK TYP POOLED RSLT: CPT | Performed by: FAMILY MEDICINE

## 2021-11-02 PROCEDURE — 36415 COLL VENOUS BLD VENIPUNCTURE: CPT | Performed by: FAMILY MEDICINE

## 2021-11-02 PROCEDURE — 99396 PREV VISIT EST AGE 40-64: CPT | Mod: 25 | Performed by: FAMILY MEDICINE

## 2021-11-02 PROCEDURE — 83036 HEMOGLOBIN GLYCOSYLATED A1C: CPT | Performed by: FAMILY MEDICINE

## 2021-11-02 PROCEDURE — 87491 CHLMYD TRACH DNA AMP PROBE: CPT | Performed by: FAMILY MEDICINE

## 2021-11-02 PROCEDURE — 80061 LIPID PANEL: CPT | Performed by: FAMILY MEDICINE

## 2021-11-02 PROCEDURE — G0123 SCREEN CERV/VAG THIN LAYER: HCPCS | Performed by: FAMILY MEDICINE

## 2021-11-02 PROCEDURE — 87591 N.GONORRHOEAE DNA AMP PROB: CPT | Performed by: FAMILY MEDICINE

## 2021-11-02 RX ORDER — BUDESONIDE AND FORMOTEROL FUMARATE DIHYDRATE 160; 4.5 UG/1; UG/1
2 AEROSOL RESPIRATORY (INHALATION) 2 TIMES DAILY
Qty: 10.2 G | Refills: 1 | Status: SHIPPED | OUTPATIENT
Start: 2021-11-02 | End: 2022-02-14

## 2021-11-02 NOTE — LETTER
November 12, 2021      Mary Ann MARIANA Wesley  [address]  [place] [address]        Dear ,    We are writing to inform you of your test results.    Your test results fall within the expected range(s) or remain unchanged from previous results.  Please continue with current treatment plan.    Resulted Orders   Hemoglobin A1c   Result Value Ref Range    Hemoglobin A1C 6.4 (H) 0.0 - 5.6 %      Comment:      Normal <5.7%   Prediabetes 5.7-6.4%    Diabetes 6.5% or higher     Note: Adopted from ADA consensus guidelines.   Lipid panel   Result Value Ref Range    Cholesterol 193 <=199 mg/dL    Triglycerides 91 <=149 mg/dL    Direct Measure HDL 55 >=50 mg/dL      Comment:      HDL Cholesterol Reference Range:     0-2 years:   No reference ranges established for patients under 2 years old  at [employer] for lipid analytes.    2-8 years:  Greater than 45 mg/dL     18 years and older:   Female: Greater than or equal to 50 mg/dL   Male:   Greater than or equal to 40 mg/dL    LDL Cholesterol Calculated 120 <=129 mg/dL    Patient Fasting > 8hrs? No    Chlamydia trachomatis PCR   Result Value Ref Range    Chlamydia trachomatis Negative Negative      Comment:      A negative result by transcription mediated amplification does not preclude the presence of C. trachomatis infection because results are dependent on proper and adequate collection, absence of inhibitors and sufficient rRNA to be detected.   Neisseria gonorrhoeae PCR   Result Value Ref Range    Neisseria gonorrhoeae Negative Negative      Comment:      Negative for N. gonorrhoeae rRNA by transcription mediated amplification. A negative result by transcription mediated amplification does not preclude the presence of C. trachomatis infection because results are dependent on proper and adequate collection, absence of inhibitors and sufficient rRNA to be detected.   Gynecologic Cytology (PAP)   Result Value Ref Range    Interpretation         Negative for Intraepithelial Lesion or Malignancy (NILM)    Specimen Adequacy       Satisfactory for evaluation, endocervical/transformation zone component present    Clinical Information       none      HPV Reflex Yes regardless of result     Previous Abnormal?       Yes      Previous Abnormal Diagnosis       history of positive HPV and indeterminate colposcopy in 2018, normal cytology and HPV test in 2019 and then loss to follow up      Performing Labs       The technical component of this testing was completed at [facility]     HPV High Risk Types DNA Cervical   Result Value Ref Range    Other HR HPV Negative Negative    HPV16 DNA Negative Negative    HPV18 DNA Negative Negative    FINAL DIAGNOSIS       This patient's sample is negative for HPV DNA.        This test was developed and its performance characteristics determined by the [facility]. It has not been cleared or approved by the FDA. The laboratory is regulated under CLIA as qualified to perform high-complexity testing. This test is used for clinical purposes. It should not be regarded as investigational or for research.    METHODOLOGY: The [employer] [place] 4800 system uses automated extraction, simultaneous amplification of HPV (L1 region) and beta-globin, followed by real time detection of fluorescent labeled HPV and beta globin using specific oligonucleotide probes. The test specifically identified types HPV 16 DNA and HPV 18 DNA while concurrently detecting the rest of the high risk types (31, 33, 35, 39, 45, 51, 52, 56, 58, 59, 66 or 68).    COMMENTS: This test is not intended for use as a screening device for woman under age 30 with normal cervical cytology. Results should be correlated with cytologic and histologic findings. Close clinical followup is recommended.           If you have any questions or concerns, please call the clinic at the number listed above.        Sincerely,      Monique Mcghee MD
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
## 2021-11-02 NOTE — PATIENT INSTRUCTIONS
- We will call you with the results of your tests in the next week or so.   - I sent a new meter to your pharmacy  - Let us know if you can't get the new breathing medicine  - Follow-up with Dr. Farah about your diabetes and asthma.      Preventive Health Recommendations  Female Ages 50 - 64    Yearly exam: See your health care provider every year in order to  o Review health changes.   o Discuss preventive care.    o Review your medicines if your doctor has prescribed any.      Get a Pap test every three years (unless you have an abnormal result and your provider advises testing more often).    If you get Pap tests with HPV test, you only need to test every 5 years, unless you have an abnormal result.     You do not need a Pap test if your uterus was removed (hysterectomy) and you have not had cancer.    You should be tested each year for STDs (sexually transmitted diseases) if you're at risk.     Have a mammogram every 1 to 2 years.    Have a colonoscopy at age 50, or have a yearly FIT test (stool test). These exams screen for colon cancer.      Have a cholesterol test every 5 years, or more often if advised.    Have a diabetes test (fasting glucose) every three years. If you are at risk for diabetes, you should have this test more often.     If you are at risk for osteoporosis (brittle bone disease), think about having a bone density scan (DEXA).    Shots: Get a flu shot each year. Get a tetanus shot every 10 years.    Nutrition:     Eat at least 5 servings of fruits and vegetables each day.    Eat whole-grain bread, whole-wheat pasta and brown rice instead of white grains and rice.    Get adequate Calcium and Vitamin D.     Lifestyle    Exercise at least 150 minutes a week (30 minutes a day, 5 days a week). This will help you control your weight and prevent disease.    Limit alcohol to one drink per day.    No smoking.     Wear sunscreen to prevent skin cancer.     See your dentist every six months for an exam  and cleaning.    See your eye doctor every 1 to 2 years.

## 2021-11-03 LAB
C TRACH DNA SPEC QL NAA+PROBE: NEGATIVE
N GONORRHOEA DNA SPEC QL NAA+PROBE: NEGATIVE

## 2021-11-05 LAB
HUMAN PAPILLOMA VIRUS 16 DNA: NEGATIVE
HUMAN PAPILLOMA VIRUS 18 DNA: NEGATIVE
HUMAN PAPILLOMA VIRUS FINAL DIAGNOSIS: NORMAL
HUMAN PAPILLOMA VIRUS OTHER HR: NEGATIVE

## 2021-11-09 LAB
BKR LAB AP GYN ADEQUACY: NORMAL
BKR LAB AP GYN INTERPRETATION: NORMAL
BKR LAB AP HPV REFLEX: NORMAL
BKR LAB AP PREVIOUS ABNL DX: NORMAL
BKR LAB AP PREVIOUS ABNORMAL: NORMAL
PATH REPORT.COMMENTS IMP SPEC: NORMAL
PATH REPORT.RELEVANT HX SPEC: NORMAL

## 2021-11-12 PROBLEM — Z12.4 SCREENING FOR CERVICAL CANCER: Status: ACTIVE | Noted: 2019-12-12

## 2021-11-16 ENCOUNTER — TELEPHONE (OUTPATIENT)
Dept: FAMILY MEDICINE | Facility: CLINIC | Age: 56
End: 2021-11-16

## 2021-11-16 DIAGNOSIS — K21.9 GASTROESOPHAGEAL REFLUX DISEASE WITHOUT ESOPHAGITIS: Primary | ICD-10-CM

## 2021-11-16 NOTE — TELEPHONE ENCOUNTER
[facility] phone call message- medication clarification/question:    Full Medication Name: OMEPRAZOL   Dose:     Question: patient called and left message on clinic voicemail in regards to medication, patient stated something about keeping her awake and has been having heartburn ans would like to have medication back and sent to to [facility] and would like to know why  had canceled medication for patient to no longer take. Please call and advise.     Pharmacy confirmed as    [id number] - [place], [place] - [address] AT [address]  : Yes    OK to leave a message on voice mail? Yes    Primary language: English      needed? No    Call taken on November 16, 2021 at 12:48 PM by Sheri Weeks

## 2021-11-21 ENCOUNTER — TELEPHONE (OUTPATIENT)
Dept: FAMILY MEDICINE | Facility: CLINIC | Age: 56
End: 2021-11-21
Payer: COMMERCIAL

## 2021-11-21 NOTE — TELEPHONE ENCOUNTER
Meka bhatti attempted calling patient at her home and cell phone multiple times and was unable to reach her.  LVM and will call back.    Called patient back and was able to get ahold of her.  Unfortunately, she has been having increasing shortness of breath for the past couple of weeks and it has worsened significantly in the last couple of days to the point where she is having a difficult time performing her ADLs.  She has noticed a little bit of weight gain but also has had some sick symptoms including runny nose and is worried she might have COVID-19.  She is vaccinated with 1 dose of Pfizer.  She noticed some new blood in her sputum today when she coughed, which is the reason for her call.  After discussing this with her, I think she needs to be assessed in person and probably cannot wait until tomorrow.  Unfortunately, she has no transportation of her own and cannot get to an urgent care to be assessed so will have to go to the emergency department at [facility], as she is able to get herself there by bus.  It does not seem like she requires an ambulance at this time although I do believe she needs to be assessed.  Called [facility] emergency department and handed off the patient.    Sher Moon MD  [facility], PGY-3

## 2021-11-22 ENCOUNTER — LAB (OUTPATIENT)
Dept: LAB | Facility: CLINIC | Age: 56
End: 2021-11-22
Payer: COMMERCIAL

## 2021-11-22 DIAGNOSIS — Z12.12 SCREENING FOR COLORECTAL CANCER: ICD-10-CM

## 2021-11-22 DIAGNOSIS — Z12.11 SCREENING FOR COLORECTAL CANCER: ICD-10-CM

## 2021-11-22 PROCEDURE — 82274 ASSAY TEST FOR BLOOD FECAL: CPT

## 2021-11-25 LAB — HEMOCCULT STL QL IA: NEGATIVE

## 2021-12-14 ENCOUNTER — OFFICE VISIT (OUTPATIENT)
Dept: FAMILY MEDICINE | Facility: CLINIC | Age: 56
End: 2021-12-14
Payer: COMMERCIAL

## 2021-12-14 VITALS
WEIGHT: 170 LBS | HEART RATE: 88 BPM | TEMPERATURE: 98.9 F | SYSTOLIC BLOOD PRESSURE: 126 MMHG | RESPIRATION RATE: 18 BRPM | OXYGEN SATURATION: 97 % | HEIGHT: 61 IN | BODY MASS INDEX: 32.1 KG/M2 | DIASTOLIC BLOOD PRESSURE: 84 MMHG

## 2021-12-14 DIAGNOSIS — F33.2 SEVERE EPISODE OF RECURRENT MAJOR DEPRESSIVE DISORDER, WITHOUT PSYCHOTIC FEATURES (H): Primary | ICD-10-CM

## 2021-12-14 DIAGNOSIS — L23.5 ALLERGIC DERMATITIS DUE TO OTHER CHEMICAL PRODUCT: ICD-10-CM

## 2021-12-14 DIAGNOSIS — J41.0 SIMPLE CHRONIC BRONCHITIS (H): ICD-10-CM

## 2021-12-14 DIAGNOSIS — F60.9 PERSONALITY DISORDER (H): ICD-10-CM

## 2021-12-14 DIAGNOSIS — Z23 HIGH PRIORITY FOR 2019-NCOV VACCINE: ICD-10-CM

## 2021-12-14 PROCEDURE — 0002A COVID-19,PF,PFIZER (12+ YRS): CPT | Performed by: STUDENT IN AN ORGANIZED HEALTH CARE EDUCATION/TRAINING PROGRAM

## 2021-12-14 PROCEDURE — 91300 COVID-19,PF,PFIZER (12+ YRS): CPT | Performed by: STUDENT IN AN ORGANIZED HEALTH CARE EDUCATION/TRAINING PROGRAM

## 2021-12-14 PROCEDURE — 99214 OFFICE O/P EST MOD 30 MIN: CPT | Mod: 25 | Performed by: STUDENT IN AN ORGANIZED HEALTH CARE EDUCATION/TRAINING PROGRAM

## 2021-12-14 RX ORDER — TRIAMCINOLONE ACETONIDE 0.25 MG/G
OINTMENT TOPICAL 2 TIMES DAILY
Qty: 80 G | Refills: 1 | Status: SHIPPED | OUTPATIENT
Start: 2021-12-14 | End: 2023-01-16

## 2021-12-14 RX ORDER — LORATADINE 10 MG/1
10 TABLET ORAL DAILY
Qty: 30 TABLET | Refills: 1 | Status: SHIPPED | OUTPATIENT
Start: 2021-12-14 | End: 2022-09-19

## 2021-12-14 ASSESSMENT — MIFFLIN-ST. JEOR: SCORE: 1293.49

## 2021-12-14 NOTE — PROGRESS NOTES
"  Assessment & Plan     Severe episode of recurrent major depressive disorder, without psychotic features (H)-tearful when talking about home.  Reviewed limits of ILS with our SW and reviewed with patient that ILS workers can help clean but they are unable to clean for her.  Encouraged patient to try to re-engage with ILS worker.   - Adult Mental Health Referral    High priority for 2019-nCoV vaccine-2nd dose today  - COVID-19,PF,PFIZER (12+ Yrs PURPLE LABEL)    History COPD:   -reports symptoms today but oxygenation, respiratory rate, and exam are all normal.   -reassured patient that her breathing status is reassuring today  -discussed role of mental health in patients subjective impressions of breathing and talked about need to help with mental health status and calming techniques to help with breathing.     Request for disability parking: patient requests disability parking.  At this time I do not believe this is appropriate.  Normal respiratory status today, ambulating without difficulty, no known heart failure or other underlying heart disease leading to hypoxia etc.  Patient became very upset when I reviewed criteria of the form and that she does not currently meet the standard for a disability parking waiver.  At this point patient elected to end our visit and requested to leave.  Form not completed.     Dermatitis: likely from environmental source, suspect new soap as trigger.  Will provide an allergy pill and steroid cream.  Discontinue current soap.                    No follow-ups on file.    Joanna Farah MD  [facility]    Rasheeda Reese is a 56 year old who presents for the following health issues     HPI     Major depression  -has ILS services overwhelmed by the state of her apartment.  Reports that she \"told off\" her ILS worker when they said they wouldn't clean for her  -frustrated with living situation and reports that the company that runs her housing " "does not do what they say they are going to do.  Working on moving  -interested in referral to psychiatry    Rash:  -has rash across upper chest  -itchy, red, irritated  -started using new bar soap recently, used most recently today  -has tried some low dose hydrocortisone which has not helped significantly  -rash present x days    Desires disability parking. Reports that some days she is in significant pain and cannot walk.  Also has diagnosis of asthma.     History of asthma:   -reports some increased SOB recently.    -went to ED 2 weeks ago but the wait was too long and she left  -no recent hospitalizations for asthma/breathing.              Review of Systems         Objective    /84 (BP Location: Right arm, Patient Position: Sitting, Cuff Size: Adult Regular)   Pulse 88   Temp 98.9  F (37.2  C) (Oral)   Resp 18   Ht 1.549 m (5' 1\")   Wt 77.1 kg (170 lb)   SpO2 97%   BMI 32.12 kg/m    Body mass index is 32.12 kg/m .  Physical Exam   GEN: NAD  HEENT: head atraumatic, normocephalic, moist mucous membranes, eyes anicteric  CV: RRR w/o M/R/G  PULM: CTAB  ABD: soft, non-tender, no appreciable masses, no guarding, BS present  Neuro: alert and oriented x 3, CN II-XII intact, normal gross motor movements  Psych: tearful at points during the conversation, frustrated when discussing parking form.   Ext: no peripheral edema          "

## 2021-12-21 DIAGNOSIS — E11.9 TYPE 2 DIABETES MELLITUS WITHOUT COMPLICATION, WITHOUT LONG-TERM CURRENT USE OF INSULIN (H): ICD-10-CM

## 2022-01-28 DIAGNOSIS — J45.40 MODERATE PERSISTENT ASTHMA WITHOUT COMPLICATION: ICD-10-CM

## 2022-01-28 NOTE — TELEPHONE ENCOUNTER
Date of last visit:   12/14/2021    Date of next visit if scheduled: Visit date not found       Pharmacy fax request:  Yes  Patient/Phone call request :  No    BP Readings from Last 3 Encounters:   12/14/21 126/84   11/02/21 108/73   09/17/21 117/76       LDL Cholesterol Calculated   Date Value Ref Range Status   11/02/2021 120 <=129 mg/dL Final     LDL Cholesterol Direct   Date Value Ref Range Status   07/16/2018 123.0 (H) 0.0 - 99.0 mg/dL Final       Lab Results   Component Value Date    A1C 6.4 11/02/2021    A1C 5.9 11/16/2020    A1C 6.0 04/13/2020    A1C 6.3 11/18/2019    A1C 6.7 02/09/2019    A1C 6.7 02/09/2019    A1C 6.6 08/28/2018       Last Comprehensive Metabolic Panel:  Sodium   Date Value Ref Range Status   04/06/2021 141 136 - 145 mmol/L Final   11/16/2020 140.0 133.0 - 144.0 mmol/L Final     Potassium   Date Value Ref Range Status   04/06/2021 3.8 3.5 - 5.0 mmol/L Final   11/16/2020 4.5 3.4 - 5.3 mmol/L Final     Chloride   Date Value Ref Range Status   04/06/2021 104 98 - 107 mmol/L Final   11/16/2020 100.0 94.0 - 109.0 mmol/L Final     Carbon Dioxide   Date Value Ref Range Status   11/16/2020 30.0 20.0 - 32.0 mmol/L Final     Carbon Dioxide (CO2)   Date Value Ref Range Status   04/06/2021 26 22 - 31 mmol/L Final     Anion Gap   Date Value Ref Range Status   04/06/2021 11 5 - 18 mmol/L Final     Glucose   Date Value Ref Range Status   04/06/2021 117 70 - 125 mg/dL Final   11/16/2020 113.0 (H) 60.0 - 109.0 mg/dL Final     Urea Nitrogen   Date Value Ref Range Status   04/06/2021 12 8 - 22 mg/dL Final   11/16/2020 18.0 7.0 - 30.0 mg/dL Final     Creatinine   Date Value Ref Range Status   04/06/2021 0.68 0.60 - 1.10 mg/dL Final   11/16/2020 0.5 (L) 0.6 - 1.3 mg/dL Final     GFR Estimate   Date Value Ref Range Status   04/06/2021 >60 >60 mL/min/1.73m2 Final   08/29/2014 90 >60 mL/min/1.7m2 Final     Comment:     Non  GFR Calc     Calcium   Date Value Ref Range Status   04/06/2021 9.6 8.5 -  10.5 mg/dL Final   11/16/2020 9.6 8.5 - 10.4 mg/dL Final       Please complete refill and CLOSE ENCOUNTER.  Closing the encounter signifies the refill is complete.

## 2022-02-04 RX ORDER — ALBUTEROL SULFATE 90 UG/1
2 AEROSOL, METERED RESPIRATORY (INHALATION) EVERY 6 HOURS PRN
Qty: 18 G | Refills: 3 | Status: SHIPPED | OUTPATIENT
Start: 2022-02-04 | End: 2022-05-16

## 2022-02-14 ENCOUNTER — OFFICE VISIT (OUTPATIENT)
Dept: FAMILY MEDICINE | Facility: CLINIC | Age: 57
End: 2022-02-14
Payer: COMMERCIAL

## 2022-02-14 VITALS
TEMPERATURE: 98 F | RESPIRATION RATE: 18 BRPM | SYSTOLIC BLOOD PRESSURE: 131 MMHG | WEIGHT: 173 LBS | DIASTOLIC BLOOD PRESSURE: 87 MMHG | HEART RATE: 102 BPM | BODY MASS INDEX: 32.66 KG/M2 | HEIGHT: 61 IN | OXYGEN SATURATION: 96 %

## 2022-02-14 DIAGNOSIS — F06.30 MOOD DISORDER AS LATE EFFECT OF TRAUMATIC BRAIN INJURY (H): ICD-10-CM

## 2022-02-14 DIAGNOSIS — F33.2 SEVERE EPISODE OF RECURRENT MAJOR DEPRESSIVE DISORDER, WITHOUT PSYCHOTIC FEATURES (H): ICD-10-CM

## 2022-02-14 DIAGNOSIS — E11.9 TYPE 2 DIABETES MELLITUS WITHOUT COMPLICATION, WITHOUT LONG-TERM CURRENT USE OF INSULIN (H): ICD-10-CM

## 2022-02-14 DIAGNOSIS — S06.9XAS MOOD DISORDER AS LATE EFFECT OF TRAUMATIC BRAIN INJURY (H): ICD-10-CM

## 2022-02-14 DIAGNOSIS — J45.40 MODERATE PERSISTENT ASTHMA WITHOUT COMPLICATION: ICD-10-CM

## 2022-02-14 DIAGNOSIS — K50.10 CROHN'S DISEASE OF LARGE INTESTINE WITHOUT COMPLICATION (H): ICD-10-CM

## 2022-02-14 DIAGNOSIS — B37.31 YEAST INFECTION OF THE VAGINA: ICD-10-CM

## 2022-02-14 DIAGNOSIS — F11.21 OPIOID USE DISORDER, SEVERE, IN SUSTAINED REMISSION (H): ICD-10-CM

## 2022-02-14 DIAGNOSIS — E11.65 TYPE 2 DIABETES MELLITUS WITH HYPERGLYCEMIA, WITHOUT LONG-TERM CURRENT USE OF INSULIN (H): ICD-10-CM

## 2022-02-14 DIAGNOSIS — N89.8 VAGINAL DISCHARGE: Primary | ICD-10-CM

## 2022-02-14 DIAGNOSIS — J41.0 SIMPLE CHRONIC BRONCHITIS (H): ICD-10-CM

## 2022-02-14 LAB
CLUE CELLS: ABNORMAL
TRICHOMONAS, WET PREP: ABNORMAL
WBC'S/HIGH POWER FIELD, WET PREP: ABNORMAL
YEAST, WET PREP: PRESENT

## 2022-02-14 PROCEDURE — 99214 OFFICE O/P EST MOD 30 MIN: CPT | Performed by: STUDENT IN AN ORGANIZED HEALTH CARE EDUCATION/TRAINING PROGRAM

## 2022-02-14 PROCEDURE — 87210 SMEAR WET MOUNT SALINE/INK: CPT | Performed by: STUDENT IN AN ORGANIZED HEALTH CARE EDUCATION/TRAINING PROGRAM

## 2022-02-14 RX ORDER — FLUCONAZOLE 150 MG/1
150 TABLET ORAL ONCE
Qty: 1 TABLET | Refills: 0 | Status: SHIPPED | OUTPATIENT
Start: 2022-02-14 | End: 2022-02-14

## 2022-02-14 RX ORDER — BUDESONIDE AND FORMOTEROL FUMARATE DIHYDRATE 80; 4.5 UG/1; UG/1
2 AEROSOL RESPIRATORY (INHALATION) 2 TIMES DAILY
Qty: 10.2 G | Refills: 1 | Status: SHIPPED | OUTPATIENT
Start: 2022-02-14 | End: 2022-04-07

## 2022-02-14 ASSESSMENT — MIFFLIN-ST. JEOR: SCORE: 1301.22

## 2022-02-14 NOTE — PROGRESS NOTES
"  Assessment & Plan     Moderate persistent asthma without complication-more likely asthma/copd overlap currently without exacerbation.  Needs refill of her Symbicort.  Continues to smoke.   - budesonide-formoterol (SYMBICORT) 80-4.5 MCG/ACT Inhaler; Inhale 2 puffs into the lungs 2 times daily    Type 2 diabetes mellitus without complication, without long-term current use of insulin (H)  - calcium carbonate-vitamin D (OSCAL W/D) 500-200 MG-UNIT tablet; Take 1 tablet by mouth 2 times daily  - blood glucose (NO BRAND SPECIFIED) test strip; Use to test blood sugar 1 times daily or as directed.      Opioid use disorder, severe, in sustained remission (H)-no opiate use in recent years.     Severe episode of recurrent major depressive disorder, without psychotic features (H)  SYmptoms wax and wane.  Good mood today.  Reports that she tires of completing PHQ9s and I explained why we do so frequently     Crohn's disease of large intestine without complication (H)-irritated by dairy.  Form completed for additional food assistance today.  Encouraged patient to follow up with GI when ready.  No recent sx.     Type 2 diabetes mellitus with hyperglycemia, without long-term current use of insulin (H)  Ongoing issue.  Last A1c with good control.  Recheck in May.  No med changes today    Vaginal discharge  - Wet preparation    Yeast infection of the vagina-sent diflucan to pharmacy  - fluconazole (DIFLUCAN) 150 MG tablet; Take 1 tablet (150 mg) by mouth once for 1 dose      Joanna Farah MD  [facility] Reese is a 56 year old who presents for the following health issues         Review of Systems         Objective    /87   Pulse 102   Temp 98  F (36.7  C) (Oral)   Resp 18   Ht 1.54 m (5' 0.63\")   Wt 78.5 kg (173 lb)   SpO2 96%   BMI 33.09 kg/m    Body mass index is 33.09 kg/m .  Physical Exam   GEN: NAD  HEENT: head atraumatic, normocephalic, moist mucous membranes, " eyes anicteric  CV: RRR w/o M/R/G  PULM: CTAB  ABD: soft, non-tender, no appreciable masses, no guarding, BS present  Neuro: alert and oriented x 3, CN II-XII intact, normal gross motor movements  Psych: appropriate  Ext: no peripheral edema

## 2022-02-24 NOTE — TELEPHONE ENCOUNTER
[facility] phone call message- general phone call:    Reason for call: Patient left a voicemail stating she needs to talk to someone regarding her letter she got today. She states her place is going to have an issue with the letter she got today administering her the medications. Please call and advise.     Return call needed: Yes    OK to leave a message on voice mail?     Primary language: English      needed? No    Call taken on October 1, 2018 at 12:48 PM by Marshall Weeks   0

## 2022-03-08 ENCOUNTER — APPOINTMENT (OUTPATIENT)
Dept: RADIOLOGY | Facility: HOSPITAL | Age: 57
End: 2022-03-08
Attending: EMERGENCY MEDICINE
Payer: COMMERCIAL

## 2022-03-08 ENCOUNTER — HOSPITAL ENCOUNTER (EMERGENCY)
Facility: HOSPITAL | Age: 57
Discharge: HOME | End: 2022-03-08
Attending: EMERGENCY MEDICINE | Admitting: EMERGENCY MEDICINE
Payer: COMMERCIAL

## 2022-03-08 VITALS
OXYGEN SATURATION: 96 % | RESPIRATION RATE: 16 BRPM | WEIGHT: 170 LBS | HEIGHT: 61 IN | BODY MASS INDEX: 32.1 KG/M2 | HEART RATE: 87 BPM | DIASTOLIC BLOOD PRESSURE: 90 MMHG | TEMPERATURE: 98.1 F | SYSTOLIC BLOOD PRESSURE: 137 MMHG

## 2022-03-08 DIAGNOSIS — S52.92XA LEFT FOREARM FRACTURE, CLOSED, INITIAL ENCOUNTER: ICD-10-CM

## 2022-03-08 PROCEDURE — 73080 X-RAY EXAM OF ELBOW: CPT | Mod: LT

## 2022-03-08 PROCEDURE — 250N000011 HC RX IP 250 OP 636: Performed by: EMERGENCY MEDICINE

## 2022-03-08 PROCEDURE — 25600 CLTX DST RDL FX/EPHYS SEP WO: CPT | Mod: LT

## 2022-03-08 PROCEDURE — 73100 X-RAY EXAM OF WRIST: CPT | Mod: LT

## 2022-03-08 PROCEDURE — 99285 EMERGENCY DEPT VISIT HI MDM: CPT | Mod: 25

## 2022-03-08 PROCEDURE — 999N000065 XR FOREARM LEFT 2 VIEWS: Mod: LT

## 2022-03-08 PROCEDURE — 96372 THER/PROPH/DIAG INJ SC/IM: CPT | Performed by: EMERGENCY MEDICINE

## 2022-03-08 RX ORDER — KETOROLAC TROMETHAMINE 30 MG/ML
15 INJECTION, SOLUTION INTRAMUSCULAR; INTRAVENOUS ONCE
Status: COMPLETED | OUTPATIENT
Start: 2022-03-08 | End: 2022-03-08

## 2022-03-08 RX ADMIN — KETOROLAC TROMETHAMINE 15 MG: 30 INJECTION, SOLUTION INTRAMUSCULAR; INTRAVENOUS at 09:55

## 2022-03-08 NOTE — ED TRIAGE NOTES
Pt was out walking her dog when she fell injuring her left wrist. She did not hit her head, no thinners.  Wrist appears deformed. Pt did take tylenol.  Is in recovery and does not want narcotics unless she really needs them. Given ice pack

## 2022-03-08 NOTE — ED PROVIDER NOTES
EMERGENCY DEPARTMENT ENCOUNTER      NAME: Mary Ann Olson  AGE: 56 year old female  YOB: 1965  MRN: [id number]  EVALUATION DATE & TIME: No admission date for patient encounter.    PCP: Joanna Farah    ED PROVIDER: Torrie Perez M.D.      Chief Complaint   Patient presents with     Wrist Pain         FINAL IMPRESSION:  1. Left forearm fracture, closed, initial encounter          ED COURSE & MEDICAL DECISION MAKING:    ED Course as of 03/08/22 1555   Tue Mar 08, 2022   0823 Pt with left distal wrist pain after fall, neurovascularly intact, amenable to IM ketorolac, XR and ice/rest/elevation, to XR shortly   0914 XR with left radius/ulna fx with some slight angulation but not likely reducible into ideal position with multiple pieces, splinted with much improvement, pt amenable to ortho f/u in 3 days with potential need for surgical alignment vs. Cast therapy, RICE therapy, slign given for convenience. Patient discharged after being provided with extensive anticipatory guidance and given return precautions, importance of PMD follow-up emphasized.         8:11 AM I met with the patient in the waiting room, obtained history, performed an initial exam, and discussed options and plan for diagnostics and treatment here in the ED.   9:00 AM I performed a splint placement procedure.  9:16 AM Patient ready for discharge.    Pertinent Labs & Imaging studies reviewed. (See chart for details)    N95 worn  A face shield was worn also  COVID PPE      At the conclusion of the encounter I discussed the results of all of the tests and the disposition. The questions were answered. The patient or family acknowledged understanding and was agreeable with the care plan.     MEDICATIONS GIVEN IN THE EMERGENCY:  Medications   ketorolac (TORADOL) injection 15 mg (15 mg Intramuscular Given 3/8/22 0955)       NEW PRESCRIPTIONS STARTED AT TODAY'S ER VISIT  Discharge Medication List as of 3/8/2022  9:49 AM              =================================================================    \[facility]\""      Mary Ann Olson is a right-handed 56 year old female with PMHx of T2DM and in sustained remission of opioid use disorder who presents to the ED today via walk-in with wrist pain.    Patient reports she fell this morning while walking her dog. She landed on her left arm and injured her left distal dorsal wrist. She describes the pain as achy and sharp, and even without movement, her pain is rated a 7/10. Patient reports taking three 500 mg Tylenol, but this did not help the pain. She notes she cannot take ibuprofen due to GI issues. Patient does not want opioids due to history of opioid use disorder. She endorses elbow tenderness. Patient denies pain to her neck or back. She denies hitting her head or blacking out. Denies any other current complaints.      REVIEW OF SYSTEMS   All other systems reviewed and are negative except as noted above in HPI.    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     Antihistamines overdose, intentional self-harm, initial encounter (H)      Asthma      Bipolar 1 disorder (H) hx of bipolar listed in h and p     Bipolar 2 disorder (H)      Bipolar I disorder, single manic episode (H)     Created by Conversion  Replacement Utility updated for latest IMO load     Cellulitis 2006 left ankle, 2008 right foot     COPD (chronic obstructive pulmonary disease) (H)      COPD (chronic obstructive pulmonary disease) (H)      Crohn disease (H)      Crohn's disease (H)     arthritis associated with crohn's     Diabetes mellitus (H)      Diabetes mellitus, type 2 (H)      Fibrocystic breast      Heat stroke     when a young child      Hyperlipidemia      IBS (irritable bowel syndrome)      Idiopathic acute pancreatitis 7/14/2015     Irritable bowel syndrome     Created by Conversion      Kidney stone      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      Overdose      Pyoderma gangrenosum     2016     Sacroiliitis (H) 2004     Skin lesion of left leg 8/27/2015     Suicidal ideation      Suicide attempt (H)      Traumatic iritis 7/21/2015     Umbilical hernia      Uncomplicated asthma        PAST SURGICAL HISTORY:  Past Surgical History:   Procedure Laterality Date     BIOPSY BREAST Left      BIOPSY OF BREAST, INCISIONAL      Description: Biopsy Breast Open;  Recorded: 10/28/2010;     HC REMOVAL OF TONSILS,<13 Y/O      Description: Tonsillectomy;  Recorded: 07/08/2009;     ZZC LIGATE FALLOPIAN TUBE      Description: Tubal Ligation;  Recorded: 07/08/2009;       CURRENT MEDICATIONS:    acetaminophen (TYLENOL) 500 MG tablet  adalimumab (HUMIRA) 40 MG/0.8ML prefilled syringe kit  albuterol (PROAIR HFA/PROVENTIL HFA/VENTOLIN HFA) 108 (90 Base) MCG/ACT inhaler  albuterol (PROVENTIL) (2.5 MG/3ML) 0.083% neb solution  atorvastatin (LIPITOR) 40 MG tablet  blood glucose (NO BRAND SPECIFIED) lancets standard  blood glucose (NO BRAND SPECIFIED) test strip  blood glucose (ONE TOUCH DELICA) lancing device  blood glucose monitoring (NO BRAND SPECIFIED) meter device kit  blood glucose monitoring (NO BRAND SPECIFIED) meter device kit  blood glucose monitoring (NO BRAND SPECIFIED) meter device kit  blood glucose monitoring (ONE TOUCH DELICA) lancets  budesonide-formoterol (SYMBICORT) 80-4.5 MCG/ACT Inhaler  calcium carbonate 600 mg-vitamin D 400 units (CALCIUM 600 + D) 600-400 MG-UNIT per tablet  calcium carbonate-vitamin D (OSCAL W/D) 500-200 MG-UNIT tablet  Calcium-Phosphorus-Vitamin D (GELATIN/CALCIUM/VITAMIN D OR)  cariprazine (VRAYLAR) 1.5 MG CAPS capsule  diclofenac (VOLTAREN) 1 % topical gel  EPINEPHrine (EPIPEN/ADRENACLICK/OR ANY BX GENERIC EQUIV) 0.3 MG/0.3ML injection 2-pack  fluticasone (FLONASE) 50 MCG/ACT nasal spray  hydrOXYzine (VISTARIL) 25 MG capsule  lancets 28G MISC  LANsoprazole (PREVACID) 15 MG DR capsule  loratadine (CLARITIN) 10 MG tablet  loratadine  (CLARITIN) 10 MG tablet  metFORMIN (GLUCOPHAGE) 1000 MG tablet  Nebulizers (VIOS AEROSOL DELIVERY SYSTEM) MISC  Nebulizers (VIOS LC SPRINT DELUXE) MISC  omeprazole (PRILOSEC) 20 MG DR capsule  order for DME  order for DME  polyethylene glycol (MIRALAX) packet  Respiratory Therapy Supplies (CHIDI BABY CONVERSION KIT) MISC  tiotropium (SPIRIVA RESPIMAT) 2.5 MCG/ACT inhaler  triamcinolone (KENALOG) 0.025 % external ointment  triamcinolone (KENALOG) 0.1 % external cream  vitamin D2 (ERGOCALCIFEROL) 84341 units (1250 mcg) capsule        ALLERGIES:  Allergies   Allergen Reactions     Keflex [Cephalexin] Shortness Of Breath and Rash     Aspirin      Cefuroxime Itching     Cheese GI Disturbance     Contrast Dye Hives     IV     Diagnostic X-Ray Materials Hives     Diatrizoate Hives     Gadolinium Derivatives Hives     Hazelnuts [Nuts]      Iodixanol Unknown     Nitrofurantoin Itching     Septra [Sulfamethoxazole W/Trimethoprim] Hives     Sulfa Drugs Hives     Metronidazole Itching and Rash     Quinolones Rash       FAMILY HISTORY:  Family History   Problem Relation Age of Onset     Coronary Artery Disease Mother      Diabetes Father      Breast Cancer Maternal Grandmother      Asthma Son      Asthma Daughter      Hemophilia Other      Diabetes Type 2  Father      Factor VIII deficiency Other        SOCIAL HISTORY:   Social History     Socioeconomic History     Marital status: Single     Spouse name: Not on file     Number of children: Not on file     Years of education: Not on file     Highest education level: Not on file   Occupational History     Not on file   Tobacco Use     Smoking status: Current Every Day Smoker     Packs/day: 0.50     Types: Cigarettes     Smokeless tobacco: Never Used     Tobacco comment: 2-3 cig/day   Substance and Sexual Activity     Alcohol use: No     Drug use: No     Sexual activity: Not on file   Other Topics Concern     Parent/sibling w/ CABG, MI or angioplasty before 65F 55M? Not Asked  "  Social History Narrative     Not on file     Social Determinants of Health     Financial Resource Strain: Not on file   Food Insecurity: Not on file   Transportation Needs: Not on file   Physical Activity: Not on file   Stress: Not on file   Social Connections: Not on file   Intimate Partner Violence: Not on file   Housing Stability: Not on file       VITALS:  Patient Vitals for the past 24 hrs:   BP Temp Temp src Pulse Resp SpO2 Height Weight   03/08/22 0758 (!) 137/90 98.1  F (36.7  C) Oral 87 16 96 % 1.549 m (5' 1\") 77.1 kg (170 lb)       PHYSICAL EXAM    GENERAL: Awake, alert.  In no acute distress.   HEENT: Normocephalic, atraumatic.  Pupils equal, round and reactive.  Conjunctiva normal.  EOMI.  NECK: No stridor or apparent deformity.  PULMONARY: Symmetrical breath sounds without distress.  Lungs clear to auscultation bilaterally without wheezes, rhonchi or rales.  CARDIO: Regular rate and rhythm.  No significant murmur, rub or gallop.  Radial pulses strong and symmetrical.  ABDOMINAL: Abdomen soft, non-distended and non-tender to palpation.  No CVAT, no palpable hepatosplenomegaly.  EXTREMITIES: No lower extremity swelling or edema. Elbow diffusely tender, no focal tenderness or limited range of motion. Left distal forearm diffusely tender with swelling, especially over distal radius.  NEURO: Alert and oriented to person, place and time.  Cranial nerves grossly intact.  No focal motor deficit.  PSYCH: Normal mood and affect  SKIN: No rashes        LAB:  All pertinent labs reviewed and interpreted.  Results for orders placed or performed during the hospital encounter of 03/08/22   Radius/Ulna XR,  PA &LAT, left    Impression    IMPRESSION: Comminuted and mildly impacted fracture of the distal left radius with dorsal angulation of the distal fracture fragment. Fracture of the tip of the ulnar styloid. The forearm bones are otherwise negative.   Elbow  XR, G/E 3 views, left    Impression    IMPRESSION: The " left elbow is negative for fracture or dislocation. No effusion.   XR Wrist Left 2 Views    Impression    IMPRESSION: There is a comminuted and mildly impacted fracture of the distal aspect of the left radius with dorsal angulation of the distal fracture fragment. The fracture extends to the wrist joint margin without significant cortical step-off. Fracture   of the ulnar styloid. No carpal fractures are identified. There is some soft tissue swelling about the wrist.       RADIOLOGY:  Reviewed all pertinent imaging. Please see official radiology report.  XR Wrist Left 2 Views   Final Result   IMPRESSION: There is a comminuted and mildly impacted fracture of the distal aspect of the left radius with dorsal angulation of the distal fracture fragment. The fracture extends to the wrist joint margin without significant cortical step-off. Fracture    of the ulnar styloid. No carpal fractures are identified. There is some soft tissue swelling about the wrist.      Elbow  XR, G/E 3 views, left   Final Result   IMPRESSION: The left elbow is negative for fracture or dislocation. No effusion.      Radius/Ulna XR,  PA &LAT, left   Final Result   IMPRESSION: Comminuted and mildly impacted fracture of the distal left radius with dorsal angulation of the distal fracture fragment. Fracture of the tip of the ulnar styloid. The forearm bones are otherwise negative.              Procedure:     PROCEDURE: Splint Placement   INDICATIONS: left distal radius and ulna fracture   PROCEDURE PROVIDER: Dr Torrie Perez   NOTE:  A short arm splint made of fiberglass was applied to the Left upper extremity by the above provider. As noted in the physical exam, distal CMS was intact prior to placement. The splint was checked and the fit was adjusted to ensure proper positioning after placement. Sensation and circulation, as well as motor function, are unchanged after splint placement and the patient is more comfortable with the splint in place.           I, Donna Pantoja, am serving as a scribe to document services personally performed by Dr. Torrie Perez based on my observation and the provider's statements to me. I, Torrie Perez MD attest that Donna Pantoja is acting in a scribe capacity, has observed my performance of the services and has documented them in accordance with my direction.     Torrie Perez MD  03/08/22 1552

## 2022-03-09 ENCOUNTER — PATIENT OUTREACH (OUTPATIENT)
Dept: FAMILY MEDICINE | Facility: CLINIC | Age: 57
End: 2022-03-09
Payer: COMMERCIAL

## 2022-03-09 NOTE — PROGRESS NOTES
Clinic Care Coordination Contact    Follow Up Progress Note      Assessment: The pt was at the ED, I called to check up on the pt and help the pt setup a ED follow up. The pt was at [facility] for wrist pain. I called the pt, but got her vm, so I left a vm for the pt to give me a call back.     Care Gaps:    Health Maintenance Due   Topic Date Due     COPD ACTION PLAN  Never done     ADVANCE CARE PLANNING  11/15/2017     LUNG CANCER SCREENING  10/02/2018     ZOSTER IMMUNIZATION (2 of 3) 11/30/2018     DIABETIC FOOT EXAM  09/25/2019     INFLUENZA VACCINE (1) 09/01/2021     MAMMO SCREENING  09/10/2021     COVID-19 Vaccine (3 - Pfizer risk 4-dose series) 01/11/2022     BMP  04/06/2022       Currently there are no Care Gaps.    Goals addressed this encounter:   Goals Addressed    None         Intervention/Education provided during outreach: NA          Plan:   NA  Care Coordinator will follow up in month

## 2022-03-09 NOTE — PROGRESS NOTES
I got a call back from the pt, pt stated that she is doing alright. Pt stated that she is following up with orthopedics this Friday. Pt stated that she did not need a follow up .

## 2022-03-11 ENCOUNTER — TRANSFERRED RECORDS (OUTPATIENT)
Dept: HEALTH INFORMATION MANAGEMENT | Facility: CLINIC | Age: 57
End: 2022-03-11
Payer: COMMERCIAL

## 2022-03-14 ENCOUNTER — OFFICE VISIT (OUTPATIENT)
Dept: FAMILY MEDICINE | Facility: CLINIC | Age: 57
End: 2022-03-14
Payer: COMMERCIAL

## 2022-03-14 VITALS
HEIGHT: 61 IN | SYSTOLIC BLOOD PRESSURE: 118 MMHG | DIASTOLIC BLOOD PRESSURE: 77 MMHG | WEIGHT: 168 LBS | TEMPERATURE: 97.9 F | RESPIRATION RATE: 18 BRPM | OXYGEN SATURATION: 97 % | HEART RATE: 94 BPM | BODY MASS INDEX: 31.72 KG/M2

## 2022-03-14 DIAGNOSIS — S52.502K CLOSED FRACTURE OF DISTAL END OF LEFT RADIUS WITH NONUNION, UNSPECIFIED FRACTURE MORPHOLOGY, SUBSEQUENT ENCOUNTER: ICD-10-CM

## 2022-03-14 DIAGNOSIS — E78.5 HYPERLIPIDEMIA WITH TARGET LOW DENSITY LIPOPROTEIN (LDL) CHOLESTEROL LESS THAN 100 MG/DL: ICD-10-CM

## 2022-03-14 DIAGNOSIS — K50.10 CROHN'S DISEASE OF LARGE INTESTINE WITHOUT COMPLICATION (H): ICD-10-CM

## 2022-03-14 DIAGNOSIS — E11.9 TYPE 2 DIABETES MELLITUS WITHOUT COMPLICATION, WITHOUT LONG-TERM CURRENT USE OF INSULIN (H): ICD-10-CM

## 2022-03-14 DIAGNOSIS — J41.0 SIMPLE CHRONIC BRONCHITIS (H): ICD-10-CM

## 2022-03-14 DIAGNOSIS — S52.602K CLOSED FRACTURE OF DISTAL END OF LEFT ULNA WITH NONUNION, UNSPECIFIED FRACTURE MORPHOLOGY, SUBSEQUENT ENCOUNTER: ICD-10-CM

## 2022-03-14 DIAGNOSIS — Z01.818 PRE-OP EXAM: Primary | ICD-10-CM

## 2022-03-14 DIAGNOSIS — Z01.818 PREOP GENERAL PHYSICAL EXAM: ICD-10-CM

## 2022-03-14 LAB
ANION GAP SERPL CALCULATED.3IONS-SCNC: 13 MMOL/L (ref 5–18)
BUN SERPL-MCNC: 16 MG/DL (ref 8–22)
CALCIUM SERPL-MCNC: 10.6 MG/DL (ref 8.5–10.5)
CHLORIDE BLD-SCNC: 104 MMOL/L (ref 98–107)
CO2 SERPL-SCNC: 24 MMOL/L (ref 22–31)
CREAT SERPL-MCNC: 0.69 MG/DL (ref 0.6–1.1)
ERYTHROCYTE [DISTWIDTH] IN BLOOD BY AUTOMATED COUNT: 12.8 % (ref 10–15)
GFR SERPL CREATININE-BSD FRML MDRD: >90 ML/MIN/1.73M2
GLUCOSE BLD-MCNC: 99 MG/DL (ref 70–125)
HCT VFR BLD AUTO: 39.1 % (ref 35–47)
HGB BLD-MCNC: 13.5 G/DL (ref 11.7–15.7)
MCH RBC QN AUTO: 30 PG (ref 26.5–33)
MCHC RBC AUTO-ENTMCNC: 34.5 G/DL (ref 31.5–36.5)
MCV RBC AUTO: 87 FL (ref 78–100)
PLATELET # BLD AUTO: 268 10E3/UL (ref 150–450)
POTASSIUM BLD-SCNC: 3.9 MMOL/L (ref 3.5–5)
RBC # BLD AUTO: 4.5 10E6/UL (ref 3.8–5.2)
SODIUM SERPL-SCNC: 141 MMOL/L (ref 136–145)
WBC # BLD AUTO: 10.6 10E3/UL (ref 4–11)

## 2022-03-14 PROCEDURE — 80048 BASIC METABOLIC PNL TOTAL CA: CPT | Performed by: FAMILY MEDICINE

## 2022-03-14 PROCEDURE — U0005 INFEC AGEN DETEC AMPLI PROBE: HCPCS | Performed by: FAMILY MEDICINE

## 2022-03-14 PROCEDURE — U0003 INFECTIOUS AGENT DETECTION BY NUCLEIC ACID (DNA OR RNA); SEVERE ACUTE RESPIRATORY SYNDROME CORONAVIRUS 2 (SARS-COV-2) (CORONAVIRUS DISEASE [COVID-19]), AMPLIFIED PROBE TECHNIQUE, MAKING USE OF HIGH THROUGHPUT TECHNOLOGIES AS DESCRIBED BY CMS-2020-01-R: HCPCS | Performed by: FAMILY MEDICINE

## 2022-03-14 PROCEDURE — 36415 COLL VENOUS BLD VENIPUNCTURE: CPT | Performed by: FAMILY MEDICINE

## 2022-03-14 PROCEDURE — 93000 ELECTROCARDIOGRAM COMPLETE: CPT | Performed by: FAMILY MEDICINE

## 2022-03-14 PROCEDURE — 85027 COMPLETE CBC AUTOMATED: CPT | Performed by: FAMILY MEDICINE

## 2022-03-14 PROCEDURE — 99214 OFFICE O/P EST MOD 30 MIN: CPT | Mod: 25 | Performed by: FAMILY MEDICINE

## 2022-03-14 RX ORDER — HYDROCODONE BITARTRATE AND ACETAMINOPHEN 5; 325 MG/1; MG/1
1-2 TABLET ORAL EVERY 6 HOURS PRN
Qty: 20 TABLET | Refills: 0 | Status: SHIPPED | OUTPATIENT
Start: 2022-03-14 | End: 2022-03-17

## 2022-03-14 ASSESSMENT — PATIENT HEALTH QUESTIONNAIRE - PHQ9: SUM OF ALL RESPONSES TO PHQ QUESTIONS 1-9: 10
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
## 2022-03-14 NOTE — PATIENT INSTRUCTIONS
Preparing for Your Surgery  Getting started  A nurse will call you to review your health history and instructions. They will give you an arrival time based on your scheduled surgery time. Please be ready to share:    Your doctor's clinic name and phone number    Your medical, surgical and anesthesia history    A list of allergies and sensitivities    A list of medicines, including herbal treatments and over-the-counter drugs    Whether the patient has a legal guardian (ask how to send us the papers in advance)  Please tell us if you're pregnant--or if there's any chance you might be pregnant. Some surgeries may injure a fetus (unborn baby), so they require a pregnancy test. Surgeries that are safe for a fetus don't always need a test, and you can choose whether to have one.   If you have a child who's having surgery, please ask for a copy of Preparing for Your Child's Surgery.    Preparing for surgery    Within 30 days of surgery: Have a pre-op exam (sometimes called an H&P, or History and Physical). This can be done at a clinic or pre-operative center.  ? If you're having a , you may not need this exam. Talk to your care team.    At your pre-op exam, talk to your care team about all medicines you take. If you need to stop any medicines before surgery, ask when to start taking them again.  ? We do this for your safety. Many medicines can make you bleed too much during surgery. Some change how well surgery (anesthesia) drugs work.    Call your insurance company to let them know you're having surgery. (If you don't have insurance, call [phone].)    Call your clinic if there's any change in your health. This includes signs of a cold or flu (sore throat, runny nose, cough, rash, fever). It also includes a scrape or scratch near the surgery site.    If you have questions on the day of surgery, call your hospital or surgery center.  COVID testing  You may need to be tested for COVID-19 before having  surgery. If so, your surgical team will give you instructions for scheduling this test, separate from your preoperative history and physical.  Eating and drinking guidelines  For your safety: Unless your surgeon tells you otherwise, follow the guidelines below.    Eat and drink as usual until 8 hours before surgery. After that, no food or milk.    Drink clear liquids until 2 hours before surgery. These are liquids you can see through, like water, Gatorade and Propel Water. You may also have black coffee and tea (no cream or milk).    Nothing by mouth within 2 hours of surgery. This includes gum, candy and breath mints.    If you drink alcohol: Stop drinking it the night before surgery.    If your care team tells you to take medicine on the morning of surgery, it's okay to take it with a sip of water.  Preventing infection    Shower or bathe the night before and morning of your surgery. Follow the instructions your clinic gave you. (If no instructions, use regular soap.)    Don't shave or clip hair near your surgery site. We'll remove the hair if needed.    Don't smoke or vape the morning of surgery. You may chew nicotine gum up to 2 hours before surgery. A nicotine patch is okay.  ? Note: Some surgeries require you to completely quit smoking and nicotine. Check with your surgeon.    Your care team will make every effort to keep you safe from infection. We will:  ? Clean our hands often with soap and water (or an alcohol-based hand rub).  ? Clean the skin at your surgery site with a special soap that kills germs.  ? Give you a special gown to keep you warm. (Cold raises the risk of infection.)  ? Wear special hair covers, masks, gowns and gloves during surgery.  ? Give antibiotic medicine, if prescribed. Not all surgeries need antibiotics.  What to bring on the day of surgery    Photo ID and insurance card    Copy of your health care directive, if you have one    Glasses and hearing aides (bring cases)  ? You can't  wear contacts during surgery    Inhaler and eye drops, if you use them (tell us about these when you arrive)    CPAP machine or breathing device, if you use them    A few personal items, if spending the night    If you have . . .  ? A pacemaker, ICD (cardiac defibrillator) or other implant: Bring the ID card.  ? An implanted stimulator: Bring the remote control.  ? A legal guardian: Bring a copy of the certified (court-stamped) guardianship papers.  Please remove any jewelry, including body piercings. Leave jewelry and other valuables at home.  If you're going home the day of surgery    You must have a responsible adult drive you home. They should stay with you overnight as well.    If you don't have someone to stay with you, and you aren't safe to go home alone, we may keep you overnight. Insurance often won't pay for this.  After surgery  If it's hard to control your pain or you need more pain medicine, please call your surgeon's office.  Questions?   If you have any questions for your care team, list them here: _________________________________________________________________________________________________________________________________________________________________________ ____________________________________ ____________________________________ ____________________________________  For informational purposes only. Not to replace the advice of your health care provider. Copyright   2003, 2019 [facility]. All rights reserved. Clinically reviewed by Kalyani Fernández MD. [employer] [id number] - REV 07/21.    Pre-op faxed to fax number :  [phone]  Location :  [facility]  Date of Surgery :  03.16.2022  By/Date :  Xiomara 03.15.2022

## 2022-03-15 LAB — SARS-COV-2 RNA RESP QL NAA+PROBE: NEGATIVE

## 2022-03-15 NOTE — RESULT ENCOUNTER NOTE
Please call results to patient and fax results to [facility]:    Your kidney function is normal.  Your calcium level is slightly high, but is not a cause for worry. Plan to have this calcium level rechecked in about one month.    Your blood count is normal.    Your COVID test is negative.

## 2022-03-16 ENCOUNTER — TRANSFERRED RECORDS (OUTPATIENT)
Dept: HEALTH INFORMATION MANAGEMENT | Facility: CLINIC | Age: 57
End: 2022-03-16
Payer: COMMERCIAL

## 2022-03-17 NOTE — LETTER
August 28, 2018      Mary Ann Olson  [address]   [place] [id number]        To Whom it May Concern-    Please see additions to patient med list including: omeprazole, pepcid AC, singulair, and atorvastatin.      Patient should be taking these medications.     Sincerely,    Joanna Farah MD   numerical 0-10

## 2022-03-25 ENCOUNTER — TRANSFERRED RECORDS (OUTPATIENT)
Dept: HEALTH INFORMATION MANAGEMENT | Facility: CLINIC | Age: 57
End: 2022-03-25
Payer: COMMERCIAL

## 2022-04-07 ENCOUNTER — OFFICE VISIT (OUTPATIENT)
Dept: FAMILY MEDICINE | Facility: CLINIC | Age: 57
End: 2022-04-07
Payer: COMMERCIAL

## 2022-04-07 ENCOUNTER — TELEPHONE (OUTPATIENT)
Dept: FAMILY MEDICINE | Facility: CLINIC | Age: 57
End: 2022-04-07
Payer: COMMERCIAL

## 2022-04-07 VITALS
OXYGEN SATURATION: 97 % | HEART RATE: 85 BPM | TEMPERATURE: 98.1 F | DIASTOLIC BLOOD PRESSURE: 84 MMHG | RESPIRATION RATE: 24 BRPM | BODY MASS INDEX: 31.43 KG/M2 | SYSTOLIC BLOOD PRESSURE: 127 MMHG | WEIGHT: 165.4 LBS

## 2022-04-07 DIAGNOSIS — N30.00 ACUTE CYSTITIS WITHOUT HEMATURIA: Primary | ICD-10-CM

## 2022-04-07 DIAGNOSIS — J45.40 MODERATE PERSISTENT ASTHMA WITHOUT COMPLICATION: ICD-10-CM

## 2022-04-07 LAB
ALBUMIN UR-MCNC: NEGATIVE MG/DL
APPEARANCE UR: CLEAR
BILIRUB UR QL STRIP: NEGATIVE
COLOR UR AUTO: YELLOW
GLUCOSE UR STRIP-MCNC: NEGATIVE MG/DL
HGB UR QL STRIP: ABNORMAL
KETONES UR STRIP-MCNC: NEGATIVE MG/DL
LEUKOCYTE ESTERASE UR QL STRIP: ABNORMAL
NITRATE UR QL: NEGATIVE
PH UR STRIP: 7 [PH] (ref 5–7)
RBC #/AREA URNS AUTO: ABNORMAL /HPF
SP GR UR STRIP: 1.01 (ref 1–1.03)
UROBILINOGEN UR STRIP-ACNC: 0.2 E.U./DL
WBC #/AREA URNS AUTO: ABNORMAL /HPF

## 2022-04-07 PROCEDURE — 81001 URINALYSIS AUTO W/SCOPE: CPT | Performed by: FAMILY MEDICINE

## 2022-04-07 PROCEDURE — 99214 OFFICE O/P EST MOD 30 MIN: CPT | Performed by: FAMILY MEDICINE

## 2022-04-07 RX ORDER — BUDESONIDE AND FORMOTEROL FUMARATE DIHYDRATE 80; 4.5 UG/1; UG/1
2 AEROSOL RESPIRATORY (INHALATION) 2 TIMES DAILY
Qty: 10.2 G | Status: CANCELLED | OUTPATIENT
Start: 2022-04-07

## 2022-04-07 RX ORDER — BUDESONIDE AND FORMOTEROL FUMARATE DIHYDRATE 80; 4.5 UG/1; UG/1
2 AEROSOL RESPIRATORY (INHALATION) 2 TIMES DAILY
Qty: 10.2 G | Refills: 11 | Status: SHIPPED | OUTPATIENT
Start: 2022-04-07 | End: 2022-08-30

## 2022-04-07 RX ORDER — PREDNISONE 20 MG/1
40 TABLET ORAL DAILY
Qty: 10 TABLET | Refills: 0 | Status: SHIPPED | OUTPATIENT
Start: 2022-04-07 | End: 2022-04-12

## 2022-04-07 NOTE — TELEPHONE ENCOUNTER
Called patient and advised recommend appointment due to needing UA/UC, especially with her list of allergies to many different antibiotics. Patient agreeable to plan and scheduled for 11:40am today. Basilio SMITH

## 2022-04-07 NOTE — TELEPHONE ENCOUNTER
[facility] phone call message- general phone call:    Reason for call: Patient call and advised pain on both rib and have been urintaing a lot lately,   May be UTI per patient. Please call and advised, please and thank you.     Return call needed: Yes    OK to leave a message on voice mail? Yes    Primary language: English      needed? No    Call taken on April 7, 2022 at 10:13 AM by Adelaida Gutierrez

## 2022-04-07 NOTE — PROGRESS NOTES
ASSESSMENT/PLAN:    (N30.00) Acute cystitis without hematuria  (primary encounter diagnosis)  Comment: U/A w/ ?UTI; will wait for urine cx given symptoms are not consistent w/ UTI; no red flags; precautions given  Plan: UA reflex to Microscopic, Urine Microscopic Exam          (J45.40) Moderate persistent asthma without complication  Comment: refilled her symbicort; printed script for short pred course if symptoms worsen; she understands risk of hyperglycemia if she takes steroids and she was wary of this; precautions given  Plan: budesonide-formoterol (SYMBICORT) 80-4.5 MCG/ACT Inhaler, predniSONE (DELTASONE) 20 MG tablet          Kenneth Jackson MD  April 7, 2022  12:14 PM        Pt is a 56 year old female last seen on 3/14/22 by Dr Kong for pre-op exam here today for:     ?UTI - x few weeks   Started on R side, then the L side w/ rib pain when coughing?   +urinary frequency; no dysuria; no discharge  No fevers; +chills/hot flashes - chronic    Brown phlegm  - clears easily  Walks dog and feels like humidity is affecting her breathing   ?wheezing   Using symbicort bid and albuterol prn     Hemoglobin A1C POCT   Date Value Ref Range Status   11/16/2020 5.9 (H) 4.1 - 5.7 % Final   04/13/2020 6.0 (H) 4.1 - 5.7 % Final   11/18/2019 6.3 (H) 4.1 - 5.7 % Final     Hemoglobin A1C   Date Value Ref Range Status   11/02/2021 6.4 (H) 0.0 - 5.6 % Final     Comment:     Normal <5.7%   Prediabetes 5.7-6.4%    Diabetes 6.5% or higher     Note: Adopted from ADA consensus guidelines.         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     Antihistamines overdose, intentional self-harm, initial encounter (H)      Asthma      Bipolar 1 disorder (H) hx of bipolar listed in h and p     Bipolar 2 disorder (H)      Bipolar I disorder, single manic episode (H)     Created by Conversion  Replacement Utility updated for latest IMO load     Cellulitis 2006 left ankle, 2008 right foot      COPD (chronic obstructive pulmonary disease) (H)      COPD (chronic obstructive pulmonary disease) (H)      Crohn disease (H)      Crohn's disease (H)     arthritis associated with crohn's     Diabetes mellitus (H)      Diabetes mellitus, type 2 (H)      Fibrocystic breast      Heat stroke     when a young child      Hyperlipidemia      IBS (irritable bowel syndrome)      Idiopathic acute pancreatitis 7/14/2015     Irritable bowel syndrome     Created by Conversion      Kidney stone      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      Overdose      Pyoderma gangrenosum     2016     Sacroiliitis (H) 2004     Skin lesion of left leg 8/27/2015     Suicidal ideation      Suicide attempt (H)      Traumatic iritis 7/21/2015     Umbilical hernia      Uncomplicated asthma       Past Surgical History:   Procedure Laterality Date     BIOPSY BREAST Left      BIOPSY OF BREAST, INCISIONAL      Description: Biopsy Breast Open;  Recorded: 10/28/2010;     HC REMOVAL OF TONSILS,<11 Y/O      Description: Tonsillectomy;  Recorded: 07/08/2009;     ZZC LIGATE FALLOPIAN TUBE      Description: Tubal Ligation;  Recorded: 07/08/2009;      Current Outpatient Medications   Medication Sig Dispense Refill     acetaminophen (TYLENOL) 500 MG tablet Take 2 tablets (1,000 mg) by mouth every 6 hours as needed for pain 100 tablet 1     adalimumab (HUMIRA) 40 MG/0.8ML prefilled syringe kit Inject 0.8 mLs (40 mg) Subcutaneous every 14 days       albuterol (PROAIR HFA/PROVENTIL HFA/VENTOLIN HFA) 108 (90 Base) MCG/ACT inhaler Inhale 2 puffs into the lungs every 6 hours as needed for shortness of breath / dyspnea or wheezing 18 g 3     albuterol (PROVENTIL) (2.5 MG/3ML) 0.083% neb solution Take 1 vial (2.5 mg) by nebulization every 6 hours as needed for shortness of breath / dyspnea or wheezing 180 mL 3     atorvastatin (LIPITOR) 40 MG tablet Take 1 tablet (40 mg) by mouth daily 90 tablet 1     blood glucose (NO BRAND SPECIFIED) lancets standard Use to test  blood sugar 1 times daily or as directed. 50 each 11     blood glucose (NO BRAND SPECIFIED) test strip Use to test blood sugar 1 times daily or as directed. 100 strip 3     blood glucose (ONE TOUCH DELICA) lancing device See Admin Instructions  0     blood glucose monitoring (NO BRAND SPECIFIED) meter device kit Use to test blood sugar 1 times daily or as directed. 1 kit 0     blood glucose monitoring (NO BRAND SPECIFIED) meter device kit Use to test blood sugar 1 times daily or as directed. 1 kit 0     blood glucose monitoring (NO BRAND SPECIFIED) meter device kit Preferred blood glucose meter OR supplies to accompany: Blood Glucose Monitor Brands: One Touch Delica 1 kit 0     blood glucose monitoring (ONE TOUCH DELICA) lancets Use to test blood sugars 1 time daily 50 each 11     budesonide-formoterol (SYMBICORT) 80-4.5 MCG/ACT Inhaler Inhale 2 puffs into the lungs 2 times daily 10.2 g 1     calcium carbonate 600 mg-vitamin D 400 units (CALCIUM 600 + D) 600-400 MG-UNIT per tablet Take 1 tablet by mouth 2 times daily 60 tablet 3     calcium carbonate-vitamin D (OSCAL W/D) 500-200 MG-UNIT tablet Take 1 tablet by mouth 2 times daily 90 tablet 3     Calcium-Phosphorus-Vitamin D (GELATIN/CALCIUM/VITAMIN D OR) 50,000 Units       cariprazine (VRAYLAR) 1.5 MG CAPS capsule Take 1 capsule (1.5 mg) by mouth daily 30 capsule 0     diclofenac (VOLTAREN) 1 % topical gel Place 2 g onto the skin 4 times daily 100 g 1     EPINEPHrine (EPIPEN/ADRENACLICK/OR ANY BX GENERIC EQUIV) 0.3 MG/0.3ML injection 2-pack Inject 0.3 mLs (0.3 mg) into the muscle as needed for anaphylaxis 0.6 mL 0     fluticasone (FLONASE) 50 MCG/ACT nasal spray Spray 1 spray into both nostrils daily 11.1 mL 3     hydrOXYzine (VISTARIL) 25 MG capsule Take 1 capsule (25 mg) by mouth 3 times daily as needed for itching 28 capsule 1     lancets 28G MISC Test twice daily 60 each 1     LANsoprazole (PREVACID) 15 MG DR capsule Take 1 capsule (15 mg) by mouth daily 90  capsule 3     loratadine (CLARITIN) 10 MG tablet Take 1 tablet (10 mg) by mouth daily 30 tablet 1     metFORMIN (GLUCOPHAGE) 1000 MG tablet Take 1 tablet (1,000 mg) by mouth 2 times daily (with meals) 180 tablet 3     Nebulizers (VIOS AEROSOL DELIVERY SYSTEM) [facility] [place] [place] USE UTD  0     Nebulizers (VIOS LC SPRINT DELUXE) MISC Needs nebulizer and all accessories.       omeprazole (PRILOSEC) 20 MG DR capsule Take 1 capsule (20 mg) by mouth daily 90 capsule 3     order for DME Equipment being ordered: Nebulizer Machine with mask and tubing. 1 Units 0     order for DME Equipment being ordered: incontinence pads    Please fax to [employer] 1 Box 3     polyethylene glycol (MIRALAX) packet Take 17 g by mouth daily as needed for constipation 30 packet 0     Respiratory Therapy Supplies (CHIDI BABY CONVERSION KIT) MISC To use with albuterol solution       tiotropium (SPIRIVA RESPIMAT) 2.5 MCG/ACT inhaler Inhale 2 puffs into the lungs daily 12 g 3     triamcinolone (KENALOG) 0.025 % external ointment Apply topically 2 times daily 80 g 1     triamcinolone (KENALOG) 0.1 % external cream Apply topically 2 times daily 80 g 1     vitamin D2 (ERGOCALCIFEROL) 94483 units (1250 mcg) capsule Take 1 capsule (50,000 Units) by mouth once a week 12 capsule 0      Allergies   Allergen Reactions     Keflex [Cephalexin] Shortness Of Breath and Rash     Aspirin      Cefuroxime Itching     Cheese GI Disturbance     Contrast Dye Hives     IV     Diagnostic X-Ray Materials Hives     Diatrizoate Hives     Gadolinium Derivatives Hives     Hazelnuts [Nuts]      Iodixanol Unknown     Nitrofurantoin Itching     Septra [Sulfamethoxazole W/Trimethoprim] Hives     Sulfa Drugs Hives     Metronidazole Itching and Rash     Quinolones Rash        ROS:   Gen- no weight change, no fevers/chills   ENT- see HPI  Cardiac - no palpitations, no chest pain   Respiratory - see HPI  GI - no nausea, no vomiting, no diarrhea, no constipation   Urinary - see HPI  Remainder of  ROS negative.     Exam:   /84 (BP Location: Right arm, Patient Position: Sitting, Cuff Size: Adult Regular)   Pulse 85   Temp 98.1  F (36.7  C) (Oral)   Resp 24   Wt 75 kg (165 lb 6.4 oz)   SpO2 97%   BMI 31.43 kg/m     Alert and oriented x 3; No acute distress   HEENT: oropharynx clear   Neck: no masses, no lymphadenopathy   Resp: clear to auscultation bilaterally, no wheezing/ronchi ; +TTP along axillary ribcage bilaterally  CV: rate/rhythm regular, no murmurs/rubs/gallops   ABd: soft, nontender/nondistended, no rebound/guarding; No CVAT  Extrem: no clubbing/cyanosis/edema   Neuro: no focal deficits   Derm: no rashes

## 2022-04-12 ENCOUNTER — TRANSFERRED RECORDS (OUTPATIENT)
Dept: HEALTH INFORMATION MANAGEMENT | Facility: CLINIC | Age: 57
End: 2022-04-12
Payer: COMMERCIAL

## 2022-05-10 ENCOUNTER — TRANSFERRED RECORDS (OUTPATIENT)
Dept: HEALTH INFORMATION MANAGEMENT | Facility: CLINIC | Age: 57
End: 2022-05-10
Payer: COMMERCIAL

## 2022-05-10 NOTE — TELEPHONE ENCOUNTER
Called and informed patient RX refill has been approved and sent to her pharmacy today.   Consultation - Nephrology   Eugune Bone 79 y o  female MRN: [id number]  Unit/Bed#: [facility] 903-01 Encounter: [id number]    ASSESSMENT AND PLAN:  EDUARDO (acute kidney injury) ([facility] [address] )  Assessment & Plan  -Baseline creatinine 0 9  She initially presented to [facility] with and EDUARDO creatinine 2 1 which improved to 0 63 with IV fluids   -Patient became volume overload after aggressive IV fluid resuscitation and was initially started on Lasix 40 mg b i d  And then transitioned to 40 mg daily per cardiology recs  - Patient now with worsening renal function with most recent creatinine 1 47  - UA significant for 1+ protein, small blood,leukocytes, occasional calcium oxalate crystal, +budding yeast, CTAP negative for hydronephrosis but concerning for fecal impaction  - Differentials for her Acute kidney injury are; volume depletion from aggressive diuresis and poor po intake,less likely ATN/ AIN from Vanc/cefepime or hypotension   Plan  - Will start gentle IVF hydration with Plasmalyte @75cc/hr  - Continue to hold diuretics  - Will order urine creatinine,urine urea and urine sodium to calculate FENA  - Strict I&O/daily weight  - Avoid nephrotoxic agents    Hyponatremia  Assessment & Plan  Hyponatremia with Na 133  This is likely secondary to volume depletion vs SIADH from pain  - Will order urine Na, urine Osm, serum Osm   - Follow up BMP in the morning  - Start gentle hydration with Plasmalyte    Fecal impaction ([facility] [address] )  Assessment & Plan  - CTAP concerning for fecal impaction  Last known BM was 4/25  - Will suggest aggressive bowel regimen  - Bowel regimen per primary team    Metabolic alkalosis  Assessment & Plan  Suspected Metabolic acidosis likely contraction alkalosis from dehydration  Can also be mixed picture as patient is obese  - Will order VBG  - Start gentle hydration with plasmalyte    Hyperlipidemia  Assessment & Plan  Stable   Continue Atovastatin      HISTORY OF PRESENT ILLNESS:  Requesting Physician: Edyta Beasley MD  Reason for Consult:  Acute kidney injury    Too Melo is a 79y o  year old female with a past medical history of suture of EF 70% G1DD, moderate aortic stenosis, cirrhosis, GERD, seizure disorder who initially presented to [facility] and was treated for aspiration pneumonia and EDUARDO  She received vanc/cefepime and IV fluid hydration  EDUARDO resolved but patient was noted to have developed worsening bilateral pedal edema/bleeding vesicles,she was transfered on 05/22 to [facility] for higher level of care/derm evaluation  A renal consultation is requested today for assistance in the management of acute kidney injury  Pt was seen and examined by bedside, she complained of bilateral lower extremity tenderness, and abdominal discomfort likely from constipation  She  denies, headaches, chest pain, shortness of breath, palpitation  PAST MEDICAL HISTORY:  Past Medical History:   Diagnosis Date    CHF [facility]congestive heart failure) ([facility] [address] )     Liver cirrhosis (HCC)     Seizures [facility]        PAST SURGICAL HISTORY:  Past Surgical History:   Procedure Laterality Date    HYSTERECTOMY      IR PICC PLACEMENT SINGLE LUMEN  4/29/2022    LEG SURGERY  05/2016    Pt family cannot recall the specific surgery or which leg it was preformed on         ALLERGIES:  Allergies   Allergen Reactions    Penicillins Other (See Comments)     Pt unaware of reaction       SOCIAL HISTORY:  Social History     Substance and Sexual Activity   Alcohol Use Never     Social History     Substance and Sexual Activity   Drug Use No     Social History     Tobacco Use   Smoking Status Never Smoker   Smokeless Tobacco Never Used       FAMILY HISTORY:  Family History   Problem Relation Age of Onset    Lung cancer Mother        MEDICATIONS:    Current Facility-Administered Medications:     acetaminophen (TYLENOL) tablet 975 mg, 975 mg, Oral, Q8H [address], Che Ruiz PA-C, 975 mg at 05/10/22 0501    albuterol (Ene Gagan HFA) inhaler 2 puff, 2 puff, Inhalation, Q4H PRN, Criss Augustine MD, 2 puff at 05/08/22 0801    atorvastatin (LIPITOR) tablet 80 mg, 80 mg, Oral, Daily, Criss Augustine MD, 80 mg at 05/10/22 0906    bisacodyl (DULCOLAX) rectal suppository 10 mg, 10 mg, Rectal, Daily, Khurram Henry DO, 10 mg at 05/10/22 0906    Diclofenac Sodium (VOLTAREN) 1 % topical gel 2 g, 2 g, Topical, 4x Daily PRN, Criss Augustine MD    heparin (porcine) subcutaneous injection 7,500 Units, 7,500 Units, Subcutaneous, Q8H [address], Criss Augustine MD, 7,500 Units at 05/10/22 1301    HYDROmorphone HCl (DILAUDID) injection 0 2 mg, 0 2 mg, Intravenous, Q6H PRN, Prabhu Selby PA-C, 0 2 mg at 05/08/22 0314    levalbuterol (Isela [place]) inhalation solution 1 25 mg, 1 25 mg, Nebulization, TID, Khurram Henry DO, 1 25 mg at 05/10/22 0920    levETIRAcetam (KEPPRA) tablet 500 mg, 500 mg, Oral, Q12H [address], Criss Augustine MD, 500 mg at 05/10/22 0905    metoprolol tartrate (LOPRESSOR) partial tablet 37 5 mg, 37 5 mg, Oral, Q12H [address], Khurram Henry DO, 37 5 mg at 05/10/22 0905    multi-electrolyte (PLASMALYTE-A/ISOLYTE-S PH 7 4) IV solution, 75 mL/hr, Intravenous, Continuous, Aliyah Salinas MD, Last Rate: 75 mL/hr at 05/10/22 1301, 75 mL/hr at 05/10/22 1301    ondansetron (ZOFRAN) injection 4 mg, 4 mg, Intravenous, Q6H PRN, Criss Augustine MD, 4 mg at 05/08/22 0241    oxyCODONE (ROXICODONE) IR tablet 2 5 mg, 2 5 mg, Oral, Q4H PRN, Criss Augustine MD    oxyCODONE (ROXICODONE) IR tablet 5 mg, 5 mg, Oral, Q4H PRN, Criss Augustine MD, 5 mg at 05/10/22 0501    pantoprazole (PROTONIX) EC tablet 20 mg, 20 mg, Oral, Early Morning, Criss Augustine MD, 20 mg at 05/10/22 0502    PHENobarbital tablet 32 4 mg, 32 4 mg, Oral, BID, Criss Augustine MD, 32 4 mg at 05/10/22 0923    polyethylene glycol (MIRALAX) packet 17 g, 17 g, Oral, Daily, Khurramcindy Henry DO, 17 g at 05/10/22 0906    senna-docusate sodium (SENOKOT S) 8 6-50 mg per tablet 1 tablet, 1 tablet, Oral, BID, AUREA Scott, 1 tablet at 05/10/22 2842    sodium chloride (PF) 0 9 % injection 3 mL, 3 mL, Intravenous, Q1H PRN, Dorys Cabezas MD    sodium chloride 0 9 % inhalation solution 3 mL, 3 mL, Nebulization, TID, Lakisha Ho MD    REVIEW OF SYSTEMS:  Complete 10 point review of systems were obtained and discussed in length with the patient  Complete review of systems were negative / unremarkable except mentioned in the HPI section  PHYSICAL EXAM:  Current Weight: Weight - Scale: 95 2 kg (209 lb 14 4 oz)  First Weight: Weight - Scale: 101 kg (222 lb)  Vitals:    05/10/22 0921   BP:    Pulse:    Resp:    Temp:    SpO2: 97%       Intake/Output Summary (Last 24 hours) at 5/10/2022 1324  Last data filed at 5/10/2022 0001  Gross per 24 hour   Intake --   Output 180 ml   Net -180 ml     Wt Readings from Last 3 Encounters:   05/10/22 95 2 kg (209 lb 14 4 oz)   05/02/22 101 kg (222 lb 14 2 oz)   03/14/21 93 kg (205 lb)     Temp Readings from Last 3 Encounters:   05/10/22 99 4 °F (37 4 °C)   05/02/22 97 8 °F (36 6 °C) (Oral)   03/20/21 99 2 °F (37 3 °C)     BP Readings from Last 3 Encounters:   05/10/22 117/76   05/02/22 143/81   01/19/22 165/75     Pulse Readings from Last 3 Encounters:   05/10/22 (!) 110   05/02/22 104   01/19/22 100      Physical Exam  Constitutional:       Appearance: She is obese  HENT:      Head: Normocephalic  Nose: Nose normal  No congestion  Mouth/Throat:      Mouth: Mucous membranes are moist    Eyes:      General: No scleral icterus  Extraocular Movements: Extraocular movements intact  Cardiovascular:      Rate and Rhythm: Normal rate  Pulses: Normal pulses  Heart sounds: Normal heart sounds  Pulmonary:      Breath sounds: Normal breath sounds  Abdominal:      General: Bowel sounds are normal       Palpations: Abdomen is soft  Skin:     General: Skin is warm and dry  Capillary Refill: Capillary refill takes less than 2 seconds        Comments: Wrapped Lower extremities  Venous stasis   Neurological:      General: No focal deficit present  Mental Status: She is alert and oriented to person, place, and time  Psychiatric:         Mood and Affect: Mood normal        Invasive Devices:      Lab Results:   Results from last 7 days   Lab Units 05/10/22  0432 05/09/22  0557 05/06/22  0706 05/05/22  0847 05/05/22  0634 05/04/22  0512   WBC Thousand/uL 16 53* 17 51* 12 87* 14 74*  --  17 25*   HEMOGLOBIN g/dL 9 1* 9 1* 9 3* 9 3*  --  9 8*   HEMATOCRIT % 29 4* 29 6* 30 4* 30 6*  --  32 6*   PLATELETS Thousands/uL 590* 532* 275 223  --  197   POTASSIUM mmol/L 4 2 4 2 3 8  --  3 8 4 2   CHLORIDE mmol/L 91* 91* 96*  --  98* 104   CO2 mmol/L 39* 38* 42*  --  38* 31   BUN mg/dL 40* 33* 11  --  8 9   CREATININE mg/dL 1 47* 1 40* 0 63  --  0 69 0 63   CALCIUM mg/dL 9 4 9 4 9 3  --  9 3 9 2   MAGNESIUM mg/dL  --  1 9  --   --   --  1 7       Other Studies:   XR chest portable   Final Result by Chata Wood MD (05/10 7580)      No acute cardiopulmonary disease  Workstation performed: [id number]         CT chest abdomen pelvis wo contrast   Final Result by Albert Soliz MD (05/09 4386)      Airspace opacities at the bases have slightly worsened could represent pneumonia  Marked colonic distention with possible fecal impaction, at the level of the rectum, causing distal obstruction versus ileus  There is no small bowel dilatation  This should be correlated with any recent bowel movement  Multiple compression deformities are seen in the thoracolumbar spine without any bony destructive features  This was discussed with Dr Gee Almaraz at 4:45 PM         Workstation performed: [id number]         XR chest portable   Final Result by Jayne Dominique MD (05/09 9093)      No acute cardiopulmonary disease  No significant interval change                    Workstation performed: [id number]             Portions of the record may have been created with voice recognition software  Occasional wrong word or "sound a like" substitutions may have occurred due to the inherent limitations of voice recognition software  Read the chart carefully and recognize, using context, where substitutions have occurred

## 2022-05-16 DIAGNOSIS — E11.9 TYPE 2 DIABETES MELLITUS WITHOUT COMPLICATION, WITHOUT LONG-TERM CURRENT USE OF INSULIN (H): ICD-10-CM

## 2022-05-16 DIAGNOSIS — J45.40 MODERATE PERSISTENT ASTHMA WITHOUT COMPLICATION: ICD-10-CM

## 2022-05-16 RX ORDER — ALBUTEROL SULFATE 90 UG/1
2 AEROSOL, METERED RESPIRATORY (INHALATION) EVERY 6 HOURS PRN
Qty: 18 G | Refills: 3 | Status: SHIPPED | OUTPATIENT
Start: 2022-05-16 | End: 2022-05-17

## 2022-05-16 RX ORDER — ATORVASTATIN CALCIUM 40 MG/1
40 TABLET, FILM COATED ORAL DAILY
Qty: 90 TABLET | Refills: 3 | Status: SHIPPED | OUTPATIENT
Start: 2022-05-16 | End: 2022-05-17

## 2022-05-16 NOTE — TELEPHONE ENCOUNTER
[facility] phone call message- medication clarification/question:    Full Medication Name:     - albuterol (PROAIR HFA/PROVENTIL HFA/VENTOLIN HFA) 108 (90 Base) MCG/ACT inhaler    - Cholesterol medication (patient do not know name)    Question: Patient called requesting refill on medications.     Pharmacy confirmed as    [id number] - [place], [place] - [address]: Yes    OK to leave a message on voice mail? Yes    Primary language: English      needed? No    Call taken on May 16, 2022 at 9:23 AM by Vern Peterson

## 2022-05-18 ENCOUNTER — TELEPHONE (OUTPATIENT)
Dept: FAMILY MEDICINE | Facility: CLINIC | Age: 57
End: 2022-05-18
Payer: COMMERCIAL

## 2022-05-19 NOTE — TELEPHONE ENCOUNTER
Received clinic page for patient with difficulty breathing  Patient is a 56-year-old with COPD, dealing with more allergies  Nebs/inhaler not working  Able to walk around apartment without getting too winded  Was able to walk dog about 1 block today  Has been hospitalized for COPD exacerbation in past, feels about the same now  No fevers/chills  No chest pain    Advised that it is difficult for me to evaluate her condition over the phone, but she is speaking in full sentences without getting winded, which is a good sign.  She has not been taking any allergy relief medicines but does have Claritin and Flonase at home.  I stated that we could likely get her a clinic appointment tomorrow, but she uses medical rides through [facility], and they require a 2 to 3-day notice before they can provide transport.    Advised that she take a Claritin and use her Flonase now.  Use albuterol and Spiriva inhalers every 4 hours or so.  Call me in a few hours if allergy medicines not improving her breathing, and we can decide whether she needs to go to the ER or not.    Phillip Plasencia MD - PGY2  [facility]  P: [phone]

## 2022-05-26 RX ORDER — ALBUTEROL SULFATE 90 UG/1
2 AEROSOL, METERED RESPIRATORY (INHALATION) EVERY 6 HOURS PRN
Qty: 18 G | Refills: 3 | Status: SHIPPED | OUTPATIENT
Start: 2022-05-26 | End: 2022-06-16

## 2022-05-26 RX ORDER — ATORVASTATIN CALCIUM 40 MG/1
40 TABLET, FILM COATED ORAL DAILY
Qty: 90 TABLET | Refills: 3 | Status: SHIPPED | OUTPATIENT
Start: 2022-05-26 | End: 2023-07-03

## 2022-06-07 DIAGNOSIS — R05.9 COUGH: ICD-10-CM

## 2022-06-07 DIAGNOSIS — Z78.0 POSTMENOPAUSAL STATUS: ICD-10-CM

## 2022-06-07 RX ORDER — ERGOCALCIFEROL 1.25 MG/1
50000 CAPSULE, LIQUID FILLED ORAL WEEKLY
Qty: 12 CAPSULE | Refills: 0 | Status: SHIPPED | OUTPATIENT
Start: 2022-06-07 | End: 2022-08-26

## 2022-06-07 RX ORDER — ACETAMINOPHEN 500 MG
1000 TABLET ORAL EVERY 6 HOURS PRN
Qty: 100 TABLET | Refills: 1 | Status: SHIPPED | OUTPATIENT
Start: 2022-06-07 | End: 2023-06-12

## 2022-06-14 DIAGNOSIS — J45.40 MODERATE PERSISTENT ASTHMA WITHOUT COMPLICATION: ICD-10-CM

## 2022-06-14 DIAGNOSIS — R05.9 COUGH: ICD-10-CM

## 2022-06-14 RX ORDER — ALBUTEROL SULFATE 0.83 MG/ML
2.5 SOLUTION RESPIRATORY (INHALATION) EVERY 6 HOURS PRN
Qty: 180 ML | Status: CANCELLED | OUTPATIENT
Start: 2022-06-14

## 2022-06-14 NOTE — TELEPHONE ENCOUNTER
"[facility] phone call message- medication clarification/question:    Full Medication Name: albuterol (PROVENTIL) (2.5 MG/3ML) 0.083% neb solution    Question: Patient called requesting for refill, stated \"she shouldn't have to keep calling for these refills, they should automatically have refills for her to .\"     Pharmacy confirmed as    [id number] - [place], [place] - [address]: Yes    OK to leave a message on voice mail? Yes    Primary language: English      needed? No    Call taken on June 14, 2022 at 8:28 AM by Vern Peterson    "

## 2022-06-15 DIAGNOSIS — R05.9 COUGH: ICD-10-CM

## 2022-06-15 RX ORDER — ALBUTEROL SULFATE 0.83 MG/ML
2.5 SOLUTION RESPIRATORY (INHALATION) EVERY 6 HOURS PRN
Qty: 180 ML | Refills: 3 | Status: SHIPPED | OUTPATIENT
Start: 2022-06-15 | End: 2022-11-17

## 2022-06-15 NOTE — PROGRESS NOTES
Responded to clinic page, patient upset. Has called clinic multiple times to have neb solution refilled and it has not been refilled. Refill sent.     Rosita Kuo MD

## 2022-06-20 ENCOUNTER — OFFICE VISIT (OUTPATIENT)
Dept: FAMILY MEDICINE | Facility: CLINIC | Age: 57
End: 2022-06-20
Payer: COMMERCIAL

## 2022-06-20 VITALS
SYSTOLIC BLOOD PRESSURE: 122 MMHG | DIASTOLIC BLOOD PRESSURE: 83 MMHG | HEART RATE: 92 BPM | OXYGEN SATURATION: 97 % | WEIGHT: 173 LBS | RESPIRATION RATE: 20 BRPM | HEIGHT: 60 IN | BODY MASS INDEX: 33.96 KG/M2 | TEMPERATURE: 98 F

## 2022-06-20 DIAGNOSIS — J44.9 CHRONIC OBSTRUCTIVE PULMONARY DISEASE, UNSPECIFIED COPD TYPE (H): Primary | ICD-10-CM

## 2022-06-20 PROCEDURE — 90715 TDAP VACCINE 7 YRS/> IM: CPT | Performed by: STUDENT IN AN ORGANIZED HEALTH CARE EDUCATION/TRAINING PROGRAM

## 2022-06-20 PROCEDURE — 90471 IMMUNIZATION ADMIN: CPT | Performed by: STUDENT IN AN ORGANIZED HEALTH CARE EDUCATION/TRAINING PROGRAM

## 2022-06-20 PROCEDURE — 99214 OFFICE O/P EST MOD 30 MIN: CPT | Mod: 25 | Performed by: STUDENT IN AN ORGANIZED HEALTH CARE EDUCATION/TRAINING PROGRAM

## 2022-06-20 RX ORDER — ALBUTEROL SULFATE 90 UG/1
2 AEROSOL, METERED RESPIRATORY (INHALATION) EVERY 6 HOURS PRN
Qty: 18 G | Refills: 3 | Status: SHIPPED | OUTPATIENT
Start: 2022-06-20 | End: 2022-07-21

## 2022-06-20 ASSESSMENT — ASTHMA QUESTIONNAIRES: ACT_TOTALSCORE: 6

## 2022-06-20 ASSESSMENT — PATIENT HEALTH QUESTIONNAIRE - PHQ9: SUM OF ALL RESPONSES TO PHQ QUESTIONS 1-9: 15
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
## 2022-06-20 NOTE — PATIENT INSTRUCTIONS
What is SMART Therapy?  SMART is a new way of approaching your asthma care that gives you more medicine when you need it most.     So does this mean I won t be using albuterol anymore?  Correct! You will use just one inhaler for everyday asthma prevention and for acute symptoms (for example: shortness breath, wheezing, etc.). You no longer need to remember which inhaler is which!     My albuterol works great to decrease my breathing difficulties, why shouldn t I use it?  Overuse of albuterol has been associated with increased airway irritation, increased risk of asthma flares, and decreased response to albuterol over time - meaning if you require hospitalization for your asthma and are in need of albuterol to treat severe symptoms, it may be less effective for you.    Why are we changing my inhalers?  Studies show that greater asthma control is achieved with SMART therapy versus your current regimen. Greater asthma control here means fewer flares of your asthma that may lead to emergency department visits or hospitalizations.  This change will enable you to get enough of the preventive medication to control your asthma long-term.

## 2022-06-20 NOTE — NURSING NOTE
Writer was requested by  staff to assess patient due to patient c/o shortness of breath. Writer met with patient in the room, patient talking in complete sentences without needing to pause for a breath. No wheezing noted. Oxygen 97%RA. Patient stated it feels harder to breathe due to the humidity. Patient stable and able to be seen for her appointment. Basilio SMITH

## 2022-06-20 NOTE — COMMUNITY RESOURCES LIST (ENGLISH)
06/20/2022   [facility] - Outpatient Clinics  Rosita Hunt  For questions about this resource list or additional care needs, please contact your primary care clinic or care manager.  Phone: [phone]   Email: N/A   Address: [address] [place] [address]   Hours: N/A        Hotlines and Helplines       Hotline - Crisis help  1  [facility] - Crisis Text Line - Crisis Text Line Distance: 1.31 miles      COVID-19 Status: Phone/Virtual   [address] [place] [address]  Language: English  Hours: Mon - Sun Open 24 Hours   Website: https://[place].gov/dhs/partners-and-providers/policies-procedures/adult-mental-health/crisis-text-line/     2  [facility] - Adult Mental Health Urgent Care Distance: 1.5 miles      COVID-19 Status: Phone/Virtual   [address] [place] [address]  Language: English, Hmong, Slovak  Hours: Mon - Sun Open 24 Hours   Phone: [phone]) [phone] Website: [email or web address]://www.[facility]/health-medical/clinics-services/mental-behavorial-health/adult-mental-health-chemical-health/urgent-care-adult-mental-health          Mental Health       Individual counseling  3  [facility] Distance: 0.8 miles      COVID-19 Status: Phone/Virtual   [address] [place] [address]  Language: English, Bolivian, Slovak  Hours: Mon - Fri 8:30 AM - 5:00 PM  Fees: Insurance, Self Pay   Phone: [phone]) [phone] Email: info@[employer].org Website: http://www.[employer].org     4  [facility] Distance: 0.83 miles      COVID-19 Status: Regular Operations, COVID-19 Status: Phone/Virtual   [address] [place] [address]  Language: English, Hmong, Slovak  Hours: Mon 8:00 AM - 8:00 PM , Tue 8:00 AM - 5:00 PM , Wed - Thu 8:00 AM - 8:00 PM , Fri 8:00 AM - 5:00 PM , Sat 8:00 AM - 12:00 PM  Fees: Insurance, Self Pay, Sliding Fee   Phone: [phone]) [phone] Website: https://www.[place]care.org     Mental  health crisis care  5  [facility] Mental Health Urgent Care - Adult Program Distance: 1.5 miles      COVID-19 Status: Regular Operations   [address] [place] [address]  Language: English, Hmong, [place]  Hours: Mon - Fri 8:00 AM - 5:30 PM  Fees: Insurance, Self Pay, Sliding Fee   Phone: (757) [phone] Website: https://www.[facility]/health-medical/clinics-services/mental-behavorial-health/adult-mental-health-chemical-health/urgent-care-adult-mental-health     6  [facility] ([facility] Distance: 4.27 miles      COVID-19 Status: Regular Operations   [address] [place] [address]  Language: English, Albanian  Hours: Mon - Sun Open 24 Hours  Fees: Insurance   Phone: (996) 179-1756 Email: info@[email or web address] Website: https://[facility].org/services-programs/residential-services/hayley-[facility] [place]-house/     Mental health support group  7  [facility] Distance: 1.73 miles      COVID-19 Status: Phone/Virtual   [address] [facility] [address] [place] [address]  Language: English  Hours: Mon - Fri 8:00 AM - 4:30 PM  Fees: Free   Phone: (616) [phone] Website: https://www.[place].gov/find-locations/facility/vc_0416V     8  [facility] Distance: 4.6 miles      COVID-19 Status: Phone/Virtual   [address] [address]  Language: English  Hours: Mon - Thu 7:00 AM - 8:00 PM , Fri 7:00 AM - 5:00 PM  Fees: Insurance, Self Pay   Phone: (935) 970-5437 Email: chad@[employer] Â® Website: https://www.[employer] Â®/our-locations/[place]/[facility]/          Important Numbers & Websites       Emergency Services   911  City Services   311  Poison Control   (927) 950-2561  Suicide Prevention Lifeline   (375) 992-3489 (TALK)  Child Abuse Hotline   (317) 188-2485 (4-A-Child)  Sexual Assault Hotline   (695) 781-6005 (HOPE)  [facility]    (355) 336-8347 (RUNAWAY)  All-[employer] Talkline   (231) 776-4957  Substance Abuse Referral   (853) [phone] (HELP)

## 2022-06-21 ENCOUNTER — TRANSFERRED RECORDS (OUTPATIENT)
Dept: HEALTH INFORMATION MANAGEMENT | Facility: CLINIC | Age: 57
End: 2022-06-21

## 2022-06-22 ENCOUNTER — TELEPHONE (OUTPATIENT)
Dept: FAMILY MEDICINE | Facility: CLINIC | Age: 57
End: 2022-06-22

## 2022-06-22 NOTE — TELEPHONE ENCOUNTER
[facility] phone call message- general phone call:    Reason for call:     Caller advised franc alequentin on Friday and it is red and swollen. Would like to know if it  Is okay and if it is normal. Please call back and advised. Thank you.    Return call needed: Yes    OK to leave a message on voice mail? Yes    Primary language: English      needed? No    Call taken on June 22, 2022 at 8:03 AM by Adelaida Gutierrez

## 2022-06-22 NOTE — TELEPHONE ENCOUNTER
Called Mary Ann to discuss, she stated it is red, swollen, and painful. Advised can be common with IM injections. Recommended some tylenol for the pain and icing the area to bring the swelling and pain down. Requested a call back if not improving, Mary Ann verbalized understanding. Basilio SMITH

## 2022-06-27 ENCOUNTER — PATIENT OUTREACH (OUTPATIENT)
Dept: FAMILY MEDICINE | Facility: CLINIC | Age: 57
End: 2022-06-27

## 2022-06-27 NOTE — PROGRESS NOTES
Clinic Care Coordination Contact    Follow Up Progress Note      Assessment:  I got the pt ED discharge paperwork, I called to check up on the pt and help the pt setup a ED follow up. The pt was at [facility] in [place] for eye problem. I called the pt, but got her vm, so I left a vm for the pt to give me a call back.     Care Gaps:    Health Maintenance Due   Topic Date Due     COPD ACTION PLAN  Never done     ADVANCE CARE PLANNING  11/15/2017     LUNG CANCER SCREENING  10/02/2018     ZOSTER IMMUNIZATION (1 of 2) 11/30/2018     DIABETIC FOOT EXAM  09/25/2019     MAMMO SCREENING  09/10/2021     COVID-19 Vaccine (3 - Pfizer risk series) 01/11/2022     A1C  05/02/2022           Goals addressed this encounter:    Goals Addressed    None         Intervention/Education provided during outreach:               Plan:       Care Coordinator will follow up in month

## 2022-06-28 ENCOUNTER — PATIENT OUTREACH (OUTPATIENT)
Dept: FAMILY MEDICINE | Facility: CLINIC | Age: 57
End: 2022-06-28

## 2022-06-28 NOTE — PROGRESS NOTES
Clinic Care Coordination Contact    Follow Up Progress Note      Assessment: The pt was recently in the ED, I called to check up on the pt, and help the pt setup a ED follow up. The pt was at the [facility] in [place] for a eye problem. I called and talked to the pt, pt stated that she is doing better. She stated that she has a follow for tomorrow at 2:20pm with .     Care Gaps:    Health Maintenance Due   Topic Date Due     COPD ACTION PLAN  Never done     ADVANCE CARE PLANNING  11/15/2017     LUNG CANCER SCREENING  10/02/2018     ZOSTER IMMUNIZATION (1 of 2) 11/30/2018     DIABETIC FOOT EXAM  09/25/2019     MAMMO SCREENING  09/10/2021     COVID-19 Vaccine (3 - Pfizer risk series) 01/11/2022     A1C  05/02/2022           Goals addressed this encounter:    Goals Addressed    None         Intervention/Education provided during outreach:               Plan:     Care Coordinator will follow up in month

## 2022-06-29 ENCOUNTER — OFFICE VISIT (OUTPATIENT)
Dept: FAMILY MEDICINE | Facility: CLINIC | Age: 57
End: 2022-06-29
Payer: COMMERCIAL

## 2022-06-29 VITALS
HEIGHT: 60 IN | OXYGEN SATURATION: 96 % | BODY MASS INDEX: 33.96 KG/M2 | TEMPERATURE: 98.4 F | SYSTOLIC BLOOD PRESSURE: 137 MMHG | HEART RATE: 82 BPM | DIASTOLIC BLOOD PRESSURE: 85 MMHG | RESPIRATION RATE: 22 BRPM | WEIGHT: 173 LBS

## 2022-06-29 DIAGNOSIS — H10.31 ACUTE CONJUNCTIVITIS OF RIGHT EYE, UNSPECIFIED ACUTE CONJUNCTIVITIS TYPE: Primary | ICD-10-CM

## 2022-06-29 PROCEDURE — 99214 OFFICE O/P EST MOD 30 MIN: CPT | Mod: GC | Performed by: STUDENT IN AN ORGANIZED HEALTH CARE EDUCATION/TRAINING PROGRAM

## 2022-06-29 RX ORDER — ERYTHROMYCIN 5 MG/G
0.5 OINTMENT OPHTHALMIC 4 TIMES DAILY
Qty: 14 G | Refills: 0 | Status: SHIPPED | OUTPATIENT
Start: 2022-06-29 | End: 2022-07-06

## 2022-06-29 NOTE — PROGRESS NOTES
"  Assessment & Plan     Acute conjunctivitis of right eye, unspecified acute conjunctivitis type  One week history of red, swollen, and erythematous right eye with photophobia on right side. She was seen in urgent care where she was prescribed antibiotic drops which were not helpful. Pupils equal, round, and reactive to light, low suspicion for uveitis. Most likely is viral conjunctivitis, although the unilateral picture fits more with bacterial. Plan to prescribe erythromycin ointment to see if this yields any improvement. We will also refer to ophthalmology for a further evaluation. Patient sinus pressure sensation likely due to inflamed right orbit. Will treat for now and if sinus pressure still present after, will consider treatment.   - erythromycin (ROMYCIN) 5 MG/GM ophthalmic ointment; Place 0.5 inches into the right eye 4 times daily for 7 days  - Adult Eye Referral; Future    Sergo Doll, MS4    I was present with the medical student who participated in the service and in the documentation of the note. I have verified the history and personally performed the physical exam and medical decision making. I agree with the assessment and plan of care as documented in the note.    Evelyn Mchugh MD  [facility] Family Medicine Residency Program, PGY-3  [facility]    Precepted with Dr. Rafiq Krause    Rasheeda Reese is a 57 year old, presenting for the following health issues:  [facility] follow up (For right eye pain)      HPI   Was seen in the ER one week ago for conjunctivitis that they felt was bacterial. Prescribed topical antibiotics that have not been helpful. She still has stabbing pains in her forehead and right eye. She states her vision is blurry in her right eye. Light makes the pain worse. She states she hears crackles in her sinuses at times. She \"has a lot of headaches\" and has been treating with Tylenol. She describes the pain as sharp and shooting and is mostly located " behind her forehead. She denies any pain over the temporal region.     Review of Systems   Constitutional, HEENT, cardiovascular, pulmonary, gi and gu systems are negative, except as otherwise noted.        Objective    /85   Pulse 82   Temp 98.4  F (36.9  C)   Resp 22   Ht 1.524 m (5')   Wt 78.5 kg (173 lb)   SpO2 96%   BMI 33.79 kg/m    Body mass index is 33.79 kg/m .  Physical Exam   GENERAL: alert and no distress  EYES: Right eye injected appearing, no cobblestoning of mucosa of right eye. Eye lids swollen, no surrounding skin erythema. . Pupils equal, round, and reactive to light. EOMI without tenderness.   RESP: lungs clear to auscultation - no rales, rhonchi or wheezes  CV: normal appearing circulation  MS: no gross musculoskeletal defects noted, no edema  NEURO: Mentation and speech intact, gait normal  PSYC: mood and affect appropriate.       .  ..

## 2022-07-06 LAB — RETINOPATHY: NORMAL

## 2022-07-12 ENCOUNTER — OFFICE VISIT (OUTPATIENT)
Dept: FAMILY MEDICINE | Facility: CLINIC | Age: 57
End: 2022-07-12
Payer: COMMERCIAL

## 2022-07-12 VITALS
DIASTOLIC BLOOD PRESSURE: 82 MMHG | BODY MASS INDEX: 34.55 KG/M2 | RESPIRATION RATE: 20 BRPM | HEIGHT: 60 IN | WEIGHT: 176 LBS | OXYGEN SATURATION: 97 % | TEMPERATURE: 97.7 F | HEART RATE: 93 BPM | SYSTOLIC BLOOD PRESSURE: 138 MMHG

## 2022-07-12 DIAGNOSIS — J44.1 COPD EXACERBATION (H): Primary | ICD-10-CM

## 2022-07-12 DIAGNOSIS — N95.1 MENOPAUSAL FLUSHING: ICD-10-CM

## 2022-07-12 PROCEDURE — 99214 OFFICE O/P EST MOD 30 MIN: CPT | Performed by: STUDENT IN AN ORGANIZED HEALTH CARE EDUCATION/TRAINING PROGRAM

## 2022-07-12 RX ORDER — PREDNISONE 20 MG/1
40 TABLET ORAL DAILY
Qty: 40 TABLET | Refills: 1 | Status: SHIPPED | OUTPATIENT
Start: 2022-07-12 | End: 2022-07-12

## 2022-07-12 RX ORDER — CLONIDINE HYDROCHLORIDE 0.1 MG/1
0.05 TABLET ORAL 2 TIMES DAILY
Qty: 30 TABLET | Refills: 0 | Status: SHIPPED | OUTPATIENT
Start: 2022-07-12 | End: 2022-07-21

## 2022-07-12 RX ORDER — CLONIDINE HYDROCHLORIDE 0.1 MG/1
0.05 TABLET ORAL 2 TIMES DAILY
Qty: 30 TABLET | Refills: 0 | Status: SHIPPED | OUTPATIENT
Start: 2022-07-12 | End: 2022-07-12

## 2022-07-12 RX ORDER — PREDNISONE 20 MG/1
40 TABLET ORAL DAILY
Qty: 40 TABLET | Refills: 1 | Status: SHIPPED | OUTPATIENT
Start: 2022-07-12 | End: 2022-07-21

## 2022-07-12 NOTE — PATIENT INSTRUCTIONS
For your breathing:   -Start your Spiriva  -Take your Symbicort two puffs twice a day every day.     Helped patient set up a follow up appointment with [facility] Rheumatology for a video visit on Aug. 19th at 10 am

## 2022-07-12 NOTE — PROGRESS NOTES
1. COPD exacerbation (H)-significant night time symptoms. Discussed importance of smoking cessation.  Treat for exacerbartion.  Emphasized importance of controller inhalers.  Exam largely benign today.  Consider allergic trigger at night if sx persist/  - predniSONE (DELTASONE) 20 MG tablet; Take 2 tablets (40 mg) by mouth daily  Dispense: 40 tablet; Refill: 1    2. Menopausal flushing-trial of clonidine for this condition.  Given ongoing smoking do not want to initiate hormone therapy  - cloNIDine (CATAPRES) 0.1 MG tablet; Take 0.5 tablets (0.05 mg) by mouth 2 times daily  Dispense: 30 tablet; Refill: 0  - cloNIDine (CATAPRES) 0.1 MG tablet; Take 0.5 tablets (0.05 mg) by mouth 2 times daily  Dispense: 30 tablet; Refill: 0    3. History RA, Chron's Disease: needs to re-establish with rheumatology.  Patient  to assist patient today    4. Recent dx iritis: Improved with steroid drops.  Pain much improved.  Encourage patient to complete course of therapy per patient recommendation was to go back on immune modulating medications.  We will again help get back into rheumatology to manage this.  Patient in agreement with this plan.  She will continue with topical treatment.  RTC 2-4 weeks.     Rasheeda Reese is a 57 year old, presenting for the following health issues:  RECHECK (eye/) and Cough (X 1-2 weeks)    Patient recently diagnosed with iritis.     specialty clinic previously prescribed Humira for her RA and Chron's but is not longer on this because she has been lost to follow up with Rheum and GI.   From a GI standpoint is having frequent BMs with occasional pain during defecation but no bleeding.     Needs to get back in with Rhematology.  Previously seen at the  specialty center.     Currently on steroid eye drops for the iritis.     Breathing issues:   Waking frequently with breathing issues.  + cough.  Occasional sputum.   Needs to wake and take a neb.    Happening for the last 1-2 weeks.      Has not been taking her Spiriva   Has been taking Symbicort when she remembers.     Chief Complaint   Patient presents with     RECHECK     eye       Cough     X 1-2 weeks           Objective    /82   Pulse 93   Temp 97.7  F (36.5  C)   Resp 20   Ht 1.524 m (5')   Wt 79.8 kg (176 lb)   SpO2 97%   BMI 34.37 kg/m    Body mass index is 34.37 kg/m .  Physical Exam   GEN: NAD  HEENT: head atraumatic, normocephalic, moist mucous membranes, eyes anicteric  CV: RRR w/o M/R/G  PULM: no significant wheeze appreciable on exam today despite significant nocturnal sx  ABD: soft, non-tender, no appreciable masses, no guarding, BS present  Neuro: alert and oriented x 3, CN II-XII intact, normal gross motor movements  Psych: appropriate  Ext: no peripheral edema              .  ..

## 2022-07-13 ENCOUNTER — TRANSFERRED RECORDS (OUTPATIENT)
Dept: HEALTH INFORMATION MANAGEMENT | Facility: CLINIC | Age: 57
End: 2022-07-13

## 2022-07-13 LAB — RETINOPATHY: NORMAL
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
## 2022-07-21 ENCOUNTER — OFFICE VISIT (OUTPATIENT)
Dept: FAMILY MEDICINE | Facility: CLINIC | Age: 57
End: 2022-07-21
Payer: COMMERCIAL

## 2022-07-21 VITALS
TEMPERATURE: 98.7 F | OXYGEN SATURATION: 96 % | RESPIRATION RATE: 20 BRPM | WEIGHT: 178.08 LBS | DIASTOLIC BLOOD PRESSURE: 85 MMHG | SYSTOLIC BLOOD PRESSURE: 131 MMHG | HEART RATE: 82 BPM | BODY MASS INDEX: 34.78 KG/M2

## 2022-07-21 DIAGNOSIS — J44.1 COPD EXACERBATION (H): Primary | ICD-10-CM

## 2022-07-21 DIAGNOSIS — J45.40 MODERATE PERSISTENT ASTHMA WITHOUT COMPLICATION: ICD-10-CM

## 2022-07-21 PROCEDURE — 99214 OFFICE O/P EST MOD 30 MIN: CPT | Mod: CS | Performed by: STUDENT IN AN ORGANIZED HEALTH CARE EDUCATION/TRAINING PROGRAM

## 2022-07-21 PROCEDURE — U0005 INFEC AGEN DETEC AMPLI PROBE: HCPCS | Performed by: STUDENT IN AN ORGANIZED HEALTH CARE EDUCATION/TRAINING PROGRAM

## 2022-07-21 PROCEDURE — U0003 INFECTIOUS AGENT DETECTION BY NUCLEIC ACID (DNA OR RNA); SEVERE ACUTE RESPIRATORY SYNDROME CORONAVIRUS 2 (SARS-COV-2) (CORONAVIRUS DISEASE [COVID-19]), AMPLIFIED PROBE TECHNIQUE, MAKING USE OF HIGH THROUGHPUT TECHNOLOGIES AS DESCRIBED BY CMS-2020-01-R: HCPCS | Performed by: STUDENT IN AN ORGANIZED HEALTH CARE EDUCATION/TRAINING PROGRAM

## 2022-07-21 RX ORDER — PREDNISONE 20 MG/1
40 TABLET ORAL DAILY
Qty: 10 TABLET | Refills: 0 | Status: SHIPPED | OUTPATIENT
Start: 2022-07-21 | End: 2022-07-26

## 2022-07-21 RX ORDER — INHALER, ASSIST DEVICES
SPACER (EA) MISCELLANEOUS
Qty: 3 EACH | Refills: 0 | Status: SHIPPED | OUTPATIENT
Start: 2022-07-21 | End: 2022-08-01

## 2022-07-21 RX ORDER — ALBUTEROL SULFATE 90 UG/1
2 AEROSOL, METERED RESPIRATORY (INHALATION) EVERY 6 HOURS PRN
Qty: 18 G | Refills: 3 | Status: SHIPPED | OUTPATIENT
Start: 2022-07-21 | End: 2022-08-11

## 2022-07-21 ASSESSMENT — ASTHMA QUESTIONNAIRES: ACT_TOTALSCORE: 6

## 2022-07-21 NOTE — PROGRESS NOTES
I have personally reviewed the history and examination as documented by Dr. Tucker and [facility].  I was present during key portions of the visit and agree with the assessment and plan as documented for 57 yr old female w/ COPD, tobacco use hx here for chronic cough. Will swab for COVID. Will tx as COPD flare w/ pred which was prescribed at last visit and pt did not .  Precautions given. Anticipatory guidance given.     Kenneth Jackson MD  July 21, 2022  3:25 PM

## 2022-07-22 LAB — SARS-COV-2 RNA RESP QL NAA+PROBE: NEGATIVE

## 2022-08-01 ENCOUNTER — OFFICE VISIT (OUTPATIENT)
Dept: FAMILY MEDICINE | Facility: CLINIC | Age: 57
End: 2022-08-01
Payer: COMMERCIAL

## 2022-08-01 VITALS
HEART RATE: 118 BPM | OXYGEN SATURATION: 95 % | WEIGHT: 170 LBS | TEMPERATURE: 98.3 F | HEIGHT: 63 IN | RESPIRATION RATE: 20 BRPM | SYSTOLIC BLOOD PRESSURE: 136 MMHG | DIASTOLIC BLOOD PRESSURE: 87 MMHG | BODY MASS INDEX: 30.12 KG/M2

## 2022-08-01 DIAGNOSIS — E11.65 TYPE 2 DIABETES MELLITUS WITH HYPERGLYCEMIA, WITHOUT LONG-TERM CURRENT USE OF INSULIN (H): ICD-10-CM

## 2022-08-01 DIAGNOSIS — J44.1 COPD EXACERBATION (H): ICD-10-CM

## 2022-08-01 DIAGNOSIS — F17.200 TOBACCO DEPENDENCE SYNDROME: ICD-10-CM

## 2022-08-01 DIAGNOSIS — N95.2 VAGINAL ATROPHY: Primary | ICD-10-CM

## 2022-08-01 PROBLEM — F06.30 MOOD DISORDER AS LATE EFFECT OF TRAUMATIC BRAIN INJURY (H): Status: RESOLVED | Noted: 2018-01-23 | Resolved: 2022-08-01

## 2022-08-01 PROBLEM — S06.9XAS MOOD DISORDER AS LATE EFFECT OF TRAUMATIC BRAIN INJURY (H): Status: RESOLVED | Noted: 2018-01-23 | Resolved: 2022-08-01

## 2022-08-01 LAB — HBA1C MFR BLD: 7.7 % (ref 0–5.6)

## 2022-08-01 PROCEDURE — 99214 OFFICE O/P EST MOD 30 MIN: CPT | Performed by: STUDENT IN AN ORGANIZED HEALTH CARE EDUCATION/TRAINING PROGRAM

## 2022-08-01 PROCEDURE — 83036 HEMOGLOBIN GLYCOSYLATED A1C: CPT | Performed by: STUDENT IN AN ORGANIZED HEALTH CARE EDUCATION/TRAINING PROGRAM

## 2022-08-01 PROCEDURE — 36415 COLL VENOUS BLD VENIPUNCTURE: CPT | Performed by: STUDENT IN AN ORGANIZED HEALTH CARE EDUCATION/TRAINING PROGRAM

## 2022-08-01 RX ORDER — ESTRADIOL 0.1 MG/G
2 CREAM VAGINAL
Qty: 42.5 G | Refills: 0 | Status: SHIPPED | OUTPATIENT
Start: 2022-08-01 | End: 2023-05-17

## 2022-08-01 RX ORDER — INHALER, ASSIST DEVICES
SPACER (EA) MISCELLANEOUS
Qty: 2 EACH | Refills: 0 | Status: SHIPPED | OUTPATIENT
Start: 2022-08-01 | End: 2023-01-16

## 2022-08-01 ASSESSMENT — ASTHMA QUESTIONNAIRES: ACT_TOTALSCORE: 8

## 2022-08-01 NOTE — PROGRESS NOTES
Assessment & Plan     1. Type 2 diabetes mellitus with hyperglycemia, without long-term current use of insulin (H)  -a1c today  -continue current    2. COPD exacerbation (H)-stable in clinic.  Do wonder the degree to which environmental irritants from home are bothering her (mouse droppings, dust etc).  ACT 9 today.  Reports consistent use of inhalers.  Smoking again  - spacer (OPTICHAMBER YARELIS) holding chamber; Spacer for albuterol MDI and symbicort MDI and spiriva MDI  Dispense: 2 each; Refill: 0      3. Vaginal atrophy-per review of ACOG guidelines ok to use topical estrogen without progesterone.  Will trial estrace cream and monitor sx  - estradiol (ESTRACE) 0.1 MG/GM vaginal cream; Place 2 g vaginally twice a week  Dispense: 42.5 g; Refill: 0    4. Tobacco dependence syndrome  -recommend re-invigorating quitting efforts once she has moved        Joanna Farah MD  [facility]    Rasheeda Reese is a 57 year old, presenting for the following health issues:  RECHECK (Hot flash and asthma)      HPI   Follow up COPD:  Patient returns today to follow up COPD exacerbation   Stopped prednisone because she got scared about elevated blood sugars.  Took the prednisone 3 days   Using her inhalers.  Chamber was out of stock and was unable to .     Desires estradiol cream:   Experiencing a lot of vaginal dryness and itching.   Still has uterus, no history reproductive cancers in self.  Grandmother had breast cancer but lived until 82  + vaginal dryness and itching.  Bothering her more than vasomotor sx.     Moving companies for supportive housing:   Starting with a new company called [employer].  Excited about this change and the prospect to move. Feeling safe with boyfried.      DM II, not insulin dependent:   Due for A1c today  Reports compliance with medications          Review of Systems         Objective    /87   Pulse 118   Temp 98.3  F (36.8  C)   Resp 20   " Ht 1.6 m (5' 3\")   Wt 77.1 kg (170 lb)   SpO2 95%   BMI 30.11 kg/m    Body mass index is 30.11 kg/m .  Physical Exam   GEN: NAD  HEENT: head atraumatic, normocephalic, moist mucous membranes, eyes anicteric  CV: RRR w/o M/R/G  PULM: CTAB  ABD: soft, non-tender, no appreciable masses, no guarding, BS present  Neuro: alert and oriented x 3, CN II-XII intact, normal gross motor movements  Psych: appropriate  Ext: no peripheral edema          .  ..  "

## 2022-08-01 NOTE — LETTER
August 8, 2022      Mary Ann MARIANA Wesley  [address]  [place] [address]        Dear ,    We are writing to inform you of your test results.    Your A1c in clinic was 7.7.   This is higher than we would like it to be.  I recommend that we repeat diabetes education regarding diet and work on taking all your diabetes medicines as recommended.     If you would like to have a visit with Valeria for diabetes education I can help set that up.  Just call the clinic and leave me a message that you would like a diabtic education visit.        Resulted Orders   HEMOGLOBIN A1C   Result Value Ref Range    Hemoglobin A1C 7.7 (H) 0.0 - 5.6 %      Comment:      Normal <5.7%   Prediabetes 5.7-6.4%    Diabetes 6.5% or higher     Note: Adopted from ADA consensus guidelines.       If you have any questions or concerns, please call the clinic at the number listed above.       Sincerely,      Joanna Farah MD

## 2022-08-10 DIAGNOSIS — J45.40 MODERATE PERSISTENT ASTHMA WITHOUT COMPLICATION: ICD-10-CM

## 2022-08-10 DIAGNOSIS — R05.9 COUGH: ICD-10-CM

## 2022-08-10 RX ORDER — ALBUTEROL SULFATE 0.83 MG/ML
2.5 SOLUTION RESPIRATORY (INHALATION) EVERY 6 HOURS PRN
Qty: 180 ML | Status: CANCELLED | OUTPATIENT
Start: 2022-08-10

## 2022-08-10 NOTE — TELEPHONE ENCOUNTER
Message to physician:     Date of last visit: 8/1/2022    Date of next visit if scheduled:     Potassium   Date Value Ref Range Status   03/14/2022 3.9 3.5 - 5.0 mmol/L Final   11/16/2020 4.5 3.4 - 5.3 mmol/L Final     Creatinine   Date Value Ref Range Status   03/14/2022 0.69 0.60 - 1.10 mg/dL Final   11/16/2020 0.5 (L) 0.6 - 1.3 mg/dL Final     GFR Estimate   Date Value Ref Range Status   03/14/2022 >90 >60 mL/min/1.73m2 Final     Comment:     Effective December 21, 2021 eGFRcr in adults is calculated using the 2021 CKD-EPI creatinine equation which includes age and gender (Devin et al., NE, DOI: 10.1056/BYDLjv5152883)   04/06/2021 >60 >60 mL/min/1.73m2 Final   08/29/2014 90 >60 mL/min/1.7m2 Final     Comment:     Non  GFR Calc       BP Readings from Last 3 Encounters:   08/01/22 136/87   07/21/22 131/85   07/12/22 138/82       Hemoglobin A1C   Date Value Ref Range Status   08/01/2022 7.7 (H) 0.0 - 5.6 % Final     Comment:     Normal <5.7%   Prediabetes 5.7-6.4%    Diabetes 6.5% or higher     Note: Adopted from ADA consensus guidelines.   11/16/2020 5.9 (H) 4.1 - 5.7 % Final       Please complete refill and CLOSE ENCOUNTER.  Closing the encounter signifies the refill is complete.

## 2022-08-11 DIAGNOSIS — J45.40 MODERATE PERSISTENT ASTHMA WITHOUT COMPLICATION: ICD-10-CM

## 2022-08-11 NOTE — TELEPHONE ENCOUNTER
Patient called unhappy, wanting to speak to supervisor. I informed supervisor, Sofie. And let patient know I will resend another message over to provider regarding this refill. Informed her when these requests are put in we typically gave provider 2-3 business days to fill. Patient stated this is her rescue inhaler and needs to be put in urgently. Sofie will give her a call regarding this and look more into it. Thank you

## 2022-08-11 NOTE — TELEPHONE ENCOUNTER
Returned call to patient as she asked to speak with a supervisor. I explained to Mary Ann that the providers have 72 hours to complete refill requests and that is our policy.   After looking in patient chart, we sent a prescription over on 7/21 for her albuterol inhaler with 3 refills. I explained to patient that she has refills remaining but the prescription was sent to the [facility] on [address]. Patient was trying to refill prescription at [facility] on [address]. I informed patient that [facility] on [address] can pull over the refills from the [facility] on [address] but she will have to ask them to do so.  Patient stated she will call the pharmacy back and ask them to pull in prescription.

## 2022-08-12 RX ORDER — ALBUTEROL SULFATE 90 UG/1
2 AEROSOL, METERED RESPIRATORY (INHALATION) EVERY 6 HOURS PRN
Qty: 18 G | Refills: 3 | Status: SHIPPED | OUTPATIENT
Start: 2022-08-12 | End: 2022-08-30

## 2022-08-12 RX ORDER — ALBUTEROL SULFATE 90 UG/1
2 AEROSOL, METERED RESPIRATORY (INHALATION) EVERY 6 HOURS PRN
Qty: 18 G | Refills: 3 | Status: SHIPPED | OUTPATIENT
Start: 2022-08-12 | End: 2022-09-17

## 2022-08-26 DIAGNOSIS — Z78.0 POSTMENOPAUSAL STATUS: ICD-10-CM

## 2022-08-26 RX ORDER — ERGOCALCIFEROL 1.25 MG/1
50000 CAPSULE, LIQUID FILLED ORAL WEEKLY
Qty: 12 CAPSULE | Refills: 1 | Status: SHIPPED | OUTPATIENT
Start: 2022-08-26 | End: 2023-06-11

## 2022-08-28 ENCOUNTER — TELEPHONE (OUTPATIENT)
Dept: FAMILY MEDICINE | Facility: CLINIC | Age: 57
End: 2022-08-28

## 2022-08-28 NOTE — TELEPHONE ENCOUNTER
Called patient back, clinic page sent regarding a cough.    Mary Ann is a 58 yo F w/ history of asthma, COPD, bipolar disorder, crohn's disease, T2DM, HLD.  Last seen in clinic on 8/1 with Dr. Farah. Appears COPD was stable (prior exacerbation) in clinic and her ACT was 9 at that time, recently started smoking again.    -Sick for the last two weeks, cough, dry/crackly voice, some fatigue  -Phlegm is clear to light green, this is new for her  -Feels more short of breath with significant activity; does not sound short of breath over the phone; asked to walk around and talk-- no shortness of breath noted  -Needing inhalers more often  -Subjective fevers/chills-- no  -Does not have pulse ox at home  -Hx of exacerbation requiring hospitalization 6-7 years ago    Inhaler use: albuterol inhaler-- 6-7 times a day, albuterol neb-- 2-3 times a day, symbicort inhaler-- twice a day     Chest pain, abdominal pain, nausea/vomiting, dysuria, changes in bowel movements, changes in skin - NO    -----  Likely COPD exacerbation w/ URI or d/t recently starting to smoke again  Symptoms c/w exacerbation. Does not demonstrate s/sx that warrant emergent evaluation or inpatient treatment.   - advised to continue taking inhalers and albuterol neb, counseled on using Symbicort as a rescue inhaler as well for today  - sent message to clinic team to set up office appointment Monday  - counseled on reasons to be evaluated more urgently    Marsha Rollins MD PGY2  [facility]   Pager #: [phone]

## 2022-08-29 ENCOUNTER — TELEPHONE (OUTPATIENT)
Dept: FAMILY MEDICINE | Facility: CLINIC | Age: 57
End: 2022-08-29

## 2022-08-29 NOTE — TELEPHONE ENCOUNTER
----- Message from Marsha Rollins MD sent at 8/28/2022 10:16 AM CDT -----  Regarding: Schedule patient for office visit monday  Patient called over the weekend with new cough, history of COPD. She needs to be seen in clinic early this week for evaluation.    She does have significant anxiety coming into the clinic and requests the door always stay open.    Thank you!    Marsha Rollins

## 2022-08-30 ENCOUNTER — OFFICE VISIT (OUTPATIENT)
Dept: FAMILY MEDICINE | Facility: CLINIC | Age: 57
End: 2022-08-30
Payer: COMMERCIAL

## 2022-08-30 DIAGNOSIS — J45.40 MODERATE PERSISTENT ASTHMA WITHOUT COMPLICATION: ICD-10-CM

## 2022-08-30 DIAGNOSIS — J01.90 ACUTE SINUSITIS WITH SYMPTOMS > 10 DAYS: Primary | ICD-10-CM

## 2022-08-30 PROCEDURE — 99213 OFFICE O/P EST LOW 20 MIN: CPT | Performed by: FAMILY MEDICINE

## 2022-08-30 RX ORDER — ALBUTEROL SULFATE 90 UG/1
2 AEROSOL, METERED RESPIRATORY (INHALATION) EVERY 6 HOURS PRN
Qty: 18 G | Refills: 3 | Status: SHIPPED | OUTPATIENT
Start: 2022-08-30 | End: 2022-11-17

## 2022-08-30 RX ORDER — BUDESONIDE AND FORMOTEROL FUMARATE DIHYDRATE 80; 4.5 UG/1; UG/1
2 AEROSOL RESPIRATORY (INHALATION) 2 TIMES DAILY
Qty: 10.2 G | Refills: 11 | Status: SHIPPED | OUTPATIENT
Start: 2022-08-30 | End: 2022-09-17

## 2022-08-30 NOTE — PROGRESS NOTES
"  Assessment & Plan     Acute sinusitis with symptoms > 10 days  Since symptoms have been present greater than 2 weeks with no improvement and has had sinus pressure, will treat as sinus infection. She has multiple drug allergies including cephalosporin that causes rash/itchy. Discussed possible cross-reactivity with PCN based medication and she was willing to try.  - amoxicillin-clavulanate (AUGMENTIN) 875-125 MG tablet  Dispense: 14 tablet; Refill: 0    Moderate persistent asthma without complication  Refill of her asthma medications were provided.  - budesonide-formoterol (SYMBICORT) 80-4.5 MCG/ACT Inhaler  Dispense: 10.2 g; Refill: 11  - albuterol (PROAIR HFA/PROVENTIL HFA/VENTOLIN HFA) 108 (90 Base) MCG/ACT inhaler  Dispense: 18 g; Refill: 3               BMI:   Estimated body mass index is 30.11 kg/m  as calculated from the following:    Height as of 8/1/22: 1.6 m (5' 3\").    Weight as of 8/1/22: 77.1 kg (170 lb).           No follow-ups on file.    Venita Sorenson DO  [facility]    Rasheeda Reese is a 57 year old, presenting for the following health issues:  COPD  (Dry cough for 2 weeks /Spitting out yellow and green /Pt skip weight and high and refuse second bp)      HPI     1. Patient has had more than 2 weeks of URI symptoms. She ha shad a dry cough and sneezing with nasal congestion. She has no fever or body aches, but feels fatigues and has sinus pressure and frontal headache. She has asthma and has been  using inhalers more often. She does have seasonal allergies. She has tried throat lozenges and delsym cough syrup.  She tells me she is living in an apartment that has mouse infestation. She is moving into a new place on 9-1-2022 and would like to feel better soon.    Objective    BP (!) 146/86   Pulse 90   Temp 98.6  F (37  C) (Oral)   Resp 16   SpO2 95%   There is no height or weight on file to calculate BMI.   Vitals were entered late but reviewed at time of " visit.  Physical Exam   GENERAL: healthy, alert and no distress  EYES: Eyes grossly normal to inspection, PERRL and conjunctivae and sclerae normal  HENT: ear canals and TM's normal,  mouth without ulcers or lesions. Post nasal drip noted with erythematous nasal turbinates. Tender at frontal and maxillary sinus area bilateral  NECK: no adenopathy, no asymmetry, masses, or scars and thyroid normal to palpation  RESP: lungs clear to auscultation - no rales, rhonchi or wheezes  CV: regular rate and rhythm, normal S1 S2, no S3 or S4, no murmur, click or rub, no peripheral edema and peripheral pulses strong

## 2022-08-30 NOTE — PATIENT INSTRUCTIONS
Please take your allergy medication (loratadine) daily.    When you are at home please use albuterol nebulizer and save your albuterol inhaler for moving day. You have a sinus infection and would benefit from taking an antibiotic.    Start augmentin for sinus infection.

## 2022-09-01 VITALS
TEMPERATURE: 98.6 F | DIASTOLIC BLOOD PRESSURE: 86 MMHG | SYSTOLIC BLOOD PRESSURE: 146 MMHG | RESPIRATION RATE: 16 BRPM | HEART RATE: 90 BPM | OXYGEN SATURATION: 95 %

## 2022-09-12 ENCOUNTER — TELEPHONE (OUTPATIENT)
Dept: FAMILY MEDICINE | Facility: CLINIC | Age: 57
End: 2022-09-12

## 2022-09-12 NOTE — TELEPHONE ENCOUNTER
[facility] phone call message- medication clarification/question:    Full Medication Name:     albuterol (PROAIR HFA/PROVENTIL HFA/VENTOLIN HFA)       Dose:     108 (90 Base) MCG/ACT inhaler    Question:     Patient called and left 2 voicemail on machine during the weekend. Patient advised do not have active insurance and needing medication. If there is something that we can do to help, please call back and advised and placed in [facility]    Pharmacy confirmed as [id number] - [place], [place] - [address]: Yes    OK to leave a message on voice mail? Yes    Primary language: English      needed? No    Call taken on September 12, 2022 at 9:35 AM by Adelaida Gutierrez

## 2022-09-16 NOTE — TELEPHONE ENCOUNTER
COPD Education    Called patient and left message. Told patient to call if she had any questions and that we would call again next on Friday.   Our phone number is [phone].    BRANDY Montgomery, COPD educator  
[[phone]]

## 2022-09-17 ENCOUNTER — TELEPHONE (OUTPATIENT)
Dept: FAMILY MEDICINE | Facility: CLINIC | Age: 57
End: 2022-09-17

## 2022-09-17 DIAGNOSIS — J45.40 MODERATE PERSISTENT ASTHMA WITHOUT COMPLICATION: ICD-10-CM

## 2022-09-17 DIAGNOSIS — J44.1 CHRONIC OBSTRUCTIVE PULMONARY DISEASE WITH ACUTE EXACERBATION (H): ICD-10-CM

## 2022-09-17 RX ORDER — ALBUTEROL SULFATE 90 UG/1
2 AEROSOL, METERED RESPIRATORY (INHALATION) EVERY 6 HOURS PRN
Qty: 18 G | Refills: 3 | Status: SHIPPED | OUTPATIENT
Start: 2022-09-17 | End: 2022-11-17

## 2022-09-17 RX ORDER — BUDESONIDE AND FORMOTEROL FUMARATE DIHYDRATE 80; 4.5 UG/1; UG/1
2 AEROSOL RESPIRATORY (INHALATION) 2 TIMES DAILY
Qty: 10.2 G | Refills: 11 | Status: SHIPPED | OUTPATIENT
Start: 2022-09-17 | End: 2023-01-16

## 2022-09-19 DIAGNOSIS — L23.5 ALLERGIC DERMATITIS DUE TO OTHER CHEMICAL PRODUCT: ICD-10-CM

## 2022-09-19 RX ORDER — LORATADINE 10 MG/1
10 TABLET ORAL DAILY
Qty: 90 TABLET | Refills: 3 | Status: SHIPPED | OUTPATIENT
Start: 2022-09-19 | End: 2023-03-09

## 2022-09-21 ENCOUNTER — PATIENT OUTREACH (OUTPATIENT)
Dept: CARE COORDINATION | Facility: CLINIC | Age: 57
End: 2022-09-21

## 2022-09-21 NOTE — PROGRESS NOTES
Clinic Care Coordination Contact    Follow Up Progress Note      Assessment: The pt was recently in the ED, I called to check up on the pt, and help the pt setup a ED follow up. The pt was at [place] for asthma. I called the pt, but got her vm, so I left a vm for the pt to give me a call back. I tried calling the pt on 09/19/2022, 09/20/2022, and today. I have not gotten a hold of the pt or heard from her.     Care Gaps:    Health Maintenance Due   Topic Date Due     COPD ACTION PLAN  Never done     ADVANCE CARE PLANNING  11/15/2017     LUNG CANCER SCREENING  10/02/2018     ZOSTER IMMUNIZATION (1 of 2) 11/30/2018     DIABETIC FOOT EXAM  09/25/2019     MAMMO SCREENING  09/10/2021     COVID-19 Vaccine (3 - Pfizer risk series) 01/11/2022     INFLUENZA VACCINE (1) 09/01/2022     MICROALBUMIN  09/17/2022           Care Plans      Intervention/Education provided during outreach:               Plan:     Care Coordinator will follow up in

## 2022-09-28 ENCOUNTER — PATIENT OUTREACH (OUTPATIENT)
Dept: CARE COORDINATION | Facility: CLINIC | Age: 57
End: 2022-09-28

## 2022-09-28 NOTE — PROGRESS NOTES
Clinic Care Coordination Contact    Follow Up Progress Note      Assessment:   The pt was recently in the ED, I called to check up on the pt and help the pt setup a ED follow up. The pt was at [place] for SOB. I called the pt, but I kept getting a busy signal. I called the pt on 09/26/2022, 09/27/2022 and today. I have not gotten a hold of the pt.  Care Gaps:    Health Maintenance Due   Topic Date Due     COPD ACTION PLAN  Never done     ADVANCE CARE PLANNING  11/15/2017     LUNG CANCER SCREENING  10/02/2018     ZOSTER IMMUNIZATION (1 of 2) 11/30/2018     DIABETIC FOOT EXAM  09/25/2019     MAMMO SCREENING  09/10/2021     COVID-19 Vaccine (3 - Pfizer risk series) 01/11/2022     INFLUENZA VACCINE (1) 09/01/2022     MICROALBUMIN  09/17/2022           Care Plans      Intervention/Education provided during outreach:               Plan:     Care Coordinator will follow up in

## 2022-10-05 DIAGNOSIS — Z78.0 POSTMENOPAUSAL STATUS: ICD-10-CM

## 2022-11-01 DIAGNOSIS — E11.9 TYPE 2 DIABETES MELLITUS WITHOUT COMPLICATION, WITHOUT LONG-TERM CURRENT USE OF INSULIN (H): ICD-10-CM

## 2022-11-17 ENCOUNTER — OFFICE VISIT (OUTPATIENT)
Dept: FAMILY MEDICINE | Facility: CLINIC | Age: 57
End: 2022-11-17
Payer: MEDICARE

## 2022-11-17 VITALS
HEART RATE: 85 BPM | SYSTOLIC BLOOD PRESSURE: 129 MMHG | RESPIRATION RATE: 20 BRPM | OXYGEN SATURATION: 96 % | DIASTOLIC BLOOD PRESSURE: 82 MMHG | TEMPERATURE: 97.5 F

## 2022-11-17 DIAGNOSIS — J44.1 COPD EXACERBATION (H): Primary | ICD-10-CM

## 2022-11-17 PROCEDURE — 99214 OFFICE O/P EST MOD 30 MIN: CPT | Mod: GC

## 2022-11-17 RX ORDER — ALBUTEROL SULFATE 90 UG/1
2 AEROSOL, METERED RESPIRATORY (INHALATION) EVERY 4 HOURS PRN
Qty: 18 G | Refills: 3 | Status: SHIPPED | OUTPATIENT
Start: 2022-11-17 | End: 2023-02-27

## 2022-11-17 RX ORDER — CALCIUM CARBONATE/VITAMIN D3 500MG-5MCG
1 TABLET ORAL 2 TIMES DAILY
COMMUNITY
Start: 2022-10-05 | End: 2023-01-16

## 2022-11-17 RX ORDER — PREDNISONE 20 MG/1
40 TABLET ORAL DAILY
Qty: 14 TABLET | Refills: 0 | Status: SHIPPED | OUTPATIENT
Start: 2022-11-17 | End: 2022-11-24

## 2022-11-17 RX ORDER — BUDESONIDE AND FORMOTEROL FUMARATE DIHYDRATE 80; 4.5 UG/1; UG/1
2 AEROSOL RESPIRATORY (INHALATION)
COMMUNITY
Start: 2022-09-03 | End: 2022-12-30

## 2022-11-17 RX ORDER — ALBUTEROL SULFATE 90 UG/1
2 AEROSOL, METERED RESPIRATORY (INHALATION)
COMMUNITY
Start: 2022-06-24 | End: 2022-11-17

## 2022-11-17 RX ORDER — PREDNISOLONE ACETATE 10 MG/ML
SUSPENSION/ DROPS OPHTHALMIC
COMMUNITY
Start: 2022-07-07 | End: 2023-01-16

## 2022-11-17 RX ORDER — HYDROXYZINE HYDROCHLORIDE 25 MG/1
TABLET, FILM COATED ORAL
COMMUNITY
Start: 2022-10-05 | End: 2023-06-11

## 2022-11-17 RX ORDER — AZITHROMYCIN 250 MG/1
TABLET, FILM COATED ORAL
Qty: 6 TABLET | Refills: 0 | Status: SHIPPED | OUTPATIENT
Start: 2022-11-17 | End: 2022-11-22

## 2022-11-17 RX ORDER — UMECLIDINIUM BROMIDE AND VILANTEROL TRIFENATATE 62.5; 25 UG/1; UG/1
1 POWDER RESPIRATORY (INHALATION) DAILY
Qty: 14 EACH | Refills: 1 | Status: SHIPPED | OUTPATIENT
Start: 2022-11-17 | End: 2022-11-28

## 2022-11-17 NOTE — PROGRESS NOTES
"  Assessment & Plan     COPD exacerbation (H)  I believe pt's respiratory symptoms are largely due to COPD exacerbation in the setting of recent URI and running out of her inhalers. Of note, pt has had recent insurance issues and is currently working on this, requesting something other than current Symbicort as this is not covered. Afebrile here w/ O2 sats >95%. PE notable for moderate wheezing in all lung fields, primarily in GRACIE.  - azithromycin (ZITHROMAX) 250 MG tablet  Dispense: 6 tablet; Refill: 0  - predniSONE (DELTASONE) 20 MG tablet  Dispense: 14 tablet; Refill: 0  - umeclidinium-vilanterol (ANORO ELLIPTA) 62.5-25 MCG/ACT oral inhaler  Dispense: 14 each; Refill: 1  - albuterol (PROAIR HFA/PROVENTIL HFA/VENTOLIN HFA) 108 (90 Base) MCG/ACT inhaler  Dispense: 18 g; Refill: 3  - F/u in 1 week to check on symptoms as well as medications    BMI:   Estimated body mass index is 30.11 kg/m  as calculated from the following:    Height as of 8/1/22: 1.6 m (5' 3\").    Weight as of 8/1/22: 77.1 kg (170 lb).     No follow-ups on file.    Mohan Larose MD  [facility]    Rasheeda Reese is a 57 year old, presenting for the following health issues:  Cough (Cough and SOB started 2 weeks ago)    HPI     Cough  - 2 weeks of cough now, productive w/ brown/green mucus  - Having some rib pain from significant coughing  - No fevers or chills  - No recent ill contacts  - Using albuterol neb 6-7 times a day w/o significant relief  - Out of Symbicort in the last several days insurance inssues    Review of Systems   Constitutional, HEENT, cardiovascular, pulmonary, gi and gu systems are negative, except as otherwise noted.        Objective    /82   Pulse 85   Temp 97.5  F (36.4  C)   Resp 20   SpO2 96%   There is no height or weight on file to calculate BMI.     Physical Exam   GENERAL: healthy, alert and no distress  EYES: Eyes grossly normal to inspection, PERRL and conjunctivae and " sclerae normal  HENT: ear canals and TM's normal, nose and mouth without ulcers or lesions  RESP: Mildly increased work of breathing, notable wheezing throughout all lung fields, worst in GRACIE. No crackles.  CV: regular rate and rhythm, normal S1 S2, no S3 or S4, no murmur, click or rub, no peripheral edema and peripheral pulses strong  MS: no gross musculoskeletal defects noted, no edema  SKIN: no suspicious lesions or rashes  NEURO: Normal strength and tone, mentation intact and speech normal  PSYCH: mentation appears normal, affect normal/bright    ----- Service Performed and Documented by Resident or Fellow ------

## 2022-11-25 ENCOUNTER — TELEPHONE (OUTPATIENT)
Dept: FAMILY MEDICINE | Facility: CLINIC | Age: 57
End: 2022-11-25

## 2022-11-25 DIAGNOSIS — J44.1 COPD EXACERBATION (H): ICD-10-CM

## 2022-11-25 NOTE — TELEPHONE ENCOUNTER
[facility] phone call message- medication clarification/question:    Full Medication Name: umeclidinium-vilanterol (ANORO ELLIPTA) 62.5-25 MCG/ACT oral inhaler    Question: Patient called stating insurance is not covering this medication - if an alternative can be sent. Please advise.    Pharmacy confirmed as    [id number] - [place], [place] - [address] [place] [address] W: Yes    OK to leave a message on voice mail? Yes    Primary language: English      needed? No    Call taken on November 25, 2022 at 2:15 PM by Vern Alford

## 2022-11-28 RX ORDER — UMECLIDINIUM BROMIDE AND VILANTEROL TRIFENATATE 62.5; 25 UG/1; UG/1
1 POWDER RESPIRATORY (INHALATION) DAILY
Qty: 14 EACH | Refills: 1 | Status: SHIPPED | OUTPATIENT
Start: 2022-11-28 | End: 2023-01-16

## 2022-11-28 NOTE — TELEPHONE ENCOUNTER
Patient called on 11/25 and left voicemail stating no one ever called her back and is needing her inhaler due to not being able to breath, patient would like a call back as son as possible I regards to inhaler. Please call and advise.

## 2022-11-29 NOTE — PROGRESS NOTES
Preceptor Attestation:  Patient's case reviewed and discussed with Mohan Larose MD resident and I evaluated the patient. I agree with written assessment and plan of care.  Supervising Physician:  MILAN WHITMAN MD  [facility]

## 2022-12-06 ENCOUNTER — TELEPHONE (OUTPATIENT)
Dept: FAMILY MEDICINE | Facility: CLINIC | Age: 57
End: 2022-12-06

## 2022-12-06 DIAGNOSIS — J44.1 COPD EXACERBATION (H): Primary | ICD-10-CM

## 2022-12-06 NOTE — TELEPHONE ENCOUNTER
[facility] phone call message- medication clarification/question:    Full Medication Name: ALL medications per patient did not state which ones - just kept saying all medications    Question: Patient called back asking about all her medications need to be sent to pharmacy and that she cannot afford to pay out of pocket for any of the medications. She stated someone called her and told her all the medications was called in to the pharmacy and she can  - I do not see any documentation anyone called her regarding ALL medications was sent to pharmacy. I informed her a message has been sent - we typically give  2-3 business days to respond to these messages. Patient requested a call back from Dr. Farah because she don't have $400 to pay out of pocket for her medication. Please call and advise thank you.      Previous message was routed to PA - will route also route to PCP.     Pharmacy confirmed as    [id number] - [place], [place] - [address] [place] [address] AVE W: Yes    OK to leave a message on voice mail? Yes    Primary language: English      needed? No    Call taken on December 6, 2022 at 1:47 PM by Vern Alford

## 2022-12-06 NOTE — TELEPHONE ENCOUNTER
[facility] phone call message- medication clarification/question:    Full Medication Name: tiotropium (SPIRIVA RESPIMAT) 2.5 MCG/ACT inhaler       albuterol (PROAIR HFA/PROVENTIL HFA/VENTOLIN HFA) 108 (90 Base) MCG/ACT inhaler    tiotropium (SPIRIVA RESPIMAT) 2.5 MCG/ACT inhaler    Question: Patient calls in regards to medication above, having to pay out of pocket. Patient does not have Medicare part D. Please call and advise, thank you.     Pharmacy confirmed as    [id number] - [place], [place] - [address] AT [address]  [id number] - [place], [place] - [address] [place] [address] AVE W: Yes    OK to leave a message on voice mail? Yes    Primary language: English      needed? No    Call taken on December 6, 2022 at 12:21 PM by Chris Castillo

## 2022-12-06 NOTE — TELEPHONE ENCOUNTER
Pharmacy request came over for a refill on albuterol sulfate 2.5mg/3ml nebulizer solution. Looking in the patient chart this medication is not active on patient med list. Dr. Kuo did document in an orders only encounter on 6/15/2022 that she sent a RX to the pharmacy but am not seeing the medication on pt med list.

## 2022-12-07 RX ORDER — ALBUTEROL SULFATE 0.83 MG/ML
2.5 SOLUTION RESPIRATORY (INHALATION) EVERY 6 HOURS PRN
Qty: 90 ML | Refills: 3 | Status: SHIPPED | OUTPATIENT
Start: 2022-12-07 | End: 2023-08-25

## 2022-12-08 ENCOUNTER — PATIENT OUTREACH (OUTPATIENT)
Dept: CARE COORDINATION | Facility: CLINIC | Age: 57
End: 2022-12-08

## 2022-12-08 NOTE — PROGRESS NOTES
Clinic Care Coordination Contact    Follow Up Progress Note      Assessment: The pt was recently in the ED, I called to check up on the pt, and help the pt setup a ED follow up. The pt was at [place] for back pain. I called the pt, but got her vm,so I left a vm for the pt to give me a call back.     Care Gaps:    Health Maintenance Due   Topic Date Due     COPD ACTION PLAN  Never done     ADVANCE CARE PLANNING  11/15/2017     LUNG CANCER SCREENING  10/02/2018     ZOSTER IMMUNIZATION (2 of 3) 11/30/2018     DIABETIC FOOT EXAM  09/25/2019     MAMMO SCREENING  09/10/2021     COVID-19 Vaccine (3 - Booster for Pfizer series) 02/08/2022     INFLUENZA VACCINE (1) 09/01/2022     MICROALBUMIN  09/17/2022     YEARLY PREVENTIVE VISIT  11/02/2022     LIPID  11/02/2022     COLORECTAL CANCER SCREENING  11/22/2022           Care Plans      Intervention/Education provided during outreach:               Plan:     Care Coordinator will follow up in

## 2022-12-09 ENCOUNTER — PATIENT OUTREACH (OUTPATIENT)
Dept: CARE COORDINATION | Facility: CLINIC | Age: 57
End: 2022-12-09

## 2022-12-09 NOTE — PROGRESS NOTES
Clinic Care Coordination Contact    Follow Up Progress Note      Assessment: The pt was recently in the ED, I called to check up on the pt, and help the pt setup a ED follow up. The pt was at [place] for back pain. I called and talked to the pt, pt stated that she is doing fine. Pt stated that she has an appointment on 12/26/2022 at 2:20pm with , and will just wait till then.     Care Gaps:    Health Maintenance Due   Topic Date Due     COPD ACTION PLAN  Never done     ADVANCE CARE PLANNING  11/15/2017     LUNG CANCER SCREENING  10/02/2018     ZOSTER IMMUNIZATION (2 of 3) 11/30/2018     DIABETIC FOOT EXAM  09/25/2019     MAMMO SCREENING  09/10/2021     COVID-19 Vaccine (3 - Booster for Pfizer series) 02/08/2022     INFLUENZA VACCINE (1) 09/01/2022     MICROALBUMIN  09/17/2022     YEARLY PREVENTIVE VISIT  11/02/2022     LIPID  11/02/2022     COLORECTAL CANCER SCREENING  11/22/2022           Care Plans      Intervention/Education provided during outreach:               Plan:     Care Coordinator will follow up in

## 2022-12-26 ENCOUNTER — OFFICE VISIT (OUTPATIENT)
Dept: FAMILY MEDICINE | Facility: CLINIC | Age: 57
End: 2022-12-26
Payer: MEDICARE

## 2022-12-26 ENCOUNTER — MEDICAL CORRESPONDENCE (OUTPATIENT)
Dept: HEALTH INFORMATION MANAGEMENT | Facility: CLINIC | Age: 57
End: 2022-12-26

## 2022-12-26 VITALS
WEIGHT: 186 LBS | BODY MASS INDEX: 32.95 KG/M2 | HEART RATE: 95 BPM | OXYGEN SATURATION: 97 % | DIASTOLIC BLOOD PRESSURE: 87 MMHG | RESPIRATION RATE: 22 BRPM | SYSTOLIC BLOOD PRESSURE: 134 MMHG

## 2022-12-26 DIAGNOSIS — Z13.220 SCREENING FOR HYPERLIPIDEMIA: ICD-10-CM

## 2022-12-26 DIAGNOSIS — Z12.31 OTHER SCREENING MAMMOGRAM: ICD-10-CM

## 2022-12-26 DIAGNOSIS — Z12.11 SCREEN FOR COLON CANCER: ICD-10-CM

## 2022-12-26 DIAGNOSIS — Z12.31 VISIT FOR SCREENING MAMMOGRAM: ICD-10-CM

## 2022-12-26 DIAGNOSIS — E11.65 TYPE 2 DIABETES MELLITUS WITH HYPERGLYCEMIA, WITHOUT LONG-TERM CURRENT USE OF INSULIN (H): ICD-10-CM

## 2022-12-26 PROCEDURE — 99214 OFFICE O/P EST MOD 30 MIN: CPT | Performed by: STUDENT IN AN ORGANIZED HEALTH CARE EDUCATION/TRAINING PROGRAM

## 2022-12-26 NOTE — PROGRESS NOTES
"  Assessment & Plan     Visit for screening mammogram  -due for mammogram, ordered  - MA SCREENING DIGITAL BILAT - Future  (s+30); Future    Type 2 diabetes mellitus with hyperglycemia, without long-term current use of insulin (H)  -needs medications refilled.   -due for A1c but declined today given insurance instability    Medication affordability issues:  -will set patient up to discuss with our clinical pharmacist  -potential internal referal to [facility] when on site to discuss her rights in coverage    Joanna Farah MD  [facility] Reese is a 57 year old, presenting for the following health issues:  Recheck Medication, Forms (Verification diet form), and Ear Problem (L sharp ear pain )      HPI   Moved and is now living at [facility].     Social security says that because you are here \"legally\" cannot apply for Medicare Part D.  Has been paying for   Is receiving social security benefits, has medicare parts a and b but wont let her have part D.      Medication affordability issues:  Cannot afford her inhalers   Imm[place] to the [place] when she was [age] through a Greencard     Can afford $40/month on medications.           Objective    /87   Pulse 95   Resp 22   Wt 84.4 kg (186 lb)   SpO2 97%   BMI 32.95 kg/m    Body mass index is 32.95 kg/m .  Physical Exam   GEN: NAD  HEENT: head atraumatic, normocephalic, moist mucous membranes, eyes anicteric  CV: RRR w/o M/R/G  PULM: CTAB  ABD: soft, non-tender, no appreciable masses, no guarding, BS present  Neuro: alert and oriented x 3, CN II-XII intact, normal gross motor movements  Psych: appropriate  Ext: no peripheral edema              "

## 2022-12-27 DIAGNOSIS — K21.9 GASTROESOPHAGEAL REFLUX DISEASE WITHOUT ESOPHAGITIS: ICD-10-CM

## 2022-12-30 ENCOUNTER — OFFICE VISIT (OUTPATIENT)
Dept: PHARMACY | Facility: CLINIC | Age: 57
End: 2022-12-30
Payer: MEDICAID

## 2022-12-30 DIAGNOSIS — J44.9 CHRONIC OBSTRUCTIVE PULMONARY DISEASE, UNSPECIFIED COPD TYPE (H): Primary | ICD-10-CM

## 2022-12-30 PROCEDURE — 99607 MTMS BY PHARM ADDL 15 MIN: CPT | Performed by: PHARMACIST

## 2022-12-30 PROCEDURE — 99605 MTMS BY PHARM NP 15 MIN: CPT | Performed by: PHARMACIST

## 2022-12-30 RX ORDER — FLUTICASONE PROPIONATE AND SALMETEROL 55; 14 UG/1; UG/1
1 POWDER, METERED RESPIRATORY (INHALATION) 2 TIMES DAILY
Qty: 1 EACH | Refills: 11 | Status: SHIPPED | OUTPATIENT
Start: 2022-12-30 | End: 2023-01-16

## 2022-12-30 NOTE — PATIENT INSTRUCTIONS
- Use the coupon to fill the AirDuo inhaler at the pharmacy.   - Take 1 puff twice daily   - Zachary suggested that you need to call your CADI workers supervisor and get the contact information for who is covering. It is within their role to help with your insurance barriers. We imagine you need to call Medicare and figure out how to sign up for Medicare Part D

## 2022-12-30 NOTE — PROGRESS NOTES
Medication Therapy Management (MTM) Encounter    ASSESSMENT:                            Medication Adherence/Access: See below for considerations    Chronic obstructive pulmonary disease, unspecified COPD type (H)  Uncontrolled, barrier is medication access. With history of asthma, need ICS as a component of therapy. No LAMA or LABA that is      PLAN:                            Patient Instructions   - Use the coupon to fill the AirDuo inhaler at the pharmacy.   - Take 1 puff twice daily   - Zachary suggested that you need to call your CADI workers supervisor and get the contact information for who is covering. It is within their role to help with your insurance barriers. We imagine you need to call Medicare and figure out how to sign up for Medicare Part D     While patient reports has filled out in the past, unclear date, filled out again the LIS application to help decrease Medicare Part D cost if approved.      Call today to Junior Hogan, patient's CADI worker. She doesn't have any other recommendations, other than to wait for SSA work with green card/getting Medicare reestablished. At that point then should work with [employer] to get a Medicare Part D plan. Unfortunately, out of open enrollment window so will have to see if any 5 star plans available.     Call and left message for patient's Financial worker Trixie Ebony [phone]. Left message inquiring if Medicaid record could be updated to reflect that Medicare currently not active and to see if can have Medicaid become primary insurance coverage    Follow-up: Return in about 2 weeks (around 1/13/2023) for with Dr. Farah.    Medication issues to be addressed at a future visit       Note that while specifically talked about inhalers today, patient reports that running low on oral medicines (metformin) and so access will likely become issue in near future if insurance problems not resolved    SUBJECTIVE/OBJECTIVE:                          Mary Ann  "Wesley is a 57 year old female coming in for a follow-up visit.  Today's visit is a follow-up MTM visit from 3/2019.     Reason for visit: Inhaler access.    Allergies/ADRs: Reviewed in chart  Past Medical History: Reviewed in chart  Social History     Tobacco Use     Smoking status: Every Day     Packs/day: 0.50     Types: Cigarettes     Smokeless tobacco: Never     Tobacco comments:     2-3 cig/day   Substance Use Topics     Alcohol use: No     Drug use: No     ^Reviewed today    Medication Adherence/Access: Patient reports that in January 2022 her Medicaid insurance changed from [employer] to Medicaid. Since this time has been having difficulty obtaining her medicines. Reports that she is working with the [employer], as they needed her to renew her green card. As this is currently in process, she is considered ineligible for her Medicare coverage. [facility] as otherwise can't get a ride. Has not been having luck contacting her CADI worker (curent CADI worker Ryan Carr on maternity leave, covering CADI worker is Junior Hogan ([phone]). At their previous recommendation, she called [employer] but they confirm that she is not eligible for Medicare Part D because her Medicare is inactive.     Chronic obstructive pulmonary disease, unspecified COPD type (H)  Does not have any of the following inhalers: Spiriva, Symbicort or Anoro.   Using Albuterol as needed, which she paid out of pocket for. Also has Primatene Mist that obtained over the counter, paid out of pocket.   In addition to COPD, has history of asthma.     Today's Vitals:   BP Readings from Last 1 Encounters:   12/26/22 134/87     Pulse Readings from Last 1 Encounters:   12/26/22 95     Wt Readings from Last 1 Encounters:   12/26/22 186 lb (84.4 kg)     Ht Readings from Last 1 Encounters:   08/01/22 5' 3\" (1.6 m)     Estimated body mass index is 32.95 kg/m  as calculated from the following:    Height as of 8/1/22: 5' 3\" (1.6 m).    " Weight as of 12/26/22: 186 lb (84.4 kg).    Temp Readings from Last 1 Encounters:   11/17/22 97.5  F (36.4  C)       ----------------      I spent 30 minutes with this patient today. All changes were made via collaborative practice agreement with Dr. Farah. A copy of the visit note was provided to the patient's provider(s).    The patient was declined by the patient a summary of these recommendations.     Valeria Hall, Pharm.D., [facility]  Phone: [phone]    For insurance/tracking purposes, MTM Team Documentation:   Medication Therapy Recommendations  COPD (chronic obstructive pulmonary disease) (H)    Current Medication: budesonide-formoterol (SYMBICORT) 80-4.5 MCG/ACT Inhaler   Rationale: Cannot afford medication product - Cost - Adherence   Recommendation: Change Medication - AirDuo Digihaler 55-14 MCG/ACT Aepb   Status: Accepted per CPA

## 2023-01-11 ENCOUNTER — PATIENT OUTREACH (OUTPATIENT)
Dept: CARE COORDINATION | Facility: CLINIC | Age: 58
End: 2023-01-11

## 2023-01-11 NOTE — PROGRESS NOTES
Clinic Care Coordination Contact    Follow Up Progress Note      Assessment: The pt was recently in the ED, I called to check up on the pt, and help the pt setup a ED follow up. The pt was at [facility] for an eye problem. I called and talked to the pt, pt stated that she is doing ok. Pt stated that she did not need a follow up, and hung up.    Care Gaps:    Health Maintenance Due   Topic Date Due     COPD ACTION PLAN  Never done     ADVANCE CARE PLANNING  11/15/2017     LUNG CANCER SCREENING  10/02/2018     ZOSTER IMMUNIZATION (3 of 3) 11/30/2018     DIABETIC FOOT EXAM  09/25/2019     MAMMO SCREENING  09/10/2021     COVID-19 Vaccine (3 - Booster for Pfizer series) 02/08/2022     INFLUENZA VACCINE (1) 09/01/2022     MICROALBUMIN  09/17/2022     MEDICARE ANNUAL WELLNESS VISIT  11/02/2022     LIPID  11/02/2022     COLORECTAL CANCER SCREENING  11/22/2022           Care Plans      Intervention/Education provided during outreach:               Plan:     Care Coordinator will follow up in

## 2023-01-16 ENCOUNTER — OFFICE VISIT (OUTPATIENT)
Dept: PHARMACY | Facility: CLINIC | Age: 58
End: 2023-01-16
Payer: MEDICAID

## 2023-01-16 ENCOUNTER — OFFICE VISIT (OUTPATIENT)
Dept: FAMILY MEDICINE | Facility: CLINIC | Age: 58
End: 2023-01-16
Payer: MEDICARE

## 2023-01-16 VITALS
BODY MASS INDEX: 32.62 KG/M2 | SYSTOLIC BLOOD PRESSURE: 121 MMHG | WEIGHT: 184.12 LBS | HEART RATE: 101 BPM | RESPIRATION RATE: 21 BRPM | OXYGEN SATURATION: 99 % | DIASTOLIC BLOOD PRESSURE: 86 MMHG

## 2023-01-16 VITALS
TEMPERATURE: 98.1 F | HEART RATE: 98 BPM | DIASTOLIC BLOOD PRESSURE: 90 MMHG | HEIGHT: 61 IN | SYSTOLIC BLOOD PRESSURE: 136 MMHG | BODY MASS INDEX: 34.74 KG/M2 | WEIGHT: 184 LBS | OXYGEN SATURATION: 95 % | RESPIRATION RATE: 16 BRPM

## 2023-01-16 DIAGNOSIS — J44.9 CHRONIC OBSTRUCTIVE PULMONARY DISEASE, UNSPECIFIED COPD TYPE (H): ICD-10-CM

## 2023-01-16 DIAGNOSIS — L08.9 SUPERFICIAL SKIN INFECTION: Primary | ICD-10-CM

## 2023-01-16 DIAGNOSIS — Z53.9 ERRONEOUS ENCOUNTER--DISREGARD: Primary | ICD-10-CM

## 2023-01-16 PROCEDURE — 99213 OFFICE O/P EST LOW 20 MIN: CPT | Mod: GC

## 2023-01-16 RX ORDER — BACITRACIN ZINC 500 [USP'U]/G
OINTMENT TOPICAL 2 TIMES DAILY
Qty: 14 G | Refills: 0 | Status: SHIPPED | OUTPATIENT
Start: 2023-01-16 | End: 2023-01-23

## 2023-01-16 RX ORDER — FLUTICASONE PROPIONATE AND SALMETEROL 55; 14 UG/1; UG/1
2 POWDER, METERED RESPIRATORY (INHALATION) 2 TIMES DAILY
Qty: 1 EACH | Refills: 11
Start: 2023-01-16 | End: 2023-01-26 | Stop reason: DRUGHIGH

## 2023-01-16 RX ORDER — ERYTHROMYCIN 5 MG/G
OINTMENT OPHTHALMIC
COMMUNITY
Start: 2023-01-11 | End: 2023-05-17

## 2023-01-16 NOTE — PROGRESS NOTES
"  Assessment & Plan     Superficial skin infection  Reports falling on ice one week ago and scraped her right knee, cleaned with alcohol and  noticed itchng and redness few days ago. No erythema or swelling of the knee, good ROM.   - bacitracin 500 UNIT/GM external ointment  Dispense: 14 g; Refill: 0        JAILYN Perales PGY1  [facility]        Rasheeda Reese is a 57 year old, presenting for the following health issues:  RECHECK (Right knee injiry, fell on ice last week)      HPI   Mary Ann is here after falling in ice a week ago and scraping her right knee. She cleaned the wound and bandaged it. Over the past few days notes that its red and itchy and wonders if it is infected. No issues with knee pain or difficulty with movement.        Review of Systems   Constitutional, HEENT, cardiovascular, pulmonary, gi and gu systems are , except as otherwise noted.      Objective    BP (!) 136/90   Pulse 98   Temp 98.1  F (36.7  C) (Tympanic)   Resp 16   Ht 1.549 m (5' 1\")   Wt 83.5 kg (184 lb)   SpO2 95%   BMI 34.77 kg/m    Body mass index is 34.77 kg/m .  Physical Exam   GENERAL: healthy, alert and no distress  RESP: lungs clear to auscultation - no rales, rhonchi or wheezes  CV: normal S1 S2, no S3 or S4, no murmur, no peripheral edema.  ABDOMEN: soft, nontender, normal bowel sounds.  MS: R knee: superficial erythematous healing wound on right knee, no surrounding swelling or erythema. Normal. ROM.  "

## 2023-01-16 NOTE — PROGRESS NOTES
"Clinical Pharmacy Consult:                                                    Mary Ann Olson is a 57 year old female coming in for a clinical pharmacist consult.  She was referred to me from Dr. Farah.     Reason for Consult: Medication access    Discussion: Continues to lack insurance coverage for refills of medicines. Review of medicine list today reveals that only missing Ergocalciferol and Fluticasone nasal spray, otherwise has current access to all medicines. Just waiting on green card application to be processed so Medicare can be reactived and so can subsequently apply for Part D. Concerns for AirDuo \"not working\". Has been taking 1 puff twice daily. Continues to have concern for frequency asthma symptoms, thinks due to dust in new apartment.    Plan after discussion w/ Dr. Farah:  1. Encouraged to continue to use Loratadine daily  2. Encouraged to consider purchasing Flonase available over-the-counter, cost ~$8 at [facility]  3. Increase AirDuo to 2 puffs twice daily    Valeria Hall, Pharm.D., [facility]  Phone: [phone]  January 16, 2023 at 1:54 PM        "

## 2023-01-16 NOTE — PROGRESS NOTES
Preceptor Attestation:  Patient's case reviewed and discussed with the resident, Leta Weiss MD, and I personally evaluated the patient. I agree with written assessment and plan of care.    Supervising Physician:  Danielle Moreno MD   [facility]

## 2023-01-16 NOTE — PROGRESS NOTES
"W{F2 to delete after use    Single Maintenance and Reliever Therapy (SMART)     Doses LOW  (Adult: 200-400 mcg/day)  (Peds: 100-200 mcg/day) MEDIUM  (Adult: >400-800 mcg/day)  (Peds: >200-400 mcg/day) High  (Adult: >800 mcg/day)  (Peds: >400 mcg/day)   Symbicort  Budesonide-formoterol HFA 80-4.5  160-4.5 2 BID + 2 PRN  - -  2 BID + 1 PRN -  3 BID + 1 PRN     Adult (>/= 12 years maximum = 12 puffs/day  Pediatric (6-11 years) = 8 puffs/day     LOW  (Adult: 200-400 mcg/day)  (Peds: 100 mcg/day) MEDIUM  (Adult: 200-400 mcg/day)  (Peds: 100 mcg/day) High  (Adult: 400 mcg/day)  (Peds: 200 mcg/day)   Dulera  Mometasone-formoterol -5  200-5 1-2 BID + 2 PRN  - 1-2 BID + 2 PRN  - -  2 BID + 1 PRN     Adult (>/= 12 years maximum = 11 puffs/day  Pediatric (6-11 years) = 7 puffs/day   Tip for Entering Rx:   1) Use radio buttons for maintenance dose,   2) Add PRN details to : \"Add additional information to the patient sig\"   i.e. , in addition to 1-2 puffs as needed for shortness of breath, up to *** puffs per day  3) Quantity : Recommend 2 inhalers per fill       Key: MDI: Metered dose inhaler, DPI: Dry powder inhaler (breath activated), SMI: Soft mist inhaler              ICS Adults and Children >12             Doses LOW MEDIUM HIGH Notes   Beclomethasone HFA   (Qvar Redihaler) - *See notes 40  80 1-2 BID  1 BID  () -  2 BID  (161-320) -  3 BID  (>320) Breath activated inhalation aerosol   Budesonide  (Pulmicort Flexhaler) - DPI 90  180 1-2 BID  1 BID  (180-360) -  2 BID  (361-720) -  3 BID  (>720) Contains milk protein   Ciclesonide HFA   (Alvesco) - MDI 80  160 1 BID  -  () 2 BID  1 BID  (161-320) -  2 BID  (>320)     Fluticasone propionate HFA (Flovent HFA) - MDI 44  110  220 1-2 BID  1 BID  -  () -  2 BID  1 BID  (221-440) -  -  2 BID  (>440)    Fluticasone propionate  (Flovent Diskus) - DPI 50  100  250 1-2 BID  1 BID  -  (100-250) -  2 BID  1 BID  (251-500) -  -  2 BID  (>500) Contains lactose "   Fluticasone furoate  (Arnuity Ellipta) -   200 -  - 1 QD  -  (100) -  1 QD  (200) Once daily  Contains lactose   Mometasone   (Asmanex Twisthaler) -   220 1 BID  -  (110-220) 2 BID  1 BID  (221-440) -  2 BID  (>440) Contains milk protein   Mometasone HFA   (Asmanex HFA) -   200 1 BID  -  (100-200) 2 BID  1 BID  (201-400) -  2 BID  (>400)      ICS-LABA    LOW MEDIUM HIGH Notes   Budesonide-formeoterol HFA   (Symbicort) - MDI 80-4.5   2 BID   160-4.5   2 BID -    Fluticasone furoate-vilanterol  (Breo Ellipta) - DPI   - 100-25   1 -25  1 QD Not for <19 yo  Contains lactose   Fluticasone propionate-salmeterol HFA (Advair HFA) - MDI   45-21  2 -21  2 -21  2 BID    Fluticasone propionate-salmeterol   (Advair Diskus) - -50  1 -50  1 -50  1 BID   Contains lactose   Fluticasone propionate-salmeterol  (AirDuo Respiclick) - DPI 55-14  1 -14  1 -14  1 BID Contains lactose   Mometasone-formoterol HFA   (Dulera) - MDI   - 100-5  2 -5  2 BID      LABA  Arformoterol  (Brovana) - Nebulized solution   15 mcg, 1 vial BID   Formoterol  (Perforomist) - Nebulized solution   20 mcg, 1 vial BID   Indacaterol  (Arcapta Neohaler) - DPI   75 mcg, 1 capsule QD  (Contains lactose, milk protein)   Olodaterol  (Striverdi Respimat) - SMI   2.5 mcg, 2 inhalations QD   Salmeterol   (Serevent Diskus) - DPI   50 mcg, 1 inhalation BID  (Contains lactose)     LAMA   Dosing   Tiotropium   (Spiriva) Handihaler (DPI): 1 capsule (18 mcg) QD  (Contains milk protein)  Respimat (SMI): 2 inhalations QD (2.5 mcg per actuation)     Umeclidinium   (Incruse Ellipta) - DPI   1 inhalation (62.5 mcg) QD  (Contains lactose)     Aclidinium   (Tudorza Pressair) - DPI   1 inhalation BID (400 mcg per inhalation)  (Contains milk protein)     Glycopyrrolate      Seebri Neohaler (DPI): 1 capsule (15.6 mcg) BID  (Contains lactose, milk protein)  Lonhala Magnair (Nebulized solution): 25 mcg, 1  vial BID       LABA-LAMA   Dosing   Indacaterol/Glycopyrrolate   (Utibron Neohaler) - DPI   1 capsule (27.5 mcg-15.6 mcg) BID  (Contains lactose, milk protein)   Formoterol/Glycopyrrolate  (Bevespi Aerosphere) - MDI   2 inhalations BID (4.8 mcg-9 mcg per inhalation)   Olodaterol/Tiotropium   (Stiolto Respimat) - SMI   2 inhalation QD (2.5 mcg-2.5 mcg per inhalation)   Vilanterol/Umeclidinium   (Anoro Ellipta) - DPI   1 inhalation (25 mcg-62.5 mcg) QD  (Contains milk protein)     [id number]   Dosing   Fluticasone furoate / Vilanterol / Umeclidinium  (Trelegy Ellipta) - DPI 1 inhalation QD   (100 mcg-25 mcg-62.5 mcg per inhalation)  (Contains lactose)     Budesonide / Formoterol / Glycopyrrolate  (Breztri) - MDI   2 inhalations BID  (160 mcg-4.8 mcg-9 mcg per inhalation)           Updated 11/2022}

## 2023-01-26 ENCOUNTER — TELEPHONE (OUTPATIENT)
Dept: FAMILY MEDICINE | Facility: CLINIC | Age: 58
End: 2023-01-26
Payer: MEDICARE

## 2023-01-26 DIAGNOSIS — J44.9 CHRONIC OBSTRUCTIVE PULMONARY DISEASE, UNSPECIFIED COPD TYPE (H): ICD-10-CM

## 2023-01-26 DIAGNOSIS — J45.40 MODERATE PERSISTENT ASTHMA WITHOUT COMPLICATION: Primary | ICD-10-CM

## 2023-01-26 RX ORDER — FLUTICASONE PROPIONATE AND SALMETEROL 113; 14 UG/1; UG/1
1 POWDER, METERED RESPIRATORY (INHALATION) 2 TIMES DAILY
Qty: 1 EACH | Refills: 11 | Status: SHIPPED | OUTPATIENT
Start: 2023-01-26 | End: 2023-06-11

## 2023-01-26 NOTE — TELEPHONE ENCOUNTER
Pt needing refill of Airduo. Reports that since increase in dose has noticed some improvement in symptoms. Sent to preferred pharmacy along with updated coupon as changing dosage form to allow 1 inhaler to last 30 days.     Valeria Hall, Pharm.D., CDC[facility]  Phone: [phone]  January 26, 2023 at 9:48 AM

## 2023-01-31 ENCOUNTER — OFFICE VISIT (OUTPATIENT)
Dept: FAMILY MEDICINE | Facility: CLINIC | Age: 58
End: 2023-01-31
Payer: MEDICARE

## 2023-01-31 VITALS
WEIGHT: 184 LBS | HEIGHT: 61 IN | DIASTOLIC BLOOD PRESSURE: 78 MMHG | HEART RATE: 101 BPM | TEMPERATURE: 98.2 F | RESPIRATION RATE: 20 BRPM | BODY MASS INDEX: 34.74 KG/M2 | SYSTOLIC BLOOD PRESSURE: 133 MMHG | OXYGEN SATURATION: 98 %

## 2023-01-31 DIAGNOSIS — F32.2 CURRENT SEVERE EPISODE OF MAJOR DEPRESSIVE DISORDER WITHOUT PSYCHOTIC FEATURES WITHOUT PRIOR EPISODE (H): ICD-10-CM

## 2023-01-31 DIAGNOSIS — J43.9 PULMONARY EMPHYSEMA, UNSPECIFIED EMPHYSEMA TYPE (H): Primary | ICD-10-CM

## 2023-01-31 DIAGNOSIS — F43.0 ACUTE REACTION TO STRESS: ICD-10-CM

## 2023-01-31 PROCEDURE — 99215 OFFICE O/P EST HI 40 MIN: CPT | Performed by: STUDENT IN AN ORGANIZED HEALTH CARE EDUCATION/TRAINING PROGRAM

## 2023-01-31 RX ORDER — IPRATROPIUM BROMIDE AND ALBUTEROL SULFATE 2.5; .5 MG/3ML; MG/3ML
1 SOLUTION RESPIRATORY (INHALATION) 2 TIMES DAILY
Qty: 180 ML | Refills: 1 | Status: SHIPPED | OUTPATIENT
Start: 2023-01-31 | End: 2023-04-17

## 2023-01-31 NOTE — PROGRESS NOTES
Assessment & Plan     1. Pulmonary emphysema, unspecified emphysema type (H)-encourage patient to continue inhaler.  We will add on duo nebs coupon for good Rx was written today and given to the patient.  Continues to smoke does not want to discuss this today.  - ipratropium - albuterol 0.5 mg/2.5 mg/3 mL (DUONEB) 0.5-2.5 (3) MG/3ML neb solution; Take 1 vial (3 mLs) by nebulization 2 times daily  Dispense: 180 mL; Refill: 1    2. Acute reaction to stress  Patient very distressed today.  I offered to call an ambulance to help her go to the hospital however she is adamant that she does not want to go there and has no active plan.  She is very frustrated about her lack of power and lack of decision-making and current living situation.  She does not feel that she is listened to.  At this time she does not have other housing options and she recently moved into this housing option.  Encouraged patient to try to work with the group that she is currently living with.  Validated that her lack of autonomy can be very frustrating.    3. Current severe episode of major depressive disorder without psychotic features without prior episode (H)  Patient occasionally takes hydroxyzine for anxiety.  Best recommendation would be for her to start an SSRI.  She is not interested in this.  She is not interested in sedating medications.  Patient is not interested in medications I could offer today.    Of note patient currently pays over $100 for the care of her dog and is unable to afford her medications.  At this time patient feels that her connection to her Pap is to try to strong and that it would exist exacerbates her anxiety and depression to such a degree that she would not be able to tolerate it.    Will have clinic call to check in on patient later this week given distress today in visit.  Again denies active plan for self harm but is very frustrated    45 min spent in direct care and consultation with patient today.   "Documentation on the day of the encounter.     Joanna Farah MD  [facility] Reese is a 57 year old, presenting for the following health issues:  Asthma    HPI   Asthma    Patient upset today about housing situation.  Upset about reimbursement for the plug which was $5.  Very tearful when describing her difficult situation.  Living at [employer].     Has run out of her inhalers.      Waiting to hear back from social security about Green card status.      Reports that she is having trouble with her breathing recently and that changes in her inhaler are not helping.  Patient reports that she is very upset and her living situation she feels unsupported she feels trapped.  She feels like she is treated like a child.  Has few options given her need for Social Security and green card to go through before she can have additional services.  This is frustrating for her.    COPD  Continues to smoke  Reports that inhaler does not affect her breathing significantly.  She does not notice a difference when she takes that.  She would be open to another therapy however has limited funds to pay for this.  Today is not an acute exacerbation by her report.    Anxiety  Take anxiety medication hydroxyzine occasionally for panic or anxiety.  She reports that she does not like taking medications she is wondering if she can find something for sleep or anxiety but does not want to take an SSRI does not want to take other sedating medications.  She reports that she gets very nervous when she goes into a store and this causes her breathing to get worse.  She reports that she does not have access to therapy given financial limitations and instability with Social Security administration and green card.              Objective    /78   Pulse 101   Temp 98.2  F (36.8  C)   Resp 20   Ht 1.549 m (5' 1\")   Wt 83.5 kg (184 lb)   SpO2 98%   BMI 34.77 kg/m    Body mass index is 34.77 " kg/m .  Physical Exam   GEN: NAD  HEENT: head atraumatic, normocephalic, moist mucous membranes, eyes anicteric  CV: RRR w/o M/R/G  PULM: CTAB  ABD: soft, non-tender, no appreciable masses, no guarding, BS present  Neuro: alert and oriented x 3, CN II-XII intact, normal gross motor movements  Psych:tearful, angry  Ext: no peripheral edema

## 2023-02-22 ENCOUNTER — PATIENT OUTREACH (OUTPATIENT)
Dept: CARE COORDINATION | Facility: CLINIC | Age: 58
End: 2023-02-22
Payer: MEDICARE

## 2023-02-22 NOTE — PROGRESS NOTES
Clinic Care Coordination Contact    Follow Up Progress Note      Assessment: The pt was recently in the ED, I called to check up on the pt and help the pt setup a ED follow up. I called the pt, but got her vm, so I left a vm for the pt to give me a call back.    Care Gaps:    Health Maintenance Due   Topic Date Due     COPD ACTION PLAN  Never done     ADVANCE CARE PLANNING  11/15/2017     LUNG CANCER SCREENING  10/02/2018     ZOSTER IMMUNIZATION (3 of 3) 11/30/2018     DIABETIC FOOT EXAM  09/25/2019     MAMMO SCREENING  09/10/2021     COVID-19 Vaccine (3 - Booster for Pfizer series) 02/08/2022     INFLUENZA VACCINE (1) 09/01/2022     MICROALBUMIN  09/17/2022     MEDICARE ANNUAL WELLNESS VISIT  11/02/2022     LIPID  11/02/2022     COLORECTAL CANCER SCREENING  11/22/2022     A1C  02/01/2023     BMP  03/14/2023           Care Plans      Intervention/Education provided during outreach:               Plan:     Care Coordinator will follow up in

## 2023-02-24 ENCOUNTER — PATIENT OUTREACH (OUTPATIENT)
Dept: CARE COORDINATION | Facility: CLINIC | Age: 58
End: 2023-02-24
Payer: MEDICARE

## 2023-02-24 NOTE — PROGRESS NOTES
Clinic Care Coordination Contact    Follow Up Progress Note      Assessment: The pt was recently in the ED, I called to check up on the pt and help the pt setup a ED follow up. I called and talked to the pt, pt stated that she is doing fine now. She did not feel that she needs a follow up.    Care Gaps:    Health Maintenance Due   Topic Date Due     COPD ACTION PLAN  Never done     ADVANCE CARE PLANNING  11/15/2017     LUNG CANCER SCREENING  10/02/2018     ZOSTER IMMUNIZATION (3 of 3) 11/30/2018     DIABETIC FOOT EXAM  09/25/2019     MAMMO SCREENING  09/10/2021     COVID-19 Vaccine (3 - Booster for Pfizer series) 02/08/2022     INFLUENZA VACCINE (1) 09/01/2022     MICROALBUMIN  09/17/2022     MEDICARE ANNUAL WELLNESS VISIT  11/02/2022     LIPID  11/02/2022     COLORECTAL CANCER SCREENING  11/22/2022     A1C  02/01/2023     BMP  03/14/2023           Care Plans      Intervention/Education provided during outreach:               Plan:     Care Coordinator will follow up in

## 2023-02-27 ENCOUNTER — OFFICE VISIT (OUTPATIENT)
Dept: FAMILY MEDICINE | Facility: CLINIC | Age: 58
End: 2023-02-27
Payer: MEDICARE

## 2023-02-27 VITALS
HEART RATE: 96 BPM | WEIGHT: 189 LBS | DIASTOLIC BLOOD PRESSURE: 98 MMHG | SYSTOLIC BLOOD PRESSURE: 161 MMHG | BODY MASS INDEX: 35.68 KG/M2 | HEIGHT: 61 IN | OXYGEN SATURATION: 97 %

## 2023-02-27 DIAGNOSIS — R03.0 ELEVATED BLOOD PRESSURE READING WITHOUT DIAGNOSIS OF HYPERTENSION: ICD-10-CM

## 2023-02-27 DIAGNOSIS — J44.1 COPD EXACERBATION (H): Primary | ICD-10-CM

## 2023-02-27 PROCEDURE — 99214 OFFICE O/P EST MOD 30 MIN: CPT | Performed by: FAMILY MEDICINE

## 2023-02-27 RX ORDER — PREDNISONE 20 MG/1
40 TABLET ORAL DAILY
Qty: 10 TABLET | Refills: 0 | Status: SHIPPED | OUTPATIENT
Start: 2023-02-27 | End: 2023-03-04

## 2023-02-27 RX ORDER — ALBUTEROL SULFATE 90 UG/1
2 AEROSOL, METERED RESPIRATORY (INHALATION) EVERY 4 HOURS PRN
Qty: 18 G | Refills: 3 | Status: SHIPPED | OUTPATIENT
Start: 2023-02-27 | End: 2023-10-26

## 2023-02-27 NOTE — PATIENT INSTRUCTIONS
- Start prednisone 40 mg and take in the morning for 5 days in a row    - Use your Duonebs no more than 4 times per day (every 6 hours)    - In between, use either your albuterol inhaler or your albuterol nebulizer treatment    - Restart your Claritin and take this regularly    - Try saline solution to clean out your sinuses if you're able    - Congrats on quitting smoking!    - Come back next week to check on your breathing

## 2023-02-27 NOTE — PROGRESS NOTES
"  Assessment & Plan     COPD exacerbation (H)  Increased dyspnea and dry cough x 2 weeks. No increased sputum purulence or volume from baseline and afebrile. CXR done 6 days ago at ED without infiltrate. Neg COVID, influenza, and RSV testing done more than a week into this episode. Symptoms suggestive of COPD exacerbation but unlikely to be a bacterial component. Symptoms likely worsened in the context of recent smoking cessation. Possible allergies vs. Upper respiratory virus leading to congestion and headaches.  - Begin predniSONE (DELTASONE) 20 MG tablet; Take 2 tablets (40 mg) by mouth daily for 5 days   - albuterol (PROAIR HFA/PROVENTIL HFA/VENTOLIN HFA) 108 (90 Base) MCG/ACT inhaler; Inhale 2 puffs into the lungs every 4 hours as needed for shortness of breath or wheezing  - Advised using DuoNebs only every 6-8 hours with albuterol nebs or inhaler in between.   - Congratulated her on smoking cessation!  - Discussed restarting ICS/LABA - patient refused because she cannot afford it and it didn't seem to help  - Restart antihistamine for possible allergic component to congestion and breathing (given possible history of asthma overlap).    Elevated blood pressure reading without diagnosis of hypertension  BP measured after energy drink and a lot of coffee. No diagnosis of hypertension.   - Follow up BP in the next few weeks at a follow up visit.   - Closer follow up if any worsening dyspnea or headaches.    38 minutes spent on the date of the encounter doing chart review, review of outside records, patient visit and documentation        BMI:   Estimated body mass index is 36.3 kg/m  as calculated from the following:    Height as of this encounter: 1.537 m (5' 0.5\").    Weight as of this encounter: 85.7 kg (189 lb).   Weight management plan: Patient was referred to their PCP to discuss a diet and exercise plan.    Follow up in 1 week or sooner if worsening symptoms.    Monique Mcghee MD  [facility] " "[facility] [place]    Rasheeda Reese is a 57 year old, presenting for the following health issues:  Cough (2 weeks nonstop cough. Neb solution doesn't work for pt )      HPI     She reports coughing for the past 2 weeks. Mostly dry. Less mucous than her typical production and no increased purulence. No fevers/chills. Quit smoking recently, about a week or so ago, and wonders if this is making her breathing worse. Has had some increased pain in her head across her forehead on both sides. Congestion. No sneezing or itchy/watery eyes. She has been trying out Mucinex that helped somewhat. She is using her DuoNeb every hour much of the day. Albuterol alone doesn't seem to do much. She is not taking her ICS/LABA after it ran out a couple of weeks ago. She cannot afford the cost of the co-pay for this. History notable for COPD and asthma diagnoses per her report. COPD is the prominent diagnosis for respiratory issues that I see on her chart here.    Was evaluated at [facility] on 2/21/23 about a week into her symptoms. Had run out of her albuterol and felt better after a nebulizer treatment. She had a chest radiograph that was unremarkable. COVID, influenza, and RSV were tested and all.     Since then, she feels that her breathing has worsened. She feels very short of breath. Very significant episode of shortness of breath when grocery shopping with her IL[employer] worker on 2/24. She thinks their fragrance use might have triggered more wheezing.  Symptoms aren't as bad as 2/24 today but just aren't getting better.    Elevated blood pressure. Just chugged an energy drink and drank a lot of coffee this morning. Not on any antihypertensives at baseline. Comorbid DM II.        Objective    BP (!) 160/89 (BP Location: Right arm, Patient Position: Sitting, Cuff Size: Adult Regular)   Pulse 106   Ht 1.537 m (5' 0.5\")   Wt 85.7 kg (189 lb)   SpO2 96%   BMI 36.30 kg/m    Body mass index is 36.3 kg/m .  Physical Exam "   GENERAL: alert, no distress  HEENT: Tenderness to light percussion across both frontal sinuses. Nares patent without discharge. Oropharynx clear. TMs pearly gray with intact light reflexes.  NECK: no adenopathy, no asymmetry.  RESP: normal rate and effort at rest. Faint end expiratory wheezing with deep breathing and slightly reduced excursion. No crackles. Wheezing doesn't clear with dry cough.  CV: regular rate and rhythm, normal S1 S2, no S3 or S4, no murmur, click or rub.

## 2023-03-09 ENCOUNTER — OFFICE VISIT (OUTPATIENT)
Dept: FAMILY MEDICINE | Facility: CLINIC | Age: 58
End: 2023-03-09
Payer: MEDICARE

## 2023-03-09 VITALS
SYSTOLIC BLOOD PRESSURE: 131 MMHG | DIASTOLIC BLOOD PRESSURE: 87 MMHG | OXYGEN SATURATION: 95 % | WEIGHT: 187.8 LBS | BODY MASS INDEX: 35.45 KG/M2 | HEART RATE: 93 BPM | HEIGHT: 61 IN | TEMPERATURE: 96.5 F

## 2023-03-09 DIAGNOSIS — J06.9 VIRAL UPPER RESPIRATORY TRACT INFECTION: ICD-10-CM

## 2023-03-09 DIAGNOSIS — J44.1 COPD EXACERBATION (H): Primary | ICD-10-CM

## 2023-03-09 DIAGNOSIS — L23.5 ALLERGIC DERMATITIS DUE TO OTHER CHEMICAL PRODUCT: ICD-10-CM

## 2023-03-09 PROCEDURE — 99214 OFFICE O/P EST MOD 30 MIN: CPT | Mod: GC

## 2023-03-09 RX ORDER — FLUTICASONE PROPIONATE 50 MCG
1 SPRAY, SUSPENSION (ML) NASAL DAILY
Qty: 11.1 ML | Refills: 3 | Status: SHIPPED | OUTPATIENT
Start: 2023-03-09 | End: 2023-03-28

## 2023-03-09 RX ORDER — LORATADINE 10 MG/1
10 TABLET ORAL DAILY
Qty: 90 TABLET | Refills: 3 | Status: SHIPPED | OUTPATIENT
Start: 2023-03-09 | End: 2023-06-11

## 2023-03-09 RX ORDER — AZITHROMYCIN 250 MG/1
TABLET, FILM COATED ORAL
Qty: 6 TABLET | Refills: 0 | Status: SHIPPED | OUTPATIENT
Start: 2023-03-09 | End: 2023-03-14

## 2023-03-09 ASSESSMENT — ASTHMA QUESTIONNAIRES: ACT_TOTALSCORE: 5

## 2023-03-09 NOTE — PROGRESS NOTES
Assessment & Plan       COPD exacerbation (H)  Patient has been seen twice for COPD exacerbation in setting of seasonal allergies and recently stopping smoking. Completed 5 day course of prednisone last week -- some improvement. Not started allergy medications as previously recommended. Vitally stable and lungs clear so do not believe needs another course of pred today. Does endorse new increased/change sputum and has not yet received antibiotics. Given this change will start azithromycin. Counseled on importance of allergy medication given much of symptoms today seem to be related to allergies as well.   - fluticasone (FLONASE) 50 MCG/ACT nasal spray; Spray 1 spray into both nostrils daily  - loratadine (CLARITIN) 10 MG tablet; Take 1 tablet (10 mg) by mouth daily  - azithromycin (ZITHROMAX) 250 MG tablet; Take 2 tablets (500 mg) by mouth daily for 1 day, THEN 1 tablet (250 mg) daily for 4 days.      Return in about 2 weeks (around 3/23/2023).    Shantal Garcia MD  [facility] [place]    Rasheeda Reese is a 57 year old, presenting for the following health issues:  Cough (Congestion just overnight nothing tken)      HPI   History of COPD, seen on 2/21 and 2/27 for COPD/asthma exacerbations. Started on 5 days of prednisone for 5 days on 27th. Told to take duonebs every 6-8 hours and albuterol PRN. On 21st in ED had normal CXR and negative flu/covid/rsv swabs. Patient recently stopped smoking. History of seasonal allergies.     Today --  Supposed to come for recheck/follow-up last week but forgot  Increased sputum production which is new over the last couple of days  She says all night long couldn't breath from congestion  Head congestion, body aches, congestion in checks  Using Duonebs all night  Not using allergy medications    Review of Systems   Constitutional, HEENT, cardiovascular, pulmonary, gi and gu systems are negative, except as otherwise noted.      Objective    /87   Pulse  "93   Temp (!) 96.5  F (35.8  C) (Tympanic)   Ht 1.555 m (5' 1.22\")   Wt 85.2 kg (187 lb 12.8 oz)   SpO2 95%   BMI 35.23 kg/m    Body mass index is 35.23 kg/m .  Physical Exam   GENERAL: Healthy, alert and no distress  EYES: Eyes grossly normal to inspection.   HEENT: nasal congestion,no cervical lymphadenopathy  RESP:  Lungs clear throughout. No wheezes or crackles. Breathing well on room air. No acute distress.  CV: Heart RRR. No murmur  Abdomen: Soft, nontender, nondistended.  MSK: No gross deformity. Normal tone.  SKIN: Visible skin clear. No significant rash, abnormal pigmentation or lesions.  NEURO: Cranial nerves grossly intact.  Mentation and speech appropriate for age.  PSYCH: Mentation appears normal, affect normal/bright, judgement and insight intact, normal speech and appearance well-groomed.    "

## 2023-03-09 NOTE — PROGRESS NOTES
Preceptor Attestation:   Patient seen, evaluated and discussed with the resident. I have verified the content of the note, which accurately reflects my assessment of the patient and the plan of care.    Supervising Physician:Claudia Chapa MD    [facility]

## 2023-03-15 ENCOUNTER — PATIENT OUTREACH (OUTPATIENT)
Dept: CARE COORDINATION | Facility: CLINIC | Age: 58
End: 2023-03-15
Payer: MEDICARE

## 2023-03-15 ASSESSMENT — ACTIVITIES OF DAILY LIVING (ADL): DEPENDENT_IADLS:: INDEPENDENT

## 2023-03-15 NOTE — PROGRESS NOTES
Clinic Care Coordination Contact  [facility]: Post-Discharge Note  SITUATION                                                      Admission:    Admission Date: 03/11/23   Reason for Admission: COPD exacerbation (HC) (Primary Dx);   Type 2 diabetes mellitus with hyperosmolarity without coma, without long-term current use of insulin (HC);   Skin rash  Discharge Disposition: Home Self Care  Discharge:   Discharge Date: 03/14/23  Discharge Diagnosis: COPD exacerbation (HC) (Primary Dx);   Type 2 diabetes mellitus with hyperosmolarity without coma, without long-term current use of insulin (HC);   Skin rash  Discharge Disposition: Home Self Care    BACKGROUND                                                      Per hospital discharge summary and inpatient provider notes.      ASSESSMENT           Discharge Assessment  How are you doing now that you are home?: Pt stated that she is doing better  How are your symptoms? (Red Flag symptoms escalate to triage hotline per guidelines): Improved  Do you feel your condition is stable enough to be safe at home until your provider visit?: Yes  Does the patient have their discharge instructions? : Yes  Does the patient have questions regarding their discharge instructions? : No  Were you started on any new medications or were there changes to any of your previous medications? : Yes  Does the patient have all of their medications?: Yes  Do you have questions regarding any of your medications? : No  Do you have all of your needed medical supplies or equipment (DME)?  (i.e. oxygen tank, CPAP, cane, etc.): Yes  Discharge follow-up appointment scheduled within 14 calendar days? : Yes  Discharge Follow Up Appointment Date: 03/23/23  Discharge Follow Up Appointment Scheduled with?: Primary Care Provider                  PLAN                                                      Outpatient Plan:      Future Appointments   Date Time Provider Department Center   3/23/2023  2:40 PM Abhishek  MD [facility]         For any urgent concerns, please contact our 24 hour clinic line:   [facility]: [phone]       VALERIO Butler

## 2023-03-22 NOTE — TELEPHONE ENCOUNTER
"Patient called on call regarding \"possible allergic reaction\"    Attempted to call back - straight to voicemail.     Left VM to call after hours again if still concerned, or call clinic in AM to set up appointment.     Dylan Perez MD   [facility]  Pager #: [phone]    "
1 (mild pain)
none

## 2023-03-23 ENCOUNTER — OFFICE VISIT (OUTPATIENT)
Dept: FAMILY MEDICINE | Facility: CLINIC | Age: 58
End: 2023-03-23
Payer: MEDICARE

## 2023-03-23 VITALS
HEIGHT: 61 IN | BODY MASS INDEX: 36.82 KG/M2 | HEART RATE: 105 BPM | TEMPERATURE: 98.2 F | SYSTOLIC BLOOD PRESSURE: 138 MMHG | RESPIRATION RATE: 20 BRPM | OXYGEN SATURATION: 94 % | WEIGHT: 195 LBS | DIASTOLIC BLOOD PRESSURE: 80 MMHG

## 2023-03-23 DIAGNOSIS — J44.9 CHRONIC OBSTRUCTIVE PULMONARY DISEASE, UNSPECIFIED COPD TYPE (H): Primary | ICD-10-CM

## 2023-03-23 DIAGNOSIS — E11.65 TYPE 2 DIABETES MELLITUS WITH HYPERGLYCEMIA, WITHOUT LONG-TERM CURRENT USE OF INSULIN (H): ICD-10-CM

## 2023-03-23 DIAGNOSIS — E66.01 MORBID OBESITY (H): ICD-10-CM

## 2023-03-23 DIAGNOSIS — Z12.11 SCREEN FOR COLON CANCER: ICD-10-CM

## 2023-03-23 PROCEDURE — 99214 OFFICE O/P EST MOD 30 MIN: CPT | Mod: GC

## 2023-03-23 RX ORDER — FLUTICASONE FUROATE AND VILANTEROL 100; 25 UG/1; UG/1
1 POWDER RESPIRATORY (INHALATION) DAILY
Qty: 60 EACH | Refills: 11 | Status: SHIPPED | OUTPATIENT
Start: 2023-03-23 | End: 2023-04-22

## 2023-03-23 NOTE — PROGRESS NOTES
Assessment & Plan     (J44.9) Chronic obstructive pulmonary disease, unspecified COPD type (H)  (primary encounter diagnosis)  Comment: patient here for hospital follow up, admitted for 3 days with copd exacerbation. Reports improved symptoms today. Currently on a course of prednisone, total ten days, due to finish tomorrow. Here to refill her new inhalers. Issues with insurance and coverage will send prescription and follow up.  Plan: fluticasone-vilanterol (BREO ELLIPTA) 100-25         MCG/ACT inhaler, umeclidinium (INCRUSE ELLIPTA)        62.5 MCG/ACT inhaler            (E11.65) Type 2 diabetes mellitus with hyperglycemia, without long-term current use of insulin (H)  Comment: blood sugars not controlled at home, reports high postprandial insulin, previously on metformin. currently after discharging from hospital started on sliding scale insulin as well as pioglitazone. Likely high sugars due to recently starting prednisone, last dose tomorrow for a total of ten days. And patient also reports somewhat high carb diet. counseled patient on importance of diet on diabetes control and complication of uncontrolled DM.  Plan: continue current DM regimen.  Follow up DM within 2-4 weeks.    (Z12.11) Screen for colon cancer  Comment: patient is agreeable to FIT testing today.   Plan: Fecal colorectal cancer screen FIT           (E66.01) Morbid obesity (H)  Comment: counseled patient about healthy diet, low carb diet, and increasing physical activity.    No follow-ups on file.    Leta Weiss MD  [facility] [place]    Subjective     RECHECK (COPD) and Medication Reconciliation (Completed)    Additional Questions 2/27/2023   Roomed by ety   Accompanied by -     HPI   Mary Ann is a 57 year old, presenting for the following health issues:    Mary Ann 56 yo, past medical hx of COPD, Asthma, tobacco use, T2DM. She is here for hospital follow up.   She was discharged recently 10 days ago. Was admitted for three  days for acute COPD exacerbation. And was started on two inhalers and is finishing a ten day course of prednisone.   Today she reports feeling better, no sob, wheezing, increase in coughing, or sputum production. She has home O2 3L which she uses only at night.   She attributes worsening symptoms to quitting smoking about 3 weeks ago. She states she doesn't want any medication to help with craving and is doing okay for now.     BG worsening with steroids. She was on metformin,and started Pioglitazone and insulin slliding scale.  Home readings: -200. 400 highest mostly 2 hours post meal. Hyperglycemia symptoms at times. High carb diet. She states she doesn't want to follow a low carb and eat less sugary snacks at the time. States not ready.  Post Discharge Outreach 3/15/2023   Admission Date 3/11/2023   Reason for Admission COPD exacerbation (HC) (Primary Dx);   Type 2 diabetes mellitus with hyperosmolarity without coma, without long-term current use of insulin (HC);   Skin rash  Discharge Disposition: Home Self Care   Discharge Date 3/14/2023   Discharge Diagnosis COPD exacerbation (HC) (Primary Dx);   Type 2 diabetes mellitus with hyperosmolarity without coma, without long-term current use of insulin (HC);   Skin rash  Discharge Disposition: Home Self Care   How are you doing now that you are home? Pt stated that she is doing better   How are your symptoms? (Red Flag symptoms escalate to triage hotline per guidelines) Improved   Do you feel your condition is stable enough to be safe at home until your provider visit? Yes   Does the patient have their discharge instructions?  Yes   Does the patient have questions regarding their discharge instructions?  No   Were you started on any new medications or were there changes to any of your previous medications?  Yes   Does the patient have all of their medications? Yes   Do you have questions regarding any of your medications?  No   Do you have all of your needed  "medical supplies or equipment (DME)?  (i.e. oxygen tank, CPAP, cane, etc.) Yes   Discharge follow-up appointment scheduled within 14 calendar days?  Yes   Discharge Follow Up Appointment Date 3/23/2023   Discharge Follow Up Appointment Scheduled with? Primary Care Provider     Hospital Follow-up Visit:    Hospital/Nursing Home/IP Rehab Facility: [facility]  Date of Admission: 3/11/23  Date of Discharge: 3/14/23  Reason(s) for Admission: COPD exacerbation    Was your hospitalization related to COVID-19? No   Problems taking medications regularly:  Insurance issues [ cant afford inhalers]  Medication changes since discharge: Breo and incruse inhalers, sliding scale insulin, pioglitazone.   Problems adhering to non-medication therapy:  None    Summary of hospitalization:  CareEverywhere information obtained and reviewed  Diagnostic Tests/Treatments reviewed.  Follow up needed: none  Other Healthcare Providers Involved in Patient s Care:         Care Coordination    Plan of care communicated with patient      Review of Systems   Constitutional, HEENT, cardiovascular, pulmonary, gi and gu systems are negative, except as otherwise noted.      Objective    /80   Pulse 105   Temp 98.2  F (36.8  C)   Resp 20   Ht 1.549 m (5' 1\")   Wt 88.5 kg (195 lb)   SpO2 94%   BMI 36.84 kg/m    Body mass index is 36.84 kg/m .  Physical Exam   GENERAL: healthy, alert and no distress  RESP: lungs clear to auscultation - no wheezes  CV: normal S1 S2, no murmur.  ABDOMEN: soft, nontender, no masses and bowel sounds normal  MS: no gross musculoskeletal defects noted, mild b/l pitting edema            "

## 2023-03-28 DIAGNOSIS — J06.9 VIRAL UPPER RESPIRATORY TRACT INFECTION: ICD-10-CM

## 2023-03-28 RX ORDER — FLUTICASONE PROPIONATE 50 MCG
1 SPRAY, SUSPENSION (ML) NASAL DAILY
Qty: 11.1 ML | Refills: 1 | Status: SHIPPED | OUTPATIENT
Start: 2023-03-28 | End: 2023-06-11

## 2023-03-31 RX ORDER — PIOGLITAZONEHYDROCHLORIDE 15 MG/1
30 TABLET ORAL DAILY
COMMUNITY
Start: 2023-03-14 | End: 2023-06-11

## 2023-03-31 RX ORDER — BUDESONIDE AND FORMOTEROL FUMARATE DIHYDRATE 80; 4.5 UG/1; UG/1
2 AEROSOL RESPIRATORY (INHALATION) 2 TIMES DAILY
COMMUNITY
Start: 2023-03-14 | End: 2023-04-05 | Stop reason: DRUGHIGH

## 2023-03-31 RX ORDER — BENZONATATE 100 MG/1
100 CAPSULE ORAL 3 TIMES DAILY PRN
COMMUNITY
Start: 2023-03-14 | End: 2023-05-17

## 2023-03-31 RX ORDER — CLINDAMYCIN PHOSPHATE AND BENZOYL PEROXIDE 10; 50 MG/G; MG/G
GEL TOPICAL
COMMUNITY
Start: 2023-03-14 | End: 2023-06-01

## 2023-03-31 NOTE — PROGRESS NOTES
Patient needs to continue on novolog 12 units with meals, monitor sugars closely.    Patient not on long acting insulin    Patient off last dose of steroid today 30 mg daily.      Call if sugars are lowering and may be able to reduce novolog with meals.

## 2023-03-31 NOTE — PROGRESS NOTES
Patient had called about bilateral swelling in her feet, advised to elevate legs and see if that helps with her swelling. Patient agreeable to plan and will call if no change, breathing ok at this time and on home oxygen. Mary Ann then asked if she should continue her insulin, her prednisone dose of 30mg/day for 10 days was done on 03/28/23. Her fasting bg yesterday morning was 269. Taking 12u novolog daily with meals. No current accurate bg for today, just had sugar snack. Writer spoke with

## 2023-04-05 ENCOUNTER — OFFICE VISIT (OUTPATIENT)
Dept: FAMILY MEDICINE | Facility: CLINIC | Age: 58
End: 2023-04-05
Payer: MEDICARE

## 2023-04-05 VITALS
HEIGHT: 61 IN | BODY MASS INDEX: 37.95 KG/M2 | OXYGEN SATURATION: 98 % | DIASTOLIC BLOOD PRESSURE: 83 MMHG | TEMPERATURE: 98 F | WEIGHT: 201 LBS | HEART RATE: 102 BPM | SYSTOLIC BLOOD PRESSURE: 139 MMHG

## 2023-04-05 DIAGNOSIS — J44.9 CHRONIC OBSTRUCTIVE PULMONARY DISEASE, UNSPECIFIED COPD TYPE (H): Primary | ICD-10-CM

## 2023-04-05 DIAGNOSIS — Z79.4 TYPE 2 DIABETES MELLITUS WITH HYPERGLYCEMIA, WITH LONG-TERM CURRENT USE OF INSULIN (H): ICD-10-CM

## 2023-04-05 DIAGNOSIS — E11.65 TYPE 2 DIABETES MELLITUS WITH HYPERGLYCEMIA, WITH LONG-TERM CURRENT USE OF INSULIN (H): ICD-10-CM

## 2023-04-05 PROCEDURE — 99214 OFFICE O/P EST MOD 30 MIN: CPT | Mod: GC

## 2023-04-05 RX ORDER — BUDESONIDE AND FORMOTEROL FUMARATE DIHYDRATE 160; 4.5 UG/1; UG/1
2 AEROSOL RESPIRATORY (INHALATION) 2 TIMES DAILY
Qty: 10.2 G | Refills: 1 | Status: SHIPPED | OUTPATIENT
Start: 2023-04-05 | End: 2023-06-11

## 2023-04-05 ASSESSMENT — PATIENT HEALTH QUESTIONNAIRE - PHQ9
5. POOR APPETITE OR OVEREATING: MORE THAN HALF THE DAYS
SUM OF ALL RESPONSES TO PHQ QUESTIONS 1-9: 5

## 2023-04-05 ASSESSMENT — ANXIETY QUESTIONNAIRES
3. WORRYING TOO MUCH ABOUT DIFFERENT THINGS: SEVERAL DAYS
IF YOU CHECKED OFF ANY PROBLEMS ON THIS QUESTIONNAIRE, HOW DIFFICULT HAVE THESE PROBLEMS MADE IT FOR YOU TO DO YOUR WORK, TAKE CARE OF THINGS AT HOME, OR GET ALONG WITH OTHER PEOPLE: SOMEWHAT DIFFICULT
GAD7 TOTAL SCORE: 14
7. FEELING AFRAID AS IF SOMETHING AWFUL MIGHT HAPPEN: NEARLY EVERY DAY
6. BECOMING EASILY ANNOYED OR IRRITABLE: MORE THAN HALF THE DAYS
2. NOT BEING ABLE TO STOP OR CONTROL WORRYING: SEVERAL DAYS
5. BEING SO RESTLESS THAT IT IS HARD TO SIT STILL: NEARLY EVERY DAY
1. FEELING NERVOUS, ANXIOUS, OR ON EDGE: MORE THAN HALF THE DAYS
GAD7 TOTAL SCORE: 14

## 2023-04-05 ASSESSMENT — ASTHMA QUESTIONNAIRES: ACT_TOTALSCORE: 7

## 2023-04-05 NOTE — PROGRESS NOTES
Assessment & Plan     (J44.9) Chronic obstructive pulmonary disease, unspecified COPD type (H)  (primary encounter diagnosis)  Comment: recent hospital admission for COPD exacerbation, was started and discharged on Incruse and Spiriva inhalers, noted significant improvement with them but unable to get refill due to health insurance not covering her medication. Given goodrx coupons and prescribed more affordable alternatives. She is currently using her partners Spiriva once daily and albuterol as needed. Reports she will be on medicaid next month on May, 2023.  Plan: tiotropium (SPIRIVA RESPIMAT) 2.5 MCG/ACT         inhaler, insulin glargine (LANTUS PEN) 100         UNIT/ML pen, budesonide-formoterol (SYMBICORT)         160-4.5 MCG/ACT Inhaler, Adult Pulmonary         Medicine Referral           (E11.65,  Z79.4) Type 2 diabetes mellitus with hyperglycemia, with long-term current use of insulin (H)  Comment: was started on insulin after hospital discharge two weeks ago, at the time she was on steroids. Currently off steriods, FBG 230s. Currently on 10 lantus and 14 u novolog with meals, Actos and metformin. Reports compliance, but eating sugary snacks almost daily and high carb almost daily. No hypoglycemia or hyperglycemia symptoms. Counseled about lowering carb and sugar intake.   Plan: insulin aspart (NOVOLOG PEN) 100 UNIT/ML pen,         insulin pen needle (31G X 6 MM) 31G X 6 MM         miscellaneous          JAILYN Perales  [facility] PGY1  [facility] [place]    Rasheeda Reese is a 57 year old, presenting for the following health issues:  COPD follow up.      2/27/2023    11:16 AM   Additional Questions   Roomed by deandre CHAN   Mary Ann 57 year old female with history of COPD, T2DM  On home O2 3L uses it occasionally, recently quit smoking. She was here last week for hospital follow up for COPD excerbation.was started on new inhalers, unable to get them due  issues with her current health insurance not covering her inhalers. She is here with sob not improving, she has been using her partners inhaler (spiriva) once daily feels its helping. Sob with flight of stairs, no sob at rest. No worsening cough, fever, or increased sputum production, notes small greenish sputum at times. Using albuterol as needed 3-6x daily. She reports getting medicaid insurance within the next 3-4 weeks and is looking forward to having coverage for her medication. And wants to see pulmonology next month.    History of DM, was started on insulin following hospital admission and steroid use. Currently finished her steroid course. FBG 230s in the morning, she is on 10u basal and 12u meal time insulin and reports compliance. She is also metformin and Actos. Morning 230s most of the time. Still eating carbs, and has sugary snacks almost daily. Notes swelling of both legs since starting steroids, still there, the swelling the same. Not swollen when she wakes up but slowly get worse throughout the day.     Review of Systems   Constitutional, HEENT, cardiovascular, pulmonary, gi and gu systems are negative, except as otherwise noted.      Objective    There were no vitals taken for this visit.  There is no height or weight on file to calculate BMI.  Physical Exam   GENERAL: healthy, alert and no distress  RESP: lungs clear slightly diminished at bases - no wheezes  CV: normal S1 S2,  no murmur.  ABDOMEN: soft, nontender, no hepatosplenomegaly, no masses and bowel sounds normal  MS: no gross musculoskeletal defects noted, +1 peripheral edema bilaterally.

## 2023-04-05 NOTE — PROGRESS NOTES
Preceptor Attestation:  Patient's case reviewed and discussed with Leta Weiss MD resident and I evaluated the patient. I agree with written assessment and plan of care.  Supervising Physician:  MILAN WHITMAN MD  [facility]

## 2023-04-06 ENCOUNTER — TELEPHONE (OUTPATIENT)
Dept: FAMILY MEDICINE | Facility: CLINIC | Age: 58
End: 2023-04-06
Payer: MEDICARE

## 2023-04-06 NOTE — TELEPHONE ENCOUNTER
Received phone call from pharmacy.  Pt unable to afford symbicort.  Pharm has discounted option for   Fluticasone-salmeterol (Advair) 230-21, 1 puff BID    Verbal OK given for substitution.    Claudia Chapa MD

## 2023-04-09 ENCOUNTER — TELEPHONE (OUTPATIENT)
Dept: FAMILY MEDICINE | Facility: CLINIC | Age: 58
End: 2023-04-09
Payer: MEDICARE

## 2023-04-09 NOTE — TELEPHONE ENCOUNTER
Returned clinic page.    Ongoing bilateral leg pain and swelling. Seems to be getting worse. Started a couple of weeks ago when started on prednisone for COPD exacerbation. Has been off of prednisone for a week now, still getting high blood sugars (236 fasting this morning). Out of insulin pen needles, so not using any insulin now.    Breathing has not been good since stopping the prednisone. Just went for a walk and had to stop every few minutes to catch her breath. Quit smoking recently. Breathing has been worse laying flat, and has developed new paroxysmal nocturnal dyspnea in the past several days. Wore oxygen last night - new for her.     Leg pain gets worse with walking around, one is more swollen than the other. Often cannot get her shoes on, even without socks.     Recommended she be evaluated today either in urgent care or the emergency department. She stated she will try to find a ride, isn't sure where she will go yet. Understands need for work-up for possible DVT, CHF, electrolyte abnormalities.    Lucrecia Maria MD  [facility], PGY-2  Contact via [employer] or pager #: [phone].

## 2023-04-10 ENCOUNTER — OFFICE VISIT (OUTPATIENT)
Dept: FAMILY MEDICINE | Facility: CLINIC | Age: 58
End: 2023-04-10
Payer: MEDICARE

## 2023-04-10 VITALS
OXYGEN SATURATION: 97 % | BODY MASS INDEX: 37.59 KG/M2 | HEART RATE: 101 BPM | DIASTOLIC BLOOD PRESSURE: 84 MMHG | RESPIRATION RATE: 18 BRPM | SYSTOLIC BLOOD PRESSURE: 135 MMHG | HEIGHT: 61 IN | WEIGHT: 199.12 LBS

## 2023-04-10 DIAGNOSIS — E11.65 TYPE 2 DIABETES MELLITUS WITH HYPERGLYCEMIA, WITH LONG-TERM CURRENT USE OF INSULIN (H): ICD-10-CM

## 2023-04-10 DIAGNOSIS — R60.0 LOWER EXTREMITY EDEMA: Primary | ICD-10-CM

## 2023-04-10 DIAGNOSIS — J44.9 CHRONIC OBSTRUCTIVE PULMONARY DISEASE, UNSPECIFIED COPD TYPE (H): ICD-10-CM

## 2023-04-10 DIAGNOSIS — Z79.4 TYPE 2 DIABETES MELLITUS WITH HYPERGLYCEMIA, WITH LONG-TERM CURRENT USE OF INSULIN (H): ICD-10-CM

## 2023-04-10 PROCEDURE — 99214 OFFICE O/P EST MOD 30 MIN: CPT | Mod: GC

## 2023-04-10 NOTE — PROGRESS NOTES
Assessment & Plan   Mary Ann is a 57 year old with history significant for COPD, asthma, Crohn's, hypertension, hyperlipidemia, type 2 diabetes mellitus, tobacco use.    Lower extremity edema  2 weeks by left lateral lower extremity edema.  With edema starting around the time prednisone was initiated, I do suspect this to be the etiology.  No JVD, no rales.  Did have a recent BNP in March that was normal.  So overall low suspicion for new onset heart failure.  Is up about 20 pounds since recent hospitalization, which I suspect his edema.  Has lost couple pounds since most recent clinic visit 5 days ago, which is reassuring. Looks well on exam, lungs are clear.  Overall low suspicion for VTE.  Swelling should start to improve now that she is off prednisone.  -Compression stockings  -Keep feet up when sitting  -Avoid salty foods.  - Follow-up 2-3 weeks or earlier as needed      Chronic obstructive pulmonary disease, unspecified COPD type (H)  Recent hospitalization for exacerbation.  Completed 10-day course of oral prednisone.  Now has home oxygen as needed, doing well with this. Using intermittently.  Has been able to  her inhalers due to issues with insurance.  Currently using albuterol, and one of her other steroid containing inhalers, unsure which one. New cat in the house, allergic, recommended continued use of Claritin. Consider montelukast in the future.  - Continue whichever controller inhaler she has and albuterol as needed  - Recommend returning to discuss this further (bring in inhalers)     Type 2 diabetes mellitus with hyperglycemia, with long-term current use of insulin (H)  Most recent A1c of 10.3 1-month ago.  Currently prescribed Actos, metformin, Lantus, and NovoLog.  Unable to  Lantus due to insurance coverage.  Does have NovoLog at home that she is taking twice a day, unsure of dosing.  Fasting sugars have been in the 200s.  We will need to try to optimize her medications once her  health insurance is updated.  - insulin pen needle (31G X 6 MM) 31G X 6 MM miscellaneous  Dispense: 100 each; Refill: 1   - Sent her home with some needles from clinic     6}  No follow-ups on file.    Sher Shah MD  [facility] [place] Subjective Birgid is a 57 year old with history significant for COPD, asthma, Crohn's, hypertension, hyperlipidemia, type 2 diabetes mellitus, tobacco use.Presenting for the following health issues:  Swelling (Both feet.)        2/27/2023    11:16 AM   Additional Questions   Roomed by deandre     HPI     Bilateral leg swelling  Orthopnea  Ongoing worsening lower extremity swelling since starting prednisone for COPD. Notes pain in her feet, worse with ambulation. Swelling improve when she elevates her feet. Has been off and on prednisone for a week now.  Breathing has become more difficult.  Having to stop every few minutes with walking to catch her breath.  Did recently quit smoking.  Develops new orthopnea. Wearing O2 occasionally at night. Home O2 was started at recent hospitalization in March.  Recently got a cat, and breathing got worse. Known history of cat allergies. Has claritin at home, not taking it.   - No recent travel. No redness in her legs  - 180lbs when admitted to hospital - now 199 lbs, down from 201 5 days ago.    - BNP - negative 3/11 - was not having swelling at that time      DM  Blood sugars elevated into the 230's  Started on insulin 4/5, not taking as she ran out of pen needles.  - On actos 15mg and Metformin 1000 mg BID     COPD  Lost green card for a while, was renewed  Will have insurance coverage now.   Hoping to  inhalers today  Recently adopted a cat, has a known allergies to cats. Has noticed her allergies are getting worse. Not taking her Claritin.       Review of Systems   Constitutional, HEENT, cardiovascular, pulmonary, gi and gu systems are negative, except as otherwise noted.      Objective    /84    "Pulse 111   Resp 18   Ht 1.549 m (5' 1\")   Wt 90.3 kg (199 lb 1.9 oz)   SpO2 95%   BMI 37.62 kg/m    Body mass index is 37.62 kg/m .  Physical Exam   GENERAL: healthy, alert and no distress  NECK - No JVD  RESP: lungs clear to auscultation - no rales, rhonchi or wheezes  CV: regular rate and rhythm, normal S1 S2, no S3 or S4, no murmur, click or rub, no peripheral edema and peripheral pulses strong  ABDOMEN: soft, nontender, no hepatosplenomegaly, no masses and bowel sounds normal  LEGS - 2+ pitting edema to shins, little more significant on the right    "

## 2023-04-17 DIAGNOSIS — J43.9 PULMONARY EMPHYSEMA, UNSPECIFIED EMPHYSEMA TYPE (H): ICD-10-CM

## 2023-04-18 RX ORDER — IPRATROPIUM BROMIDE AND ALBUTEROL SULFATE 2.5; .5 MG/3ML; MG/3ML
1 SOLUTION RESPIRATORY (INHALATION) 2 TIMES DAILY
Qty: 180 ML | Refills: 3 | Status: SHIPPED | OUTPATIENT
Start: 2023-04-18 | End: 2023-06-11

## 2023-04-19 DIAGNOSIS — E11.9 TYPE 2 DIABETES MELLITUS WITHOUT COMPLICATION, WITHOUT LONG-TERM CURRENT USE OF INSULIN (H): ICD-10-CM

## 2023-04-19 RX ORDER — CALCIUM CARBONATE/VITAMIN D3 500MG-5MCG
1 TABLET ORAL
COMMUNITY
Start: 2023-04-06 | End: 2023-06-01

## 2023-05-01 ENCOUNTER — OFFICE VISIT (OUTPATIENT)
Dept: FAMILY MEDICINE | Facility: CLINIC | Age: 58
End: 2023-05-01
Payer: MEDICARE

## 2023-05-01 VITALS
HEART RATE: 91 BPM | TEMPERATURE: 97.8 F | OXYGEN SATURATION: 93 % | HEIGHT: 61 IN | RESPIRATION RATE: 22 BRPM | DIASTOLIC BLOOD PRESSURE: 79 MMHG | WEIGHT: 193 LBS | SYSTOLIC BLOOD PRESSURE: 122 MMHG | BODY MASS INDEX: 36.44 KG/M2

## 2023-05-01 DIAGNOSIS — F41.9 ANXIETY: Primary | ICD-10-CM

## 2023-05-01 DIAGNOSIS — E11.65 TYPE 2 DIABETES MELLITUS WITH HYPERGLYCEMIA, WITHOUT LONG-TERM CURRENT USE OF INSULIN (H): ICD-10-CM

## 2023-05-01 DIAGNOSIS — F31.81 BIPOLAR II DISORDER (H): ICD-10-CM

## 2023-05-01 DIAGNOSIS — J44.9 CHRONIC OBSTRUCTIVE PULMONARY DISEASE, UNSPECIFIED COPD TYPE (H): ICD-10-CM

## 2023-05-01 PROCEDURE — 99214 OFFICE O/P EST MOD 30 MIN: CPT | Performed by: STUDENT IN AN ORGANIZED HEALTH CARE EDUCATION/TRAINING PROGRAM

## 2023-05-08 NOTE — PROGRESS NOTES
"  Assessment & Plan     Anxiety  Chronic obstructive pulmonary disease, unspecified COPD type (H  Bipolar II disorder (H)-forms measurability completed with patient today.  Her portion needs to be completed and submitted prior to approval.  I do agree that public transportation will be a challenge for the patient due to her uncontrolled respiratory issues as well as mental health conditions.      Joanna Farah MD  [facility] [place] Subjective Birgid is a 57 year old, presenting for the following health issues:  Foot Pain (Left foot), Mass (By left lower groin area), and Forms ([employer] form)        4/10/2023     1:30 PM   Additional Questions   Roomed by deandre CHAN   Patient returns today with her care worker to discuss general care.  She is continuing to work on pharmacy coverage for medications and continues to struggle with this despite recent hospitalization with COPD exacerbation.  Reports that inhalers alone are over $100 a month and that she lives on a fixed income.  Her immigration status makes it difficult for her to receive prince benefit for this condition.  Our Pharm.D. has worked to help resolve his issues with limited benefit.  Today patient would like forms completed for [employer].  Due to her asthma and frequent exacerbations she struggles to ride , transportation.  At times is very limited range of mobility due to shortness of breath.  Also has a history of bipolar depression and notes that she can get very anxious and feel trapped in enclosed spaces and act out due to this discomfort      Objective    /79   Pulse 91   Temp 97.8  F (36.6  C)   Resp 22   Ht 1.549 m (5' 1\")   Wt 87.5 kg (193 lb)   SpO2 93%   BMI 36.47 kg/m    Body mass index is 36.47 kg/m .  Physical Exam   GEN: NAD  HEENT: head atraumatic, normocephalic, moist mucous membranes, eyes anicteric  CV: RRR w/o M/R/G  PULM: CTAB  ABD: soft, non-tender, no appreciable masses, no " guarding, BS present  Neuro: alert and oriented x 3, CN II-XII intact, normal gross motor movements  Psych: appropriate  Ext: no peripheral edema

## 2023-05-17 ENCOUNTER — OFFICE VISIT (OUTPATIENT)
Dept: FAMILY MEDICINE | Facility: CLINIC | Age: 58
End: 2023-05-17
Payer: MEDICARE

## 2023-05-17 ENCOUNTER — PATIENT OUTREACH (OUTPATIENT)
Dept: CARE COORDINATION | Facility: CLINIC | Age: 58
End: 2023-05-17

## 2023-05-17 VITALS
DIASTOLIC BLOOD PRESSURE: 84 MMHG | BODY MASS INDEX: 36.44 KG/M2 | OXYGEN SATURATION: 97 % | HEIGHT: 61 IN | TEMPERATURE: 98.3 F | RESPIRATION RATE: 16 BRPM | WEIGHT: 193 LBS | SYSTOLIC BLOOD PRESSURE: 124 MMHG | HEART RATE: 82 BPM

## 2023-05-17 DIAGNOSIS — B96.89 BACTERIAL VAGINOSIS: Primary | ICD-10-CM

## 2023-05-17 DIAGNOSIS — N76.0 BACTERIAL VAGINOSIS: Primary | ICD-10-CM

## 2023-05-17 DIAGNOSIS — Z59.9 FINANCIAL DIFFICULTIES: ICD-10-CM

## 2023-05-17 DIAGNOSIS — N89.8 VAGINAL DISCHARGE: ICD-10-CM

## 2023-05-17 LAB
CLUE CELLS: PRESENT
TRICHOMONAS, WET PREP: ABNORMAL
WBC'S/HIGH POWER FIELD, WET PREP: ABNORMAL
YEAST, WET PREP: ABNORMAL

## 2023-05-17 PROCEDURE — 99213 OFFICE O/P EST LOW 20 MIN: CPT | Mod: GC

## 2023-05-17 PROCEDURE — 87210 SMEAR WET MOUNT SALINE/INK: CPT

## 2023-05-17 RX ORDER — CLINDAMYCIN HCL 300 MG
300 CAPSULE ORAL 2 TIMES DAILY
Qty: 14 CAPSULE | Refills: 0 | Status: SHIPPED | OUTPATIENT
Start: 2023-05-17 | End: 2023-05-24

## 2023-05-17 SDOH — ECONOMIC STABILITY - INCOME SECURITY: PROBLEM RELATED TO HOUSING AND ECONOMIC CIRCUMSTANCES, UNSPECIFIED: Z59.9

## 2023-05-17 NOTE — PROGRESS NOTES
Clinic Care Coordination Contact    Follow Up Progress Note      Assessment: The pt had some questions about her medication. The pt has Medicare and Medicaid, but when she goes to get her medication, its not covered. The pt has Medicare part A and B. I found out that the pt needs to get part D also, even if the pt had Medicaid. I told the pt to call the Social Security Office about her part D, and purchased it. Or she can call Medicare about it. Pt stated that she will call and see.     Care Gaps:    Health Maintenance Due   Topic Date Due     COPD ACTION PLAN  Never done     ADVANCE CARE PLANNING  11/15/2017     LUNG CANCER SCREENING  10/02/2018     ZOSTER IMMUNIZATION (3 of 3) 11/30/2018     DIABETIC FOOT EXAM  09/25/2019     MAMMO SCREENING  09/10/2021     COVID-19 Vaccine (3 - Pfizer series) 02/08/2022     INFLUENZA VACCINE (1) 09/01/2022     MICROALBUMIN  09/17/2022     MEDICARE ANNUAL WELLNESS VISIT  11/02/2022     LIPID  11/02/2022     COLORECTAL CANCER SCREENING  11/22/2022           Care Plans      Intervention/Education provided during outreach:               Plan:     Care Coordinator will follow up in

## 2023-05-17 NOTE — PROGRESS NOTES
"  Assessment & Plan     Bacterial vaginosis  Vaginal discharge  One week of malodorous, brown/clear vaginal discharge and vaginal pruritis. Has not been sexually active for past year. Does not desire STD screening. No history of similar symptoms. Has T2DM, poor control in part due to not being able to afford medications (see below). Wet prep with clue cells and WBC, consistent with BV. Patient has a metronidazole allergy and does not want to use an intravaginal medication. Will prescribe oral clindamycin course.   - Wet preparation  - Clindamycin BID for 7 days   - Follow-up if symptoms worsen or fail to improve     Financial difficulties  Has had issues with insurance not covering medications. Has medicare. JOYCE Toney discussed with patient today.   - Primary Care - Care Coordination Referral    No follow-ups on file.    Elizabeth Ontiveros MD  [facility] [place]    Rasheeda Reese is a 57 year old, presenting for the following health issues:..........  Urinary Problem (Vaginal discharge, smelly, 2 weeks, )        4/10/2023     1:30 PM   Additional Questions   Roomed by deandre     [facility]: COPD, asthma, Crohn's, hypertension, hyperlipidemia, type 2 diabetes mellitus, tobacco use.    Vaginal discharge   Started 1 week ago   Malodorous   Brownish color   Vaginal itchiness   No dysuria  Has noted increased urinary frequency   No new sexual partners  No sex for about 1 year   No problems like this in the past   Negative G/C in 2021     Insurance is not covering any medications   Has medicare   Does not have medicare part D   Has a green card (immigrant from [place] - moved to [place] in [place])   Discussed with Dawna, care coordinator today     Review of Systems   Constitutional, HEENT, cardiovascular, pulmonary, gi and gu systems are negative, except as otherwise noted.      Objective    /84   Pulse 82   Temp 98.3  F (36.8  C) (Tympanic)   Resp 16   Ht 1.549 m (5' 1\")   Wt 87.5 kg (193 lb)   " SpO2 97%   BMI 36.47 kg/m    Body mass index is 36.47 kg/m .  Physical Exam   GENERAL: healthy, alert and no distress  EYES: Eyes grossly normal to inspection, PERRL and conjunctivae and sclerae normal  RESP: lungs clear to auscultation - no rales, rhonchi or wheezes  CV: regular rate and rhythm, normal S1 S2, no S3 or S4, no murmur, click or rub, no peripheral edema and peripheral pulses strong  ABDOMEN: soft, nontender, no hepatosplenomegaly, no masses and bowel sounds normal  MS: no gross musculoskeletal defects noted, no edema  : No vulvar erythema or leukoplakia, small amount of clear/tan discharge, did not complete speculum exam  PSYCH: mentation appears normal, affect normal/bright

## 2023-05-18 NOTE — PROGRESS NOTES
Preceptor Attestation:  Patient's case reviewed and discussed with Elizabeth Ontiveros MD resident and I evaluated the patient. I agree with written assessment and plan of care.  Supervising Physician:  Robb Daniels MD, MD [facility]

## 2023-05-30 ENCOUNTER — TELEPHONE (OUTPATIENT)
Dept: FAMILY MEDICINE | Facility: CLINIC | Age: 58
End: 2023-05-30
Payer: MEDICARE

## 2023-05-30 NOTE — LETTER
RETURN TO WORK/SCHOOL FORM    5/30/2023    Re: Mary Ann Olson  1965      To Whom It May Concern:     Mary Ann Olson is a patient that I have been caring for for several years.  She has known diagnoses of anxiety, bipolar II depression, and personality disorder with variable control over time.  These conditions affect Mary Ann's efforts in concentration as well as personal and group interactions.     It is my opinion that it would be appropriate to exclude Wendy from jury duty due to medical exemption.     Thank you,              Joanna Farah MD  5/30/2023 1:56 PM

## 2023-05-30 NOTE — TELEPHONE ENCOUNTER
[facility] phone call message- general phone call:    Reason for call: Patient called and wanted Dr. Farah to write a letter stating her Mental illness. Patient just received a letter to go to Jury duty and patient does not want to go. Please call patient and advise once its ready for . Thank you    Return call needed: Yes    OK to leave a message on voice mail? Yes    Primary language: English      needed? No    Call taken on May 30, 2023 at 10:59 AM by Chris Castillo

## 2023-06-01 ENCOUNTER — OFFICE VISIT (OUTPATIENT)
Dept: FAMILY MEDICINE | Facility: CLINIC | Age: 58
End: 2023-06-01
Payer: MEDICARE

## 2023-06-01 VITALS
OXYGEN SATURATION: 94 % | DIASTOLIC BLOOD PRESSURE: 80 MMHG | WEIGHT: 184 LBS | HEART RATE: 94 BPM | HEIGHT: 61 IN | BODY MASS INDEX: 34.74 KG/M2 | SYSTOLIC BLOOD PRESSURE: 115 MMHG | TEMPERATURE: 97.4 F

## 2023-06-01 DIAGNOSIS — E11.65 TYPE 2 DIABETES MELLITUS WITH HYPERGLYCEMIA, WITHOUT LONG-TERM CURRENT USE OF INSULIN (H): ICD-10-CM

## 2023-06-01 DIAGNOSIS — R10.32 ABDOMINAL PAIN, LEFT LOWER QUADRANT: Primary | ICD-10-CM

## 2023-06-01 DIAGNOSIS — K50.10 CROHN'S DISEASE OF LARGE INTESTINE WITHOUT COMPLICATION (H): ICD-10-CM

## 2023-06-01 LAB
BASOPHILS # BLD AUTO: 0.1 10E3/UL (ref 0–0.2)
BASOPHILS NFR BLD AUTO: 1 %
CRP SERPL-MCNC: 19.7 MG/L
EOSINOPHIL # BLD AUTO: 0.3 10E3/UL (ref 0–0.7)
EOSINOPHIL NFR BLD AUTO: 3 %
ERYTHROCYTE [DISTWIDTH] IN BLOOD BY AUTOMATED COUNT: 13.6 % (ref 10–15)
ERYTHROCYTE [SEDIMENTATION RATE] IN BLOOD BY WESTERGREN METHOD: 45 MM/HR (ref 0–30)
HCT VFR BLD AUTO: 39.2 % (ref 35–47)
HGB BLD-MCNC: 12.4 G/DL (ref 11.7–15.7)
IMM GRANULOCYTES # BLD: 0 10E3/UL
IMM GRANULOCYTES NFR BLD: 0 %
LYMPHOCYTES # BLD AUTO: 2 10E3/UL (ref 0.8–5.3)
LYMPHOCYTES NFR BLD AUTO: 21 %
MCH RBC QN AUTO: 26.5 PG (ref 26.5–33)
MCHC RBC AUTO-ENTMCNC: 31.6 G/DL (ref 31.5–36.5)
MCV RBC AUTO: 84 FL (ref 78–100)
MONOCYTES # BLD AUTO: 0.6 10E3/UL (ref 0–1.3)
MONOCYTES NFR BLD AUTO: 6 %
NEUTROPHILS # BLD AUTO: 6.7 10E3/UL (ref 1.6–8.3)
NEUTROPHILS NFR BLD AUTO: 69 %
PLATELET # BLD AUTO: 389 10E3/UL (ref 150–450)
RBC # BLD AUTO: 4.68 10E6/UL (ref 3.8–5.2)
WBC # BLD AUTO: 9.7 10E3/UL (ref 4–11)

## 2023-06-01 PROCEDURE — 36415 COLL VENOUS BLD VENIPUNCTURE: CPT

## 2023-06-01 PROCEDURE — 85652 RBC SED RATE AUTOMATED: CPT

## 2023-06-01 PROCEDURE — 99214 OFFICE O/P EST MOD 30 MIN: CPT | Mod: GC

## 2023-06-01 PROCEDURE — 85025 COMPLETE CBC W/AUTO DIFF WBC: CPT

## 2023-06-01 PROCEDURE — 86140 C-REACTIVE PROTEIN: CPT

## 2023-06-01 NOTE — PROGRESS NOTES
"  Assessment & Plan     Abdominal pain, left lower quadrant  Crohn's disease of large intestine without complication (H)  Patient with history of Crohn's disease diagnosed by biopsy in 2006, and has not been treated for the past 3 or 4 years due to insurance difficulties and severe financial constraints.  Has not seen GI in a couple of years due to cost.  Developed abdominal pain a couple of days ago without fevers.  Has had some constipation.  Feels like her previous Crohn's flares.  CBC without leukocytosis.  CRP and ESR markedly elevated.  Ideally, would do CT scan to further assess disease severity and location.  Also recommended colonoscopy, which patient states she will never do again.  Recommended GI and rheumatology follow-up, which she states is not possible due to financial constraints.  Discussed no role for antibiotics in this case.  Offered her a short course of prednisone to help to get the inflammation under control and she will see if this is within her financial abilities, though she repeatedly voices concern about the cost of this medication and the fact that it will raise her blood sugars.  - CBC with Platelets & Differential  - C-Reactive Protein  - Erythrocyte sedimentation rate auto  - predniSONE (DELTASONE) 20 MG tablet  Dispense: 10 tablet; Refill: 0    Type 2 diabetes mellitus with hyperglycemia, without long-term current use of insulin (H)  Currently being treated only with metformin due to financial concerns with medications.    Lucrecia Maria MD PGY-2  [facility]    Precepted with: Dr. Chapa    Rasheeda Reese is a 57 year old, presenting for the following health issues:  Pain (Left side of abdomen for two days), Bloated, Constipation, Recheck Medication (Oysco), and Refill Request    \"I think it's a crohn's flare\"  Pain and bloating over past 2-3 days started after eating strawberries, other berries with seeds, beans  Has pooped 3 times since taking " "metamucil   thinks it has been 4 months since last crohn's flare - no record of this in Epic  No nausea/vomiting  No appetite  Woke up with sugar of 167 - that was high for her  Typically blood sugars ~130  No fevers  No blood in poop    Hasn't seen GI or rheumatology in a few years - can't afford Humira, would have to pay for visit out of pocket    Colonoscopy in  - Crohn's - does Fit testing, last in 2021  Last saw rheumatology () in 2021    diagnosed with crohns colitis in . She presented with anemia and diarrhea. Diagnosed on colonoscopy done at [facility]. Ileum was normal. Patchy colitis, rectal sparing. Biopsies c/w crohns. Treated with entocort and pentasa.  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 Remicade stopped as thought to have drug related pancreatitis.  Was changed to humira. She hasn't had it as script .  Recently established care with  rheumatologist. Told to check in with GI.       Objective    /80   Pulse 94   Temp 97.4  F (36.3  C)   Ht 1.549 m (5' 1\")   Wt 83.5 kg (184 lb)   SpO2 94%   BMI 34.77 kg/m    Body mass index is 34.77 kg/m .  GEN: Patient sitting comfortably in no acute distress.  HEEN: Head is atraumatic, normocephalic, eyes anicteric, mucous membranes moist.  CV: Regular rate and rhythm without obvious murmurs.  PULM: Clear to auscultation bilaterally without wheezing or rales.  ABD: Soft, nontender, bowel sounds present.  NEURO: Alert and oriented x3.  No focal motor abnormalities.  Face symmetric.  PSYCH: Appropriate affect.  SKIN: No rashes, bruising, or other lesions.  "

## 2023-06-02 ENCOUNTER — TELEPHONE (OUTPATIENT)
Dept: FAMILY MEDICINE | Facility: CLINIC | Age: 58
End: 2023-06-02

## 2023-06-02 RX ORDER — PREDNISONE 20 MG/1
40 TABLET ORAL DAILY
Qty: 10 TABLET | Refills: 0 | Status: SHIPPED | OUTPATIENT
Start: 2023-06-02 | End: 2023-06-07

## 2023-06-02 RX ORDER — CALCIUM CARBONATE/VITAMIN D3 500MG-5MCG
1 TABLET ORAL 2 TIMES DAILY
Qty: 90 TABLET | Refills: 3 | Status: SHIPPED | OUTPATIENT
Start: 2023-06-02 | End: 2023-06-11

## 2023-06-02 NOTE — TELEPHONE ENCOUNTER
[facility] phone call message- patient requesting results:    Test: Lab    Date of test: 6/1/23    Additional Comments: Patient called requesting for results, please review, annotate and call patient back thank you.    OK to leave a message on voice mail? Yes    Primary language: English      needed? No    Call taken on June 2, 2023 at 9:55 AM by Vern Alford

## 2023-06-03 ENCOUNTER — HOSPITAL ENCOUNTER (EMERGENCY)
Facility: HOSPITAL | Age: 58
Discharge: HOME | End: 2023-06-03
Attending: EMERGENCY MEDICINE | Admitting: EMERGENCY MEDICINE
Payer: MEDICARE

## 2023-06-03 ENCOUNTER — APPOINTMENT (OUTPATIENT)
Dept: CT IMAGING | Facility: HOSPITAL | Age: 58
End: 2023-06-03
Attending: EMERGENCY MEDICINE
Payer: MEDICARE

## 2023-06-03 ENCOUNTER — TELEPHONE (OUTPATIENT)
Dept: FAMILY MEDICINE | Facility: CLINIC | Age: 58
End: 2023-06-03
Payer: MEDICARE

## 2023-06-03 VITALS
RESPIRATION RATE: 18 BRPM | BODY MASS INDEX: 34.96 KG/M2 | DIASTOLIC BLOOD PRESSURE: 84 MMHG | TEMPERATURE: 98 F | SYSTOLIC BLOOD PRESSURE: 154 MMHG | OXYGEN SATURATION: 95 % | WEIGHT: 185 LBS | HEART RATE: 90 BPM

## 2023-06-03 DIAGNOSIS — K59.00 CONSTIPATION, UNSPECIFIED CONSTIPATION TYPE: ICD-10-CM

## 2023-06-03 DIAGNOSIS — R10.32 ABDOMINAL PAIN, LEFT LOWER QUADRANT: ICD-10-CM

## 2023-06-03 LAB
ALBUMIN SERPL BCG-MCNC: 4.4 G/DL (ref 3.5–5.2)
ALBUMIN UR-MCNC: NEGATIVE MG/DL
ALP SERPL-CCNC: 103 U/L (ref 35–104)
ALT SERPL W P-5'-P-CCNC: 15 U/L (ref 10–35)
ANION GAP SERPL CALCULATED.3IONS-SCNC: 14 MMOL/L (ref 7–15)
APPEARANCE UR: CLEAR
AST SERPL W P-5'-P-CCNC: 16 U/L (ref 10–35)
BACTERIA #/AREA URNS HPF: ABNORMAL /HPF
BASOPHILS # BLD AUTO: 0 10E3/UL (ref 0–0.2)
BASOPHILS NFR BLD AUTO: 0 %
BILIRUB DIRECT SERPL-MCNC: <0.2 MG/DL (ref 0–0.3)
BILIRUB SERPL-MCNC: 0.2 MG/DL
BILIRUB UR QL STRIP: NEGATIVE
BUN SERPL-MCNC: 9.3 MG/DL (ref 6–20)
CALCIUM SERPL-MCNC: 9.9 MG/DL (ref 8.6–10)
CHLORIDE SERPL-SCNC: 101 MMOL/L (ref 98–107)
COLOR UR AUTO: COLORLESS
CREAT SERPL-MCNC: 0.46 MG/DL (ref 0.51–0.95)
CRP SERPL-MCNC: 14.4 MG/L
DEPRECATED HCO3 PLAS-SCNC: 22 MMOL/L (ref 22–29)
EOSINOPHIL # BLD AUTO: 0 10E3/UL (ref 0–0.7)
EOSINOPHIL NFR BLD AUTO: 0 %
ERYTHROCYTE [DISTWIDTH] IN BLOOD BY AUTOMATED COUNT: 14 % (ref 10–15)
GFR SERPL CREATININE-BSD FRML MDRD: >90 ML/MIN/1.73M2
GLUCOSE SERPL-MCNC: 137 MG/DL (ref 70–99)
GLUCOSE UR STRIP-MCNC: NEGATIVE MG/DL
HCT VFR BLD AUTO: 36.7 % (ref 35–47)
HGB BLD-MCNC: 12.1 G/DL (ref 11.7–15.7)
HGB UR QL STRIP: NEGATIVE
IMM GRANULOCYTES # BLD: 0 10E3/UL
IMM GRANULOCYTES NFR BLD: 0 %
KETONES UR STRIP-MCNC: ABNORMAL MG/DL
LEUKOCYTE ESTERASE UR QL STRIP: ABNORMAL
LIPASE SERPL-CCNC: 12 U/L (ref 13–60)
LYMPHOCYTES # BLD AUTO: 1.1 10E3/UL (ref 0.8–5.3)
LYMPHOCYTES NFR BLD AUTO: 12 %
MCH RBC QN AUTO: 26.7 PG (ref 26.5–33)
MCHC RBC AUTO-ENTMCNC: 33 G/DL (ref 31.5–36.5)
MCV RBC AUTO: 81 FL (ref 78–100)
MONOCYTES # BLD AUTO: 0.4 10E3/UL (ref 0–1.3)
MONOCYTES NFR BLD AUTO: 5 %
NEUTROPHILS # BLD AUTO: 7.4 10E3/UL (ref 1.6–8.3)
NEUTROPHILS NFR BLD AUTO: 83 %
NITRATE UR QL: NEGATIVE
NRBC # BLD AUTO: 0 10E3/UL
NRBC BLD AUTO-RTO: 0 /100
PH UR STRIP: 6 [PH] (ref 5–7)
PLATELET # BLD AUTO: 409 10E3/UL (ref 150–450)
POTASSIUM SERPL-SCNC: 3.9 MMOL/L (ref 3.4–5.3)
PROT SERPL-MCNC: 7.4 G/DL (ref 6.4–8.3)
RBC # BLD AUTO: 4.54 10E6/UL (ref 3.8–5.2)
RBC URINE: 1 /HPF
SODIUM SERPL-SCNC: 137 MMOL/L (ref 136–145)
SP GR UR STRIP: 1.01 (ref 1–1.03)
SQUAMOUS EPITHELIAL: 1 /HPF
UROBILINOGEN UR STRIP-MCNC: <2 MG/DL
WBC # BLD AUTO: 8.9 10E3/UL (ref 4–11)
WBC URINE: 2 /HPF

## 2023-06-03 PROCEDURE — 258N000003 HC RX IP 258 OP 636: Performed by: EMERGENCY MEDICINE

## 2023-06-03 PROCEDURE — 82248 BILIRUBIN DIRECT: CPT | Performed by: EMERGENCY MEDICINE

## 2023-06-03 PROCEDURE — G1010 CDSM STANSON: HCPCS

## 2023-06-03 PROCEDURE — 81001 URINALYSIS AUTO W/SCOPE: CPT | Performed by: EMERGENCY MEDICINE

## 2023-06-03 PROCEDURE — 99284 EMERGENCY DEPT VISIT MOD MDM: CPT | Mod: 25

## 2023-06-03 PROCEDURE — 36415 COLL VENOUS BLD VENIPUNCTURE: CPT | Performed by: EMERGENCY MEDICINE

## 2023-06-03 PROCEDURE — 86140 C-REACTIVE PROTEIN: CPT | Performed by: EMERGENCY MEDICINE

## 2023-06-03 PROCEDURE — 96360 HYDRATION IV INFUSION INIT: CPT

## 2023-06-03 PROCEDURE — 87086 URINE CULTURE/COLONY COUNT: CPT | Performed by: EMERGENCY MEDICINE

## 2023-06-03 PROCEDURE — 80053 COMPREHEN METABOLIC PANEL: CPT | Performed by: EMERGENCY MEDICINE

## 2023-06-03 PROCEDURE — 250N000013 HC RX MED GY IP 250 OP 250 PS 637: Performed by: EMERGENCY MEDICINE

## 2023-06-03 PROCEDURE — 83690 ASSAY OF LIPASE: CPT | Performed by: EMERGENCY MEDICINE

## 2023-06-03 PROCEDURE — 85025 COMPLETE CBC W/AUTO DIFF WBC: CPT | Performed by: EMERGENCY MEDICINE

## 2023-06-03 RX ORDER — ACETAMINOPHEN 325 MG/1
975 TABLET ORAL ONCE
Status: COMPLETED | OUTPATIENT
Start: 2023-06-03 | End: 2023-06-03

## 2023-06-03 RX ORDER — SODIUM CHLORIDE 9 MG/ML
INJECTION, SOLUTION INTRAVENOUS ONCE
Status: COMPLETED | OUTPATIENT
Start: 2023-06-03 | End: 2023-06-03

## 2023-06-03 RX ADMIN — SODIUM CHLORIDE: 9 INJECTION, SOLUTION INTRAVENOUS at 19:51

## 2023-06-03 RX ADMIN — ACETAMINOPHEN 975 MG: 325 TABLET ORAL at 19:51

## 2023-06-03 ASSESSMENT — ACTIVITIES OF DAILY LIVING (ADL): ADLS_ACUITY_SCORE: 35

## 2023-06-03 NOTE — TELEPHONE ENCOUNTER
After hours clinic page received from patient.     See my notes from the past few days about her ongoing abdominal pain.    She states that her pain is slightly worse this morning and she has not been able to have a bowel movement for a few days now. She picked up the prednisone yesterday (for suspected Crohn's flare) and took her first dose and it has not seemed to help yet. She has metamucil at home and will plan to try that. She does not want to come into the hospital yet, wants to wait at home and see how her pain/bowels do over the day today. Instructed her to call clinic again if she has any questions/concerns or to present to [facility] if she is feeling any worse.    Lucrecia Maria MD  [facility], PGY-2  Contact via [employer] marcel or pager #: [phone].

## 2023-06-03 NOTE — TELEPHONE ENCOUNTER
After hours clinic page received from patient. See earlier notes re: ongoing abdominal pain over the last week. In brief, the patient was evaluated in clinic earlier in the week for abdominal pain and constipation. She was started on prednisone for concern of Chrons and advised to take metamucil for constipation. Patient notes that she took metamucil about 3 hours prior to this call and that she has not had a bowel movement but is passing gas. She denies worsening pain with eating. Denies any associated nausea or vomiting.     She also states that her sugar have been higher since starting prednisone. She reports a level of 174 after eating. I discussed with her that this is an expected side effect of steroids and that we can address optimized blood sugar control at outpatient visit.     In regards to her abdominal pain: I am less concerned for a bowel obstruction given that she is passing gas, but we discussed that it may be beneficial to be seen in the ED for further characterization of this pain. Despite my encouragement, the patient states she cannot come into the emergency department because she has animals that need to be cared for. Stated that in the absence of presenting to the ED, I would advise close follow up at our clinic. I will send a message to scheduling.     Plan:   - Advised presentation to ED for further work up of SBO vs ischemic bowel vs constipation, patient declined   - Therefore advised close follow up in our clinic, message sent to scheduling     Giovanna Stout MD PGY-1   [facility]

## 2023-06-04 NOTE — ED PROVIDER NOTES
Emergency Department Encounter     Evaluation Date & Time:   6/3/2023  7:13 PM    CHIEF COMPLAINT:  Abdominal Pain      Triage Note:  The pt arrives with c/o lower left abd pain for two days. Pt has a hx of crones disease and states that she has not had a bowel movement in two days. Denies vomiting.      Triage Assessment     Row Name 23 1912       Triage Assessment (Adult)    Airway WDL WDL       Cognitive/Neuro/Behavioral WDL    Cognitive/Neuro/Behavioral WDL WDL                    Impression and Plan       FINAL IMPRESSION:    ICD-10-CM    1. Abdominal pain, left lower quadrant  R10.32       2. Constipation, unspecified constipation type  K59.00             ED COURSE & MEDICAL DECISION MAKIN:16 PM I met the patient and performed my initial interview and exam.   8:30 PM I rechecked and updated the patient     57 year old female, history of Crohn's disease (not on medications), DM2, HTN, HLD and COPD / asthma, who presents for evaluation of sharp LLQ abdominal pain that has been constant and progressively worsening since onset 2 weeks ago. She endorses associated constipation with last BM 2 days ago. Passed flatus only once today. No associated N/V/D, urinary symptoms.     Patient was started on a prednisone burst 2 days ago by clinic for concerns of possible Crohn's exacerbation as well as Metamucil.     On exam, abdomen is soft with moderate to severe very localized tenderness to palpation in the LLQ with equivocal rebound tenderness; abdomen is otherwise benign. No CVAT, BL.    IV access established, blood sent for labs and IV fluids initiated.     Labs remarkable for no leukocytosis (WBC 8.9) with mildly elevated CRP (14.4 - down-trending from 19.7 two days ago) with no anemia (Hb 12.1).  No significant electrolyte derangements or renal impairment.  No laboratory evidence of hepatitis, biliary obstruction or pancreatitis.    UA with 250 LE and few bacteria. Given no urinary symptoms, will hold on  antibiotics pending urine culture results.     CT abdomen / pelvis performed and demonstrated moderately large fecal burden with no CT evidence of diverticulitis, appendicitis or intra-abdominal fluid collections.     Soap suds enema administered with passage of only a small amount of stool. Patient feeling ready for discharge.    Patient discharged to home with follow-up with her Primary Care Clinic next week. She was advised to take OTC MiraLAX daily until she sees her PCP. Return precautions provided. Patient stable throughout ED course.     At the conclusion of the encounter I discussed the results of all the tests and the disposition. The questions were answered. The patient acknowledged understanding and was agreeable with the care plan.    Medical Decision Making    History:    Supplemental history from: Documented in chart, if applicable    External Record(s) reviewed: Documented in chart, if applicable. Clinic note - SEE HPI    Work Up:    Chart documentation includes differential considered and any EKGs or imaging independently interpreted by provider, where specified.    In additional to work up documented, I considered the following work up: Documented in chart, if applicable.    External consultation:    Discussion of management with another provider: Documented in chart, if applicable    Complicating factors:    Care impacted by chronic illness: Chronic Lung Disease, Diabetes, Hyperlipidemia and Hypertension    Care affected by social determinants of health: N/A    Disposition considerations: Discharge. No recommendations on prescription strength medication(s). I considered admission, but ultimately discharged patient Given reassuring exam, vitals and evaluation.      MEDICATIONS GIVEN IN THE EMERGENCY DEPARTMENT:  Medications   acetaminophen (TYLENOL) tablet 975 mg (975 mg Oral $Given 6/3/23 1951)   sodium chloride 0.9% infusion ( Intravenous $New Bag 6/3/23 1951)       NEW PRESCRIPTIONS STARTED AT  TODAY'S ED VISIT:  New Prescriptions    No medications on file       HPI     HPI     Mary Ann Olson is a 57 year old female, history of Crohn's disease (not on medications), COPD / asthma, HTN, HLD, DM2, tobacco use and polysubstance abuse (in remission), who presents to this ED via walk in for evaluation of abdominal pain.     Per chart review: the patient was evaluated in clinic earlier in the week (6/01) for abdominal pain and constipation. She was started on prednisone for concern of Chrons and advised to take metamucil for constipation. Patient later called after hours clinic today due to residual abdominal pain. She also states that her sugar have been higher since starting prednisone. She reports a level of 174 after eating. Patient was advised to be seen in the ED for further SBO vs ischemic bowel vs constipation work up.     For the last couple of weeks, the patient endorses progressively worsening left lower quadrant abdominal pain with associated constipation. Last bowel movement was two days ago. Patient is still passing flatus (once today). The pain is sharp and worse with movement and sometimes worse after eating. No nausea or vomiting. Patient has been taking her prednisone and metamucil for the last two days without relief of symptoms. No urinary symptoms. Unsure if she has had fevers; denies chills.     Patient denies any chest pain, shortness of breath or any other complaints at this time.     REVIEW OF SYSTEMS:  All other systems reviewed and are negative.      Medical History     Past Medical History:   Diagnosis Date     Anemia      Antihistamines overdose, intentional self-harm, initial encounter (H)      Asthma      Bipolar 1 disorder (H) hx of bipolar listed in h and p     Bipolar 2 disorder (H)      Bipolar I disorder, single manic episode (H)      Cellulitis 2006 left ankle, 2008 right foot     COPD (chronic obstructive pulmonary disease) (H)      Crohn's disease (H)      Diabetes  mellitus (H)      Diabetes mellitus, type 2 (H)      Fibrocystic breast      Heat stroke      Hyperlipidemia      Idiopathic acute pancreatitis 07/14/2015     Irritable bowel syndrome      Kidney stone      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      Overdose      Pyoderma gangrenosum      Sacroiliitis (H) 2004     Skin lesion of left leg 08/27/2015     Suicidal ideation      Suicide attempt (H)      Traumatic iritis 07/21/2015     Umbilical hernia      Uncomplicated asthma        Past Surgical History:   Procedure Laterality Date     BIOPSY BREAST Left      BIOPSY OF BREAST, INCISIONAL      Description: Biopsy Breast Open;  Recorded: 10/28/2010;     HC REMOVAL OF TONSILS,<11 Y/O      Description: Tonsillectomy;  Recorded: 07/08/2009;     ZZC LIGATE FALLOPIAN TUBE      Description: Tubal Ligation;  Recorded: 07/08/2009;       Family History   Problem Relation Age of Onset     Coronary Artery Disease Mother      Diabetes Father      Breast Cancer Maternal Grandmother      Asthma Son      Asthma Daughter      Hemophilia Other      Diabetes Type 2  Father      Factor VIII deficiency Other        Social History     Tobacco Use     Smoking status: Every Day     Packs/day: 0.50     Types: Cigarettes     Smokeless tobacco: Never     Tobacco comments:     2-3 cig/day   Vaping Use     Vaping status: Never Used     Passive vaping exposure: Yes   Substance Use Topics     Alcohol use: No     Drug use: No       acetaminophen (TYLENOL) 500 MG tablet  albuterol (PROAIR HFA/PROVENTIL HFA/VENTOLIN HFA) 108 (90 Base) MCG/ACT inhaler  albuterol (PROVENTIL) (2.5 MG/3ML) 0.083% neb solution  atorvastatin (LIPITOR) 40 MG tablet  blood glucose (NO BRAND SPECIFIED) test strip  blood glucose monitoring (NO BRAND SPECIFIED) meter device kit  budesonide-formoterol (SYMBICORT) 160-4.5 MCG/ACT Inhaler  calcium carbonate-vitamin D (OSCAL W/D) 500-200 MG-UNIT tablet  fluticasone (FLONASE) 50 MCG/ACT nasal spray  fluticasone-salmeterol (AIRDUO  RESPICLICK) 113-14 MCG/ACT inhaler  hydrOXYzine (ATARAX) 25 MG tablet  insulin aspart (NOVOLOG PEN) 100 UNIT/ML pen  insulin glargine (LANTUS PEN) 100 UNIT/ML pen  insulin pen needle (31G X 6 MM) 31G X 6 MM miscellaneous  ipratropium - albuterol 0.5 mg/2.5 mg/3 mL (DUONEB) 0.5-2.5 (3) MG/3ML neb solution  loratadine (CLARITIN) 10 MG tablet  metFORMIN (GLUCOPHAGE) 1000 MG tablet  omeprazole (PRILOSEC) 20 MG DR capsule  OYSCO 500 + D 500-5 MG-MCG TABS  pioglitazone (ACTOS) 15 MG tablet  predniSONE (DELTASONE) 20 MG tablet  tiotropium (SPIRIVA RESPIMAT) 2.5 MCG/ACT inhaler  vitamin D2 (ERGOCALCIFEROL) 77326 units (1250 mcg) capsule        Physical Exam     First Vitals:  Patient Vitals for the past 24 hrs:   BP Temp Temp src Pulse Resp SpO2 Weight   06/03/23 1911 (!) 166/94 98  F (36.7  C) Temporal 97 18 97 % 83.9 kg (185 lb)       PHYSICAL EXAM:   Physical Exam    GENERAL: Awake, alert.  In no acute distress.   HEENT: Normocephalic, atraumatic.   NECK: No stridor.  PULMONARY: Symmetrical breath sounds without distress.  Lungs clear to auscultation bilaterally without wheezes, rhonchi or rales.  CARDIO: Borderline tachycardic rate with regular rhythm.  No significant murmur, rub or gallop.  Radial pulses strong and symmetrical.  ABDOMINAL: Abdomen soft, non-distended with moderate to severe very localized tenderness to palpation in the LLQ with equivocal rebound tenderness; abdomen is otherwise benign. No CVAT, BL.  EXTREMITIES: No lower extremity swelling or edema.      NEURO: Alert and oriented to person, place and time.  Cranial nerves grossly intact.  No focal motor deficit.  PSYCH: Normal mood and affect.      Results     LAB:  All pertinent labs reviewed and interpreted  Labs Ordered and Resulted from Time of ED Arrival to Time of ED Departure   BASIC METABOLIC PANEL - Abnormal       Result Value    Sodium 137      Potassium 3.9      Chloride 101      Carbon Dioxide (CO2) 22      Anion Gap 14      Urea Nitrogen  9.3      Creatinine 0.46 (*)     Calcium 9.9      Glucose 137 (*)     GFR Estimate >90     LIPASE - Abnormal    Lipase 12 (*)    ROUTINE UA WITH MICROSCOPIC REFLEX TO CULTURE - Abnormal    Color Urine Colorless      Appearance Urine Clear      Glucose Urine Negative      Bilirubin Urine Negative      Ketones Urine Trace (*)     Specific Gravity Urine 1.007      Blood Urine Negative      pH Urine 6.0      Protein Albumin Urine Negative      Urobilinogen Urine <2.0      Nitrite Urine Negative      Leukocyte Esterase Urine 250 Veena/uL (*)     Bacteria Urine Few (*)     RBC Urine 1      WBC Urine 2      Squamous Epithelials Urine 1     CRP INFLAMMATION - Abnormal    CRP Inflammation 14.40 (*)    HEPATIC FUNCTION PANEL - Normal    Protein Total 7.4      Albumin 4.4      Bilirubin Total 0.2      Alkaline Phosphatase 103      AST 16      ALT 15      Bilirubin Direct <0.20     CBC WITH PLATELETS AND DIFFERENTIAL    WBC Count 8.9      RBC Count 4.54      Hemoglobin 12.1      Hematocrit 36.7      MCV 81      MCH 26.7      MCHC 33.0      RDW 14.0      Platelet Count 409      % Neutrophils 83      % Lymphocytes 12      % Monocytes 5      % Eosinophils 0      % Basophils 0      % Immature Granulocytes 0      NRBCs per 100 WBC 0      Absolute Neutrophils 7.4      Absolute Lymphocytes 1.1      Absolute Monocytes 0.4      Absolute Eosinophils 0.0      Absolute Basophils 0.0      Absolute Immature Granulocytes 0.0      Absolute NRBCs 0.0     GLUCOSE MONITOR NURSING POCT   URINE CULTURE       RADIOLOGY:  CT Abdomen Pelvis w/o Contrast   Final Result   IMPRESSION:    1.  Moderately large fecal burden. No CT evidence of diverticulitis, appendicitis, or intra-abdominal fluid collections.             I, Shantal Ventura, am serving as a scribe to document services personally performed by Ellie Moreira MD based on my observation and the provider's statements to me. I, Ellie Moreira MD attest that Shantal Ventura is acting in a scribe capacity,  has observed my performance of the services and has documented them in accordance with my direction.    Ellie Moreira MD  Emergency Medicine  [facility] EMERGENCY DEPARTMENT         Ellie Moreira MD  06/03/23 2128

## 2023-06-04 NOTE — DISCHARGE INSTRUCTIONS
Please follow-up with your Primary Care Provider this upcoming week; call to arrange appointment.    Return to the ER for worsening symptoms, worsening abdominal pain, persistent nausea or vomiting, inability to pass stool or gas, fever or other concerns.    I have recommended that you take MiraLAX (over-the-counter) once daily until you follow-up with your Primary Care Provider.

## 2023-06-04 NOTE — ED TRIAGE NOTES
The pt arrives with c/o lower left abd pain for two days. Pt has a hx of crones disease and states that she has not had a bowel movement in two days. Denies vomiting.      Triage Assessment     Row Name 06/03/23 1912       Triage Assessment (Adult)    Airway WDL WDL       Cognitive/Neuro/Behavioral WDL    Cognitive/Neuro/Behavioral WDL WDL

## 2023-06-05 LAB — BACTERIA UR CULT: NORMAL

## 2023-06-06 ENCOUNTER — PATIENT OUTREACH (OUTPATIENT)
Dept: CARE COORDINATION | Facility: CLINIC | Age: 58
End: 2023-06-06
Payer: MEDICARE

## 2023-06-06 NOTE — PROGRESS NOTES
I got a call back from the pt, pt called to say that she is doing ok. Pt stated that if she needs a follow up, she will call the clinic.

## 2023-06-06 NOTE — PROGRESS NOTES
Clinic Care Coordination Contact    Follow Up Progress Note      Assessment: The pt was recently in the ED, I called to check up on the pt and help the pt setup a ED follow up. The pt was at [facility] for abdominal pain. I called the pt,but got her vm, so I left a vm for the pt to give me a call back.     Care Gaps:    Health Maintenance Due   Topic Date Due     COPD ACTION PLAN  Never done     ADVANCE CARE PLANNING  11/15/2017     LUNG CANCER SCREENING  10/02/2018     ZOSTER IMMUNIZATION (3 of 3) 11/30/2018     DIABETIC FOOT EXAM  09/25/2019     MAMMO SCREENING  09/10/2021     COVID-19 Vaccine (3 - Pfizer risk series) 01/11/2022     MICROALBUMIN  09/17/2022     MEDICARE ANNUAL WELLNESS VISIT  11/02/2022     LIPID  11/02/2022     COLORECTAL CANCER SCREENING  11/22/2022           Care Plans      Intervention/Education provided during outreach:               Plan:     Care Coordinator will follow up in

## 2023-06-11 ENCOUNTER — TELEPHONE (OUTPATIENT)
Dept: FAMILY MEDICINE | Facility: CLINIC | Age: 58
End: 2023-06-11
Payer: MEDICARE

## 2023-06-11 ENCOUNTER — APPOINTMENT (OUTPATIENT)
Dept: CT IMAGING | Facility: HOSPITAL | Age: 58
End: 2023-06-11
Attending: EMERGENCY MEDICINE
Payer: MEDICARE

## 2023-06-11 ENCOUNTER — HOSPITAL ENCOUNTER (OUTPATIENT)
Facility: HOSPITAL | Age: 58
Setting detail: OBSERVATION
Discharge: HOME | End: 2023-06-12
Attending: STUDENT IN AN ORGANIZED HEALTH CARE EDUCATION/TRAINING PROGRAM | Admitting: STUDENT IN AN ORGANIZED HEALTH CARE EDUCATION/TRAINING PROGRAM
Payer: MEDICARE

## 2023-06-11 DIAGNOSIS — R10.32 LLQ ABDOMINAL PAIN: ICD-10-CM

## 2023-06-11 DIAGNOSIS — K59.00 CONSTIPATION, UNSPECIFIED CONSTIPATION TYPE: ICD-10-CM

## 2023-06-11 DIAGNOSIS — R05.9 COUGH: ICD-10-CM

## 2023-06-11 DIAGNOSIS — R52 PAIN: Primary | ICD-10-CM

## 2023-06-11 LAB
ALBUMIN SERPL BCG-MCNC: 4.1 G/DL (ref 3.5–5.2)
ALBUMIN UR-MCNC: 20 MG/DL
ALP SERPL-CCNC: 93 U/L (ref 35–104)
ALT SERPL W P-5'-P-CCNC: 20 U/L (ref 10–35)
ANION GAP SERPL CALCULATED.3IONS-SCNC: 14 MMOL/L (ref 7–15)
APPEARANCE UR: ABNORMAL
AST SERPL W P-5'-P-CCNC: 21 U/L (ref 10–35)
BACTERIA #/AREA URNS HPF: ABNORMAL /HPF
BASOPHILS # BLD AUTO: 0.1 10E3/UL (ref 0–0.2)
BASOPHILS NFR BLD AUTO: 1 %
BILIRUB SERPL-MCNC: 0.4 MG/DL
BILIRUB UR QL STRIP: NEGATIVE
BUN SERPL-MCNC: 8.9 MG/DL (ref 6–20)
CALCIUM SERPL-MCNC: 9.3 MG/DL (ref 8.6–10)
CHLORIDE SERPL-SCNC: 102 MMOL/L (ref 98–107)
COLOR UR AUTO: YELLOW
CREAT SERPL-MCNC: 0.57 MG/DL (ref 0.51–0.95)
DEPRECATED HCO3 PLAS-SCNC: 24 MMOL/L (ref 22–29)
EOSINOPHIL # BLD AUTO: 0.2 10E3/UL (ref 0–0.7)
EOSINOPHIL NFR BLD AUTO: 2 %
ERYTHROCYTE [DISTWIDTH] IN BLOOD BY AUTOMATED COUNT: 14.5 % (ref 10–15)
GFR SERPL CREATININE-BSD FRML MDRD: >90 ML/MIN/1.73M2
GLUCOSE BLDC GLUCOMTR-MCNC: 109 MG/DL (ref 70–99)
GLUCOSE SERPL-MCNC: 121 MG/DL (ref 70–99)
GLUCOSE UR STRIP-MCNC: NEGATIVE MG/DL
HCT VFR BLD AUTO: 39.1 % (ref 35–47)
HGB BLD-MCNC: 12.9 G/DL (ref 11.7–15.7)
HGB UR QL STRIP: NEGATIVE
IMM GRANULOCYTES # BLD: 0 10E3/UL
IMM GRANULOCYTES NFR BLD: 0 %
KETONES UR STRIP-MCNC: 20 MG/DL
LEUKOCYTE ESTERASE UR QL STRIP: ABNORMAL
LIPASE SERPL-CCNC: 21 U/L (ref 13–60)
LYMPHOCYTES # BLD AUTO: 2.9 10E3/UL (ref 0.8–5.3)
LYMPHOCYTES NFR BLD AUTO: 28 %
MCH RBC QN AUTO: 27 PG (ref 26.5–33)
MCHC RBC AUTO-ENTMCNC: 33 G/DL (ref 31.5–36.5)
MCV RBC AUTO: 82 FL (ref 78–100)
MONOCYTES # BLD AUTO: 0.6 10E3/UL (ref 0–1.3)
MONOCYTES NFR BLD AUTO: 6 %
MUCOUS THREADS #/AREA URNS LPF: PRESENT /LPF
NEUTROPHILS # BLD AUTO: 6.4 10E3/UL (ref 1.6–8.3)
NEUTROPHILS NFR BLD AUTO: 63 %
NITRATE UR QL: NEGATIVE
NRBC # BLD AUTO: 0 10E3/UL
NRBC BLD AUTO-RTO: 0 /100
PH UR STRIP: 6 [PH] (ref 5–7)
PLATELET # BLD AUTO: 351 10E3/UL (ref 150–450)
POTASSIUM SERPL-SCNC: 3.9 MMOL/L (ref 3.4–5.3)
PROT SERPL-MCNC: 6.8 G/DL (ref 6.4–8.3)
RBC # BLD AUTO: 4.78 10E6/UL (ref 3.8–5.2)
RBC URINE: 3 /HPF
SODIUM SERPL-SCNC: 140 MMOL/L (ref 136–145)
SP GR UR STRIP: 1.02 (ref 1–1.03)
SQUAMOUS EPITHELIAL: 1 /HPF
UROBILINOGEN UR STRIP-MCNC: <2 MG/DL
WBC # BLD AUTO: 10.2 10E3/UL (ref 4–11)
WBC URINE: 163 /HPF

## 2023-06-11 PROCEDURE — 85025 COMPLETE CBC W/AUTO DIFF WBC: CPT | Performed by: EMERGENCY MEDICINE

## 2023-06-11 PROCEDURE — 99285 EMERGENCY DEPT VISIT HI MDM: CPT | Mod: 25

## 2023-06-11 PROCEDURE — 96361 HYDRATE IV INFUSION ADD-ON: CPT

## 2023-06-11 PROCEDURE — 82962 GLUCOSE BLOOD TEST: CPT

## 2023-06-11 PROCEDURE — 96374 THER/PROPH/DIAG INJ IV PUSH: CPT

## 2023-06-11 PROCEDURE — 250N000011 HC RX IP 250 OP 636: Performed by: EMERGENCY MEDICINE

## 2023-06-11 PROCEDURE — 83690 ASSAY OF LIPASE: CPT | Performed by: EMERGENCY MEDICINE

## 2023-06-11 PROCEDURE — 258N000003 HC RX IP 258 OP 636: Performed by: EMERGENCY MEDICINE

## 2023-06-11 PROCEDURE — 87086 URINE CULTURE/COLONY COUNT: CPT | Performed by: EMERGENCY MEDICINE

## 2023-06-11 PROCEDURE — 99223 1ST HOSP IP/OBS HIGH 75: CPT | Performed by: FAMILY MEDICINE

## 2023-06-11 PROCEDURE — 80053 COMPREHEN METABOLIC PANEL: CPT | Performed by: EMERGENCY MEDICINE

## 2023-06-11 PROCEDURE — 36415 COLL VENOUS BLD VENIPUNCTURE: CPT | Performed by: EMERGENCY MEDICINE

## 2023-06-11 PROCEDURE — G0378 HOSPITAL OBSERVATION PER HR: HCPCS

## 2023-06-11 PROCEDURE — 81001 URINALYSIS AUTO W/SCOPE: CPT | Performed by: EMERGENCY MEDICINE

## 2023-06-11 PROCEDURE — 74176 CT ABD & PELVIS W/O CONTRAST: CPT | Mod: MA

## 2023-06-11 PROCEDURE — 250N000013 HC RX MED GY IP 250 OP 250 PS 637: Performed by: FAMILY MEDICINE

## 2023-06-11 RX ORDER — KETOROLAC TROMETHAMINE 15 MG/ML
15 INJECTION, SOLUTION INTRAMUSCULAR; INTRAVENOUS ONCE
Status: COMPLETED | OUTPATIENT
Start: 2023-06-11 | End: 2023-06-11

## 2023-06-11 RX ORDER — ONDANSETRON 4 MG/1
4 TABLET, ORALLY DISINTEGRATING ORAL EVERY 6 HOURS PRN
Status: DISCONTINUED | OUTPATIENT
Start: 2023-06-11 | End: 2023-06-12 | Stop reason: HOSPADM

## 2023-06-11 RX ORDER — SODIUM CHLORIDE 9 MG/ML
INJECTION, SOLUTION INTRAVENOUS CONTINUOUS
Status: DISCONTINUED | OUTPATIENT
Start: 2023-06-11 | End: 2023-06-12 | Stop reason: HOSPADM

## 2023-06-11 RX ORDER — HYDROXYZINE HYDROCHLORIDE 25 MG/1
25 TABLET, FILM COATED ORAL 3 TIMES DAILY PRN
COMMUNITY
End: 2023-07-31

## 2023-06-11 RX ORDER — LORATADINE 10 MG/1
10 TABLET ORAL DAILY PRN
COMMUNITY
End: 2023-10-26

## 2023-06-11 RX ORDER — FLUTICASONE PROPIONATE 50 MCG
1 SPRAY, SUSPENSION (ML) NASAL DAILY PRN
COMMUNITY
End: 2024-02-27

## 2023-06-11 RX ORDER — ACETAMINOPHEN 325 MG/1
650 TABLET ORAL EVERY 4 HOURS PRN
Status: DISCONTINUED | OUTPATIENT
Start: 2023-06-11 | End: 2023-06-12 | Stop reason: HOSPADM

## 2023-06-11 RX ORDER — ONDANSETRON 2 MG/ML
4 INJECTION INTRAMUSCULAR; INTRAVENOUS EVERY 6 HOURS PRN
Status: DISCONTINUED | OUTPATIENT
Start: 2023-06-11 | End: 2023-06-12 | Stop reason: HOSPADM

## 2023-06-11 RX ORDER — NICOTINE POLACRILEX 4 MG
15-30 LOZENGE BUCCAL
Status: DISCONTINUED | OUTPATIENT
Start: 2023-06-11 | End: 2023-06-12 | Stop reason: HOSPADM

## 2023-06-11 RX ORDER — POLYETHYLENE GLYCOL 3350 17 G/17G
17 POWDER, FOR SOLUTION ORAL 2 TIMES DAILY
Status: DISCONTINUED | OUTPATIENT
Start: 2023-06-11 | End: 2023-06-12

## 2023-06-11 RX ORDER — IPRATROPIUM BROMIDE AND ALBUTEROL SULFATE 2.5; .5 MG/3ML; MG/3ML
1 SOLUTION RESPIRATORY (INHALATION) 2 TIMES DAILY PRN
COMMUNITY
End: 2024-02-13

## 2023-06-11 RX ORDER — DEXTROSE MONOHYDRATE 25 G/50ML
25-50 INJECTION, SOLUTION INTRAVENOUS
Status: DISCONTINUED | OUTPATIENT
Start: 2023-06-11 | End: 2023-06-12 | Stop reason: HOSPADM

## 2023-06-11 RX ADMIN — POLYETHYLENE GLYCOL 3350 17 G: 17 POWDER, FOR SOLUTION ORAL at 23:44

## 2023-06-11 RX ADMIN — KETOROLAC TROMETHAMINE 15 MG: 15 INJECTION, SOLUTION INTRAMUSCULAR; INTRAVENOUS at 20:59

## 2023-06-11 RX ADMIN — SODIUM CHLORIDE: 9 INJECTION, SOLUTION INTRAVENOUS at 22:57

## 2023-06-11 RX ADMIN — ACETAMINOPHEN 650 MG: 325 TABLET ORAL at 23:43

## 2023-06-11 RX ADMIN — SODIUM CHLORIDE 1000 ML: 9 INJECTION, SOLUTION INTRAVENOUS at 21:45

## 2023-06-11 ASSESSMENT — ACTIVITIES OF DAILY LIVING (ADL)
ADLS_ACUITY_SCORE: 35
ADLS_ACUITY_SCORE: 35

## 2023-06-11 NOTE — TELEPHONE ENCOUNTER
1:36 PM     Returned a clinic page re: abdominal pain.     Reports she is having a Crohns flare. Per chart review she was seen in our clinic on 6/1 for a Crohn's flare.  At that time was prescribed a short course of steroids and MiraLAX.    He was seen in the ED on 6/3 for similar concerns. At that visit she had a CT abdomen pelvis with moderate stool burden.  Symptoms felt better after an enema.    Today she reports she is still feeling very bloated.  Having only very small bowel movements.  Increasing abdominal pain.  She is also feeling feverish and short of breath today    She completed the steroid burst.  Has been taking MiraLAX, but does not feel these are helping.  She did try some prune juice but then had significant diarrhea.      Plan:   Advised patient that with her worsening abdominal pain and now possible fevers I do think she should be evaluated at urgent care or the emergency room today.  She expressed understanding and will come in to be evaluated.      Conrad Ovalle MD, PGY2  [facility]   Pgr: [phone] or Sarina Hinojosa

## 2023-06-12 VITALS
RESPIRATION RATE: 18 BRPM | DIASTOLIC BLOOD PRESSURE: 69 MMHG | WEIGHT: 179.7 LBS | SYSTOLIC BLOOD PRESSURE: 106 MMHG | OXYGEN SATURATION: 94 % | HEART RATE: 79 BPM | TEMPERATURE: 98 F | BODY MASS INDEX: 33.95 KG/M2

## 2023-06-12 LAB
ANION GAP SERPL CALCULATED.3IONS-SCNC: 10 MMOL/L (ref 7–15)
BASOPHILS # BLD AUTO: 0.1 10E3/UL (ref 0–0.2)
BASOPHILS NFR BLD AUTO: 1 %
BUN SERPL-MCNC: 6.9 MG/DL (ref 6–20)
CALCIUM SERPL-MCNC: 8.3 MG/DL (ref 8.6–10)
CHLORIDE SERPL-SCNC: 105 MMOL/L (ref 98–107)
CREAT SERPL-MCNC: 0.56 MG/DL (ref 0.51–0.95)
DEPRECATED HCO3 PLAS-SCNC: 24 MMOL/L (ref 22–29)
EOSINOPHIL # BLD AUTO: 0.2 10E3/UL (ref 0–0.7)
EOSINOPHIL NFR BLD AUTO: 4 %
ERYTHROCYTE [DISTWIDTH] IN BLOOD BY AUTOMATED COUNT: 14.6 % (ref 10–15)
GFR SERPL CREATININE-BSD FRML MDRD: >90 ML/MIN/1.73M2
GLUCOSE BLDC GLUCOMTR-MCNC: 117 MG/DL (ref 70–99)
GLUCOSE SERPL-MCNC: 128 MG/DL (ref 70–99)
HCT VFR BLD AUTO: 35.1 % (ref 35–47)
HGB BLD-MCNC: 11 G/DL (ref 11.7–15.7)
IMM GRANULOCYTES # BLD: 0 10E3/UL
IMM GRANULOCYTES NFR BLD: 0 %
LYMPHOCYTES # BLD AUTO: 1.7 10E3/UL (ref 0.8–5.3)
LYMPHOCYTES NFR BLD AUTO: 28 %
MCH RBC QN AUTO: 26.4 PG (ref 26.5–33)
MCHC RBC AUTO-ENTMCNC: 31.3 G/DL (ref 31.5–36.5)
MCV RBC AUTO: 84 FL (ref 78–100)
MONOCYTES # BLD AUTO: 0.5 10E3/UL (ref 0–1.3)
MONOCYTES NFR BLD AUTO: 7 %
NEUTROPHILS # BLD AUTO: 3.6 10E3/UL (ref 1.6–8.3)
NEUTROPHILS NFR BLD AUTO: 60 %
NRBC # BLD AUTO: 0 10E3/UL
NRBC BLD AUTO-RTO: 0 /100
PLATELET # BLD AUTO: 292 10E3/UL (ref 150–450)
POTASSIUM SERPL-SCNC: 4 MMOL/L (ref 3.4–5.3)
RBC # BLD AUTO: 4.17 10E6/UL (ref 3.8–5.2)
SODIUM SERPL-SCNC: 139 MMOL/L (ref 136–145)
WBC # BLD AUTO: 6.1 10E3/UL (ref 4–11)

## 2023-06-12 PROCEDURE — 99238 HOSP IP/OBS DSCHRG MGMT 30/<: CPT | Performed by: INTERNAL MEDICINE

## 2023-06-12 PROCEDURE — G0378 HOSPITAL OBSERVATION PER HR: HCPCS

## 2023-06-12 PROCEDURE — 85025 COMPLETE CBC W/AUTO DIFF WBC: CPT | Performed by: FAMILY MEDICINE

## 2023-06-12 PROCEDURE — 80048 BASIC METABOLIC PNL TOTAL CA: CPT | Performed by: FAMILY MEDICINE

## 2023-06-12 PROCEDURE — 82962 GLUCOSE BLOOD TEST: CPT

## 2023-06-12 PROCEDURE — 250N000013 HC RX MED GY IP 250 OP 250 PS 637: Performed by: FAMILY MEDICINE

## 2023-06-12 PROCEDURE — 36415 COLL VENOUS BLD VENIPUNCTURE: CPT | Performed by: FAMILY MEDICINE

## 2023-06-12 PROCEDURE — 250N000013 HC RX MED GY IP 250 OP 250 PS 637: Performed by: INTERNAL MEDICINE

## 2023-06-12 PROCEDURE — 258N000003 HC RX IP 258 OP 636: Performed by: EMERGENCY MEDICINE

## 2023-06-12 PROCEDURE — 96361 HYDRATE IV INFUSION ADD-ON: CPT

## 2023-06-12 RX ORDER — POLYETHYLENE GLYCOL 3350 17 G/17G
51 POWDER, FOR SOLUTION ORAL ONCE
Status: COMPLETED | OUTPATIENT
Start: 2023-06-12 | End: 2023-06-12

## 2023-06-12 RX ORDER — SENNOSIDES 8.6 MG
2 TABLET ORAL 2 TIMES DAILY
Qty: 120 TABLET | Refills: 1 | Status: SHIPPED | OUTPATIENT
Start: 2023-06-12 | End: 2024-03-22

## 2023-06-12 RX ORDER — ACETAMINOPHEN 500 MG
500-1000 TABLET ORAL EVERY 6 HOURS PRN
Refills: 0 | Status: ON HOLD
Start: 2023-06-12 | End: 2024-04-10

## 2023-06-12 RX ORDER — ALBUTEROL SULFATE 90 UG/1
2 AEROSOL, METERED RESPIRATORY (INHALATION) EVERY 6 HOURS PRN
Status: DISCONTINUED | OUTPATIENT
Start: 2023-06-12 | End: 2023-06-12 | Stop reason: HOSPADM

## 2023-06-12 RX ORDER — SENNOSIDES 8.6 MG
2 TABLET ORAL 2 TIMES DAILY
Status: DISCONTINUED | OUTPATIENT
Start: 2023-06-12 | End: 2023-06-12 | Stop reason: HOSPADM

## 2023-06-12 RX ADMIN — SENNOSIDES 2 TABLET: 8.6 TABLET, FILM COATED ORAL at 09:51

## 2023-06-12 RX ADMIN — POLYETHYLENE GLYCOL 3350 51 G: 17 POWDER, FOR SOLUTION ORAL at 09:08

## 2023-06-12 RX ADMIN — SODIUM CHLORIDE: 9 INJECTION, SOLUTION INTRAVENOUS at 05:21

## 2023-06-12 RX ADMIN — POLYETHYLENE GLYCOL 3350 17 G: 17 POWDER, FOR SOLUTION ORAL at 07:44

## 2023-06-12 ASSESSMENT — ACTIVITIES OF DAILY LIVING (ADL)
ADLS_ACUITY_SCORE: 35
DEPENDENT_IADLS:: TRANSPORTATION
ADLS_ACUITY_SCORE: 35

## 2023-06-12 NOTE — DISCHARGE SUMMARY
[facility]    Discharge Summary  Hospitalist    Date of Admission:  6/11/2023  Date of Discharge:  6/12/2023 11:51 AM  Discharging Provider: Mich Yee MD, MD    Discharge Diagnoses   Principal Problem:    Constipation, unspecified constipation type  Active Problems:    LLQ abdominal pain      History of Present Illness   57 year old female with PMH signficant for Crohn's disease, chronic constipation, DM2, Asthma, COPD and current smoker who presents to ED due to constipation and LLQ pain. Patient reports chronic constipation and abdominal pain for several weeks.  The pain is rated 10/10.  She tried 2 doses of Miralax at home prior to coming to the ED.  She says that she has had small hard BM this morning.  She denies nausea, vomiting, fever, chills, chest pain, palpitations or shortness of breath.    Hospital Course   Given bowel meds and bowels moved.  Discussed ways to prevent constipation. Discharged on Senokot 2 pills bid and Milk of Magnesia 30 ml bid prn constipation.    Mich Yee MD  Pager: [phone]  Cell Phone:  [phone]       Significant Results and Procedures   As above    Pending Results   These results will be followed up by Dr. Yee  Unresulted Labs Ordered in the Past 30 Days of this Admission     Date and Time Order Name Status Description    6/11/2023 11:24 PM Urine Culture In process           Code Status   Full Code       Primary Care Physician   Joanna Farah    Physical Exam   Temp: 98.3  F (36.8  C) Temp src: Oral BP: 106/69 Pulse: 79   Resp: 18 SpO2: 94 % O2 Device: None (Room air)    Vitals:    06/11/23 1925   Weight: 81.5 kg (179 lb 11.2 oz)     Vital Signs with Ranges  Temp:  [97.7  F (36.5  C)-98.4  F (36.9  C)] 98.3  F (36.8  C)  Pulse:  [] 79  Resp:  [16-18] 18  BP: (103-136)/(63-92) 106/69  SpO2:  [93 %-95 %] 94 %  I/O last 3 completed shifts:  In: 2160 [P.O.:1360; I.V.:800]  Out: -     Exam on discharge:    Abd soft, non-tender    Discharge Disposition   Discharged to home  Condition at discharge: Stable    Consultations This Hospital Stay   CARE MANAGEMENT / SOCIAL WORK IP CONSULT    Time Spent on this Encounter   I spent a total of 25 minutes discharging this patient.     Discharge Orders      Reason for your hospital stay    Constipation.     Follow-up and recommended labs and tests     Follow up with primary care provider, Joanna Farah, in 1 week if ongoing problems.     Call Dr. Cordero if any medical questions at Cell Phone [phone].     Activity    Your activity upon discharge: activity as tolerated     Diet    Follow this diet upon discharge: Orders Placed This Encounter        Regular diet     Discharge Medications   Current Discharge Medication List      START taking these medications    Details   magnesium hydroxide (MILK OF MAGNESIA) 400 MG/5ML suspension Take 30 mLs by mouth 2 times daily as needed for constipation  Qty: 354 mL, Refills: 0    Associated Diagnoses: Constipation, unspecified constipation type      sennosides (SENOKOT) 8.6 MG tablet Take 2 tablets by mouth 2 times daily  Qty: 120 tablet, Refills: 1    Associated Diagnoses: Constipation, unspecified constipation type         CONTINUE these medications which have CHANGED    Details   acetaminophen (TYLENOL) 500 MG tablet Take 1-2 tablets (500-1,000 mg) by mouth every 6 hours as needed for pain  Refills: 0    Associated Diagnoses: Pain         CONTINUE these medications which have NOT CHANGED    Details   albuterol (PROAIR HFA/PROVENTIL HFA/VENTOLIN HFA) 108 (90 Base) MCG/ACT inhaler Inhale 2 puffs into the lungs every 4 hours as needed for shortness of breath or wheezing  Qty: 18 g, Refills: 3    Comments: Pharmacy may dispense brand covered by insurance (Proair, or proventil or ventolin or generic albuterol inhaler)  Associated Diagnoses: COPD exacerbation (H)      albuterol (PROVENTIL) (2.5 MG/3ML) 0.083% neb solution Take 1  vial (2.5 mg) by nebulization every 6 hours as needed for shortness of breath / dyspnea or wheezing  Qty: 90 mL, Refills: 3    Associated Diagnoses: COPD exacerbation (H)      atorvastatin (LIPITOR) 40 MG tablet Take 1 tablet (40 mg) by mouth daily  Qty: 90 tablet, Refills: 3    Associated Diagnoses: Type 2 diabetes mellitus without complication, without long-term current use of insulin (H)      calcium carbonate-vitamin D (OSCAL W/D) 500-200 MG-UNIT tablet Take 1 tablet by mouth 2 times daily  Qty: 90 tablet, Refills: 3    Associated Diagnoses: Type 2 diabetes mellitus without complication, without long-term current use of insulin (H)      fluticasone (FLONASE) 50 MCG/ACT nasal spray Spray 1 spray into both nostrils daily as needed for rhinitis or allergies      hydrOXYzine (ATARAX) 25 MG tablet Take 25 mg by mouth 3 times daily as needed for anxiety      insulin aspart (NOVOLOG PEN) 100 UNIT/ML pen Inject 12 Units Subcutaneous 3 times daily (before meals) Sliding scale  Qty: 15 mL, Refills: 1    Associated Diagnoses: Type 2 diabetes mellitus with hyperglycemia, with long-term current use of insulin (H)      ipratropium - albuterol 0.5 mg/2.5 mg/3 mL (DUONEB) 0.5-2.5 (3) MG/3ML neb solution Take 1 vial by nebulization 2 times daily as needed for shortness of breath, wheezing or cough      loratadine (CLARITIN) 10 MG tablet Take 10 mg by mouth daily as needed for allergies      metFORMIN (GLUCOPHAGE) 1000 MG tablet Take 1 tablet (1,000 mg) by mouth 2 times daily (with meals)  Qty: 180 tablet, Refills: 1    Associated Diagnoses: Type 2 diabetes mellitus without complication, without long-term current use of insulin (H)      Multiple Vitamins-Minerals (CENTRUM SILVER 50+WOMEN PO) Take 1 tablet by mouth daily      omeprazole (PRILOSEC) 20 MG DR capsule Take 1 capsule (20 mg) by mouth daily  Qty: 90 capsule, Refills: 3    Associated Diagnoses: Gastroesophageal reflux disease without esophagitis      blood glucose (NO  BRAND SPECIFIED) test strip Use to test blood sugar 1 times daily or as directed.  Qty: 100 strip, Refills: 3    Associated Diagnoses: Type 2 diabetes mellitus without complication, without long-term current use of insulin (H)      blood glucose monitoring (NO BRAND SPECIFIED) meter device kit Use to test blood sugar 1 times daily or as directed.  Qty: 1 kit, Refills: 0    Associated Diagnoses: Type 2 diabetes mellitus without complication, without long-term current use of insulin (H)      insulin pen needle (31G X 6 MM) 31G X 6 MM miscellaneous U pen needles daily or as directed.  Qty: 100 each, Refills: 1    Associated Diagnoses: Type 2 diabetes mellitus with hyperglycemia, with long-term current use of insulin (H)           Allergies   Allergies   Allergen Reactions     Azithromycin Other (See Comments) and Rash     Erythema Nodosum x2     Keflex [Cephalexin] Rash     Aspirin      Cefuroxime Itching     Cheese GI Disturbance     Contrast Dye Hives     IV     Diatrizoate Hives     Gadolinium Derivatives Hives     Hazelnuts [Nuts]      Iodinated Contrast Media Hives     Iodixanol Unknown     Nitrofurantoin Itching     Septra [Sulfamethoxazole-Trimethoprim] Hives     Sulfa Antibiotics Hives     Metronidazole Itching and Rash     Quinolones Rash     Data   Most Recent 3 CBC's:Recent Labs   Lab Test 06/12/23 0702 06/11/23 2057 06/03/23 1952   WBC 6.1 10.2 8.9   HGB 11.0* 12.9 12.1   MCV 84 82 81    351 409      Most Recent 3 BMP's:  Recent Labs   Lab Test 06/12/23  0727 06/12/23  0702 06/11/23  2342 06/11/23 2057 06/03/23 1952   NA  --  139  --  140 137   POTASSIUM  --  4.0  --  3.9 3.9   CHLORIDE  --  105  --  102 101   CO2  --  24  --  24 22   BUN  --  6.9  --  8.9 9.3   CR  --  0.56  --  0.57 0.46*   ANIONGAP  --  10  --  14 14   GHAZAL  --  8.3*  --  9.3 9.9   * 128* 109* 121* 137*     Most Recent 2 LFT's:  Recent Labs   Lab Test 06/11/23 2057 06/03/23 1952   AST 21 16   ALT 20 15   ALKPHOS 93  103   BILITOTAL 0.4 0.2     Most Recent INR's and Anticoagulation Dosing History:  Anticoagulation Dose History         Latest Ref Rng & Units 3/5/2019 3/11/2019   Recent Dosing and Labs   INR 0.90 - 1.10 0.82   0.85                Most Recent 3 Troponin's:No lab results found.  Most Recent Cholesterol Panel:  Recent Labs   Lab Test 11/02/21  1431   CHOL 193      HDL 55   TRIG 91     Most Recent 6 Bacteria Isolates From Any Culture (See EPIC Reports for Culture Details):No lab results found.  Most Recent TSH, T4 and A1c Labs:  Recent Labs   Lab Test 08/01/22  1505 02/09/19  0000 01/24/19  2330   TSH  --   --  2.95   A1C 7.7*   < >  --     < > = values in this interval not displayed.

## 2023-06-12 NOTE — PLAN OF CARE
Problem: Plan of Care - These are the overarching goals to be used throughout the patient stay.    Goal: Absence of Hospital-Acquired Illness or Injury  Outcome: Progressing  Intervention: Identify and Manage Fall Risk  Recent Flowsheet Documentation  Taken 6/11/2023 2345 by Joanie Yo RN  Safety Promotion/Fall Prevention:    assistive device/personal items within reach    clutter free environment maintained    increased rounding and observation    increase visualization of patient    nonskid shoes/slippers when out of bed    patient and family education  Intervention: Prevent Skin Injury  Recent Flowsheet Documentation  Taken 6/11/2023 2345 by Joanie Yo RN  Body Position: position changed independently  Goal: Optimal Comfort and Wellbeing  Outcome: Progressing  Goal: Readiness for Transition of Care  Outcome: Progressing     Problem: Pain Acute  Goal: Optimal Pain Control and Function  Outcome: Progressing  Intervention: Prevent or Manage Pain  Recent Flowsheet Documentation  Taken 6/11/2023 2345 by Joanie Yo RN  Medication Review/Management: medications reviewed     Problem: Constipation  Goal: Effective Bowel Elimination  Outcome: Progressing   Goal Outcome Evaluation:      Pt alert and oriented x 4. Tylenol administered for abdominal pain with relief. Patient tolerating clear liquid diet. NS running at 125 mL/hr. First dose of miralax given. . No insulin coverage required. Patient up independently to bathroom. Patient had a very small BM this morning, was a little blob per patient. Around 03:00 patient reported feeling short of breath, oxygen saturation 96% on room air, no wheezing present and not in respiratory distress. Per patient she takes albuterol at home. Hospitalist paged for albuterol inhaler. After receiving order, patient refused albuterol inhaler. No complaints of pain currently.    Joanie Yo RN

## 2023-06-12 NOTE — PLAN OF CARE
PRIMARY DIAGNOSIS: constipation  OUTPATIENT/OBSERVATION GOALS TO BE MET BEFORE DISCHARGE:  1. Stable vital signs Yes  2. Tolerating diet:Yes  3. Pain controlled with oral pain medications:   no need for pain meds, pain free at this time  4. Positive bowel sounds:  Yes  5. Voiding without difficulty:  Yes  6. Able to ambulate:  Yes  7. Provider specific discharge goals met:  Yes    Discharge Planner Nurse   Safe discharge environment identified: Yes  Barriers to discharge: No       Entered by: Susana Mcwilliams RN 06/12/2023 11:08 AM     Please review provider order for any additional goals.   Nurse to notify provider when observation goals have been met and patient is ready for discharge.Goal Outcome Evaluation: Multiple loose stools this shift, abdomen soft and non tender, plan made for constipation prevention and treatment.
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
## 2023-06-12 NOTE — ED TRIAGE NOTES
"Pt arrives c/o \"a chrones flare\" Pt reports LLQ pain and constipation (per patient for weeks) Was seen a week ago for this and was told to do Magnesium Citrate but she states she cannot take it due to it having alcohol in it and she is in recovery.      Triage Assessment     Row Name 06/11/23 1923       Triage Assessment (Adult)    Airway WDL WDL       Respiratory WDL    Respiratory WDL WDL       Skin Circulation/Temperature WDL    Skin Circulation/Temperature WDL WDL       Cardiac WDL    Cardiac WDL WDL       Peripheral/Neurovascular WDL    Peripheral Neurovascular WDL WDL       Cognitive/Neuro/Behavioral WDL    Cognitive/Neuro/Behavioral WDL WDL              "

## 2023-06-12 NOTE — CONSULTS
Care Management Initial Consult    General Information  Assessment completed with: Patient,    Type of CM/SW Visit: Initial Assessment    Primary Care Provider verified and updated as needed: Yes   Readmission within the last 30 days: no previous admission in last 30 days         Advance Care Planning:            Communication Assessment  Patient's communication style: spoken language (English or Bilingual)             Cognitive  Cognitive/Neuro/Behavioral: WDL                      Living Environment:   People in home: alone     Current living Arrangements: apartment      Able to return to prior arrangements: yes       Family/Social Support:  Care provided by: self  Provides care for: no one     Other (specify), Significant Other (friends)          Description of Support System: Supportive         Current Resources:   Patient receiving home care services: No     Community Resources:  (ILS worker)  Equipment currently used at home:  none  Supplies currently used at home: None        Lifestyle & Psychosocial Needs:  Social Determinants of Health     Tobacco Use: High Risk (6/11/2023)    Patient History      Smoking Tobacco Use: Every Day      Smokeless Tobacco Use: Never      Passive Exposure: Not on file   Alcohol Use: Not on file   Financial Resource Strain: Not on file   Food Insecurity: Not on file   Transportation Needs: Not on file   Physical Activity: Not on file   Stress: Not on file   Social Connections: Not on file   Intimate Partner Violence: Not on file   Depression: Not at risk (5/1/2023)    PHQ-2      PHQ-2 Score: 0   Housing Stability: Not on file       Functional Status:  Prior to admission patient needed assistance:   Dependent ADLs:: Independent  Dependent IADLs:: Transportation           Additional Information:    Assessment completed with patient. Assessment brief by request from patient. Patient lives alone in her apartment-[facility]. She states she has ILS worker for support. Patient reports she  is independent with ADLs and IADLs but transportation. She ambulate without devices. She declines need for any services at discharge. ILS worker will transport home.          Daiana Jason RN

## 2023-06-12 NOTE — PHARMACY-ADMISSION MEDICATION HISTORY
Pharmacist Admission Medication History    Admission medication history is complete. The information provided in this note is only as accurate as the sources available at the time of the update.    Medication reconciliation/reorder completed by provider prior to medication history? No    Information Source(s): Patient and CareEverywhere/SureScripts via in-person    Pertinent Information: cost concerns     Changes made to PTA medication list:    Added: mvi    Deleted: symbicort, advair, Lantus    Changed: freq on allergy meds    Medication Affordability:  Not including over the counter (OTC) medications, was there a time in the past 3 months when you did not take your medications as prescribed because of cost?: Yes    Allergies reviewed with patient and updates made in EHR: yes    Medication History Completed By: Radha Chavarria RPH 6/11/2023 11:28 PM    Prior to Admission medications    Medication Sig Last Dose Taking? Auth Provider Long Term End Date   acetaminophen (TYLENOL) 500 MG tablet Take 2 tablets (1,000 mg) by mouth every 6 hours as needed for pain  Patient taking differently: Take 500-1,000 mg by mouth every 6 hours as needed for pain Unknown at prn Yes Joanna Farah MD     albuterol (PROAIR HFA/PROVENTIL HFA/VENTOLIN HFA) 108 (90 Base) MCG/ACT inhaler Inhale 2 puffs into the lungs every 4 hours as needed for shortness of breath or wheezing 6/11/2023 at pm Yes Monique Mcghee MD Yes    albuterol (PROVENTIL) (2.5 MG/3ML) 0.083% neb solution Take 1 vial (2.5 mg) by nebulization every 6 hours as needed for shortness of breath / dyspnea or wheezing Unknown at prn Yes Joanna Farah MD Yes    atorvastatin (LIPITOR) 40 MG tablet Take 1 tablet (40 mg) by mouth daily Past Week at couple days Yes Joanna Farah MD Yes    calcium carbonate-vitamin D (OSCAL W/D) 500-200 MG-UNIT tablet Take 1 tablet by mouth 2 times daily Past Week at couple days Yes Joanna Farah  MD     fluticasone (FLONASE) 50 MCG/ACT nasal spray Spray 1 spray into both nostrils daily as needed for rhinitis or allergies Unknown at prn Yes Unknown, Entered By History     hydrOXYzine (ATARAX) 25 MG tablet Take 25 mg by mouth 3 times daily as needed for anxiety Unknown at prn Yes Unknown, Entered By History     insulin aspart (NOVOLOG PEN) 100 UNIT/ML pen Inject 12 Units Subcutaneous 3 times daily (before meals) Sliding scale Past Week at couple days Yes Berta Leonard MD Yes    ipratropium - albuterol 0.5 mg/2.5 mg/3 mL (DUONEB) 0.5-2.5 (3) MG/3ML neb solution Take 1 vial by nebulization 2 times daily as needed for shortness of breath, wheezing or cough 6/11/2023 at pm Yes Unknown, Entered By History No    loratadine (CLARITIN) 10 MG tablet Take 10 mg by mouth daily as needed for allergies Unknown at prn Yes Unknown, Entered By History     metFORMIN (GLUCOPHAGE) 1000 MG tablet Take 1 tablet (1,000 mg) by mouth 2 times daily (with meals) Past Week at couple days Yes Joanna Farah MD Yes    omeprazole (PRILOSEC) 20 MG DR capsule Take 1 capsule (20 mg) by mouth daily Past Week Yes Joanna Farah MD

## 2023-06-12 NOTE — H&P
"[facility]    History and Physical - Hospitalist Service       Date of Admission:  6/11/2023    Assessment & Plan      Mary Ann Olson is a 57 year old female with PMH signficant for Crohn's disease, chronic constipation, DM2, Asthma, COPD and current smoker who is admitted for observation on 6/11/2023 due to constipation and LLQ abdominal pain.    1. Constipation- Admit for observation. CT abd/pel report reviewed. Trial of Miralax. She prefers to avoid Magnesium Citrate due to history of alcohol abuse. Clear liquid diet until patient has BM. Supportive measures.    2. LLQ abdominal pain- 2/2 to constipation. Trial of Miralax.    3. History of Crohn's- Off Humira due to financial difficulties. Recommend outpatient follow up with Gastroenterology.    4. Tobacco use- Patient smokes 1PPD. Smoking cessation counseled. Declines nicotine patch.     Diet:   Clear liquid diet  DVT Prophylaxis: Pneumatic Compression Devices  Beckwith Catheter: Not present  Lines: None     Cardiac Monitoring: None  Code Status:   Full    Clinically Significant Risk Factors Present on Admission                       # Obesity: Estimated body mass index is 33.95 kg/m  as calculated from the following:    Height as of 6/1/23: 1.549 m (5' 1\").    Weight as of this encounter: 81.5 kg (179 lb 11.2 oz).            Disposition Plan    Anticipate discharge home in <2 days if medically cleared.    Ria Black MD  Hospitalist Service  [facility]  Securely message with "[employer] (more info)  Text page via [employer] Paging/Directory     ______________________________________________________________________    Chief Complaint   Constipation, LLQ pain    History is obtained from the patient.    History of Present Illness   Mary Ann Olson is a 57 year old female with PMH signficant for Crohn's disease, chronic constipation, DM2, Asthma, COPD and current smoker who presents to ED due to constipation and LLQ " pain. Patient reports chronic constipation and abdominal pain for several weeks.  The pain is rated 10/10.  She tried 2 doses of Miralax at home prior to coming to the ED.  She says that she has had small hard BM this morning.  She denies nausea, vomiting, fever, chills, chest pain, palpitations or shortness of breath.      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     Antihistamines overdose, intentional self-harm, initial encounter (H)      Asthma      Bipolar 1 disorder (H) hx of bipolar listed in h and p     Bipolar 2 disorder (H)      Bipolar I disorder, single manic episode (H)     Created by Conversion  Replacement Utility updated for latest IMO load     Cellulitis 2006 left ankle, 2008 right foot     COPD (chronic obstructive pulmonary disease) (H)      Crohn's disease (H)     arthritis associated with crohn's     Diabetes mellitus (H)      Diabetes mellitus, type 2 (H)      Fibrocystic breast      Heat stroke     when a young child      Hyperlipidemia      Idiopathic acute pancreatitis 07/14/2015     Irritable bowel syndrome     Created by Conversion      Kidney stone      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      Overdose      Pyoderma gangrenosum     2016     Sacroiliitis (H) 2004     Skin lesion of left leg 08/27/2015     Suicidal ideation      Suicide attempt (H)      Traumatic iritis 07/21/2015     Umbilical hernia      Uncomplicated asthma        Past Surgical History   Past Surgical History:   Procedure Laterality Date     BIOPSY BREAST Left      BIOPSY OF BREAST, INCISIONAL      Description: Biopsy Breast Open;  Recorded: 10/28/2010;      REMOVAL OF TONSILS,<13 Y/O      Description: Tonsillectomy;  Recorded: 07/08/2009;     ZZC LIGATE FALLOPIAN TUBE      Description: Tubal Ligation;  Recorded: 07/08/2009;     Social History  Current smoker, 1PPD.  Recovering Alcoholic. Sober x 4 years.  Denies illicit drug  use.    Family History  DM    Prior to Admission Medications   Prior to Admission Medications   Prescriptions Last Dose Informant Patient Reported? Taking?   [employer]SC[employer] 500 + D 500-5 MG-MCG TABS   No No   Sig: Take 1 tablet by mouth 2 times daily   acetaminophen (TYLENOL) 500 MG tablet   No No   Sig: Take 2 tablets (1,000 mg) by mouth every 6 hours as needed for pain   albuterol (PROAIR HFA/PROVENTIL HFA/VENTOLIN HFA) 108 (90 Base) MCG/ACT inhaler   No No   Sig: Inhale 2 puffs into the lungs every 4 hours as needed for shortness of breath or wheezing   albuterol (PROVENTIL) (2.5 MG/3ML) 0.083% neb solution   No No   Sig: Take 1 vial (2.5 mg) by nebulization every 6 hours as needed for shortness of breath / dyspnea or wheezing   atorvastatin (LIPITOR) 40 MG tablet   No No   Sig: Take 1 tablet (40 mg) by mouth daily   blood glucose (NO BRAND SPECIFIED) test strip   No No   Sig: Use to test blood sugar 1 times daily or as directed.   blood glucose monitoring (NO BRAND SPECIFIED) meter device kit   No No   Sig: Use to test blood sugar 1 times daily or as directed.   budesonide-formoterol (SYMBICORT) 160-4.5 MCG/ACT Inhaler   No No   Sig: Inhale 2 puffs into the lungs 2 times daily   calcium carbonate-vitamin D (OSCAL W/D) 500-200 MG-UNIT tablet   No No   Sig: Take 1 tablet by mouth 2 times daily   fluticasone (FLONASE) 50 MCG/ACT nasal spray   No No   Sig: Spray 1 spray into both nostrils daily   fluticasone-salmeterol (AIRDUO RESPICLICK) 113-14 MCG/ACT inhaler   No No   Sig: Inhale 1 puff into the lungs 2 times daily   hydrOXYzine (ATARAX) 25 MG tablet   Yes No   Sig: TAKE 1-2 TABLETS BY MOUTH EVVERY 4-6 HOURS AS NEEDED   insulin aspart (NOVOLOG PEN) 100 UNIT/ML pen   No No   Sig: Inject 12 Units Subcutaneous 3 times daily (before meals) Sliding scale   Patient not taking: Reported on 6/1/2023   insulin glargine (LANTUS PEN) 100 UNIT/ML pen   No No   Sig: Inject 10 Units Subcutaneous At Bedtime   Patient not taking:  Reported on 6/1/2023   insulin pen needle (31G X 6 MM) 31G X 6 MM miscellaneous   No No   Sig: U pen needles daily or as directed.   Patient not taking: Reported on 6/1/2023   ipratropium - albuterol 0.5 mg/2.5 mg/3 mL (DUONEB) 0.5-2.5 (3) MG/3ML neb solution   No No   Sig: Take 1 vial (3 mLs) by nebulization 2 times daily   loratadine (CLARITIN) 10 MG tablet   No No   Sig: Take 1 tablet (10 mg) by mouth daily   Patient not taking: Reported on 6/1/2023   metFORMIN (GLUCOPHAGE) 1000 MG tablet   No No   Sig: Take 1 tablet (1,000 mg) by mouth 2 times daily (with meals)   omeprazole (PRILOSEC) 20 MG DR capsule   No No   Sig: Take 1 capsule (20 mg) by mouth daily   pioglitazone (ACTOS) 15 MG tablet   Yes No   Sig: Take 30 mg by mouth daily   Patient not taking: Reported on 5/17/2023   tiotropium (SPIRIVA RESPIMAT) 2.5 MCG/ACT inhaler   No No   Sig: Inhale 2 puffs into the lungs daily   Patient not taking: Reported on 5/17/2023   vitamin D2 (ERGOCALCIFEROL) 55850 units (1250 mcg) capsule   No No   Sig: Take 1 capsule (50,000 Units) by mouth once a week      Facility-Administered Medications: None        Review of Systems    The 10 point Review of Systems is negative other than noted in the HPI.    Physical Exam   Vital Signs: Temp: 98.4  F (36.9  C) Temp src: Oral BP: (!) 131/92 Pulse: 101   Resp: 16 SpO2: 94 %      Weight: 179 lbs 11.2 oz    General Appearance: AAOx3, NAD  Respiratory: CTAB  Cardiovascular: Regular rate, S1S2  GI: +BS, soft, NT, ND  Skin: No rash, no jaundice  Other: No pedal edema     Medical Decision Making     50 MINUTES SPENT BY ME on the date of service doing chart review, history, exam, documentation & further activities per the note.      Data     I have personally reviewed the following data over the past 24 hrs:    10.2  \   12.9   / 351     140 102 8.9 /  121 (H)   3.9 24 0.57 \       ALT: 20 AST: 21 AP: 93 TBILI: 0.4   ALB: 4.1 TOT PROTEIN: 6.8 LIPASE: 21       Imaging results reviewed over  the past 24 hrs:   Recent Results (from the past 24 hour(s))   CT Abdomen Pelvis w/o Contrast    Narrative    EXAM: CT ABDOMEN PELVIS W/O CONTRAST  LOCATION: [facility]  DATE/TIME: 6/11/2023 9:15 PM CDT    INDICATION: LLQ abdominal pain  COMPARISON: 06/03/2023  TECHNIQUE: CT scan of the abdomen and pelvis was performed without IV contrast. Multiplanar reformats were obtained. Dose reduction techniques were used.  CONTRAST: None.    FINDINGS:   LOWER CHEST: Coronary calcification. Linear atelectasis in the right lung base.    HEPATOBILIARY: Fatty infiltration of the liver.    PANCREAS: Normal.    SPLEEN: Normal.    ADRENAL GLANDS: Normal.    KIDNEYS/BLADDER: No urinary calculi or hydronephrosis.    BOWEL: Normal appendix. Constipation mildly improved.    LYMPH NODES: Normal.    VASCULATURE: Unremarkable.    PELVIC ORGANS: Normal.    MUSCULOSKELETAL: Normal fat-containing periumbilical hernia.      Impression    IMPRESSION:   1.  No diverticulitis or appendicitis. Constipation mildly improved    2.  Fatty liver.    3.  Small fat-containing periumbilical hernia.

## 2023-06-13 ENCOUNTER — OFFICE VISIT (OUTPATIENT)
Dept: FAMILY MEDICINE | Facility: CLINIC | Age: 58
End: 2023-06-13
Payer: MEDICARE

## 2023-06-13 ENCOUNTER — PATIENT OUTREACH (OUTPATIENT)
Dept: CARE COORDINATION | Facility: CLINIC | Age: 58
End: 2023-06-13

## 2023-06-13 VITALS
DIASTOLIC BLOOD PRESSURE: 85 MMHG | OXYGEN SATURATION: 94 % | HEIGHT: 61 IN | TEMPERATURE: 98 F | SYSTOLIC BLOOD PRESSURE: 137 MMHG | WEIGHT: 181 LBS | RESPIRATION RATE: 20 BRPM | BODY MASS INDEX: 34.17 KG/M2 | HEART RATE: 90 BPM

## 2023-06-13 DIAGNOSIS — F41.9 ANXIETY: Primary | ICD-10-CM

## 2023-06-13 LAB — BACTERIA UR CULT: NORMAL

## 2023-06-13 PROCEDURE — 94640 AIRWAY INHALATION TREATMENT: CPT

## 2023-06-13 RX ORDER — ALBUTEROL SULFATE 0.83 MG/ML
2.5 SOLUTION RESPIRATORY (INHALATION) ONCE
Status: COMPLETED | OUTPATIENT
Start: 2023-06-13 | End: 2023-06-13

## 2023-06-13 RX ORDER — ALBUTEROL SULFATE 1.25 MG/3ML
1.25 SOLUTION RESPIRATORY (INHALATION) ONCE
Status: DISCONTINUED | OUTPATIENT
Start: 2023-06-13 | End: 2023-06-13 | Stop reason: DRUGHIGH

## 2023-06-13 RX ADMIN — ALBUTEROL SULFATE 2.5 MG: 0.83 SOLUTION RESPIRATORY (INHALATION) at 09:49

## 2023-06-13 NOTE — PROGRESS NOTES
[facility]    Background: Transitional Care Management program identified per system criteria and reviewed by [facility] team for possible outreach.    Assessment: Upon chart review, [facility] Team member will not proceed with patient outreach related to this episode of Transitional Care Management program due to reason below:    Patient has a follow up appointment with an appropriate provider today for hospital discharge.    Plan: Transitional Care Management episode addressed appropriately per reason noted above.      Cee Deras MA  [place]    *Connected Care Resource Team does NOT follow patient ongoing. Referrals are identified based on internal discharge reports and the outreach is to ensure patient has an understanding of their discharge instructions.

## 2023-06-13 NOTE — PROGRESS NOTES
Assessment & Plan     Anxiety  Pt here originally to discuss form N-648 for [place] Citizenship Serivces as she is working on obtaining citizenship for applying to Medicare part D. Did discuss this at length and how due to the detailed nature of this form and review process w/ immigration services this would be best filled out by pt's PCP Dr. Farah. Pt unfortunately began suffering from her typical anxiety symptoms while here. Declining to complete PHQ9/GAD7 though these scores would likely be high. This was worsened after realizing she accidentally left her Albuterol inhaler at home, typically improving breathing w/ this greatly helps her anxiety. For this reason did give an albuterol neb here today w/ good resolution of symptoms.  - albuterol (PROVENTIL) neb solution 2.5 mg     MED REC REQUIRED{  Post Medication Reconciliation Status:    Complete    No follow-ups on file.    Mohan Larose MD  [facility]    Rasheeda Reese is a 57 year old, presenting for the following health issues:  RECHECK (Needs a N648, medical certification for disability form filled out.)        6/13/2023     8:54 AM   Additional Questions   Roomed by [email or web address]     Forms 6/13/2023   Any forms needing to be completed Yes     HPI     Paperwork question  - Working on citizenship to get access to Medicare part D  - Requesting completion of Form N-648 for disability services w/ regard to [place] history exam  - Reports hx of severe anxiety as well as learning disability making her unable to study for an complete this test    ROHAN  - Severe anxiety at baseline, this unfortunately was active and worse than normal today  - Worsened by the fact that Mary Ann forgot her albuterol inhaler at home  - Typically albuterol for breathing improves anxiety symptoms  - Declining to complete PHQ9 and GAD7 forms today    Plan of care communicated with patient    Review of Systems   Constitutional, HEENT, cardiovascular, pulmonary, gi and  "gu systems are negative, except as otherwise noted.      Objective    /85   Pulse 90   Temp 98  F (36.7  C) (Oral)   Resp 20   Ht 1.549 m (5' 1\")   Wt 82.1 kg (181 lb)   SpO2 94%   BMI 34.20 kg/m    Body mass index is 34.2 kg/m .  Physical Exam   GENERAL: Very anxious appearing, appears older than stated age  NECK: no adenopathy, no asymmetry, masses, or scars and thyroid normal to palpation  RESP: lungs clear to auscultation - no rales, rhonchi or wheezes  CV: regular rate and rhythm, normal S1 S2, no S3 or S4, no murmur, click or rub, no peripheral edema and peripheral pulses strong  ABDOMEN: soft, nontender, no hepatosplenomegaly, no masses and bowel sounds normal  MS: no gross musculoskeletal defects noted, no edema  Psych: Alert and oriented x3, linear thought processes intact, very anxious affect.    ----- Service Performed and Documented by Resident or Fellow ------            "

## 2023-06-13 NOTE — PROGRESS NOTES
Preceptor Attestation:   Patient seen, evaluated and discussed with the resident Dr. Larose. I have verified the content of the note, which accurately reflects my assessment of the patient and the plan of care.    Extensive review of necessary process for N648, will assist in multiple follow-ups and other necessary steps.     Supervising Physician:Benjamin Rosenstein, MD, MA  [facility]

## 2023-06-13 NOTE — PATIENT INSTRUCTIONS
Today we discussed your request for an N648 waiver for the citizenship exam.  Your provider has determined that additional assessment is necessary to complete this evaluation and do a good job with the N648 form.     The following are important things to remember with regard to this process:    This assessment may take several appointments to complete.  Completing the assessment is not a guarantee that the N648 waiver will be supported or approved.   You may be asked to sign a release of information so that we can obtain information from citizenship teachers you have worked with in the past or other providers involved in your care in order to create a complete picture of your current difficulties and the efforts you have made to address these concerns.  Given the timeline and uncertainties with this process, you may want to wait to file for citizenship until you have completed this assessment and had a further conversation with your provider about whether the N648 can be completed and supported on your behalf.   If an N648 waiver is completed on your behalf, you will need the original form (not a copy) to give to the [employer].  The USCIS will not accept a photo copy, so keep this in a safe place or give this to your  so that you will have it when needed.

## 2023-06-13 NOTE — NURSING NOTE
Clinic Administered Medication Documentation    Patient was given Albuterol 0.083% 2.5mg/3mL. Prior to medication administration, verified patient's identity using patient's name and date of birth.    Kaylie Ford LPN

## 2023-06-14 ENCOUNTER — TELEPHONE (OUTPATIENT)
Dept: FAMILY MEDICINE | Facility: CLINIC | Age: 58
End: 2023-06-14
Payer: MEDICARE

## 2023-06-14 DIAGNOSIS — F41.9 ANXIETY: ICD-10-CM

## 2023-06-14 DIAGNOSIS — Z14.01 ASYMPTOMATIC HEMOPHILIA A CARRIER: Primary | ICD-10-CM

## 2023-06-14 DIAGNOSIS — F31.81 BIPOLAR II DISORDER (H): ICD-10-CM

## 2023-06-14 NOTE — TELEPHONE ENCOUNTER
[facility] phone call message- general phone call:    Reason for call: Patient called asking about forms, she stated she was told by someone she spoke to yesterday that it will be done today and she hasn't received a call yet. Informed her I have not seen anything come back and will send a message to Dr. Farah. Please advise thank you.    Return call needed: Yes    OK to leave a message on voice mail? Yes    Primary language: English      needed? No    Call taken on June 14, 2023 at 1:44 PM by Vern Alford    Called patient today to discuss for 4 citizenship.  The form patient brought in was in regards to ability to take citizenship test.  I discussed with patient that I am concerned that with the information we have currently that this request would be denied.  Patient has no known diagnosed learning disability.  In our conversation she did go to school in [place] and was in special education but did graduate from high school.  She has no records of this and much of it has been lost over the years since she started her life in the [place].  Patient is worried that she will be unable to pass the test due to anxiety as well as historic difficulties with learning.  We reviewed why patient is currently receiving services and financial support for disability and vigorous breast understanding is that this is due to mental health.  She does live in a assisted housing situation but is managing her own money.  She is largely independent.    Patient does report that she did seek assistance with her citizenship application with us [employer].  She stopped engaging with [employer] after she needed to collect records for her criminal history.  Her understanding is that this would cost money that she does not have and she stopped pursuing this.  She is open to working with [employer] or another  service to help her with her citizenship evaluation.  I talked with the patient about  needing some additional medical documentation such as a neuropsych evaluation she is also open to this.    I will put in a referral to care coordination to help with these issues as well as connection to spirals.  I will also put in a referral for neuropsych eval with specific question of ability to learn for citizenship examination.  Form was not completed today as at in his current state I do not think it will support her application for citizenship and will not exempt her from the examination.

## 2023-06-16 ENCOUNTER — TELEPHONE (OUTPATIENT)
Dept: NEUROPSYCHOLOGY | Facility: CLINIC | Age: 58
End: 2023-06-16
Payer: MEDICARE

## 2023-06-16 NOTE — TELEPHONE ENCOUNTER
Neuropsychology referral declined. Referring provider, Dr. Farah notified via inbasket message.    Unfortunately, at this time our clinic does not see patients for diagnostic questions related to citizenship tests or naturalization.     Possible referral options for your patient could include:    - Dr. Michelle Lopez - [phone]    - Dr. Giuliana Smith - [phone]    - Dr. Jonathon Shrestha - [phone]    Sincerely,   Marie Milian  [facility]

## 2023-06-20 NOTE — TELEPHONE ENCOUNTER
Please verify this medication not in Epic or current med list.   chest, right leg, left arm/complains of pain/discomfort

## 2023-06-21 ENCOUNTER — TELEPHONE (OUTPATIENT)
Dept: FAMILY MEDICINE | Facility: CLINIC | Age: 58
End: 2023-06-21

## 2023-06-21 NOTE — TELEPHONE ENCOUNTER
Writer was notified by call center patient left VM on phone line stating short of breath and not sure if she should come in for appointment or go elsewhere. Writer called patient, patient being seen at urgent care in order to be seen sooner. Patient at urgent care during call. No further action needed by writer. Basilio SMITH

## 2023-06-29 ENCOUNTER — TELEPHONE (OUTPATIENT)
Dept: PULMONOLOGY | Facility: CLINIC | Age: 58
End: 2023-06-29
Payer: MEDICARE

## 2023-06-29 NOTE — TELEPHONE ENCOUNTER
Called and spoke with [facility] to have CXR completed 6/28/23 pushed to  [facility]. Called  [facility] who confirmed CXR arrived and will be resolved.     Anatoly Jiang RN

## 2023-07-03 DIAGNOSIS — E11.9 TYPE 2 DIABETES MELLITUS WITHOUT COMPLICATION, WITHOUT LONG-TERM CURRENT USE OF INSULIN (H): ICD-10-CM

## 2023-07-03 RX ORDER — ATORVASTATIN CALCIUM 40 MG/1
40 TABLET, FILM COATED ORAL DAILY
Qty: 90 TABLET | Refills: 3 | Status: SHIPPED | OUTPATIENT
Start: 2023-07-03 | End: 2024-04-29

## 2023-07-03 NOTE — TELEPHONE ENCOUNTER
[facility] phone call message- medication clarification/question:    Full Medication Name: All medications         Question: Patient called requesting for all medications to be refilled especially her atorvastatin (LIPITOR) 40 MG tablet. If able to fill please send to pharmacy thank you.     Pharmacy confirmed as [id number] - [place], [place] - [address] [place] [address] RD E: Yes    OK to leave a message on voice mail? Yes    Primary language: English      needed? No    Call taken on July 3, 2023 at 8:39 AM by Vern Alford

## 2023-07-11 ENCOUNTER — PATIENT OUTREACH (OUTPATIENT)
Dept: CARE COORDINATION | Facility: CLINIC | Age: 58
End: 2023-07-11
Payer: MEDICARE

## 2023-07-11 NOTE — CONFIDENTIAL NOTE
Clinic Care Coordination Contact  [facility]/Voicemail       Clinical Data: Care Coordinator Outreach  Outreach attempted x 1.  Left message on patient's voicemail with call back information and requested return call.      TERRY STEWART, RANJITH, LADC

## 2023-07-20 ENCOUNTER — OFFICE VISIT (OUTPATIENT)
Dept: URGENT CARE | Facility: URGENT CARE | Age: 58
End: 2023-07-20
Payer: MEDICARE

## 2023-07-20 VITALS
BODY MASS INDEX: 34.01 KG/M2 | RESPIRATION RATE: 18 BRPM | TEMPERATURE: 97.3 F | HEART RATE: 99 BPM | DIASTOLIC BLOOD PRESSURE: 86 MMHG | WEIGHT: 180 LBS | SYSTOLIC BLOOD PRESSURE: 129 MMHG | OXYGEN SATURATION: 96 %

## 2023-07-20 DIAGNOSIS — R30.0 DYSURIA: ICD-10-CM

## 2023-07-20 DIAGNOSIS — N30.00 ACUTE CYSTITIS WITHOUT HEMATURIA: Primary | ICD-10-CM

## 2023-07-20 DIAGNOSIS — R45.851 SUICIDAL IDEATION: ICD-10-CM

## 2023-07-20 DIAGNOSIS — E11.65 TYPE 2 DIABETES MELLITUS WITH HYPERGLYCEMIA, WITHOUT LONG-TERM CURRENT USE OF INSULIN (H): ICD-10-CM

## 2023-07-20 LAB
ALBUMIN UR-MCNC: NEGATIVE MG/DL
APPEARANCE UR: CLEAR
BACTERIA #/AREA URNS HPF: ABNORMAL /HPF
BILIRUB UR QL STRIP: NEGATIVE
CLUE CELLS: ABNORMAL
COLOR UR AUTO: YELLOW
GLUCOSE BLD-MCNC: 124 MG/DL (ref 60–99)
GLUCOSE UR STRIP-MCNC: NEGATIVE MG/DL
HGB UR QL STRIP: ABNORMAL
HOLD SPECIMEN: NORMAL
KETONES UR STRIP-MCNC: NEGATIVE MG/DL
LEUKOCYTE ESTERASE UR QL STRIP: ABNORMAL
NITRATE UR QL: NEGATIVE
PH UR STRIP: 6 [PH] (ref 5–7)
RBC #/AREA URNS AUTO: ABNORMAL /HPF
SP GR UR STRIP: <=1.005 (ref 1–1.03)
TRICHOMONAS, WET PREP: ABNORMAL
UROBILINOGEN UR STRIP-ACNC: 0.2 E.U./DL
WBC #/AREA URNS AUTO: ABNORMAL /HPF
WBC'S/HIGH POWER FIELD, WET PREP: ABNORMAL
YEAST, WET PREP: ABNORMAL

## 2023-07-20 PROCEDURE — 36415 COLL VENOUS BLD VENIPUNCTURE: CPT | Performed by: STUDENT IN AN ORGANIZED HEALTH CARE EDUCATION/TRAINING PROGRAM

## 2023-07-20 PROCEDURE — 87086 URINE CULTURE/COLONY COUNT: CPT | Performed by: STUDENT IN AN ORGANIZED HEALTH CARE EDUCATION/TRAINING PROGRAM

## 2023-07-20 PROCEDURE — 87210 SMEAR WET MOUNT SALINE/INK: CPT | Performed by: STUDENT IN AN ORGANIZED HEALTH CARE EDUCATION/TRAINING PROGRAM

## 2023-07-20 PROCEDURE — 81001 URINALYSIS AUTO W/SCOPE: CPT | Performed by: STUDENT IN AN ORGANIZED HEALTH CARE EDUCATION/TRAINING PROGRAM

## 2023-07-20 PROCEDURE — 82947 ASSAY GLUCOSE BLOOD QUANT: CPT | Performed by: STUDENT IN AN ORGANIZED HEALTH CARE EDUCATION/TRAINING PROGRAM

## 2023-07-20 PROCEDURE — 99215 OFFICE O/P EST HI 40 MIN: CPT | Performed by: STUDENT IN AN ORGANIZED HEALTH CARE EDUCATION/TRAINING PROGRAM

## 2023-07-20 RX ORDER — AMOXICILLIN 875 MG
875 TABLET ORAL 2 TIMES DAILY
Qty: 10 TABLET | Refills: 0 | Status: SHIPPED | OUTPATIENT
Start: 2023-07-20 | End: 2023-07-25

## 2023-07-20 RX ORDER — CLOTRIMAZOLE 1 %
1 CREAM WITH APPLICATOR VAGINAL AT BEDTIME
Qty: 45 G | Refills: 0 | Status: SHIPPED | OUTPATIENT
Start: 2023-07-20 | End: 2023-07-27
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
## 2023-07-20 NOTE — PATIENT INSTRUCTIONS
Please follow-up with your PCP in the next few days. I placed a referral so you can hopefully get in soon.     Tests were positive for UTI.  Take amoxicillin as directed.  Urine culture is pending.     Use clotrimazole cream for vaginal itching/discharge.

## 2023-07-22 LAB — BACTERIA UR CULT: NO GROWTH

## 2023-07-27 DIAGNOSIS — J44.9 CHRONIC OBSTRUCTIVE PULMONARY DISEASE, UNSPECIFIED COPD TYPE (H): Primary | ICD-10-CM

## 2023-07-27 RX ORDER — BUDESONIDE AND FORMOTEROL FUMARATE DIHYDRATE 160; 4.5 UG/1; UG/1
2 AEROSOL RESPIRATORY (INHALATION) 2 TIMES DAILY
COMMUNITY
End: 2023-07-27

## 2023-07-28 RX ORDER — BUDESONIDE AND FORMOTEROL FUMARATE DIHYDRATE 160; 4.5 UG/1; UG/1
2 AEROSOL RESPIRATORY (INHALATION) 2 TIMES DAILY
Qty: 18 G | Refills: 3 | Status: SHIPPED | OUTPATIENT
Start: 2023-07-28 | End: 2024-01-29

## 2023-07-31 ENCOUNTER — HOSPITAL ENCOUNTER (EMERGENCY)
Facility: HOSPITAL | Age: 58
Discharge: HOME | End: 2023-07-31
Attending: EMERGENCY MEDICINE | Admitting: EMERGENCY MEDICINE
Payer: MEDICARE

## 2023-07-31 VITALS
OXYGEN SATURATION: 94 % | DIASTOLIC BLOOD PRESSURE: 78 MMHG | SYSTOLIC BLOOD PRESSURE: 135 MMHG | TEMPERATURE: 97.6 F | HEART RATE: 95 BPM | BODY MASS INDEX: 33.99 KG/M2 | HEIGHT: 61 IN | RESPIRATION RATE: 12 BRPM | WEIGHT: 180 LBS

## 2023-07-31 DIAGNOSIS — R45.86 MOOD SWINGS: ICD-10-CM

## 2023-07-31 DIAGNOSIS — F41.8 DEPRESSION WITH ANXIETY: ICD-10-CM

## 2023-07-31 PROBLEM — R94.31 PROLONGED Q-T INTERVAL ON ECG: Status: RESOLVED | Noted: 2019-07-01 | Resolved: 2023-07-31

## 2023-07-31 PROBLEM — F31.81 BIPOLAR 2 DISORDER (H): Status: ACTIVE | Noted: 2017-10-18

## 2023-07-31 PROCEDURE — 99285 EMERGENCY DEPT VISIT HI MDM: CPT | Mod: 25

## 2023-07-31 PROCEDURE — 90791 PSYCH DIAGNOSTIC EVALUATION: CPT

## 2023-07-31 PROCEDURE — 250N000013 HC RX MED GY IP 250 OP 250 PS 637: Performed by: EMERGENCY MEDICINE

## 2023-07-31 PROCEDURE — 93005 ELECTROCARDIOGRAM TRACING: CPT | Performed by: EMERGENCY MEDICINE

## 2023-07-31 RX ORDER — HYDROXYZINE HYDROCHLORIDE 25 MG/1
25 TABLET, FILM COATED ORAL 3 TIMES DAILY PRN
Qty: 20 TABLET | Refills: 0 | Status: SHIPPED | OUTPATIENT
Start: 2023-07-31 | End: 2023-09-19

## 2023-07-31 RX ORDER — HYDROXYZINE HYDROCHLORIDE 25 MG/1
25 TABLET, FILM COATED ORAL ONCE
Status: COMPLETED | OUTPATIENT
Start: 2023-07-31 | End: 2023-07-31

## 2023-07-31 RX ADMIN — HYDROXYZINE HYDROCHLORIDE 25 MG: 25 TABLET, FILM COATED ORAL at 10:25

## 2023-07-31 ASSESSMENT — ACTIVITIES OF DAILY LIVING (ADL)
ADLS_ACUITY_SCORE: 35
ADLS_ACUITY_SCORE: 35

## 2023-07-31 ASSESSMENT — ENCOUNTER SYMPTOMS
FEVER: 0
NERVOUS/ANXIOUS: 1
CHILLS: 1
HALLUCINATIONS: 0

## 2023-07-31 NOTE — ED TRIAGE NOTES
Pt is here as she has had more anxiety for the last month. She has not been sleeping. She ran out of hydroxizine for her anxiety about 1 month ago.  Pt does say she is depressed and had a plan for suicide(cut wrists) about 3 months ago but none today. Pt is tearful.pt has a caregiver with her.

## 2023-07-31 NOTE — DISCHARGE INSTRUCTIONS
Thank you for choosing [facility] for your care. It was a pleasure taking care of you today in our Emergency Department.       Instructions from your doctor today:  Emergency Department (ED) testing is focused on the potential causes of your symptoms that are the most dangerous possibilities, and cannot cover every possibility. Based on the evaluation, it was deemed sufficiently safe to discharge and continue management through the clinics. Thus, follow-up is very important to assess for improvement/worsening, potential further testing, and potential treatment adjustments. If you were given opioid pain medications or other medications that can make you drowsy while in the ED, you should not drive for at least several hours and not until you feel completely back to normal.     Please make an appointment to follow up with:  - Your Primary Care Provider in 2-4 days  - If you do not have a primary care provider, you can be seen in follow-up and establish care by calling any of the clinics below:     - Primary [facility] Center (phone: [phone])     - Primary Care / [facility] (phone: [phone])   - Have your clinic provider review the results from today's visit with you again, including any potential follow-up or additional testing that may be needed based on the results. Occasionally, incidental findings are found on later review by radiologists that may need follow-up.       If you have continued worsening symptoms, please return to the emergency department.     DEC Aftercare Plan    If I am feeling unsafe or I am in a crisis, I will:   Contact my established care providers   Call the National Suicide Prevention Lifeline: 842.354.3322   Go to the nearest emergency room   Call [phone]     Warning signs that I or other people might notice when a crisis is developing for me:     Experiencing increasing suicidal thoughts that turn to plans with intent or means  Experiencing  emotions of hopelessness; feeling like there's no way out.  Experiencing ongoing difficulties sleeping  Experiencing dramatic mood swings or drastic personality changes  Neglecting personal hygiene or cares     Things that I am able to do with others to cope or help me better:   Reach out to supportive people in life if experiencing difficult times or increased mental health symptoms   Let others know how they can provide me with support in the moment, such as if just needing them to be a listening ear, are seeking advice, etc.   Use visuals if having difficulties verbalizing emotions     Things I can use or do for distraction:   Reach out to/spend time with family, friends, engage in leisure activities   Shower  Exercise  Chores or do a project  Listen to music  Watch movie/TV  Journaling  Call a friend    Changes I can make to support my mental health and wellness:    Commit to 30 minutes of self care daily - this can be as simple as taking a shower, going for a walk, cooking a meal, read, writing, etc   Use distraction skills of: going for walks, watching TV, spending time outside, calling a friend or family member   Use community resources, including hotline numbers, [facility] crisis and support meetings   Maintain a daily schedule/routine   Eat regular healthy well balanced meals   Practice deep breathing skills  Focus on positive self-talk vs negative self-talk  Spend quality time with supportive family members or friends  Consider downloading a meditation marcel and spend 15-20 minutes per day mediating/relaxing. Some apps to download include: Calm, Headspace and Insight Timer (plus many others). All 3 of these apps have free version.  Abstain from all mood altering chemicals not currently prescribed to me    People in my life that I can ask for help:   Primary care provider   Mental health professionals   CADI    Residential Caregiver Staff    Your [facility] has a mental health crisis team you can call  "24/7: call the [facility] 24/7/365 crisis line at [phone]. Walk-in crisis services are available Monday-Friday from 8 a.m.-5:30 p.m. at [facility]    Crisis Lines  Crisis Text Line  Text 024927  You will be connected with a trained live crisis counselor to provide support.    Por angel, texto  ZHAO a 105548 o texto a 442-AYUDAME en WhatsApp    [employer] (LGBTQ Youth Crisis Line)  3.759.251.6288  text START to 138-713      [employer]  Fast Tracker  Linking people to mental health and substance use disorder resources  [employer]eting[employer]     [place] Mental Health Warm Line  Peer to peer support  Monday thru Saturday, 12 pm to 10 pm  620.568.3251 or 0.276.225.1116  Text \"Support\" to 78608    [employer] [place] [employer] Mental Illness (GARTH)  347.336.5017 or 1.888.GARTH.HELPS      Mental Health Apps  My3  https://Retrotopepp.org/    VirtualHopeBox  https://BrandFiesta.org/apps/virtual-hope-box/      Crisis Lines  Crisis Text Line  Text 225383  You will be connected with a trained live crisis counselor to provide support.    Por espharley, texto  ZHAO a 986635 o texto a 442-AYUDAME en WhatsApp    National Hope Line  1.800.SUICIDE [3652459]      Community Resources  Fast Tracker  Linking people to mental health and substance use disorder resources  [employer]eting[employer]     [place] Mental Health Warm Line  Peer to peer support  Monday thru Saturday, 12 pm to 10 pm  955.499.7903 or 8.820.917.8187  Text \"Support\" to 52000    [employer] [place] [employer] Mental Illness (GARTH)  127.217.9973 or 1.888.GARTH.HELPS      Additional Information  Today you were seen by a licensed mental health professional through Triage and Transition services, [facility] ([facility])  for a crisis assessment in the Emergency Department at [facility].  It is recommended that you follow up with your established providers (psychiatrist, mental health therapist, and/or primary " care doctor - as relevant) as soon as possible. Coordinators from [facility] will be calling you in the next 24-48 hours to ensure that you have the resources you need.  You can also contact [facility] coordinators directly at [phone]. You may have been scheduled for or offered an appointment with a mental health provider. [facility] maintains an extensive network of licensed behavioral health providers to connect patients with the services they need.  We do not charge providers a fee to participate in our referral network.  We match patients with providers based on a patient's specific needs, insurance coverage, and location.  Our first effort will be to refer you to a provider within your care system, and will utilize providers outside your care system as needed.
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
## 2023-07-31 NOTE — ED PROVIDER NOTES
EMERGENCY DEPARTMENT ENCOUNTER      NAME: Mary Ann Olson  AGE: 58 year old female  YOB: 1965  MRN: [id number]  EVALUATION DATE & TIME: 7/31/2023 10:06 AM    PCP: Joanna Farah    ED PROVIDER: Salome Nicole MD      Chief Complaint   Patient presents with    Anxiety    Depression         FINAL IMPRESSION:  1. Depression with anxiety    2. Mood swings          ED COURSE & MEDICAL DECISION MAKING:    10:14 AM I met with the patient, obtained history, performed an initial exam, and discussed options and plan for diagnostics and treatment here in the ED.    11:52 AM Spoke to the DEC .     Pertinent Labs & Imaging studies reviewed. (See chart for details)  58 year old female presents to the Emergency Department for evaluation of depression  Nursing note were reviewed.  Past Medical records were reviewed.  Given patient s history and physical exam, it appears patient has been having sleep difficulties and mood changes over the last several weeks, she believes possibly due to menopause.      History, mental status exam and physical exam were otherwise unremarkable, including no physical concerns at this time besides her hot and cold sweats.   For more details of history and behavior health, please see social workers detailed note for today.   She stated she is most interested in menopause work-up, however let her know we have only emergent work-up to the emergency department but we can refer her to primary care for that.  She agreed to hydroxyzine and DEC assessment.  Please see DEC  note for further details.    Patient is  safe to be discharged home at this time.       Patient requested more hydroxyzine, but has a history of prolonged QTc.  So we obtained an EKG in the emergency department. EKG showed normal qtc, so will prescribe hydroxyzine.     Patient discharged in good condition with questions answered. She was given epic discharge instructions and follow-up  recommendations. Patient will return to the ED if symptoms worsen or she develops any of the concerning signs/symptoms as outlined in the EPIC d/c instructions. Otherwise she will follow up in clinic as recommended in the d/c instructions.            At the conclusion of the encounter I discussed the results of all of the tests and the disposition. The questions were answered. The patient or family acknowledged understanding and was agreeable with the care plan.         MEDICATIONS GIVEN IN THE EMERGENCY:  Medications   hydrOXYzine (ATARAX) tablet 25 mg (25 mg Oral $Given 7/31/23 1025)       NEW PRESCRIPTIONS STARTED AT TODAY'S ER VISIT  New Prescriptions    No medications on file     Medical Decision Making    History:  Supplemental history from: Documented in chart, if applicable  External Record(s) reviewed: Documented in chart, if applicable.    Work Up:  Chart documentation includes differential considered and any EKGs or imaging independently interpreted by provider, where specified.  In additional to work up documented, I considered the following work up: Documented in chart, if applicable.    External consultation:  Discussion of management with another provider: Documented in chart, if applicable    Complicating factors:  Care impacted by chronic illness: Mental Health  Care affected by social determinants of health: N/A    Disposition considerations: Discharge. Hydroxyzine(s). See documentation for any additional details.           =================================================================    HPI    Patient information was obtained from: Patient        Pt is here as she has had more anxiety for the last month. She has not been sleeping. She ran out of hydroxizine for her anxiety about 1 month ago.  Pt does say she is depressed and had a plan for suicide(cut wrists) about 3 months ago but none today. Pt is tearful.pt has a caregiver with her.       Use of : N/A         Mary Ann Olson  is a 58 year old female with a pertinent history of bipolar 2 disorder, DMII, HLD, heat stroke, Crohn's disease, suicidal attempt, asthma, COPD, personality disorder, hypoxia, cocaine use disorder, opioid use disorder, who presents to this ED via walk in accompanied by caregiver for evaluation of anxiety and depression.     Temperature was slightly low upon arrival, but recheck was normal, patient's mouth was dry when the temperature was checked.    The patient has not been able to sleep and has increase anxiety and depression. She reports that she ran out of hydroxyzine for her anxiety 1 month ago. She takes about 35 mg hydroxyzine as needed. The patient reports decrease appetite. She gets hot and cold symptoms in which she associates to menopause. She is unable to get appointment with primary care provider today or tomorrow. She denies having a psychiatrist or therapist. She lives independently but has staff coming in. Denies any suicidal thought, auditory or visual hallucination, or any other medical concerns.  No dysuria, hematuria, diarrhea, nausea, vomiting, chest pain, abdominal pain, shortness of breath, or any other concerns.      REVIEW OF SYSTEMS   Review of Systems   Constitutional:  Positive for chills. Negative for fever.        Positive for trouble sleeping   Psychiatric/Behavioral:  Negative for hallucinations and suicidal ideas. The patient is nervous/anxious.    All other systems reviewed and are negative.       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    Antihistamines overdose, intentional self-harm, initial encounter (H)     Asthma     Bipolar 1 disorder (H) hx of bipolar listed in h and p    Bipolar 2 disorder (H)     Bipolar I disorder, single manic episode (H)     Created by Conversion  Replacement Utility updated for latest IMO load    Cellulitis 2006 left ankle, 2008 right foot    COPD (chronic obstructive  pulmonary disease) (H)     Crohn's disease (H)     arthritis associated with crohn's    Diabetes mellitus (H)     Diabetes mellitus, type 2 (H)     Fibrocystic breast     Heat stroke     when a young child     Hyperlipidemia     Idiopathic acute pancreatitis 07/14/2015    Irritable bowel syndrome     Created by Conversion     Kidney stone     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     Overdose     Pyoderma gangrenosum     2016    Sacroiliitis (H) 2004    Skin lesion of left leg 08/27/2015    Suicidal ideation     Suicide attempt (H)     Traumatic iritis 07/21/2015    Umbilical hernia     Uncomplicated asthma        PAST SURGICAL HISTORY:  Past Surgical History:   Procedure Laterality Date    BIOPSY BREAST Left     BIOPSY OF BREAST, INCISIONAL      Description: Biopsy Breast Open;  Recorded: 10/28/2010;    HC REMOVAL OF TONSILS,<13 Y/O      Description: Tonsillectomy;  Recorded: 07/08/2009;    ZZC LIGATE FALLOPIAN TUBE      Description: Tubal Ligation;  Recorded: 07/08/2009;           CURRENT MEDICATIONS:    acetaminophen (TYLENOL) 500 MG tablet  albuterol (PROAIR HFA/PROVENTIL HFA/VENTOLIN HFA) 108 (90 Base) MCG/ACT inhaler  albuterol (PROVENTIL) (2.5 MG/3ML) 0.083% neb solution  atorvastatin (LIPITOR) 40 MG tablet  blood glucose (NO BRAND SPECIFIED) test strip  blood glucose (NO BRAND SPECIFIED) test strip  blood glucose monitoring (NO BRAND SPECIFIED) meter device kit  budesonide-formoterol (SYMBICORT) 160-4.5 MCG/ACT Inhaler  calcium carbonate-vitamin D (OSCAL W/D) 500-200 MG-UNIT tablet  fluticasone (FLONASE) 50 MCG/ACT nasal spray  hydrOXYzine (ATARAX) 25 MG tablet  insulin aspart (NOVOLOG PEN) 100 UNIT/ML pen  insulin pen needle (31G X 6 MM) 31G X 6 MM miscellaneous  ipratropium - albuterol 0.5 mg/2.5 mg/3 mL (DUONEB) 0.5-2.5 (3) MG/3ML neb solution  loratadine (CLARITIN) 10 MG tablet  magnesium hydroxide (MILK OF MAGNESIA) 400 MG/5ML suspension  metFORMIN (GLUCOPHAGE) 1000 MG tablet  Multiple Vitamins-Minerals  "(CENTRUM SILVER 50+WOMEN PO)  omeprazole (PRILOSEC) 20 MG DR capsule  sennosides (SENOKOT) 8.6 MG tablet        ALLERGIES:  Allergies   Allergen Reactions    Azithromycin Other (See Comments) and Rash     Erythema Nodosum x2    Keflex [Cephalexin] Rash    Aspirin     Cefuroxime Itching    Cheese GI Disturbance    Contrast Dye Hives     IV    Diatrizoate Hives    Gadolinium Derivatives Hives    Hazelnuts [Nuts]     Iodinated Contrast Media Hives    Iodixanol Unknown    Nitrofurantoin Itching    Septra [Sulfamethoxazole-Trimethoprim] Hives    Sulfa Antibiotics Hives    Metronidazole Itching and Rash    Quinolones Rash       FAMILY HISTORY:  Family History   Problem Relation Age of Onset    Coronary Artery Disease Mother     Diabetes Father     Breast Cancer Maternal Grandmother     Asthma Son     Asthma Daughter     Hemophilia Other     Diabetes Type 2  Father     Factor VIII deficiency Other        SOCIAL HISTORY:   Social History     Socioeconomic History    Marital status: Single   Tobacco Use    Smoking status: Every Day     Packs/day: 0.50     Types: Cigarettes    Smokeless tobacco: Never    Tobacco comments:     2-3 cig/day   Vaping Use    Vaping Use: Never used   Substance and Sexual Activity    Alcohol use: No    Drug use: No       VITALS:  BP (!) 143/79   Pulse 88   Temp (!) 96.4  F (35.8  C) (Tympanic)   Resp 12   Ht 1.549 m (5' 1\")   Wt 81.6 kg (180 lb)   SpO2 96%   BMI 34.01 kg/m      PHYSICAL EXAM    General: Alert, sitting on the bed in NAD  Neuro: awake alert moving extremities x 4 face symmetrical, MCCARTY equally  Head: atraumatic  Eyes: palpebral conjunctiva normal - not pale  Mouth/Throat: mucous membranes moist  Chest/Pulm: breathing comfortably  Cardiovascular: regular rate  Skin: non diaphoretic  warm and dry       LAB:  All pertinent labs reviewed and interpreted.       RADIOLOGY:  Reviewed all pertinent imaging. Please see official radiology report.  No orders to display               Mental " Health Risk Assessment        PSS-3      Date and Time Over the past 2 weeks have you felt down, depressed, or hopeless? Over the past 2 weeks have you had thoughts of killing yourself? Have you ever attempted to kill yourself? When did this last happen? User   07/31/23 1001 yes yes yes -- JS                    Salome Nicole MD  [facility] EMERGENCY DEPARTMENT  [address] [place] [id number] [phone]       Salome Nicole MD  07/31/23 2096       Salome Nicole MD  07/31/23 [id number]

## 2023-08-01 ENCOUNTER — PATIENT OUTREACH (OUTPATIENT)
Dept: CARE COORDINATION | Facility: CLINIC | Age: 58
End: 2023-08-01
Payer: MEDICARE

## 2023-08-01 NOTE — PROGRESS NOTES
Clinic Care Coordination Contact  Follow Up Progress Note      Assessment:  The pt was recently in the ED, I called to check up on the pt and help the pt setup a ED follow up. The pt was at [facility] for anxiety and depression. I called the pt, but got her vm, so I left a vm for the pt to give me a call back.     Care Gaps:    Health Maintenance Due   Topic Date Due    COPD ACTION PLAN  Never done    ADVANCE CARE PLANNING  11/15/2017    LUNG CANCER SCREENING  10/02/2018    ZOSTER IMMUNIZATION (3 of 3) 11/30/2018    DIABETIC FOOT EXAM  09/25/2019    MAMMO SCREENING  09/10/2021    COVID-19 Vaccine (3 - Pfizer series) 02/08/2022    MICROALBUMIN  09/17/2022    MEDICARE ANNUAL WELLNESS VISIT  11/02/2022    LIPID  11/02/2022    COLORECTAL CANCER SCREENING  11/22/2022    EYE EXAM  07/13/2023    NICOTINE/TOBACCO CESSATION COUNSELING Q 1 YR  08/01/2023           Care Plans      Intervention/Education provided during outreach:               Plan:     Care Coordinator will follow up in

## 2023-08-02 ENCOUNTER — PATIENT OUTREACH (OUTPATIENT)
Dept: CARE COORDINATION | Facility: CLINIC | Age: 58
End: 2023-08-02
Payer: MEDICARE

## 2023-08-02 NOTE — PROGRESS NOTES
Clinic Care Coordination Contact  Follow Up Progress Note      Assessment:  The pt was recently in the ED, I called to check up on the pt and help the pt setup a ED follow up. The pt was at [facility] for anxiety and depression. I called and talked to the pt, pt stated that she is doing ok. Pt stated that she will call later to schedule.     Care Gaps:    Health Maintenance Due   Topic Date Due    COPD ACTION PLAN  Never done    ADVANCE CARE PLANNING  11/15/2017    LUNG CANCER SCREENING  10/02/2018    ZOSTER IMMUNIZATION (3 of 3) 11/30/2018    DIABETIC FOOT EXAM  09/25/2019    MAMMO SCREENING  09/10/2021    COVID-19 Vaccine (3 - Pfizer series) 02/08/2022    MICROALBUMIN  09/17/2022    MEDICARE ANNUAL WELLNESS VISIT  11/02/2022    LIPID  11/02/2022    COLORECTAL CANCER SCREENING  11/22/2022    EYE EXAM  07/13/2023    NICOTINE/TOBACCO CESSATION COUNSELING Q 1 YR  08/01/2023           Care Plans      Intervention/Education provided during outreach:               Plan:     Care Coordinator will follow up in

## 2023-08-14 ENCOUNTER — APPOINTMENT (OUTPATIENT)
Dept: CT IMAGING | Facility: HOSPITAL | Age: 58
End: 2023-08-14
Attending: EMERGENCY MEDICINE
Payer: MEDICARE

## 2023-08-14 ENCOUNTER — HOSPITAL ENCOUNTER (EMERGENCY)
Facility: HOSPITAL | Age: 58
Discharge: HOME | End: 2023-08-14
Attending: EMERGENCY MEDICINE | Admitting: EMERGENCY MEDICINE
Payer: MEDICARE

## 2023-08-14 VITALS
BODY MASS INDEX: 32.12 KG/M2 | HEART RATE: 89 BPM | RESPIRATION RATE: 18 BRPM | WEIGHT: 170 LBS | DIASTOLIC BLOOD PRESSURE: 76 MMHG | TEMPERATURE: 98.2 F | OXYGEN SATURATION: 91 % | SYSTOLIC BLOOD PRESSURE: 126 MMHG

## 2023-08-14 DIAGNOSIS — R10.32 LLQ ABDOMINAL PAIN: ICD-10-CM

## 2023-08-14 DIAGNOSIS — N30.00 ACUTE CYSTITIS WITHOUT HEMATURIA: ICD-10-CM

## 2023-08-14 DIAGNOSIS — H57.11 DISCOMFORT OF RIGHT EYE: ICD-10-CM

## 2023-08-14 LAB
ALBUMIN SERPL BCG-MCNC: 4.1 G/DL (ref 3.5–5.2)
ALBUMIN UR-MCNC: NEGATIVE MG/DL
ALP SERPL-CCNC: 125 U/L (ref 35–104)
ALT SERPL W P-5'-P-CCNC: 13 U/L (ref 0–50)
ANION GAP SERPL CALCULATED.3IONS-SCNC: 13 MMOL/L (ref 7–15)
APPEARANCE UR: CLEAR
AST SERPL W P-5'-P-CCNC: 19 U/L (ref 0–45)
BACTERIA #/AREA URNS HPF: ABNORMAL /HPF
BASOPHILS # BLD AUTO: 0.1 10E3/UL (ref 0–0.2)
BASOPHILS NFR BLD AUTO: 1 %
BILIRUB SERPL-MCNC: 0.2 MG/DL
BILIRUB UR QL STRIP: NEGATIVE
BUN SERPL-MCNC: 9.1 MG/DL (ref 8–23)
CALCIUM SERPL-MCNC: 10.5 MG/DL (ref 8.6–10)
CHLORIDE SERPL-SCNC: 101 MMOL/L (ref 98–107)
COLOR UR AUTO: ABNORMAL
CREAT SERPL-MCNC: 0.5 MG/DL (ref 0.51–0.95)
DEPRECATED HCO3 PLAS-SCNC: 25 MMOL/L (ref 22–29)
EOSINOPHIL # BLD AUTO: 0.4 10E3/UL (ref 0–0.7)
EOSINOPHIL NFR BLD AUTO: 3 %
ERYTHROCYTE [DISTWIDTH] IN BLOOD BY AUTOMATED COUNT: 14.9 % (ref 10–15)
GFR SERPL CREATININE-BSD FRML MDRD: >90 ML/MIN/1.73M2
GLUCOSE SERPL-MCNC: 144 MG/DL (ref 70–99)
GLUCOSE UR STRIP-MCNC: NEGATIVE MG/DL
HCT VFR BLD AUTO: 38.6 % (ref 35–47)
HGB BLD-MCNC: 12.2 G/DL (ref 11.7–15.7)
HGB UR QL STRIP: NEGATIVE
IMM GRANULOCYTES # BLD: 0 10E3/UL
IMM GRANULOCYTES NFR BLD: 0 %
KETONES UR STRIP-MCNC: NEGATIVE MG/DL
LEUKOCYTE ESTERASE UR QL STRIP: ABNORMAL
LIPASE SERPL-CCNC: 21 U/L (ref 13–60)
LYMPHOCYTES # BLD AUTO: 1.8 10E3/UL (ref 0.8–5.3)
LYMPHOCYTES NFR BLD AUTO: 17 %
MCH RBC QN AUTO: 25.7 PG (ref 26.5–33)
MCHC RBC AUTO-ENTMCNC: 31.6 G/DL (ref 31.5–36.5)
MCV RBC AUTO: 81 FL (ref 78–100)
MONOCYTES # BLD AUTO: 0.4 10E3/UL (ref 0–1.3)
MONOCYTES NFR BLD AUTO: 4 %
MUCOUS THREADS #/AREA URNS LPF: PRESENT /LPF
NEUTROPHILS # BLD AUTO: 7.8 10E3/UL (ref 1.6–8.3)
NEUTROPHILS NFR BLD AUTO: 75 %
NITRATE UR QL: NEGATIVE
NRBC # BLD AUTO: 0 10E3/UL
NRBC BLD AUTO-RTO: 0 /100
PH UR STRIP: 5 [PH] (ref 5–7)
PLATELET # BLD AUTO: 455 10E3/UL (ref 150–450)
POTASSIUM SERPL-SCNC: 4.2 MMOL/L (ref 3.4–5.3)
PROT SERPL-MCNC: 7.6 G/DL (ref 6.4–8.3)
RBC # BLD AUTO: 4.74 10E6/UL (ref 3.8–5.2)
RBC URINE: 2 /HPF
SODIUM SERPL-SCNC: 139 MMOL/L (ref 136–145)
SP GR UR STRIP: 1.01 (ref 1–1.03)
SQUAMOUS EPITHELIAL: <1 /HPF
TRANSITIONAL EPI: <1 /HPF
UROBILINOGEN UR STRIP-MCNC: <2 MG/DL
WBC # BLD AUTO: 10.5 10E3/UL (ref 4–11)
WBC URINE: 72 /HPF

## 2023-08-14 PROCEDURE — 96361 HYDRATE IV INFUSION ADD-ON: CPT

## 2023-08-14 PROCEDURE — 96374 THER/PROPH/DIAG INJ IV PUSH: CPT

## 2023-08-14 PROCEDURE — 99285 EMERGENCY DEPT VISIT HI MDM: CPT | Mod: 25

## 2023-08-14 PROCEDURE — G1010 CDSM STANSON: HCPCS

## 2023-08-14 PROCEDURE — 258N000003 HC RX IP 258 OP 636: Performed by: EMERGENCY MEDICINE

## 2023-08-14 PROCEDURE — 80053 COMPREHEN METABOLIC PANEL: CPT | Performed by: STUDENT IN AN ORGANIZED HEALTH CARE EDUCATION/TRAINING PROGRAM

## 2023-08-14 PROCEDURE — 250N000009 HC RX 250: Performed by: EMERGENCY MEDICINE

## 2023-08-14 PROCEDURE — 96376 TX/PRO/DX INJ SAME DRUG ADON: CPT

## 2023-08-14 PROCEDURE — 36415 COLL VENOUS BLD VENIPUNCTURE: CPT | Performed by: STUDENT IN AN ORGANIZED HEALTH CARE EDUCATION/TRAINING PROGRAM

## 2023-08-14 PROCEDURE — 85025 COMPLETE CBC W/AUTO DIFF WBC: CPT | Performed by: STUDENT IN AN ORGANIZED HEALTH CARE EDUCATION/TRAINING PROGRAM

## 2023-08-14 PROCEDURE — 93005 ELECTROCARDIOGRAM TRACING: CPT | Performed by: EMERGENCY MEDICINE

## 2023-08-14 PROCEDURE — 81001 URINALYSIS AUTO W/SCOPE: CPT | Performed by: EMERGENCY MEDICINE

## 2023-08-14 PROCEDURE — 87086 URINE CULTURE/COLONY COUNT: CPT | Performed by: EMERGENCY MEDICINE

## 2023-08-14 PROCEDURE — 80053 COMPREHEN METABOLIC PANEL: CPT | Performed by: EMERGENCY MEDICINE

## 2023-08-14 PROCEDURE — 83690 ASSAY OF LIPASE: CPT | Performed by: EMERGENCY MEDICINE

## 2023-08-14 PROCEDURE — 250N000013 HC RX MED GY IP 250 OP 250 PS 637: Performed by: EMERGENCY MEDICINE

## 2023-08-14 PROCEDURE — 250N000011 HC RX IP 250 OP 636: Performed by: EMERGENCY MEDICINE

## 2023-08-14 PROCEDURE — 85025 COMPLETE CBC W/AUTO DIFF WBC: CPT | Performed by: EMERGENCY MEDICINE

## 2023-08-14 RX ORDER — GRANULES FOR ORAL 3 G/1
3 POWDER ORAL ONCE
Status: COMPLETED | OUTPATIENT
Start: 2023-08-14 | End: 2023-08-14

## 2023-08-14 RX ORDER — TETRACAINE HYDROCHLORIDE 5 MG/ML
1-2 SOLUTION OPHTHALMIC ONCE
Status: COMPLETED | OUTPATIENT
Start: 2023-08-14 | End: 2023-08-14

## 2023-08-14 RX ORDER — HYDROMORPHONE HYDROCHLORIDE 1 MG/ML
0.5 INJECTION, SOLUTION INTRAMUSCULAR; INTRAVENOUS; SUBCUTANEOUS ONCE
Status: COMPLETED | OUTPATIENT
Start: 2023-08-14 | End: 2023-08-14

## 2023-08-14 RX ORDER — POLYVINYL ALCOHOL 14 MG/ML
1 SOLUTION/ DROPS OPHTHALMIC PRN
Qty: 30 ML | Refills: 0 | Status: SHIPPED | OUTPATIENT
Start: 2023-08-14 | End: 2024-02-27

## 2023-08-14 RX ORDER — ERYTHROMYCIN 5 MG/G
0.5 OINTMENT OPHTHALMIC 4 TIMES DAILY
Qty: 14 G | Refills: 0 | Status: SHIPPED | OUTPATIENT
Start: 2023-08-14 | End: 2023-08-21

## 2023-08-14 RX ADMIN — HYDROMORPHONE HYDROCHLORIDE 0.5 MG: 1 INJECTION, SOLUTION INTRAMUSCULAR; INTRAVENOUS; SUBCUTANEOUS at 18:01

## 2023-08-14 RX ADMIN — TETRACAINE HYDROCHLORIDE 2 DROP: 5 SOLUTION OPHTHALMIC at 20:24

## 2023-08-14 RX ADMIN — SODIUM CHLORIDE 1000 ML: 9 INJECTION, SOLUTION INTRAVENOUS at 15:41

## 2023-08-14 RX ADMIN — FLUORESCEIN SODIUM 1 STRIP: 1 STRIP OPHTHALMIC at 20:25

## 2023-08-14 RX ADMIN — GRANULES FOR ORAL SOLUTION 3 G: 3 POWDER ORAL at 20:25

## 2023-08-14 RX ADMIN — HYDROMORPHONE HYDROCHLORIDE 0.5 MG: 1 INJECTION, SOLUTION INTRAMUSCULAR; INTRAVENOUS; SUBCUTANEOUS at 16:08

## 2023-08-14 ASSESSMENT — ACTIVITIES OF DAILY LIVING (ADL)
ADLS_ACUITY_SCORE: 35
ADLS_ACUITY_SCORE: 35

## 2023-08-14 ASSESSMENT — VISUAL ACUITY
OD: 20/70
OS: 20/50

## 2023-08-14 NOTE — ED PROVIDER NOTES
EMERGENCY DEPARTMENT ENCOUNTER      NAME: Mary Ann Olson  AGE: 58 year old female  YOB: 1965  MRN: [id number]  EVALUATION DATE & TIME: 8/14/2023  3:07 PM    PCP: Joanna Farah    ED PROVIDER: Lazara Birmingham MD      Chief Complaint   Patient presents with    Abdominal Pain         FINAL IMPRESSION:  1. LLQ abdominal pain    2. Acute cystitis without hematuria    3. Discomfort of right eye          ED COURSE & MEDICAL DECISION MAKING:    Pertinent Labs & Imaging studies reviewed. (See chart for details)    4:06 PM I introduced myself to the patient, obtained patient history, performed a physical exam, and discussed plan for ED workup including potential diagnostic laboratory/imaging studies and interventions.  5:37 PM Checked in on and updated patient. Patient refusing EKG.   8:16 PM Checked in on and updated patient. I discussed the plan for discharge with the patient, and patient is agreeable.  We discussed supportive cares at home and reasons for return to the ER including new or worsening symptoms - all questions and concerns addressed.  Patient to be discharged by RN.     58 year old female with history of constipation, hernia, IBS, and Crohn's disease who presents to the Emergency Department for evaluation of LLQ abdominal pain.  She was originally seen in urgent care who sent her here for imaging.  She was seen for similar pain back in June twice and briefly observed overnight with bowel regiment and had bowel movement diagnosed with constipation issues.  She does state that she had 3 normal bowel movements today.  Her main complaint is the abdominal pain.  On exam, she has left lower quadrant tenderness without signs of peritonitis.  She is in no acute distress.  She was given IV Dilaudid for pain medication 0.5 mg x 2.  She was given IV fluids.  Differential diagnosis includes but is not limited to diverticulitis, Crohn's flare, constipation, bowel obstruction, bowel  perforation, UTI, kidney stone, ovarian pathology, etc.  Laboratory studies obtained as well as CT of the abdomen pelvis without contrast as she has a contrast allergy.  CBC reveals a normal white blood cell count of 10.5.  Hemoglobin 12.2.  She denies any signs or symptoms of GI bleeding.  CMP is largely unremarkable.  Normal renal function.  Urinalysis reveals leukocyte esterase and 72 white blood cells with 2 red blood cells and negative nitrate.  Urine culture is sent.  She has had previous UTIs per patient.  She did have some urinary symptoms yesterday that she was questioning whether was related to dehydration.  CT scan Does not reveal any acute findings.  No signs of different diverticulitis.  No inflammatory process bowel obstruction or hydronephrosis.  No obvious sign of Crohn's flare.  No kidney stone.  Mild colonic stool burden.  Possible her symptoms are related to a UTI.  At this point could also have some component of constipation and we advised outpatient bowel regimen.  She unfortunately has multiple allergies to many classes of antibiotics to be used for possible UTI.  I discussed with pharmacy and they recommended single dose of fosfomycin which she was given here.  I discussed with her the importance of following up closely with her primary care doctor.  She voiced understanding and was comfortable with this plan.    As I was leaving the room during initial evaluation she asked about right eye discomfort.  She states that it has been red and has some very slight swelling of the right upper eyelid but no erythema over the eyelid or periorbital erythema.  Has some mild conjunctival injection.  She states she has had some tearing.  Does report prior history of an eye infection as well as dry eye/stye/blepharitis in the past.  She follows with [facility].  The symptoms have been ongoing for 3 days.  She denies any eye trauma.  She does not wear contacts.  She does wear reading glasses which she does  not have with her.  She states that her vision is slightly blurred.  She initially denied any photophobia and had no photophobia on my exam.  Pupils are equal round and reactive to light bilaterally.  She has no pain with extraocular movement.  Overall with the appearance felt that orbital cellulitis was very unlikely.  She does not have any significant erythema and thus felt that preseptal cellulitis was also less likely.  Is not having any purulent drainage so felt that bacterial conjunctivitis was somewhat less likely.  Considered corneal abrasion and performed fluorescein exam which does not show any uptake.  Negative Timbo sign.  No foreign bodies noted.  She is again not had any trauma to the eye.  No hypopyon or hyphema.  Vision is grossly normal on my exam.  Visual acuity performed by the nurse revealed 20/70 on the right and 20/60 on the left without her glasses.  Do suspect that is why the vision is slightly abnormal but is not significantly worse in the right eye compared to the left.  With her Crohn's she could potentially have something such as iritis or uveitis however does not definitely fit this description.  As she has had blepharitis previously do have concern for this as she has dry eyes and the slight lid swelling may actually be developing of a chill lesion or stye but not obviously developed currently.  Her intraocular pressures are normal making acute angle glaucoma unlikely.  No temporal headache or tenderness making temporal arteritis less likely.  She does report that the eye discomfort gives her a headache at times but again on my exam has no photophobia.  At this point I am not finding signs of an emergent intraocular pathology but I did discuss with her the extreme importance that she call her [facility] tomorrow and make an appointment to be seen with them.  Possible this may be blepharitis versus a conjunctivitis with her presentation.  Do feel she needs more thorough eye  exam and thus discussed calling them tomorrow to get this appointment as soon as possible.  Will prescribe her erythromycin ointment to be used 4 times daily and advised warm compresses and also prescribed artificial tears as well.  She was in agreement with this plan and follow-up with her eye doctor.  We did give strict return precautions regarding the eye complaint as well.    She did also briefly mention some intermittent chest pain that of been going on for quite some time without any current chest pain.  I offered further cardiac work-up with troponin level and EKG which she declined.  She stated she just wanted to focus on her abdominal pain.  Discussed that we could miss a life-threatening pathology such as a heart attack which could lead to death and she voiced understanding and appears to have capacity to accept these risks and still declined this work-up.    She was requesting discharge.  She was given indications for return emergency department.  She voiced understanding was comfortable this plan.  She was discharged home in stable condition.    ED Course as of 08/18/23 0846   Mon Aug 14, 2023   1759 Visual acuity is 20/70 on right and 20/60 on the left without her glasses per RN       At the conclusion of the encounter I discussed the results of all of the tests and the disposition. The questions were answered. The patient or family acknowledged understanding and was agreeable with the care plan.       Medical Decision Making    History:  Supplemental history from: N/A  External Record(s) reviewed: Other: ED visits for abdominal pain on 6/03/2023 and 6/11/2023 . UC visit earlier today 8/14    Work Up:  Chart documentation includes differential considered and any EKGs or imaging independently interpreted by provider.  In additional to work up documented, I considered the following work up: Documented in chart, if applicable.    External consultation:  Discussion of management with another provider:  Documented in chart, if applicable    Complicating factors:  Care impacted by chronic illness: Diabetes and Other: Crohn's Disease  Care affected by social determinants of health: N/A    Disposition considerations: Discharge. I prescribed additional prescription strength medication(s) as charted. See documentation for any additional details.      MEDICATIONS GIVEN IN THE EMERGENCY:  Medications   0.9% sodium chloride BOLUS (0 mLs Intravenous Stopped 8/14/23 1801)   HYDROmorphone (PF) (DILAUDID) injection 0.5 mg (0.5 mg Intravenous $Given 8/14/23 1608)   fluorescein (FUL-CRISTOFER) ophthalmic strip 1 strip (1 strip Right Eye $Given 8/14/23 2025)   tetracaine (PONTOCAINE) 0.5 % ophthalmic solution 1-2 drop (2 drops Both Eyes $Given 8/14/23 2024)   HYDROmorphone (PF) (DILAUDID) injection 0.5 mg (0.5 mg Intravenous $Given 8/14/23 1801)   fosfomycin (MONUROL) Packet 3 g (3 g Oral $Given 8/14/23 2025)       NEW PRESCRIPTIONS STARTED AT TODAY'S ER VISIT  Discharge Medication List as of 8/14/2023  8:36 PM        START taking these medications    Details   erythromycin (ROMYCIN) 5 MG/GM ophthalmic ointment Place 0.5 inches into the right eye 4 times daily for 7 daysDisp-14 g, [id number]      polyvinyl alcohol (LIQUIFILM TEARS) 1.4 % ophthalmic solution Apply 1 drop to eye as needed for dry eyes, Disp-30 mL, R-0, E-Prescribe                =================================================================    HPI    Patient information was obtained from: Patient    Use of : N/A         Mary Ann Olson is a 58 year old female with a pertinent history of asthma, COPD, hyperlipidemia, anemia, type 2 diabetes mellitus, umbilical hernia, constipation, IBS, and Crohn's disease who presents to this ED for evaluation of abdominal pain.    Per chart review: Patient recently had ED visits for abdominal pain on 6/03/2023 and 6/11/2023. She was diagnosed with constipation and observed for less than 24 hours on 6/11 and had a  "bowel movement and was discharged.     Patient reports a sudden onset of sharp constant left lower abdominal pain that does not radiate since this morning. Patient states it worsens with movement.     Patient endorses a history of Crohn's disease and kidney stones, but states this pain feels different. Patient noted she has had 3 normal bowel movement recently. Patient reports 1 episode of dysuria, but believes this is due to dehydration. Patient also reports intermittent chest pain \"for a while\" and states she felt it this morning. No chest pain currently. She is not currently taking any medication for her crohn's. She was seen at urgent care this morning and sent here for imaging.     Patient separately endorses right eye pain, itchiness, blurriness, and redness for the past few days with associated headache. Patient denies eye drainage, but notes she has been tearful in that eye. Patient endorses history of an eye infection, but states this does not feel the same. Patient reports she wears reading glasses. She also reports history of dry eye and stye in the past. She follows with [facility]      Patient notes she was treated with antibiotics for a UTI in July 2023. Patient reports surgical history of tubal ligation. Patient denies history of heart problems. Patient denies sick contact. Patient denies any recent falls or injuries. Patient denies having a menstrual period anymore. Patient denies nausea, vomiting, blood in stool, melena, constipation, hematuria, difficulty urinating, urinary frequency, urinary urgency, cough, shortness of breath, or any other complaints at this time.       REVIEW OF SYSTEMS   Pertinent positives and negatives are documented in the HPI. All other systems reviewed and are negative.      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    Antihistamines overdose, intentional self-harm, initial " encounter (H)     Asthma     Bipolar 1 disorder (H) hx of bipolar listed in h and p    Bipolar 2 disorder (H)     Bipolar I disorder, single manic episode (H)     Created by Conversion  Replacement Utility updated for latest IMO load    Cellulitis 2006 left ankle, 2008 right foot    COPD (chronic obstructive pulmonary disease) (H)     Crohn's disease (H)     arthritis associated with crohn's    Diabetes mellitus (H)     Diabetes mellitus, type 2 (H)     Fibrocystic breast     Heat stroke     when a young child     Hyperlipidemia     Idiopathic acute pancreatitis 07/14/2015    Irritable bowel syndrome     Created by Conversion     Kidney stone     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     Overdose     Pyoderma gangrenosum     2016    Sacroiliitis (H) 2004    Skin lesion of left leg 08/27/2015    Suicidal ideation     Suicide attempt (H)     Traumatic iritis 07/21/2015    Umbilical hernia     Uncomplicated asthma        PAST SURGICAL HISTORY:  Past Surgical History:   Procedure Laterality Date    BIOPSY BREAST Left     BIOPSY OF BREAST, INCISIONAL      Description: Biopsy Breast Open;  Recorded: 10/28/2010;    HC REMOVAL OF TONSILS,<11 Y/O      Description: Tonsillectomy;  Recorded: 07/08/2009;    ZZC LIGATE FALLOPIAN TUBE      Description: Tubal Ligation;  Recorded: 07/08/2009;           CURRENT MEDICATIONS:    erythromycin (ROMYCIN) 5 MG/GM ophthalmic ointment  polyvinyl alcohol (LIQUIFILM TEARS) 1.4 % ophthalmic solution  acetaminophen (TYLENOL) 500 MG tablet  albuterol (PROAIR HFA/PROVENTIL HFA/VENTOLIN HFA) 108 (90 Base) MCG/ACT inhaler  albuterol (PROVENTIL) (2.5 MG/3ML) 0.083% neb solution  atorvastatin (LIPITOR) 40 MG tablet  blood glucose (NO BRAND SPECIFIED) test strip  blood glucose (NO BRAND SPECIFIED) test strip  blood glucose monitoring (NO BRAND SPECIFIED) meter device kit  budesonide-formoterol (SYMBICORT) 160-4.5 MCG/ACT Inhaler  calcium carbonate-vitamin D (OSCAL W/D) 500-200 MG-UNIT  tablet  fluticasone (FLONASE) 50 MCG/ACT nasal spray  hydrOXYzine (ATARAX) 25 MG tablet  insulin aspart (NOVOLOG PEN) 100 UNIT/ML pen  insulin pen needle (31G X 6 MM) 31G X 6 MM miscellaneous  ipratropium - albuterol 0.5 mg/2.5 mg/3 mL (DUONEB) 0.5-2.5 (3) MG/3ML neb solution  loratadine (CLARITIN) 10 MG tablet  magnesium hydroxide (MILK OF MAGNESIA) 400 MG/5ML suspension  metFORMIN (GLUCOPHAGE) 1000 MG tablet  Multiple Vitamins-Minerals (CENTRUM SILVER 50+WOMEN PO)  nitroGLYcerin (RECTIV) 0.4 % OINT rectal ointment  omeprazole (PRILOSEC) 20 MG DR capsule  sennosides (SENOKOT) 8.6 MG tablet  venlafaxine (EFFEXOR XR) 37.5 MG 24 hr capsule        ALLERGIES:  Allergies   Allergen Reactions    Azithromycin Other (See Comments) and Rash     Erythema Nodosum x2    Keflex [Cephalexin] Rash    Aspirin     Cefuroxime Itching    Cheese GI Disturbance    Contrast Dye Hives     IV    Diatrizoate Hives    Gadolinium Derivatives Hives    Hazelnuts [Nuts]     Iodinated Contrast Media Hives    Iodixanol Unknown    Nitrofurantoin Itching    Septra [Sulfamethoxazole-Trimethoprim] Hives    Sulfa Antibiotics Hives    Metronidazole Itching and Rash    Quinolones Rash       FAMILY HISTORY:  Family History   Problem Relation Age of Onset    Coronary Artery Disease Mother     Diabetes Father     Breast Cancer Maternal Grandmother     Asthma Son     Asthma Daughter     Hemophilia Other     Diabetes Type 2  Father     Factor VIII deficiency Other        SOCIAL HISTORY:   Social History     Socioeconomic History    Marital status: Single   Tobacco Use    Smoking status: Every Day     Packs/day: 0.50     Types: Cigarettes    Smokeless tobacco: Never    Tobacco comments:     2-3 cig/day   Vaping Use    Vaping Use: Never used   Substance and Sexual Activity    Alcohol use: No    Drug use: No       VITALS:  /76   Pulse 89   Temp 98.2  F (36.8  C) (Oral)   Resp 18   Wt 77.1 kg (170 lb)   SpO2 91%   BMI 32.12 kg/m      PHYSICAL EXAM     Physical Exam  Constitutional: Well developed, Well nourished, NAD, GCS 15  HENT: Normocephalic, Atraumatic, Bilateral external ears normal, Oropharynx normal, mucous membranes moist, Nose normal. Neck- Normal range of motion, No tenderness, Supple, No stridor.   Eyes: PERRL, EOMI, No discharge. Right upper eyelid very slightly swollen, no erythema or periorbital erythema or ecchymosis, full EOM without pain, slight right conjunctival injection, no photophobia on exam, vision grossly normal on my exam, visual fields intact, no fluorescein uptake or foreign bodies noted, negative marysol sign, IOP 12 bilaterally  Respiratory: Normal breath sounds, No respiratory distress, No wheezing or crackles, Speaks in full sentences easily.   Cardiovascular: Normal heart rate, Regular rhythm, No murmurs, No rubs, No gallops. 2+ radial pulses bilaterally  GI: Bowel sounds normal, Soft, LLQ tenderness, No masses, No rebound or guarding. No CVA tenderness bilaterally   Musculoskeletal: 2+ DP pulses. No notable lower extremity edema. No cyanosis, No clubbing. Good range of motion in all major joints. No tenderness to palpation or major deformities noted. No tenderness of the CTLS spine.    Integument: Warm, Dry, No erythema, No rash. No petechiae.    Neurologic: Alert & oriented x 3, 5/5 strength in all 4 extremities bilaterally. Sensation intact to light touch in all 4 extremities and the face bilaterally. No focal deficits noted. Normal gait.     Psychiatric: Cooperative.      LAB:  All pertinent labs reviewed and interpreted.  Results for orders placed or performed during the hospital encounter of 08/14/23   CT Abdomen Pelvis w/o Contrast    Impression    IMPRESSION:   1.  No acute findings. No inflammatory process, bowel obstruction or hydronephrosis. No evidence of diverticulitis.  2.  Stable chronic findings as described above.     Comprehensive metabolic panel   Result Value Ref Range    Sodium 139 136 - 145 mmol/L     Potassium 4.2 3.4 - 5.3 mmol/L    Chloride 101 98 - 107 mmol/L    Carbon Dioxide (CO2) 25 22 - 29 mmol/L    Anion Gap 13 7 - 15 mmol/L    Urea Nitrogen 9.1 8.0 - 23.0 mg/dL    Creatinine 0.50 (L) 0.51 - 0.95 mg/dL    Calcium 10.5 (H) 8.6 - 10.0 mg/dL    Glucose 144 (H) 70 - 99 mg/dL    Alkaline Phosphatase 125 (H) 35 - 104 U/L    AST 19 0 - 45 U/L    ALT 13 0 - 50 U/L    Protein Total 7.6 6.4 - 8.3 g/dL    Albumin 4.1 3.5 - 5.2 g/dL    Bilirubin Total 0.2 <=1.2 mg/dL    GFR Estimate >90 >60 mL/min/1.73m2   CBC with platelets and differential   Result Value Ref Range    WBC Count 10.5 4.0 - 11.0 10e3/uL    RBC Count 4.74 3.80 - 5.20 10e6/uL    Hemoglobin 12.2 11.7 - 15.7 g/dL    Hematocrit 38.6 35.0 - 47.0 %    MCV 81 78 - 100 fL    MCH 25.7 (L) 26.5 - 33.0 pg    MCHC 31.6 31.5 - 36.5 g/dL    RDW 14.9 10.0 - 15.0 %    Platelet Count 455 (H) 150 - 450 10e3/uL    % Neutrophils 75 %    % Lymphocytes 17 %    % Monocytes 4 %    % Eosinophils 3 %    % Basophils 1 %    % Immature Granulocytes 0 %    NRBCs per 100 WBC 0 <1 /100    Absolute Neutrophils 7.8 1.6 - 8.3 10e3/uL    Absolute Lymphocytes 1.8 0.8 - 5.3 10e3/uL    Absolute Monocytes 0.4 0.0 - 1.3 10e3/uL    Absolute Eosinophils 0.4 0.0 - 0.7 10e3/uL    Absolute Basophils 0.1 0.0 - 0.2 10e3/uL    Absolute Immature Granulocytes 0.0 <=0.4 10e3/uL    Absolute NRBCs 0.0 10e3/uL   Result Value Ref Range    Lipase 21 13 - 60 U/L   UA with Microscopic reflex to Culture    Specimen: Urine, Midstream   Result Value Ref Range    Color Urine Light Yellow Colorless, Straw, Light Yellow, Yellow    Appearance Urine Clear Clear    Glucose Urine Negative Negative mg/dL    Bilirubin Urine Negative Negative    Ketones Urine Negative Negative mg/dL    Specific Gravity Urine 1.013 1.001 - 1.030    Blood Urine Negative Negative    pH Urine 5.0 5.0 - 7.0    Protein Albumin Urine Negative Negative mg/dL    Urobilinogen Urine <2.0 <2.0 mg/dL    Nitrite Urine Negative Negative    Leukocyte  Esterase Urine 500 Veena/uL (A) Negative    Bacteria Urine Few (A) None Seen /HPF    Mucus Urine Present (A) None Seen /LPF    RBC Urine 2 <=2 /HPF    WBC Urine 72 (H) <=5 /HPF    Squamous Epithelials Urine <1 <=1 /HPF    Transitional Epithelials Urine <1 <=1 /HPF   Urine Culture    Specimen: Urine, Midstream   Result Value Ref Range    Culture <10,000 CFU/mL Mixture of urogenital garfield        RADIOLOGY:  Reviewed all pertinent imaging. Please see official radiology report.  CT Abdomen Pelvis w/o Contrast   Final Result   IMPRESSION:    1.  No acute findings. No inflammatory process, bowel obstruction or hydronephrosis. No evidence of diverticulitis.   2.  Stable chronic findings as described above.           PROCEDURES:   None       I, Nell Washington, am serving as a scribe to document services personally performed by Lazara Birmingham MD based on my observation and the provider's statements to me. I, Lazara Birmingham MD, attest that Nell Washington is acting in a scribe capacity, has observed my performance of the services and has documented them in accordance with my direction.    Lazara Birmingham MD  [facility] EMERGENCY DEPARTMENT  [facility] [facility] [place] [id number] [phone]       Lazara Birmingham MD  08/18/23 0274

## 2023-08-14 NOTE — ED TRIAGE NOTES
Pt has hx of crohns/colitis, went to [facility] clinic after experiencing abdominal pain this morning and had urine test done, was told she needed a scan and the clinic scanner was busy today. NP pt saw at the clinic sent pt here for possible diverticulitis and need for labs and scan.      Triage Assessment       Row Name 08/14/23 1257       Triage Assessment (Adult)    Airway WDL WDL       Respiratory WDL    Respiratory WDL WDL       Skin Circulation/Temperature WDL    Skin Circulation/Temperature WDL WDL       Cardiac WDL    Cardiac WDL WDL       Peripheral/Neurovascular WDL    Peripheral Neurovascular WDL WDL       Cognitive/Neuro/Behavioral WDL    Cognitive/Neuro/Behavioral WDL WDL

## 2023-08-15 ENCOUNTER — OFFICE VISIT (OUTPATIENT)
Dept: FAMILY MEDICINE | Facility: CLINIC | Age: 58
End: 2023-08-15
Payer: MEDICARE

## 2023-08-15 ENCOUNTER — PATIENT OUTREACH (OUTPATIENT)
Dept: CARE COORDINATION | Facility: CLINIC | Age: 58
End: 2023-08-15

## 2023-08-15 ENCOUNTER — ALLIED HEALTH/NURSE VISIT (OUTPATIENT)
Dept: FAMILY MEDICINE | Facility: CLINIC | Age: 58
End: 2023-08-15
Payer: MEDICARE

## 2023-08-15 VITALS
BODY MASS INDEX: 31.72 KG/M2 | WEIGHT: 168 LBS | TEMPERATURE: 97.9 F | DIASTOLIC BLOOD PRESSURE: 80 MMHG | OXYGEN SATURATION: 95 % | HEIGHT: 61 IN | HEART RATE: 91 BPM | SYSTOLIC BLOOD PRESSURE: 123 MMHG

## 2023-08-15 DIAGNOSIS — N95.1 VASOMOTOR SYMPTOMS DUE TO MENOPAUSE: ICD-10-CM

## 2023-08-15 DIAGNOSIS — K60.2 ANAL FISSURE: ICD-10-CM

## 2023-08-15 DIAGNOSIS — R20.8 SENSATION OF FOREIGN BODY IN FOOT: Primary | ICD-10-CM

## 2023-08-15 DIAGNOSIS — Z65.9 OTHER SOCIAL STRESSOR: Primary | ICD-10-CM

## 2023-08-15 DIAGNOSIS — E11.65 TYPE 2 DIABETES MELLITUS WITH HYPERGLYCEMIA, WITHOUT LONG-TERM CURRENT USE OF INSULIN (H): ICD-10-CM

## 2023-08-15 DIAGNOSIS — Z12.31 VISIT FOR SCREENING MAMMOGRAM: ICD-10-CM

## 2023-08-15 DIAGNOSIS — N89.8 VAGINAL ITCHING: ICD-10-CM

## 2023-08-15 LAB
CHOLEST SERPL-MCNC: 101 MG/DL
CLUE CELLS: ABNORMAL
HBA1C MFR BLD: 7.7 % (ref 0–5.6)
HDLC SERPL-MCNC: 36 MG/DL
LDLC SERPL CALC-MCNC: 45 MG/DL
NONHDLC SERPL-MCNC: 65 MG/DL
TRICHOMONAS, WET PREP: ABNORMAL
TRIGL SERPL-MCNC: 101 MG/DL
WBC'S/HIGH POWER FIELD, WET PREP: ABNORMAL
YEAST, WET PREP: ABNORMAL

## 2023-08-15 PROCEDURE — 99214 OFFICE O/P EST MOD 30 MIN: CPT | Mod: GC | Performed by: STUDENT IN AN ORGANIZED HEALTH CARE EDUCATION/TRAINING PROGRAM

## 2023-08-15 PROCEDURE — 80061 LIPID PANEL: CPT | Performed by: STUDENT IN AN ORGANIZED HEALTH CARE EDUCATION/TRAINING PROGRAM

## 2023-08-15 PROCEDURE — 99207 PR NO CHARGE NURSE ONLY: CPT

## 2023-08-15 PROCEDURE — 83036 HEMOGLOBIN GLYCOSYLATED A1C: CPT | Performed by: STUDENT IN AN ORGANIZED HEALTH CARE EDUCATION/TRAINING PROGRAM

## 2023-08-15 PROCEDURE — 87210 SMEAR WET MOUNT SALINE/INK: CPT | Performed by: STUDENT IN AN ORGANIZED HEALTH CARE EDUCATION/TRAINING PROGRAM

## 2023-08-15 PROCEDURE — 36415 COLL VENOUS BLD VENIPUNCTURE: CPT | Performed by: STUDENT IN AN ORGANIZED HEALTH CARE EDUCATION/TRAINING PROGRAM

## 2023-08-15 RX ORDER — VENLAFAXINE HYDROCHLORIDE 37.5 MG/1
37.5 CAPSULE, EXTENDED RELEASE ORAL DAILY
Qty: 30 CAPSULE | Refills: 0 | Status: SHIPPED | OUTPATIENT
Start: 2023-08-15 | End: 2024-01-29

## 2023-08-15 RX ORDER — NITROGLYCERIN 4 MG/G
1 OINTMENT RECTAL EVERY 12 HOURS
Qty: 30 G | Refills: 0 | Status: SHIPPED | OUTPATIENT
Start: 2023-08-15 | End: 2023-08-15

## 2023-08-15 RX ORDER — NITROGLYCERIN 4 MG/G
1 OINTMENT RECTAL EVERY 12 HOURS
Qty: 30 G | Refills: 0 | Status: SHIPPED | OUTPATIENT
Start: 2023-08-15 | End: 2024-03-22

## 2023-08-15 ASSESSMENT — ACTIVITIES OF DAILY LIVING (ADL): DEPENDENT_IADLS:: TRANSPORTATION

## 2023-08-15 NOTE — PROGRESS NOTES
Clinic Care Coordination Contact  Clinic Care Coordination Contact  OUTREACH    Referral Information:  Referral Source: Care Team    Primary Diagnosis:  (COPD exacerbation (HC) (Primary Dx); Type 2 diabetes mellitus with hyperosmolarity without coma, without long-term current use of insulin (HC); Skin rash Discharge Disposition: Home Self Care)    No chief complaint on file.       [place] Utilization:   Clinic Utilization  Difficulty keeping appointments:: No  Compliance Concerns: No  No-Show Concerns: No  No PCP office visit in Past Year: No  Utilization      Hospital Admissions  0             ED Visits  4             No Show Count (past year)  2                    Current as of: 8/15/2023  6:45 AM                Clinical Concerns:  Current Medical Concerns:  COPD; Type 2 diabetes; Crohn's disease; osteoarthritis   Current Behavioral Concerns: history of IBIS; Bipolar 2; Anxiety  Education Provided to patient: Care Coordination Services     Health Maintenance Reviewed: Due/Overdue   Clinical Pathway: None    Medication Management:  Medication review status: Medications reviewed and no changes reported per patient.           Functional Status:  Dependent ADLs:: Independent  Dependent IADLs:: Transportation  Bed or wheelchair confined:: No  Mobility Status: Independent    Living Situation:  Current living arrangement:: I live alone  Type of residence:: Apartment    Lifestyle & Psychosocial Needs:    Social Determinants of Health     Tobacco Use: High Risk (8/14/2023)    Patient History     Smoking Tobacco Use: Every Day     Smokeless Tobacco Use: Never     Passive Exposure: Not on file   Alcohol Use: Not on file   Financial Resource Strain: Not on file   Food Insecurity: Not on file   Transportation Needs: Not on file   Physical Activity: Not on file   Stress: Not on file   Social Connections: Not on file   Intimate Partner Violence: Not on file   Depression: At risk (6/13/2023)    PHQ-2     PHQ-2 Score: 6   Housing  Stability: Not on file     Diet:: Diabetic diet  Inadequate nutrition (GOAL):: No  Tube Feeding: No  Inadequate activity/exercise (GOAL):: No  Significant changes in sleep pattern (GOAL): No  Transportation means:: Medical transport     Sikh or spiritual beliefs that impact treatment:: No  Mental health DX:: Yes  Mental health DX how managed:: Medication  Mental health management concern (GOAL):: No  Chemical Dependency Status: Past Concern  Informal Support system:: Family, Friends      Resources and Interventions:  Current Resources:      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)  Supplies Currently Used at Home: Diabetic Supplies  Equipment Currently Used at Home: glucometer  Employment Status: unemployed         Advance Care Plan/Directive  Advanced Care Plans/Directives on file:: No    Referrals Placed: Behavioral Health Providers     Care Plan:  Care Plan: Immigration/Social Security       Problem: Seeking Citizenship/Green Card/Social Security       Goal: Have appropriate information and resources to obtain Citizenship/Green Card/Social Security       Start Date: 8/15/2023    This Visit's Progress: 100%    Priority: High    Note:     I will work with [facility] to complete the immigration process.                              Patient/Caregiver understanding: Yes.       Future Appointments                In 3 weeks [facility]  2 [facility] [place] [facility]    In 3 weeks [facility] 2 [facility] [place] [facility]            Plan:   Patient will follow-up with clinic  regarding immigration process,    TERRY STEWART LGSW, SAURABH

## 2023-08-15 NOTE — PROGRESS NOTES
Clinic Care Coordination Contact  Follow Up Progress Note      Assessment: The pt was recently in the ED, I called to check up on the pt and help the pt setup a ED follow up.The pt was at [facility] for abdominal pain. I called and talked to the pt, pt stated that she is doing fine. Pt stated that she is on her way to the clinic now, her appointment is at 10:40am with .     Care Gaps:    Health Maintenance Due   Topic Date Due    COPD ACTION PLAN  Never done    ADVANCE CARE PLANNING  11/15/2017    LUNG CANCER SCREENING  10/02/2018    ZOSTER IMMUNIZATION (3 of 3) 11/30/2018    DIABETIC FOOT EXAM  09/25/2019    MAMMO SCREENING  09/10/2021    COVID-19 Vaccine (3 - Pfizer series) 02/08/2022    MICROALBUMIN  09/17/2022    MEDICARE ANNUAL WELLNESS VISIT  11/02/2022    LIPID  11/02/2022    COLORECTAL CANCER SCREENING  11/22/2022    A1C  02/01/2023    EYE EXAM  07/13/2023    NICOTINE/TOBACCO CESSATION COUNSELING Q 1 YR  08/01/2023           Care Plans      Intervention/Education provided during outreach:               Plan:     Care Coordinator will follow up in

## 2023-08-15 NOTE — PROGRESS NOTES
Assessment & Plan     Sensation of foreign body in foot  Unable to visualize any foreign body or palpate any abnormality on exam today. No indication for XR given no entry point or history of trauma. Discussed pursuing orthotics/diabetic shoes for protection as she is worried about getting neuropathy 2/2 DM and having sensory deficits although sensation was intact on today's exam. See below for DME order.    Vaginal itching  Concern for vaginal itching and discharge in setting of recent abx (for UTI) and DM. Wet prep negative. Patient also endorsed poor hygiene recently due to depressive symptoms. This is likely the cause of her symptoms.  - Wet prep    Anal fissure  Patient reported pain with bowel movements and constipation recently. Rectal exam performed with obvious anal fissure at 7:00. Discussed etiology of fissure and treatment. Trial Rectiv. Discussed toilet hygiene and stool softeners, conservative management.  - nitroGLYcerin (RECTIV) 0.4 % OINT rectal ointment; Place 1 inch (1.5 mg) rectally every 12 hours    Type 2 diabetes mellitus with hyperglycemia, without long-term current use of insulin (H)  Patient with T2DM, on insulin and metformin 1g BID. Due for A1C recheck. Discussed need for eye exam as well.  - Albumin Random Urine Quantitative with Creat Ratio; Future  - Lipid panel reflex to direct LDL Non-fasting; Future  - HEMOGLOBIN A1C; Future  - Adult Eye  Referral; Future  - [facility] FOOT EXAM  NO CHARGE  - Orthotics and Prosthetics DME Orthotic; Diabetic Shoe(s)/Insert(s); 3 pair; Progress note must support the need for shoes/orthotics. Use .diabeticfootexam or diabetic foot exam picklist in [employer].  - Lipid panel reflex to direct LDL Non-fasting  - HEMOGLOBIN A1C    Visit for screening mammogram  - MA SCREENING DIGITAL BILAT - Future  (s+30); Future    Vasomotor symptoms due to menopause  Patient briefly mentioned this at the end of her visit. Did not have time to fully discuss. But went  "through menopause about 10 years ago but would be a poor candidate for estrogen therapy. Given increased depression, discussed Effexor to help with mood as well as vasomotor symptoms. Patient agreeable.  - venlafaxine (EFFEXOR XR) 37.5 MG 24 hr capsule; Take 1 capsule (37.5 mg) by mouth daily for 30 days       Nicotine/Tobacco Cessation:  She reports that she has been smoking cigarettes. She has been smoking an average of .5 packs per day. She has never used smokeless tobacco.  Nicotine/Tobacco Cessation Plan:   Information offered: Patient not interested at this time      Maricel Robert MD  [facility]    Rasheeda Reese is a 58 year old, presenting for the following health issues:  Musculoskeletal Problem (Possible object states she is diabetic x1 wk or so) and Vaginal Problem (Discharge x1wk no meds)      HPI     Foreign Object L foot  - Feels like something's been in there for a while, maybe 1 week.  - Painful  - No redness.   - No issues with numbness/tingling in bottom of her feet.    Vaginal Symptoms  Onset/Duration: weeks  Description:  Vaginal Discharge: yellow/brown discharge   Itching (Pruritis): YES  Burning sensation:  No  Odor: YES  Accompanying Signs & Symptoms:  Urinary symptoms: No  Abdominal pain: Yes - LLQ pain (seen in ED yesterday, negative CT)  Fever: No  History:   Sexually active: No  New Partner: No  Possibility of Pregnancy:  No  Recent antibiotic use: YES  Previous vaginitis issues: YES - previously was a bacterial infection  Precipitating or alleviating factors: None  Therapies tried and outcome: none    Pain in Bottom  - notices pain in her bottom pretty bad with Bms  - no bleeding but very tender  - not constipated right now    Review of Systems   Constitutional, HEENT, cardiovascular, pulmonary, gi and gu systems are negative, except as otherwise noted.      Objective    /80   Pulse 91   Temp 97.9  F (36.6  C) (Oral)   Ht 1.56 m (5' 1.42\")   " Wt 76.2 kg (168 lb)   SpO2 95%   BMI 31.31 kg/m    Body mass index is 31.31 kg/m .  Physical Exam   GENERAL: healthy, alert and no distress  RESP: lungs clear to auscultation - no rales, rhonchi or wheezes  CV: regular rate and rhythm, normal S1 S2, no S3 or S4, no murmur, click or rub, no peripheral edema and peripheral pulses strong   (female): normal female external genitalia, normal urethral meatus , vaginal mucosa pink, moist, well rugated, and vaginal discharge - scant and white  RECTAL (female): normal sphincter tone, no rectal masses and anal fissure at 7:00, tender to palpation  MS: no gross musculoskeletal defects noted, no edema  Diabetic foot exam: normal DP and PT pulses, no trophic changes or ulcerative lesions, and normal sensory exam

## 2023-08-15 NOTE — DISCHARGE INSTRUCTIONS
Call your [facility] tomorrow for follow-up appointment for your eye symptoms.  Use the erythromycin ointment as prescribed.  Use the dry eye Liquifilm Tears in between when you use the erythromycin ointment to help with eye dryness.  Can also use warm compresses on the eye for at least 10 minutes 4 times a day.     Gave you a dose of antibiotics called fosfomycin here for your possible UTI.  If the urine culture grows bacteria that is resistant to this we will contact you.  Follow-up with your primary care doctor if you continue to have left lower quadrant pain.    Return to the ER for any new or worsening concerns.

## 2023-08-16 LAB — BACTERIA UR CULT: NORMAL

## 2023-08-18 ENCOUNTER — NURSE TRIAGE (OUTPATIENT)
Dept: FAMILY MEDICINE | Facility: CLINIC | Age: 58
End: 2023-08-18
Payer: MEDICARE

## 2023-08-18 NOTE — TELEPHONE ENCOUNTER
Agree with RN assessment for same-day appointment or ER evaluation of the patient's symptoms.     Monique Mcghee MD

## 2023-08-18 NOTE — TELEPHONE ENCOUNTER
Nurse Triage SBAR    Is this a 2nd Level Triage? YES, LICENSED PRACTITIONER REVIEW IS REQUIRED    Situation: per protocol, patient was advised to be seen due to hx prolong Qtc and family hx- mother had MI at age 52- .    Background: patient called to report onset of intermittent, sharp discomfort in chest this morning and again another episode a few minutes before calling clinic. Duration of discomfort x 2 seconds. Advised need to be seen, [place] declined due to no transportation, also declines going into ED- too long of a wait.  Red flag symptoms reviewed with patient, if should experience call paramedics.    Assessment: rule out anxiety versus cardiac, hx COPD     Protocol Recommended Disposition:   See in Office Today    Recommendation: please review documentation, patient declined recommendation per protocol.    Routing encounter to precepting to review and further advise.    Does the patient meet one of the following criteria for ADS visit consideration? No

## 2023-08-18 NOTE — TELEPHONE ENCOUNTER
Reason for Disposition   All other patients with chest pain (Exception: fleeting chest pain lasting a few seconds)    Additional Information   Negative: SEVERE difficulty breathing (e.g., struggling for each breath, speaks in single words)   Negative: Passed out (i.e., fainted, collapsed and was not responding)   Negative: Difficult to awaken or acting confused (e.g., disoriented, slurred speech)   Negative: Shock suspected (e.g., cold/pale/clammy skin, too weak to stand, low BP, rapid pulse)   Negative: Chest pain lasting longer than 5 minutes and ANY of the following:* Over 44 years old* Over 30 years old and at least one cardiac risk factor (e.g., diabetes mellitus, high blood pressure, high cholesterol, smoker, or strong family history of heart disease)* History of heart disease (i.e., angina, heart attack, heart failure, bypass surgery, takes nitroglycerin)* Pain is crushing, pressure-like, or heavy   Negative: Heart beating < 50 beats per minute OR > 140 beats per minute   Negative: Visible sweat on face or sweat dripping down face   Negative: Sounds like a life-threatening emergency to the triager   Negative: Followed an injury to chest   Negative: SEVERE chest pain   Negative: Pain also in shoulder(s) or arm(s) or jaw   Negative: Difficulty breathing   Negative: Cocaine use within last 3 days   Negative: Major surgery in the past month   Negative: Hip or leg fracture (broken bone) in past month (or had cast on leg or ankle in past month)   Negative: Illness requiring prolonged bedrest in past month (e.g., immobilization, long hospital stay)   Negative: Long-distance travel in past month (e.g., car, bus, train, plane; with trip lasting 6 or more hours)   Negative: History of prior 'blood clot' in leg or lungs (i.e., deep vein thrombosis, pulmonary embolism)   Negative: History of inherited increased risk of blood clots (e.g., Factor 5 Leiden, Anti-thrombin 3, Protein C or Protein S deficiency, Prothrombin  mutation)   Negative: Cancer treatment in the past two months (or has cancer now)   Negative: Heart beating irregularly or very rapidly   Negative: Chest pain lasting longer than 5 minutes and occurred in last 3 days (72 hours) (Exception: feels exactly the same as previously diagnosed heartburn and has accompanying sour taste in mouth)   Negative: Chest pain or 'angina' comes and goes and is happening more often (increasing in frequency) or getting worse (increasing in severity) (Exception: chest pains that last only a few seconds)   Negative: Dizziness or lightheadedness   Negative: Coughing up blood   Negative: Patient sounds very sick or weak to the triager   Negative: Patient says chest pain feels exactly the same as previously diagnosed 'heartburn' and describes burning in chest and accompanying sour taste in mouth   Negative: Fever > 100.4 F (38.0 C)   Negative: Chest pain(s) lasting a few seconds persists > 3 days   Negative: Rash in same area as pain (may be described as 'small blisters')   Negative: Patient wants to be seen    Answer Assessment - Initial Assessment Questions  1. LOCATION: patient unable to provide exact location of intermittent, sharp pain in chest.    2. RADIATION: denies radiation, local discomfort    3. ONSET: this morning, then again experienced few mins prior to calling clinic.  4. PATTERN: intermittent, unable to identify if discomfort is triggered by position or activity.    5. DURATION: 2 seconds    6. SEVERITY: unable to provide    7. CARDIAC RISK FACTORS: hx- prolong Qtc, most recent EKG done in ED 7/31/2023 Qtc normal.  Family hx- mother had MI.    8. PULMONARY RISK FACTORS: hx COPD    9. CAUSE: per patient did not get good sleep last night, Hx COPD, hx anxiety, rule out cardiac per personal history and family medical history.    10. OTHER SYMPTOMS: denies lightheadedness, denies sweating, no fever, no cough, no palpitations    11. PREGNANCY: n/a    Protocols used: Chest  Pain-A-[place]

## 2023-08-19 NOTE — PROGRESS NOTES
Faculty Supervision of Residents   I have examined this patient and the medical care has been evaluated and discussed with the resident. See resident note outlining our discussion.      Joanna Farah MD

## 2023-08-24 ENCOUNTER — HOSPITAL ENCOUNTER (EMERGENCY)
Facility: HOSPITAL | Age: 58
Discharge: HOME | End: 2023-08-24
Attending: EMERGENCY MEDICINE | Admitting: EMERGENCY MEDICINE
Payer: MEDICARE

## 2023-08-24 ENCOUNTER — APPOINTMENT (OUTPATIENT)
Dept: CT IMAGING | Facility: HOSPITAL | Age: 58
End: 2023-08-24
Attending: EMERGENCY MEDICINE
Payer: MEDICARE

## 2023-08-24 VITALS
HEART RATE: 93 BPM | OXYGEN SATURATION: 94 % | BODY MASS INDEX: 31.28 KG/M2 | RESPIRATION RATE: 18 BRPM | HEIGHT: 62 IN | TEMPERATURE: 97.1 F | SYSTOLIC BLOOD PRESSURE: 119 MMHG | DIASTOLIC BLOOD PRESSURE: 72 MMHG | WEIGHT: 170 LBS

## 2023-08-24 DIAGNOSIS — H20.9 UVEITIS OF RIGHT EYE: ICD-10-CM

## 2023-08-24 LAB
ALBUMIN SERPL BCG-MCNC: 4.5 G/DL (ref 3.5–5.2)
ALP SERPL-CCNC: 108 U/L (ref 35–104)
ALT SERPL W P-5'-P-CCNC: 15 U/L (ref 0–50)
ANION GAP SERPL CALCULATED.3IONS-SCNC: 16 MMOL/L (ref 7–15)
AST SERPL W P-5'-P-CCNC: 19 U/L (ref 0–45)
BASOPHILS # BLD AUTO: 0.1 10E3/UL (ref 0–0.2)
BASOPHILS NFR BLD AUTO: 1 %
BILIRUB SERPL-MCNC: 0.3 MG/DL
BUN SERPL-MCNC: 11.8 MG/DL (ref 8–23)
CALCIUM SERPL-MCNC: 10.2 MG/DL (ref 8.6–10)
CHLORIDE SERPL-SCNC: 97 MMOL/L (ref 98–107)
CREAT SERPL-MCNC: 0.56 MG/DL (ref 0.51–0.95)
CRP SERPL-MCNC: 17.6 MG/L
DEPRECATED HCO3 PLAS-SCNC: 22 MMOL/L (ref 22–29)
EOSINOPHIL # BLD AUTO: 0.3 10E3/UL (ref 0–0.7)
EOSINOPHIL NFR BLD AUTO: 2 %
ERYTHROCYTE [DISTWIDTH] IN BLOOD BY AUTOMATED COUNT: 15.3 % (ref 10–15)
GFR SERPL CREATININE-BSD FRML MDRD: >90 ML/MIN/1.73M2
GLUCOSE SERPL-MCNC: 159 MG/DL (ref 70–99)
HCT VFR BLD AUTO: 38.6 % (ref 35–47)
HGB BLD-MCNC: 12.5 G/DL (ref 11.7–15.7)
IMM GRANULOCYTES # BLD: 0 10E3/UL
IMM GRANULOCYTES NFR BLD: 0 %
LYMPHOCYTES # BLD AUTO: 2.2 10E3/UL (ref 0.8–5.3)
LYMPHOCYTES NFR BLD AUTO: 19 %
MCH RBC QN AUTO: 26.4 PG (ref 26.5–33)
MCHC RBC AUTO-ENTMCNC: 32.4 G/DL (ref 31.5–36.5)
MCV RBC AUTO: 82 FL (ref 78–100)
MONOCYTES # BLD AUTO: 0.7 10E3/UL (ref 0–1.3)
MONOCYTES NFR BLD AUTO: 6 %
NEUTROPHILS # BLD AUTO: 8 10E3/UL (ref 1.6–8.3)
NEUTROPHILS NFR BLD AUTO: 72 %
NRBC # BLD AUTO: 0 10E3/UL
NRBC BLD AUTO-RTO: 0 /100
PLATELET # BLD AUTO: 423 10E3/UL (ref 150–450)
POTASSIUM SERPL-SCNC: 4 MMOL/L (ref 3.4–5.3)
PROT SERPL-MCNC: 7.5 G/DL (ref 6.4–8.3)
RBC # BLD AUTO: 4.73 10E6/UL (ref 3.8–5.2)
SODIUM SERPL-SCNC: 135 MMOL/L (ref 136–145)
WBC # BLD AUTO: 11.2 10E3/UL (ref 4–11)

## 2023-08-24 PROCEDURE — 80053 COMPREHEN METABOLIC PANEL: CPT | Performed by: EMERGENCY MEDICINE

## 2023-08-24 PROCEDURE — 250N000013 HC RX MED GY IP 250 OP 250 PS 637: Performed by: EMERGENCY MEDICINE

## 2023-08-24 PROCEDURE — G1010 CDSM STANSON: HCPCS

## 2023-08-24 PROCEDURE — 86140 C-REACTIVE PROTEIN: CPT | Performed by: EMERGENCY MEDICINE

## 2023-08-24 PROCEDURE — 85652 RBC SED RATE AUTOMATED: CPT | Performed by: EMERGENCY MEDICINE

## 2023-08-24 PROCEDURE — 96375 TX/PRO/DX INJ NEW DRUG ADDON: CPT

## 2023-08-24 PROCEDURE — 250N000011 HC RX IP 250 OP 636: Mod: JZ | Performed by: EMERGENCY MEDICINE

## 2023-08-24 PROCEDURE — 36415 COLL VENOUS BLD VENIPUNCTURE: CPT | Performed by: EMERGENCY MEDICINE

## 2023-08-24 PROCEDURE — 85025 COMPLETE CBC W/AUTO DIFF WBC: CPT | Performed by: EMERGENCY MEDICINE

## 2023-08-24 PROCEDURE — 99285 EMERGENCY DEPT VISIT HI MDM: CPT | Mod: 25

## 2023-08-24 PROCEDURE — 250N000009 HC RX 250: Performed by: EMERGENCY MEDICINE

## 2023-08-24 PROCEDURE — 96374 THER/PROPH/DIAG INJ IV PUSH: CPT

## 2023-08-24 RX ORDER — METHYLPREDNISOLONE SODIUM SUCCINATE 125 MG/2ML
125 INJECTION, POWDER, LYOPHILIZED, FOR SOLUTION INTRAMUSCULAR; INTRAVENOUS ONCE
Status: COMPLETED | OUTPATIENT
Start: 2023-08-24 | End: 2023-08-24

## 2023-08-24 RX ORDER — TETRACAINE HYDROCHLORIDE 5 MG/ML
1-2 SOLUTION OPHTHALMIC ONCE
Status: COMPLETED | OUTPATIENT
Start: 2023-08-24 | End: 2023-08-24

## 2023-08-24 RX ORDER — HYDROCODONE BITARTRATE AND ACETAMINOPHEN 5; 325 MG/1; MG/1
1 TABLET ORAL ONCE
Status: COMPLETED | OUTPATIENT
Start: 2023-08-24 | End: 2023-08-24

## 2023-08-24 RX ORDER — HYDROCODONE BITARTRATE AND ACETAMINOPHEN 5; 325 MG/1; MG/1
1 TABLET ORAL EVERY 6 HOURS PRN
Qty: 3 TABLET | Refills: 0 | Status: SHIPPED | OUTPATIENT
Start: 2023-08-24 | End: 2024-01-29

## 2023-08-24 RX ORDER — ATROPINE SULFATE 10 MG/ML
1 SOLUTION/ DROPS OPHTHALMIC ONCE
Status: COMPLETED | OUTPATIENT
Start: 2023-08-24 | End: 2023-08-24

## 2023-08-24 RX ORDER — IOPAMIDOL 755 MG/ML
90 INJECTION, SOLUTION INTRAVASCULAR ONCE
Status: COMPLETED | OUTPATIENT
Start: 2023-08-24 | End: 2023-08-24

## 2023-08-24 RX ORDER — ATROPINE SULFATE 10 MG/ML
1 SOLUTION/ DROPS OPHTHALMIC DAILY
Qty: 2 ML | Refills: 0 | Status: SHIPPED | OUTPATIENT
Start: 2023-08-24 | End: 2023-08-27

## 2023-08-24 RX ORDER — DIPHENHYDRAMINE HYDROCHLORIDE 50 MG/ML
25 INJECTION INTRAMUSCULAR; INTRAVENOUS ONCE
Status: COMPLETED | OUTPATIENT
Start: 2023-08-24 | End: 2023-08-24

## 2023-08-24 RX ADMIN — METHYLPREDNISOLONE SODIUM SUCCINATE 125 MG: 125 INJECTION, POWDER, LYOPHILIZED, FOR SOLUTION INTRAMUSCULAR; INTRAVENOUS at 19:25

## 2023-08-24 RX ADMIN — HYDROCODONE BITARTRATE AND ACETAMINOPHEN 1 TABLET: 5; 325 TABLET ORAL at 19:24

## 2023-08-24 RX ADMIN — IOPAMIDOL 90 ML: 755 INJECTION, SOLUTION INTRAVENOUS at 20:37

## 2023-08-24 RX ADMIN — DIPHENHYDRAMINE HYDROCHLORIDE 25 MG: 50 INJECTION INTRAMUSCULAR; INTRAVENOUS at 19:25

## 2023-08-24 RX ADMIN — ATROPINE SULFATE 1 DROP: 10 SOLUTION/ DROPS OPHTHALMIC at 19:51

## 2023-08-24 RX ADMIN — FLUORESCEIN SODIUM 1 STRIP: 1 STRIP OPHTHALMIC at 18:53

## 2023-08-24 RX ADMIN — TETRACAINE HYDROCHLORIDE 2 DROP: 5 SOLUTION OPHTHALMIC at 18:53

## 2023-08-24 ASSESSMENT — VISUAL ACUITY
OU: 20/70
OD: 20/100;WITHOUT CORRECTIVE LENSES
OS: 20/70;WITHOUT CORRECTIVE LENSES

## 2023-08-24 ASSESSMENT — ACTIVITIES OF DAILY LIVING (ADL)
ADLS_ACUITY_SCORE: 35
ADLS_ACUITY_SCORE: 35

## 2023-08-24 NOTE — ED TRIAGE NOTES
Eye pain in R eye. Seen  on 8/17 by eye doctor, given an ointment. Not helping.Pt now sensitive to lights, shooting pains, headache.

## 2023-08-24 NOTE — ED PROVIDER NOTES
EMERGENCY DEPARTMENT ENCOUNTER      NAME: Mary Ann Olson  AGE: 58 year old female  YOB: 1965  MRN: [id number]  EVALUATION DATE & TIME: 2023  6:37 PM    PCP: Joanna Farah    ED PROVIDER: Giovanna Hayes M.D.      Chief Complaint   Patient presents with    Headache    Eye Pain       FINAL IMPRESSION:  1. Uveitis of right eye        ED COURSE & MEDICAL DECISION MAKIN:50 PM I met with the patient to gather history and to perform my initial exam. I discussed the plan for care while in the Emergency Department.         Pertinent Labs & Imaging studies reviewed. (See chart for details).    Medical Decision Making  R eye pain.  Suspect uveitis vs scleritis, normal tonopen measurement so unlikely glaucoma. No significant discharge, do not suspect conjunctivitis. Possibly some blepharitis as right eyelid is slightly edematous.   R pupil slightly miotic but both pupils briskly reactive, consistent with uveitis vs scleritis vs iritis.  No visual field deficits to confrontation.  Visual acuity 20/100 R eye, 20/70 L eye uncorrected, did not have glasses with her.  No trauma per history.  Improvement in symptoms after atropine 1% ophthalmic placed for comfort. Will follow up with [facility] tomorrow or [place] [facility] if they are unavailable.  DC home. Return precautions given.      History:  Supplemental history from: Documented in chart, if applicable  External Record(s) reviewed: Documented in chart, if applicable.    Work Up:  Chart documentation includes differential considered and any EKGs or imaging independently interpreted by provider, where specified.  In additional to work up documented, I considered the following work up: Documented in chart, if applicable.    External consultation:  Discussion of management with another provider: Documented in chart, if applicable    Complicating factors:  Care impacted by chronic illness: Chronic Lung Disease, Diabetes, Hyperlipidemia, and  Other: Crohn's, pyogenic granuloma  Care affected by social determinants of health: N/A    Disposition considerations: Discharge. I prescribed additional prescription strength medication(s) as charted. See documentation for any additional details.      At the conclusion of the encounter I discussed the results of all of the tests and the disposition. The questions were answered. The patient or family acknowledged understanding and was agreeable with the care plan.      CRITICAL CARE:  N/A    HPI    Patient information was obtained from: patient     Use of : N/A        Mary Ann Olson is a 58 year old female who presents for eye pain and headache.     Patient reports she has had ongoing shooting eye pain radiating into her head with some swelling of her right eyelid. Pain has been worsening since her ED visit on 8/14/23 and since being seen at [facility] on 8/17/23. She is not certain if she saw an ophthalmologist or optometrist. Patient was given a topical ointment (per pharmacy this is a neomycin/polymyxin/dexamethasone ointment for the eyelid bid) that has not relieved any pain. She does state that warm compresses improve symptoms. Patient endorses blurry vision in her right eye and some intermittent blurriness in her left eye, photophobia of right eye. She has taken Tylenol and done heat compresses for pain with minimal improvement. Denies history of eye issues. Denies discharge or drainage of the area. Denies prior surgeries to eye such as Lasik or PRK, denies contact lens use, denies hx of glaucoma. Has hx of Crohn's disease, hx of eye issue similar to today but uncertain what diagnosis she received. She denies fever but feels chills intermittently since she became sick. She did have a small amount of discharge from the right eye in the am but denies significant drainage during the day, denies loss of vision from right eye (such as loss of visual field or amaurosis fugax).  She denies any trauma  or foreign body possibility to eye.    Per Chart Review: patient was seen in [facility] ED on 8/14/23 for LLQ abdominal pain and discomfort in her right eye. She had slight swelling and no erythema over the eyelid with some mild conjunctival injection. was encouraged to go to [facility] on 8/17/23 for the eye pan.       REVIEW OF SYSTEMS  All other systems negative unless noted in HPI.    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    Antihistamines overdose, intentional self-harm, initial encounter (H)     Asthma     Bipolar 1 disorder (H) hx of bipolar listed in h and p    Bipolar 2 disorder (H)     Bipolar I disorder, single manic episode (H)     Created by Conversion  Replacement Utility updated for latest IMO load    Cellulitis 2006 left ankle, 2008 right foot    COPD (chronic obstructive pulmonary disease) (H)     Crohn's disease (H)     arthritis associated with crohn's    Diabetes mellitus (H)     Diabetes mellitus, type 2 (H)     Fibrocystic breast     Heat stroke     when a young child     Hyperlipidemia     Idiopathic acute pancreatitis 07/14/2015    Irritable bowel syndrome     Created by Conversion     Kidney stone     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     Overdose     Pyoderma gangrenosum     2016    Sacroiliitis (H) 2004    Skin lesion of left leg 08/27/2015    Suicidal ideation     Suicide attempt (H)     Traumatic iritis 07/21/2015    Umbilical hernia     Uncomplicated asthma        PAST SURGICAL HISTORY:  Past Surgical History:   Procedure Laterality Date    BIOPSY BREAST Left     BIOPSY OF BREAST, INCISIONAL      Description: Biopsy Breast Open;  Recorded: 10/28/2010;    HC REMOVAL OF TONSILS,<11 Y/O      Description: Tonsillectomy;  Recorded: 07/08/2009;    ZZC LIGATE FALLOPIAN TUBE      Description: Tubal Ligation;  Recorded: 07/08/2009;         CURRENT MEDICATIONS:    No current facility-administered  medications for this encounter.     Current Outpatient Medications   Medication    HYDROcodone-acetaminophen (NORCO) 5-325 MG tablet    acetaminophen (TYLENOL) 500 MG tablet    albuterol (PROAIR HFA/PROVENTIL HFA/VENTOLIN HFA) 108 (90 Base) MCG/ACT inhaler    albuterol (PROVENTIL) (2.5 MG/3ML) 0.083% neb solution    atorvastatin (LIPITOR) 40 MG tablet    blood glucose (NO BRAND SPECIFIED) test strip    blood glucose (NO BRAND SPECIFIED) test strip    blood glucose monitoring (NO BRAND SPECIFIED) meter device kit    budesonide-formoterol (SYMBICORT) 160-4.5 MCG/ACT Inhaler    calcium carbonate-vitamin D (OSCAL W/D) 500-200 MG-UNIT tablet    fluticasone (FLONASE) 50 MCG/ACT nasal spray    hydrOXYzine (ATARAX) 25 MG tablet    insulin aspart (NOVOLOG PEN) 100 UNIT/ML pen    insulin pen needle (31G X 6 MM) 31G X 6 MM miscellaneous    ipratropium - albuterol 0.5 mg/2.5 mg/3 mL (DUONEB) 0.5-2.5 (3) MG/3ML neb solution    loratadine (CLARITIN) 10 MG tablet    magnesium hydroxide (MILK OF MAGNESIA) 400 MG/5ML suspension    metFORMIN (GLUCOPHAGE) 1000 MG tablet    Multiple Vitamins-Minerals (CENTRUM SILVER 50+WOMEN PO)    nitroGLYcerin (RECTIV) 0.4 % OINT rectal ointment    omeprazole (PRILOSEC) 20 MG DR capsule    polyvinyl alcohol (LIQUIFILM TEARS) 1.4 % ophthalmic solution    sennosides (SENOKOT) 8.6 MG tablet    venlafaxine (EFFEXOR XR) 37.5 MG 24 hr capsule         ALLERGIES:  Allergies   Allergen Reactions    Azithromycin Other (See Comments) and Rash     Erythema Nodosum x2    Keflex [Cephalexin] Rash    Aspirin     Cefuroxime Itching    Cheese GI Disturbance    Contrast Dye Hives     IV    Diatrizoate Hives    Gadolinium Derivatives Hives    Hazelnuts [Nuts]     Iodinated Contrast Media Hives    Iodixanol Unknown    Nitrofurantoin Itching    Septra [Sulfamethoxazole-Trimethoprim] Hives    Sulfa Antibiotics Hives    Metronidazole Itching and Rash    Quinolones Rash       FAMILY HISTORY:  Family History   Problem  "Relation Age of Onset    Coronary Artery Disease Mother     Diabetes Father     Breast Cancer Maternal Grandmother     Asthma Son     Asthma Daughter     Hemophilia Other     Diabetes Type 2  Father     Factor VIII deficiency Other        SOCIAL HISTORY:  Social History     Socioeconomic History    Marital status: Single   Tobacco Use    Smoking status: Every Day     Packs/day: 0.50     Types: Cigarettes    Smokeless tobacco: Never    Tobacco comments:     2-3 cig/day   Vaping Use    Vaping Use: Never used   Substance and Sexual Activity    Alcohol use: No    Drug use: No       VITALS:  No data found.      PHYSICAL EXAM    VITAL SIGNS: /72   Pulse 93   Temp 97.1  F (36.2  C)   Resp 18   Ht 1.562 m (5' 1.5\")   Wt 77.1 kg (170 lb)   SpO2 94%   BMI 31.60 kg/m    Physical Exam  Vitals and nursing note reviewed.   Constitutional:       General: She is not in acute distress.     Appearance: She is not toxic-appearing.      Comments: Chronically ill appearing   HENT:      Head:      Comments: R periorbital area: hint of fullness compared to L periorbital area. No significant redness noted. No rash overlying face.      Nose: Nose normal. No congestion or rhinorrhea.      Mouth/Throat:      Mouth: Mucous membranes are moist.   Eyes:      Comments: R eye: eyelid slightly edematous. R pupil 3mm, reactive briskly, photophobia present during exam. Tonopen measurement 14-19. No discharge seen. Temporal portion of sclera injected, no limbic sparing. No fluoroscein uptake to right eye. EOM full and intact. L eye: pupil 4mm, reactive briskly. Visual fields intact to confrontation bilaterally   Cardiovascular:      Rate and Rhythm: Normal rate and regular rhythm.   Pulmonary:      Effort: Pulmonary effort is normal. No respiratory distress.      Breath sounds: Normal breath sounds.   Abdominal:      General: There is no distension.      Palpations: Abdomen is soft.      Tenderness: There is no abdominal tenderness. "   Musculoskeletal:         General: No swelling or deformity.      Cervical back: Neck supple. No rigidity.   Skin:     General: Skin is warm and dry.      Capillary Refill: Capillary refill takes less than 2 seconds.      Findings: No bruising or erythema.   Neurological:      General: No focal deficit present.      Mental Status: She is alert and oriented to person, place, and time. Mental status is at baseline.      Comments: No slurred speech, following commands spontaneously. No facial droop.   Psychiatric:         Mood and Affect: Mood normal.         Behavior: Behavior normal.         LABS  No results found. However, due to the size of the patient record, not all encounters were searched. Please check Results Review for a complete set of results.      RADIOLOGY  CT Orbits w Contrast   Final Result   IMPRESSION:    1.  Normal orbit CT.           I have independently reviewed the above image. See radiology report for detail.    EKG:    NA  I have independently reviewed and interpreted today's EKG, pending Cardiologist read    PROCEDURES:  N/A        MEDICATIONS GIVEN IN THE EMERGENCY:  Medications   tetracaine (PONTOCAINE) 0.5 % ophthalmic solution 1-2 drop (2 drops Right Eye $Given by Other 8/24/23 1853)   fluorescein (FUL-CRISTOFER) ophthalmic strip 1 strip (1 strip Right Eye $Given by Other 8/24/23 1853)   atropine 1 % ophthalmic solution 1 drop (1 drop Right Eye $Given 8/24/23 1951)   HYDROcodone-acetaminophen (NORCO) 5-325 MG per tablet 1 tablet (1 tablet Oral $Given 8/24/23 1924)   methylPREDNISolone sodium succinate (solu-MEDROL) injection 125 mg (125 mg Intravenous $Given 8/24/23 1925)   diphenhydrAMINE (BENADRYL) injection 25 mg (25 mg Intravenous $Given 8/24/23 1925)   iopamidol (ISOVUE-370) solution 90 mL (90 mLs Intravenous $Given 8/24/23 2037)       NEW PRESCRIPTIONS STARTED AT TODAY'S ER VISIT  Discharge Medication List as of 8/24/2023  9:45 PM        START taking these medications    Details   atropine  1 % ophthalmic solution Place 1 drop into the right eye daily for 3 days, Disp-2 mL, R-0, E-Prescribe      HYDROcodone-acetaminophen (NORCO) 5-325 MG tablet Take 1 tablet by mouth every 6 hours as needed for severe pain, Disp-3 tablet, R-0, Local Print              I, Gayathri Mendoza, am serving as a scribe to document services personally performed by Giovanna Hayes MD, based on my observations and the provider's statements to me.  I, Giovanna Hayes MD, attest that Gayathri Mendoza is acting in a scribe capacity, has observed my performance of the services and has documented them in accordance with my direction.     Giovanna Hayes MD  Emergency Medicine  [facility] EMERGENCY DEPARTMENT  [facility] [facility] [address] [phone]  Dept: [phone]             Giovanna Hayes MD  08/28/23 4427

## 2023-08-25 ENCOUNTER — PATIENT OUTREACH (OUTPATIENT)
Dept: CARE COORDINATION | Facility: CLINIC | Age: 58
End: 2023-08-25
Payer: MEDICARE

## 2023-08-25 DIAGNOSIS — J44.1 COPD EXACERBATION (H): ICD-10-CM

## 2023-08-25 LAB — ERYTHROCYTE [SEDIMENTATION RATE] IN BLOOD BY WESTERGREN METHOD: 21 MM/HR (ref 0–30)

## 2023-08-25 NOTE — PROGRESS NOTES
Clinic Care Coordination Contact  Follow Up Progress Note      Assessment: The pt was recently in the ED, I called to check up on the pt and help the pt setup a ED follow up. The pt was at [facility] for headache and eye pain. I called the pt,but got her vm, so I left a vm for the pt to give me a call back.     Care Gaps:    Health Maintenance Due   Topic Date Due    COPD ACTION PLAN  Never done    ADVANCE CARE PLANNING  11/15/2017    LUNG CANCER SCREENING  10/02/2018    ZOSTER IMMUNIZATION (3 of 3) 11/30/2018    MAMMO SCREENING  09/10/2021    COVID-19 Vaccine (3 - Pfizer series) 02/08/2022    MICROALBUMIN  09/17/2022    MEDICARE ANNUAL WELLNESS VISIT  11/02/2022    COLORECTAL CANCER SCREENING  11/22/2022    EYE EXAM  07/13/2023    INFLUENZA VACCINE (1) 09/01/2023           Care Plans      Intervention/Education provided during outreach:               Plan:     Care Coordinator will follow up in

## 2023-08-25 NOTE — DISCHARGE INSTRUCTIONS
Your CT scan is negative for cellulitis.  Please follow-up with ophthalmologist/eye doctor tomorrow.  I think you have uveitis.  This needs to be confirmed by an eye doctor.

## 2023-08-28 ENCOUNTER — PATIENT OUTREACH (OUTPATIENT)
Dept: CARE COORDINATION | Facility: CLINIC | Age: 58
End: 2023-08-28
Payer: MEDICARE

## 2023-08-28 ENCOUNTER — TRANSFERRED RECORDS (OUTPATIENT)
Dept: HEALTH INFORMATION MANAGEMENT | Facility: CLINIC | Age: 58
End: 2023-08-28
Payer: MEDICARE

## 2023-08-28 LAB — RETINOPATHY: NEGATIVE

## 2023-08-28 RX ORDER — ALBUTEROL SULFATE 0.83 MG/ML
2.5 SOLUTION RESPIRATORY (INHALATION) EVERY 6 HOURS PRN
Qty: 90 ML | Refills: 1 | Status: SHIPPED | OUTPATIENT
Start: 2023-08-28 | End: 2023-12-04

## 2023-08-28 NOTE — PROGRESS NOTES
Clinic Care Coordination Contact  Follow Up Progress Note      Assessment: The pt was recently in the ED, I called to check up on the pt and help the pt setup a ED follow up. The pt was at [facility] for headache and eye pain. I called and talked to the pt, pt stated that she is doing fine. She did not feel that she needs a follow up.    Care Gaps:    Health Maintenance Due   Topic Date Due    COPD ACTION PLAN  Never done    ADVANCE CARE PLANNING  11/15/2017    LUNG CANCER SCREENING  10/02/2018    ZOSTER IMMUNIZATION (3 of 3) 11/30/2018    MAMMO SCREENING  09/10/2021    COVID-19 Vaccine (3 - Pfizer series) 02/08/2022    MICROALBUMIN  09/17/2022    MEDICARE ANNUAL WELLNESS VISIT  11/02/2022    COLORECTAL CANCER SCREENING  11/22/2022    EYE EXAM  07/13/2023    INFLUENZA VACCINE (1) 09/01/2023           Care Plans      Intervention/Education provided during outreach:               Plan:     Care Coordinator will follow up in

## 2023-08-30 ENCOUNTER — HOSPITAL ENCOUNTER (EMERGENCY)
Facility: HOSPITAL | Age: 58
Discharge: HOME | End: 2023-08-30
Attending: FAMILY MEDICINE | Admitting: FAMILY MEDICINE
Payer: MEDICARE

## 2023-08-30 ENCOUNTER — PATIENT OUTREACH (OUTPATIENT)
Dept: CARE COORDINATION | Facility: CLINIC | Age: 58
End: 2023-08-30

## 2023-08-30 VITALS
OXYGEN SATURATION: 96 % | SYSTOLIC BLOOD PRESSURE: 144 MMHG | RESPIRATION RATE: 18 BRPM | DIASTOLIC BLOOD PRESSURE: 82 MMHG | TEMPERATURE: 97.5 F | HEART RATE: 92 BPM

## 2023-08-30 DIAGNOSIS — H20.9 UVEITIS: ICD-10-CM

## 2023-08-30 DIAGNOSIS — R51.9 ACUTE NONINTRACTABLE HEADACHE, UNSPECIFIED HEADACHE TYPE: ICD-10-CM

## 2023-08-30 LAB
ALBUMIN SERPL BCG-MCNC: 4.1 G/DL (ref 3.5–5.2)
ALP SERPL-CCNC: 91 U/L (ref 35–104)
ALT SERPL W P-5'-P-CCNC: 18 U/L (ref 0–50)
APAP SERPL-MCNC: 21.2 UG/ML (ref 10–30)
AST SERPL W P-5'-P-CCNC: 23 U/L (ref 0–45)
BILIRUB DIRECT SERPL-MCNC: <0.2 MG/DL (ref 0–0.3)
BILIRUB SERPL-MCNC: <0.2 MG/DL
PROT SERPL-MCNC: 6.7 G/DL (ref 6.4–8.3)

## 2023-08-30 PROCEDURE — 96375 TX/PRO/DX INJ NEW DRUG ADDON: CPT

## 2023-08-30 PROCEDURE — 80076 HEPATIC FUNCTION PANEL: CPT | Performed by: FAMILY MEDICINE

## 2023-08-30 PROCEDURE — 250N000011 HC RX IP 250 OP 636: Mod: JZ | Performed by: FAMILY MEDICINE

## 2023-08-30 PROCEDURE — 99284 EMERGENCY DEPT VISIT MOD MDM: CPT | Mod: 25

## 2023-08-30 PROCEDURE — 36415 COLL VENOUS BLD VENIPUNCTURE: CPT | Performed by: FAMILY MEDICINE

## 2023-08-30 PROCEDURE — 80143 DRUG ASSAY ACETAMINOPHEN: CPT | Performed by: FAMILY MEDICINE

## 2023-08-30 PROCEDURE — 96374 THER/PROPH/DIAG INJ IV PUSH: CPT

## 2023-08-30 RX ORDER — DIPHENHYDRAMINE HYDROCHLORIDE 50 MG/ML
25 INJECTION INTRAMUSCULAR; INTRAVENOUS ONCE
Status: COMPLETED | OUTPATIENT
Start: 2023-08-30 | End: 2023-08-30

## 2023-08-30 RX ORDER — DEXAMETHASONE SODIUM PHOSPHATE 10 MG/ML
10 INJECTION, SOLUTION INTRAMUSCULAR; INTRAVENOUS ONCE
Status: COMPLETED | OUTPATIENT
Start: 2023-08-30 | End: 2023-08-30

## 2023-08-30 RX ORDER — KETOROLAC TROMETHAMINE 15 MG/ML
15 INJECTION, SOLUTION INTRAMUSCULAR; INTRAVENOUS ONCE
Status: COMPLETED | OUTPATIENT
Start: 2023-08-30 | End: 2023-08-30

## 2023-08-30 RX ADMIN — DEXAMETHASONE SODIUM PHOSPHATE 10 MG: 10 INJECTION, SOLUTION INTRAMUSCULAR; INTRAVENOUS at 01:27

## 2023-08-30 RX ADMIN — DIPHENHYDRAMINE HYDROCHLORIDE 25 MG: 50 INJECTION, SOLUTION INTRAMUSCULAR; INTRAVENOUS at 01:26

## 2023-08-30 RX ADMIN — PROCHLORPERAZINE EDISYLATE 10 MG: 5 INJECTION, SOLUTION INTRAMUSCULAR; INTRAVENOUS at 01:27

## 2023-08-30 RX ADMIN — KETOROLAC TROMETHAMINE 15 MG: 15 INJECTION INTRAMUSCULAR; INTRAVENOUS at 01:27

## 2023-08-30 ASSESSMENT — ACTIVITIES OF DAILY LIVING (ADL)
ADLS_ACUITY_SCORE: 35
ADLS_ACUITY_SCORE: 33

## 2023-08-30 NOTE — PROGRESS NOTES
Clinic Care Coordination Contact  Follow Up Progress Note      Assessment: The pt was recently in the ED, I called to check up on the pt and help the pt setup a ED follow up. The pt was at [facility] for a headache. I called the pt,but got her vm, so I left a vm for the pt to give me a call back.     Care Gaps:    Health Maintenance Due   Topic Date Due    COPD ACTION PLAN  Never done    ADVANCE CARE PLANNING  11/15/2017    LUNG CANCER SCREENING  10/02/2018    ZOSTER IMMUNIZATION (3 of 3) 11/30/2018    MAMMO SCREENING  09/10/2021    COVID-19 Vaccine (3 - Pfizer series) 02/08/2022    MICROALBUMIN  09/17/2022    MEDICARE ANNUAL WELLNESS VISIT  11/02/2022    COLORECTAL CANCER SCREENING  11/22/2022    EYE EXAM  07/13/2023           Care Plans      Intervention/Education provided during outreach:               Plan:     Care Coordinator will follow up in
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
## 2023-08-31 ENCOUNTER — PATIENT OUTREACH (OUTPATIENT)
Dept: CARE COORDINATION | Facility: CLINIC | Age: 58
End: 2023-08-31
Payer: MEDICARE

## 2023-08-31 NOTE — PROGRESS NOTES
Clinic Care Coordination Contact  Follow Up Progress Note      Assessment: The pt was recently in the ED, I called to check up on the pt and help the pt setup a ED follow up. The pt was at [facility] for a headache. I called the pt,but got her vm, so I left a vm for the pt to give me a call back.      Care Gaps:    Health Maintenance Due   Topic Date Due    COPD ACTION PLAN  Never done    ADVANCE CARE PLANNING  11/15/2017    LUNG CANCER SCREENING  10/02/2018    ZOSTER IMMUNIZATION (3 of 3) 11/30/2018    MAMMO SCREENING  09/10/2021    COVID-19 Vaccine (3 - Pfizer series) 02/08/2022    MICROALBUMIN  09/17/2022    MEDICARE ANNUAL WELLNESS VISIT  11/02/2022    COLORECTAL CANCER SCREENING  11/22/2022    EYE EXAM  07/13/2023           Care Plans      Intervention/Education provided during outreach:               Plan:     Care Coordinator will follow up in

## 2023-09-01 ENCOUNTER — HOSPITAL ENCOUNTER (EMERGENCY)
Facility: HOSPITAL | Age: 58
Discharge: HOME | End: 2023-09-01
Attending: EMERGENCY MEDICINE | Admitting: EMERGENCY MEDICINE
Payer: MEDICARE

## 2023-09-01 ENCOUNTER — APPOINTMENT (OUTPATIENT)
Dept: CT IMAGING | Facility: HOSPITAL | Age: 58
End: 2023-09-01
Attending: EMERGENCY MEDICINE
Payer: MEDICARE

## 2023-09-01 ENCOUNTER — PATIENT OUTREACH (OUTPATIENT)
Dept: CARE COORDINATION | Facility: CLINIC | Age: 58
End: 2023-09-01
Payer: MEDICARE

## 2023-09-01 VITALS
WEIGHT: 173 LBS | OXYGEN SATURATION: 95 % | DIASTOLIC BLOOD PRESSURE: 56 MMHG | HEART RATE: 74 BPM | HEIGHT: 62 IN | BODY MASS INDEX: 31.83 KG/M2 | SYSTOLIC BLOOD PRESSURE: 113 MMHG | RESPIRATION RATE: 16 BRPM | TEMPERATURE: 98.3 F

## 2023-09-01 DIAGNOSIS — R51.9 NONINTRACTABLE HEADACHE, UNSPECIFIED CHRONICITY PATTERN, UNSPECIFIED HEADACHE TYPE: ICD-10-CM

## 2023-09-01 LAB
ALBUMIN SERPL BCG-MCNC: 3.8 G/DL (ref 3.5–5.2)
ALP SERPL-CCNC: 77 U/L (ref 35–104)
ALT SERPL W P-5'-P-CCNC: 17 U/L (ref 0–50)
ANION GAP SERPL CALCULATED.3IONS-SCNC: 12 MMOL/L (ref 7–15)
AST SERPL W P-5'-P-CCNC: 16 U/L (ref 0–45)
BASOPHILS # BLD AUTO: 0.1 10E3/UL (ref 0–0.2)
BASOPHILS NFR BLD AUTO: 1 %
BILIRUB DIRECT SERPL-MCNC: <0.2 MG/DL (ref 0–0.3)
BILIRUB SERPL-MCNC: 0.2 MG/DL
BUN SERPL-MCNC: 15.6 MG/DL (ref 8–23)
CALCIUM SERPL-MCNC: 9.4 MG/DL (ref 8.6–10)
CHLORIDE SERPL-SCNC: 103 MMOL/L (ref 98–107)
CREAT SERPL-MCNC: 0.54 MG/DL (ref 0.51–0.95)
CRP SERPL-MCNC: <3 MG/L
DEPRECATED HCO3 PLAS-SCNC: 25 MMOL/L (ref 22–29)
EOSINOPHIL # BLD AUTO: 0.3 10E3/UL (ref 0–0.7)
EOSINOPHIL NFR BLD AUTO: 3 %
ERYTHROCYTE [DISTWIDTH] IN BLOOD BY AUTOMATED COUNT: 16.5 % (ref 10–15)
ERYTHROCYTE [SEDIMENTATION RATE] IN BLOOD BY WESTERGREN METHOD: 19 MM/HR (ref 0–30)
FLUAV RNA SPEC QL NAA+PROBE: NEGATIVE
FLUBV RNA RESP QL NAA+PROBE: NEGATIVE
GFR SERPL CREATININE-BSD FRML MDRD: >90 ML/MIN/1.73M2
GLUCOSE SERPL-MCNC: 169 MG/DL (ref 70–99)
HCT VFR BLD AUTO: 34.1 % (ref 35–47)
HGB BLD-MCNC: 10.5 G/DL (ref 11.7–15.7)
IMM GRANULOCYTES # BLD: 0 10E3/UL
IMM GRANULOCYTES NFR BLD: 0 %
INR PPP: 0.9 (ref 0.85–1.15)
LYMPHOCYTES # BLD AUTO: 2.7 10E3/UL (ref 0.8–5.3)
LYMPHOCYTES NFR BLD AUTO: 24 %
MCH RBC QN AUTO: 25.6 PG (ref 26.5–33)
MCHC RBC AUTO-ENTMCNC: 30.8 G/DL (ref 31.5–36.5)
MCV RBC AUTO: 83 FL (ref 78–100)
MONOCYTES # BLD AUTO: 0.7 10E3/UL (ref 0–1.3)
MONOCYTES NFR BLD AUTO: 6 %
NEUTROPHILS # BLD AUTO: 7.6 10E3/UL (ref 1.6–8.3)
NEUTROPHILS NFR BLD AUTO: 66 %
NRBC # BLD AUTO: 0 10E3/UL
NRBC BLD AUTO-RTO: 0 /100
PLATELET # BLD AUTO: 347 10E3/UL (ref 150–450)
POTASSIUM SERPL-SCNC: 3.7 MMOL/L (ref 3.4–5.3)
PROT SERPL-MCNC: 6.1 G/DL (ref 6.4–8.3)
RBC # BLD AUTO: 4.1 10E6/UL (ref 3.8–5.2)
RSV RNA SPEC NAA+PROBE: NEGATIVE
SARS-COV-2 RNA RESP QL NAA+PROBE: NEGATIVE
SODIUM SERPL-SCNC: 140 MMOL/L (ref 136–145)
WBC # BLD AUTO: 11.4 10E3/UL (ref 4–11)

## 2023-09-01 PROCEDURE — 82248 BILIRUBIN DIRECT: CPT | Performed by: EMERGENCY MEDICINE

## 2023-09-01 PROCEDURE — 96374 THER/PROPH/DIAG INJ IV PUSH: CPT

## 2023-09-01 PROCEDURE — 250N000009 HC RX 250: Performed by: EMERGENCY MEDICINE

## 2023-09-01 PROCEDURE — 85610 PROTHROMBIN TIME: CPT | Mod: GZ | Performed by: EMERGENCY MEDICINE

## 2023-09-01 PROCEDURE — 85025 COMPLETE CBC W/AUTO DIFF WBC: CPT | Performed by: EMERGENCY MEDICINE

## 2023-09-01 PROCEDURE — 99285 EMERGENCY DEPT VISIT HI MDM: CPT | Mod: 25

## 2023-09-01 PROCEDURE — 36415 COLL VENOUS BLD VENIPUNCTURE: CPT | Performed by: EMERGENCY MEDICINE

## 2023-09-01 PROCEDURE — G1010 CDSM STANSON: HCPCS

## 2023-09-01 PROCEDURE — 85652 RBC SED RATE AUTOMATED: CPT | Performed by: EMERGENCY MEDICINE

## 2023-09-01 PROCEDURE — 250N000011 HC RX IP 250 OP 636: Performed by: EMERGENCY MEDICINE

## 2023-09-01 PROCEDURE — 87637 SARSCOV2&INF A&B&RSV AMP PRB: CPT | Performed by: EMERGENCY MEDICINE

## 2023-09-01 PROCEDURE — 80053 COMPREHEN METABOLIC PANEL: CPT | Performed by: EMERGENCY MEDICINE

## 2023-09-01 PROCEDURE — 86140 C-REACTIVE PROTEIN: CPT | Performed by: EMERGENCY MEDICINE

## 2023-09-01 RX ORDER — PROCHLORPERAZINE MALEATE 10 MG
10 TABLET ORAL EVERY 8 HOURS PRN
Qty: 10 TABLET | Refills: 0 | Status: SHIPPED | OUTPATIENT
Start: 2023-09-01 | End: 2024-01-29

## 2023-09-01 RX ORDER — TETRACAINE HYDROCHLORIDE 5 MG/ML
1-2 SOLUTION OPHTHALMIC ONCE
Status: COMPLETED | OUTPATIENT
Start: 2023-09-01 | End: 2023-09-01

## 2023-09-01 RX ADMIN — PROCHLORPERAZINE EDISYLATE 10 MG: 5 INJECTION INTRAMUSCULAR; INTRAVENOUS at 19:50

## 2023-09-01 RX ADMIN — TETRACAINE HYDROCHLORIDE 2 DROP: 5 SOLUTION OPHTHALMIC at 20:06

## 2023-09-01 ASSESSMENT — ACTIVITIES OF DAILY LIVING (ADL)
ADLS_ACUITY_SCORE: 33
ADLS_ACUITY_SCORE: 35

## 2023-09-01 NOTE — PROGRESS NOTES
Clinic Care Coordination Contact  Follow Up Progress Note      Assessment: The pt was recently in the ED, I called to check up on the pt and help the pt setup a ED follow up. The pt was at [facility] for a headache. I called the pt,but got her vm, so I left a vm for the pt to give me a call back.       Care Gaps:    Health Maintenance Due   Topic Date Due    COPD ACTION PLAN  Never done    ADVANCE CARE PLANNING  11/15/2017    LUNG CANCER SCREENING  10/02/2018    ZOSTER IMMUNIZATION (3 of 3) 11/30/2018    MAMMO SCREENING  09/10/2021    COVID-19 Vaccine (3 - Pfizer series) 02/08/2022    MICROALBUMIN  09/17/2022    MEDICARE ANNUAL WELLNESS VISIT  11/02/2022    COLORECTAL CANCER SCREENING  11/22/2022    EYE EXAM  07/13/2023    INFLUENZA VACCINE (1) 09/01/2023           Care Plans      Intervention/Education provided during outreach:               Plan:     Care Coordinator will follow up in

## 2023-09-01 NOTE — ED TRIAGE NOTES
Patient arrives to triage from home with chief complaint of ongoing headache and right eye pain for over a week now.  Patient reports sound sensitivity, light sensitivity and intermittent dizziness.  Denies nausea and vomiting.  Has been seen for this issue patient reports and diagnosed with Uveitis a couple days ago.  Patient is not convinced this is what is going on.  Alert and oriented x4.  Endorses congestion as well.       Patient reports taking 7-8 Tylenol at a time for pain. Could benefit from education.

## 2023-09-01 NOTE — ED PROVIDER NOTES
Emergency Department Encounter     Evaluation Date & Time:   2023  5:54 PM    CHIEF COMPLAINT:  Headache and Eye Pain      Triage Note:Patient arrives to triage from home with chief complaint of ongoing headache and right eye pain for over a week now.  Patient reports sound sensitivity, light sensitivity and intermittent dizziness.  Denies nausea and vomiting.  Has been seen for this issue patient reports and diagnosed with Uveitis a couple days ago.  Patient is not convinced this is what is going on.  Alert and oriented x4.  Endorses congestion as well.       Patient reports taking 7-8 Tylenol at a time for pain. Could benefit from education.         Impression and Plan       FINAL IMPRESSION:    ICD-10-CM    1. Nonintractable headache, unspecified chronicity pattern, unspecified headache type  R51.9             ED COURSE & MEDICAL DECISION MAKIN:05 PM I met with the patient to gather history and to perform my initial exam. I discussed the plan for care while in the Emergency Department.     58 year old female, history of DM2, HTN, HLD, COPD / asthma and uveitis, who presents for evaluation of right frontal pressure headache and right eye pain for the past few days associated with photophobia and phonophobia. She has been taking Tylenol with minimal relief. No associated extremity weakness / paresthesias, dysphagia, speech problems or difficulty ambulating. No nausea / vomiting or fevers.     She has been on prednisone eye drops for her uveitis and has a follow-up appointment with ophthalmology ([facility]) on ). She does report blurring of her vision right eye. Also endorses dizziness when standing that started a few days ago.     On exam, PERRL with mild conjunctival injection right eye. Given that she has been on prednisone eyedrops, acute glaucoma considered; IOP right eye normal at 17.     Neuro exam is normal, however given severe headache, imaging pursued. Head CT negative.  I do not suspect  SAH, meningitis or encephalitis and risks of LP felt to outweigh benefit.    CRP and ESR normal (<3 and 19, respectively) without suggestion of temporal arteritis.    Labs otherwise remarkable for mild leukocytosis (WBC 11.4) and mild anemia (Hb 10.5).  No electrolyte derangements or renal impairment.    Hepatic panel and INR checked given reports of excessive amounts of Tylenol and were both normal; I do not suspect Tylenol overdose.    COVID, influenza and RSV tests negative.    Patient is feeling significantly better after IV Compazine.    Patient discharged to home with follow-up with her PCP and ophthalmologist.  She was given a prescription for Compazine as this has helped with her headaches.  Return precautions provided.  Patient stable throughout ED course.      At the conclusion of the encounter I discussed the results of all the tests and the disposition. The questions were answered. The patient acknowledged understanding and was agreeable with the care plan.      Medical Decision Making    History:  Supplemental history from: N/A  External Record(s) reviewed: Other: Prior ED records for uveitis    Work Up:  Chart documentation includes differential considered and any EKGs or imaging interpreted by provider.  In additional to work up documented, I considered the following work up: Documented in chart, if applicable. LP - SEE ABOVE    External consultation:  Discussion of management with another provider: Documented in chart, if applicable    Complicating factors:  Care impacted by chronic illness: Chronic Lung Disease, Diabetes, Hyperlipidemia, Hypertension, and Other: uveitis  Care affected by social determinants of health: N/A    Disposition considerations: Discharge. I prescribed additional prescription strength medication(s) as charted. I considered admission, but ultimately discharged patient given reassuring evaluation and clinical improvement.       MEDICATIONS GIVEN IN THE EMERGENCY  "DEPARTMENT:  Medications   tetracaine (PONTOCAINE) 0.5 % ophthalmic solution 1-2 drop (2 drops Both Eyes $Given by Other 9/1/23 2006)   prochlorperazine (COMPAZINE) injection 10 mg (10 mg Intravenous $Given 9/1/23 1950)       NEW PRESCRIPTIONS STARTED AT TODAY'S ED VISIT:  Discharge Medication List as of 9/1/2023  9:21 PM          HPI     The history is provided by the patient. No  was used.        Mary Ann Olson is a 58 year old female, history of diabetes type 2, hypertension, hyperlipidemia, anemia, COPD / asthma and uveitis, who presents to this ED by walk-in for evaluation of a headache and eye pain.    Per chart review:   Patient was seen on 8/24/2023 and 8/30/2023 for uveitis.   On 8/30/2023 at [facility], patient presented with a headache. Patient recently diagnosed with uveitis. Toradol, Benadryl, Compazine, and Decadron given which improved her headache. Patient discharged.     Patient endorses onset of a right frontal headache starting a few days ago. Patient describes the headache as a \"pressure/crackling\" with shooting pain to her right temple. She endorses associated photophobia and phonophobia. She has been taking Tylenol with minimal relief. She denies history of a headache like this, however has been seen twice in the past ~week for this headache. No associated extremity weakness / paresthesias, dysphagia, speech problems or difficulty ambulating. No nausea / vomiting or fevers.     She has been on prednisone eye drops for her uveitis and has a follow-up appointment with ophthalmology ([facility]) on 9/27). She does report blurring of her vision right eye. No diplopia.     Patient endorses dizziness when standing that started a few days ago.     She has otherwise been in her usual state of health and denies chest pain, shortness of breath, abdominal pain, N/V/D, cough or other concerns.      REVIEW OF SYSTEMS:  All other systems reviewed and are " negative.      Medical History     Past Medical History:   Diagnosis Date    Anemia     Antihistamines overdose, intentional self-harm, initial encounter (H)     Asthma     Bipolar 1 disorder (H) hx of bipolar listed in h and p    Bipolar 2 disorder (H)     Bipolar I disorder, single manic episode (H)     Cellulitis 2006 left ankle, 2008 right foot    COPD (chronic obstructive pulmonary disease) (H)     Crohn's disease (H)     Diabetes mellitus (H)     Diabetes mellitus, type 2 (H)     Fibrocystic breast     Heat stroke     Hyperlipidemia     Idiopathic acute pancreatitis 07/14/2015    Irritable bowel syndrome     Kidney stone     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     Overdose     Pyoderma gangrenosum     Sacroiliitis (H) 2004    Skin lesion of left leg 08/27/2015    Suicidal ideation     Suicide attempt (H)     Traumatic iritis 07/21/2015    Umbilical hernia     Uncomplicated asthma        Past Surgical History:   Procedure Laterality Date    BIOPSY BREAST Left     BIOPSY OF BREAST, INCISIONAL      Description: Biopsy Breast Open;  Recorded: 10/28/2010;    HC REMOVAL OF TONSILS,<13 Y/O      Description: Tonsillectomy;  Recorded: 07/08/2009;    ZZC LIGATE FALLOPIAN TUBE      Description: Tubal Ligation;  Recorded: 07/08/2009;       Family History   Problem Relation Age of Onset    Coronary Artery Disease Mother     Diabetes Father     Breast Cancer Maternal Grandmother     Asthma Son     Asthma Daughter     Hemophilia Other     Diabetes Type 2  Father     Factor VIII deficiency Other        Social History     Tobacco Use    Smoking status: Every Day     Packs/day: 0.50     Types: Cigarettes    Smokeless tobacco: Never    Tobacco comments:     2-3 cig/day   Vaping Use    Vaping Use: Never used   Substance Use Topics    Alcohol use: No    Drug use: No       prochlorperazine (COMPAZINE) 10 MG tablet  acetaminophen (TYLENOL) 500 MG tablet  albuterol (PROAIR HFA/PROVENTIL HFA/VENTOLIN HFA) 108 (90 Base) MCG/ACT  "inhaler  albuterol (PROVENTIL) (2.5 MG/3ML) 0.083% neb solution  atorvastatin (LIPITOR) 40 MG tablet  blood glucose (NO BRAND SPECIFIED) test strip  blood glucose (NO BRAND SPECIFIED) test strip  blood glucose monitoring (NO BRAND SPECIFIED) meter device kit  budesonide-formoterol (SYMBICORT) 160-4.5 MCG/ACT Inhaler  calcium carbonate-vitamin D (OSCAL W/D) 500-200 MG-UNIT tablet  fluticasone (FLONASE) 50 MCG/ACT nasal spray  HYDROcodone-acetaminophen (NORCO) 5-325 MG tablet  hydrOXYzine (ATARAX) 25 MG tablet  insulin aspart (NOVOLOG PEN) 100 UNIT/ML pen  insulin pen needle (31G X 6 MM) 31G X 6 MM miscellaneous  ipratropium - albuterol 0.5 mg/2.5 mg/3 mL (DUONEB) 0.5-2.5 (3) MG/3ML neb solution  loratadine (CLARITIN) 10 MG tablet  magnesium hydroxide (MILK OF MAGNESIA) 400 MG/5ML suspension  metFORMIN (GLUCOPHAGE) 1000 MG tablet  Multiple Vitamins-Minerals (CENTRUM SILVER 50+WOMEN PO)  nitroGLYcerin (RECTIV) 0.4 % OINT rectal ointment  omeprazole (PRILOSEC) 20 MG DR capsule  polyvinyl alcohol (LIQUIFILM TEARS) 1.4 % ophthalmic solution  sennosides (SENOKOT) 8.6 MG tablet  venlafaxine (EFFEXOR XR) 37.5 MG 24 hr capsule        Physical Exam     First Vitals:  Patient Vitals for the past 24 hrs:   BP Temp Temp src Pulse Resp SpO2 Height Weight   09/01/23 2058 113/56 -- -- 74 -- 95 % -- --   09/01/23 2038 127/60 -- -- 75 16 95 % -- --   09/01/23 1751 138/77 98.3  F (36.8  C) Oral 85 18 96 % 1.562 m (5' 1.5\") 78.5 kg (173 lb)       PHYSICAL EXAM:   Physical Exam    GENERAL: Awake, alert.  In mild acute distress.   HEENT: Normocephalic, atraumatic.  Pupils equal, round and reactive to light.  Mild conjunctival injection right eye. Conjunctiva normal left eye. IOP right eye 17.  Normal posterior oropharynx.  Tongue is midline.  NECK: Neck is supple without meningismus.  No stridor.  PULMONARY: Symmetrical breath sounds without distress.  Lungs clear to auscultation bilaterally without wheezes, rhonchi or rales.  CARDIO: " Regular rate and rhythm.  No significant murmur, rub or gallop.  Radial pulses strong and symmetrical.  ABDOMINAL: Abdomen soft, non-distended and non-tender to palpation.    EXTREMITIES: No lower extremity swelling or edema.      NEURO: Alert and oriented to person, place and time.  Cranial nerves III-XII intact.  Strength 5/5 BL upper and lower extremities with sensation to light touch grossly intact.  No dysmetria on finger-nose-finger testing, BL.  No ataxia on heel-shin testing.  PSYCH: Normal mood and affect.      Results     LAB:  All pertinent labs reviewed and interpreted  Labs Ordered and Resulted from Time of ED Arrival to Time of ED Departure   BASIC METABOLIC PANEL - Abnormal       Result Value    Sodium 140      Potassium 3.7      Chloride 103      Carbon Dioxide (CO2) 25      Anion Gap 12      Urea Nitrogen 15.6      Creatinine 0.54      Calcium 9.4      Glucose 169 (*)     GFR Estimate >90     HEPATIC FUNCTION PANEL - Abnormal    Protein Total 6.1 (*)     Albumin 3.8      Bilirubin Total 0.2      Alkaline Phosphatase 77      AST 16      ALT 17      Bilirubin Direct <0.20     CBC WITH PLATELETS AND DIFFERENTIAL - Abnormal    WBC Count 11.4 (*)     RBC Count 4.10      Hemoglobin 10.5 (*)     Hematocrit 34.1 (*)     MCV 83      MCH 25.6 (*)     MCHC 30.8 (*)     RDW 16.5 (*)     Platelet Count 347      % Neutrophils 66      % Lymphocytes 24      % Monocytes 6      % Eosinophils 3      % Basophils 1      % Immature Granulocytes 0      NRBCs per 100 WBC 0      Absolute Neutrophils 7.6      Absolute Lymphocytes 2.7      Absolute Monocytes 0.7      Absolute Eosinophils 0.3      Absolute Basophils 0.1      Absolute Immature Granulocytes 0.0      Absolute NRBCs 0.0     INFLUENZA A/B, RSV, & SARS-COV2 PCR - Normal    Influenza A PCR Negative      Influenza B PCR Negative      RSV PCR Negative      SARS CoV2 PCR Negative     CRP INFLAMMATION - Normal    CRP Inflammation <3.00     ERYTHROCYTE SEDIMENTATION RATE  AUTO - Normal    Erythrocyte Sedimentation Rate 19     INR - Normal    INR 0.90       RADIOLOGY    Head CT w/o contrast   Final Result   IMPRESSION:   1.  Normal head CT.              I, Nell Washington , am serving as a scribe to document services personally performed by Ellie Moreira MD based on my observation and the provider's statements to me. I, Ellie Moreira MD attest that Nell Washington is acting in a scribe capacity, has observed my performance of the services and has documented them in accordance with my direction.    Ellie Moreira MD  Emergency Medicine  [facility] EMERGENCY DEPARTMENT           Ellie Moreira MD  09/02/23 0929

## 2023-09-02 NOTE — DISCHARGE INSTRUCTIONS
Please follow-up with your eye doctor next week for a recheck; call to arrange appointment.    Please follow-up with your Primary Care Provider next week; call to arrange appointment.      Return to the ER for worsening symptoms, sudden onset or severe headache, focal neurologic deficit (for example, facial droop or right arm weakness), vision changes, eye pain, persistent nausea / vomiting, fever or other concerns.

## 2023-09-05 ENCOUNTER — PATIENT OUTREACH (OUTPATIENT)
Dept: CARE COORDINATION | Facility: CLINIC | Age: 58
End: 2023-09-05

## 2023-09-05 ENCOUNTER — PATIENT OUTREACH (OUTPATIENT)
Dept: FAMILY MEDICINE | Facility: CLINIC | Age: 58
End: 2023-09-05
Payer: MEDICARE

## 2023-09-05 DIAGNOSIS — Z65.9 OTHER SOCIAL STRESSOR: Primary | ICD-10-CM

## 2023-09-05 PROCEDURE — 99207 PR NO CHARGE NURSE ONLY: CPT

## 2023-09-05 NOTE — PROGRESS NOTES
Clinic Care Coordination Contact  Follow Up Progress Note      Assessment:     SW provided reminder [facility] process advice scheduled for 9/7/23 at 1:30pm. Patient reported having another appointment that date and needing to reschedule. Patient rescheduled for next onsite [facility] date at [facility] 10/12/23 at 1:30pm.    Care Gaps:    Health Maintenance Due   Topic Date Due    COPD ACTION PLAN  Never done    ADVANCE CARE PLANNING  11/15/2017    LUNG CANCER SCREENING  10/02/2018    ZOSTER IMMUNIZATION (3 of 3) 11/30/2018    MAMMO SCREENING  09/10/2021    COVID-19 Vaccine (3 - Pfizer series) 02/08/2022    MICROALBUMIN  09/17/2022    MEDICARE ANNUAL WELLNESS VISIT  11/02/2022    COLORECTAL CANCER SCREENING  11/22/2022    EYE EXAM  07/13/2023    INFLUENZA VACCINE (1) 09/01/2023       Patient accepted scheduling phone number for [facility]  to schedule independently     Care Plans  Care Plan: Immigration/Social Security       Problem: Seeking Citizenship/Green Card/Social Security       Goal: Have appropriate information and resources to obtain Citizenship/Green Card/Social Security       Start Date: 8/15/2023    This Visit's Progress: 100%    Priority: High    Note:     I will work with [facility] to complete the immigration process.                              Intervention/Education provided during outreach: Reminder call and rescheduled SMRLS appointment at [facility].      Plan:   [facility] at 1:30pm.    Care Coordinator will follow up 10/10/23 for reminder call as requested by patient.    TERRY STEWART, RANJITH, LADC

## 2023-09-05 NOTE — PROGRESS NOTES
Clinic Care Coordination Contact  Follow Up Progress Note      Assessment: The pt was recently in the ED, I called to check up on the pt and help the pt setup a ED follow up. The pt was at [facility] for a headache. I called and talked to the pt, pt stated that she is doing fine. She did not feel that she needs a follow up.    Care Gaps:    Health Maintenance Due   Topic Date Due    COPD ACTION PLAN  Never done    ADVANCE CARE PLANNING  11/15/2017    LUNG CANCER SCREENING  10/02/2018    ZOSTER IMMUNIZATION (3 of 3) 11/30/2018    MAMMO SCREENING  09/10/2021    COVID-19 Vaccine (3 - Pfizer series) 02/08/2022    MICROALBUMIN  09/17/2022    MEDICARE ANNUAL WELLNESS VISIT  11/02/2022    COLORECTAL CANCER SCREENING  11/22/2022    EYE EXAM  07/13/2023    INFLUENZA VACCINE (1) 09/01/2023           Care Plans      Intervention/Education provided during outreach:               Plan:     Care Coordinator will follow up in

## 2023-09-18 ENCOUNTER — OFFICE VISIT (OUTPATIENT)
Dept: URGENT CARE | Facility: URGENT CARE | Age: 58
End: 2023-09-18
Payer: MEDICARE

## 2023-09-18 VITALS
HEART RATE: 87 BPM | HEIGHT: 61 IN | OXYGEN SATURATION: 98 % | SYSTOLIC BLOOD PRESSURE: 137 MMHG | BODY MASS INDEX: 32.1 KG/M2 | TEMPERATURE: 97.5 F | DIASTOLIC BLOOD PRESSURE: 87 MMHG | WEIGHT: 170 LBS

## 2023-09-18 DIAGNOSIS — R06.02 SHORTNESS OF BREATH: Primary | ICD-10-CM

## 2023-09-18 LAB
BASOPHILS # BLD AUTO: 0.1 10E3/UL (ref 0–0.2)
BASOPHILS NFR BLD AUTO: 1 %
EOSINOPHIL # BLD AUTO: 0.3 10E3/UL (ref 0–0.7)
EOSINOPHIL NFR BLD AUTO: 3 %
ERYTHROCYTE [DISTWIDTH] IN BLOOD BY AUTOMATED COUNT: 16.5 % (ref 10–15)
HCT VFR BLD AUTO: 37.4 % (ref 35–47)
HGB BLD-MCNC: 11.7 G/DL (ref 11.7–15.7)
IMM GRANULOCYTES # BLD: 0 10E3/UL
IMM GRANULOCYTES NFR BLD: 0 %
LYMPHOCYTES # BLD AUTO: 2 10E3/UL (ref 0.8–5.3)
LYMPHOCYTES NFR BLD AUTO: 23 %
MCH RBC QN AUTO: 26.2 PG (ref 26.5–33)
MCHC RBC AUTO-ENTMCNC: 31.3 G/DL (ref 31.5–36.5)
MCV RBC AUTO: 84 FL (ref 78–100)
MONOCYTES # BLD AUTO: 0.7 10E3/UL (ref 0–1.3)
MONOCYTES NFR BLD AUTO: 8 %
NEUTROPHILS # BLD AUTO: 5.7 10E3/UL (ref 1.6–8.3)
NEUTROPHILS NFR BLD AUTO: 65 %
PLATELET # BLD AUTO: 422 10E3/UL (ref 150–450)
RBC # BLD AUTO: 4.46 10E6/UL (ref 3.8–5.2)
SARS-COV-2 RNA RESP QL NAA+PROBE: POSITIVE
WBC # BLD AUTO: 8.8 10E3/UL (ref 4–11)

## 2023-09-18 PROCEDURE — 85025 COMPLETE CBC W/AUTO DIFF WBC: CPT | Performed by: PHYSICIAN ASSISTANT

## 2023-09-18 PROCEDURE — 87635 SARS-COV-2 COVID-19 AMP PRB: CPT | Performed by: PHYSICIAN ASSISTANT

## 2023-09-18 PROCEDURE — 99213 OFFICE O/P EST LOW 20 MIN: CPT | Performed by: PHYSICIAN ASSISTANT

## 2023-09-18 PROCEDURE — 36415 COLL VENOUS BLD VENIPUNCTURE: CPT | Performed by: PHYSICIAN ASSISTANT

## 2023-09-18 ASSESSMENT — ENCOUNTER SYMPTOMS: SHORTNESS OF BREATH: 1

## 2023-09-18 NOTE — PROGRESS NOTES
SUBJECTIVE:   Mary Ann Olson is a 58 year old female presenting with a chief complaint of   Chief Complaint   Patient presents with    Urgent Care     Shortness of breath, cough, fever, body chills x14 days      Has an appointment with PCP tomorrow but lives in independent living-like facility and the staff told her to come in today    14 days ago:  SOB- has been using duoneb and albuterol, wakes up in the middle of the night out of breath, has asthma and COPD  Cough/congestion- mucinex helped, does not have cough anymore, was coughing up green mucus  Has not taken temperature  No covid tests at home  Patient has enough inhalers at home    Denies fever, sore throat, chest pain, calf pain, nausea, vomiting, diarrhea, constipation    Hot flashes/chills- past couple months, thinks associated to menopause    She is an established patient of [facility].        Review of Systems   Respiratory:  Positive for shortness of breath.        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    Antihistamines overdose, intentional self-harm, initial encounter (H)     Asthma     Bipolar 1 disorder (H) hx of bipolar listed in h and p    Bipolar 2 disorder (H)     Bipolar I disorder, single manic episode (H)     Created by Conversion  Replacement Utility updated for latest IMO load    Cellulitis 2006 left ankle, 2008 right foot    COPD (chronic obstructive pulmonary disease) (H)     Crohn's disease (H)     arthritis associated with crohn's    Diabetes mellitus (H)     Diabetes mellitus, type 2 (H)     Fibrocystic breast     Heat stroke     when a young child     Hyperlipidemia     Idiopathic acute pancreatitis 07/14/2015    Irritable bowel syndrome     Created by Conversion     Kidney stone     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     Overdose     Pyoderma gangrenosum     2016    Sacroiliitis (H) 2004    Skin lesion of left leg 08/27/2015    Suicidal ideation     Suicide attempt  (H)     Traumatic iritis 07/21/2015    Umbilical hernia     Uncomplicated asthma      Family History   Problem Relation Age of Onset    Coronary Artery Disease Mother     Diabetes Father     Breast Cancer Maternal Grandmother     Asthma Son     Asthma Daughter     Hemophilia Other     Diabetes Type 2  Father     Factor VIII deficiency Other      Current Outpatient Medications   Medication Sig Dispense Refill    acetaminophen (TYLENOL) 500 MG tablet Take 1-2 tablets (500-1,000 mg) by mouth every 6 hours as needed for pain  0    albuterol (PROAIR HFA/PROVENTIL HFA/VENTOLIN HFA) 108 (90 Base) MCG/ACT inhaler Inhale 2 puffs into the lungs every 4 hours as needed for shortness of breath or wheezing 18 g 3    albuterol (PROVENTIL) (2.5 MG/3ML) 0.083% neb solution Take 1 vial (2.5 mg) by nebulization every 6 hours as needed for shortness of breath or wheezing 90 mL 1    atorvastatin (LIPITOR) 40 MG tablet Take 1 tablet (40 mg) by mouth daily 90 tablet 3    blood glucose (NO BRAND SPECIFIED) test strip Use to test blood sugar 1 times daily or as directed. 100 strip 0    blood glucose (NO BRAND SPECIFIED) test strip Use to test blood sugar 1 times daily or as directed. 100 strip 3    blood glucose monitoring (NO BRAND SPECIFIED) meter device kit Use to test blood sugar 1 times daily or as directed. 1 kit 0    budesonide-formoterol (SYMBICORT) 160-4.5 MCG/ACT Inhaler Inhale 2 puffs into the lungs 2 times daily 18 g 3    calcium carbonate-vitamin D (OSCAL W/D) 500-200 MG-UNIT tablet Take 1 tablet by mouth 2 times daily 90 tablet 3    fluticasone (FLONASE) 50 MCG/ACT nasal spray Spray 1 spray into both nostrils daily as needed for rhinitis or allergies      HYDROcodone-acetaminophen (NORCO) 5-325 MG tablet Take 1 tablet by mouth every 6 hours as needed for severe pain 3 tablet 0    hydrOXYzine (ATARAX) 25 MG tablet Take 1 tablet (25 mg) by mouth 3 times daily as needed for itching 20 tablet 0    ipratropium - albuterol 0.5  mg/2.5 mg/3 mL (DUONEB) 0.5-2.5 (3) MG/3ML neb solution Take 1 vial by nebulization 2 times daily as needed for shortness of breath, wheezing or cough      loratadine (CLARITIN) 10 MG tablet Take 10 mg by mouth daily as needed for allergies      magnesium hydroxide (MILK OF MAGNESIA) 400 MG/5ML suspension Take 30 mLs by mouth 2 times daily as needed for constipation 354 mL 0    metFORMIN (GLUCOPHAGE) 1000 MG tablet Take 1 tablet (1,000 mg) by mouth 2 times daily (with meals) 180 tablet 1    Multiple Vitamins-Minerals (CENTRUM SILVER 50+WOMEN PO) Take 1 tablet by mouth daily      nitroGLYcerin (RECTIV) 0.4 % OINT rectal ointment Place 1 inch (1.5 mg) rectally every 12 hours 30 g 0    omeprazole (PRILOSEC) 20 MG DR capsule Take 1 capsule (20 mg) by mouth daily 90 capsule 3    prochlorperazine (COMPAZINE) 10 MG tablet Take 1 tablet (10 mg) by mouth every 8 hours as needed for other (headache) 10 tablet 0    insulin aspart (NOVOLOG PEN) 100 UNIT/ML pen Inject 12 Units Subcutaneous 3 times daily (before meals) Sliding scale (Patient not taking: Reported on 8/15/2023) 15 mL 1    insulin pen needle (31G X 6 MM) 31G X 6 MM miscellaneous U pen needles daily or as directed. (Patient not taking: Reported on 8/15/2023) 100 each 1    polyvinyl alcohol (LIQUIFILM TEARS) 1.4 % ophthalmic solution Apply 1 drop to eye as needed for dry eyes (Patient not taking: Reported on 8/15/2023) 30 mL 0    sennosides (SENOKOT) 8.6 MG tablet Take 2 tablets by mouth 2 times daily (Patient not taking: Reported on 8/15/2023) 120 tablet 1    venlafaxine (EFFEXOR XR) 37.5 MG 24 hr capsule Take 1 capsule (37.5 mg) by mouth daily for 30 days 30 capsule 0     Social History     Tobacco Use    Smoking status: Former     Packs/day: 0.50     Types: Cigarettes    Smokeless tobacco: Never    Tobacco comments:     2-3 cig/day   Substance Use Topics    Alcohol use: No       OBJECTIVE  /87   Pulse 87   Temp 97.5  F (36.4  C) (Temporal)   Ht 1.549 m (5'  "1\")   Wt 77.1 kg (170 lb)   SpO2 98%   BMI 32.12 kg/m      Physical Exam  Vitals and nursing note reviewed.   Constitutional:       General: She is not in acute distress.     Appearance: Normal appearance. She is not ill-appearing, toxic-appearing or diaphoretic.   HENT:      Right Ear: Tympanic membrane, ear canal and external ear normal.      Left Ear: Tympanic membrane, ear canal and external ear normal.      Mouth/Throat:      Mouth: Mucous membranes are moist.      Pharynx: Oropharynx is clear. No oropharyngeal exudate or posterior oropharyngeal erythema.   Cardiovascular:      Rate and Rhythm: Normal rate and regular rhythm.      Heart sounds: Normal heart sounds.   Pulmonary:      Effort: Pulmonary effort is normal.      Breath sounds: Normal breath sounds. No stridor. No wheezing, rhonchi or rales.      Comments: Negative Cindy sign  Skin:     General: Skin is warm and dry.   Neurological:      General: No focal deficit present.      Mental Status: She is alert and oriented to person, place, and time. Mental status is at baseline.   Psychiatric:         Thought Content: Thought content normal.         Judgment: Judgment normal.      Comments: Anxious affect         Labs:  No results found. However, due to the size of the patient record, not all encounters were searched. Please check Results Review for a complete set of results.    X-Ray was not done.    ASSESSMENT:      ICD-10-CM    1. Shortness of breath  R06.02 Symptomatic COVID-19 Virus (Coronavirus) by PCR Nose           Medical Decision Making:    Differential Diagnosis:  Asthma, Asthma exacerbation, Bronchitis-viral, Pneumonia, Sinusitis, and Viral upper respiratory illness, PE, COPD, Panic/Anxiety    Serious Comorbid Conditions:  Adult:  COPD and Diabetes    PLAN:    Vitals stable, reassuring physical exam and CBC. Last chest xray was in July so did not get one today. Recommended patient discuss her shortness of breath with her PCP tomorrow at her " appointment.     Followup:    If not improving or if condition worsens, follow up with your Primary Care Provider    There are no Patient Instructions on file for this visit.    DAVIDE Perez Student

## 2023-09-19 ENCOUNTER — ANCILLARY PROCEDURE (OUTPATIENT)
Dept: GENERAL RADIOLOGY | Facility: CLINIC | Age: 58
End: 2023-09-19
Attending: STUDENT IN AN ORGANIZED HEALTH CARE EDUCATION/TRAINING PROGRAM
Payer: MEDICARE

## 2023-09-19 ENCOUNTER — OFFICE VISIT (OUTPATIENT)
Dept: FAMILY MEDICINE | Facility: CLINIC | Age: 58
End: 2023-09-19
Payer: MEDICARE

## 2023-09-19 VITALS — SYSTOLIC BLOOD PRESSURE: 120 MMHG | OXYGEN SATURATION: 97 % | HEART RATE: 102 BPM | DIASTOLIC BLOOD PRESSURE: 81 MMHG

## 2023-09-19 DIAGNOSIS — R06.02 SHORTNESS OF BREATH: Primary | ICD-10-CM

## 2023-09-19 DIAGNOSIS — E11.65 TYPE 2 DIABETES MELLITUS WITH HYPERGLYCEMIA, WITHOUT LONG-TERM CURRENT USE OF INSULIN (H): ICD-10-CM

## 2023-09-19 DIAGNOSIS — J18.9 PNEUMONIA OF LEFT LOWER LOBE DUE TO INFECTIOUS ORGANISM: ICD-10-CM

## 2023-09-19 DIAGNOSIS — R06.02 SHORTNESS OF BREATH: ICD-10-CM

## 2023-09-19 DIAGNOSIS — F41.9 ANXIETY: ICD-10-CM

## 2023-09-19 PROCEDURE — 99214 OFFICE O/P EST MOD 30 MIN: CPT | Performed by: STUDENT IN AN ORGANIZED HEALTH CARE EDUCATION/TRAINING PROGRAM

## 2023-09-19 PROCEDURE — 71046 X-RAY EXAM CHEST 2 VIEWS: CPT | Mod: TC | Performed by: RADIOLOGY

## 2023-09-19 RX ORDER — AMOXICILLIN 500 MG/1
1000 CAPSULE ORAL 3 TIMES DAILY
Qty: 42 CAPSULE | Refills: 0 | Status: SHIPPED | OUTPATIENT
Start: 2023-09-19 | End: 2023-09-26

## 2023-09-19 RX ORDER — HYDROXYZINE HYDROCHLORIDE 25 MG/1
25 TABLET, FILM COATED ORAL 3 TIMES DAILY PRN
Qty: 20 TABLET | Refills: 0 | Status: SHIPPED | OUTPATIENT
Start: 2023-09-19 | End: 2024-05-24
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
## 2023-10-10 ENCOUNTER — PATIENT OUTREACH (OUTPATIENT)
Dept: FAMILY MEDICINE | Facility: CLINIC | Age: 58
End: 2023-10-10
Payer: MEDICARE

## 2023-10-10 NOTE — PROGRESS NOTES
SW called patient regarding MH referral and for . Patient requested call for another as patient currently at another meeting/appointment.    TERRY STEWART, RANJITH, LADC

## 2023-10-11 ENCOUNTER — PATIENT OUTREACH (OUTPATIENT)
Dept: CARE COORDINATION | Facility: CLINIC | Age: 58
End: 2023-10-11
Payer: MEDICARE

## 2023-10-11 NOTE — PROGRESS NOTES
Clinic Care Coordination Contact  Follow Up Progress Note      Assessment:  The pt was recently in the ED, I called to check up on the pt, and help the pt setup a ED follow up. I called and talked to the pt, pt stated that she is doing better. Pt stated that she wants to wait, if she needs a follow up, she will call the clinic.     Care Gaps:    Health Maintenance Due   Topic Date Due    COPD ACTION PLAN  Never done    ADVANCE CARE PLANNING  11/15/2017    LUNG CANCER SCREENING  10/02/2018    ZOSTER IMMUNIZATION (3 of 3) 11/30/2018    MAMMO SCREENING  09/10/2021    COVID-19 Vaccine (3 - Pfizer series) 02/08/2022    MICROALBUMIN  09/17/2022    MEDICARE ANNUAL WELLNESS VISIT  11/02/2022    COLORECTAL CANCER SCREENING  11/22/2022    INFLUENZA VACCINE (1) 09/01/2023           Care Plans      Intervention/Education provided during outreach:               Plan:     Care Coordinator will follow up in

## 2023-10-12 ENCOUNTER — PATIENT OUTREACH (OUTPATIENT)
Dept: FAMILY MEDICINE | Facility: CLINIC | Age: 58
End: 2023-10-12
Payer: COMMERCIAL

## 2023-10-12 PROCEDURE — 99207 PR NO CHARGE NURSE ONLY: CPT | Mod: 93

## 2023-10-12 NOTE — CONFIDENTIAL NOTE
Clinic Care Coordination Contact  Follow Up Progress Note      Assessment:     SW called patient this date regarding referrals for neuropsych evaluation for citizenship exam and to schedule [facility] appointment for immigration status. SW provided names (See Care Plan Goal below) for neuropsych evaluation for citizenship exam. SW scheduled patient for [facility] consult regarding immigration process 11/9/23 at [facility].    Care Gaps:    Health Maintenance Due   Topic Date Due    COPD ACTION PLAN  Never done    ADVANCE CARE PLANNING  11/15/2017    LUNG CANCER SCREENING  10/02/2018    ZOSTER IMMUNIZATION (3 of 3) 11/30/2018    MAMMO SCREENING  09/10/2021    MICROALBUMIN  09/17/2022    MEDICARE ANNUAL WELLNESS VISIT  11/02/2022    COLORECTAL CANCER SCREENING  11/22/2022    INFLUENZA VACCINE (1) 09/01/2023    COVID-19 Vaccine (3 - 2023-24 season) 09/01/2023       Patient accepted scheduling phone number for [facility]  to schedule independently     Care Plans  Care Plan: Immigration/Social Security       Problem: Seeking Citizenship/Green Card/Social Security       Goal: Have appropriate information and resources to obtain Citizenship/Green Card/Social Security       Start Date: 8/15/2023 Expected End Date: 11/9/2023    This Visit's Progress: 100% Recent Progress: 100%    Priority: High    Note:     I will work with [facility] to complete the immigration process.                            Care Plan: Mental Health       Problem: Neuropsych Eval       Priority: High Onset Date: 7/12/2023    Note:     I will reach out to the following providers to obtain neuropsych evaluation for citizenship test:    - Dr. Michelle Lopez - [phone]     - Dr. Giuliana Smith - [phone]         Goal: Improve management of mental health symptoms and establish with mental health/psychosocial supports                             Intervention/Education provided during outreach: MH referral; [facility] referral    Plan:   Patient to reach out to  above providers for neuropsych eval for citizenship exam.    [facility]    Care Coordinator will follow up 11/9/23.    TERRY STEWART, RANJITH, ELENAC

## 2023-10-19 ENCOUNTER — TELEPHONE (OUTPATIENT)
Dept: OPHTHALMOLOGY | Facility: CLINIC | Age: 58
End: 2023-10-19
Payer: MEDICARE

## 2023-10-26 ENCOUNTER — OFFICE VISIT (OUTPATIENT)
Dept: URGENT CARE | Facility: URGENT CARE | Age: 58
End: 2023-10-26
Payer: MEDICARE

## 2023-10-26 VITALS
HEART RATE: 91 BPM | DIASTOLIC BLOOD PRESSURE: 87 MMHG | SYSTOLIC BLOOD PRESSURE: 131 MMHG | OXYGEN SATURATION: 97 % | TEMPERATURE: 97.8 F | BODY MASS INDEX: 32.12 KG/M2 | WEIGHT: 170 LBS

## 2023-10-26 DIAGNOSIS — J30.89 SEASONAL ALLERGIC RHINITIS DUE TO OTHER ALLERGIC TRIGGER: ICD-10-CM

## 2023-10-26 DIAGNOSIS — J44.1 COPD EXACERBATION (H): Primary | ICD-10-CM

## 2023-10-26 PROCEDURE — 99214 OFFICE O/P EST MOD 30 MIN: CPT | Performed by: PHYSICIAN ASSISTANT

## 2023-10-26 RX ORDER — LORATADINE 10 MG/1
10 TABLET ORAL DAILY PRN
Qty: 30 TABLET | Refills: 3 | Status: SHIPPED | OUTPATIENT
Start: 2023-10-26 | End: 2024-05-24

## 2023-10-26 RX ORDER — DOXYCYCLINE 100 MG/1
100 CAPSULE ORAL 2 TIMES DAILY
Qty: 14 CAPSULE | Refills: 0 | Status: SHIPPED | OUTPATIENT
Start: 2023-10-26 | End: 2023-11-02

## 2023-10-26 RX ORDER — PREDNISONE 20 MG/1
20 TABLET ORAL DAILY
Qty: 5 TABLET | Refills: 0 | Status: SHIPPED | OUTPATIENT
Start: 2023-10-26 | End: 2023-10-31

## 2023-10-26 RX ORDER — ALBUTEROL SULFATE 90 UG/1
2 AEROSOL, METERED RESPIRATORY (INHALATION) EVERY 4 HOURS PRN
Qty: 18 G | Refills: 0 | Status: SHIPPED | OUTPATIENT
Start: 2023-10-26 | End: 2024-02-07

## 2023-10-27 NOTE — PATIENT INSTRUCTIONS
(J44.1) COPD exacerbation (H)  (primary encounter diagnosis)  Comment:   Plan: doxycycline hyclate (VIBRAMYCIN) 100 MG         capsule, predniSONE (DELTASONE) 20 MG tablet,         albuterol (PROAIR HFA/PROVENTIL HFA/VENTOLIN         HFA) 108 (90 Base) MCG/ACT inhaler            (J30.89) Seasonal allergic rhinitis due to other allergic trigger  Comment:   Plan: loratadine (CLARITIN) 10 MG tablet        Taken daily until the end of November.      Use albuterol inhaler OR nebulizer every 4-6 hours as needed.      Follow up with primary clinic should symptoms persist or worsen.

## 2023-10-27 NOTE — PROGRESS NOTES
Patient presents with:  Urgent Care  Shortness of Breath: C/O SOB for 2 weeks, no asthma med's for 2 days        (J44.1) COPD exacerbation (H)  (primary encounter diagnosis)  Comment:   Plan: doxycycline hyclate (VIBRAMYCIN) 100 MG         capsule, predniSONE (DELTASONE) 20 MG tablet,         albuterol (PROAIR HFA/PROVENTIL HFA/VENTOLIN         HFA) 108 (90 Base) MCG/ACT inhaler            (J30.89) Seasonal allergic rhinitis due to other allergic trigger  Comment:   Plan: loratadine (CLARITIN) 10 MG tablet        Taken daily until the end of November.    Continue Flonase nightly.   Use albuterol inhaler OR nebulizer every 4-6 hours as needed.      Follow up with primary clinic should symptoms persist or worsen.              SUBJECTIVE:   Mary Ann Olson is a 58 year old female who presents today with intermittent cough with some wheezing for the past 2 weeks.  Ran out of her albuterol inhaler a couple of days ago.  Still has her albuterol nebulizer and has been using this with relief.  Also complains of runny nose and sneezing.  Takes Flonase, is using nightly, but has not been on Claritin recently.            Current Outpatient Medications   Medication Sig Dispense Refill    Multiple Vitamins-Iron (DAILY-MARGOT/IRON/BETA-CAROTENE) TABS TAKE 1 TABLET BY MOUTH DAILY. (Patient not taking: Reported on 10/19/2020) 30 tablet 7     Social History     Tobacco Use    Smoking status: Never Smoker    Smokeless tobacco: Never Used   Substance Use Topics    Alcohol use: Not on file     Family History   Problem Relation Age of Onset    Diabetes Mother     Diabetes Father          ROS:    10 point ROS of systems including Constitutional, Eyes, Respiratory, Cardiovascular, Gastroenterology, Genitourinary, Integumentary, Muscularskeletal, Psychiatric ,neurological were all negative except for pertinent positives noted in my HPI       OBJECTIVE:  /87   Pulse 91   Temp 97.8  F (36.6  C) (Tympanic)   Wt 77.1 kg (170 lb)    SpO2 97%   BMI 32.12 kg/m    Physical Exam:  GENERAL APPEARANCE: healthy, alert and no distress  EYES: EOMI,  PERRL, conjunctiva clear  HENT: ear canals and TM's normal.  Nose and mouth without ulcers, erythema or lesions  HENT: nasal turbinates boggy with bluish hue and rhinorrhea clear  NECK: supple, nontender, no lymphadenopathy  RESP: Few scattered wheezes and rhonchi  CV: regular rates and rhythm, normal S1 S2, no murmur noted  NEURO: Normal strength and tone, sensory exam grossly normal,  normal speech and mentation  SKIN: no suspicious lesions or rashes

## 2023-11-09 ENCOUNTER — ALLIED HEALTH/NURSE VISIT (OUTPATIENT)
Dept: FAMILY MEDICINE | Facility: CLINIC | Age: 58
End: 2023-11-09
Payer: COMMERCIAL

## 2023-11-09 DIAGNOSIS — Z65.9 OTHER SOCIAL STRESSOR: Primary | ICD-10-CM

## 2023-11-09 PROCEDURE — 99207 PR NO CHARGE NURSE ONLY: CPT

## 2023-11-11 ENCOUNTER — HOSPITAL ENCOUNTER (EMERGENCY)
Facility: HOSPITAL | Age: 58
Discharge: HOME | End: 2023-11-11
Attending: EMERGENCY MEDICINE | Admitting: EMERGENCY MEDICINE
Payer: MEDICARE

## 2023-11-11 ENCOUNTER — APPOINTMENT (OUTPATIENT)
Dept: RADIOLOGY | Facility: HOSPITAL | Age: 58
End: 2023-11-11
Attending: EMERGENCY MEDICINE
Payer: MEDICARE

## 2023-11-11 VITALS
OXYGEN SATURATION: 96 % | SYSTOLIC BLOOD PRESSURE: 122 MMHG | HEIGHT: 61 IN | RESPIRATION RATE: 20 BRPM | BODY MASS INDEX: 32.12 KG/M2 | DIASTOLIC BLOOD PRESSURE: 72 MMHG | TEMPERATURE: 98.5 F | HEART RATE: 104 BPM

## 2023-11-11 DIAGNOSIS — J44.1 COPD EXACERBATION (H): ICD-10-CM

## 2023-11-11 DIAGNOSIS — J06.9 UPPER RESPIRATORY TRACT INFECTION, UNSPECIFIED TYPE: ICD-10-CM

## 2023-11-11 PROBLEM — F31.9 BIPOLAR AFFECTIVE DISORDER (H): Status: ACTIVE | Noted: 2023-08-22

## 2023-11-11 LAB
FLUAV RNA SPEC QL NAA+PROBE: NEGATIVE
FLUBV RNA RESP QL NAA+PROBE: NEGATIVE
RSV RNA SPEC NAA+PROBE: NEGATIVE
SARS-COV-2 RNA RESP QL NAA+PROBE: POSITIVE

## 2023-11-11 PROCEDURE — 250N000013 HC RX MED GY IP 250 OP 250 PS 637: Performed by: EMERGENCY MEDICINE

## 2023-11-11 PROCEDURE — 71046 X-RAY EXAM CHEST 2 VIEWS: CPT

## 2023-11-11 PROCEDURE — 87637 SARSCOV2&INF A&B&RSV AMP PRB: CPT | Performed by: EMERGENCY MEDICINE

## 2023-11-11 PROCEDURE — 99285 EMERGENCY DEPT VISIT HI MDM: CPT | Mod: 25

## 2023-11-11 PROCEDURE — 250N000009 HC RX 250: Performed by: EMERGENCY MEDICINE

## 2023-11-11 PROCEDURE — 250N000012 HC RX MED GY IP 250 OP 636 PS 637: Performed by: EMERGENCY MEDICINE

## 2023-11-11 PROCEDURE — 94640 AIRWAY INHALATION TREATMENT: CPT

## 2023-11-11 RX ORDER — ALBUTEROL SULFATE 5 MG/ML
2.5 SOLUTION RESPIRATORY (INHALATION) ONCE
Status: COMPLETED | OUTPATIENT
Start: 2023-11-11 | End: 2023-11-11

## 2023-11-11 RX ORDER — PREDNISONE 20 MG/1
40 TABLET ORAL ONCE
Status: COMPLETED | OUTPATIENT
Start: 2023-11-11 | End: 2023-11-11

## 2023-11-11 RX ORDER — IPRATROPIUM BROMIDE AND ALBUTEROL SULFATE 2.5; .5 MG/3ML; MG/3ML
3 SOLUTION RESPIRATORY (INHALATION) ONCE
Status: COMPLETED | OUTPATIENT
Start: 2023-11-11 | End: 2023-11-11

## 2023-11-11 RX ORDER — PREDNISONE 20 MG/1
TABLET ORAL
Qty: 8 TABLET | Refills: 0 | Status: SHIPPED | OUTPATIENT
Start: 2023-11-11 | End: 2024-01-29

## 2023-11-11 RX ORDER — ACETAMINOPHEN 325 MG/1
975 TABLET ORAL ONCE
Status: COMPLETED | OUTPATIENT
Start: 2023-11-11 | End: 2023-11-11

## 2023-11-11 RX ADMIN — PREDNISONE 40 MG: 20 TABLET ORAL at 20:56

## 2023-11-11 RX ADMIN — ACETAMINOPHEN 975 MG: 325 TABLET ORAL at 19:38

## 2023-11-11 RX ADMIN — ALBUTEROL SULFATE 2.5 MG: 2.5 SOLUTION RESPIRATORY (INHALATION) at 17:43

## 2023-11-11 RX ADMIN — IPRATROPIUM BROMIDE AND ALBUTEROL SULFATE 3 ML: .5; 3 SOLUTION RESPIRATORY (INHALATION) at 17:43

## 2023-11-11 ASSESSMENT — ACTIVITIES OF DAILY LIVING (ADL)
ADLS_ACUITY_SCORE: 35
ADLS_ACUITY_SCORE: 35

## 2023-11-11 NOTE — ED TRIAGE NOTES
Patient presents to ED for cough and increased shortness of breath.  States had productive cough with green sputum.  States chills.  Afebrile in triage.  History of asthma.     Triage Assessment (Adult)       Row Name 11/11/23 1702          Triage Assessment    Airway WDL WDL        Respiratory WDL    Respiratory WDL WDL        Skin Circulation/Temperature WDL    Skin Circulation/Temperature WDL WDL        Cardiac WDL    Cardiac WDL WDL        Peripheral/Neurovascular WDL    Peripheral Neurovascular WDL WDL        Cognitive/Neuro/Behavioral WDL    Cognitive/Neuro/Behavioral WDL WDL

## 2023-11-11 NOTE — ED PROVIDER NOTES
EMERGENCY DEPARTMENT ENCOUNTER      NAME: Mary Ann Olson  AGE: 58 year old female  YOB: 1965  MRN: [id number]  EVALUATION DATE & TIME: 11/11/2023  5:10 PM    PCP: Joanna Farah    ED PROVIDER: Lisa Alford M.D.      Chief Complaint   Patient presents with    Cough    Shortness of Breath     FINAL IMPRESSION:  1. COPD exacerbation (H)    2. Upper respiratory tract infection, unspecified type      ED COURSE & MEDICAL DECISION MAKING:    Pertinent Labs & Imaging studies reviewed. (See chart for details)  ED Course as of 11/11/23 2319   Sat Nov 11, 2023   1730 Patient is a pleasant 58-year-old female with a history of COPD who presents today for evaluation of 1 week of a cough and nasal congestion.  She says her sputum is light green.  Her boyfriend is currently sick.  She denies any fevers or chills and denies nausea or vomiting.  She says her appetite is okay.  She has some shortness of breath.  She is using her inhalers without much relief and the nebs are only working a little bit.  She has previously been on prednisone but stopped it about a week ago.  She does not tolerate it great because of her diabetes.  She is tachycardic on arrival.  She had decreased breath sounds bilaterally.  We will treat her with some nebs and get a COVID, flu, RSV as well as a chest x-ray on her.  She had COVID about a month ago sore for COVID's positive I am not that excited about it.  I still think it is worthwhile to test the flu and RSV.  I discussed it with her and she is comfortable with the plan.  She like to avoid steroids if possible.  Certainly if we see pneumonia or something else that can be managed then we can avoid steroids on her.   1907 She tested positive for COVID.  She was positive a month ago so I do not think this is likely a reinfection versus lingering positive test.   2053 Avani results and plan for discharge with the patient.  We will put her on 40 mg of prednisone for 5 days.   She is in agreement with the plan.  I do not think that we should treat her for her COVID because I suspect that it is a positive from a month ago.  I discussed it with her and she is in agreement.  She asked about whether antibiotics are indicated in this case I do not think they are.  I discussed it with her.  She will be discharged home shortly.       Medical Decision Making    History:  Supplemental history from: None  External Record(s) reviewed: I reviewed the patient's office visit from 10/26/2023.  She had presented with intermittent cough and wheezing for previous 2 weeks.  She had run out of her inhaler a few days ago.  At that time they put her on 5 days of prednisone 20 mg daily and doxycycline.    Work Up:  Emergent/Severe conditions considered and evaluated for: Pneumonia, COVID, flu, RSV, pneumothorax  I independently reviewed and interpreted chest x-ray which showed no pneumothorax.  See full radiology report for all details  In additional to work up documented, I considered the following work up: None  Medications given that require intensive monitoring for toxicity: None    External consultation:  Discussion of management with another provider: None    Complicating factors:  Care impacted by chronic illness: Asthma, Type 2 diabetes, COPD, Crohn's disease  Care affected by social determinants of health: None    Disposition considerations: Discharge  Prescriptions considered/prescribed: Prednisone    5:26 PM I met with the patient to gather history and perform an initial exam.   8:41 PM I rechecked on the patient and updated them. We discussed plans for discharge including supportive cares, symptomatic treatment, outpatient follow up, and reasons to return to the emergency department.      At the conclusion of the encounter I discussed  the results of all of the tests and the disposition with patient.   All questions were answered.  The patient acknowledged understanding and was involved in the  decision making regarding the overall care plan.      I discussed with patient the utility, limitations and findings of the exam/interventions/studies done during this visit as well as the list of differential diagnosis and symptoms to monitor/return to ER for.  Additional verbal discharge instructions were provided.     MEDICATIONS GIVEN IN THE EMERGENCY:  Medications   ipratropium - albuterol 0.5 mg/2.5 mg/3 mL (DUONEB) neb solution 3 mL (3 mLs Nebulization $Given 11/11/23 1743)   albuterol (PROVENTIL) neb solution 2.5 mg (2.5 mg Nebulization $Given 11/11/23 1743)   albuterol (PROVENTIL) neb solution 2.5 mg (2.5 mg Nebulization $Given 11/11/23 1743)   acetaminophen (TYLENOL) tablet 975 mg (975 mg Oral $Given 11/11/23 1938)   predniSONE (DELTASONE) tablet 40 mg (40 mg Oral $Given 11/11/23 2056)       NEW PRESCRIPTIONS STARTED AT TODAY'S ER VISIT  Discharge Medication List as of 11/11/2023  8:58 PM        START taking these medications    Details   predniSONE (DELTASONE) 20 MG tablet Take two tablets (= 40mg) each day for 4 (four) days, Disp-8 tablet, R-0, E-Prescribe                =================================================================    HPI    Triage Note: Patient presents to ED for cough and increased shortness of breath.  States had productive cough with green sputum.  States chills.  Afebrile in triage.  History of asthma.     Triage Assessment (Adult)       Row Name 11/11/23 2121          Triage Assessment    Airway WDL WDL        Respiratory WDL    Respiratory WDL WDL        Skin Circulation/Temperature WDL    Skin Circulation/Temperature WDL WDL        Cardiac WDL    Cardiac WDL WDL        Peripheral/Neurovascular WDL    Peripheral Neurovascular WDL WDL        Cognitive/Neuro/Behavioral WDL    Cognitive/Neuro/Behavioral WDL WDL                   Patient information was obtained from: Patient    Use of : N/A       Mary Ann Olson is a 58 year old female who presents to the ED by walk  "in for evaluation of shortness of breath and cough.     The patient reports shortness of breath that has been occurring for a week, a productive cough with a \"light green\" sputum, nasal congestion, and slightly decreased appetite. She states that her shortness of breath has resulted in her waking up 3-4 times during the night. Coughing causes her right posterior rib pain, so she has been sleeping on a heating pad for relief. The patient says that her albuterol inhaler does not alleviate her shortness of breath. However, she has occasional mild improvement when using her nebulizer. She stopped taking prednisone 1 weeks ago, and she noted side effects with her blood sugar while on prednisone since she is a diabetic. The patient has been exposed to her boyfriend who is \"always\" sick since he works around children. She reports having pneumonia and COVID last month. Her most recent symptoms are similar to prior pneumonia episodes and COPD flares.     The patient denies fever, chills, nausea, vomiting, or any other complaints at this time.     Per Chart Review:  The patient visited [facility] on 10/26/2023 for evaluation of intermittent cough, runny nose, and sneezing. Patient was prescribed Doxycycline Hyclate 100 mg and Prednisone 20 mg daily. Patient advised to continue daily Flonase and Claritin as well as use albuterol or nebulizer every 4-6 hours as needed.     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    Antihistamines overdose, intentional self-harm, initial encounter (H)     Asthma     Bipolar 1 disorder (H) hx of bipolar listed in h and p    Bipolar 2 disorder (H)     Bipolar I disorder, single manic episode (H)     Created by Conversion  Replacement Utility updated for latest IMO load    Cellulitis 2006 left ankle, 2008 right foot    COPD (chronic obstructive pulmonary disease) (H)     Crohn's " disease (H)     arthritis associated with crohn's    Diabetes mellitus (H)     Diabetes mellitus, type 2 (H)     Fibrocystic breast     Heat stroke     when a young child     Hyperlipidemia     Idiopathic acute pancreatitis 07/14/2015    Irritable bowel syndrome     Created by Conversion     Kidney stone     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     Overdose     Pyoderma gangrenosum (H28)     2016    Sacroiliitis (H24) 2004    Skin lesion of left leg 08/27/2015    Suicidal ideation     Suicide attempt (H)     Traumatic iritis 07/21/2015    Umbilical hernia     Uncomplicated asthma        PAST SURGICAL HISTORY:  Past Surgical History:   Procedure Laterality Date    BIOPSY BREAST Left     BIOPSY OF BREAST, INCISIONAL      Description: Biopsy Breast Open;  Recorded: 10/28/2010;    HC REMOVAL OF TONSILS,<11 Y/O      Description: Tonsillectomy;  Recorded: 07/08/2009;    ZZC LIGATE FALLOPIAN TUBE      Description: Tubal Ligation;  Recorded: 07/08/2009;       CURRENT MEDICATIONS:    No current facility-administered medications for this encounter.    Current Outpatient Medications:     predniSONE (DELTASONE) 20 MG tablet, Take two tablets (= 40mg) each day for 4 (four) days, Disp: 8 tablet, Rfl: 0    acetaminophen (TYLENOL) 500 MG tablet, Take 1-2 tablets (500-1,000 mg) by mouth every 6 hours as needed for pain, Disp: , Rfl: 0    albuterol (PROAIR HFA/PROVENTIL HFA/VENTOLIN HFA) 108 (90 Base) MCG/ACT inhaler, Inhale 2 puffs into the lungs every 4 hours as needed for shortness of breath or wheezing, Disp: 18 g, Rfl: 0    albuterol (PROVENTIL) (2.5 MG/3ML) 0.083% neb solution, Take 1 vial (2.5 mg) by nebulization every 6 hours as needed for shortness of breath or wheezing, Disp: 90 mL, Rfl: 1    atorvastatin (LIPITOR) 40 MG tablet, Take 1 tablet (40 mg) by mouth daily, Disp: 90 tablet, Rfl: 3    blood glucose (NO BRAND SPECIFIED) test strip, Use to test blood sugar 1 times daily or as directed., Disp: 100 strip, Rfl: 0     blood glucose (NO BRAND SPECIFIED) test strip, Use to test blood sugar 1 times daily or as directed., Disp: 100 strip, Rfl: 3    blood glucose monitoring (NO BRAND SPECIFIED) meter device kit, Use to test blood sugar 1 times daily or as directed., Disp: 1 kit, Rfl: 0    budesonide-formoterol (SYMBICORT) 160-4.5 MCG/ACT Inhaler, Inhale 2 puffs into the lungs 2 times daily, Disp: 18 g, Rfl: 3    calcium carbonate-vitamin D (OSCAL W/D) 500-200 MG-UNIT tablet, Take 1 tablet by mouth 2 times daily, Disp: 90 tablet, Rfl: 3    fluticasone (FLONASE) 50 MCG/ACT nasal spray, Spray 1 spray into both nostrils daily as needed for rhinitis or allergies, Disp: , Rfl:     HYDROcodone-acetaminophen (NORCO) 5-325 MG tablet, Take 1 tablet by mouth every 6 hours as needed for severe pain, Disp: 3 tablet, Rfl: 0    hydrOXYzine (ATARAX) 25 MG tablet, Take 1 tablet (25 mg) by mouth 3 times daily as needed for itching, Disp: 20 tablet, Rfl: 0    insulin aspart (NOVOLOG PEN) 100 UNIT/ML pen, Inject 12 Units Subcutaneous 3 times daily (before meals) Sliding scale, Disp: 15 mL, Rfl: 1    insulin pen needle (31G X 6 MM) 31G X 6 MM miscellaneous, U pen needles daily or as directed., Disp: 100 each, Rfl: 1    ipratropium - albuterol 0.5 mg/2.5 mg/3 mL (DUONEB) 0.5-2.5 (3) MG/3ML neb solution, Take 1 vial by nebulization 2 times daily as needed for shortness of breath, wheezing or cough, Disp: , Rfl:     loratadine (CLARITIN) 10 MG tablet, Take 1 tablet (10 mg) by mouth daily as needed for allergies, Disp: 30 tablet, Rfl: 3    magnesium hydroxide (MILK OF MAGNESIA) 400 MG/5ML suspension, Take 30 mLs by mouth 2 times daily as needed for constipation, Disp: 354 mL, Rfl: 0    metFORMIN (GLUCOPHAGE) 1000 MG tablet, Take 1 tablet (1,000 mg) by mouth 2 times daily (with meals), Disp: 180 tablet, Rfl: 1    Multiple Vitamins-Minerals (CENTRUM SILVER 50+WOMEN PO), Take 1 tablet by mouth daily, Disp: , Rfl:     nitroGLYcerin (RECTIV) 0.4 % OINT rectal  ointment, Place 1 inch (1.5 mg) rectally every 12 hours, Disp: 30 g, Rfl: 0    omeprazole (PRILOSEC) 20 MG DR capsule, Take 1 capsule (20 mg) by mouth daily, Disp: 90 capsule, Rfl: 3    polyvinyl alcohol (LIQUIFILM TEARS) 1.4 % ophthalmic solution, Apply 1 drop to eye as needed for dry eyes, Disp: 30 mL, Rfl: 0    prochlorperazine (COMPAZINE) 10 MG tablet, Take 1 tablet (10 mg) by mouth every 8 hours as needed for other (headache), Disp: 10 tablet, Rfl: 0    sennosides (SENOKOT) 8.6 MG tablet, Take 2 tablets by mouth 2 times daily, Disp: 120 tablet, Rfl: 1    venlafaxine (EFFEXOR XR) 37.5 MG 24 hr capsule, Take 1 capsule (37.5 mg) by mouth daily for 30 days, Disp: 30 capsule, Rfl: 0    ALLERGIES:  Allergies   Allergen Reactions    Gadolinium Derivatives Hives    Iodinated Contrast Media Hives    Azithromycin Other (See Comments) and Rash     Erythema Nodosum x2    Keflex [Cephalexin] Rash    Aspirin Other (See Comments)    Cefuroxime Itching and Other (See Comments)    Cheese GI Disturbance and Nausea    Contrast Dye Hives     IV    Corylus Other (See Comments)    Diatrizoate Hives    Iodixanol Unknown    Nuts Other (See Comments)    Septra [Sulfamethoxazole-Trimethoprim] Hives    Sulfa Antibiotics Hives    Metronidazole Itching and Rash    Nitrofurantoin Itching and Rash    Quinolones Rash       FAMILY HISTORY:  Family History   Problem Relation Age of Onset    Coronary Artery Disease Mother     Diabetes Father     Breast Cancer Maternal Grandmother     Asthma Son     Asthma Daughter     Hemophilia Other     Diabetes Type 2  Father     Factor VIII deficiency Other        SOCIAL HISTORY:   Social History     Socioeconomic History    Marital status: Single   Tobacco Use    Smoking status: Former     Packs/day: .5     Types: Cigarettes     Passive exposure: Never    Smokeless tobacco: Never    Tobacco comments:     2-3 cig/day   Vaping Use    Vaping Use: Never used   Substance and Sexual Activity    Alcohol use: No     "Drug use: No       PHYSICAL EXAM    VITAL SIGNS: /72   Pulse 104   Temp 98.5  F (36.9  C) (Oral)   Resp 20   Ht 1.549 m (5' 1\")   SpO2 96%   BMI 32.12 kg/m     GENERAL: Awake, alert, answering questions appropriately,   SPEECH:  Easy to understand speech, Normal volume and alisia  PULMONARY: No respiratory distress, Decreased air movement bilaterally. Lungs otherwise clear to auscultation bilaterally. No wheezing. No crackles.   CARDIOVASCULAR: Regular rate and rhythm, Distal pulses present and normal.  ABDOMINAL: Soft, Nondistended, Nontender, No rebound or guarding, No palpable masses  EXTREMITIES: No lower extremity edema.  PSYCH: Normal mood and affect     LAB:  All pertinent labs reviewed and interpreted.  Results for orders placed or performed during the hospital encounter of 11/11/23   Chest XR,  PA & LAT    Impression    IMPRESSION: Negative chest.   Symptomatic Influenza A/B, RSV, & SARS-CoV2 PCR (COVID-19) Nasopharyngeal    Specimen: Nasopharyngeal; Swab   Result Value Ref Range    Influenza A PCR Negative Negative    Influenza B PCR Negative Negative    RSV PCR Negative Negative    SARS CoV2 PCR Positive (A) Negative       RADIOLOGY:  Chest XR,  PA & LAT   Final Result   IMPRESSION: Negative chest.          I, Dorian Jimenez, am serving as a scribe to document services personally performed by Dr. Alford based on my observation and the provider's statements to me. I, Lisa Alford MD attest that Dorian Jimenez is acting in a scribe capacity, has observed my performance of the services and has documented them in accordance with my direction.    Lisa Alford M.D.  Emergency Medicine  [facility] EMERGENCY DEPARTMENT  [facility] [facility] [id number] [phone]  Dept: [phone]       Lisa Alford MD  11/11/23 2322    "

## 2023-11-12 NOTE — DISCHARGE INSTRUCTIONS
You were seen in the Emergency Department today for evaluation of cough and shortness of breath.  Your lab work showed no cause of your symptoms. Your imaging studies showed no significant cause of your symptoms. You were prescribed 40 mg prednisone daily for 5 days. You should take all medications as prescribed.  Follow up with your primary care physician to ensure resolution of symptoms. Return if you have new or worsening symptoms.

## 2023-11-13 ENCOUNTER — PATIENT OUTREACH (OUTPATIENT)
Dept: CARE COORDINATION | Facility: CLINIC | Age: 58
End: 2023-11-13
Payer: MEDICARE

## 2023-11-14 ENCOUNTER — PATIENT OUTREACH (OUTPATIENT)
Dept: CARE COORDINATION | Facility: CLINIC | Age: 58
End: 2023-11-14
Payer: MEDICARE

## 2023-11-14 NOTE — PROGRESS NOTES
Clinic Care Coordination Contact  Follow Up Progress Note      Assessment:  The pt was recently in the ED, I called to check up on the pt, and help the pt setup a ED follow up. The pt was at [facility] for a cough, shortness of breath. I called and talked to the pt, pt stated that she is doing ok. Pt did want to make a follow up, so I was able to help setup for the pt to come in on 11/20/2023 at 1:20pm with .    Care Gaps:    Health Maintenance Due   Topic Date Due    COPD ACTION PLAN  Never done    ADVANCE CARE PLANNING  11/15/2017    LUNG CANCER SCREENING  10/02/2018    ZOSTER IMMUNIZATION (3 of 3) 11/30/2018    MAMMO SCREENING  09/10/2021    MICROALBUMIN  09/17/2022    MEDICARE ANNUAL WELLNESS VISIT  11/02/2022    COLORECTAL CANCER SCREENING  11/22/2022    INFLUENZA VACCINE (1) 09/01/2023    COVID-19 Vaccine (3 - 2023-24 season) 09/01/2023           Care Plans      Intervention/Education provided during outreach:               Plan:     Care Coordinator will follow up in

## 2023-11-15 NOTE — PROGRESS NOTES
Patient was connected and introduced to [facility]  this date.     TERRY STEWART, RANJITH, [facility]

## 2023-11-21 ENCOUNTER — OFFICE VISIT (OUTPATIENT)
Dept: FAMILY MEDICINE | Facility: CLINIC | Age: 58
End: 2023-11-21
Payer: MEDICARE

## 2023-11-21 VITALS
OXYGEN SATURATION: 97 % | BODY MASS INDEX: 35.73 KG/M2 | DIASTOLIC BLOOD PRESSURE: 86 MMHG | SYSTOLIC BLOOD PRESSURE: 132 MMHG | HEART RATE: 101 BPM | WEIGHT: 189.12 LBS

## 2023-11-21 DIAGNOSIS — J44.9 CHRONIC OBSTRUCTIVE PULMONARY DISEASE, UNSPECIFIED COPD TYPE (H): Primary | ICD-10-CM

## 2023-11-21 DIAGNOSIS — J45.40 MODERATE PERSISTENT ASTHMA WITHOUT COMPLICATION: ICD-10-CM

## 2023-11-21 DIAGNOSIS — B37.2 CANDIDAL INTERTRIGO: ICD-10-CM

## 2023-11-21 DIAGNOSIS — L02.92 BOIL: ICD-10-CM

## 2023-11-21 PROCEDURE — 99215 OFFICE O/P EST HI 40 MIN: CPT | Performed by: FAMILY MEDICINE

## 2023-11-21 RX ORDER — NYSTATIN 100000 [USP'U]/G
POWDER TOPICAL 2 TIMES DAILY
Qty: 30 G | Refills: 0 | Status: SHIPPED | OUTPATIENT
Start: 2023-11-21 | End: 2024-03-21

## 2023-11-21 RX ORDER — FLUTICASONE PROPIONATE AND SALMETEROL 113; 14 UG/1; UG/1
1 POWDER, METERED RESPIRATORY (INHALATION) 2 TIMES DAILY
Qty: 1 EACH | Refills: 1 | Status: SHIPPED | OUTPATIENT
Start: 2023-11-21 | End: 2024-02-27

## 2023-11-21 NOTE — PROGRESS NOTES
"  Assessment & Plan     Chronic obstructive pulmonary disease, unspecified COPD type (H)  Moderate persistent asthma without complication  COPD/asthma overlap with ongoing cigarette smoking. Recent URI symptoms are resolving and breathing has improved with oral steroid course last week, though she continues to have a more productive cough than normal. Does not appear to be in an acute exacerbation. Reassuring lung exam today. Not on any ICS/LABA or LAMA meds due to cost concerns and lack of pharmacologic coverage for her insurance.   - Reviewed options for GoodRx prescriptions with PharmD's help. Will try fluticasone-salmeterol (AIRDUO RESPICLICK) 113-14 MCG/ACT inhaler; Inhale 1 puff into the lungs 2 times daily  - Continue albuterol PRN.  - Discussed extending oral prednisone until she can  the AirDuo inhaler later this week. She declined due to side effects.   - Reviewed symptoms that should prompt urgent medical attention.     Candidal intertrigo  Bilateral breast involvement. Has azole at home.  - nystatin (MYCOSTATIN) 099365 UNIT/GM external powder; Apply topically 2 times daily  - OTC azole application once daily x 2-4 weeks (patient has previously prescription at home)    Boil  Relatively small in size and already has a pustule present on exam. Suspect conservative management will suffice.    - Encouraged regular soaks and/or warm compresses to promote drainage  - Close follow up if worsening      I spent a total of 48 minutes on the day of the visit.   Time spent by me doing chart review, history and exam, documentation and further activities per the note     MED REC REQUIRED  Post Medication Reconciliation Status: discharge medications reconciled and changed, per note/orders  BMI:   Estimated body mass index is 35.73 kg/m  as calculated from the following:    Height as of 11/11/23: 1.549 m (5' 1\").    Weight as of this encounter: 85.8 kg (189 lb 1.9 oz).   Weight management plan: Patient was " referred to their PCP to discuss a diet and exercise plan.    Follow up in 2 weeks to check back on above or sooner if worsening.    Monique Mcghee MD  [facility] OMARI Reese is a 58 year old, presenting for the following health issues:  ER F/U (COPD 11/11/23, can't breathe well. ), Derm Problem (Boil on bottom, to the side not on cheek. Hurts to touch./Rash underneath breasts), Referral (Lung referral ), and Forms (Special diet form )        11/21/2023     2:58 PM   Additional Questions   Roomed by ety   Accompanied by self       HPI     She was evaluated in the Emergency Department on 11/11/23 for worsening shortness of breath and cough. Work-up included a normal CXR and viral panel that was positive for Covid but felt to be a lingering result from an infection one month prior. She was discharged with a 5 day course of prednisone that she completed a few days ago. She was feeling better on this but since she completed it, she reported worsening of her symptoms again. These include shortness of breath, wheezing, frequent waking up at night, and coughing. The cough is productive of more sputum than normal and is more light green to yellow. No fevers/chills. Some headaches and sinus pressure. She is only using albuterol for her breathing because she cannot afford her inhalers. She has not used her ICS/LABA for several months due to cost. She is not eligible for Part D because of her citizenship status and cannot afford the GoodRx costs of $40-50 per inhaler.     Boil on her bottom for the past few days. Unable to soak it. Wonders if she needs to have it lanced or if antibiotics are necessary.    Rash under her breasts has been very itchy. She is sweating all of the time. Wonders what to do about this.         Objective    /86 (BP Location: Right arm, Patient Position: Sitting, Cuff Size: Adult Regular)   Pulse 101   Wt 85.8 kg (189 lb 1.9 oz)   SpO2 97%   BMI 35.73  kg/m    Body mass index is 35.73 kg/m .  Physical Exam   GENERAL: healthy, alert and no distress  HEENT: No rhinorrhea, oropharynx clear  NECK: no adenopathy, no asymmetry, masses, or scars and thyroid normal to palpation  RESP: normal rate and effort, lungs clear to auscultation - no rales, rhonchi, faint expiratory wheeze that clears with coughing, rare coughing noted during my assessment, good air movement to bases  CV: regular rate and rhythm, normal S1 S2, no S3 or S4, no murmur, click or rub, no peripheral edema and peripheral pulses strong  SKIN: erythematous rash confluent under both breasts with scattered satellite lesions in the skin folds on the right, 0.5 cm indurated, non-fluctuant erythematous nodule on left buttock with a pustule in the center

## 2023-11-21 NOTE — PATIENT INSTRUCTIONS
- For your breathing,  the prescription for your new inhaler as soon as possible - fluticasone/salmeterol - and take this twice daily.   -For the breast rash, apply a thin layer of the antifungal cream that you have at home once per day for 2-4 weeks until the rash is gone.   - Also use the Nystatin powder twice per day to help keep it dry and to treat the infection.   - Use heat on the boil on your bottom 2-3 times per day to help it open up on its own. A small amount of topical antibiotic will likely help to keep any infection in check.     - If worsening breathing symptoms before you can get your inhaler, please follow up. You may need antibiotics or oral steroids at that point.     Follow up in 2 weeks to reassess with Dr. Farah or myself

## 2023-11-30 ENCOUNTER — OFFICE VISIT (OUTPATIENT)
Dept: URGENT CARE | Facility: URGENT CARE | Age: 58
End: 2023-11-30
Payer: MEDICARE

## 2023-11-30 VITALS
TEMPERATURE: 97.2 F | BODY MASS INDEX: 33.13 KG/M2 | DIASTOLIC BLOOD PRESSURE: 87 MMHG | WEIGHT: 180 LBS | HEIGHT: 62 IN | HEART RATE: 97 BPM | OXYGEN SATURATION: 98 % | SYSTOLIC BLOOD PRESSURE: 133 MMHG

## 2023-11-30 DIAGNOSIS — J01.40 ACUTE NON-RECURRENT PANSINUSITIS: Primary | ICD-10-CM

## 2023-11-30 PROCEDURE — 99213 OFFICE O/P EST LOW 20 MIN: CPT | Performed by: PHYSICIAN ASSISTANT

## 2023-11-30 RX ORDER — FLUTICASONE PROPIONATE 50 MCG
2 SPRAY, SUSPENSION (ML) NASAL DAILY
Qty: 18.2 ML | Refills: 0 | Status: SHIPPED | OUTPATIENT
Start: 2023-11-30 | End: 2024-02-27

## 2023-11-30 RX ORDER — AZITHROMYCIN 250 MG/1
TABLET, FILM COATED ORAL
Qty: 6 TABLET | Refills: 0 | Status: SHIPPED | OUTPATIENT
Start: 2023-11-30 | End: 2023-12-05

## 2023-12-01 NOTE — TELEPHONE ENCOUNTER
Called patient to discuss concerns. Patient sounded congested on the phone, still coughing up mucous green/yellow. Drinking plenty of fluids, intermittent fevers, taking mucinex. Advised to continue to let it run its course, OTC medications for symptom relief okay but to discuss with pharmacist to ensure no interactions. Advised to continue with increased fluids, recommended warmed broth or chicken noodle soup. Mary Ann endorses having albuterol rescue inhaler, no longer has nebulizer machine or the solution. Will route to PCP for possible order for nebulizer and solution. Requested Mary Ann call clinic if symptoms worsen, Mary Ann verbalized understanding. Basilio SMITH   Labs/Imaging Studies

## 2023-12-01 NOTE — PROGRESS NOTES
Acute non-recurrent pansinusitis  - fluticasone (FLONASE) 50 MCG/ACT nasal spray; Spray 2 sprays into both nostrils daily  - azithromycin (ZITHROMAX) 250 MG tablet; Take 2 tablets (500 mg) by mouth daily for 1 day, THEN 1 tablet (250 mg) daily for 4 days.    Age 12 months or more  Okay to use Zarbee's   Okay to use Rx Children Tylenol if prescribed (Dose based on weight)    Age 2-12:   Okay to use Children Motrin or Tylenol over the counter.    Adults:  Okay to take acetaminophen 500 mg- 2 tabs (Total of 1000 mg) every 8 hrs   Okay to take ibuprofen 200 mg- 3 tabs (Total of 600 mg) every 6 hours        Okay to use Neti pot for sinus lavage up to three times daily for congestion and sinus pressure if present. Daily hot shower can be beneficial for congestion and body aches. Okay to use bedroom vaporizer or humidifier if symptoms are worse at night. Nightly Vicks Vapor rub and 5-10 mg of Melatonin okay to use for sleep.     Over the counter cough medication and decongestants okay if not prescribed by me during this visit. For homeopathic alternatives to cough syrup and decongestant, feel free to try Elderberry extract.    Okay to use salt water gargles, warm tea (or warm water with lemon and honey), and lozenges for any throat discomfort. Chloraseptic spray is also highly encourages for throat pain/irritation.     Patient will need to get plenty of rest and drink at least 1.5-2 liters of fluids daily for adults and 1-1.5 liters for children. If vomiting and not tolerating liquids for more than 24 hrs, please go to your nearest emergency department for IV fluids and further treatment.     Patient is not contagious after 1 week from start of symptoms. If possible, wear mask for first 7 days. Wash hands regularly and vigorously for 30 seconds often.     Patient was advised to return to clinic for reevaluation (either UC or PCP) if symptoms do not improve in 7 days. Patient educated on red flag symptoms and asked to go  directly to the ED if these symptoms present themselves.     EVELINE Rodriguez [facility] URGENT CARE    Subjective   58 year old who presents to clinic today for the following health issues:    Urgent Care       HPI     Acute Illness  Acute illness concerns: intermittent headache and congestion   Onset/Duration: 3 weeks following a covid-19 infection   Symptoms:  Fever: No  Chills/Sweats: No  Headache (location?): YES- stabbing headaches that seem to come and go- sometimes feeling light headed.    Sinus Pressure: YES  Conjunctivitis:  No  Ear Pain: no  Rhinorrhea: No  Congestion: YES  Sore Throat: No  Cough: Mild  Wheeze: Some shortness of breath but no wheezing   Decreased Appetite: No  Nausea: No  Vomiting: No  Diarrhea: No  Dysuria/Freq.: No  Dysuria or Hematuria: No  Fatigue/Achiness: fatigue but no body aches  Sick/Strep Exposure: None   Therapies tried and outcome: Tylenol     Review of Systems   Review of Systems   See HPI    Objective    Temp: 97.2  F (36.2  C) Temp src: Temporal BP: 133/87 Pulse: 97     SpO2: 98 %       Physical Exam   Physical Exam  Constitutional:       General: She is not in acute distress.     Appearance: Normal appearance. She is normal weight. She is not ill-appearing, toxic-appearing or diaphoretic.   HENT:      Head: Normocephalic and atraumatic.      Right Ear: Tympanic membrane, ear canal and external ear normal. There is no impacted cerumen.      Left Ear: Tympanic membrane, ear canal and external ear normal. There is no impacted cerumen.      Nose: Congestion and rhinorrhea present.      Right Sinus: Maxillary sinus tenderness and frontal sinus tenderness present.      Left Sinus: Maxillary sinus tenderness and frontal sinus tenderness present.      Mouth/Throat:      Mouth: Mucous membranes are moist.      Pharynx: No oropharyngeal exudate or posterior oropharyngeal erythema.   Cardiovascular:      Rate and Rhythm: Normal rate and regular rhythm.      Pulses:  Normal pulses.      Heart sounds: Normal heart sounds. No murmur heard.     No friction rub. No gallop.   Pulmonary:      Effort: Pulmonary effort is normal. No respiratory distress.      Breath sounds: No stridor. No wheezing, rhonchi or rales.   Chest:      Chest wall: No tenderness.   Lymphadenopathy:      Cervical: No cervical adenopathy.   Neurological:      General: No focal deficit present.      Mental Status: She is alert and oriented to person, place, and time. Mental status is at baseline.      Gait: Gait normal.   Psychiatric:         Mood and Affect: Mood normal.         Behavior: Behavior normal.         Thought Content: Thought content normal.         Judgment: Judgment normal.          No results found. However, due to the size of the patient record, not all encounters were searched. Please check Results Review for a complete set of results.

## 2023-12-04 DIAGNOSIS — J44.1 COPD EXACERBATION (H): ICD-10-CM

## 2023-12-04 RX ORDER — ALBUTEROL SULFATE 0.83 MG/ML
2.5 SOLUTION RESPIRATORY (INHALATION) EVERY 6 HOURS PRN
Qty: 90 ML | Refills: 1 | Status: SHIPPED | OUTPATIENT
Start: 2023-12-04 | End: 2024-01-18

## 2023-12-05 ENCOUNTER — OFFICE VISIT (OUTPATIENT)
Dept: FAMILY MEDICINE | Facility: CLINIC | Age: 58
End: 2023-12-05
Payer: MEDICARE

## 2023-12-05 VITALS
HEIGHT: 61 IN | DIASTOLIC BLOOD PRESSURE: 84 MMHG | WEIGHT: 193 LBS | RESPIRATION RATE: 20 BRPM | SYSTOLIC BLOOD PRESSURE: 133 MMHG | HEART RATE: 116 BPM | OXYGEN SATURATION: 94 % | TEMPERATURE: 98.4 F | BODY MASS INDEX: 36.44 KG/M2

## 2023-12-05 DIAGNOSIS — E11.65 TYPE 2 DIABETES MELLITUS WITH HYPERGLYCEMIA, WITHOUT LONG-TERM CURRENT USE OF INSULIN (H): Primary | ICD-10-CM

## 2023-12-05 LAB
CREAT UR-MCNC: 100 MG/DL
MICROALBUMIN UR-MCNC: 13.5 MG/L
MICROALBUMIN/CREAT UR: 13.5 MG/G CR (ref 0–25)

## 2023-12-05 PROCEDURE — 82043 UR ALBUMIN QUANTITATIVE: CPT | Performed by: STUDENT IN AN ORGANIZED HEALTH CARE EDUCATION/TRAINING PROGRAM

## 2023-12-05 PROCEDURE — 99214 OFFICE O/P EST MOD 30 MIN: CPT | Performed by: STUDENT IN AN ORGANIZED HEALTH CARE EDUCATION/TRAINING PROGRAM

## 2023-12-05 PROCEDURE — 82570 ASSAY OF URINE CREATININE: CPT | Performed by: STUDENT IN AN ORGANIZED HEALTH CARE EDUCATION/TRAINING PROGRAM

## 2023-12-05 RX ORDER — SUMATRIPTAN 50 MG/1
50 TABLET, FILM COATED ORAL
Qty: 10 TABLET | Refills: 0 | Status: SHIPPED | OUTPATIENT
Start: 2023-12-05 | End: 2024-02-27

## 2023-12-05 RX ORDER — LANOLIN ALCOHOL/MO/W.PET/CERES
3 CREAM (GRAM) TOPICAL AT BEDTIME
Qty: 90 TABLET | Refills: 1 | Status: SHIPPED | OUTPATIENT
Start: 2023-12-05 | End: 2024-03-22

## 2023-12-05 ASSESSMENT — PATIENT HEALTH QUESTIONNAIRE - PHQ9
SUM OF ALL RESPONSES TO PHQ QUESTIONS 1-9: 17
10. IF YOU CHECKED OFF ANY PROBLEMS, HOW DIFFICULT HAVE THESE PROBLEMS MADE IT FOR YOU TO DO YOUR WORK, TAKE CARE OF THINGS AT HOME, OR GET ALONG WITH OTHER PEOPLE: VERY DIFFICULT
SUM OF ALL RESPONSES TO PHQ QUESTIONS 1-9: 17

## 2023-12-05 NOTE — COMMUNITY RESOURCES LIST (ENGLISH)
12/05/2023   [facility]  N/A  For questions about this resource list or additional care needs, please contact your primary care clinic or care manager.  Phone: [phone]   Email: N/A   Address: [facility] [place] [address]   Hours: N/A        Food and Nutrition       Food pantry  1  [employer] - Emergency Food Shelf Distance: 0.46 miles      [facility]   [address] [place] [address]  Language: English, American  Hours: Mon 9:30 AM - 11:30 AM Appt. Only  Fees: Free   Phone: (527) 739-3491 Email: etienne@[employer] Website: http://www.[employer].org/     2  [employer] [place] - [employer] - Food pantry Distance: 1.26 miles      Delivery, [facility]   [facility] [place] [address]  Language: English  Hours: Mon 1:00 PM - 6:00 PM , Tue 9:30 AM - 2:30 PM , Wed - Thu 9:30 AM - 2:00 PM  Fees: Free   Phone: (581) 890-8826 Email: info@[employer].org Website: http://[employer].org/     SNAP application assistance  3  [employer] ([facility] [place] Distance: 0.46 miles      Phone/Virtual   [address] [facility] [address] [place] [address]  Language: English  Hours: Mon - Fri 8:00 AM - 4:30 PM  Fees: Free   Phone: (647) 219-5779 Email: info@[employer].org Website: http://www.[employer].org/     4  [employer] [place] Distance: 2.03 miles      Phone/Virtual   [address] [place] [address]  Language: English, Hmong, Cook Islander, Iranian, American  Hours: Mon - Fri 8:30 AM - 4:30 PM  Fees: Free   Phone: (860) 608-5304 Email: helpline@[employer].org Website: https://www.[employer]ers[employer].org/programs/[place]-food-helpline/     Soup kitchen or free meals  5  [employer] [place] [facility] - Free Summer Meals Distance: 0.62 miles      [employer]-Person   [address] [place] [address]  Language: English, Hmong, American  Hours: Mon - Fri 12:00 PM -  1:00 PM , Mon - Fri 3:00 PM - 4:00 PM  Fees: Free   Phone: (195) 889-2560 Email: Andrew@.[facility]. Website: https://www.[facility]/departments/valera-recreation/[facility]     6  [employer] [place] Distance: 1.13 miles      Pickup   [address] [place] [address]  Language: English  Hours: Mon 12:00 PM - 2:00 PM , Wed 12:00 PM - 2:00 PM  Fees: Free   Phone: (658) 801-5857 Ext.4 Email: info@[employer] Website: http://www.[employer]          Transportation       Free or low-cost transportation  7  [employer] Distance: 2.53 miles      In-Person   [address] [place] [address]  Language: English, Dominican  Hours: Mon 9:00 AM - 5:00 PM , Tue 9:30 AM - 7:00 PM , Wed 9:00 AM - 5:00 PM , Thu 9:30 AM - 7:00 PM , Fri 9:00 AM - 5:00 PM  Fees: Free   Phone: (180) 451-2974 Email: contactus@[employer] Website: http://www.[employer]     8  [facility] Circulator Bus Distance: 5.48 miles      In-Person   [address] [place] [address]  Language: English  Hours: Tue 9:00 AM - 2:00 PM  Fees: Self Pay   Phone: (929) 225-9285 Email: info@[employer] Website: http://www.[employer].org/     Transportation to medical appointments  9  [facility] - Non-Emergency Medical Transportation Distance: 2.2 miles      In-Person   [address] [place] [address]  Language: English  Hours: Mon - Fri 8:00 AM - 4:00 PM Appt. Only  Fees: Self Pay   Phone: (664) 570-6380 Website: http://www.[facility].org/Medical-Services/Emergency-medical-services/Non-emergency-transportation/     10  [employer] Distance: 4.13 miles      In-Person   [address] [address]  Language: English  Hours: Mon - Thu 6:00 AM - 6:00 PM , Fri 6:00 AM - 5:00 PM  Fees: Insurance, Self Pay   Phone: (892) 890-3223 Email: office@[employer] Website: https://www.[employer]/          Important Numbers & Websites        Emergency Services   911  [facility] Services   [facility]  Poison Control   (702) 159-2946  Suicide Prevention Lifeline   (671) 140-3801 (TALK)  Child Abuse Hotline   (312) 691-4608 (4-A-Child)  Sexual Assault Hotline   (593) 209-3656 (HOPE)  National Runaway Safeline   (400) 249-7240 (RUNAWAY)  [employer] Talkline   (280) 660-3589  Substance Abuse Referral   (976) 488-8811 (HELP)

## 2023-12-05 NOTE — PROGRESS NOTES
"  Assessment & Plan     Type 2 diabetes mellitus with hyperglycemia, without long-term current use of insulin (H)-A1c at goal  - Albumin Random Urine Quantitative with Creat Ratio; Future      Headaches: New issue.  From hx may be related to poor sleep.  Favor migraine vs tension HA.  Discussed importance of sleep, limiting caffeine exp during afternoon, and will try abortive therapy.  Good RX coupon printed for patient today given limited income and gap in insurance for medications.   - SUMAtriptan (IMITREX) 50 MG tablet; Take 1 tablet (50 mg) by mouth at onset of headache for migraine May repeat in 2 hours. Max 4 tablets/24 hours.  - melatonin 3 MG tablet; Take 1 tablet (3 mg) by mouth at bedtime    COPD: picked up AirDuo and is taking.   Lungs clear today.      Recommend COVID shot at pharmacy.   Patient thinking about giving up her animals because she cannot afford them.  Reports she would not miss the cat.     Joanna Farah MD  [facility]    Rasheeda Reese is a 58 year old, presenting for the following health issues:  Asthma      12/5/2023     2:17 PM   Additional Questions   Roomed by Beatris   Accompanied by SALVADOR CHAN     Patient returns today to discuss chronic daily headache.  Headache feels like a sharp pain stabbing in her head.  Headache goes away sporadically and unpredictably.  Treats HA with tylenol.  Taking tylenol 1000mg a dose a few times a day.  Is having blurred vision when the headaches happen.  No nausea or vomiting.  Light and noise sensitive.       Headaches started a few weeks ago.      Also having sleep problems.  Not sleeping well.  Having trouble falling asleep.  Does feel like stress is playing a role in her sleep issues.  Drinks caffeine during the day.  Drinks a few cups a day.          Objective    /84   Pulse 116   Temp 98.4  F (36.9  C)   Resp 20   Ht 1.55 m (5' 1.02\")   Wt 87.5 kg (193 lb)   SpO2 94%   BMI 36.44 kg/m    Body " mass index is 36.44 kg/m .  Physical Exam   GEN: NAD  HEENT: head atraumatic, normocephalic, moist mucous membranes, eyes anicteric.  Vision normal in clinic today  CV: RRR w/o M/R/G  PULM: CTAB  ABD: soft, non-tender, no appreciable masses, no guarding, BS present  Neuro: alert and oriented x 3, CN II-XII intact, normal gross motor movements,  normal coordination  Psych: appropriate  Ext: no peripheral edema        Lab Results   Component Value Date    A1C 7.7 08/15/2023    A1C 7.7 08/01/2022    A1C 6.4 11/02/2021    A1C 5.9 11/16/2020    A1C 6.0 04/13/2020    A1C 6.3 11/18/2019    A1C 6.7 02/09/2019    A1C 6.7 02/09/2019    A1C 6.6 08/28/2018

## 2023-12-11 DIAGNOSIS — Z78.0 POSTMENOPAUSAL STATUS: ICD-10-CM

## 2023-12-11 DIAGNOSIS — K21.9 GASTROESOPHAGEAL REFLUX DISEASE WITHOUT ESOPHAGITIS: ICD-10-CM

## 2023-12-11 DIAGNOSIS — E11.65 TYPE 2 DIABETES MELLITUS WITH HYPERGLYCEMIA, WITHOUT LONG-TERM CURRENT USE OF INSULIN (H): ICD-10-CM

## 2023-12-11 DIAGNOSIS — E11.9 TYPE 2 DIABETES MELLITUS WITHOUT COMPLICATION, WITHOUT LONG-TERM CURRENT USE OF INSULIN (H): ICD-10-CM

## 2023-12-11 NOTE — TELEPHONE ENCOUNTER
[facility] phone call message- medication clarification/question:    Full Medication Name:     - vitamin D (ERGOCALCIFEROL) 86217 UNIT capsule       - metFORMIN (GLUCOPHAGE) 1000 MG tablet       - omeprazole (PRILOSEC) 20 MG DR capsule       - calcium carbonate-vitamin D (OSCAL W/D) 500-200 MG-UNIT tablet       Question: Patient called requesting for refill on medications - stated she is almost out of metformin. If able to fill please send to pharmacy thank you.    Pharmacy confirmed as    [id number] [address]: Yes    OK to leave a message on voice mail? Yes    Primary language: English      needed? No    Call taken on December 11, 2023 at 8:21 AM by Vern Alford

## 2023-12-12 NOTE — TELEPHONE ENCOUNTER
Patient calling, requesting for refill for medications since she is almost out. Please refill or else call and advise patient.

## 2023-12-13 RX ORDER — OYSTER SHELL CALCIUM WITH VITAMIN D 500; 200 MG/1; [IU]/1
1 TABLET, FILM COATED ORAL 2 TIMES DAILY
Qty: 90 TABLET | Refills: 3 | Status: SHIPPED | OUTPATIENT
Start: 2023-12-13 | End: 2024-03-22

## 2023-12-13 RX ORDER — ERGOCALCIFEROL 1.25 MG/1
50000 CAPSULE, LIQUID FILLED ORAL WEEKLY
Qty: 12 CAPSULE | Refills: 3 | Status: SHIPPED | OUTPATIENT
Start: 2023-12-13

## 2023-12-15 ENCOUNTER — HOSPITAL ENCOUNTER (EMERGENCY)
Facility: HOSPITAL | Age: 58
Discharge: HOME | End: 2023-12-15
Admitting: EMERGENCY MEDICINE
Payer: MEDICARE

## 2023-12-15 VITALS
RESPIRATION RATE: 15 BRPM | HEIGHT: 64 IN | SYSTOLIC BLOOD PRESSURE: 140 MMHG | HEART RATE: 96 BPM | WEIGHT: 180 LBS | TEMPERATURE: 96.8 F | BODY MASS INDEX: 30.73 KG/M2 | OXYGEN SATURATION: 96 % | DIASTOLIC BLOOD PRESSURE: 83 MMHG

## 2023-12-15 DIAGNOSIS — R73.9 HYPERGLYCEMIA: ICD-10-CM

## 2023-12-15 LAB
ANION GAP SERPL CALCULATED.3IONS-SCNC: 12 MMOL/L (ref 7–15)
B-OH-BUTYR SERPL-SCNC: <0.18 MMOL/L
BASE EXCESS BLDV CALC-SCNC: 4.6 MMOL/L
BASOPHILS # BLD AUTO: 0.1 10E3/UL (ref 0–0.2)
BASOPHILS NFR BLD AUTO: 1 %
BUN SERPL-MCNC: 12 MG/DL (ref 8–23)
CALCIUM SERPL-MCNC: 10 MG/DL (ref 8.6–10)
CHLORIDE SERPL-SCNC: 95 MMOL/L (ref 98–107)
CREAT SERPL-MCNC: 0.55 MG/DL (ref 0.51–0.95)
DEPRECATED HCO3 PLAS-SCNC: 28 MMOL/L (ref 22–29)
EGFRCR SERPLBLD CKD-EPI 2021: >90 ML/MIN/1.73M2
EOSINOPHIL # BLD AUTO: 0.3 10E3/UL (ref 0–0.7)
EOSINOPHIL NFR BLD AUTO: 3 %
ERYTHROCYTE [DISTWIDTH] IN BLOOD BY AUTOMATED COUNT: 14.3 % (ref 10–15)
GLUCOSE BLDC GLUCOMTR-MCNC: 316 MG/DL (ref 70–99)
GLUCOSE SERPL-MCNC: 325 MG/DL (ref 70–99)
HCO3 BLDV-SCNC: 30 MMOL/L (ref 24–30)
HCT VFR BLD AUTO: 38.4 % (ref 35–47)
HGB BLD-MCNC: 12.5 G/DL (ref 11.7–15.7)
IMM GRANULOCYTES # BLD: 0 10E3/UL
IMM GRANULOCYTES NFR BLD: 0 %
LYMPHOCYTES # BLD AUTO: 2.5 10E3/UL (ref 0.8–5.3)
LYMPHOCYTES NFR BLD AUTO: 26 %
MCH RBC QN AUTO: 26.5 PG (ref 26.5–33)
MCHC RBC AUTO-ENTMCNC: 32.6 G/DL (ref 31.5–36.5)
MCV RBC AUTO: 82 FL (ref 78–100)
MONOCYTES # BLD AUTO: 0.6 10E3/UL (ref 0–1.3)
MONOCYTES NFR BLD AUTO: 6 %
NEUTROPHILS # BLD AUTO: 6.3 10E3/UL (ref 1.6–8.3)
NEUTROPHILS NFR BLD AUTO: 64 %
NRBC # BLD AUTO: 0 10E3/UL
NRBC BLD AUTO-RTO: 0 /100
OXYHGB MFR BLDV: 53.9 % (ref 70–75)
PCO2 BLDV: 56 MM HG (ref 35–50)
PH BLDV: 7.34 [PH] (ref 7.35–7.45)
PLATELET # BLD AUTO: 427 10E3/UL (ref 150–450)
PO2 BLDV: 33 MM HG (ref 25–47)
POTASSIUM SERPL-SCNC: 4.6 MMOL/L (ref 3.4–5.3)
RBC # BLD AUTO: 4.71 10E6/UL (ref 3.8–5.2)
SAO2 % BLDV: 54.8 % (ref 70–75)
SODIUM SERPL-SCNC: 135 MMOL/L (ref 135–145)
WBC # BLD AUTO: 9.8 10E3/UL (ref 4–11)

## 2023-12-15 PROCEDURE — 250N000013 HC RX MED GY IP 250 OP 250 PS 637: Performed by: EMERGENCY MEDICINE

## 2023-12-15 PROCEDURE — 82805 BLOOD GASES W/O2 SATURATION: CPT | Performed by: EMERGENCY MEDICINE

## 2023-12-15 PROCEDURE — 82010 KETONE BODYS QUAN: CPT | Performed by: EMERGENCY MEDICINE

## 2023-12-15 PROCEDURE — 99283 EMERGENCY DEPT VISIT LOW MDM: CPT

## 2023-12-15 PROCEDURE — 80048 BASIC METABOLIC PNL TOTAL CA: CPT | Performed by: EMERGENCY MEDICINE

## 2023-12-15 PROCEDURE — 85025 COMPLETE CBC W/AUTO DIFF WBC: CPT | Performed by: EMERGENCY MEDICINE

## 2023-12-15 PROCEDURE — 36415 COLL VENOUS BLD VENIPUNCTURE: CPT | Performed by: EMERGENCY MEDICINE

## 2023-12-15 PROCEDURE — 82962 GLUCOSE BLOOD TEST: CPT

## 2023-12-15 RX ADMIN — METFORMIN HYDROCHLORIDE 1000 MG: 500 TABLET, FILM COATED ORAL at 20:02

## 2023-12-15 ASSESSMENT — ENCOUNTER SYMPTOMS
CHILLS: 0
WEAKNESS: 0
NAUSEA: 0
FEVER: 0
SHORTNESS OF BREATH: 0
LIGHT-HEADEDNESS: 0
VOMITING: 0
ABDOMINAL PAIN: 0
DIARRHEA: 0
NUMBNESS: 0
HEADACHES: 1

## 2023-12-16 NOTE — ED TRIAGE NOTES
The patient ran out of metformin and can't afford to fill it, pt is here for metformin to help lower her blood sugar.      Triage Assessment (Adult)       Row Name 12/15/23 4614          Triage Assessment    Airway WDL WDL        Respiratory WDL    Respiratory WDL WDL        Skin Circulation/Temperature WDL    Skin Circulation/Temperature WDL WDL        Cardiac WDL    Cardiac WDL WDL        Peripheral/Neurovascular WDL    Peripheral Neurovascular WDL WDL        Cognitive/Neuro/Behavioral WDL    Cognitive/Neuro/Behavioral WDL WDL

## 2023-12-16 NOTE — DISCHARGE INSTRUCTIONS
You were seen here today for evaluation of high blood sugar.  Your lab work today is reassuring with no evidence of diabetic ketoacidosis or electrolyte problems from your high blood sugars.  Your blood sugar was high today, you were given your home dose of metformin here but you should call the pharmacy and ask if they can get you a few days worth until your payer can arrange for payment.    Monitor your carbohydrate intake closely and drink lots of fluids.    Return here for any new or worsening symptoms including chest pain, difficulty breathing, visual changes, severe pain, persistent vomiting, blood sugars greater than 350, or any other symptoms that concern you.

## 2023-12-16 NOTE — ED PROVIDER NOTES
EMERGENCY DEPARTMENT ENCOUNTER      NAME: Mary Ann Olson  AGE: 58 year old female  YOB: 1965  MRN: [id number]  EVALUATION DATE & TIME: No admission date for patient encounter.    PCP: Joanna Farah    ED PROVIDER: Marsha Torres PA-C      Chief Complaint   Patient presents with    Medication Refill         FINAL IMPRESSION:  1. Hyperglycemia          ED COURSE & MEDICAL DECISION MAKING:    Pertinent Labs & Imaging studies reviewed. (See chart for details)    58 year old female presents to the Emergency Department for evaluation of diabetes problem.    Physical exam is remarkable for a generally well-appearing female who is in no acute distress.  Heart and lung sounds are clear diffusely throughout.  Abdomen is soft and nontender.  No focal neurologic deficits noted.  Vital signs are stable and she is afebrile.    CBC is unremarkable with no leukocytosis or anemia.  BMP is unremarkable with no significant electrolyte derangements, normal kidney function. Hyperglycemia noted with glucose of 325, no anion gap noted. Beta hydroxybutyrate negative. VBG with very mildly low pH of 7.34 but appears consistent with baseline and patient is a smoker; normal bicarb.     The patient was given an oral dose of her home metformin here.  I do not think any further emergent labs or imaging are indicated at this time.  The patient essentially has no complaints today and her workup is overall reassuring with no evidence of DKA.  Patient states she is unable to  her metformin prescription until her her designated payor faxes something to the pharmacy but I have no reason to admit her today-advised her to ask the pharmacy the cost for a few days' worth of metformin and I gave her a goodRx coupon. We also discussed monitoring her carb intake very carefully and drinking extra fluids. Advised her to return here for any new or worsening symptoms, patient is agreeable with this treatment plan and  verbalized understanding.     Medical Decision Making    History:  Supplemental history from: Documented in chart, if applicable  External Record(s) reviewed: Outpatient Record: Office visit 12/05/23    Work Up:  Chart documentation includes differential considered and any EKGs or imaging independently interpreted by provider, where specified.  In additional to work up documented, I considered the following work up: Documented in chart, if applicable.    External consultation:  Discussion of management with another provider: Documented in chart, if applicable    Complicating factors:  Care impacted by chronic illness: Diabetes, Hyperlipidemia, Hypertension, and Smoking / Nicotine Use  Care affected by social determinants of health: Access to Medical Care, Access to Affordable Health Care, and Medication Noncompliance    Disposition considerations: Discharge. I recommended the patient continue their current prescription strength medication(s): Metformin. I considered admission, but ultimately discharged patient after reassuring labs.    ED Course   6:05 PM Performed my initial history and physical exam. Discussed workup in the emergency department, management of symptoms, and likely disposition.   7:58 PM I discussed the plan for discharge with the patient or family and they are agreeable.. We discussed supportive cares at home and reasons for return to the ER including new or worsening symptoms - all questions and concerns addressed. Patient to be discharged by RN.    At the conclusion of the encounter I discussed the results of all of the tests and the disposition. The questions were answered. The patient or family acknowledged understanding and was agreeable with the care plan.     Voice recognition software was used in the creation of this note. Any grammatical or nonsensical errors are due to inherent errors with the software and are not the intention of the writer.     MEDICATIONS GIVEN IN THE  EMERGENCY:  Medications   metFORMIN (GLUCOPHAGE) tablet 1,000 mg (1,000 mg Oral $Given 12/15/23 2002)       NEW PRESCRIPTIONS STARTED AT TODAY'S ER VISIT  New Prescriptions    No medications on file            =================================================================    HPI    Patient information was obtained from: Patient    Use of : N/A         Mary Ann Olson is a 58 year old female with past medical history of diabetes, COPD, bipolar disorder who presents with concerns about high blood sugar.    The patient states that she recently ran out of her metformin, last dose was yesterday, and does not believe she will be able to get a refill until at least Monday.  She checked her blood sugar at home and it was in the 390s. She states that she feels well; she has had intermittent headaches for the last few months but no new changes today.     She denies fevers, chills, chest pain, sob, nausea, vomiting, diarrhea, or light headedness.      REVIEW OF SYSTEMS   Review of Systems   Constitutional:  Negative for chills and fever.   Eyes:  Negative for visual disturbance.   Respiratory:  Negative for shortness of breath.    Cardiovascular:  Negative for chest pain.   Gastrointestinal:  Negative for abdominal pain, diarrhea, nausea and vomiting.   Neurological:  Positive for headaches (Intermittent for months). Negative for weakness, light-headedness and numbness.       All other systems reviewed and are negative unless noted in HPI.      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    Antihistamines overdose, intentional self-harm, initial encounter (H)     Asthma     Bipolar 1 disorder (H) hx of bipolar listed in h and p    Bipolar 2 disorder (H)     Bipolar I disorder, single manic episode (H)     Created by Conversion  Replacement Utility updated for latest IMO load    Cellulitis 2006 left ankle, 2008 right foot    COPD  (chronic obstructive pulmonary disease) (H)     Crohn's disease (H)     arthritis associated with crohn's    Diabetes mellitus (H)     Diabetes mellitus, type 2 (H)     Fibrocystic breast     Heat stroke     when a young child     Hyperlipidemia     Idiopathic acute pancreatitis 07/14/2015    Irritable bowel syndrome     Created by Conversion     Kidney stone     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     Overdose     Pyoderma gangrenosum (H28)     2016    Sacroiliitis (H24) 2004    Skin lesion of left leg 08/27/2015    Suicidal ideation     Suicide attempt (H)     Traumatic iritis 07/21/2015    Umbilical hernia     Uncomplicated asthma        PAST SURGICAL HISTORY:  Past Surgical History:   Procedure Laterality Date    BIOPSY BREAST Left     BIOPSY OF BREAST, INCISIONAL      Description: Biopsy Breast Open;  Recorded: 10/28/2010;    HC REMOVAL OF TONSILS,<11 Y/O      Description: Tonsillectomy;  Recorded: 07/08/2009;    ZZC LIGATE FALLOPIAN TUBE      Description: Tubal Ligation;  Recorded: 07/08/2009;       CURRENT MEDICATIONS:    acetaminophen (TYLENOL) 500 MG tablet  albuterol (PROAIR HFA/PROVENTIL HFA/VENTOLIN HFA) 108 (90 Base) MCG/ACT inhaler  albuterol (PROVENTIL) (2.5 MG/3ML) 0.083% neb solution  atorvastatin (LIPITOR) 40 MG tablet  blood glucose (NO BRAND SPECIFIED) test strip  blood glucose (NO BRAND SPECIFIED) test strip  blood glucose monitoring (NO BRAND SPECIFIED) meter device kit  budesonide-formoterol (SYMBICORT) 160-4.5 MCG/ACT Inhaler  Calcium Carbonate-Vitamin D 500-5 MG-MCG TABS  fluticasone (FLONASE) 50 MCG/ACT nasal spray  fluticasone (FLONASE) 50 MCG/ACT nasal spray  fluticasone-salmeterol (AIRDUO RESPICLICK) 113-14 MCG/ACT inhaler  HYDROcodone-acetaminophen (NORCO) 5-325 MG tablet  hydrOXYzine (ATARAX) 25 MG tablet  insulin aspart (NOVOLOG PEN) 100 UNIT/ML pen  insulin pen needle (31G X 6 MM) 31G X 6 MM miscellaneous  ipratropium - albuterol 0.5 mg/2.5 mg/3 mL (DUONEB) 0.5-2.5 (3) MG/3ML neb  solution  loratadine (CLARITIN) 10 MG tablet  magnesium hydroxide (MILK OF MAGNESIA) 400 MG/5ML suspension  melatonin 3 MG tablet  metFORMIN (GLUCOPHAGE) 1000 MG tablet  Multiple Vitamins-Minerals (CENTRUM SILVER 50+WOMEN PO)  nitroGLYcerin (RECTIV) 0.4 % OINT rectal ointment  nystatin (MYCOSTATIN) 753290 UNIT/GM external powder  omeprazole (PRILOSEC) 20 MG DR capsule  polyvinyl alcohol (LIQUIFILM TEARS) 1.4 % ophthalmic solution  predniSONE (DELTASONE) 20 MG tablet  prochlorperazine (COMPAZINE) 10 MG tablet  sennosides (SENOKOT) 8.6 MG tablet  SUMAtriptan (IMITREX) 50 MG tablet  venlafaxine (EFFEXOR XR) 37.5 MG 24 hr capsule  vitamin D2 (ERGOCALCIFEROL) 36072 units (1250 mcg) capsule        ALLERGIES:  Allergies   Allergen Reactions    Gadolinium Derivatives Hives    Iodinated Contrast Media Hives    Azithromycin Other (See Comments) and Rash     Erythema Nodosum x2    Keflex [Cephalexin] Rash    Aspirin Other (See Comments)    Cefuroxime Itching and Other (See Comments)    Cheese GI Disturbance and Nausea    Contrast Dye Hives     IV    Corylus Other (See Comments)    Diatrizoate Hives    Iodixanol Unknown    Nuts Other (See Comments)    Septra [Sulfamethoxazole-Trimethoprim] Hives    Sulfa Antibiotics Hives    Metronidazole Itching and Rash    Nitrofurantoin Itching and Rash    Quinolones Rash       FAMILY HISTORY:  Family History   Problem Relation Age of Onset    Coronary Artery Disease Mother     Diabetes Father     Breast Cancer Maternal Grandmother     Asthma Son     Asthma Daughter     Hemophilia Other     Diabetes Type 2  Father     Factor VIII deficiency Other        SOCIAL HISTORY:   Social History     Socioeconomic History    Marital status: Single   Tobacco Use    Smoking status: Former     Packs/day: .5     Types: Cigarettes     Passive exposure: Never    Smokeless tobacco: Never    Tobacco comments:     2-3 cig/day   Vaping Use    Vaping Use: Never used   Substance and Sexual Activity    Alcohol use:  "No    Drug use: No     Social Determinants of Health     Financial Resource Strain: Low Risk  (12/5/2023)    Financial Resource Strain     Within the past 12 months, have you or your family members you live with been unable to get utilities (heat, electricity) when it was really needed?: No   Food Insecurity: High Risk (12/5/2023)    Food Insecurity     Within the past 12 months, did you worry that your food would run out before you got money to buy more?: Yes     Within the past 12 months, did the food you bought just not last and you didn t have money to get more?: Yes   Transportation Needs: High Risk (12/5/2023)    Transportation Needs     Within the past 12 months, has lack of transportation kept you from medical appointments, getting your medicines, non-medical meetings or appointments, work, or from getting things that you need?: Yes   Housing Stability: Low Risk  (12/5/2023)    Housing Stability     Do you have housing? : Yes     Are you worried about losing your housing?: No       VITALS:  Patient Vitals for the past 24 hrs:   BP Temp Temp src Pulse Resp SpO2 Height Weight   12/15/23 1949 (!) 140/83 -- -- 100 14 96 % -- --   12/15/23 1800 (!) 154/72 96.8  F (36  C) Temporal 107 12 95 % 1.626 m (5' 4\") 81.6 kg (180 lb)       PHYSICAL EXAM    VITAL SIGNS: BP (!) 140/83   Pulse 100   Temp 96.8  F (36  C) (Temporal)   Resp 14   Ht 1.626 m (5' 4\")   Wt 81.6 kg (180 lb)   SpO2 96%   BMI 30.90 kg/m    General Appearance: Alert, cooperative, normal speech and facial symmetry, appears stated age, the patient does not appear in distress  Head:  Normocephalic, without obvious abnormality, atraumatic  Eyes: Conjunctiva/corneas clear, EOM's intact, no nystagmus, PERRL  ENT:  Lips, mucosa, and tongue normal; teeth and gums normal, no pharyngeal inflammation, no dysphonia or difficulty swallowing, membranes are moist without pallor  Cardio:  Regular rate and rhythm, S1 and S2 normal, no murmur, rub    or gallop, 2+ " pulses symmetric in all extremities  Pulm:  Clear to auscultation bilaterally, respirations unlabored with no accessory muscle use  Abdomen:  Abdomen is soft, non-distended with no tenderness to palpation, rebound tenderness, or guarding.   Extremities: Moves all extremities  Neuro: Patient is awake, alert, and responsive to voice. No gross motor weaknesses or sensory loss; moves all extremities.    LAB:  All pertinent labs reviewed and interpreted.  Labs Ordered and Resulted from Time of ED Arrival to Time of ED Departure   BASIC METABOLIC PANEL - Abnormal       Result Value    Sodium 135      Potassium 4.6      Chloride 95 (*)     Carbon Dioxide (CO2) 28      Anion Gap 12      Urea Nitrogen 12.0      Creatinine 0.55      GFR Estimate >90      Calcium 10.0      Glucose 325 (*)    BLOOD GAS VENOUS - Abnormal    pH Venous 7.34 (*)     pCO2 Venous 56 (*)     pO2 Venous 33      Bicarbonate Venous 30      Base Excess/Deficit 4.6      Oxyhemoglobin Venous 53.9 (*)     O2 Sat, Venous 54.8 (*)    GLUCOSE BY METER - Abnormal    GLUCOSE BY METER POCT 316 (*)    KETONE BETA-HYDROXYBUTYRATE QUANTITATIVE, RAPID - Normal    Ketone (Beta-Hydroxybutyrate) Quantitative <0.18     GLUCOSE MONITOR NURSING POCT   CBC WITH PLATELETS AND DIFFERENTIAL    WBC Count 9.8      RBC Count 4.71      Hemoglobin 12.5      Hematocrit 38.4      MCV 82      MCH 26.5      MCHC 32.6      RDW 14.3      Platelet Count 427      % Neutrophils 64      % Lymphocytes 26      % Monocytes 6      % Eosinophils 3      % Basophils 1      % Immature Granulocytes 0      NRBCs per 100 WBC 0      Absolute Neutrophils 6.3      Absolute Lymphocytes 2.5      Absolute Monocytes 0.6      Absolute Eosinophils 0.3      Absolute Basophils 0.1      Absolute Immature Granulocytes 0.0      Absolute NRBCs 0.0         RADIOLOGY:  Reviewed all pertinent imaging. Please see official radiology report.  No orders to display       Marsha Torres PA-C  Emergency Medicine  [facility]  [facility] EMERGENCY DEPARTMENT  [facility] [facility] [address]  [phone]  Dept: [phone]       Marsha Torres PA-C  12/15/23 2010

## 2023-12-18 ENCOUNTER — PATIENT OUTREACH (OUTPATIENT)
Dept: CARE COORDINATION | Facility: CLINIC | Age: 58
End: 2023-12-18

## 2023-12-18 ENCOUNTER — VIRTUAL VISIT (OUTPATIENT)
Dept: FAMILY MEDICINE | Facility: CLINIC | Age: 58
End: 2023-12-18
Payer: MEDICARE

## 2023-12-18 DIAGNOSIS — E11.65 TYPE 2 DIABETES MELLITUS WITH HYPERGLYCEMIA, WITHOUT LONG-TERM CURRENT USE OF INSULIN (H): Primary | ICD-10-CM

## 2023-12-18 PROCEDURE — 99441 PR PHYSICIAN TELEPHONE EVALUATION 5-10 MIN: CPT | Mod: 95 | Performed by: STUDENT IN AN ORGANIZED HEALTH CARE EDUCATION/TRAINING PROGRAM
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
## 2023-12-18 NOTE — PROCEDURES
"Family Medicine Telephone Visit Note                   No chief complaint on file.      {If Provider starts visit, do consent and meds as above using \"VIRTUAL tab.\" For calls - Use Stampsy marcel to use their \"\" function to call without revealing your cell phone # and show your clinics phone number. Or use *67 before dialing the 10 digit #. DELETE THIS TEXT.}     {If interpreted visit, fill out the following. Otherwise delete.}  Due to patient being non-English speaking/uses sign language, an  was used for this visit. Only for face-to-face interpretation by an external agency, date and length of interpretation can be found on the scanned worksheet.     name: ***  Agency: {interpagency:658033}  Language: {interplan}   Telephone number: ***  Type of interpretation: {FamMedInterpType:879736}         HPI   Patients name: Mary Ann  Appointment start time:  { :6978384}    {Superlists ():252754}      Current Outpatient Medications   Medication Sig Dispense Refill    acetaminophen (TYLENOL) 500 MG tablet Take 1-2 tablets (500-1,000 mg) by mouth every 6 hours as needed for pain  0    albuterol (PROAIR HFA/PROVENTIL HFA/VENTOLIN HFA) 108 (90 Base) MCG/ACT inhaler Inhale 2 puffs into the lungs every 4 hours as needed for shortness of breath or wheezing 18 g 0    albuterol (PROVENTIL) (2.5 MG/3ML) 0.083% neb solution Take 1 vial (2.5 mg) by nebulization every 6 hours as needed for shortness of breath or wheezing 90 mL 1    atorvastatin (LIPITOR) 40 MG tablet Take 1 tablet (40 mg) by mouth daily 90 tablet 3    blood glucose (NO BRAND SPECIFIED) test strip Use to test blood sugar 1 times daily or as directed. 100 strip 0    blood glucose (NO BRAND SPECIFIED) test strip Use to test blood sugar 1 times daily or as directed. 100 strip 3    blood glucose monitoring (NO BRAND SPECIFIED) meter device kit Use to test blood sugar 1 times daily or as directed. 1 kit 0    budesonide-formoterol " (SYMBICORT) 160-4.5 MCG/ACT Inhaler Inhale 2 puffs into the lungs 2 times daily 18 g 3    Calcium Carbonate-Vitamin D 500-5 MG-MCG TABS Take 1 tablet by mouth 2 times daily 90 tablet 3    fluticasone (FLONASE) 50 MCG/ACT nasal spray Spray 2 sprays into both nostrils daily 18.2 mL 0    fluticasone (FLONASE) 50 MCG/ACT nasal spray Spray 1 spray into both nostrils daily as needed for rhinitis or allergies      fluticasone-salmeterol (AIRDUO RESPICLICK) 113-14 MCG/ACT inhaler Inhale 1 puff into the lungs 2 times daily 1 each 1    HYDROcodone-acetaminophen (NORCO) 5-325 MG tablet Take 1 tablet by mouth every 6 hours as needed for severe pain 3 tablet 0    hydrOXYzine (ATARAX) 25 MG tablet Take 1 tablet (25 mg) by mouth 3 times daily as needed for itching 20 tablet 0    insulin aspart (NOVOLOG PEN) 100 UNIT/ML pen Inject 12 Units Subcutaneous 3 times daily (before meals) Sliding scale 15 mL 1    insulin pen needle (31G X 6 MM) 31G X 6 MM miscellaneous U pen needles daily or as directed. 100 each 1    ipratropium - albuterol 0.5 mg/2.5 mg/3 mL (DUONEB) 0.5-2.5 (3) MG/3ML neb solution Take 1 vial by nebulization 2 times daily as needed for shortness of breath, wheezing or cough      loratadine (CLARITIN) 10 MG tablet Take 1 tablet (10 mg) by mouth daily as needed for allergies 30 tablet 3    magnesium hydroxide (MILK OF MAGNESIA) 400 MG/5ML suspension Take 30 mLs by mouth 2 times daily as needed for constipation 354 mL 0    melatonin 3 MG tablet Take 1 tablet (3 mg) by mouth at bedtime 90 tablet 1    metFORMIN (GLUCOPHAGE) 1000 MG tablet Take 1 tablet (1,000 mg) by mouth 2 times daily (with meals) 180 tablet 3    Multiple Vitamins-Minerals (CENTRUM SILVER 50+WOMEN PO) Take 1 tablet by mouth daily      nitroGLYcerin (RECTIV) 0.4 % OINT rectal ointment Place 1 inch (1.5 mg) rectally every 12 hours 30 g 0    nystatin (MYCOSTATIN) 227631 UNIT/GM external powder Apply topically 2 times daily 30 g 0    omeprazole (PRILOSEC) 20 MG  "DR capsule Take 1 capsule (20 mg) by mouth daily 90 capsule 3    polyvinyl alcohol (LIQUIFILM TEARS) 1.4 % ophthalmic solution Apply 1 drop to eye as needed for dry eyes 30 mL 0    predniSONE (DELTASONE) 20 MG tablet Take two tablets (= 40mg) each day for 4 (four) days 8 tablet 0    prochlorperazine (COMPAZINE) 10 MG tablet Take 1 tablet (10 mg) by mouth every 8 hours as needed for other (headache) 10 tablet 0    sennosides (SENOKOT) 8.6 MG tablet Take 2 tablets by mouth 2 times daily 120 tablet 1    SUMAtriptan (IMITREX) 50 MG tablet Take 1 tablet (50 mg) by mouth at onset of headache for migraine May repeat in 2 hours. Max 4 tablets/24 hours. 10 tablet 0    venlafaxine (EFFEXOR XR) 37.5 MG 24 hr capsule Take 1 capsule (37.5 mg) by mouth daily for 30 days 30 capsule 0    vitamin D2 (ERGOCALCIFEROL) 86979 units (1250 mcg) capsule Take 1 capsule (50,000 Units) by mouth once a week 12 capsule 3     Allergies   Allergen Reactions    Gadolinium Derivatives Hives    Iodinated Contrast Media Hives    Azithromycin Other (See Comments) and Rash     Erythema Nodosum x2    Keflex [Cephalexin] Rash    Aspirin Other (See Comments)    Cefuroxime Itching and Other (See Comments)    Cheese GI Disturbance and Nausea    Contrast Dye Hives     IV    Corylus Other (See Comments)    Diatrizoate Hives    Iodixanol Unknown    Nuts Other (See Comments)    Septra [Sulfamethoxazole-Trimethoprim] Hives    Sulfa Antibiotics Hives    Metronidazole Itching and Rash    Nitrofurantoin Itching and Rash    Quinolones Rash              Review of Systems:     {ROS COMP (Optional):696867}         Physical Exam:     There were no vitals taken for this visit.  Estimated body mass index is 30.9 kg/m  as calculated from the following:    Height as of 12/15/23: 1.626 m (5' 4\").    Weight as of 12/15/23: 81.6 kg (180 lb).    Exam:  Constitutional: {:853834}  Psychiatric: {:109053}    {Result Choices:943695}        Assessment and Plan   {Diag " "[facility][1]:704402}    Refilled medications that would be required in the next 3 months.     After Visit Information:  {avs options:777764}    No follow-ups on file.    Appointment end time: { :7355213}  This is a telephone visit that took *** minutes.      Clinician location:  [facility]     Joanna Farah MD  I precepted today with ***.    {Bill telephone visit time codes. Coding will change to an E&M code if the patient's insurance allows for this.  However, documentation should support E&M code billing.  17850: 5-10 min  28522: 11-20 min  28369: 21-30 min}      {[employer] Language Line Access (Help text- F2 to highlight then hit delete)    Dial [phone]    Answer  Welcome to the Language Line Services.  Please enter your six-digit client ID.    [place] enter [id number]  [place] enter [id number]  [facility] enter [id number]  [place] enter [id number]    Answer  \"For Vincentian, press 1.  For all other languages, press 2.\"   they will ask you to say the name of the language.    Press either 1 (yes, correct) or 2 (no, incorrect).}    "

## 2023-12-18 NOTE — PROGRESS NOTES
Clinic Care Coordination Contact  Follow Up Progress Note      Assessment: The pt was recently in the ED, I called to check up on the pt and help the pt setup a ED follow up. The pt was at [facility] for medication refill. I called and talked to the pt, pt stated that she is doing fine. She has a follow up today at 4:20pm with     Care Gaps:    Health Maintenance Due   Topic Date Due    COPD ACTION PLAN  Never done    ADVANCE CARE PLANNING  11/15/2017    ZOSTER IMMUNIZATION (3 of 3) 11/30/2018    MAMMO SCREENING  09/10/2021    MEDICARE ANNUAL WELLNESS VISIT  11/02/2022    COLORECTAL CANCER SCREENING  11/22/2022    INFLUENZA VACCINE (1) 09/01/2023    COVID-19 Vaccine (3 - 2023-24 season) 09/01/2023           Care Plans      Intervention/Education provided during outreach:               Plan:     Care Coordinator will follow up in

## 2024-01-03 ENCOUNTER — HOSPITAL ENCOUNTER (OUTPATIENT)
Dept: GENERAL RADIOLOGY | Facility: HOSPITAL | Age: 59
Discharge: HOME | End: 2024-01-03
Attending: FAMILY MEDICINE | Admitting: FAMILY MEDICINE
Payer: MEDICARE

## 2024-01-03 ENCOUNTER — OFFICE VISIT (OUTPATIENT)
Dept: FAMILY MEDICINE | Facility: CLINIC | Age: 59
End: 2024-01-03
Payer: MEDICARE

## 2024-01-03 VITALS
SYSTOLIC BLOOD PRESSURE: 118 MMHG | BODY MASS INDEX: 30.9 KG/M2 | RESPIRATION RATE: 18 BRPM | HEART RATE: 84 BPM | OXYGEN SATURATION: 97 % | TEMPERATURE: 98.6 F | WEIGHT: 180 LBS | DIASTOLIC BLOOD PRESSURE: 73 MMHG

## 2024-01-03 DIAGNOSIS — R05.1 ACUTE COUGH: ICD-10-CM

## 2024-01-03 DIAGNOSIS — J06.9 VIRAL URI: Primary | ICD-10-CM

## 2024-01-03 DIAGNOSIS — R07.81 RIB PAIN: ICD-10-CM

## 2024-01-03 PROCEDURE — 99214 OFFICE O/P EST MOD 30 MIN: CPT | Performed by: FAMILY MEDICINE

## 2024-01-03 PROCEDURE — 71046 X-RAY EXAM CHEST 2 VIEWS: CPT
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
## 2024-01-12 ENCOUNTER — ANCILLARY PROCEDURE (OUTPATIENT)
Dept: GENERAL RADIOLOGY | Facility: CLINIC | Age: 59
End: 2024-01-12
Attending: STUDENT IN AN ORGANIZED HEALTH CARE EDUCATION/TRAINING PROGRAM
Payer: MEDICARE

## 2024-01-12 ENCOUNTER — OFFICE VISIT (OUTPATIENT)
Dept: URGENT CARE | Facility: URGENT CARE | Age: 59
End: 2024-01-12
Payer: MEDICARE

## 2024-01-12 VITALS
TEMPERATURE: 98.1 F | WEIGHT: 180 LBS | BODY MASS INDEX: 30.9 KG/M2 | DIASTOLIC BLOOD PRESSURE: 92 MMHG | OXYGEN SATURATION: 96 % | SYSTOLIC BLOOD PRESSURE: 141 MMHG | HEART RATE: 107 BPM

## 2024-01-12 DIAGNOSIS — R05.1 ACUTE COUGH: Primary | ICD-10-CM

## 2024-01-12 DIAGNOSIS — R05.1 ACUTE COUGH: ICD-10-CM

## 2024-01-12 DIAGNOSIS — J44.1 COPD EXACERBATION (H): ICD-10-CM

## 2024-01-12 PROCEDURE — 99214 OFFICE O/P EST MOD 30 MIN: CPT | Performed by: STUDENT IN AN ORGANIZED HEALTH CARE EDUCATION/TRAINING PROGRAM

## 2024-01-12 PROCEDURE — 71046 X-RAY EXAM CHEST 2 VIEWS: CPT | Mod: TC | Performed by: RADIOLOGY

## 2024-01-12 RX ORDER — PREDNISONE 20 MG/1
40 TABLET ORAL DAILY
Qty: 10 TABLET | Refills: 0 | Status: SHIPPED | OUTPATIENT
Start: 2024-01-12 | End: 2024-01-17

## 2024-01-12 RX ORDER — BENZONATATE 100 MG/1
100 CAPSULE ORAL 3 TIMES DAILY PRN
Qty: 15 CAPSULE | Refills: 0 | Status: SHIPPED | OUTPATIENT
Start: 2024-01-12 | End: 2024-01-17

## 2024-01-12 RX ORDER — AZITHROMYCIN 250 MG/1
TABLET, FILM COATED ORAL
Qty: 6 TABLET | Refills: 0 | Status: SHIPPED | OUTPATIENT
Start: 2024-01-12 | End: 2024-01-17

## 2024-01-13 NOTE — PATIENT INSTRUCTIONS
Your chest x-ray was negative for acute bacterial pneumonia.  Based on my assessment and evaluation, you are having a COPD exacerbation. Please take the antibiotics and steroids for 5 days.  Continue using your daily inhalers as well as albuterol as needed every 2-4 hours.    If you develop a fever, chills, night sweats, please present to the emergency department or return to urgent care for evaluation

## 2024-01-18 DIAGNOSIS — J44.1 COPD EXACERBATION (H): ICD-10-CM

## 2024-01-18 NOTE — TELEPHONE ENCOUNTER
[facility] phone call message- medication clarification/question:    Full Medication Name: albuterol (PROVENTIL) (2.5 MG/3ML) 0.083% neb solution        Question: patient called in requesting refill of above medication -- very demanding that it be done today - please fill if able to below pharmacy-- thank you    Pharmacy confirmed as      [id number] - [place], [place] - [address] [place] [address] [address]  D: Yes    OK to leave a message on voice mail? Yes    Primary language: English      needed? No    Call taken on January 18, 2024 at 11:03 AM by Ana Laura Stephens

## 2024-01-19 ENCOUNTER — OFFICE VISIT (OUTPATIENT)
Dept: FAMILY MEDICINE | Facility: CLINIC | Age: 59
End: 2024-01-19
Payer: MEDICARE

## 2024-01-19 VITALS
RESPIRATION RATE: 22 BRPM | DIASTOLIC BLOOD PRESSURE: 79 MMHG | SYSTOLIC BLOOD PRESSURE: 125 MMHG | OXYGEN SATURATION: 95 % | HEIGHT: 62 IN | BODY MASS INDEX: 36.25 KG/M2 | WEIGHT: 197 LBS | TEMPERATURE: 98.5 F | HEART RATE: 105 BPM

## 2024-01-19 DIAGNOSIS — F31.81 BIPOLAR 2 DISORDER (H): ICD-10-CM

## 2024-01-19 DIAGNOSIS — E11.65 TYPE 2 DIABETES MELLITUS WITH HYPERGLYCEMIA, WITHOUT LONG-TERM CURRENT USE OF INSULIN (H): Primary | ICD-10-CM

## 2024-01-19 DIAGNOSIS — F60.9 PERSONALITY DISORDER (H): ICD-10-CM

## 2024-01-19 DIAGNOSIS — F40.241 FEAR OF HEIGHTS: ICD-10-CM

## 2024-01-19 PROCEDURE — 36416 COLLJ CAPILLARY BLOOD SPEC: CPT | Performed by: STUDENT IN AN ORGANIZED HEALTH CARE EDUCATION/TRAINING PROGRAM

## 2024-01-19 PROCEDURE — 83036 HEMOGLOBIN GLYCOSYLATED A1C: CPT | Performed by: STUDENT IN AN ORGANIZED HEALTH CARE EDUCATION/TRAINING PROGRAM

## 2024-01-19 PROCEDURE — 99213 OFFICE O/P EST LOW 20 MIN: CPT | Performed by: STUDENT IN AN ORGANIZED HEALTH CARE EDUCATION/TRAINING PROGRAM

## 2024-01-19 NOTE — PROGRESS NOTES
"  Assessment & Plan     Type 2 diabetes mellitus with hyperglycemia, without long-term current use of insulin (H)-due for A1c.  On orals only.  - Hemoglobin A1c; Future  - Hemoglobin A1c    Fear of heights  Personality disorder (H)  Bipolar 2 disorder (H)  Agree with need for specialized mobility given personality disorder as well as severe anxiety.  I do believe patient benefits from a  animal.  Forms completed for all requests today and given back to patient.      Rasheeda Reese is a 58 year old, presenting for the following health issues:  Forms    HPI      Patient presents today for a variety of forms to be completed.  These include [employer] [facility], application for  animal and current residence, and application for lower unit due to fear of heights.      Objective    /79   Pulse 105   Temp 98.5  F (36.9  C)   Resp 22   Ht 1.562 m (5' 1.5\")   Wt 89.4 kg (197 lb)   SpO2 95%   BMI 36.62 kg/m    Body mass index is 36.62 kg/m .  Physical Exam   GEN: NAD  HEENT: head atraumatic, normocephalic, moist mucous membranes, eyes anicteric  CV: RRR w/o M/R/G  PULM: CTAB  ABD: soft, non-tender, no appreciable masses, no guarding, BS present  Neuro: alert and oriented x 3, CN II-XII intact, normal gross motor movements  Psych: appropriate  Ext: no peripheral edema          Signed Electronically by: Joanna Farah MD    "

## 2024-01-22 LAB — HBA1C MFR BLD: 12.2 % (ref 0–5.6)

## 2024-01-22 RX ORDER — ALBUTEROL SULFATE 0.83 MG/ML
2.5 SOLUTION RESPIRATORY (INHALATION) EVERY 6 HOURS PRN
Qty: 90 ML | Refills: 1 | Status: SHIPPED | OUTPATIENT
Start: 2024-01-22 | End: 2024-02-13

## 2024-01-25 ENCOUNTER — HOSPITAL ENCOUNTER (EMERGENCY)
Facility: HOSPITAL | Age: 59
Discharge: LEFT BEFORE DISCHARGE | End: 2024-01-25
Attending: EMERGENCY MEDICINE | Admitting: EMERGENCY MEDICINE
Payer: MEDICARE

## 2024-01-25 VITALS
BODY MASS INDEX: 36.8 KG/M2 | SYSTOLIC BLOOD PRESSURE: 151 MMHG | RESPIRATION RATE: 20 BRPM | DIASTOLIC BLOOD PRESSURE: 92 MMHG | WEIGHT: 197.97 LBS | OXYGEN SATURATION: 95 % | HEART RATE: 118 BPM | TEMPERATURE: 97.8 F

## 2024-01-25 DIAGNOSIS — R06.02 SHORTNESS OF BREATH: ICD-10-CM

## 2024-01-25 DIAGNOSIS — R73.9 HYPERGLYCEMIA: ICD-10-CM

## 2024-01-25 LAB
ANION GAP SERPL CALCULATED.3IONS-SCNC: 11 MMOL/L (ref 7–15)
BASOPHILS # BLD AUTO: 0.1 10E3/UL (ref 0–0.2)
BASOPHILS NFR BLD AUTO: 1 %
BUN SERPL-MCNC: 8 MG/DL (ref 8–23)
CALCIUM SERPL-MCNC: 8.6 MG/DL (ref 8.6–10)
CHLORIDE SERPL-SCNC: 99 MMOL/L (ref 98–107)
CREAT SERPL-MCNC: 0.51 MG/DL (ref 0.51–0.95)
D DIMER PPP FEU-MCNC: 0.44 UG/ML FEU (ref 0–0.5)
DEPRECATED HCO3 PLAS-SCNC: 24 MMOL/L (ref 22–29)
EGFRCR SERPLBLD CKD-EPI 2021: >90 ML/MIN/1.73M2
EOSINOPHIL # BLD AUTO: 0.4 10E3/UL (ref 0–0.7)
EOSINOPHIL NFR BLD AUTO: 4 %
ERYTHROCYTE [DISTWIDTH] IN BLOOD BY AUTOMATED COUNT: 14.9 % (ref 10–15)
FLUAV RNA SPEC QL NAA+PROBE: NEGATIVE
FLUBV RNA RESP QL NAA+PROBE: NEGATIVE
GLUCOSE SERPL-MCNC: 455 MG/DL (ref 70–99)
HCT VFR BLD AUTO: 37.7 % (ref 35–47)
HGB BLD-MCNC: 11.8 G/DL (ref 11.7–15.7)
IMM GRANULOCYTES # BLD: 0 10E3/UL
IMM GRANULOCYTES NFR BLD: 0 %
LYMPHOCYTES # BLD AUTO: 2.1 10E3/UL (ref 0.8–5.3)
LYMPHOCYTES NFR BLD AUTO: 20 %
MAGNESIUM SERPL-MCNC: 1.9 MG/DL (ref 1.7–2.3)
MCH RBC QN AUTO: 25.4 PG (ref 26.5–33)
MCHC RBC AUTO-ENTMCNC: 31.3 G/DL (ref 31.5–36.5)
MCV RBC AUTO: 81 FL (ref 78–100)
MONOCYTES # BLD AUTO: 0.5 10E3/UL (ref 0–1.3)
MONOCYTES NFR BLD AUTO: 5 %
NEUTROPHILS # BLD AUTO: 7.1 10E3/UL (ref 1.6–8.3)
NEUTROPHILS NFR BLD AUTO: 70 %
NRBC # BLD AUTO: 0 10E3/UL
NRBC BLD AUTO-RTO: 0 /100
NT-PROBNP SERPL-MCNC: <36 PG/ML (ref 0–900)
PLATELET # BLD AUTO: 367 10E3/UL (ref 150–450)
POTASSIUM SERPL-SCNC: ABNORMAL MMOL/L
RBC # BLD AUTO: 4.65 10E6/UL (ref 3.8–5.2)
RSV RNA SPEC NAA+PROBE: NEGATIVE
SARS-COV-2 RNA RESP QL NAA+PROBE: NEGATIVE
SODIUM SERPL-SCNC: 134 MMOL/L (ref 135–145)
WBC # BLD AUTO: 10.2 10E3/UL (ref 4–11)

## 2024-01-25 PROCEDURE — 82374 ASSAY BLOOD CARBON DIOXIDE: CPT | Performed by: EMERGENCY MEDICINE

## 2024-01-25 PROCEDURE — 36415 COLL VENOUS BLD VENIPUNCTURE: CPT | Performed by: EMERGENCY MEDICINE

## 2024-01-25 PROCEDURE — 83880 ASSAY OF NATRIURETIC PEPTIDE: CPT | Performed by: EMERGENCY MEDICINE

## 2024-01-25 PROCEDURE — 99283 EMERGENCY DEPT VISIT LOW MDM: CPT

## 2024-01-25 PROCEDURE — 87637 SARSCOV2&INF A&B&RSV AMP PRB: CPT | Performed by: STUDENT IN AN ORGANIZED HEALTH CARE EDUCATION/TRAINING PROGRAM

## 2024-01-25 PROCEDURE — 83735 ASSAY OF MAGNESIUM: CPT | Performed by: EMERGENCY MEDICINE

## 2024-01-25 PROCEDURE — 85025 COMPLETE CBC W/AUTO DIFF WBC: CPT | Performed by: EMERGENCY MEDICINE

## 2024-01-25 PROCEDURE — 85379 FIBRIN DEGRADATION QUANT: CPT | Performed by: EMERGENCY MEDICINE

## 2024-01-25 PROCEDURE — 82565 ASSAY OF CREATININE: CPT | Performed by: EMERGENCY MEDICINE

## 2024-01-26 ENCOUNTER — PATIENT OUTREACH (OUTPATIENT)
Dept: CARE COORDINATION | Facility: CLINIC | Age: 59
End: 2024-01-26
Payer: MEDICARE

## 2024-01-26 NOTE — ED NOTES
"Pt ran up to writer who was triaging another pt, stating \"when is it my turn to get called? I have seen people go in that have been here less time than me\". Writer attempted to educate pt on acuity and that there are people that have been waiting for 4+ hours. Writer stated that I can assist her more after done triaging other pt.   "

## 2024-01-26 NOTE — ED NOTES
"Pt yelling in waiting room cursing \"this is Bullsh*t\". Writer walked up to pt to see what pt's concerns were she reports \"I need to be seen, I'm not going to have a ride soon\". Pt informed that we are waiting for the Xray at this time that the provider placed. Pt states \"there are people getting seen before me\" writer attempted to explain processing of acuity and pt interrupts writer and begins yelling again and cursing at writer \"get me the hell out of here\", \"I want to go, take my IV out\".  Dr. Alford informed of pt wanting to leave and writer brought pt leaving against medical advice paperwork and pt refused to sign \"I'm not signing that Sh*t\", \"get the hell away from me with that\", \"I'm never coming back here again\". IV removed and pt dismissed  "

## 2024-01-26 NOTE — ED TRIAGE NOTES
"Patient arrives by private car for evaluation of shortness of breath and a cough x 1 week.  Patient reports history of COPD.  Reports administering \"several\" nebs and using inhaler often today.  Finished course of prednisone and antibiotics 5 days ago.         "

## 2024-01-26 NOTE — PROGRESS NOTES
Clinic Care Coordination Contact  Follow Up Progress Note      Assessment:  The pt was recently in the ED, I called to check up on the pt and help the pt setup a ED follow up. The pt was at [facility], and shortness of breath. I called and talked to the pt, she stated that she was at the ED but left. She does have a follow up with  on 01/29/2024 at 1:40pm.    Care Gaps:    Health Maintenance Due   Topic Date Due    COPD ACTION PLAN  Never done    ADVANCE CARE PLANNING  11/15/2017    ZOSTER IMMUNIZATION (3 of 3) 11/30/2018    MAMMO SCREENING  09/10/2021    MEDICARE ANNUAL WELLNESS VISIT  11/02/2022    COLORECTAL CANCER SCREENING  11/22/2022    INFLUENZA VACCINE (1) 09/01/2023    COVID-19 Vaccine (3 - 2023-24 season) 09/01/2023           Care Plans      Intervention/Education provided during outreach:               Plan:     Care Coordinator will follow up in

## 2024-01-26 NOTE — ED PROVIDER NOTES
EMERGENCY DEPARTMENT ENCOUNTER      NAME: Mary Ann Olson  AGE: 58 year old female  YOB: 1965  MRN: [id number]  EVALUATION DATE & TIME: No admission date for patient encounter.    PCP: Joanna Farah    ED PROVIDER: Lisa Alford M.D.      Chief Complaint   Patient presents with    Shortness of Breath    Cough         FINAL IMPRESSION:  1. Hyperglycemia    2. Shortness of breath        ED COURSE & MEDICAL DECISION MAKING:    Pertinent Labs & Imaging studies reviewed. (See chart for details)  ED Course as of 01/25/24 2146   Thu Jan 25, 2024 1840 Patient's symptoms do not seem obviously consistent with COPD as she is not having a lot of wheezing.  I am little worried about CHF.  She refused the EKG and told me there is no way I was going to convince her to have it done so we will just check blood work on her.  Will get COVID, flu, RSV.  Will get a chest x-ray and see what we can find.  She has not been taking her inhaled steroid because of the out-of-pocket expense which I think puts her at risk for this to be more of a COPD exacerbation although lung sounds are pretty good.  Will get a D-dimer on her because I am concerned about potential PE with clear lung sounds.  Depending on that we will determine whether we get chest x-ray or CT scan.  Patient is in agreement with the plan.   1953 Patient was asking to leave.  Her labs are not even back yet.  I will order chest x-ray on her and hopefully she can at least get that done.   2145 Patient left AMA.  She was apparently upset that she did not get back into a room.  We asked her to stay for a chest x-ray and she did not.  She had her IV removed and left.  I did review her labs after she left and her BNP and D-dimer were both negative which is very reassuring.  Her BMP hemolyzed for potassium was elevated but her electrolytes otherwise looked okay.  COVID, flu, RSV were all negative.  Glucose was elevated to 455.  I had not discussed  "results with patient prior to her discharge.       Medical Decision Making    History:  Supplemental history from: None  External Record(s) reviewed: I reviewed the note from 1/12/2024 when the patient was seen at urgent care.  She was diagnosed with a COPD exacerbation and was started on prednisone and azithromycin.    Work Up:  Emergent/Severe conditions considered and evaluated for: COPD exacerbation, pneumonia, pneumothorax, pulmonary embolism, electrolyte abnormality, renal failure  I independently reviewed and interpreted none  In additional to work up documented, I considered the following work up: Ordered EKG and patient declined, ordered chest x-ray and patient declined  Medications given that require intensive monitoring for toxicity: None    External consultation:  Discussion of management with another provider: None    Complicating factors:  Care impacted by chronic illness: COPD, asthma, diabetes, bipolar  Care affected by social determinants of health: Difficulty affording medications so noncompliance related to cost    Disposition considerations: AGAINST MEDICAL ADVICE  Prescriptions considered/prescribed: None    MEDICATIONS GIVEN IN THE EMERGENCY:  Medications - No data to display    NEW PRESCRIPTIONS STARTED AT TODAY'S ER VISIT  Discharge Medication List as of 1/25/2024  8:00 PM             =================================================================    HPI    Triage Note: Patient arrives by private car for evaluation of shortness of breath and a cough x 1 week.  Patient reports history of COPD.  Reports administering \"several\" nebs and using inhaler often today.  Finished course of prednisone and antibiotics 5 days ago.     Patient information was obtained from: Patient    Use of : N/A       Mary Ann Olson is a 58 year old female who presents for evaluation of shortness of breath for the last 1 week.  She says it is worse with lying down.  She is gets more of a cough with " laying down.  She says been worse when she is at home because of allergies to her animals.  She has pain in her ribs when she coughs.  She reports soreness there.  She reports chills on and off.  She said she was recently diagnosed with a COPD exacerbation and finished a course of prednisone and antibiotic 5 days ago.  She said she pays out-of-pocket for her medications and so has not been able to afford her inhaled steroid right now and is not taking it.  She denies any cardiac history.    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    Antihistamines overdose, intentional self-harm, initial encounter (H)     Asthma     Bipolar 1 disorder (H) hx of bipolar listed in h and p    Bipolar 2 disorder (H)     Bipolar I disorder, single manic episode (H)     Created by Conversion  Replacement Utility updated for latest IMO load    Cellulitis 2006 left ankle, 2008 right foot    COPD (chronic obstructive pulmonary disease) (H)     Crohn's disease (H)     arthritis associated with crohn's    Diabetes mellitus (H)     Diabetes mellitus, type 2 (H)     Fibrocystic breast     Heat stroke     when a young child     Hyperlipidemia     Idiopathic acute pancreatitis 07/14/2015    Irritable bowel syndrome     Created by Conversion     Kidney stone     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     Overdose     Pyoderma gangrenosum (H28)     2016    Sacroiliitis (H24) 2004    Skin lesion of left leg 08/27/2015    Suicidal ideation     Suicide attempt (H)     Traumatic iritis 07/21/2015    Umbilical hernia     Uncomplicated asthma        PAST SURGICAL HISTORY:  Past Surgical History:   Procedure Laterality Date    BIOPSY BREAST Left     BIOPSY OF BREAST, INCISIONAL      Description: Biopsy Breast Open;  Recorded: 10/28/2010;     REMOVAL OF TONSILS,<13 Y/O      Description: Tonsillectomy;  Recorded: 07/08/2009;    ZZC LIGATE FALLOPIAN TUBE      Description:  Tubal Ligation;  Recorded: 07/08/2009;       CURRENT MEDICATIONS:    No current facility-administered medications for this encounter.    Current Outpatient Medications:     acetaminophen (TYLENOL) 500 MG tablet, Take 1-2 tablets (500-1,000 mg) by mouth every 6 hours as needed for pain, Disp: , Rfl: 0    albuterol (PROAIR HFA/PROVENTIL HFA/VENTOLIN HFA) 108 (90 Base) MCG/ACT inhaler, Inhale 2 puffs into the lungs every 4 hours as needed for shortness of breath or wheezing, Disp: 18 g, Rfl: 0    albuterol (PROVENTIL) (2.5 MG/3ML) 0.083% neb solution, Take 1 vial (2.5 mg) by nebulization every 6 hours as needed for shortness of breath or wheezing, Disp: 90 mL, Rfl: 1    atorvastatin (LIPITOR) 40 MG tablet, Take 1 tablet (40 mg) by mouth daily, Disp: 90 tablet, Rfl: 3    blood glucose (NO BRAND SPECIFIED) test strip, Use to test blood sugar 1 times daily or as directed., Disp: 100 strip, Rfl: 0    blood glucose (NO BRAND SPECIFIED) test strip, Use to test blood sugar 1 times daily or as directed., Disp: 100 strip, Rfl: 3    blood glucose monitoring (NO BRAND SPECIFIED) meter device kit, Use to test blood sugar 1 times daily or as directed., Disp: 1 kit, Rfl: 0    budesonide-formoterol (SYMBICORT) 160-4.5 MCG/ACT Inhaler, Inhale 2 puffs into the lungs 2 times daily, Disp: 18 g, Rfl: 3    Calcium Carbonate-Vitamin D 500-5 MG-MCG TABS, Take 1 tablet by mouth 2 times daily, Disp: 90 tablet, Rfl: 3    fluticasone (FLONASE) 50 MCG/ACT nasal spray, Spray 2 sprays into both nostrils daily, Disp: 18.2 mL, Rfl: 0    fluticasone (FLONASE) 50 MCG/ACT nasal spray, Spray 1 spray into both nostrils daily as needed for rhinitis or allergies, Disp: , Rfl:     fluticasone-salmeterol (AIRDUO RESPICLICK) 113-14 MCG/ACT inhaler, Inhale 1 puff into the lungs 2 times daily, Disp: 1 each, Rfl: 1    HYDROcodone-acetaminophen (NORCO) 5-325 MG tablet, Take 1 tablet by mouth every 6 hours as needed for severe pain, Disp: 3 tablet, Rfl: 0     hydrOXYzine (ATARAX) 25 MG tablet, Take 1 tablet (25 mg) by mouth 3 times daily as needed for itching, Disp: 20 tablet, Rfl: 0    insulin aspart (NOVOLOG PEN) 100 UNIT/ML pen, Inject 12 Units Subcutaneous 3 times daily (before meals) Sliding scale, Disp: 15 mL, Rfl: 1    insulin pen needle (31G X 6 MM) 31G X 6 MM miscellaneous, U pen needles daily or as directed., Disp: 100 each, Rfl: 1    ipratropium - albuterol 0.5 mg/2.5 mg/3 mL (DUONEB) 0.5-2.5 (3) MG/3ML neb solution, Take 1 vial by nebulization 2 times daily as needed for shortness of breath, wheezing or cough, Disp: , Rfl:     loratadine (CLARITIN) 10 MG tablet, Take 1 tablet (10 mg) by mouth daily as needed for allergies, Disp: 30 tablet, Rfl: 3    magnesium hydroxide (MILK OF MAGNESIA) 400 MG/5ML suspension, Take 30 mLs by mouth 2 times daily as needed for constipation, Disp: 354 mL, Rfl: 0    melatonin 3 MG tablet, Take 1 tablet (3 mg) by mouth at bedtime, Disp: 90 tablet, Rfl: 1    metFORMIN (GLUCOPHAGE) 1000 MG tablet, Take 1 tablet (1,000 mg) by mouth 2 times daily (with meals), Disp: 180 tablet, Rfl: 3    Multiple Vitamins-Minerals (CENTRUM SILVER 50+WOMEN PO), Take 1 tablet by mouth daily, Disp: , Rfl:     nitroGLYcerin (RECTIV) 0.4 % OINT rectal ointment, Place 1 inch (1.5 mg) rectally every 12 hours, Disp: 30 g, Rfl: 0    nystatin (MYCOSTATIN) 204215 UNIT/GM external powder, Apply topically 2 times daily, Disp: 30 g, Rfl: 0    omeprazole (PRILOSEC) 20 MG DR capsule, Take 1 capsule (20 mg) by mouth daily, Disp: 90 capsule, Rfl: 3    polyvinyl alcohol (LIQUIFILM TEARS) 1.4 % ophthalmic solution, Apply 1 drop to eye as needed for dry eyes, Disp: 30 mL, Rfl: 0    predniSONE (DELTASONE) 20 MG tablet, Take two tablets (= 40mg) each day for 4 (four) days, Disp: 8 tablet, Rfl: 0    prochlorperazine (COMPAZINE) 10 MG tablet, Take 1 tablet (10 mg) by mouth every 8 hours as needed for other (headache), Disp: 10 tablet, Rfl: 0    sennosides (SENOKOT) 8.6 MG  tablet, Take 2 tablets by mouth 2 times daily, Disp: 120 tablet, Rfl: 1    SUMAtriptan (IMITREX) 50 MG tablet, Take 1 tablet (50 mg) by mouth at onset of headache for migraine May repeat in 2 hours. Max 4 tablets/24 hours., Disp: 10 tablet, Rfl: 0    venlafaxine (EFFEXOR XR) 37.5 MG 24 hr capsule, Take 1 capsule (37.5 mg) by mouth daily for 30 days, Disp: 30 capsule, Rfl: 0    vitamin D2 (ERGOCALCIFEROL) 80216 units (1250 mcg) capsule, Take 1 capsule (50,000 Units) by mouth once a week, Disp: 12 capsule, Rfl: 3    ALLERGIES:  Allergies   Allergen Reactions    Gadolinium Derivatives Hives    Iodinated Contrast Media Hives    Azithromycin Other (See Comments) and Rash     Erythema Nodosum x2    Keflex [Cephalexin] Rash    Aspirin Other (See Comments)    Cefuroxime Itching and Other (See Comments)    Cheese GI Disturbance and Nausea    Contrast Dye Hives     IV    Corylus Other (See Comments)    Diatrizoate Hives    Iodixanol Unknown    Nuts Other (See Comments)    Septra [Sulfamethoxazole-Trimethoprim] Hives    Sulfa Antibiotics Hives    Metronidazole Itching and Rash    Nitrofurantoin Itching and Rash    Quinolones Rash       FAMILY HISTORY:  Family History   Problem Relation Age of Onset    Coronary Artery Disease Mother     Diabetes Father     Breast Cancer Maternal Grandmother     Asthma Son     Asthma Daughter     Hemophilia Other     Diabetes Type 2  Father     Factor VIII deficiency Other        SOCIAL HISTORY:   Social History     Socioeconomic History    Marital status: Single   Tobacco Use    Smoking status: Former     Packs/day: .5     Types: Cigarettes     Passive exposure: Never    Smokeless tobacco: Never    Tobacco comments:     2-3 cig/day   Vaping Use    Vaping Use: Never used   Substance and Sexual Activity    Alcohol use: No    Drug use: No     Social Determinants of Health     Financial Resource Strain: Low Risk  (12/5/2023)    Financial Resource Strain     Within the past 12 months, have you or  your family members you live with been unable to get utilities (heat, electricity) when it was really needed?: No   Food Insecurity: High Risk (12/5/2023)    Food Insecurity     Within the past 12 months, did you worry that your food would run out before you got money to buy more?: Yes     Within the past 12 months, did the food you bought just not last and you didn t have money to get more?: Yes   Transportation Needs: High Risk (12/5/2023)    Transportation Needs     Within the past 12 months, has lack of transportation kept you from medical appointments, getting your medicines, non-medical meetings or appointments, work, or from getting things that you need?: Yes   Housing Stability: Low Risk  (12/5/2023)    Housing Stability     Do you have housing? : Yes     Are you worried about losing your housing?: No       PHYSICAL EXAM    VITAL SIGNS: BP (!) 151/92   Pulse 118   Temp 97.8  F (36.6  C) (Oral)   Resp 20   Wt 89.8 kg (197 lb 15.6 oz)   SpO2 95%   BMI 36.80 kg/m     GENERAL: Awake, alert, answering questions appropriately, no significant distress  SPEECH:  Easy to understand speech, Normal volume and alisia  PULMONARY: No respiratory distress, Lungs clear to auscultation bilaterally  CARDIOVASCULAR: Regular tachycardic rate and rhythm, Distal pulses present and normal.  ABDOMINAL: Soft, Nondistended, Nontender, No rebound or guarding, No palpable masses  EXTREMITIES: No lower extremity edema.  PSYCH: Normal mood and affect     LAB:  All pertinent labs reviewed and interpreted.  Results for orders placed or performed during the hospital encounter of 01/25/24   Symptomatic Influenza A/B, RSV, & SARS-CoV2 PCR (COVID-19) Nasopharyngeal    Specimen: Nasopharyngeal; Swab   Result Value Ref Range    Influenza A PCR Negative Negative    Influenza B PCR Negative Negative    RSV PCR Negative Negative    SARS CoV2 PCR Negative Negative   D dimer quantitative   Result Value Ref Range    D-Dimer Quantitative 0.44  0.00 - 0.50 ug/mL FEU   Basic metabolic panel   Result Value Ref Range    Sodium 134 (L) 135 - 145 mmol/L    Potassium      Chloride 99 98 - 107 mmol/L    Carbon Dioxide (CO2) 24 22 - 29 mmol/L    Anion Gap 11 7 - 15 mmol/L    Urea Nitrogen 8.0 8.0 - 23.0 mg/dL    Creatinine 0.51 0.51 - 0.95 mg/dL    GFR Estimate >90 >60 mL/min/1.73m2    Calcium 8.6 8.6 - 10.0 mg/dL    Glucose 455 (H) 70 - 99 mg/dL   Result Value Ref Range    Magnesium 1.9 1.7 - 2.3 mg/dL   Nt probnp inpatient (BNP)   Result Value Ref Range    N terminal Pro BNP Inpatient <36 0 - 900 pg/mL   CBC with platelets and differential   Result Value Ref Range    WBC Count 10.2 4.0 - 11.0 10e3/uL    RBC Count 4.65 3.80 - 5.20 10e6/uL    Hemoglobin 11.8 11.7 - 15.7 g/dL    Hematocrit 37.7 35.0 - 47.0 %    MCV 81 78 - 100 fL    MCH 25.4 (L) 26.5 - 33.0 pg    MCHC 31.3 (L) 31.5 - 36.5 g/dL    RDW 14.9 10.0 - 15.0 %    Platelet Count 367 150 - 450 10e3/uL    % Neutrophils 70 %    % Lymphocytes 20 %    % Monocytes 5 %    % Eosinophils 4 %    % Basophils 1 %    % Immature Granulocytes 0 %    NRBCs per 100 WBC 0 <1 /100    Absolute Neutrophils 7.1 1.6 - 8.3 10e3/uL    Absolute Lymphocytes 2.1 0.8 - 5.3 10e3/uL    Absolute Monocytes 0.5 0.0 - 1.3 10e3/uL    Absolute Eosinophils 0.4 0.0 - 0.7 10e3/uL    Absolute Basophils 0.1 0.0 - 0.2 10e3/uL    Absolute Immature Granulocytes 0.0 <=0.4 10e3/uL    Absolute NRBCs 0.0 10e3/uL       RADIOLOGY:  Chest XR,  PA & LAT    (Results Pending)         I, Ellie Weeks, am serving as a scribe to document services personally performed by Dr. Alford based on my observation and the provider's statements to me. I, Lisa Alford MD attest that Ellie Weeks is acting in a scribe capacity, has observed my performance of the services and has documented them in accordance with my direction.    Lisa Alford M.D.  Emergency Medicine  [facility] EMERGENCY DEPARTMENT  [address]  [facility] [address]  [phone]  Dept: [phone]      Lisa Alford MD  01/25/24 4809

## 2024-01-29 ENCOUNTER — ANCILLARY PROCEDURE (OUTPATIENT)
Dept: GENERAL RADIOLOGY | Facility: CLINIC | Age: 59
End: 2024-01-29
Attending: STUDENT IN AN ORGANIZED HEALTH CARE EDUCATION/TRAINING PROGRAM
Payer: MEDICARE

## 2024-01-29 ENCOUNTER — OFFICE VISIT (OUTPATIENT)
Dept: FAMILY MEDICINE | Facility: CLINIC | Age: 59
End: 2024-01-29
Payer: MEDICARE

## 2024-01-29 VITALS
DIASTOLIC BLOOD PRESSURE: 81 MMHG | TEMPERATURE: 98.3 F | HEIGHT: 62 IN | BODY MASS INDEX: 35.51 KG/M2 | WEIGHT: 193 LBS | RESPIRATION RATE: 22 BRPM | OXYGEN SATURATION: 95 % | SYSTOLIC BLOOD PRESSURE: 134 MMHG | HEART RATE: 105 BPM

## 2024-01-29 DIAGNOSIS — E11.65 TYPE 2 DIABETES MELLITUS WITH HYPERGLYCEMIA, WITHOUT LONG-TERM CURRENT USE OF INSULIN (H): Primary | ICD-10-CM

## 2024-01-29 DIAGNOSIS — R06.02 SHORTNESS OF BREATH: ICD-10-CM

## 2024-01-29 LAB
FASTING STATUS PATIENT QL REPORTED: NO
GLUCOSE SERPL-MCNC: 390 MG/DL

## 2024-01-29 PROCEDURE — 36415 COLL VENOUS BLD VENIPUNCTURE: CPT | Performed by: STUDENT IN AN ORGANIZED HEALTH CARE EDUCATION/TRAINING PROGRAM

## 2024-01-29 PROCEDURE — 82947 ASSAY GLUCOSE BLOOD QUANT: CPT | Performed by: STUDENT IN AN ORGANIZED HEALTH CARE EDUCATION/TRAINING PROGRAM

## 2024-01-29 PROCEDURE — 99214 OFFICE O/P EST MOD 30 MIN: CPT | Performed by: STUDENT IN AN ORGANIZED HEALTH CARE EDUCATION/TRAINING PROGRAM

## 2024-01-29 PROCEDURE — 71046 X-RAY EXAM CHEST 2 VIEWS: CPT | Mod: TC | Performed by: RADIOLOGY

## 2024-01-29 RX ORDER — BLOOD SUGAR DIAGNOSTIC
STRIP MISCELLANEOUS
Qty: 100 EACH | Refills: 3 | Status: SHIPPED | OUTPATIENT
Start: 2024-01-29 | End: 2024-03-22

## 2024-01-29 NOTE — PROGRESS NOTES
"  Assessment & Plan     Type 2 diabetes mellitus with hyperglycemia, without long-term current use of insulin (H)-significant improvement in glucose control needed   Due to insurance issues, [facility] and will start with 10 units daily.  Can titrate insulin dosing from there.  PharmD available today to do syringe and vial teaching with patient.   - Glucose By Meter - Accucheck ([facility])  - insulin syringe-needle U-100 (31G X 5/16\" 0.3 ML) 31G X 5/16\" 0.3 ML miscellaneous; Use 1 syringes daily or as directed.  - insulin  UNIT/ML vial; Inject 10 Units Subcutaneous daily    Shortness of breath-exam negative for wheeze.  Poor air movement generally.  Without infiltrate recommend focusing on inhaler compliance.  Do not recommend steroid at this time   - XR CHEST 2 VW; Future      Subjective   Mary Ann is a 58 year old, presenting for the following health issues:  RECHECK (DM)      1/29/2024     1:54 PM   Additional Questions   Roomed by Beatris   Accompanied by SALVADOR CHAN     Patient presents today after presenting to the emergency department on 25 January.  She initially presented for shortness of breath but became frustrated with the delay in time for care and treatment and left AMA after having some initial labs drawn.  At that time basic metabolic panel was significant for an elevated glucose.  BNP was normal.  Patient left prior to obtaining chest x-ray.  She reports that she is continuing to experience shortness of breath.  She does have some sputum production.  Denies fevers.  She has not been able to use her inhaler as of late due to troubles with pain at for the cost of the inhalers.    Patient was also recently contacted by myself to discuss diabetes.  She has an A1c greater than 12.  Glucose in the hospital was 453.      Objective    /81   Pulse 105   Temp 98.3  F (36.8  C)   Resp 22   Ht 1.562 m (5' 1.5\")   Wt 87.5 kg (193 lb)   SpO2 95%   BMI 35.88 kg/m    Body mass index is 35.88 " kg/m .  Physical Exam   GEN: NAD  HEENT: head atraumatic, normocephalic, moist mucous membranes, eyes anicteric  CV: RRR w/o M/R/G  PULM: CTAB  ABD: soft, non-tender, no appreciable masses, no guarding, BS present  Neuro: alert and oriented x 3, CN II-XII intact, normal gross motor movements  Psych: appropriate  Ext: no peripheral edema      Signed Electronically by: Joanna Farah MD

## 2024-02-01 DIAGNOSIS — J45.40 MODERATE PERSISTENT ASTHMA WITHOUT COMPLICATION: Primary | ICD-10-CM

## 2024-02-01 NOTE — TELEPHONE ENCOUNTER
Message to physician: Rx refill    Date of last visit: 1/29/2024    Date of next visit if scheduled: 2/9/24    Potassium   Date Value Ref Range Status   01/25/2024   Final     Comment:     Specimen hemolyzed, potassium not available.   03/14/2022 3.9 3.5 - 5.0 mmol/L Final   11/16/2020 4.5 3.4 - 5.3 mmol/L Final     Creatinine   Date Value Ref Range Status   01/25/2024 0.51 0.51 - 0.95 mg/dL Final   11/16/2020 0.5 (L) 0.6 - 1.3 mg/dL Final     GFR Estimate   Date Value Ref Range Status   01/25/2024 >90 >60 mL/min/1.73m2 Final   04/06/2021 >60 >60 mL/min/1.73m2 Final   08/29/2014 90 >60 mL/min/1.7m2 Final     Comment:     Non  GFR Calc       BP Readings from Last 3 Encounters:   01/29/24 134/81   01/19/24 125/79   01/12/24 (!) 141/92       Hemoglobin A1C   Date Value Ref Range Status   01/19/2024 12.2 (H) 0.0 - 5.6 % Final   11/16/2020 5.9 (H) 4.1 - 5.7 % Final       Please complete refill and CLOSE ENCOUNTER.  Closing the encounter signifies the refill is complete.

## 2024-02-02 RX ORDER — BUDESONIDE AND FORMOTEROL FUMARATE DIHYDRATE 160; 4.5 UG/1; UG/1
2 AEROSOL RESPIRATORY (INHALATION) 2 TIMES DAILY
Qty: 11 G | Refills: 3 | Status: SHIPPED | OUTPATIENT
Start: 2024-02-02 | End: 2024-04-29 | Stop reason: ALTCHOICE

## 2024-02-07 DIAGNOSIS — J44.1 COPD EXACERBATION (H): ICD-10-CM

## 2024-02-07 NOTE — TELEPHONE ENCOUNTER
[facility] phone call message- medication clarification/question:    Full Medication Name: albuterol (PROAIR HFA/PROVENTIL HFA/VENTOLIN HFA) 108 (90 Base) MCG/ACT inhaler       Question: Patient called requesting for refill and stated that for future can there be refills so she doesn't have to call in and wait 3 days for it to be filled. If able to fill please send to pharmacy thank you.    Pharmacy confirmed as    [facility] [id number] - [address]: Yes    OK to leave a message on voice mail? Yes    Primary language: English      needed? No    Call taken on February 7, 2024 at 10:54 AM by Vern Alford

## 2024-02-09 RX ORDER — ALBUTEROL SULFATE 90 UG/1
2 AEROSOL, METERED RESPIRATORY (INHALATION) EVERY 4 HOURS PRN
Qty: 18 G | Refills: 0 | Status: SHIPPED | OUTPATIENT
Start: 2024-02-09 | End: 2024-03-01

## 2024-02-12 DIAGNOSIS — J44.1 COPD EXACERBATION (H): ICD-10-CM

## 2024-02-13 ENCOUNTER — OFFICE VISIT (OUTPATIENT)
Dept: URGENT CARE | Facility: URGENT CARE | Age: 59
End: 2024-02-13
Payer: MEDICARE

## 2024-02-13 VITALS
HEIGHT: 62 IN | WEIGHT: 180 LBS | TEMPERATURE: 97.6 F | SYSTOLIC BLOOD PRESSURE: 137 MMHG | BODY MASS INDEX: 33.13 KG/M2 | HEART RATE: 83 BPM | DIASTOLIC BLOOD PRESSURE: 89 MMHG | OXYGEN SATURATION: 95 %

## 2024-02-13 DIAGNOSIS — K08.89 PAIN, DENTAL: Primary | ICD-10-CM

## 2024-02-13 PROCEDURE — 99213 OFFICE O/P EST LOW 20 MIN: CPT | Performed by: PHYSICIAN ASSISTANT

## 2024-02-13 RX ORDER — LIDOCAINE HYDROCHLORIDE 20 MG/ML
15 SOLUTION OROPHARYNGEAL
Qty: 100 ML | Refills: 0 | Status: SHIPPED | OUTPATIENT
Start: 2024-02-13 | End: 2024-03-21

## 2024-02-13 RX ORDER — ALBUTEROL SULFATE 0.83 MG/ML
SOLUTION RESPIRATORY (INHALATION)
Qty: 30 ML | Refills: 3 | Status: SHIPPED | OUTPATIENT
Start: 2024-02-13 | End: 2024-03-22

## 2024-02-13 RX ORDER — IPRATROPIUM BROMIDE AND ALBUTEROL SULFATE 2.5; .5 MG/3ML; MG/3ML
1 SOLUTION RESPIRATORY (INHALATION) 2 TIMES DAILY PRN
Qty: 90 ML | Refills: 3 | Status: SHIPPED | OUTPATIENT
Start: 2024-02-13 | End: 2024-03-20

## 2024-02-13 RX ORDER — CLINDAMYCIN HCL 300 MG
300 CAPSULE ORAL 3 TIMES DAILY
Qty: 15 CAPSULE | Refills: 0 | Status: SHIPPED | OUTPATIENT
Start: 2024-02-13 | End: 2024-02-18

## 2024-02-13 ASSESSMENT — ENCOUNTER SYMPTOMS: FEVER: 0

## 2024-02-13 NOTE — PROGRESS NOTES
SUBJECTIVE:   Mary Ann Olson is a 58 year old female presenting with a chief complaint of   Chief Complaint   Patient presents with    Urgent Care    Dental Problem     Pt in clinic c/o dental pain and oral swelling.  Pt states she did have a tooth extraction 2 weeks ago.       She is an established patient of [facility].  Patient presents with complaints of post tooth extraction pain.  Extraction occurred about 2 weeks ago.  Patient followed up complaining of pain and was given a mouth wash.  She is concerned about infection and pain. States she will not go back to that dentist and has not found a replacement.  She denies fevers.      Treatment:  ibuprofen (600 mg every 2-3 hours) and tylenol (1000 mg q 3 hours)    Review of Systems   Constitutional:  Negative for fever.   HENT:  Positive for dental problem.    All other systems reviewed and are negative.      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    Antihistamines overdose, intentional self-harm, initial encounter (H)     Asthma     Bipolar 1 disorder (H) hx of bipolar listed in h and p    Bipolar 2 disorder (H)     Bipolar I disorder, single manic episode (H)     Created by Conversion  Replacement Utility updated for latest IMO load    Cellulitis 2006 left ankle, 2008 right foot    COPD (chronic obstructive pulmonary disease) (H)     Crohn's disease (H)     arthritis associated with crohn's    Diabetes mellitus (H)     Diabetes mellitus, type 2 (H)     Fibrocystic breast     Heat stroke     when a young child     Hyperlipidemia     Idiopathic acute pancreatitis 07/14/2015    Irritable bowel syndrome     Created by Conversion     Kidney stone     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     Overdose     Pyoderma gangrenosum (H28)     2016    Sacroiliitis (H24) 2004    Skin lesion of left leg 08/27/2015    Suicidal ideation     Suicide attempt (H)     Traumatic iritis 07/21/2015    Umbilical hernia      Uncomplicated asthma      Family History   Problem Relation Age of Onset    Coronary Artery Disease Mother     Diabetes Father     Breast Cancer Maternal Grandmother     Asthma Son     Asthma Daughter     Hemophilia Other     Diabetes Type 2  Father     Factor VIII deficiency Other      Current Outpatient Medications   Medication Sig Dispense Refill    acetaminophen (TYLENOL) 500 MG tablet Take 1-2 tablets (500-1,000 mg) by mouth every 6 hours as needed for pain  0    albuterol (PROAIR HFA/PROVENTIL HFA/VENTOLIN HFA) 108 (90 Base) MCG/ACT inhaler Inhale 2 puffs into the lungs every 4 hours as needed for shortness of breath or wheezing 18 g 0    atorvastatin (LIPITOR) 40 MG tablet Take 1 tablet (40 mg) by mouth daily 90 tablet 3    blood glucose (NO BRAND SPECIFIED) test strip Use to test blood sugar 1 times daily or as directed. 100 strip 0    blood glucose (NO BRAND SPECIFIED) test strip Use to test blood sugar 1 times daily or as directed. 100 strip 3    blood glucose monitoring (NO BRAND SPECIFIED) meter device kit Use to test blood sugar 1 times daily or as directed. 1 kit 0    budesonide-formoterol (SYMBICORT) 160-4.5 MCG/ACT Inhaler Inhale 2 puffs by mouth twice daily 11 g 3    Calcium Carbonate-Vitamin D 500-5 MG-MCG TABS Take 1 tablet by mouth 2 times daily 90 tablet 3    clindamycin (CLEOCIN) 300 MG capsule Take 1 capsule (300 mg) by mouth 3 times daily for 5 days 15 capsule 0    fluticasone (FLONASE) 50 MCG/ACT nasal spray Spray 2 sprays into both nostrils daily 18.2 mL 0    fluticasone (FLONASE) 50 MCG/ACT nasal spray Spray 1 spray into both nostrils daily as needed for rhinitis or allergies      fluticasone-salmeterol (AIRDUO RESPICLICK) 113-14 MCG/ACT inhaler Inhale 1 puff into the lungs 2 times daily 1 each 1    hydrOXYzine (ATARAX) 25 MG tablet Take 1 tablet (25 mg) by mouth 3 times daily as needed for itching 20 tablet 0    insulin aspart (NOVOLOG PEN) 100 UNIT/ML pen Inject 12 Units Subcutaneous 3  "times daily (before meals) Sliding scale 15 mL 1    insulin  UNIT/ML vial Inject 10 Units Subcutaneous daily 10 mL 1    insulin pen needle (31G X 6 MM) 31G X 6 MM miscellaneous U pen needles daily or as directed. 100 each 1    insulin syringe-needle U-100 (31G X 5/16\" 0.3 ML) 31G X 5/16\" 0.3 ML miscellaneous Use 1 syringes daily or as directed. 100 each 3    lidocaine, viscous, (XYLOCAINE) 2 % solution Apply 15 mLs topically every 3 hours as needed for pain ; Max 8 doses/24 hour period. 100 mL 0    loratadine (CLARITIN) 10 MG tablet Take 1 tablet (10 mg) by mouth daily as needed for allergies 30 tablet 3    magnesium hydroxide (MILK OF MAGNESIA) 400 MG/5ML suspension Take 30 mLs by mouth 2 times daily as needed for constipation 354 mL 0    melatonin 3 MG tablet Take 1 tablet (3 mg) by mouth at bedtime 90 tablet 1    metFORMIN (GLUCOPHAGE) 1000 MG tablet Take 1 tablet (1,000 mg) by mouth 2 times daily (with meals) 180 tablet 3    Multiple Vitamins-Minerals (CENTRUM SILVER 50+WOMEN PO) Take 1 tablet by mouth daily      nitroGLYcerin (RECTIV) 0.4 % OINT rectal ointment Place 1 inch (1.5 mg) rectally every 12 hours 30 g 0    nystatin (MYCOSTATIN) 891077 UNIT/GM external powder Apply topically 2 times daily 30 g 0    omeprazole (PRILOSEC) 20 MG DR capsule Take 1 capsule (20 mg) by mouth daily 90 capsule 3    polyvinyl alcohol (LIQUIFILM TEARS) 1.4 % ophthalmic solution Apply 1 drop to eye as needed for dry eyes 30 mL 0    sennosides (SENOKOT) 8.6 MG tablet Take 2 tablets by mouth 2 times daily 120 tablet 1    SUMAtriptan (IMITREX) 50 MG tablet Take 1 tablet (50 mg) by mouth at onset of headache for migraine May repeat in 2 hours. Max 4 tablets/24 hours. 10 tablet 0    vitamin D2 (ERGOCALCIFEROL) 54501 units (1250 mcg) capsule Take 1 capsule (50,000 Units) by mouth once a week 12 capsule 3    albuterol (PROVENTIL) (2.5 MG/3ML) 0.083% neb solution USE 1 VIAL IN NEBULIZER EVERY 6 HOURS AS NEEDED FOR SHORTNESS OF " "BREATH /  DYSPNEA  OR  WHEEZING 30 mL 3    ipratropium - albuterol 0.5 mg/2.5 mg/3 mL (DUONEB) 0.5-2.5 (3) MG/3ML neb solution Take 1 vial (3 mLs) by nebulization 2 times daily as needed for shortness of breath, wheezing or cough 90 mL 3     Social History     Tobacco Use    Smoking status: Former     Packs/day: .5     Types: Cigarettes     Passive exposure: Never    Smokeless tobacco: Never    Tobacco comments:     2-3 cig/day   Substance Use Topics    Alcohol use: No       OBJECTIVE  /89   Pulse 83   Temp 97.6  F (36.4  C) (Temporal)   Ht 1.562 m (5' 1.5\")   Wt 81.6 kg (180 lb)   SpO2 95%   BMI 33.46 kg/m      Physical Exam  Vitals reviewed.   Constitutional:       Appearance: Normal appearance. She is obese.   HENT:      Mouth/Throat:      Comments: Left lower canine area, tooth extracted and appears with minor erythema and edema.  No discharge noted.    Cardiovascular:      Rate and Rhythm: Normal rate.   Neurological:      Mental Status: She is alert.         Labs:  No results found. However, due to the size of the patient record, not all encounters were searched. Please check Results Review for a complete set of results.      ASSESSMENT:      ICD-10-CM    1. Pain, dental  K08.89 lidocaine, viscous, (XYLOCAINE) 2 % solution     clindamycin (CLEOCIN) 300 MG capsule           Medical Decision Making:    Differential Diagnosis:  Dental pain, abscess    Serious Comorbid Conditions:  Adult:   reviewed    PLAN:    Rx for clindamycin x 5 days and viscous lidocaine.  Discussed importance of being evaluated by dentist and STOP taking tylenol and motrin.  Discussed the maximum dose.  Discussed reasons to seek immediate medical attention.  Additionally if no improvement or worsening in one week, may follow up with PCP and/or UC.        Followup:    If not improving or if condition worsens, follow up with your Primary Care Provider, If not improving or if conditions worsens over the next 12-24 hours, go to the " Emergency Department    There are no Patient Instructions on file for this visit.

## 2024-02-18 ENCOUNTER — TELEPHONE (OUTPATIENT)
Dept: FAMILY MEDICINE | Facility: CLINIC | Age: 59
End: 2024-02-18
Payer: MEDICARE

## 2024-02-18 NOTE — TELEPHONE ENCOUNTER
Returning weekend clinic call. Mary Ann is calling regarding ongoing diarrhea and abdominal pain. She reports that she has been having 3 days of nausea, vomiting, and diffuse abdominal pain. No recent suspect food or others she has been in contact w/ who have had similar symptoms. Notably, pt was just seen in the ED 2/13 (5d ago) for concern regarding possible dental infx after having a tooth pooled ~3 weeks ago. Was given 5d course of clindamycin at that time. Given onset of symptoms shortly after starting this abx, I do have high suspicion that ongoing diarrhea and GI symptoms are related to this. Possibly C dif infection vs GI garfield imbalance. She does report that she can tolerate small sips of PO fluids despite difficulty keeping anything solid down. Discussed that should this change and she cannot keep fluids down or is not urinating at least once every 12-24h she should be seen in the ED or Urgent Care and she expressed understanding.    Mary Ann also reports recent increase in asthma/copd symptoms w/ increased green sputum production primarily. She tells me she has had several exacerbations in the last year requiring oral steroids. For this reason I do wonder if we are under treating her asthma/COPD. At this time she feels comfortable waiting to be seen in clinic early this week for both asthma/copd as well as follow up on GI illness. Requested I have our clinic coordinators reach out to schedule this appt.

## 2024-02-21 ENCOUNTER — OFFICE VISIT (OUTPATIENT)
Dept: FAMILY MEDICINE | Facility: CLINIC | Age: 59
End: 2024-02-21
Payer: MEDICARE

## 2024-02-21 VITALS
OXYGEN SATURATION: 95 % | HEIGHT: 62 IN | WEIGHT: 195 LBS | RESPIRATION RATE: 20 BRPM | DIASTOLIC BLOOD PRESSURE: 77 MMHG | HEART RATE: 110 BPM | SYSTOLIC BLOOD PRESSURE: 119 MMHG | TEMPERATURE: 99.1 F | BODY MASS INDEX: 35.88 KG/M2

## 2024-02-21 DIAGNOSIS — R04.2 HEMOPTYSIS: ICD-10-CM

## 2024-02-21 DIAGNOSIS — R19.7 DIARRHEA OF PRESUMED INFECTIOUS ORIGIN: Primary | ICD-10-CM

## 2024-02-21 DIAGNOSIS — S93.402A SPRAIN OF LEFT ANKLE, UNSPECIFIED LIGAMENT, INITIAL ENCOUNTER: ICD-10-CM

## 2024-02-21 PROCEDURE — 99215 OFFICE O/P EST HI 40 MIN: CPT | Performed by: STUDENT IN AN ORGANIZED HEALTH CARE EDUCATION/TRAINING PROGRAM

## 2024-02-21 NOTE — PROGRESS NOTES
Assessment & Plan     Diarrhea of presumed infectious origin  Patient recently on clindamycin for dental infection, diarrhea description is concerning for c diff. She was given materials to provide us with a sample to test for c diff. She is maintaining adequate hydration and has a benign abdominal exam and outpatient evaluation is adequate.   - C. difficile Toxin B PCR with reflex to C. difficile Antigen and Toxins A/B EIA; Future  - C. difficile Toxin B PCR with reflex to C. difficile Antigen and Toxins A/B EIA    Sprain of left ankle, unspecified ligament, initial encounter  Exam very minimally tender, minimal swelling, no bruising. No indication for xray. Not sure how this happens since she doesn't remember any traumatic event. Provided her with ankle brace for comfort.   - Ankle/Foot Bracing Supplies Order Air Ankle Stirrup Brace; Left    Hemoptysis  I spoke with the patient's primary, Dr. Farah, over the phone and the patient's symptoms sound fairly consistent with symptoms she has had chronically. This is what I can gather from chart review as well. She is not acutely ill appearing, no hypoxia, lung exam sounds fine- no wheezes, crackles or tachypnea. I do not think steroids are indicated at this time. Given her hemoptysis, I did order a CT to evaluate for mass/progression of lung disease.   - CT Chest w/o Contrast; Future    She has follow up with Dr. Farah already scheduled in 1 week.       I spent a total of 40 minutes on the day of the visit.   Time spent by me doing chart review, history and exam, documentation and further activities per the note            No follow-ups on file.    Rasheeda Reese is a 58 year old, presenting for the following health issues:  Diarrhea, Mass (Lump on right fore arm that has grown in size), and Cough (With trouble breathing, mucus )    HPI     Breathing trouble for a month. Feels short of breath. Has been waking up at night short of breath. Coughs up green phlegm.  "More short of breath with activity. Coughed up blood 2 times, blood was bright red, was a couple specks. Has to lay on her back. Has been using her nebs 3-4 times per day. Has had chills but no documented fevers.       Diarrhea  Has been having diarrhea for almost a week. Has 5-6 Bms per day, sometimes has to go in the middle of the night. Stools are watery. No blood. No black color. Stool smells really foul.     Sometimes feels dizzy.     Didn't check sugar today. Sugar was in the 200s yesterday. Usually it has been in the 100s. Has been better on the NPH.   The on call doctor said that her diarrhea might be from antibiotics that she was on so she stopped them. Was on clindamycin for tooth.     Has a bump on the right forearm x 2 weeks.     Last visit saw Dr. Farah at which time sugars were uncontrolled and Dr. Farah added NPH 10 units.       Review of Systems  Constitutional, HEENT, cardiovascular, pulmonary, gi and gu systems are negative, except as otherwise noted.      Objective    /77 (BP Location: Right arm, Patient Position: Sitting, Cuff Size: Adult Regular)   Pulse 110   Temp 99.1  F (37.3  C)   Resp 20   Ht 1.565 m (5' 1.61\")   Wt 88.5 kg (195 lb)   SpO2 95%   BMI 36.11 kg/m    Body mass index is 36.11 kg/m .  Physical Exam   GENERAL: alert and no distress  NECK: no adenopathy, no asymmetry, masses, or scars  RESP: lungs clear to auscultation - no rales, rhonchi or wheezes, no tachypnea  CV: regular rate and rhythm, normal S1 S2, no S3 or S4, no murmur, click or rub, no peripheral edema  ABDOMEN: soft, nontender, no hepatosplenomegaly, no masses and bowel sounds normal  MS: no gross musculoskeletal defects noted. She is tender at the left ankle just posterior to lateral malleolus but there is no ecchymosis, minimal non pitting edema. She bears weight without issue.             Signed Electronically by: Valencia Brown MD    "

## 2024-02-27 ENCOUNTER — OFFICE VISIT (OUTPATIENT)
Dept: PHARMACY | Facility: CLINIC | Age: 59
End: 2024-02-27
Payer: MEDICARE

## 2024-02-27 ENCOUNTER — HOSPITAL ENCOUNTER (EMERGENCY)
Facility: HOSPITAL | Age: 59
Discharge: HOME | End: 2024-02-28
Attending: EMERGENCY MEDICINE | Admitting: EMERGENCY MEDICINE
Payer: MEDICARE

## 2024-02-27 ENCOUNTER — APPOINTMENT (OUTPATIENT)
Dept: RADIOLOGY | Facility: HOSPITAL | Age: 59
End: 2024-02-27
Attending: EMERGENCY MEDICINE
Payer: MEDICARE

## 2024-02-27 ENCOUNTER — APPOINTMENT (OUTPATIENT)
Dept: CT IMAGING | Facility: HOSPITAL | Age: 59
End: 2024-02-27
Attending: EMERGENCY MEDICINE
Payer: MEDICARE

## 2024-02-27 VITALS — DIASTOLIC BLOOD PRESSURE: 85 MMHG | OXYGEN SATURATION: 93 % | HEART RATE: 108 BPM | SYSTOLIC BLOOD PRESSURE: 140 MMHG

## 2024-02-27 DIAGNOSIS — E11.65 TYPE 2 DIABETES MELLITUS WITH HYPERGLYCEMIA, WITH LONG-TERM CURRENT USE OF INSULIN (H): Primary | ICD-10-CM

## 2024-02-27 DIAGNOSIS — R07.9 CHEST PAIN, UNSPECIFIED TYPE: ICD-10-CM

## 2024-02-27 DIAGNOSIS — R07.9 CHEST PAIN: ICD-10-CM

## 2024-02-27 DIAGNOSIS — J44.89 ASTHMA-COPD OVERLAP SYNDROME (H): ICD-10-CM

## 2024-02-27 DIAGNOSIS — Z79.4 TYPE 2 DIABETES MELLITUS WITH HYPERGLYCEMIA, WITH LONG-TERM CURRENT USE OF INSULIN (H): Primary | ICD-10-CM

## 2024-02-27 LAB
ANION GAP SERPL CALCULATED.3IONS-SCNC: 13 MMOL/L (ref 7–15)
BASOPHILS # BLD AUTO: 0.1 10E3/UL (ref 0–0.2)
BASOPHILS NFR BLD AUTO: 1 %
BUN SERPL-MCNC: 8.4 MG/DL (ref 8–23)
CALCIUM SERPL-MCNC: 9.5 MG/DL (ref 8.6–10)
CHLORIDE SERPL-SCNC: 99 MMOL/L (ref 98–107)
CREAT SERPL-MCNC: 0.52 MG/DL (ref 0.51–0.95)
D DIMER PPP FEU-MCNC: 0.7 UG/ML FEU (ref 0–0.5)
DEPRECATED HCO3 PLAS-SCNC: 26 MMOL/L (ref 22–29)
EGFRCR SERPLBLD CKD-EPI 2021: >90 ML/MIN/1.73M2
EOSINOPHIL # BLD AUTO: 0.4 10E3/UL (ref 0–0.7)
EOSINOPHIL NFR BLD AUTO: 4 %
ERYTHROCYTE [DISTWIDTH] IN BLOOD BY AUTOMATED COUNT: 14.6 % (ref 10–15)
FLUAV RNA SPEC QL NAA+PROBE: NEGATIVE
FLUBV RNA RESP QL NAA+PROBE: NEGATIVE
GLUCOSE SERPL-MCNC: 197 MG/DL (ref 70–99)
HCT VFR BLD AUTO: 36.3 % (ref 35–47)
HGB BLD-MCNC: 10.9 G/DL (ref 11.7–15.7)
IMM GRANULOCYTES # BLD: 0.1 10E3/UL
IMM GRANULOCYTES NFR BLD: 1 %
LYMPHOCYTES # BLD AUTO: 1.7 10E3/UL (ref 0.8–5.3)
LYMPHOCYTES NFR BLD AUTO: 16 %
MCH RBC QN AUTO: 23.7 PG (ref 26.5–33)
MCHC RBC AUTO-ENTMCNC: 30 G/DL (ref 31.5–36.5)
MCV RBC AUTO: 79 FL (ref 78–100)
MONOCYTES # BLD AUTO: 0.6 10E3/UL (ref 0–1.3)
MONOCYTES NFR BLD AUTO: 6 %
NEUTROPHILS # BLD AUTO: 7.5 10E3/UL (ref 1.6–8.3)
NEUTROPHILS NFR BLD AUTO: 72 %
NRBC # BLD AUTO: 0 10E3/UL
NRBC BLD AUTO-RTO: 0 /100
PLATELET # BLD AUTO: 504 10E3/UL (ref 150–450)
POTASSIUM SERPL-SCNC: 3.7 MMOL/L (ref 3.4–5.3)
RBC # BLD AUTO: 4.59 10E6/UL (ref 3.8–5.2)
RSV RNA SPEC NAA+PROBE: NEGATIVE
SARS-COV-2 RNA RESP QL NAA+PROBE: NEGATIVE
SODIUM SERPL-SCNC: 138 MMOL/L (ref 135–145)
TROPONIN T SERPL HS-MCNC: <6 NG/L
WBC # BLD AUTO: 10.2 10E3/UL (ref 4–11)

## 2024-02-27 PROCEDURE — 99285 EMERGENCY DEPT VISIT HI MDM: CPT | Mod: 25

## 2024-02-27 PROCEDURE — 999N000157 HC STATISTIC RCP TIME EA 10 MIN

## 2024-02-27 PROCEDURE — 85025 COMPLETE CBC W/AUTO DIFF WBC: CPT | Performed by: EMERGENCY MEDICINE

## 2024-02-27 PROCEDURE — 99207 PR NO CHARGE LOS: CPT | Performed by: PHARMACIST

## 2024-02-27 PROCEDURE — 96376 TX/PRO/DX INJ SAME DRUG ADON: CPT

## 2024-02-27 PROCEDURE — 250N000011 HC RX IP 250 OP 636: Performed by: EMERGENCY MEDICINE

## 2024-02-27 PROCEDURE — 96375 TX/PRO/DX INJ NEW DRUG ADDON: CPT

## 2024-02-27 PROCEDURE — 85379 FIBRIN DEGRADATION QUANT: CPT | Performed by: EMERGENCY MEDICINE

## 2024-02-27 PROCEDURE — 93005 ELECTROCARDIOGRAM TRACING: CPT | Performed by: EMERGENCY MEDICINE

## 2024-02-27 PROCEDURE — 71046 X-RAY EXAM CHEST 2 VIEWS: CPT

## 2024-02-27 PROCEDURE — 36415 COLL VENOUS BLD VENIPUNCTURE: CPT | Performed by: EMERGENCY MEDICINE

## 2024-02-27 PROCEDURE — 71275 CT ANGIOGRAPHY CHEST: CPT | Mod: MA

## 2024-02-27 PROCEDURE — 84484 ASSAY OF TROPONIN QUANT: CPT | Performed by: EMERGENCY MEDICINE

## 2024-02-27 PROCEDURE — 94640 AIRWAY INHALATION TREATMENT: CPT

## 2024-02-27 PROCEDURE — 96374 THER/PROPH/DIAG INJ IV PUSH: CPT | Mod: 59

## 2024-02-27 PROCEDURE — 80048 BASIC METABOLIC PNL TOTAL CA: CPT | Performed by: EMERGENCY MEDICINE

## 2024-02-27 PROCEDURE — 87637 SARSCOV2&INF A&B&RSV AMP PRB: CPT | Performed by: EMERGENCY MEDICINE

## 2024-02-27 RX ORDER — KETOROLAC TROMETHAMINE 15 MG/ML
15 INJECTION, SOLUTION INTRAMUSCULAR; INTRAVENOUS ONCE
Status: COMPLETED | OUTPATIENT
Start: 2024-02-27 | End: 2024-02-27

## 2024-02-27 RX ORDER — DIPHENHYDRAMINE HYDROCHLORIDE 50 MG/ML
25 INJECTION INTRAMUSCULAR; INTRAVENOUS ONCE
Status: COMPLETED | OUTPATIENT
Start: 2024-02-27 | End: 2024-02-27

## 2024-02-27 RX ORDER — METHYLPREDNISOLONE SODIUM SUCCINATE 125 MG/2ML
125 INJECTION, POWDER, LYOPHILIZED, FOR SOLUTION INTRAMUSCULAR; INTRAVENOUS ONCE
Status: COMPLETED | OUTPATIENT
Start: 2024-02-27 | End: 2024-02-27

## 2024-02-27 RX ORDER — NITROGLYCERIN 0.4 MG/1
0.4 TABLET SUBLINGUAL EVERY 5 MIN PRN
Status: DISCONTINUED | OUTPATIENT
Start: 2024-02-27 | End: 2024-03-30

## 2024-02-27 RX ORDER — IOPAMIDOL 755 MG/ML
66 INJECTION, SOLUTION INTRAVASCULAR ONCE
Status: COMPLETED | OUTPATIENT
Start: 2024-02-28 | End: 2024-02-27

## 2024-02-27 RX ORDER — DIPHENHYDRAMINE HYDROCHLORIDE 50 MG/ML
25 INJECTION INTRAMUSCULAR; INTRAVENOUS ONCE
Status: COMPLETED | OUTPATIENT
Start: 2024-02-28 | End: 2024-02-27

## 2024-02-27 RX ORDER — IPRATROPIUM BROMIDE AND ALBUTEROL SULFATE 2.5; .5 MG/3ML; MG/3ML
3 SOLUTION RESPIRATORY (INHALATION) ONCE
Status: COMPLETED | OUTPATIENT
Start: 2024-02-27 | End: 2024-02-28

## 2024-02-27 RX ORDER — LIRAGLUTIDE 6 MG/ML
0.6 INJECTION SUBCUTANEOUS DAILY
Qty: 6 ML | Refills: 1 | Status: SHIPPED | OUTPATIENT
Start: 2024-02-27 | End: 2024-04-29

## 2024-02-27 RX ADMIN — DIPHENHYDRAMINE HYDROCHLORIDE 25 MG: 50 INJECTION, SOLUTION INTRAMUSCULAR; INTRAVENOUS at 23:42

## 2024-02-27 RX ADMIN — KETOROLAC TROMETHAMINE 15 MG: 15 INJECTION, SOLUTION INTRAMUSCULAR; INTRAVENOUS at 20:31

## 2024-02-27 RX ADMIN — DIPHENHYDRAMINE HYDROCHLORIDE 25 MG: 50 INJECTION, SOLUTION INTRAMUSCULAR; INTRAVENOUS at 22:09

## 2024-02-27 RX ADMIN — IOPAMIDOL 66 ML: 755 INJECTION, SOLUTION INTRAVENOUS at 23:38

## 2024-02-27 RX ADMIN — METHYLPREDNISOLONE SODIUM SUCCINATE 125 MG: 125 INJECTION, POWDER, FOR SOLUTION INTRAMUSCULAR; INTRAVENOUS at 22:11

## 2024-02-27 ASSESSMENT — ASTHMA QUESTIONNAIRES
QUESTION_4 LAST FOUR WEEKS HOW OFTEN HAVE YOU USED YOUR RESCUE INHALER OR NEBULIZER MEDICATION (SUCH AS ALBUTEROL): THREE OR MORE TIMES PER DAY
QUESTION_1 LAST FOUR WEEKS HOW MUCH OF THE TIME DID YOUR ASTHMA KEEP YOU FROM GETTING AS MUCH DONE AT WORK, SCHOOL OR AT HOME: ALL OF THE TIME
QUESTION_2 LAST FOUR WEEKS HOW OFTEN HAVE YOU HAD SHORTNESS OF BREATH: MORE THAN ONCE A DAY
ACT_TOTALSCORE: 5
QUESTION_3 LAST FOUR WEEKS HOW OFTEN DID YOUR ASTHMA SYMPTOMS (WHEEZING, COUGHING, SHORTNESS OF BREATH, CHEST TIGHTNESS OR PAIN) WAKE YOU UP AT NIGHT OR EARLIER THAN USUAL IN THE MORNING: FOUR OR MORE NIGHTS A WEEK
QUESTION_5 LAST FOUR WEEKS HOW WOULD YOU RATE YOUR ASTHMA CONTROL: NOT CONTROLLED AT ALL

## 2024-02-27 ASSESSMENT — ACTIVITIES OF DAILY LIVING (ADL)
ADLS_ACUITY_SCORE: 35
ADLS_ACUITY_SCORE: 33
ADLS_ACUITY_SCORE: 35

## 2024-02-27 ASSESSMENT — PAIN SCALES - GENERAL: PAINLEVEL: EXTREME PAIN (8)

## 2024-02-27 NOTE — ED TRIAGE NOTES
Pt arrives to triage for chest pain that began today. Pt was at her clinic and they told her to come to the ER. Pt reports chest pain that is located on the left side that radiates down her arm. Pt reports pain 8/10. The clinic also gave her a nitroglycerin.

## 2024-02-27 NOTE — PROGRESS NOTES
Medication Therapy Management (MTM) Encounter    ASSESSMENT:                            Medication Adherence/Access: Needs close monitoring to ensure that don't need to pivot to lower cost options if insurance lapses.     Diabetes:   Patient is not meeting A1c goal of < 7%. Self monitoring of blood glucose is not at goal of fasting  mg/dL. Misunderstanding of NPH use. Needing additional therapy. Ideally GLP1 given elevated BMI. Future state should also consider transition to simpler, more stable basal insulin like insulin glargine.     Asthma/COPD overlap:   Not at goal; ACT < 20. As above, previous prescribing has really been driven by lack of insurance coverage. Historically has been prescribed LAMA, however I am unsure if they were used/accessible given insurance issues. Given impact of allergens on disease control, should start antihistamine or steroid nasal spray. Concern for access to Montelukast given lack of fill data.     PLAN:                            Given concerns for chest pain, triaged by RNs, visit abbreviated    Offered alternative antihistamine with lower likelihood to cause sedation, pt declined.     Patient Instructions   Today start Victoza 0.6 mg every day    After 1 week, increase Victoza to 1.2 mg daily    Change your NPH to 15 units twice daily     Start taking Claritin every day      Follow-up: No follow-ups on file.    Medication issues to be addressed at a future visit     Change NPH to insulin glargine  (1:1 if continued concerns for hyperglycemia, 10-20% dose reduction if blood glucose at goal)  Retry LAMA. Could consider Trelegy for single inhaler or addition of solo-LAMA agent.   Confirm access to Montelukast, if not available, send new Rx!    SUBJECTIVE/OBJECTIVE:                          Mary Ann Olson is a 58 year old female coming in for a follow-up visit from 1/6/2023.       Reason for visit: walk in concerns for insulin regimen.    Allergies/ADRs: Reviewed in  "chart  Past Medical History: Reviewed in chart  Social History     Tobacco Use    Smoking status: Former     Packs/day: .5     Types: Cigarettes     Passive exposure: Past    Smokeless tobacco: Never    Tobacco comments:     2-3 cig/day   Vaping Use    Vaping Use: Never used   Substance Use Topics    Alcohol use: No    Drug use: No     ^Reviewed today    Medication Adherence/Access: Historically has had insurance barriers. Currently is set up with \"temporary\" insurance plan. Unclear efforts to get more permanent solution.     Diabetes     Has been using NPH three times daily, 10 units. Still waking up with blood sugar in the 200s. Only checking fasting. Doesn't have Novolog available. Continues on Metformin 1000 mg twice daily. Has read concerning things about Metformin online, thinking she doesn't want to take it. Denies concerns for hypoglycemia.     The ASCVD Risk score (Malena PINEDA, et al., 2019) failed to calculate for the following reasons:    The systolic blood pressure is missing    The valid total cholesterol range is 130 to 320 mg/dL    Patient is not experiencing side effects.     Eye exam is up to date  Foot exam is up to date  Urine Albumin:   Lab Results   Component Value Date    UMALCR 13.50 12/05/2023      Lab Results   Component Value Date    A1C 12.2 (H) 01/19/2024     Asthma/COPD overlap:   ICS/LABA - Symbicort 160/4.5 mcg - 2 puffs twice daily   Albuterol (ProAir/Ventolin/Proventil) as needed (available as inhaler and neb)  montelukast (Singulair) 10 mg once daily - however no recent dispense history in the last 12 months.   Not currently taking Loratadine as makes her drowsy.   Reports has currently used SMART therapy model with Symbicort however did not find beneifical and prefers albuterol as needed     Patient reports no current medication side effects.      Triggers include: upper respiratory infections, dust mites, animal dander, mold, cold air, and air pollution .  Patient reports the " following symptoms: see ACT scoring.        3/9/2023    11:22 AM 4/5/2023     3:03 PM 2/27/2024     9:00 AM   ACT Total Scores   ACT TOTAL SCORE (Goal Greater than or Equal to 20) 5 7 5   In the past 12 months, how many times did you visit the emergency room for your asthma without being admitted to the hospital? 1 0 0   In the past 12 months, how many times were you hospitalized overnight because of your asthma? 0 0 0           Today's Vitals: BP (!) 140/85 (BP Location: Left arm, Patient Position: Sitting, Cuff Size: Adult Regular)   Pulse 108   SpO2 93%   ----------------      I spent 30 minutes with this patient today. All changes were made via collaborative practice agreement with Dr. Farah. A copy of the visit note was provided to the patient's provider(s).    A summary of these recommendations was given to the patient.    Valeria Hall, Pharm.D., [facility]  Phone: [phone]  February 28, 2024 at 8:52 AM     Medication Therapy Recommendations  Asthma-COPD overlap syndrome    Current Medication: loratadine (CLARITIN) 10 MG tablet   Rationale: Does not understand instructions - Adherence - Adherence   Recommendation: Provide Education   Status: Patient Agreed - Adherence/Education         Type 2 diabetes mellitus with hyperglycemia, without long-term current use of insulin (H)    Current Medication: insulin  UNIT/ML vial   Rationale: Does not understand instructions - Adherence - Adherence   Recommendation: Provide Education   Status: Patient Agreed - Adherence/Education          Rationale: Synergistic therapy - Needs additional medication therapy - Indication   Recommendation: Start Medication - VICTOZA PEN 18 MG/3ML soln   Status: Accepted per CPA

## 2024-02-27 NOTE — PROGRESS NOTES
Patient arrived to clinic with c/o intermittent stabbing chest pain and SOB. Patient triaged, recommended ED.  also discussed ED with patient. Nitroglycerin given at 1600hours sublingual. Patient going to ED via personal ride, deemed appropriate by  due to patient stable in clinic. Basilio SMITH
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
## 2024-02-27 NOTE — ED NOTES
Expected Patient Referral to ED  4:22 PM    Referring Clinic/Provider:  Marlene Farah at [place] [facility] clinic    Reason for referral/Clinical facts:  Chest pain.  The patient is a 58-year-old female who walked into [place] [facility] clinic to discuss her insulin therapy.  While there she complained of chest pain.  She appeared anxious.  Her chest pain is in the left chest and radiates to her left arm.  It is accompanied by shortness of breath.  She has reportedly multiple cardiac risk factors including poorly controlled diabetes.  She refused ambulance and is coming to the ER by private vehicle.    Recommendations provided:  Send to ED for further evaluation    Caller was informed that this institution does possess the capabilities and/or resources to provide for patient and should be transferred to our facility.    Discussed that if direct admit is sought and any hurdles are encountered, this ED would be happy to see the patient and evaluate.    Informed caller that recommendations provided are recommendations based only on the facts provided and that they responsible to accept or reject the advice, or to seek a formal in person consultation as needed and that this ED will see/treat patient should they arrive.      Junior Luna MD  [facility] EMERGENCY DEPARTMENT  [address] [place] [id number]  [phone]       Junior Luna MD  02/27/24 [id number]

## 2024-02-28 ENCOUNTER — PATIENT OUTREACH (OUTPATIENT)
Dept: CARE COORDINATION | Facility: CLINIC | Age: 59
End: 2024-02-28
Payer: MEDICARE

## 2024-02-28 VITALS
HEIGHT: 62 IN | DIASTOLIC BLOOD PRESSURE: 81 MMHG | WEIGHT: 195 LBS | RESPIRATION RATE: 19 BRPM | SYSTOLIC BLOOD PRESSURE: 148 MMHG | BODY MASS INDEX: 35.88 KG/M2 | TEMPERATURE: 99.9 F | OXYGEN SATURATION: 98 % | HEART RATE: 97 BPM

## 2024-02-28 DIAGNOSIS — J44.1 COPD EXACERBATION (H): ICD-10-CM

## 2024-02-28 PROBLEM — J44.89 ASTHMA-COPD OVERLAP SYNDROME (H): Status: ACTIVE | Noted: 2018-07-28

## 2024-02-28 PROCEDURE — 250N000009 HC RX 250: Performed by: EMERGENCY MEDICINE

## 2024-02-28 RX ORDER — DOXYCYCLINE 100 MG/1
100 CAPSULE ORAL 2 TIMES DAILY
Qty: 14 CAPSULE | Refills: 0 | Status: SHIPPED | OUTPATIENT
Start: 2024-02-28 | End: 2024-03-06

## 2024-02-28 RX ORDER — IPRATROPIUM BROMIDE AND ALBUTEROL SULFATE 2.5; .5 MG/3ML; MG/3ML
3 SOLUTION RESPIRATORY (INHALATION) ONCE
Status: COMPLETED | OUTPATIENT
Start: 2024-02-28 | End: 2024-02-28

## 2024-02-28 RX ADMIN — IPRATROPIUM BROMIDE AND ALBUTEROL SULFATE 3 ML: .5; 3 SOLUTION RESPIRATORY (INHALATION) at 00:47

## 2024-02-28 ASSESSMENT — ACTIVITIES OF DAILY LIVING (ADL): ADLS_ACUITY_SCORE: 35

## 2024-02-28 ASSESSMENT — ASTHMA QUESTIONNAIRES: ACT_TOTALSCORE: 5

## 2024-02-28 NOTE — ED NOTES
"Pt refused to be put on the cardiac monitor. Tech explained to pt that she could change in privacy but Pt stated \"Im not here to have my heart monitored and I dont want anything on me\". Pt said that she \"did not want to be touched\".  "

## 2024-02-28 NOTE — DISCHARGE INSTRUCTIONS
Start doxycycline twice daily for symptoms of bronchitis.  Take ibuprofen 400 mg every 8 hours and Tylenol 650 m g every 6 hours for pain management.  Continue omeprazole as previously prescribed.  See your primary care physician in follow-up this week.  You have also been given a referral to the cardiology clinic for follow-up and should receive a call tomorrow to schedule.  If you have recurrent chest pain that is not improved with Tylenol ibuprofen or develop shortness of breath not improved with your metered-dose inhaler or nebulizer return to the emergency department.

## 2024-02-28 NOTE — ED NOTES
1x Duoneb given to patient, slight expiratory wheezes in the bases breath sounds otherwise diminished. Increased air movent heard throughout after neb. Patient reported breathing improved.

## 2024-02-28 NOTE — ED NOTES
Pt is very particular and anxious about having things attached to her. Pt will only allow for spot checks of blood pressures and did not want EKG monitoring until after the chest x ray was done.

## 2024-02-28 NOTE — TELEPHONE ENCOUNTER
Patient requesting refill and advisd only have 15 puff left  
PAST SURGICAL HISTORY:  AV fistula

## 2024-02-28 NOTE — ED NOTES
Patient refusing discharge. Wants to stay and do more nebulizers. Insists on wearing oxygen 97-98% on room air. Nurse educated on oxygen levels. Patient requires much attention from nurse. No sign of learning. Dozes off and then makes many requests for nurse. Dozes back off.

## 2024-02-28 NOTE — PROGRESS NOTES
Clinic Care Coordination Contact  Follow Up Progress Note      Assessment: The pt was recently in the ED, I called to check up on the pt and help the pt setup a ED follow up. The pt was at [facility] for chest pain. I called and talked to the pt, pt stated that she is doing better. She did not feel that she needs a follow up.    Care Gaps:    Health Maintenance Due   Topic Date Due    COPD ACTION PLAN  Never done    ADVANCE CARE PLANNING  11/15/2017    ZOSTER IMMUNIZATION (3 of 3) 11/30/2018    MAMMO SCREENING  09/10/2021    MEDICARE ANNUAL WELLNESS VISIT  11/02/2022    COLORECTAL CANCER SCREENING  11/22/2022    INFLUENZA VACCINE (1) 09/01/2023    COVID-19 Vaccine (3 - 2023-24 season) 09/01/2023           Care Plans      Intervention/Education provided during outreach:               Plan:     Care Coordinator will follow up in    Paperwork completed and given back to YENI Vega.    Koko Betancourt, CNP

## 2024-03-01 LAB
ATRIAL RATE - MUSE: 104 BPM
DIASTOLIC BLOOD PRESSURE - MUSE: NORMAL MMHG
INTERPRETATION ECG - MUSE: NORMAL
P AXIS - MUSE: 49 DEGREES
PR INTERVAL - MUSE: 226 MS
QRS DURATION - MUSE: 82 MS
QT - MUSE: 318 MS
QTC - MUSE: 418 MS
R AXIS - MUSE: 55 DEGREES
SYSTOLIC BLOOD PRESSURE - MUSE: NORMAL MMHG
T AXIS - MUSE: 54 DEGREES
VENTRICULAR RATE- MUSE: 104 BPM

## 2024-03-01 RX ORDER — ALBUTEROL SULFATE 90 UG/1
AEROSOL, METERED RESPIRATORY (INHALATION)
Qty: 18 G | Refills: 1 | Status: SHIPPED | OUTPATIENT
Start: 2024-03-01 | End: 2024-05-08

## 2024-03-04 ENCOUNTER — OFFICE VISIT (OUTPATIENT)
Dept: CARDIOLOGY | Facility: CLINIC | Age: 59
End: 2024-03-04
Attending: EMERGENCY MEDICINE
Payer: MEDICARE

## 2024-03-04 VITALS
RESPIRATION RATE: 16 BRPM | OXYGEN SATURATION: 96 % | DIASTOLIC BLOOD PRESSURE: 88 MMHG | WEIGHT: 189 LBS | SYSTOLIC BLOOD PRESSURE: 140 MMHG | BODY MASS INDEX: 34.57 KG/M2 | HEART RATE: 108 BPM

## 2024-03-04 DIAGNOSIS — R07.9 CHEST PAIN, UNSPECIFIED TYPE: ICD-10-CM

## 2024-03-04 DIAGNOSIS — Z87.891 PERSONAL HISTORY OF TOBACCO USE, PRESENTING HAZARDS TO HEALTH: ICD-10-CM

## 2024-03-04 DIAGNOSIS — R06.09 DYSPNEA ON EXERTION: Primary | ICD-10-CM

## 2024-03-04 PROCEDURE — 99204 OFFICE O/P NEW MOD 45 MIN: CPT | Performed by: INTERNAL MEDICINE

## 2024-03-04 NOTE — LETTER
3/4/2024    Joanna Farah MD  [address] [facility] [address]    RE: Mary Ann Olson       Dear Colleague,     I had the pleasure of seeing Mary Ann Olson in the [facility].    Thank you, Dr. Govea, for asking the [facility] team to see Ms. Mary Ann Olson to evaluate       Assessment/Recommendations   Assessment/Plan:  Chest pain neg trop but with mother with MI and IDDM reasonable to do stress echo but also PFT given tob hx and risk for lung dz  CV prevention - noted LDL at target - cont statin    Follow up prn     History of Present Illness/Subjective    Ms. Mary Ann Olson is a 58 year old female with chest pain for several mo, random, stopped tob 4 mo ago, COPD hx, IDDM, mother  at age 50 from MI, LDL 45  on statin, she does wakes up short of breath improves with inhaler sometiems, no orthopnea, no edema, syncopal events, no Gi/ bleeding.  D-dimer 0.7, normal K/Cr, trop <6, Hgb 10.9 (stabl), BNP normal. CTPE neg, cor calcifications.  ECG 1st degree AV block.  Neb helped chest pain in ED.          Physical Examination Review of Systems   BP (!) 140/88 (BP Location: Left arm, Patient Position: Sitting, Cuff Size: Adult Regular)   Pulse 108   Resp 16   Wt 85.7 kg (189 lb)   SpO2 96%   BMI 34.57 kg/m    Body mass index is 34.57 kg/m .  Wt Readings from Last 3 Encounters:   24 85.7 kg (189 lb)   24 88.5 kg (195 lb)   24 88.5 kg (195 lb)     [unfilled]  General Appearance:   no distress, normal body habitus   ENT/Mouth: membranes moist, no oral lesions or bleeding gums.      EYES:  no scleral icterus, normal conjunctivae   Neck: no carotid bruits or thyromegaly   Chest/Lungs:   lungs are clear to auscultation, no rales or wheezing,  sternal scar, equal chest wall expansion    Cardiovascular:   Regular. Normal first and second heart sounds with no murmurs, rubs, or gallops; the carotid, radial and posterior tibial  pulses are intact, Jugular venous pressure , edema bilaterally    Abdomen:  no organomegaly, masses, bruits, or tenderness; bowel sounds are present   Extremities: no cyanosis or clubbing   Skin: no xanthelasma, warm.    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    Antihistamines overdose, intentional self-harm, initial encounter (H)     Asthma     Bipolar 1 disorder (H) hx of bipolar listed in h and p    Bipolar 2 disorder (H)     Bipolar I disorder, single manic episode (H)     Created by Conversion  Replacement Utility updated for latest IMO load    Cellulitis 2006 left ankle, 2008 right foot    COPD (chronic obstructive pulmonary disease) (H)     Crohn's disease (H)     arthritis associated with crohn's    Diabetes mellitus (H)     Diabetes mellitus, type 2 (H)     Fibrocystic breast     Heat stroke     when a young child     Hyperlipidemia     Idiopathic acute pancreatitis 07/14/2015    Irritable bowel syndrome     Created by Conversion     Kidney stone     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     Overdose     Pyoderma gangrenosum (H28)     2016    Sacroiliitis (H24) 2004    Skin lesion of left leg 08/27/2015    Suicidal ideation     Suicide attempt (H)     Traumatic iritis 07/21/2015    Umbilical hernia     Uncomplicated asthma     Past Surgical History:   Procedure Laterality Date    BIOPSY BREAST Left     BIOPSY OF BREAST, INCISIONAL      Description: Biopsy Breast Open;  Recorded: 10/28/2010;     REMOVAL OF TONSILS,<11 Y/O      Description: Tonsillectomy;  Recorded: 07/08/2009;    ZZC LIGATE FALLOPIAN TUBE      Description: Tubal Ligation;  Recorded: 07/08/2009;    Family History   Problem Relation Age of Onset    Coronary Artery  Disease Mother     Diabetes Father     Breast Cancer Maternal Grandmother     Asthma Son     Asthma Daughter     Hemophilia Other     Diabetes Type 2  Father     Factor VIII deficiency Other     Social History     Socioeconomic History    Marital status: Single     Spouse name: Not on file    Number of children: Not on file    Years of education: Not on file    Highest education level: Not on file   Occupational History    Not on file   Tobacco Use    Smoking status: Former     Packs/day: .5     Types: Cigarettes     Passive exposure: Past    Smokeless tobacco: Never    Tobacco comments:     2-3 cig/day   Vaping Use    Vaping Use: Never used   Substance and Sexual Activity    Alcohol use: No    Drug use: No    Sexual activity: Not on file   Other Topics Concern    Parent/sibling w/ CABG, MI or angioplasty before 65F 55M? Not Asked   Social History Narrative    Not on file     Social Determinants of Health     Financial Resource Strain: Low Risk  (12/5/2023)    Financial Resource Strain     Within the past 12 months, have you or your family members you live with been unable to get utilities (heat, electricity) when it was really needed?: No   Food Insecurity: High Risk (12/5/2023)    Food Insecurity     Within the past 12 months, did you worry that your food would run out before you got money to buy more?: Yes     Within the past 12 months, did the food you bought just not last and you didn t have money to get more?: Yes   Transportation Needs: High Risk (12/5/2023)    Transportation Needs     Within the past 12 months, has lack of transportation kept you from medical appointments, getting your medicines, non-medical meetings or appointments, work, or from getting things that you need?: Yes   Physical Activity: Not on file   Stress: Not on file   Social Connections: Not on file   Interpersonal Safety: Low Risk  (1/29/2024)    Interpersonal Safety     Do you feel physically and emotionally safe where you currently  live?: Yes     Within the past 12 months, have you been hit, slapped, kicked or otherwise physically hurt by someone?: No     Within the past 12 months, have you been humiliated or emotionally abused in other ways by your partner or ex-partner?: No   Housing Stability: Low Risk  (12/5/2023)    Housing Stability     Do you have housing? : Yes     Are you worried about losing your housing?: No          Medications  Allergies   Scheduled Meds:  Continuous Infusions:  PRN Meds:.nitroGLYcerin Allergies   Allergen Reactions    Gadolinium Derivatives Hives    Iodinated Contrast Media Hives    Azithromycin Other (See Comments) and Rash     Erythema Nodosum x2    Keflex [Cephalexin] Rash    Aspirin Other (See Comments)    Cefuroxime Itching and Other (See Comments)    Cheese GI Disturbance and Nausea    Contrast Dye Hives     IV    Corylus Other (See Comments)    Diatrizoate Hives    Iodixanol Unknown    Nuts Other (See Comments)    Septra [Sulfamethoxazole-Trimethoprim] Hives    Sulfa Antibiotics Hives    Metronidazole Itching and Rash    Nitrofurantoin Itching and Rash    Quinolones Rash         Lab Results    Chemistry/lipid CBC Cardiac Enzymes/BNP/TSH/INR   Lab Results   Component Value Date    CHOL 101 08/15/2023    HDL 36 (L) 08/15/2023    TRIG 101 08/15/2023    BUN 8.4 02/27/2024     02/27/2024    CO2 26 02/27/2024    Lab Results   Component Value Date    WBC 10.2 02/27/2024    HGB 10.9 (L) 02/27/2024    HCT 36.3 02/27/2024    MCV 79 02/27/2024     (H) 02/27/2024    Lab Results   Component Value Date    TROPONINI <0.01 04/06/2021    BNP 11 04/06/2021    TSH 2.95 01/24/2019    INR 0.90 09/01/2023              Art Godinez MD  Interventional Cardiology  [facility]              Thank you for allowing me to participate in the care of your patient.      Sincerely,     Art Godinez MD     [facility]  cc:   Albert Govea,  DO  [facility]  [address]  [place] [address]

## 2024-03-04 NOTE — PATIENT INSTRUCTIONS
Obtain stress echo and pulmonary function tests    If stress echo indicates there might be a blocked artery then angiography of the heart arteries or CT scan may be indicated.

## 2024-03-04 NOTE — PROGRESS NOTES
Thank you, Dr. Govea, for asking the [facility] team to see Ms. Mary Ann Olson to evaluate       Assessment/Recommendations   Assessment/Plan:  Chest pain neg trop but with mother with MI and IDDM reasonable to do stress echo but also PFT given tob hx and risk for lung dz  CV prevention - noted LDL at target - cont statin    Follow up prn     History of Present Illness/Subjective    Ms. Mary Ann Olson is a 58 year old female with chest pain for several mo, random, stopped tob 4 mo ago, COPD hx, IDDM, mother  at age 50 from MI, LDL 45  on statin, she does wakes up short of breath improves with inhaler sometiems, no orthopnea, no edema, syncopal events, no Gi/ bleeding.  D-dimer 0.7, normal K/Cr, trop <6, Hgb 10.9 (stabl), BNP normal. CTPE neg, cor calcifications.  ECG 1st degree AV block.  Neb helped chest pain in ED.          Physical Examination Review of Systems   BP (!) 140/88 (BP Location: Left arm, Patient Position: Sitting, Cuff Size: Adult Regular)   Pulse 108   Resp 16   Wt 85.7 kg (189 lb)   SpO2 96%   BMI 34.57 kg/m    Body mass index is 34.57 kg/m .  Wt Readings from Last 3 Encounters:   24 85.7 kg (189 lb)   24 88.5 kg (195 lb)   24 88.5 kg (195 lb)     [unfilled]  General Appearance:   no distress, normal body habitus   ENT/Mouth: membranes moist, no oral lesions or bleeding gums.      EYES:  no scleral icterus, normal conjunctivae   Neck: no carotid bruits or thyromegaly   Chest/Lungs:   lungs are clear to auscultation, no rales or wheezing,  sternal scar, equal chest wall expansion    Cardiovascular:   Regular. Normal first and second heart sounds with no murmurs, rubs, or gallops; the carotid, radial and posterior tibial pulses are intact, Jugular venous pressure , edema bilaterally    Abdomen:  no organomegaly, masses, bruits, or tenderness; bowel sounds are present   Extremities: no cyanosis or clubbing   Skin: no xanthelasma, warm.     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    Antihistamines overdose, intentional self-harm, initial encounter (H)     Asthma     Bipolar 1 disorder (H) hx of bipolar listed in h and p    Bipolar 2 disorder (H)     Bipolar I disorder, single manic episode (H)     Created by Conversion  Replacement Utility updated for latest IMO load    Cellulitis 2006 left ankle, 2008 right foot    COPD (chronic obstructive pulmonary disease) (H)     Crohn's disease (H)     arthritis associated with crohn's    Diabetes mellitus (H)     Diabetes mellitus, type 2 (H)     Fibrocystic breast     Heat stroke     when a young child     Hyperlipidemia     Idiopathic acute pancreatitis 07/14/2015    Irritable bowel syndrome     Created by Conversion     Kidney stone     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     Overdose     Pyoderma gangrenosum (H28)     2016    Sacroiliitis (H24) 2004    Skin lesion of left leg 08/27/2015    Suicidal ideation     Suicide attempt (H)     Traumatic iritis 07/21/2015    Umbilical hernia     Uncomplicated asthma     Past Surgical History:   Procedure Laterality Date    BIOPSY BREAST Left     BIOPSY OF BREAST, INCISIONAL      Description: Biopsy Breast Open;  Recorded: 10/28/2010;    HC REMOVAL OF TONSILS,<13 Y/O      Description: Tonsillectomy;  Recorded: 07/08/2009;    ZZC LIGATE FALLOPIAN TUBE      Description: Tubal Ligation;  Recorded: 07/08/2009;    Family History   Problem Relation Age of Onset    Coronary Artery Disease Mother     Diabetes Father     Breast Cancer Maternal Grandmother     Asthma Son     Asthma Daughter     Hemophilia Other     Diabetes Type 2  Father     Factor VIII deficiency Other     Social History      Socioeconomic History    Marital status: Single     Spouse name: Not on file    Number of children: Not on file    Years of education: Not on file    Highest education level: Not on file   Occupational History    Not on file   Tobacco Use    Smoking status: Former     Packs/day: .5     Types: Cigarettes     Passive exposure: Past    Smokeless tobacco: Never    Tobacco comments:     2-3 cig/day   Vaping Use    Vaping Use: Never used   Substance and Sexual Activity    Alcohol use: No    Drug use: No    Sexual activity: Not on file   Other Topics Concern    Parent/sibling w/ CABG, MI or angioplasty before 65F 55M? Not Asked   Social History Narrative    Not on file     Social Determinants of Health     Financial Resource Strain: Low Risk  (12/5/2023)    Financial Resource Strain     Within the past 12 months, have you or your family members you live with been unable to get utilities (heat, electricity) when it was really needed?: No   Food Insecurity: High Risk (12/5/2023)    Food Insecurity     Within the past 12 months, did you worry that your food would run out before you got money to buy more?: Yes     Within the past 12 months, did the food you bought just not last and you didn t have money to get more?: Yes   Transportation Needs: High Risk (12/5/2023)    Transportation Needs     Within the past 12 months, has lack of transportation kept you from medical appointments, getting your medicines, non-medical meetings or appointments, work, or from getting things that you need?: Yes   Physical Activity: Not on file   Stress: Not on file   Social Connections: Not on file   Interpersonal Safety: Low Risk  (1/29/2024)    Interpersonal Safety     Do you feel physically and emotionally safe where you currently live?: Yes     Within the past 12 months, have you been hit, slapped, kicked or otherwise physically hurt by someone?: No     Within the past 12 months, have you been humiliated or emotionally abused in other ways  by your partner or ex-partner?: No   Housing Stability: Low Risk  (12/5/2023)    Housing Stability     Do you have housing? : Yes     Are you worried about losing your housing?: No          Medications  Allergies   Scheduled Meds:  Continuous Infusions:  PRN Meds:.nitroGLYcerin Allergies   Allergen Reactions    Gadolinium Derivatives Hives    Iodinated Contrast Media Hives    Azithromycin Other (See Comments) and Rash     Erythema Nodosum x2    Keflex [Cephalexin] Rash    Aspirin Other (See Comments)    Cefuroxime Itching and Other (See Comments)    Cheese GI Disturbance and Nausea    Contrast Dye Hives     IV    Corylus Other (See Comments)    Diatrizoate Hives    Iodixanol Unknown    Nuts Other (See Comments)    Septra [Sulfamethoxazole-Trimethoprim] Hives    Sulfa Antibiotics Hives    Metronidazole Itching and Rash    Nitrofurantoin Itching and Rash    Quinolones Rash         Lab Results    Chemistry/lipid CBC Cardiac Enzymes/BNP/TSH/INR   Lab Results   Component Value Date    CHOL 101 08/15/2023    HDL 36 (L) 08/15/2023    TRIG 101 08/15/2023    BUN 8.4 02/27/2024     02/27/2024    CO2 26 02/27/2024    Lab Results   Component Value Date    WBC 10.2 02/27/2024    HGB 10.9 (L) 02/27/2024    HCT 36.3 02/27/2024    MCV 79 02/27/2024     (H) 02/27/2024    Lab Results   Component Value Date    TROPONINI <0.01 04/06/2021    BNP 11 04/06/2021    TSH 2.95 01/24/2019    INR 0.90 09/01/2023              Art Godinez MD  Interventional Cardiology  [facility]

## 2024-03-12 ENCOUNTER — ANCILLARY PROCEDURE (OUTPATIENT)
Dept: GENERAL RADIOLOGY | Facility: CLINIC | Age: 59
End: 2024-03-12
Attending: FAMILY MEDICINE
Payer: MEDICARE

## 2024-03-12 ENCOUNTER — OFFICE VISIT (OUTPATIENT)
Dept: FAMILY MEDICINE | Facility: CLINIC | Age: 59
End: 2024-03-12
Payer: MEDICARE

## 2024-03-12 VITALS
RESPIRATION RATE: 16 BRPM | DIASTOLIC BLOOD PRESSURE: 86 MMHG | OXYGEN SATURATION: 99 % | SYSTOLIC BLOOD PRESSURE: 132 MMHG | TEMPERATURE: 98 F | HEART RATE: 104 BPM

## 2024-03-12 DIAGNOSIS — M25.571 ARTHRALGIA OF RIGHT FOOT: ICD-10-CM

## 2024-03-12 DIAGNOSIS — M25.571 ARTHRALGIA OF RIGHT FOOT: Primary | ICD-10-CM

## 2024-03-12 LAB
ANION GAP SERPL CALCULATED.3IONS-SCNC: 12 MMOL/L (ref 3–14)
BUN SERPL-MCNC: 10 MG/DL (ref 7–30)
CALCIUM SERPL-MCNC: 10.4 MG/DL (ref 8.5–10.1)
CHLORIDE BLD-SCNC: 104 MMOL/L (ref 94–109)
CO2 SERPL-SCNC: 29 MMOL/L (ref 20–32)
CREAT SERPL-MCNC: 0.7 MG/DL (ref 0.52–1.04)
EGFRCR SERPLBLD CKD-EPI 2021: >90 ML/MIN/1.73M2
GLUCOSE BLD-MCNC: 128 MG/DL (ref 70–99)
POTASSIUM BLD-SCNC: 4.4 MMOL/L (ref 3.4–5.3)
SODIUM SERPL-SCNC: 145 MMOL/L (ref 135–145)
URATE SERPL-MCNC: 4.3 MG/DL (ref 2.4–5.7)

## 2024-03-12 PROCEDURE — 84550 ASSAY OF BLOOD/URIC ACID: CPT | Performed by: FAMILY MEDICINE

## 2024-03-12 PROCEDURE — 36415 COLL VENOUS BLD VENIPUNCTURE: CPT | Performed by: FAMILY MEDICINE

## 2024-03-12 PROCEDURE — 80048 BASIC METABOLIC PNL TOTAL CA: CPT | Performed by: FAMILY MEDICINE

## 2024-03-12 PROCEDURE — 73630 X-RAY EXAM OF FOOT: CPT | Mod: TC | Performed by: RADIOLOGY

## 2024-03-12 PROCEDURE — 99214 OFFICE O/P EST MOD 30 MIN: CPT | Performed by: FAMILY MEDICINE

## 2024-03-12 NOTE — PROGRESS NOTES
Assessment & Plan     Mary Ann Olson is a 58 year old female with pmhx including T2DM, Asthma/COPD, Crohn's, Bipolar seen today for the following:    Arthralgia of right foot  Specific concern of right second toe with PIP joint tenderness and swelling.  Etiology unclear.  No evidence to suggest fracture.  X-ray primarily to evaluate joint space and possibility of erosions which were not seen.  No findings to suggest septic arthritis.  Potentially represents first episode of gout and will obtain uric acid and BMP today.  Follow-up pending findings  - XR Foot Right G/E 3 Views; Future  - Basic metabolic panel; Future  - Uric acid; Future    Benjamin Rosenstein, MD, MA  [facility] Faculty     This note was completed with the assistance of dictation software. Typos and word substitution-errors are expected and unintended.          Subjective   Mary Ann is a 58 year old, presenting for the following health issues:  Toe Injury (Possible broken toe: right foot 2nd digit/)    HPI   No specific trauma to her toe - didn't kick anything, didn't drop anything on her foot  Started a few days ago. Pain and swelling  Has been using XS tylenol with some improvement in pain.     Seen today for concerns related to toe pain.  She has swelling and pain of right second toe.  It is primarily focused at the PIP joint.  She denies any trauma to her foot or toe.  She did not kick or hit anything, did not drop anything on her foot.  This started a few days ago with the pain and swelling.  Had been using extra strength Tylenol with some improvement in her pain.  No other toes affected.  No ulcerations.  She was wondering if this would prevent her from completing her stress test next week.  This is being obtained for dyspnea on exertion which she has been experiencing I did recommend she complete this.        Objective    /86   Pulse 104   Temp 98  F (36.7  C) (Oral)   Resp 16   SpO2 99%   There is no height or  weight on file to calculate BMI.  Physical Exam     General: Awake, seated comfortably, appears well and NAD   MSK: Mild swelling without significant erythema or right 2nd toe PIP joint. Decreased AROM, severe pain with PROM particularly plantar flexion. Significant TTP of PIP joint   Neuro: Alert, answering questions appropriately, normal thought processes; gait favoring right leg but able to place full weight on left foot.  Mood: Anxious    Foot XR: Clinical read-no fracture or dislocation, no significant joint space narrowing, no joint erosions.  Normal cortical structures    Results for orders placed or performed in visit on 03/12/24 (from the past 24 hour(s))   Basic metabolic panel   Result Value Ref Range    Sodium 145 135 - 145 mmol/L    Potassium 4.4 3.4 - 5.3 mmol/L    Chloride 104 94 - 109 mmol/L    Carbon Dioxide (CO2) 29 20 - 32 mmol/L    Anion Gap 12 3 - 14 mmol/L    Urea Nitrogen 10 7 - 30 mg/dL    Creatinine 0.70 0.52 - 1.04 mg/dL    GFR Estimate >90 >60 mL/min/1.73m2    Calcium 10.4 (H) 8.5 - 10.1 mg/dL    Glucose 128 (H) 70 - 99 mg/dL     *Note: Due to a large number of results and/or encounters for the requested time period, some results have not been displayed. A complete set of results can be found in Results Review.

## 2024-03-13 ENCOUNTER — TELEPHONE (OUTPATIENT)
Dept: FAMILY MEDICINE | Facility: CLINIC | Age: 59
End: 2024-03-13
Payer: MEDICARE

## 2024-03-13 NOTE — TELEPHONE ENCOUNTER
Patient left voice mail on clinic vm asking about results from her labs yesterday -- they are not annotated yet but as soon as they are, please contact her --thank you

## 2024-03-18 ENCOUNTER — HOSPITAL ENCOUNTER (EMERGENCY)
Facility: HOSPITAL | Age: 59
Discharge: HOME | End: 2024-03-18
Attending: FAMILY MEDICINE | Admitting: FAMILY MEDICINE
Payer: MEDICARE

## 2024-03-18 ENCOUNTER — APPOINTMENT (OUTPATIENT)
Dept: CT IMAGING | Facility: HOSPITAL | Age: 59
End: 2024-03-18
Attending: FAMILY MEDICINE
Payer: MEDICARE

## 2024-03-18 VITALS
RESPIRATION RATE: 21 BRPM | WEIGHT: 183.1 LBS | SYSTOLIC BLOOD PRESSURE: 151 MMHG | TEMPERATURE: 98.1 F | DIASTOLIC BLOOD PRESSURE: 79 MMHG | BODY MASS INDEX: 34.57 KG/M2 | HEIGHT: 61 IN | HEART RATE: 113 BPM | OXYGEN SATURATION: 94 %

## 2024-03-18 DIAGNOSIS — L03.116 LEFT LEG CELLULITIS: ICD-10-CM

## 2024-03-18 DIAGNOSIS — R10.31 RLQ ABDOMINAL PAIN: ICD-10-CM

## 2024-03-18 LAB
ANION GAP SERPL CALCULATED.3IONS-SCNC: 15 MMOL/L (ref 7–15)
BASOPHILS # BLD AUTO: 0.1 10E3/UL (ref 0–0.2)
BASOPHILS NFR BLD AUTO: 1 %
BUN SERPL-MCNC: 10.6 MG/DL (ref 8–23)
CALCIUM SERPL-MCNC: 9.6 MG/DL (ref 8.6–10)
CHLORIDE SERPL-SCNC: 99 MMOL/L (ref 98–107)
CREAT SERPL-MCNC: 0.53 MG/DL (ref 0.51–0.95)
DEPRECATED HCO3 PLAS-SCNC: 24 MMOL/L (ref 22–29)
EGFRCR SERPLBLD CKD-EPI 2021: >90 ML/MIN/1.73M2
EOSINOPHIL # BLD AUTO: 0.4 10E3/UL (ref 0–0.7)
EOSINOPHIL NFR BLD AUTO: 3 %
ERYTHROCYTE [DISTWIDTH] IN BLOOD BY AUTOMATED COUNT: 15.5 % (ref 10–15)
GLUCOSE SERPL-MCNC: 141 MG/DL (ref 70–99)
HCT VFR BLD AUTO: 36.9 % (ref 35–47)
HGB BLD-MCNC: 11.5 G/DL (ref 11.7–15.7)
IMM GRANULOCYTES # BLD: 0 10E3/UL
IMM GRANULOCYTES NFR BLD: 0 %
LACTATE SERPL-SCNC: 1.7 MMOL/L (ref 0.7–2)
LYMPHOCYTES # BLD AUTO: 1.8 10E3/UL (ref 0.8–5.3)
LYMPHOCYTES NFR BLD AUTO: 14 %
MAGNESIUM SERPL-MCNC: 1.9 MG/DL (ref 1.7–2.3)
MCH RBC QN AUTO: 24 PG (ref 26.5–33)
MCHC RBC AUTO-ENTMCNC: 31.2 G/DL (ref 31.5–36.5)
MCV RBC AUTO: 77 FL (ref 78–100)
MONOCYTES # BLD AUTO: 0.7 10E3/UL (ref 0–1.3)
MONOCYTES NFR BLD AUTO: 5 %
NEUTROPHILS # BLD AUTO: 9.8 10E3/UL (ref 1.6–8.3)
NEUTROPHILS NFR BLD AUTO: 77 %
NRBC # BLD AUTO: 0 10E3/UL
NRBC BLD AUTO-RTO: 0 /100
PLATELET # BLD AUTO: 493 10E3/UL (ref 150–450)
POTASSIUM SERPL-SCNC: 3.7 MMOL/L (ref 3.4–5.3)
PROCALCITONIN SERPL IA-MCNC: 0.1 NG/ML
RBC # BLD AUTO: 4.8 10E6/UL (ref 3.8–5.2)
SODIUM SERPL-SCNC: 138 MMOL/L (ref 135–145)
WBC # BLD AUTO: 12.7 10E3/UL (ref 4–11)

## 2024-03-18 PROCEDURE — 83735 ASSAY OF MAGNESIUM: CPT | Performed by: FAMILY MEDICINE

## 2024-03-18 PROCEDURE — 85025 COMPLETE CBC W/AUTO DIFF WBC: CPT | Performed by: FAMILY MEDICINE

## 2024-03-18 PROCEDURE — 80048 BASIC METABOLIC PNL TOTAL CA: CPT | Performed by: FAMILY MEDICINE

## 2024-03-18 PROCEDURE — 96374 THER/PROPH/DIAG INJ IV PUSH: CPT

## 2024-03-18 PROCEDURE — 83605 ASSAY OF LACTIC ACID: CPT | Performed by: FAMILY MEDICINE

## 2024-03-18 PROCEDURE — 74176 CT ABD & PELVIS W/O CONTRAST: CPT

## 2024-03-18 PROCEDURE — 96361 HYDRATE IV INFUSION ADD-ON: CPT

## 2024-03-18 PROCEDURE — 258N000003 HC RX IP 258 OP 636: Performed by: FAMILY MEDICINE

## 2024-03-18 PROCEDURE — 84145 PROCALCITONIN (PCT): CPT | Performed by: FAMILY MEDICINE

## 2024-03-18 PROCEDURE — 250N000011 HC RX IP 250 OP 636: Performed by: FAMILY MEDICINE

## 2024-03-18 PROCEDURE — 36415 COLL VENOUS BLD VENIPUNCTURE: CPT | Performed by: FAMILY MEDICINE

## 2024-03-18 PROCEDURE — 93005 ELECTROCARDIOGRAM TRACING: CPT | Performed by: STUDENT IN AN ORGANIZED HEALTH CARE EDUCATION/TRAINING PROGRAM

## 2024-03-18 PROCEDURE — 99285 EMERGENCY DEPT VISIT HI MDM: CPT | Mod: 25

## 2024-03-18 PROCEDURE — 87040 BLOOD CULTURE FOR BACTERIA: CPT | Performed by: FAMILY MEDICINE

## 2024-03-18 RX ORDER — DOXYCYCLINE 100 MG/1
100 CAPSULE ORAL 2 TIMES DAILY
Qty: 14 CAPSULE | Refills: 0 | Status: ON HOLD | OUTPATIENT
Start: 2024-03-18 | End: 2024-03-30

## 2024-03-18 RX ORDER — AMOXICILLIN 500 MG/1
500 CAPSULE ORAL 3 TIMES DAILY
Qty: 21 CAPSULE | Refills: 0 | Status: ON HOLD | OUTPATIENT
Start: 2024-03-18 | End: 2024-03-30

## 2024-03-18 RX ORDER — HYDROMORPHONE HYDROCHLORIDE 1 MG/ML
0.5 INJECTION, SOLUTION INTRAMUSCULAR; INTRAVENOUS; SUBCUTANEOUS ONCE
Status: COMPLETED | OUTPATIENT
Start: 2024-03-18 | End: 2024-03-18

## 2024-03-18 RX ADMIN — SODIUM CHLORIDE 1000 ML: 9 INJECTION, SOLUTION INTRAVENOUS at 17:10

## 2024-03-18 RX ADMIN — HYDROMORPHONE HYDROCHLORIDE 0.5 MG: 1 INJECTION, SOLUTION INTRAMUSCULAR; INTRAVENOUS; SUBCUTANEOUS at 18:11

## 2024-03-18 ASSESSMENT — ACTIVITIES OF DAILY LIVING (ADL)
ADLS_ACUITY_SCORE: 35

## 2024-03-18 ASSESSMENT — ENCOUNTER SYMPTOMS
ABDOMINAL PAIN: 1
NAUSEA: 0
COLOR CHANGE: 1
JOINT SWELLING: 1
CHILLS: 1
DIARRHEA: 1
VOMITING: 0
APPETITE CHANGE: 1

## 2024-03-18 NOTE — ED PROVIDER NOTES
EMERGENCY DEPARTMENT ENCOUNTER      NAME: Mary Ann Olson  AGE: 58 year old female  YOB: 1965  MRN: [id number]  EVALUATION DATE & TIME: 3/18/2024  4:12 PM    PCP: Joanna Farah    ED PROVIDER: Trey Mckeon M.D.    Chief Complaint   Patient presents with    Wound Infection       FINAL IMPRESSION:  1. RLQ abdominal pain    2. Left leg cellulitis        ED COURSE & MEDICAL DECISION MAKING:    Pertinent Labs & Imaging studies independently interpreted by me. (See chart for details)  Review of most recent primary care office visit from March 12, 2024 when patient was complaining of arthralgia of the right foot, no concerns about the left foot at that time, basic panel and uric acid were ordered, at that time epic arthritis was felt to be unlikely.  Also noted that at that visit patient's heart rate was 104.  Review of prior visit February 21 showed heart rate of 110 so it appears patient is fairly chronically tachycardic  ED Course as of 03/18/24 1955   Mon Mar 18, 2024   1634 Patient seen and examined, prior records are reviewed.  Patient presents today with multiple concerns.  She says she has some areas of redness on her left leg that she is concerned about.  She does have an area of about 5 cm of erythema on the posterior lateral lower left leg as well as the small area around the prior incision site on the lateral left ankle.  These appear most consistent with cellulitis although patient says she sometimes get these when she has a flare of Crohn's.  Consider pyoderma gangrenosum but there is no central skin breakdown or ulceration, consider also erythema nodosum but somewhat more erythematous than might be expected.  Patient also was complaining of right-sided abdominal pain.  She is tachycardic with right lower quadrant tenderness, labs and CT scan are ordered to evaluate for Crohn's flare versus perforation and abscess versus acute appendicitis.  IV fluids initiated.   1735 Labs  ordered and independently interpreted by me with mild leukocytosis, normal basic metabolic panel, normal magnesium, negative procalcitonin, normal lactate.  CT scan is pending.  No evidence for severe infection or sepsis.   1940 CT abdomen and pelvis independently interpreted by me does not demonstrate any acute inflammatory changes, subtle inflammatory changes could be missed on this noncontrast CT.  Patient is stable for discharge, will be started on Keflex and doxycycline for possible cellulitis of the left lower leg, follow-up with primary care.         At the conclusion of the encounter I discussed the results of all of the tests and the disposition. The questions were answered. The patient or family acknowledged understanding and was agreeable with the care plan.     Medical Decision Making  Obtained supplemental history:Supplemental history obtained?: Documented in chart  Reviewed external records: External records reviewed?: Documented in chart and Outpatient Record: Patient had a office visit visit at [facility] on 03/12/24.    Care impacted by chronic illness:Chronic Lung Disease, Hyperlipidemia, Mental Health, and Other: Crohn's disease, IBS, Kidney stones, and osteoarthritis.   Care significantly affected by social determinants of health:Access to Affordable Health Care and Medication Noncompliance  Did you consider but not order tests?: Work up considered but not performed and documented in chart, if applicable  Did you interpret images independently?: Independent interpretation of ECG and images noted in documentation, when applicable.  Consultation discussion with other provider:Did you involve another provider (consultant, , pharmacy, etc.)?: No  Discharge. I prescribed additional prescription strength medication(s) as charted. N/A.      MEDICATIONS GIVEN IN THE EMERGENCY:  Medications   sodium chloride 0.9% BOLUS 1,000 mL (0 mLs Intravenous Stopped 3/18/24 8897)   HYDROmorphone (PF)  (DILAUDID) injection 0.5 mg (0.5 mg Intravenous $Given 3/18/24 1811)       NEW PRESCRIPTIONS STARTED AT TODAY'S ER VISIT  New Prescriptions    AMOXICILLIN (AMOXIL) 500 MG CAPSULE    Take 1 capsule (500 mg) by mouth 3 times daily for 7 days    DOXYCYCLINE HYCLATE (VIBRAMYCIN) 100 MG CAPSULE    Take 1 capsule (100 mg) by mouth 2 times daily       =================================================================    HPI    Patient information was obtained from: Patient       Mary Ann Olson is a 58 year old female with a pertinent history of Chronic Lung Disease, Hyperlipidemia, Mental Health,Crohn's disease, IBS, Kidney stones, and osteoarthritis who presents to this ED via car for evaluation of  wound infection.     The patient reports bilateral swelling of the ankles along with abdominal pain and intermittent mixture between diarrhea and constipation. Patient states her colitis crohn's disease is flared up which is why she is endorsing these symptoms. She also mentioned some redness on her left ankle. Patient notes not eating at all last night and endorses some chills. She takes medication for her type 2 diabetes. Denies any nausea or vomiting. No other complaints at this time.       REVIEW OF SYSTEMS   Review of Systems   Constitutional:  Positive for appetite change and chills.   Gastrointestinal:  Positive for abdominal pain and diarrhea. Negative for nausea and vomiting.   Musculoskeletal:  Positive for joint swelling.   Skin:  Positive for color change.   All other systems reviewed and are negative.     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    Antihistamines overdose, intentional self-harm, initial encounter (H)     Asthma     Bipolar 1 disorder (H) hx of bipolar listed in h and p    Bipolar 2 disorder (H)     Bipolar I disorder, single manic episode (H)     Created by Conversion   "Replacement Utility updated for latest IMO load    Cellulitis 2006 left ankle, 2008 right foot    COPD (chronic obstructive pulmonary disease) (H)     Crohn's disease (H)     arthritis associated with crohn's    Diabetes mellitus (H)     Diabetes mellitus, type 2 (H)     Fibrocystic breast     Heat stroke     when a young child     Hyperlipidemia     Idiopathic acute pancreatitis 07/14/2015    Irritable bowel syndrome     Created by Conversion     Kidney stone     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     Overdose     Pyoderma gangrenosum (H28)     2016    Sacroiliitis (H24) 2004    Skin lesion of left leg 08/27/2015    Suicidal ideation     Suicide attempt (H)     Traumatic iritis 07/21/2015    Umbilical hernia     Uncomplicated asthma        PAST SURGICAL HISTORY:  Past Surgical History:   Procedure Laterality Date    BIOPSY BREAST Left     BIOPSY OF BREAST, INCISIONAL      Description: Biopsy Breast Open;  Recorded: 10/28/2010;    HC REMOVAL OF TONSILS,<13 Y/O      Description: Tonsillectomy;  Recorded: 07/08/2009;    ZZC LIGATE FALLOPIAN TUBE      Description: Tubal Ligation;  Recorded: 07/08/2009;       CURRENT MEDICATIONS:    Current Facility-Administered Medications   Medication    nitroGLYcerin (NITROSTAT) sublingual tablet 0.4 mg     Current Outpatient Medications   Medication    amoxicillin (AMOXIL) 500 MG capsule    doxycycline hyclate (VIBRAMYCIN) 100 MG capsule    acetaminophen (TYLENOL) 500 MG tablet    albuterol (PROAIR HFA/PROVENTIL HFA/VENTOLIN HFA) 108 (90 Base) MCG/ACT inhaler    albuterol (PROVENTIL) (2.5 MG/3ML) 0.083% neb solution    atorvastatin (LIPITOR) 40 MG tablet    blood glucose (NO BRAND SPECIFIED) test strip    budesonide-formoterol (SYMBICORT) 160-4.5 MCG/ACT Inhaler    Calcium Carbonate-Vitamin D 500-5 MG-MCG TABS    hydrOXYzine (ATARAX) 25 MG tablet    insulin  UNIT/ML vial    insulin pen needle (32G X 4 MM) 32G X 4 MM miscellaneous    insulin syringe-needle U-100 (31G X 5/16\" " "0.3 ML) 31G X 5/16\" 0.3 ML miscellaneous    ipratropium - albuterol 0.5 mg/2.5 mg/3 mL (DUONEB) 0.5-2.5 (3) MG/3ML neb solution    lidocaine, viscous, (XYLOCAINE) 2 % solution    liraglutide (VICTOZA) 18 MG/3ML solution    loratadine (CLARITIN) 10 MG tablet    magnesium hydroxide (MILK OF MAGNESIA) 400 MG/5ML suspension    melatonin 3 MG tablet    metFORMIN (GLUCOPHAGE) 1000 MG tablet    Multiple Vitamins-Minerals (CENTRUM SILVER 50+WOMEN PO)    nitroGLYcerin (RECTIV) 0.4 % OINT rectal ointment    nystatin (MYCOSTATIN) 041950 UNIT/GM external powder    omeprazole (PRILOSEC) 20 MG DR capsule    sennosides (SENOKOT) 8.6 MG tablet    vitamin D2 (ERGOCALCIFEROL) 51754 units (1250 mcg) capsule       ALLERGIES:  Allergies   Allergen Reactions    Gadolinium Derivatives Hives    Iodinated Contrast Media Hives    Azithromycin Other (See Comments) and Rash     Erythema Nodosum x2    Keflex [Cephalexin] Rash    Aspirin Other (See Comments)    Cefuroxime Itching and Other (See Comments)    Cheese GI Disturbance and Nausea    Contrast Dye Hives     IV    Corylus Other (See Comments)    Diatrizoate Hives    Iodixanol Unknown    Nuts Other (See Comments)    Septra [Sulfamethoxazole-Trimethoprim] Hives    Sulfa Antibiotics Hives    Metronidazole Itching and Rash    Nitrofurantoin Itching and Rash    Quinolones Rash       FAMILY HISTORY:  Family History   Problem Relation Age of Onset    Coronary Artery Disease Mother     Diabetes Father     Breast Cancer Maternal Grandmother     Asthma Son     Asthma Daughter     Hemophilia Other     Diabetes Type 2  Father     Factor VIII deficiency Other        SOCIAL HISTORY:   Social History     Socioeconomic History    Marital status: Single   Tobacco Use    Smoking status: Former     Packs/day: .5     Types: Cigarettes     Passive exposure: Past    Smokeless tobacco: Never    Tobacco comments:     2-3 cig/day   Vaping Use    Vaping Use: Never used   Substance and Sexual Activity    Alcohol " "use: No    Drug use: No     Social Determinants of Health     Financial Resource Strain: Low Risk  (12/5/2023)    Financial Resource Strain     Within the past 12 months, have you or your family members you live with been unable to get utilities (heat, electricity) when it was really needed?: No   Food Insecurity: High Risk (12/5/2023)    Food Insecurity     Within the past 12 months, did you worry that your food would run out before you got money to buy more?: Yes     Within the past 12 months, did the food you bought just not last and you didn t have money to get more?: Yes   Transportation Needs: High Risk (12/5/2023)    Transportation Needs     Within the past 12 months, has lack of transportation kept you from medical appointments, getting your medicines, non-medical meetings or appointments, work, or from getting things that you need?: Yes   Interpersonal Safety: Low Risk  (1/29/2024)    Interpersonal Safety     Do you feel physically and emotionally safe where you currently live?: Yes     Within the past 12 months, have you been hit, slapped, kicked or otherwise physically hurt by someone?: No     Within the past 12 months, have you been humiliated or emotionally abused in other ways by your partner or ex-partner?: No   Housing Stability: Low Risk  (12/5/2023)    Housing Stability     Do you have housing? : Yes     Are you worried about losing your housing?: No       VITALS:  BP (!) 153/86   Pulse 116   Temp 98.1  F (36.7  C) (Oral)   Resp 20   Ht 1.549 m (5' 1\")   Wt 83.1 kg (183 lb 1.6 oz)   SpO2 94%   BMI 34.60 kg/m      PHYSICAL EXAM:  Physical Exam  Vitals and nursing note reviewed.   Constitutional:       Appearance: Normal appearance.   HENT:      Head: Normocephalic and atraumatic.      Right Ear: External ear normal.      Left Ear: External ear normal.      Nose: Nose normal.      Mouth/Throat:      Mouth: Mucous membranes are moist.   Eyes:      Extraocular Movements: Extraocular movements " intact.      Conjunctiva/sclera: Conjunctivae normal.      Pupils: Pupils are equal, round, and reactive to light.   Cardiovascular:      Rate and Rhythm: Regular rhythm. Tachycardia present.   Pulmonary:      Effort: Pulmonary effort is normal.      Breath sounds: Normal breath sounds. No wheezing or rales.   Abdominal:      General: Abdomen is flat. There is no distension.      Palpations: Abdomen is soft.      Tenderness: There is abdominal tenderness in the right lower quadrant. There is no guarding.   Musculoskeletal:         General: Normal range of motion.      Cervical back: Normal range of motion and neck supple.      Right lower leg: No edema.      Left lower leg: No edema.      Comments: Mild swelling over the right malleolus.    Lymphadenopathy:      Cervical: No cervical adenopathy.   Skin:     General: Skin is warm and dry.      Comments: 3 centimeter area of erythema induration to the left posterior calf. Mild red and warmth over the interior ankle.    Neurological:      General: No focal deficit present.      Mental Status: She is alert and oriented to person, place, and time. Mental status is at baseline.      Comments: No gross focal neurologic deficits   Psychiatric:         Mood and Affect: Mood normal.         Behavior: Behavior normal.         Thought Content: Thought content normal.          LAB:  All pertinent labs reviewed and interpreted.  Results for orders placed or performed during the hospital encounter of 03/18/24   CT Abdomen Pelvis w/o Contrast    Impression    IMPRESSION:   No findings to account for right lower quadrant abdominal pain. Appendix is normal. No evidence of Crohn's disease, though lack of IV contrast decreases sensitivity somewhat.      Basic metabolic panel   Result Value Ref Range    Sodium 138 135 - 145 mmol/L    Potassium 3.7 3.4 - 5.3 mmol/L    Chloride 99 98 - 107 mmol/L    Carbon Dioxide (CO2) 24 22 - 29 mmol/L    Anion Gap 15 7 - 15 mmol/L    Urea Nitrogen 10.6  8.0 - 23.0 mg/dL    Creatinine 0.53 0.51 - 0.95 mg/dL    GFR Estimate >90 >60 mL/min/1.73m2    Calcium 9.6 8.6 - 10.0 mg/dL    Glucose 141 (H) 70 - 99 mg/dL   Lactic acid whole blood   Result Value Ref Range    Lactic Acid 1.7 0.7 - 2.0 mmol/L   Result Value Ref Range    Procalcitonin 0.10 <0.50 ng/mL   Result Value Ref Range    Magnesium 1.9 1.7 - 2.3 mg/dL   CBC with platelets and differential   Result Value Ref Range    WBC Count 12.7 (H) 4.0 - 11.0 10e3/uL    RBC Count 4.80 3.80 - 5.20 10e6/uL    Hemoglobin 11.5 (L) 11.7 - 15.7 g/dL    Hematocrit 36.9 35.0 - 47.0 %    MCV 77 (L) 78 - 100 fL    MCH 24.0 (L) 26.5 - 33.0 pg    MCHC 31.2 (L) 31.5 - 36.5 g/dL    RDW 15.5 (H) 10.0 - 15.0 %    Platelet Count 493 (H) 150 - 450 10e3/uL    % Neutrophils 77 %    % Lymphocytes 14 %    % Monocytes 5 %    % Eosinophils 3 %    % Basophils 1 %    % Immature Granulocytes 0 %    NRBCs per 100 WBC 0 <1 /100    Absolute Neutrophils 9.8 (H) 1.6 - 8.3 10e3/uL    Absolute Lymphocytes 1.8 0.8 - 5.3 10e3/uL    Absolute Monocytes 0.7 0.0 - 1.3 10e3/uL    Absolute Eosinophils 0.4 0.0 - 0.7 10e3/uL    Absolute Basophils 0.1 0.0 - 0.2 10e3/uL    Absolute Immature Granulocytes 0.0 <=0.4 10e3/uL    Absolute NRBCs 0.0 10e3/uL       RADIOLOGY:  Reviewed all pertinent imaging. Please see official radiology report.  CT Abdomen Pelvis w/o Contrast   Final Result   IMPRESSION:    No findings to account for right lower quadrant abdominal pain. Appendix is normal. No evidence of Crohn's disease, though lack of IV contrast decreases sensitivity somewhat.              I, Let Let, am serving as a scribe to document services personally performed by Dr. Mckeon based on my observation and the provider's statements to me. I, Trey Mckeon MD attest that Let Tamar is acting in a scribe capacity, has observed my performance of the services and has documented them in accordance with my direction.    Trey Mckeon M.D.  Emergency  Medicine  [facility] EMERGENCY DEPARTMENT  [facility] [facility] [id number] [phone]  Dept: [phone]       Trey Mckeon MD  03/18/24 1955

## 2024-03-18 NOTE — ED TRIAGE NOTES
Pt presents with several c/o: bilateral swelling of ankles, abdominal pain and inability to eat for several days with diarrhea and a wound to left posterior lower leg

## 2024-03-19 ENCOUNTER — PATIENT OUTREACH (OUTPATIENT)
Dept: CARE COORDINATION | Facility: CLINIC | Age: 59
End: 2024-03-19
Payer: MEDICARE

## 2024-03-19 NOTE — PROGRESS NOTES
Clinic Care Coordination Contact  Follow Up Progress Note      Assessment: The pt was recently in the ED, I called to check up on the pt and help the pt setup a ED follow up. The pt was at [facility] for wound infection. I called the pt,but got her vm, so I left a vm for the pt to give me a call back. The pt has a follow up on 04/01/2024 at 3:20pm with .     Care Gaps:    Health Maintenance Due   Topic Date Due    COPD ACTION PLAN  Never done    ADVANCE CARE PLANNING  11/15/2017    ZOSTER IMMUNIZATION (3 of 3) 11/30/2018    MAMMO SCREENING  09/10/2021    YEARLY PREVENTIVE VISIT  11/02/2022    COLORECTAL CANCER SCREENING  11/22/2022    INFLUENZA VACCINE (1) 09/01/2023    COVID-19 Vaccine (3 - 2023-24 season) 09/01/2023           Care Plans      Intervention/Education provided during outreach:               Plan:     Care Coordinator will follow up in

## 2024-03-19 NOTE — ED NOTES
Patient contacted staff at her independent living facility for transportation. Writer spoke with a woman named Marcela who states she will  patient from ED.

## 2024-03-20 ENCOUNTER — NURSE TRIAGE (OUTPATIENT)
Dept: FAMILY MEDICINE | Facility: CLINIC | Age: 59
End: 2024-03-20
Payer: MEDICARE

## 2024-03-20 DIAGNOSIS — J44.1 COPD EXACERBATION (H): ICD-10-CM

## 2024-03-20 LAB
ATRIAL RATE - MUSE: 119 BPM
DIASTOLIC BLOOD PRESSURE - MUSE: 81 MMHG
INTERPRETATION ECG - MUSE: NORMAL
P AXIS - MUSE: 82 DEGREES
PR INTERVAL - MUSE: 204 MS
QRS DURATION - MUSE: 72 MS
QT - MUSE: 296 MS
QTC - MUSE: 416 MS
R AXIS - MUSE: 69 DEGREES
SYSTOLIC BLOOD PRESSURE - MUSE: 163 MMHG
T AXIS - MUSE: 59 DEGREES
VENTRICULAR RATE- MUSE: 119 BPM

## 2024-03-20 RX ORDER — IPRATROPIUM BROMIDE AND ALBUTEROL SULFATE 2.5; .5 MG/3ML; MG/3ML
1 SOLUTION RESPIRATORY (INHALATION) 2 TIMES DAILY PRN
Qty: 90 ML | Refills: 3 | Status: SHIPPED | OUTPATIENT
Start: 2024-03-20

## 2024-03-20 NOTE — TELEPHONE ENCOUNTER
Writer called patient back, no answer. LVMTCB. Advised of on-call via VM if unable to connect with patient prior to end of the day. Timo SMITH

## 2024-03-20 NOTE — TELEPHONE ENCOUNTER
Patient called to speak to a nurse regarding throwing up. Patient was recently seen in the ER for cellulitis. Pt is taking two antibiotics. No fever noted at this time. Please call back when able. Thank you.

## 2024-03-20 NOTE — TELEPHONE ENCOUNTER
Message to physician:     Date of last visit: 3/12/2024    Date of next visit if scheduled:     Potassium   Date Value Ref Range Status   03/18/2024 3.7 3.4 - 5.3 mmol/L Final   03/12/2024 4.4 3.4 - 5.3 mmol/L Final   11/16/2020 4.5 3.4 - 5.3 mmol/L Final     Creatinine   Date Value Ref Range Status   03/18/2024 0.53 0.51 - 0.95 mg/dL Final   11/16/2020 0.5 (L) 0.6 - 1.3 mg/dL Final     GFR Estimate   Date Value Ref Range Status   03/18/2024 >90 >60 mL/min/1.73m2 Final   04/06/2021 >60 >60 mL/min/1.73m2 Final   08/29/2014 90 >60 mL/min/1.7m2 Final     Comment:     Non  GFR Calc       BP Readings from Last 3 Encounters:   03/18/24 (!) 151/79   03/12/24 132/86   03/04/24 (!) 140/88       Hemoglobin A1C   Date Value Ref Range Status   01/19/2024 12.2 (H) 0.0 - 5.6 % Final   11/16/2020 5.9 (H) 4.1 - 5.7 % Final       Please complete refill and CLOSE ENCOUNTER.  Closing the encounter signifies the refill is complete.

## 2024-03-20 NOTE — TELEPHONE ENCOUNTER
"Reason for Disposition    Vomiting a prescription medication    Additional Information    Negative: Shock suspected (e.g., cold/pale/clammy skin, too weak to stand, low BP, rapid pulse)    Negative: Difficult to awaken or acting confused (e.g., disoriented, slurred speech)    Negative: Sounds like a life-threatening emergency to the triager    Negative: Vomiting occurs only while coughing    Negative: [1] Pregnant < 20 Weeks AND [2] nausea/vomiting began in early pregnancy (i.e., 4-8 weeks pregnant)    Negative: Chest pain    Negative: Headache is main symptom    Negative: Vomiting (or Nausea) in a cancer patient who is currently (or recently) receiving chemotherapy or radiation therapy, or cancer patient who has metastatic or end-stage cancer and is receiving palliative care    Negative: [1] Vomiting AND [2] contains red blood or black (\"coffee ground\") material  (Exception: Few red streaks in vomit that only happened once.)    Negative: Severe pain in one eye    Negative: Recent head injury (within last 3 days)    Negative: Recent abdominal injury (within last 3 days)    Negative: [1] Insulin-dependent diabetes (Type I) AND [2] glucose > 400 mg/dl (22 mmol/l)    Negative: [1] Vomiting AND [2] hernia is more painful or swollen than usual    Negative: [1] SEVERE vomiting (e.g., 6 or more times/day) AND [2] present > 8 hours (Exception: Patient sounds well, is drinking liquids, does not sound dehydrated, and vomiting has lasted less than 24 hours.)    Negative: [1] MODERATE vomiting (e.g., 3 - 5 times/day) AND [2] age > 60 years    Negative: Severe headache (e.g., excruciating)  (Exception: Similar to previous migraines.)    Negative: High-risk adult (e.g., diabetes mellitus, brain tumor, V-P shunt, hernia)    Negative: [1] Drinking very little AND [2] dehydration suspected (e.g., no urine > 12 hours, very dry mouth, very lightheaded)    Negative: Patient sounds very sick or weak to the triager    Negative: [1] " "Vomiting AND [2] abdomen looks much more swollen than usual    Negative: [1] Vomiting AND [2] contains bile (green color)    Negative: [1] Constant abdominal pain AND [2] present > 2 hours    Negative: [1] Fever > 103 F (39.4 C) AND [2] not able to get the fever down using Fever Care Advice    Negative: [1] Fever > 101 F (38.3 C) AND [2] age > 60 years    Negative: [1] Fever > 100.0 F (37.8 C) AND [2] bedridden (e.g., CVA, chronic illness, recovering from surgery)    Negative: [1] Fever > 100.0 F (37.8 C) AND [2] weak immune system (e.g., HIV positive, cancer chemo, splenectomy, organ transplant, chronic steroids)    Negative: Taking any of the following medications: digoxin (Lanoxin), lithium, theophylline, phenytoin (Dilantin)    Negative: [1] MILD or MODERATE vomiting AND [2] present > 48 hours (2 days) (Exception: Mild vomiting with associated diarrhea.)    Negative: Fever present > 3 days (72 hours)    Answer Assessment - Initial Assessment Questions  1. VOMITING SEVERITY: \"How many times have you vomited in the past 24 hours?\"      - MILD:  1 - 2 times/day     - MODERATE: 3 - 5 times/day, decreased oral intake without significant weight loss or symptoms of dehydration     - SEVERE: 6 or more times/day, vomits everything or nearly everything, with significant weight loss, symptoms of dehydration       Mild, one time at 3pm  2. ONSET: \"When did the vomiting begin?\"       3pm, 3h after taking second dose of amoxicillin  3. FLUIDS: \"What fluids or food have you vomited up today?\" \"Have you been able to keep any fluids down?\"      Apple, feeling much better now, only had the one episode    Protocols used: Vomiting-A-AH    "

## 2024-03-21 ENCOUNTER — VIRTUAL VISIT (OUTPATIENT)
Dept: FAMILY MEDICINE | Facility: CLINIC | Age: 59
End: 2024-03-21
Payer: COMMERCIAL

## 2024-03-21 ENCOUNTER — TELEPHONE (OUTPATIENT)
Dept: FAMILY MEDICINE | Facility: CLINIC | Age: 59
End: 2024-03-21

## 2024-03-21 DIAGNOSIS — L03.116 CELLULITIS OF LEFT LEG: Primary | ICD-10-CM

## 2024-03-21 DIAGNOSIS — R11.10 VOMITING AND DIARRHEA: ICD-10-CM

## 2024-03-21 DIAGNOSIS — R19.7 VOMITING AND DIARRHEA: ICD-10-CM

## 2024-03-21 DIAGNOSIS — E11.65 TYPE 2 DIABETES MELLITUS WITH HYPERGLYCEMIA, WITHOUT LONG-TERM CURRENT USE OF INSULIN (H): ICD-10-CM

## 2024-03-21 PROCEDURE — 99443 PR PHYSICIAN TELEPHONE EVALUATION 21-30 MIN: CPT | Mod: 93 | Performed by: FAMILY MEDICINE

## 2024-03-21 NOTE — TELEPHONE ENCOUNTER
Patient called regarding symptoms they are experiencing. She stated she is having loose stools and is concerned the antibiotic is giving her problems. She also stated her stomach has been hurting. Please assist when able. Thank you.  Marilee Burnette on 3/21/2024 at 1:27 PM

## 2024-03-21 NOTE — PROGRESS NOTES
Family Medicine Telephone Visit Note        Chief Complaint   Patient presents with    Emesis     Diarrhea     X1 day no meds taking meds for cellulitus          HPI   Patients name: Mary Ann  Appointment start time:  4:55 PM    Abdominal pain with nausea:  -began yesterday thought it was from a strong smell of bleach from cleaning, wonders if from the two antibiotics for cellulitis  -patient had eaten an apple and had emesis X1 about 2 hours after eating  -later had a meatball sandwich, still just nausea    2. Cellulitis- started amoxicillin and doxycycline for 2 days  Leg still red, still swollen, still very painful, only using tylenol now  Skin intact but think it's not getting better, thinks redness is larger    3. Diarrhea:  Today twice, watery, wasn't sure if also med related      Current Outpatient Medications   Medication Sig Dispense Refill    acetaminophen (TYLENOL) 500 MG tablet Take 1-2 tablets (500-1,000 mg) by mouth every 6 hours as needed for pain  0    albuterol (PROAIR HFA/PROVENTIL HFA/VENTOLIN HFA) 108 (90 Base) MCG/ACT inhaler INHALE 2 PUFFS INTO LUNGS EVERY 4 HOURS AS NEEDED FOR SHORTNESS OF BREATH OR  WHEEZING 18 g 1    albuterol (PROVENTIL) (2.5 MG/3ML) 0.083% neb solution USE 1 VIAL IN NEBULIZER EVERY 6 HOURS AS NEEDED FOR SHORTNESS OF BREATH /  DYSPNEA  OR  WHEEZING 30 mL 3    amoxicillin (AMOXIL) 500 MG capsule Take 1 capsule (500 mg) by mouth 3 times daily for 7 days 21 capsule 0    atorvastatin (LIPITOR) 40 MG tablet Take 1 tablet (40 mg) by mouth daily 90 tablet 3    blood glucose (NO BRAND SPECIFIED) test strip Use to test blood sugar 1 times daily or as directed. 100 strip 0    budesonide-formoterol (SYMBICORT) 160-4.5 MCG/ACT Inhaler Inhale 2 puffs by mouth twice daily 11 g 3    Calcium Carbonate-Vitamin D 500-5 MG-MCG TABS Take 1 tablet by mouth 2 times daily 90 tablet 3    doxycycline hyclate (VIBRAMYCIN) 100 MG capsule Take 1 capsule (100 mg) by mouth 2 times daily 14 capsule 0     "hydrOXYzine (ATARAX) 25 MG tablet Take 1 tablet (25 mg) by mouth 3 times daily as needed for itching 20 tablet 0    insulin  UNIT/ML vial Inject 15 Units Subcutaneous 2 times daily 10 mL 1    insulin pen needle (32G X 4 MM) 32G X 4 MM miscellaneous Use 1 pen needles daily 100 each 3    insulin syringe-needle U-100 (31G X 5/16\" 0.3 ML) 31G X 5/16\" 0.3 ML miscellaneous Use 1 syringes daily or as directed. 100 each 3    ipratropium - albuterol 0.5 mg/2.5 mg/3 mL (DUONEB) 0.5-2.5 (3) MG/3ML neb solution Take 1 vial (3 mLs) by nebulization 2 times daily as needed for shortness of breath, wheezing or cough 90 mL 3    lidocaine, viscous, (XYLOCAINE) 2 % solution Apply 15 mLs topically every 3 hours as needed for pain ; Max 8 doses/24 hour period. (Patient not taking: Reported on 3/4/2024) 100 mL 0    liraglutide (VICTOZA) 18 MG/3ML solution Inject 0.6 mg Subcutaneous daily 6 mL 1    loratadine (CLARITIN) 10 MG tablet Take 1 tablet (10 mg) by mouth daily as needed for allergies 30 tablet 3    magnesium hydroxide (MILK OF MAGNESIA) 400 MG/5ML suspension Take 30 mLs by mouth 2 times daily as needed for constipation 354 mL 0    melatonin 3 MG tablet Take 1 tablet (3 mg) by mouth at bedtime 90 tablet 1    metFORMIN (GLUCOPHAGE) 1000 MG tablet Take 1 tablet (1,000 mg) by mouth 2 times daily (with meals) 180 tablet 3    Multiple Vitamins-Minerals (CENTRUM SILVER 50+WOMEN PO) Take 1 tablet by mouth daily      nitroGLYcerin (RECTIV) 0.4 % OINT rectal ointment Place 1 inch (1.5 mg) rectally every 12 hours 30 g 0    nystatin (MYCOSTATIN) 315625 UNIT/GM external powder Apply topically 2 times daily (Patient not taking: Reported on 3/4/2024) 30 g 0    omeprazole (PRILOSEC) 20 MG DR capsule Take 1 capsule (20 mg) by mouth daily 90 capsule 3    sennosides (SENOKOT) 8.6 MG tablet Take 2 tablets by mouth 2 times daily 120 tablet 1    vitamin D2 (ERGOCALCIFEROL) 84639 units (1250 mcg) capsule Take 1 capsule (50,000 Units) by mouth " "once a week 12 capsule 3     Allergies   Allergen Reactions    Gadolinium Derivatives Hives    Iodinated Contrast Media Hives    Azithromycin Other (See Comments) and Rash     Erythema Nodosum x2    Keflex [Cephalexin] Rash    Aspirin Other (See Comments)    Cefuroxime Itching and Other (See Comments)    Cheese GI Disturbance and Nausea    Contrast Dye Hives     IV    Corylus Other (See Comments)    Diatrizoate Hives    Iodixanol Unknown    Nuts Other (See Comments)    Septra [Sulfamethoxazole-Trimethoprim] Hives    Sulfa Antibiotics Hives    Metronidazole Itching and Rash    Nitrofurantoin Itching and Rash    Quinolones Rash              Review of Systems:       Not measuring fevers, no diffuse aches, making urine, not sure sugars         Physical Exam:     There were no vitals taken for this visit.  Estimated body mass index is 34.6 kg/m  as calculated from the following:    Height as of 3/18/24: 1.549 m (5' 1\").    Weight as of 3/18/24: 83.1 kg (183 lb 1.6 oz).    Exam:  Constitutional: healthy, alert, and no distress  Psychiatric: mentation appears normal and affect normal  -appears to be having leg pains during visit,    Results from last visit:  Admission on 03/18/2024, Discharged on 03/18/2024   Component Date Value Ref Range Status    Systolic Blood Pressure 03/18/2024 163  mmHg Final    Diastolic Blood Pressure 03/18/2024 81  mmHg Final    Ventricular Rate 03/18/2024 119  BPM Final    Atrial Rate 03/18/2024 119  BPM Final    WV Interval 03/18/2024 204  ms Final    QRS Duration 03/18/2024 72  ms Final    QT 03/18/2024 296  ms Final    QTc 03/18/2024 416  ms Final    P Axis 03/18/2024 82  degrees Final    R AXIS 03/18/2024 69  degrees Final    T Axis 03/18/2024 59  degrees Final    Interpretation ECG 03/18/2024    Final                    Value:Sinus tachycardia  Nonspecific T wave abnormality  Abnormal ECG  When compared with ECG of 27-FEB-2024 16:57,  No significant change was found  Confirmed by SEE ED " PROVIDER NOTE FOR, ECG INTERPRETATION (4000),  DENICE CALVO ([id number]) on 3/20/2024 2:36:34 AM      Sodium 03/18/2024 138  135 - 145 mmol/L Final    Reference intervals for this test were updated on 09/26/2023 to more accurately reflect our healthy population. There may be differences in the flagging of prior results with similar values performed with this method. Interpretation of those prior results can be made in the context of the updated reference intervals.     Potassium 03/18/2024 3.7  3.4 - 5.3 mmol/L Final    Chloride 03/18/2024 99  98 - 107 mmol/L Final    Carbon Dioxide (CO2) 03/18/2024 24  22 - 29 mmol/L Final    Anion Gap 03/18/2024 15  7 - 15 mmol/L Final    Urea Nitrogen 03/18/2024 10.6  8.0 - 23.0 mg/dL Final    Creatinine 03/18/2024 0.53  0.51 - 0.95 mg/dL Final    GFR Estimate 03/18/2024 >90  >60 mL/min/1.73m2 Final    Calcium 03/18/2024 9.6  8.6 - 10.0 mg/dL Final    Glucose 03/18/2024 141 (H)  70 - 99 mg/dL Final    Lactic Acid 03/18/2024 1.7  0.7 - 2.0 mmol/L Final    Procalcitonin 03/18/2024 0.10  <0.50 ng/mL Final    Comment: Interpretation and Recommendations     <0.5 ng/mL:   Systemic bacterial infection unlikely. Local bacterial infection is possible.     0.5-1.99 ng/mL:   Systemic bacterial infection possible, but various other conditions are known to induce PCT as well.     >=2.00 ng/mL:   Systemic bacterial infection likely, unless other causes are known.     Decision to start antibiotics should not be based on procalcitonin level alone. See Procalcitonin Guidance document for more details. [email or web address]/files/[email or web address]/[email or web address]-[email or web address]-guidance-[email or web address]    Factors that may affect PCT levels (not all-inclusive):     - Increased PCT level           Severe trauma/burns           Invasive surgery           Cooling therapy after cardiac arrest/surgery           Treatment with agents which stimulate cytokines           Acute kidney injury            Chronic kidney disease and end stage renal disease           Acute graft vs host disease           Non-specific shock                            causing decreased organ perfusion and/or infarction       - Normal or unchanged PCT level           Early in infections (if low and infection is suspected, repeating in 6-12 hours is recommended)           Chronic infections (endocarditis, osteomyelitis, prosthetic device/graft infections)           Localized infections (cellulitis, wound infections, intra-abdominal abscess)     Note: PCT has not been extensively studied in pregnancy/breastfeeding, pediatrics, severe immunosuppression, and cystic fibrosis.    Magnesium 03/18/2024 1.9  1.7 - 2.3 mg/dL Final    Culture 03/18/2024 No growth after 3 days   Preliminary    Culture 03/18/2024 No growth after 3 days   Preliminary    WBC Count 03/18/2024 12.7 (H)  4.0 - 11.0 10e3/uL Final    RBC Count 03/18/2024 4.80  3.80 - 5.20 10e6/uL Final    Hemoglobin 03/18/2024 11.5 (L)  11.7 - 15.7 g/dL Final    Hematocrit 03/18/2024 36.9  35.0 - 47.0 % Final    MCV 03/18/2024 77 (L)  78 - 100 fL Final    MCH 03/18/2024 24.0 (L)  26.5 - 33.0 pg Final    MCHC 03/18/2024 31.2 (L)  31.5 - 36.5 g/dL Final    RDW 03/18/2024 15.5 (H)  10.0 - 15.0 % Final    Platelet Count 03/18/2024 493 (H)  150 - 450 10e3/uL Final    % Neutrophils 03/18/2024 77  % Final    % Lymphocytes 03/18/2024 14  % Final    % Monocytes 03/18/2024 5  % Final    % Eosinophils 03/18/2024 3  % Final    % Basophils 03/18/2024 1  % Final    % Immature Granulocytes 03/18/2024 0  % Final    NRBCs per 100 WBC 03/18/2024 0  <1 /100 Final    Absolute Neutrophils 03/18/2024 9.8 (H)  1.6 - 8.3 10e3/uL Final    Absolute Lymphocytes 03/18/2024 1.8  0.8 - 5.3 10e3/uL Final    Absolute Monocytes 03/18/2024 0.7  0.0 - 1.3 10e3/uL Final    Absolute Eosinophils 03/18/2024 0.4  0.0 - 0.7 10e3/uL Final    Absolute Basophils 03/18/2024 0.1  0.0 - 0.2 10e3/uL Final    Absolute Immature Granulocytes  03/18/2024 0.0  <=0.4 10e3/uL Final    Absolute NRBCs 03/18/2024 0.0  10e3/uL Final           Assessment and Plan   (L03.116) Cellulitis of left leg  (primary encounter diagnosis)  Comment: needs to be checked to see if responding now to about 24 hours of oral antibiotics and unsure if tolerating  Plan: instructed to draw a line around the red area to see if regressing or expanding and visit tomorrow morning  -elevate leg and heating pad and tylenol for pain, observe swelling, in assisted living and if worsens will call care givers at facility    (R11.10,  R19.7) Vomiting and diarrhea  Comment: possibly related to doxycycline, and amoxicillin  Plan: continue antibiotics, frequent small meals, check sugars more frequently    (E11.65) Type 2 diabetes mellitus with hyperglycemia, without long-term current use of insulin (H)  Comment: see above check sugars  Plan: patient states has been able to eat now and taking all meds    Refilled medications that would be required in the next 3 months.     After Visit Information:  Patient declined AVS     No follow-ups on file.    Appointment end time: 5:25 PM  This is a telephone visit that took 30 minutes.      Clinician location:  [facility]     Berta Leonard MD

## 2024-03-21 NOTE — TELEPHONE ENCOUNTER
Writer called patient to further discuss. Now experiencing diarrhea, bloating, and stomach pains. Has tried changes recommended yesterday including timing with food and water. No improvement. Recommended telephone visit to discuss with a doctor since it is a necessary medication. Mary Ann agreeable to the plan and scheduled at 4pm with . Timo SMITH

## 2024-03-22 ENCOUNTER — HOSPITAL ENCOUNTER (INPATIENT)
Facility: HOSPITAL | Age: 59
LOS: 8 days | Discharge: HOME | DRG: 603 | End: 2024-03-30
Attending: EMERGENCY MEDICINE | Admitting: STUDENT IN AN ORGANIZED HEALTH CARE EDUCATION/TRAINING PROGRAM
Payer: MEDICARE

## 2024-03-22 ENCOUNTER — ANCILLARY PROCEDURE (OUTPATIENT)
Dept: ULTRASOUND IMAGING | Facility: HOSPITAL | Age: 59
DRG: 603 | End: 2024-03-22
Payer: MEDICARE

## 2024-03-22 ENCOUNTER — TELEPHONE (OUTPATIENT)
Dept: FAMILY MEDICINE | Facility: CLINIC | Age: 59
End: 2024-03-22

## 2024-03-22 ENCOUNTER — OFFICE VISIT (OUTPATIENT)
Dept: FAMILY MEDICINE | Facility: CLINIC | Age: 59
End: 2024-03-22
Payer: MEDICARE

## 2024-03-22 VITALS
SYSTOLIC BLOOD PRESSURE: 118 MMHG | BODY MASS INDEX: 34.04 KG/M2 | HEART RATE: 102 BPM | WEIGHT: 185 LBS | HEIGHT: 62 IN | TEMPERATURE: 98 F | RESPIRATION RATE: 16 BRPM | OXYGEN SATURATION: 94 % | DIASTOLIC BLOOD PRESSURE: 77 MMHG

## 2024-03-22 DIAGNOSIS — E11.9 TYPE 2 DIABETES MELLITUS WITHOUT COMPLICATION, WITHOUT LONG-TERM CURRENT USE OF INSULIN (H): ICD-10-CM

## 2024-03-22 DIAGNOSIS — L03.90 CELLULITIS, UNSPECIFIED CELLULITIS SITE: ICD-10-CM

## 2024-03-22 DIAGNOSIS — L03.116 CELLULITIS OF LEFT LEG: Primary | ICD-10-CM

## 2024-03-22 LAB
ANION GAP SERPL CALCULATED.3IONS-SCNC: 11 MMOL/L (ref 7–15)
BASOPHILS # BLD AUTO: 0.1 10E3/UL (ref 0–0.2)
BASOPHILS NFR BLD AUTO: 1 %
BUN SERPL-MCNC: 11.3 MG/DL (ref 8–23)
CALCIUM SERPL-MCNC: 9.8 MG/DL (ref 8.6–10)
CHLORIDE SERPL-SCNC: 105 MMOL/L (ref 98–107)
CREAT SERPL-MCNC: 0.51 MG/DL (ref 0.51–0.95)
DEPRECATED HCO3 PLAS-SCNC: 25 MMOL/L (ref 22–29)
EGFRCR SERPLBLD CKD-EPI 2021: >90 ML/MIN/1.73M2
EOSINOPHIL # BLD AUTO: 0.4 10E3/UL (ref 0–0.7)
EOSINOPHIL NFR BLD AUTO: 3 %
ERYTHROCYTE [DISTWIDTH] IN BLOOD BY AUTOMATED COUNT: 15.8 % (ref 10–15)
GLUCOSE BLDC GLUCOMTR-MCNC: 107 MG/DL (ref 70–99)
GLUCOSE BLDC GLUCOMTR-MCNC: 118 MG/DL (ref 70–99)
GLUCOSE SERPL-MCNC: 72 MG/DL (ref 70–99)
HCT VFR BLD AUTO: 35 % (ref 35–47)
HGB BLD-MCNC: 10.6 G/DL (ref 11.7–15.7)
IMM GRANULOCYTES # BLD: 0 10E3/UL
IMM GRANULOCYTES NFR BLD: 0 %
LACTATE SERPL-SCNC: 0.9 MMOL/L (ref 0.7–2)
LYMPHOCYTES # BLD AUTO: 1.9 10E3/UL (ref 0.8–5.3)
LYMPHOCYTES NFR BLD AUTO: 18 %
MCH RBC QN AUTO: 23.8 PG (ref 26.5–33)
MCHC RBC AUTO-ENTMCNC: 30.3 G/DL (ref 31.5–36.5)
MCV RBC AUTO: 79 FL (ref 78–100)
MONOCYTES # BLD AUTO: 0.6 10E3/UL (ref 0–1.3)
MONOCYTES NFR BLD AUTO: 6 %
NEUTROPHILS # BLD AUTO: 7.7 10E3/UL (ref 1.6–8.3)
NEUTROPHILS NFR BLD AUTO: 72 %
NRBC # BLD AUTO: 0 10E3/UL
NRBC BLD AUTO-RTO: 0 /100
PLATELET # BLD AUTO: 456 10E3/UL (ref 150–450)
POTASSIUM SERPL-SCNC: 4.3 MMOL/L (ref 3.4–5.3)
RBC # BLD AUTO: 4.45 10E6/UL (ref 3.8–5.2)
SODIUM SERPL-SCNC: 141 MMOL/L (ref 135–145)
WBC # BLD AUTO: 10.6 10E3/UL (ref 4–11)

## 2024-03-22 PROCEDURE — 85025 COMPLETE CBC W/AUTO DIFF WBC: CPT

## 2024-03-22 PROCEDURE — 258N000003 HC RX IP 258 OP 636

## 2024-03-22 PROCEDURE — 250N000013 HC RX MED GY IP 250 OP 250 PS 637

## 2024-03-22 PROCEDURE — 99223 1ST HOSP IP/OBS HIGH 75: CPT | Mod: AI

## 2024-03-22 PROCEDURE — 96374 THER/PROPH/DIAG INJ IV PUSH: CPT

## 2024-03-22 PROCEDURE — 80048 BASIC METABOLIC PNL TOTAL CA: CPT

## 2024-03-22 PROCEDURE — 99215 OFFICE O/P EST HI 40 MIN: CPT | Mod: GC

## 2024-03-22 PROCEDURE — 99285 EMERGENCY DEPT VISIT HI MDM: CPT | Mod: 25

## 2024-03-22 PROCEDURE — 120N000001 HC R&B MED SURG/OB

## 2024-03-22 PROCEDURE — 250N000011 HC RX IP 250 OP 636

## 2024-03-22 PROCEDURE — 36415 COLL VENOUS BLD VENIPUNCTURE: CPT

## 2024-03-22 PROCEDURE — 83605 ASSAY OF LACTIC ACID: CPT

## 2024-03-22 RX ORDER — FLUTICASONE FUROATE AND VILANTEROL 200; 25 UG/1; UG/1
1 POWDER RESPIRATORY (INHALATION) DAILY
Status: DISCONTINUED | OUTPATIENT
Start: 2024-03-22 | End: 2024-03-30 | Stop reason: HOSPADM

## 2024-03-22 RX ORDER — ALBUTEROL SULFATE 0.83 MG/ML
2.5 SOLUTION RESPIRATORY (INHALATION) EVERY 4 HOURS PRN
Status: DISCONTINUED | OUTPATIENT
Start: 2024-03-22 | End: 2024-03-30 | Stop reason: HOSPADM

## 2024-03-22 RX ORDER — ACETAMINOPHEN 325 MG/1
650 TABLET ORAL EVERY 4 HOURS PRN
Status: DISCONTINUED | OUTPATIENT
Start: 2024-03-22 | End: 2024-03-30 | Stop reason: HOSPADM

## 2024-03-22 RX ORDER — DEXTROSE MONOHYDRATE 25 G/50ML
25-50 INJECTION, SOLUTION INTRAVENOUS
Status: DISCONTINUED | OUTPATIENT
Start: 2024-03-22 | End: 2024-03-30 | Stop reason: HOSPADM

## 2024-03-22 RX ORDER — ENOXAPARIN SODIUM 100 MG/ML
40 INJECTION SUBCUTANEOUS EVERY 24 HOURS
Status: DISCONTINUED | OUTPATIENT
Start: 2024-03-22 | End: 2024-03-30 | Stop reason: HOSPADM

## 2024-03-22 RX ORDER — CEFAZOLIN SODIUM 1 G/50ML
2000 SOLUTION INTRAVENOUS ONCE
Status: COMPLETED | OUTPATIENT
Start: 2024-03-22 | End: 2024-03-22

## 2024-03-22 RX ORDER — AMOXICILLIN 250 MG
1 CAPSULE ORAL 2 TIMES DAILY PRN
Status: DISCONTINUED | OUTPATIENT
Start: 2024-03-22 | End: 2024-03-30 | Stop reason: HOSPADM

## 2024-03-22 RX ORDER — NALOXONE HYDROCHLORIDE 0.4 MG/ML
0.4 INJECTION, SOLUTION INTRAMUSCULAR; INTRAVENOUS; SUBCUTANEOUS
Status: DISCONTINUED | OUTPATIENT
Start: 2024-03-22 | End: 2024-03-30 | Stop reason: HOSPADM

## 2024-03-22 RX ORDER — ATORVASTATIN CALCIUM 40 MG/1
40 TABLET, FILM COATED ORAL DAILY
Status: DISCONTINUED | OUTPATIENT
Start: 2024-03-22 | End: 2024-03-30 | Stop reason: HOSPADM

## 2024-03-22 RX ORDER — CEFAZOLIN SODIUM 1 G/50ML
1250 SOLUTION INTRAVENOUS EVERY 12 HOURS
Status: DISCONTINUED | OUTPATIENT
Start: 2024-03-22 | End: 2024-03-25

## 2024-03-22 RX ORDER — NALOXONE HYDROCHLORIDE 0.4 MG/ML
0.2 INJECTION, SOLUTION INTRAMUSCULAR; INTRAVENOUS; SUBCUTANEOUS
Status: DISCONTINUED | OUTPATIENT
Start: 2024-03-22 | End: 2024-03-30 | Stop reason: HOSPADM

## 2024-03-22 RX ORDER — LANOLIN ALCOHOL/MO/W.PET/CERES
3 CREAM (GRAM) TOPICAL
Status: ON HOLD | COMMUNITY
End: 2024-04-10

## 2024-03-22 RX ORDER — OXYCODONE HYDROCHLORIDE 5 MG/1
5 TABLET ORAL EVERY 6 HOURS PRN
Status: DISCONTINUED | OUTPATIENT
Start: 2024-03-22 | End: 2024-03-30 | Stop reason: HOSPADM

## 2024-03-22 RX ORDER — ACETAMINOPHEN 650 MG/1
650 SUPPOSITORY RECTAL EVERY 4 HOURS PRN
Status: DISCONTINUED | OUTPATIENT
Start: 2024-03-22 | End: 2024-03-30 | Stop reason: HOSPADM

## 2024-03-22 RX ORDER — DOXYCYCLINE 100 MG/1
100 CAPSULE ORAL 2 TIMES DAILY
Status: DISCONTINUED | OUTPATIENT
Start: 2024-03-22 | End: 2024-03-22

## 2024-03-22 RX ORDER — AMOXICILLIN 250 MG/1
500 CAPSULE ORAL 3 TIMES DAILY
Status: DISCONTINUED | OUTPATIENT
Start: 2024-03-22 | End: 2024-03-22

## 2024-03-22 RX ORDER — ALBUTEROL SULFATE 90 UG/1
2 AEROSOL, METERED RESPIRATORY (INHALATION)
Status: DISCONTINUED | OUTPATIENT
Start: 2024-03-22 | End: 2024-03-30 | Stop reason: HOSPADM

## 2024-03-22 RX ORDER — BUDESONIDE AND FORMOTEROL FUMARATE DIHYDRATE 160; 4.5 UG/1; UG/1
2 AEROSOL RESPIRATORY (INHALATION) 2 TIMES DAILY
Status: DISCONTINUED | OUTPATIENT
Start: 2024-03-22 | End: 2024-03-22

## 2024-03-22 RX ORDER — NICOTINE POLACRILEX 4 MG
15-30 LOZENGE BUCCAL
Status: DISCONTINUED | OUTPATIENT
Start: 2024-03-22 | End: 2024-03-30 | Stop reason: HOSPADM

## 2024-03-22 RX ORDER — PANTOPRAZOLE SODIUM 40 MG/1
40 TABLET, DELAYED RELEASE ORAL DAILY
Status: DISCONTINUED | OUTPATIENT
Start: 2024-03-22 | End: 2024-03-30 | Stop reason: HOSPADM

## 2024-03-22 RX ORDER — AMOXICILLIN 250 MG
2 CAPSULE ORAL 2 TIMES DAILY PRN
Status: DISCONTINUED | OUTPATIENT
Start: 2024-03-22 | End: 2024-03-30 | Stop reason: HOSPADM

## 2024-03-22 RX ORDER — LANOLIN ALCOHOL/MO/W.PET/CERES
3 CREAM (GRAM) TOPICAL
Status: DISCONTINUED | OUTPATIENT
Start: 2024-03-22 | End: 2024-03-30 | Stop reason: HOSPADM

## 2024-03-22 RX ORDER — LIDOCAINE 40 MG/G
CREAM TOPICAL
Status: DISCONTINUED | OUTPATIENT
Start: 2024-03-22 | End: 2024-03-30 | Stop reason: HOSPADM

## 2024-03-22 RX ADMIN — Medication 3 MG: at 22:19

## 2024-03-22 RX ADMIN — VANCOMYCIN HYDROCHLORIDE 1250 MG: 5 INJECTION, POWDER, LYOPHILIZED, FOR SOLUTION INTRAVENOUS at 21:54

## 2024-03-22 RX ADMIN — OXYCODONE HYDROCHLORIDE 5 MG: 5 TABLET ORAL at 16:38

## 2024-03-22 RX ADMIN — VANCOMYCIN HYDROCHLORIDE 2000 MG: 5 INJECTION, POWDER, LYOPHILIZED, FOR SOLUTION INTRAVENOUS at 14:02

## 2024-03-22 RX ADMIN — ALBUTEROL SULFATE 2 PUFF: 90 AEROSOL, METERED RESPIRATORY (INHALATION) at 22:11

## 2024-03-22 RX ADMIN — ACETAMINOPHEN 650 MG: 325 TABLET ORAL at 22:19

## 2024-03-22 RX ADMIN — ACETAMINOPHEN 650 MG: 325 TABLET ORAL at 17:40

## 2024-03-22 RX ADMIN — ENOXAPARIN SODIUM 40 MG: 40 INJECTION SUBCUTANEOUS at 20:41

## 2024-03-22 RX ADMIN — FLUTICASONE FUROATE AND VILANTEROL TRIFENATATE 1 PUFF: 200; 25 POWDER RESPIRATORY (INHALATION) at 16:55

## 2024-03-22 ASSESSMENT — ACTIVITIES OF DAILY LIVING (ADL)
ADLS_ACUITY_SCORE: 36
ADLS_ACUITY_SCORE: 26
ADLS_ACUITY_SCORE: 33
ADLS_ACUITY_SCORE: 35
DEPENDENT_IADLS:: TRANSPORTATION
ADLS_ACUITY_SCORE: 24
ADLS_ACUITY_SCORE: 35
ADLS_ACUITY_SCORE: 36
ADLS_ACUITY_SCORE: 35
ADLS_ACUITY_SCORE: 35
ADLS_ACUITY_SCORE: 26
ADLS_ACUITY_SCORE: 36

## 2024-03-22 ASSESSMENT — COLUMBIA-SUICIDE SEVERITY RATING SCALE - C-SSRS
2. HAVE YOU ACTUALLY HAD ANY THOUGHTS OF KILLING YOURSELF IN THE PAST MONTH?: NO
1. IN THE PAST MONTH, HAVE YOU WISHED YOU WERE DEAD OR WISHED YOU COULD GO TO SLEEP AND NOT WAKE UP?: NO
6. HAVE YOU EVER DONE ANYTHING, STARTED TO DO ANYTHING, OR PREPARED TO DO ANYTHING TO END YOUR LIFE?: NO

## 2024-03-22 ASSESSMENT — PATIENT HEALTH QUESTIONNAIRE - PHQ9: SUM OF ALL RESPONSES TO PHQ QUESTIONS 1-9: 12

## 2024-03-22 NOTE — CONSULTS
Care Management Initial Consult    General Information  Assessment completed with: Patient,    Type of CM/SW Visit: Initial Assessment    Primary Care Provider verified and updated as needed: Yes   Readmission within the last 30 days: no previous admission in last 30 days      Reason for Consult: discharge planning  Advance Care Planning: Advance Care Planning Reviewed: no concerns identified          Communication Assessment  Patient's communication style: spoken language (English or Bilingual)             Cognitive  Cognitive/Neuro/Behavioral:                        Living Environment:   People in home: alone     Current living Arrangements: apartment (4 plex through [employer])      Able to return to prior arrangements: yes  Living Arrangement Comments: lives in a 4 plex run by [employer].    Family/Social Support:  Care provided by: self  Provides care for: no one  Marital Status: Single  Other (specify) ([email or web address])          Description of Support System: Supportive, Involved    Support Assessment: Patient communicates needs well met    Current Resources:   Patient receiving home care services: No     Community Resources: [facility], Other (see comment), Transportation Services (ILS worker)  Equipment currently used at home:    Supplies currently used at home: Diabetic Supplies    Employment/Financial:  Employment Status:          Financial Concerns:             Does the patient's insurance plan have a 3 day qualifying hospital stay waiver?  No    Lifestyle & Psychosocial Needs:  Social Determinants of Health     Food Insecurity: High Risk (12/5/2023)    Food Insecurity     Within the past 12 months, did you worry that your food would run out before you got money to buy more?: Yes     Within the past 12 months, did the food you bought just not last and you didn t have money to get more?: Yes   Depression: Not at risk (3/22/2024)    PHQ-2     PHQ-2 Score: 2   Housing Stability: Low Risk  (12/5/2023)  "   Housing Stability     Do you have housing? : Yes     Are you worried about losing your housing?: No   Tobacco Use: Medium Risk (3/22/2024)    Patient History     Smoking Tobacco Use: Former     Smokeless Tobacco Use: Never     Passive Exposure: Past   Financial Resource Strain: Low Risk  (12/5/2023)    Financial Resource Strain     Within the past 12 months, have you or your family members you live with been unable to get utilities (heat, electricity) when it was really needed?: No   Alcohol Use: Not on file   Transportation Needs: High Risk (12/5/2023)    Transportation Needs     Within the past 12 months, has lack of transportation kept you from medical appointments, getting your medicines, non-medical meetings or appointments, work, or from getting things that you need?: Yes   Physical Activity: Not on file   Interpersonal Safety: Low Risk  (1/29/2024)    Interpersonal Safety     Do you feel physically and emotionally safe where you currently live?: Yes     Within the past 12 months, have you been hit, slapped, kicked or otherwise physically hurt by someone?: No     Within the past 12 months, have you been humiliated or emotionally abused in other ways by your partner or ex-partner?: No   Stress: Not on file   Social Connections: Not on file       Functional Status:  Prior to admission patient needed assistance:   Dependent ADLs:: Independent  Dependent IADLs:: Transportation  Assesssment of Functional Status: At functional baseline    Mental Health Status:          Chemical Dependency Status:                Values/Beliefs:  Spiritual, Cultural Beliefs, Bahai Practices, Values that affect care:                 Additional Information:  Met with patient at the bedside for initial assessment. Introduced self and care management role. Patient lives in a low income 4 plex apartment where they offer services to assist with transportation. [employer] associated with the apartment \"and I can call them to help me " "with certain things\".  She lives alone and is independent with her ADLs and most of her IADLs. She reports that she did have housekeeping services through her [employer] waiver but recently fired the person for using to much chemicals around the home.  ILS worker Tanya (did not know last name) [phone]. Patient uses no DME.     Unknown discharge needs, CM to follow hospital progression, treatment team recommendations and assist with discharge planning as needed. [facility] to transport home.  [phone]    Samantha Gudino RN    "

## 2024-03-22 NOTE — TELEPHONE ENCOUNTER
Left message early this morning to see if patient can come in for 10am appt this morning. Neida SMITH

## 2024-03-22 NOTE — PROGRESS NOTES
Assessment & Plan     Mary Ann is a 58 year old female with a PMH of T2DM and Crohn's who presents with 2 weeks of left lower leg cellulitis. She was seen in the ED on 3/18 and prescribed amoxicillin and doxycycline. Yesterday she zachary a line around the area of cellulitis and this morning the redness had spread beyond the line and an area of drainage appeared at the center.     #Left lower leg cellulitis, significantly worsening  Cellulitis of LLE continuing to worsen since initiation of amoxicillin and doxycycline on 3/18. Significant spread since yesterday, with area of erythema extending far beyond the line drawn on her skin last night. Exquisitely tender to palpation. ROS positive for: nausea, vomiting, diarrhea, headache. No reported fevers but she has been taking Tylenol regularly. Given failure of outpatient antibiotics, recommend going to ED for further evaluation.   -Sent to ED for IV antibiotics and further workup    No follow-ups on file.    Subjective   Mary Ann is a 58 year old, presenting for the following health issues:  F/u cellulitus (No other concerns)        3/22/2024    10:00 AM   Additional Questions   Roomed by Erin'         3/22/2024    Information    services provided? No     HPI   Mary Ann presents with about 2 weeks of cellulitis on her left lower leg, for which she was recently seen in the ED on 3/18/24 and prescribed amoxicillin and doxycycline. Yesterday, she zachary a line around the area of cellulitis and this morning the redness spread beyond the line, which prompted her to come in.     She cannot recall any trauma to the area when the cellulitis initially started. She has a cat and a dog but denies any recent scratches or bites. She has been taking Tylenol regularly for the pain. This morning, she also noted a small area of erythema on her right lower leg and is concerned she may be developing a cellulitis there as well.    ROS positive for: headaches, vomit,  "diarrhea, abdominal pain, bloating  ROS negative for: fever, cough, dyspnea    Social Hx:  -no IVDU or alcohol  -former smoker  -lives in assisted living facility        Objective    /77   Pulse 102   Temp 98  F (36.7  C)   Resp 16   Ht 1.567 m (5' 1.69\")   Wt 83.9 kg (185 lb)   SpO2 94%   BMI 34.17 kg/m    Body mass index is 34.17 kg/m .  Physical Exam   GENERAL: patient is tearful, alert  SKIN: area of erythema on the majority of the back of her left lower leg with purulent ulcer noted in the center; exquisitely tender to palpation; visible black marker line on calf with area of erythema extending beyond this line              This pt was seen and discussed with Dr. Ontiveros.  Drea Riggs, MS3  [employer]    I was present with the medical student who participated in the service and in the documentation of this note. I have verified the history and personally performed the physical exam and medical decision making, and have verified the content of the note, which accurately reflects my assessment of the patient and the plan of care.     Elizabeth Ontiveros MD, PGY-2  [facility]   Pager #: [phone]      Signed Electronically by: Elizabeth Ontiveros MD    "

## 2024-03-22 NOTE — PROGRESS NOTES
I have personally reviewed the history and examination as documented by Dr. Ontiveros and Drea Riggs [facility].  I was present during key portions of the visit and agree with the assessment and plan as documented for 58 yr old female with recent ED visit for lower leg cellulitis here for follow-up. Unfortunately this is getting worse despite appropriate PO Abx. Recommend going to ED for IV Abx. Precautions given. Anticipatory guidance given.     Kenneth Jackson MD  March 22, 2024  10:32 AM

## 2024-03-22 NOTE — ED PROVIDER NOTES
"Emergency Department Midlevel Supervisory Note     I personally saw the patient and performed a substantive portion of the visit including all aspects of the medical decision making.    ED Course:  12:24 PM Shanthi Llanos PA-C staffed patient with me. I agree with their assessment and plan of management, and I will see the patient.  1:19 PM I met with the patient to introduce myself, gather additional history, perform my initial exam, and discuss the plan.     Brief HPI:     Mary Ann Olson is a 58 year old female who presents for evaluation of wound. Patient was seen in the ED on 03/18/2024 and diagnosed with cellulitis. She has been taking Keflex and doxycycline and notes that since she started the antibiotics, her redness has increased and worsened. Patient believes that the cellulitis started after she scratched the back of her leg and it became infected. She zachary a [place] around the affected area this morning, and it has already spread.     I, Deana Munguia, am serving as a scribe to document services personally performed by Stormy Santoyo DO, based on my observations and the provider's statements to me.   I, Stormy Santoyo DO attest that Deana Munguia was acting in a scribe capacity, has observed my performance of the services and has documented them in accordance with my direction.    Brief Physical Exam: /66 (BP Location: Right arm)   Pulse 82   Temp 97.9  F (36.6  C) (Oral)   Resp 16   Ht 1.562 m (5' 1.5\")   Wt 84.1 kg (185 lb 8 oz)   SpO2 90%   BMI 34.48 kg/m    Constitutional:  Alert, in no acute distress  EYES: Conjunctivae clear  HENT:  Atraumatic  Respiratory:  Respirations even, unlabored, in no acute respiratory distress  Cardiovascular:  Regular rate and rhythm, good peripheral perfusion  GI: Soft, non-distended, non-tender  Musculoskeletal:  Moves all 4 extremities equally, grossly symmetrical strength  Integument: Warm & dry.  Erythema to left lower extremity spreading " outside of marked area, no palpable fluctuance.  No significant calf pain  Neurologic:  Alert & oriented, speech clear and fluent, no focal deficits noted  Psych: Normal mood and affect       MDM:  Patient presenting for evaluation of cellulitis failing antibiotics.  Her primary care provider recommended admission with IV antibiotics.  Multiple drug allergies.  Started on vancomycin.  Bedside ultrasound performed by me without evidence of fluid collection or abscess.  She is nontoxic-appearing.  Compartments soft.  Not consistent with necrotizing infection.  Admitted to the hospitalist.       1. Cellulitis, unspecified cellulitis site        Labs and Imaging:  Results for orders placed or performed during the hospital encounter of 03/22/24   Basic metabolic panel   Result Value Ref Range    Sodium 141 135 - 145 mmol/L    Potassium 4.3 3.4 - 5.3 mmol/L    Chloride 105 98 - 107 mmol/L    Carbon Dioxide (CO2) 25 22 - 29 mmol/L    Anion Gap 11 7 - 15 mmol/L    Urea Nitrogen 11.3 8.0 - 23.0 mg/dL    Creatinine 0.51 0.51 - 0.95 mg/dL    GFR Estimate >90 >60 mL/min/1.73m2    Calcium 9.8 8.6 - 10.0 mg/dL    Glucose 72 70 - 99 mg/dL   Lactic acid whole blood   Result Value Ref Range    Lactic Acid 0.9 0.7 - 2.0 mmol/L   CBC with platelets and differential   Result Value Ref Range    WBC Count 10.6 4.0 - 11.0 10e3/uL    RBC Count 4.45 3.80 - 5.20 10e6/uL    Hemoglobin 10.6 (L) 11.7 - 15.7 g/dL    Hematocrit 35.0 35.0 - 47.0 %    MCV 79 78 - 100 fL    MCH 23.8 (L) 26.5 - 33.0 pg    MCHC 30.3 (L) 31.5 - 36.5 g/dL    RDW 15.8 (H) 10.0 - 15.0 %    Platelet Count 456 (H) 150 - 450 10e3/uL    % Neutrophils 72 %    % Lymphocytes 18 %    % Monocytes 6 %    % Eosinophils 3 %    % Basophils 1 %    % Immature Granulocytes 0 %    NRBCs per 100 WBC 0 <1 /100    Absolute Neutrophils 7.7 1.6 - 8.3 10e3/uL    Absolute Lymphocytes 1.9 0.8 - 5.3 10e3/uL    Absolute Monocytes 0.6 0.0 - 1.3 10e3/uL    Absolute Eosinophils 0.4 0.0 - 0.7 10e3/uL     Absolute Basophils 0.1 0.0 - 0.2 10e3/uL    Absolute Immature Granulocytes 0.0 <=0.4 10e3/uL    Absolute NRBCs 0.0 10e3/uL   Glucose by meter   Result Value Ref Range    GLUCOSE BY METER POCT 107 (H) 70 - 99 mg/dL   Glucose by meter   Result Value Ref Range    GLUCOSE BY METER POCT 118 (H) 70 - 99 mg/dL   CBC with platelets   Result Value Ref Range    WBC Count 8.3 4.0 - 11.0 10e3/uL    RBC Count 4.08 3.80 - 5.20 10e6/uL    Hemoglobin 9.6 (L) 11.7 - 15.7 g/dL    Hematocrit 32.3 (L) 35.0 - 47.0 %    MCV 79 78 - 100 fL    MCH 23.5 (L) 26.5 - 33.0 pg    MCHC 29.7 (L) 31.5 - 36.5 g/dL    RDW 15.7 (H) 10.0 - 15.0 %    Platelet Count 396 150 - 450 10e3/uL   Creatinine   Result Value Ref Range    Creatinine 0.46 (L) 0.51 - 0.95 mg/dL    GFR Estimate >90 >60 mL/min/1.73m2   Glucose by meter   Result Value Ref Range    GLUCOSE BY METER POCT 187 (H) 70 - 99 mg/dL   Glucose by meter   Result Value Ref Range    GLUCOSE BY METER POCT 99 70 - 99 mg/dL   Glucose by meter   Result Value Ref Range    GLUCOSE BY METER POCT 126 (H) 70 - 99 mg/dL   Glucose by meter   Result Value Ref Range    GLUCOSE BY METER POCT 178 (H) 70 - 99 mg/dL   CBC with platelets   Result Value Ref Range    WBC Count 9.8 4.0 - 11.0 10e3/uL    RBC Count 4.31 3.80 - 5.20 10e6/uL    Hemoglobin 10.2 (L) 11.7 - 15.7 g/dL    Hematocrit 33.5 (L) 35.0 - 47.0 %    MCV 78 78 - 100 fL    MCH 23.7 (L) 26.5 - 33.0 pg    MCHC 30.4 (L) 31.5 - 36.5 g/dL    RDW 15.8 (H) 10.0 - 15.0 %    Platelet Count 463 (H) 150 - 450 10e3/uL   Creatinine   Result Value Ref Range    Creatinine 0.47 (L) 0.51 - 0.95 mg/dL    GFR Estimate >90 >60 mL/min/1.73m2   Glucose by meter   Result Value Ref Range    GLUCOSE BY METER POCT 176 (H) 70 - 99 mg/dL   Glucose by meter   Result Value Ref Range    GLUCOSE BY METER POCT 113 (H) 70 - 99 mg/dL   Glucose by meter   Result Value Ref Range    GLUCOSE BY METER POCT 153 (H) 70 - 99 mg/dL   Glucose by meter   Result Value Ref Range    GLUCOSE BY METER  POCT 254 (H) 70 - 99 mg/dL   CBC with platelets   Result Value Ref Range    WBC Count 8.2 4.0 - 11.0 10e3/uL    RBC Count 3.93 3.80 - 5.20 10e6/uL    Hemoglobin 9.4 (L) 11.7 - 15.7 g/dL    Hematocrit 30.7 (L) 35.0 - 47.0 %    MCV 78 78 - 100 fL    MCH 23.9 (L) 26.5 - 33.0 pg    MCHC 30.6 (L) 31.5 - 36.5 g/dL    RDW 15.8 (H) 10.0 - 15.0 %    Platelet Count 390 150 - 450 10e3/uL   Creatinine   Result Value Ref Range    Creatinine 0.45 (L) 0.51 - 0.95 mg/dL    GFR Estimate >90 >60 mL/min/1.73m2   Vancomycin level   Result Value Ref Range    Vancomycin 15.2   ug/mL   Glucose by meter   Result Value Ref Range    GLUCOSE BY METER POCT 143 (H) 70 - 99 mg/dL   Glucose by meter   Result Value Ref Range    GLUCOSE BY METER POCT 115 (H) 70 - 99 mg/dL   Glucose by meter   Result Value Ref Range    GLUCOSE BY METER POCT 159 (H) 70 - 99 mg/dL   Glucose by meter   Result Value Ref Range    GLUCOSE BY METER POCT 178 (H) 70 - 99 mg/dL   Glucose by meter   Result Value Ref Range    GLUCOSE BY METER POCT 180 (H) 70 - 99 mg/dL   Creatinine   Result Value Ref Range    Creatinine 0.54 0.51 - 0.95 mg/dL    GFR Estimate >90 >60 mL/min/1.73m2   Glucose by meter   Result Value Ref Range    GLUCOSE BY METER POCT 167 (H) 70 - 99 mg/dL   Abscess Aerobic Bacterial Culture Routine With Gram Stain    Specimen: Leg, left; Abscess   Result Value Ref Range    Culture Culture in progress     Gram Stain Result No organisms seen     Gram Stain Result No white blood cells seen      I have reviewed the relevant laboratory and radiology studies    Procedures:  I was present for the key portions of procedures documented in midlevel note, see midlevel note for further details.  PROCEDURE: Emergency Department Limited Bedside Screening Ultrasound   ANATOMICAL WINDOW: LLE   INDICATIONS: Cellulitis, evaluate for abscess   PROCEDURE PROVIDER:   Ariela Santoyo   FINDINGS: Cobblestoning noted, no organized fluid collection, no gas   IMAGES SAVED AND STORED FOR  ARCHIVE AND REVIEW: Yes          DO JHOAN Mendoza [facility] EMERGENCY DEPARTMENT  [facility] [facility] [id number] [phone]       Stormy Santoyo DO  03/26/24 0707

## 2024-03-22 NOTE — PHARMACY-VANCOMYCIN DOSING SERVICE
Pharmacy Vancomycin Initial Note  Date of Service 2024  Patient's  1965  58 year old, female    Indication: Skin and Soft Tissue Infection    Current estimated CrCl = Estimated Creatinine Clearance: 113.7 mL/min (based on SCr of 0.53 mg/dL).    Creatinine for last 3 days  No results found for requested labs within last 3 days.    Recent Vancomycin Level(s) for last 3 days  No results found for requested labs within last 3 days.      Vancomycin IV Administrations (past 72 hours)        No vancomycin orders with administrations in past 72 hours.                    Nephrotoxins and other renal medications (From now, onward)      Start     Dose/Rate Route Frequency Ordered Stop    24 1400  vancomycin (VANCOCIN) 2,000 mg in sodium chloride 0.9 % 500 mL intermittent infusion         2,000 mg  over 2 Hours Intravenous ONCE 24 1330              Contrast Orders - past 72 hours (72h ago, onward)      None                  Plan:  Start vancomycin  2000 mg IV once  Please re-consult pharmacy if vancomycin continue inpatient     Patty Silver, [facility]

## 2024-03-22 NOTE — ED NOTES
Pt wanted a contact added to her chart for updates. [facility] at [phone]. Fax #[phone]

## 2024-03-22 NOTE — MEDICATION SCRIBE - ADMISSION MEDICATION HISTORY
Medication Scribe Admission Medication History    Admission medication history is complete. The information provided in this note is only as accurate as the sources available at the time of the update.    Information Source(s): Patient via in-person    Pertinent Information: Patient reports self management of medications     Patient reports no longer taking: Albuterol Neb soln, Calcium-Vit D, Rectiv, Senokot    Amoxicillin/Doxycycline: Patient reports last dose of 7 day course taken this morning.     Changes made to PTA medication list:  Added: None  Deleted: Albuterol Neb soln, Calcium-Vit D, Rectiv, Senokot  Changed: Melatonin to PRN    Allergies reviewed with patient and updates made in EHR: yes    Medication History Completed By: Trey Browning 3/22/2024 2:44 PM    Current Facility-Administered Medications for the 3/22/24 encounter (Hospital Encounter)   Medication    nitroGLYcerin (NITROSTAT) sublingual tablet 0.4 mg     PTA Med List   Medication Sig Note Last Dose    acetaminophen (TYLENOL) 500 MG tablet Take 1-2 tablets (500-1,000 mg) by mouth every 6 hours as needed for pain  3/22/2024 at am    albuterol (PROAIR HFA/PROVENTIL HFA/VENTOLIN HFA) 108 (90 Base) MCG/ACT inhaler INHALE 2 PUFFS INTO LUNGS EVERY 4 HOURS AS NEEDED FOR SHORTNESS OF BREATH OR  WHEEZING  Past Month at prn    amoxicillin (AMOXIL) 500 MG capsule Take 1 capsule (500 mg) by mouth 3 times daily for 7 days 3/22/2024: Patient reports last dose of 7 day course taken this morning.  3/22/2024 at am    atorvastatin (LIPITOR) 40 MG tablet Take 1 tablet (40 mg) by mouth daily  3/21/2024 at am    budesonide-formoterol (SYMBICORT) 160-4.5 MCG/ACT Inhaler Inhale 2 puffs by mouth twice daily  3/21/2024 at pm    doxycycline hyclate (VIBRAMYCIN) 100 MG capsule Take 1 capsule (100 mg) by mouth 2 times daily 3/22/2024: Patient reports last dose of 7 day course taken this morning.  3/22/2024 at am    hydrOXYzine (ATARAX) 25 MG tablet Take 1 tablet (25 mg) by  mouth 3 times daily as needed for itching  Past Week at prn    insulin  UNIT/ML vial Inject 15 Units Subcutaneous 2 times daily  3/22/2024 at am    ipratropium - albuterol 0.5 mg/2.5 mg/3 mL (DUONEB) 0.5-2.5 (3) MG/3ML neb solution Take 1 vial (3 mLs) by nebulization 2 times daily as needed for shortness of breath, wheezing or cough  Past Week at prn    liraglutide (VICTOZA) 18 MG/3ML solution Inject 0.6 mg Subcutaneous daily  3/22/2024 at am    loratadine (CLARITIN) 10 MG tablet Take 1 tablet (10 mg) by mouth daily as needed for allergies  More than a month at prn    magnesium hydroxide (MILK OF MAGNESIA) 400 MG/5ML suspension Take 30 mLs by mouth 2 times daily as needed for constipation  More than a month at prn    melatonin 3 MG tablet Take 3 mg by mouth nightly as needed for sleep  Past Week at prn    metFORMIN (GLUCOPHAGE) 1000 MG tablet Take 1 tablet (1,000 mg) by mouth 2 times daily (with meals)  3/21/2024 at pm    Multiple Vitamins-Minerals (CENTRUM SILVER 50+WOMEN PO) Take 1 tablet by mouth daily  3/21/2024 at am    omeprazole (PRILOSEC) 20 MG DR capsule Take 1 capsule (20 mg) by mouth daily  3/21/2024 at am    vitamin D2 (ERGOCALCIFEROL) 07093 units (1250 mcg) capsule Take 1 capsule (50,000 Units) by mouth once a week  Past Week at am

## 2024-03-22 NOTE — TELEPHONE ENCOUNTER
----- Message from Berta Leonard MD sent at 3/21/2024  5:14 PM CDT -----  Regarding: Patient needs to be seen Friday AM: think 10:00 with Dr. Leigh  Patient had phone visit today, but thinking her leg is not improving (cellulitis) I told her we could see her tomorrow and the leg needs to be seen.      Thanks  Berta Leonard MD

## 2024-03-22 NOTE — ED NOTES
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    Antihistamines overdose, intentional self-harm, initial encounter (H)     Asthma     Bipolar 1 disorder (H) hx of bipolar listed in h and p    Bipolar 2 disorder (H)     Bipolar I disorder, single manic episode (H)     Created by Conversion  Replacement Utility updated for latest IMO load    Cellulitis 2006 left ankle, 2008 right foot    COPD (chronic obstructive pulmonary disease) (H)     Crohn's disease (H)     arthritis associated with crohn's    Diabetes mellitus (H)     Diabetes mellitus, type 2 (H)     Fibrocystic breast     Heat stroke     when a young child     Hyperlipidemia     Idiopathic acute pancreatitis 07/14/2015    Irritable bowel syndrome     Created by Conversion     Kidney stone     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     Overdose     Pyoderma gangrenosum (H28)     2016    Sacroiliitis (H24) 2004    Skin lesion of left leg 08/27/2015    Suicidal ideation     Suicide attempt (H)     Traumatic iritis 07/21/2015    Umbilical hernia     Uncomplicated asthma         Code Status:  Prior     Falls Risk: No Band: Not applicable    Current Living Situation/Residence: lives in an apartment     Elimination Status: Continent: Yes     Activity Level: SBA    Patients Preferred Language:  English     Needed: No    Vital Signs:  BP (!) 143/92   Pulse 100   Temp 98.1  F (36.7  C) (Oral)   Resp 20   Ht 1.562 m (5' 1.5\")   Wt 84.1 kg (185 lb 8 oz)   SpO2 95%   BMI 34.48 kg/m       Cardiac Rhythm:     Pain Score: Patient is unable to quantify. Asked pt about pain. She was too busy on phone call and put one finger up to quiet nurse    Is the Patient Confused:  No    Last Food or Drink: 03/22/24 at now    Focused " Assessment:  Pt has known infection on left lower leg. Pt has been on two oral antibiotics with no improvement. Pt referred to hospital for IV antibiotic treatment     Tests Performed: Done: Labs and Imaging    Treatments Provided:      Family Dynamics/Concerns: No    Family Updated On Visitor Policy: No    Plan of Care Communicated to Family: No    Who Was Updated about Plan of Care: [facility]    Belongings Checklist Done and Signed by Patient: Yes    Belongings Sent with Patient: clothing, phone    Medications sent with patient:     Covid: asymptomatic , N/A    Additional Information:     RN: Mian Escalante RN   3/22/2024 2:17 PM

## 2024-03-22 NOTE — H&P
[facility]    History and Physical - Hospitalist Service       Date of Admission:  3/22/2024    Assessment & Plan      Mary Ann Olson is a 58 year old female with PMHx of Type 2 DM insulin dependent, GERD, COPD, HLD, presenting with cellulitis worsening on oral antibiotics  admitted on 3/22/2024 for IV antibiotics.     Left lower leg cellulitis, significantly worsening  Cellulitis of LLE continuing to worsen since initiation of amoxicillin and doxycycline on 3/18. Significant spread since yesterday, with area of erythema extending far beyond the line drawn on her skin last night. Exquisitely tender to palpation. ROS positive for: nausea, vomiting, diarrhea, headache, potentially secondary to the antibiotics, her heart rate is 100 but she does not otherwise appear septic. Blood cultures from 3/18 without growth.  Admission labs include Lactic Acid, CBC and BMP that are unremarkable. No reported fevers but she has been taking Tylenol regularly.  Started on IV vancomycin.  - Started IV vancomycin on 3/22/24  - Wound cultures pending    Type 2 DM   Last A1c was 12.2 on 1/19/24.  Currently on liraglutide, metformin, and 15U NPH 2X daily.  - Continued NPH 12U 2X daily  - Medium resistance sliding scale insulin  - Held metformin  - Held liraglutide    Chronic meds  - continued PTA inhalers  - continued PTA atorvastatin  - continued PTA omeprazole  - continued PTA CaCarbonate-Vit D        Diet:  Regular Diet  DVT Prophylaxis: Enoxaparin (Lovenox) SQ  Estimated Creatinine Clearance: 118.1 mL/min (based on SCr of 0.51 mg/dL).  Beckwith Catheter: Not present  Fluids: PO  Lines: None     Cardiac Monitoring: None  Code Status:  Full     Clinically Significant Risk Factors Present on Admission                      # DMII: A1C = 12.2 % (Ref range: 0.0 - 5.6 %) within past 6 months    # Obesity: Estimated body mass index is 34.48 kg/m  as calculated from the following:    Height as of this encounter:  "1.562 m (5' 1.5\").    Weight as of this encounter: 84.1 kg (185 lb 8 oz).       # Financial/Environmental Concerns:           Disposition Plan      Expected Discharge Date: 03/23/2024                The patient's care was discussed with the Attending Physician, Dr. Brown .      Miguel Bell MD  Hospitalist Service  [facility]  Securely message with [employer] (more info)  Text page via [employer] Paging/Directory   ______________________________________________________________________    Chief Complaint   Cellulitis    History is obtained from the patient    History of Present Illness   Mary Ann Olson is a 58 year old female with PMHx of Type 2 DM (on 15U NPH 2X daily+ metformin), GERD, COPD, HLD, presenting with cellulitis worsening on oral antibiotics admitted on 3/22/2024 for IV antibiotics.     Mary Ann presents with about 2 weeks of cellulitis on her left lower leg, for which she was recently seen in the ED on 3/18/24 and prescribed amoxicillin and doxycycline. Yesterday, she zachary a line around the area of cellulitis and this morning the redness spread beyond the line, which prompted her to come in.   Her leg has been very swollen and painful and she has difficulty walking.     She cannot recall any trauma to the area when the cellulitis initially started. She has a cat and a dog but denies any recent scratches or bites. She has been taking Tylenol regularly for the pain. This morning, she also noted a small area of erythema on her right lower leg and is concerned she may be developing a cellulitis there as well.     ROS positive for: headaches, vomit, diarrhea, abdominal pain, bloating  ROS negative for: fever, cough, dyspnea, dysuria, bloody stools, hematemesis     Hx 1/2 ppd tobacco use, quit recently  Denies alcohol use  Denies other drug use    Social:  Lives in [employer] independent housing.  Lives alone and does not have any family.      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    Antihistamines overdose, intentional self-harm, initial encounter (H)     Asthma     Bipolar 1 disorder (H) hx of bipolar listed in h and p    Bipolar 2 disorder (H)     Bipolar I disorder, single manic episode (H)     Created by Conversion  Replacement Utility updated for latest IMO load    Cellulitis 2006 left ankle, 2008 right foot    COPD (chronic obstructive pulmonary disease) (H)     Crohn's disease (H)     arthritis associated with crohn's    Diabetes mellitus (H)     Diabetes mellitus, type 2 (H)     Fibrocystic breast     Heat stroke     when a young child     Hyperlipidemia     Idiopathic acute pancreatitis 07/14/2015    Irritable bowel syndrome     Created by Conversion     Kidney stone     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     Overdose     Pyoderma gangrenosum (H28)     2016    Sacroiliitis (H24) 2004    Skin lesion of left leg 08/27/2015    Suicidal ideation     Suicide attempt (H)     Traumatic iritis 07/21/2015    Umbilical hernia     Uncomplicated asthma        Past Surgical History   Past Surgical History:   Procedure Laterality Date    BIOPSY BREAST Left     BIOPSY OF BREAST, INCISIONAL      Description: Biopsy Breast Open;  Recorded: 10/28/2010;     REMOVAL OF TONSILS,<13 Y/O      Description: Tonsillectomy;  Recorded: 07/08/2009;    ZZC LIGATE FALLOPIAN TUBE      Description: Tubal Ligation;  Recorded: 07/08/2009;       Prior to Admission Medications   Prior to Admission Medications   Prescriptions Last Dose Informant Patient Reported? Taking?   Calcium Carbonate-Vitamin D 500-5 MG-MCG TABS   No No   Sig: Take 1 tablet by mouth 2 times daily   Multiple Vitamins-Minerals (CENTRUM SILVER 50+WOMEN PO)   Yes No   Sig: Take 1 tablet by mouth daily   acetaminophen (TYLENOL) 500 MG tablet   No No   Sig: Take 1-2 tablets (500-1,000 mg) by mouth every 6 hours as needed for pain   albuterol (PROAIR  "HFA/PROVENTIL HFA/VENTOLIN HFA) 108 (90 Base) MCG/ACT inhaler   No No   Sig: INHALE 2 PUFFS INTO LUNGS EVERY 4 HOURS AS NEEDED FOR SHORTNESS OF BREATH OR  WHEEZING   albuterol (PROVENTIL) (2.5 MG/3ML) 0.083% neb solution   No No   Sig: USE 1 VIAL IN NEBULIZER EVERY 6 HOURS AS NEEDED FOR SHORTNESS OF BREATH /  DYSPNEA  OR  WHEEZING   amoxicillin (AMOXIL) 500 MG capsule   No No   Sig: Take 1 capsule (500 mg) by mouth 3 times daily for 7 days   atorvastatin (LIPITOR) 40 MG tablet   No No   Sig: Take 1 tablet (40 mg) by mouth daily   blood glucose (NO BRAND SPECIFIED) test strip   No No   Sig: Use to test blood sugar 1 times daily or as directed.   budesonide-formoterol (SYMBICORT) 160-4.5 MCG/ACT Inhaler   No No   Sig: Inhale 2 puffs by mouth twice daily   doxycycline hyclate (VIBRAMYCIN) 100 MG capsule   No No   Sig: Take 1 capsule (100 mg) by mouth 2 times daily   hydrOXYzine (ATARAX) 25 MG tablet   No No   Sig: Take 1 tablet (25 mg) by mouth 3 times daily as needed for itching   insulin  UNIT/ML vial   No No   Sig: Inject 15 Units Subcutaneous 2 times daily   insulin pen needle (32G X 4 MM) 32G X 4 MM miscellaneous   No No   Sig: Use 1 pen needles daily   insulin syringe-needle U-100 (31G X 5/16\" 0.3 ML) 31G X 5/16\" 0.3 ML miscellaneous   No No   Sig: Use 1 syringes daily or as directed.   ipratropium - albuterol 0.5 mg/2.5 mg/3 mL (DUONEB) 0.5-2.5 (3) MG/3ML neb solution   No No   Sig: Take 1 vial (3 mLs) by nebulization 2 times daily as needed for shortness of breath, wheezing or cough   liraglutide (VICTOZA) 18 MG/3ML solution   No No   Sig: Inject 0.6 mg Subcutaneous daily   loratadine (CLARITIN) 10 MG tablet   No No   Sig: Take 1 tablet (10 mg) by mouth daily as needed for allergies   magnesium hydroxide (MILK OF MAGNESIA) 400 MG/5ML suspension   No No   Sig: Take 30 mLs by mouth 2 times daily as needed for constipation   melatonin 3 MG tablet   No No   Sig: Take 1 tablet (3 mg) by mouth at bedtime "   metFORMIN (GLUCOPHAGE) 1000 MG tablet   No No   Sig: Take 1 tablet (1,000 mg) by mouth 2 times daily (with meals)   nitroGLYcerin (RECTIV) 0.4 % OINT rectal ointment   No No   Sig: Place 1 inch (1.5 mg) rectally every 12 hours   omeprazole (PRILOSEC) 20 MG DR capsule   No No   Sig: Take 1 capsule (20 mg) by mouth daily   sennosides (SENOKOT) 8.6 MG tablet   No No   Sig: Take 2 tablets by mouth 2 times daily   vitamin D2 (ERGOCALCIFEROL) 85932 units (1250 mcg) capsule   No No   Sig: Take 1 capsule (50,000 Units) by mouth once a week      Facility-Administered Medications Last Administration Doses Remaining   nitroGLYcerin (NITROSTAT) sublingual tablet 0.4 mg None recorded 1              Physical Exam   Vital Signs: Temp: 98.1  F (36.7  C) Temp src: Oral BP: (!) 143/92 Pulse: 100   Resp: 20 SpO2: 95 %      Weight: 185 lbs 8 oz    Constitutional: anxious and tearful, cooperative,and appears stated age  Eyes: Lids and lashes normal, no obvious abnormalities   ENT: without obvious abnormality,  oral pharynx with moist mucous membranes  Hematologic / Lymphatic: no cervical lymphadenopathy  Respiratory: No increased work of breathing, good air exchange, clear to auscultation bilaterally, no crackles or wheezing  Cardiovascular: regular rate and rhythm, and no murmur noted  GI:  normal bowel sounds, soft, non-distended, non-tender  Skin: area of erythema on the majority of the back of her left lower leg with purulent ulcer noted in the center; exquisitely tender to palpation; visible black marker line on calf with area of erythema extending beyond this line, swelling and pitting edema present on left lower extremity  Neurologic: Awake, alert, oriented      Media Information      Document Information    Other: Photograph      03/22/2024 10:28 AM   Attached To:   Office Visit on 3/22/24 with Elizabeth Ontiveros MD   Source Information    Elizabeth Ontiveros MD  [facility]         Data     I have personally reviewed  the following data over the past 24 hrs:    10.6  \   10.6 (L)   / 456 (H)     141 105 11.3 /  72   4.3 25 0.51 \     Procal: N/A CRP: N/A Lactic Acid: 0.9         Imaging results reviewed over the past 24 hrs:   No results found for this or any previous visit (from the past 24 hour(s)).

## 2024-03-22 NOTE — PHARMACY-VANCOMYCIN DOSING SERVICE
Pharmacy Vancomycin Initial Note  Date of Service 2024  Patient's  1965  58 year old, female    Indication: Skin and Soft Tissue Infection    Current estimated CrCl = Estimated Creatinine Clearance: 118.1 mL/min (based on SCr of 0.51 mg/dL).    Creatinine for last 3 days  3/22/2024:  1:29 PM Creatinine 0.51 mg/dL    Recent Vancomycin Level(s) for last 3 days  No results found for requested labs within last 3 days.      Vancomycin IV Administrations (past 72 hours)                     vancomycin (VANCOCIN) 2,000 mg in sodium chloride 0.9 % 500 mL intermittent infusion (mg) 2,000 mg New Bag 24 1402                    Nephrotoxins and other renal medications (From now, onward)      Start     Dose/Rate Route Frequency Ordered Stop    24 2200  vancomycin (VANCOCIN) 1,250 mg in sodium chloride 0.9 % 250 mL intermittent infusion         1,250 mg  over 90 Minutes Intravenous EVERY 12 HOURS 24 1753              Contrast Orders - past 72 hours (72h ago, onward)      None            [employer] Prediction of Planned Initial Vancomycin Regimen  Regimen: 1250 mg IV every 12 hours.  Start time: 22:00 on 2024  Exposure target: AUC24 (range)400-600 mg/L.hr   AUC24,ss: 463 mg/L.hr  Probability of AUC24 > 400: 65 %  Ctrough,ss: 13.5 mg/L  Probability of Ctrough,ss > 20: 22 %  Probability of nephrotoxicity (Lodise LESLYE ): 9 %    First dose inpt dose ~8 hrs after ED dose to keep trough > 10  Plan:  Start vancomycin  1250 mg IV q12h.   Vancomycin monitoring method: AUC  Vancomycin therapeutic monitoring goal: 400-600 mg*h/L  Pharmacy will check vancomycin levels as appropriate in 1-3 Days.    Serum creatinine ordered for 3/23 and  3/24.    Radha Chavarria [facility]

## 2024-03-22 NOTE — ED TRIAGE NOTES
Pt ambulatory to triage c/o worsening left leg wound, reportedly referred here by PCP for IV antibiotics. Pt states she developed a wound to left calf a few weeks ago, perhaps from a scratch clarisse. Since then she has been taking amoxicillin and doxycycline, but she reports wound has increasing redness. Denies fever.

## 2024-03-23 LAB
BACTERIA BLD CULT: NO GROWTH
BACTERIA BLD CULT: NO GROWTH
CREAT SERPL-MCNC: 0.46 MG/DL (ref 0.51–0.95)
EGFRCR SERPLBLD CKD-EPI 2021: >90 ML/MIN/1.73M2
ERYTHROCYTE [DISTWIDTH] IN BLOOD BY AUTOMATED COUNT: 15.7 % (ref 10–15)
GLUCOSE BLDC GLUCOMTR-MCNC: 126 MG/DL (ref 70–99)
GLUCOSE BLDC GLUCOMTR-MCNC: 178 MG/DL (ref 70–99)
GLUCOSE BLDC GLUCOMTR-MCNC: 187 MG/DL (ref 70–99)
GLUCOSE BLDC GLUCOMTR-MCNC: 99 MG/DL (ref 70–99)
HCT VFR BLD AUTO: 32.3 % (ref 35–47)
HGB BLD-MCNC: 9.6 G/DL (ref 11.7–15.7)
MCH RBC QN AUTO: 23.5 PG (ref 26.5–33)
MCHC RBC AUTO-ENTMCNC: 29.7 G/DL (ref 31.5–36.5)
MCV RBC AUTO: 79 FL (ref 78–100)
PLATELET # BLD AUTO: 396 10E3/UL (ref 150–450)
RBC # BLD AUTO: 4.08 10E6/UL (ref 3.8–5.2)
WBC # BLD AUTO: 8.3 10E3/UL (ref 4–11)

## 2024-03-23 PROCEDURE — 258N000003 HC RX IP 258 OP 636

## 2024-03-23 PROCEDURE — 250N000013 HC RX MED GY IP 250 OP 250 PS 637

## 2024-03-23 PROCEDURE — 99233 SBSQ HOSP IP/OBS HIGH 50: CPT | Mod: GC

## 2024-03-23 PROCEDURE — 85049 AUTOMATED PLATELET COUNT: CPT

## 2024-03-23 PROCEDURE — 250N000011 HC RX IP 250 OP 636

## 2024-03-23 PROCEDURE — 82565 ASSAY OF CREATININE: CPT | Performed by: FAMILY MEDICINE

## 2024-03-23 PROCEDURE — 120N000001 HC R&B MED SURG/OB

## 2024-03-23 PROCEDURE — 250N000012 HC RX MED GY IP 250 OP 636 PS 637

## 2024-03-23 PROCEDURE — 36415 COLL VENOUS BLD VENIPUNCTURE: CPT

## 2024-03-23 RX ORDER — IBUPROFEN 600 MG/1
600 TABLET, FILM COATED ORAL
Status: COMPLETED | OUTPATIENT
Start: 2024-03-23 | End: 2024-03-23

## 2024-03-23 RX ADMIN — Medication 3 MG: at 21:18

## 2024-03-23 RX ADMIN — INSULIN ASPART 1 UNITS: 100 INJECTION, SOLUTION INTRAVENOUS; SUBCUTANEOUS at 07:10

## 2024-03-23 RX ADMIN — ACETAMINOPHEN 650 MG: 325 TABLET ORAL at 03:45

## 2024-03-23 RX ADMIN — ACETAMINOPHEN 650 MG: 325 TABLET ORAL at 19:40

## 2024-03-23 RX ADMIN — ACETAMINOPHEN 650 MG: 325 TABLET ORAL at 13:42

## 2024-03-23 RX ADMIN — Medication 1 TABLET: at 08:08

## 2024-03-23 RX ADMIN — VANCOMYCIN HYDROCHLORIDE 1250 MG: 5 INJECTION, POWDER, LYOPHILIZED, FOR SOLUTION INTRAVENOUS at 21:04

## 2024-03-23 RX ADMIN — VANCOMYCIN HYDROCHLORIDE 1250 MG: 5 INJECTION, POWDER, LYOPHILIZED, FOR SOLUTION INTRAVENOUS at 11:58

## 2024-03-23 RX ADMIN — ALBUTEROL SULFATE 2 PUFF: 90 AEROSOL, METERED RESPIRATORY (INHALATION) at 17:58

## 2024-03-23 RX ADMIN — IBUPROFEN 600 MG: 600 TABLET, FILM COATED ORAL at 22:06

## 2024-03-23 RX ADMIN — HUMAN INSULIN 12 UNITS: 100 INJECTION, SUSPENSION SUBCUTANEOUS at 21:40

## 2024-03-23 RX ADMIN — OXYCODONE HYDROCHLORIDE 5 MG: 5 TABLET ORAL at 21:04

## 2024-03-23 RX ADMIN — ENOXAPARIN SODIUM 40 MG: 40 INJECTION SUBCUTANEOUS at 21:04

## 2024-03-23 RX ADMIN — ACETAMINOPHEN 650 MG: 325 TABLET ORAL at 09:30

## 2024-03-23 RX ADMIN — ATORVASTATIN CALCIUM 40 MG: 40 TABLET, FILM COATED ORAL at 08:08

## 2024-03-23 RX ADMIN — FLUTICASONE FUROATE AND VILANTEROL TRIFENATATE 1 PUFF: 200; 25 POWDER RESPIRATORY (INHALATION) at 08:09

## 2024-03-23 RX ADMIN — Medication 1 TABLET: at 21:04

## 2024-03-23 RX ADMIN — PANTOPRAZOLE SODIUM 40 MG: 40 TABLET, DELAYED RELEASE ORAL at 08:08

## 2024-03-23 ASSESSMENT — ACTIVITIES OF DAILY LIVING (ADL)
ADLS_ACUITY_SCORE: 26
ADLS_ACUITY_SCORE: 27
ADLS_ACUITY_SCORE: 27
ADLS_ACUITY_SCORE: 26
ADLS_ACUITY_SCORE: 27
ADLS_ACUITY_SCORE: 26
ADLS_ACUITY_SCORE: 27
ADLS_ACUITY_SCORE: 26
ADLS_ACUITY_SCORE: 27
ADLS_ACUITY_SCORE: 26

## 2024-03-23 NOTE — PLAN OF CARE
Problem: Adult Inpatient Plan of Care  Goal: Absence of Hospital-Acquired Illness or Injury  Intervention: Identify and Manage Fall Risk  Recent Flowsheet Documentation  Taken 3/22/2024 1700 by Christina Patterson RN  Safety Promotion/Fall Prevention:   assistive device/personal items within reach   nonskid shoes/slippers when out of bed   patient and family education   room near nurse's station   safety round/check completed  Goal: Optimal Comfort and Wellbeing  Intervention: Monitor Pain and Promote Comfort  Recent Flowsheet Documentation  Taken 3/22/2024 1740 by Christina Patterson RN  Pain Management Interventions: medication (see MAR)  Goal: Readiness for Transition of Care  Intervention: Mutually Develop Transition Plan  Recent Flowsheet Documentation  Taken 3/22/2024 2000 by Christina Patterson RN  Equipment Currently Used at Home: (nebulizer)   glucometer   other (see comments)     Problem: Pain Acute  Goal: Optimal Pain Control and Function  Intervention: Develop Pain Management Plan  Recent Flowsheet Documentation  Taken 3/22/2024 1740 by Christina Patterson RN  Pain Management Interventions: medication (see MAR)  Intervention: Prevent or Manage Pain  Recent Flowsheet Documentation  Taken 3/22/2024 1700 by Christina Patterson RN  Complementary Therapy: essential oils utilized  Medication Review/Management: medications reviewed     Problem: Comorbidity Management  Goal: Blood Glucose Levels Within Targeted Range  Intervention: Monitor and Manage Glycemia  Recent Flowsheet Documentation  Taken 3/22/2024 1700 by Christina Patterson RN  Medication Review/Management: medications reviewed     Problem: Anxiety  Goal: Anxiety Reduction or Resolution  Intervention: Promote Anxiety Reduction  Recent Flowsheet Documentation  Taken 3/22/2024 1700 by Christina Patterson RN  Complementary Therapy: essential oils utilized   Goal Outcome Evaluation:       Pt admitted this shift. Mild to moderate anxiety, moderate to severe pain in LLE.  Stable and controlled, continue to provide meds and comfort measures. Redness and swelling in LLE, started on IV antibiotics. No drainage on site, needing swab sample if present. Vitals stable, afebrile. Independent in room.

## 2024-03-23 NOTE — PROGRESS NOTES
Care Management Follow Up    Length of Stay (days): 1    Expected Discharge Date: 03/23/2024     Concerns to be Addressed: discharge planning     Patient plan of care discussed at interdisciplinary rounds: Yes    Anticipated Discharge Disposition:  return to home with support staff.     Anticipated Discharge Services:    Anticipated Discharge DME:      Patient/family educated on Medicare website which has current facility and service quality ratings:    Education Provided on the Discharge Plan:yes    Patient/Family in Agreement with the Plan:  yes    Referrals Placed by CM/SW:    Private pay costs discussed: Not applicable    Additional Information:  SW met with pt in pt room, introduced self. Pt states that she gets help from staff and that staff will provide transport at discharge. Pt declined other needs from SW.   CM to follow for PT recommendations.     TIFFANY Zarate

## 2024-03-23 NOTE — PLAN OF CARE
Problem: Infection  Goal: Absence of Infection Signs and Symptoms  Outcome: Not Progressing   Goal Outcome Evaluation:         Cellulitis to left calf red, mild intermittent pain. No drainage noted to LLE. Diet changed to regular per patient's preference. Patient refused IV Vancomycin due to being upset about diet restrictions, allowed for IV Vanco at noon. Tylenol given for headache x1. Patient later stated headache was worse, refused Oxycodone. Patient stated does not want Oxycodone due to constipation despite educated stool softeners could also be given. Patient declined. MD updated. One time order given for Ibuprofen. Patient then declined Ibuprofen, stating headache improved. A&Ox4.

## 2024-03-23 NOTE — PLAN OF CARE
"  Problem: Adult Inpatient Plan of Care  Goal: Plan of Care Review  Description: The Plan of Care Review/Shift note should be completed every shift.  The Outcome Evaluation is a brief statement about your assessment that the patient is improving, declining, or no change.  This information will be displayed automatically on your shift  note.  Outcome: Progressing  Goal: Patient-Specific Goal (Individualized)  Description: You can add care plan individualizations to a care plan. Examples of Individualization might be:  \"Parent requests to be called daily at 9am for status\", \"I have a hard time hearing out of my right ear\", or \"Do not touch me to wake me up as it startles  me\".  Outcome: Progressing  Goal: Absence of Hospital-Acquired Illness or Injury  Outcome: Progressing  Intervention: Identify and Manage Fall Risk  Recent Flowsheet Documentation  Taken 3/23/2024 0100 by Pema Omalley RN  Safety Promotion/Fall Prevention:   assistive device/personal items within reach   nonskid shoes/slippers when out of bed   patient and family education   room near nurse's station   safety round/check completed  Intervention: Prevent Skin Injury  Recent Flowsheet Documentation  Taken 3/23/2024 0100 by Pema Omalley RN  Body Position: position changed independently  Goal: Optimal Comfort and Wellbeing  Outcome: Progressing  Goal: Readiness for Transition of Care  Outcome: Progressing     Problem: Pain Acute  Goal: Optimal Pain Control and Function  Outcome: Progressing  Intervention: Prevent or Manage Pain  Recent Flowsheet Documentation  Taken 3/23/2024 0100 by Pema Omalley RN  Medication Review/Management: medications reviewed     Problem: Infection  Goal: Absence of Infection Signs and Symptoms  Outcome: Progressing     Problem: Comorbidity Management  Goal: Blood Glucose Levels Within Targeted Range  Outcome: Progressing  Intervention: Monitor and Manage Glycemia  Recent Flowsheet Documentation  Taken 3/23/2024 0100 by " Pema Omalley, RN  Medication Review/Management: medications reviewed     Problem: Anxiety  Goal: Anxiety Reduction or Resolution  Outcome: Progressing   Goal Outcome Evaluation:  Pt slept well overnight, awake with cares. Pt complained of pain left leg, 10/10. Prn tylenol given as requested. Pt is alert and able to make her needs known.

## 2024-03-23 NOTE — PROGRESS NOTES
Patient was crying and cussing because the kitchen would not give her soy milk due to allergy to nuts. She was also upset that she cannot order food outside of her 60g CHO diabetic diet. She stated she would rather leave the hospital and lose her leg. Refused IV Vancomycin until she can eats what she wants. Updated House MD. Diet order changed to regular. Patient now calm.

## 2024-03-23 NOTE — PROGRESS NOTES
"[facility]    Progress Note - Hospitalist Service       Date of Admission:  3/22/2024    Assessment & Plan   Mary Ann Olson is a 58 year old female with PMHx of Type 2 DM insulin dependent, GERD, COPD, HLD, presenting with cellulitis worsening on oral antibiotics  admitted on 3/22/2024 for IV antibiotics.      Left lower leg cellulitis  Initially diagnosed with LLE cellulitis in ED on 3/18 and was started on amoxicillin and doxycycline. Noted in clinic to have signficant worsening of erythema despite PO antibiotics and was admitted on 3/22 for IV antibiotics. Normal WBC. POCUS in ED with no evidence of abscess. Started on IV vancomycin. Now with some improvement of erythema and pain.   - Continue IV vancomycin (started 3/22/24)  - Wound cultures ordered  - Blood cultures 3/18 with NGTD     Type 2 DM   Last A1c was 12.2 on 1/19/24.  Currently on liraglutide, metformin, and 15U NPH BID. Patient adamant about not doing a carb consistent diet. Educated on importance of BG control while treating infection. Patient threatened to leave AMA if we did not give a regular diet, so this was ordered.   - NPH 12u BID  - Medium resistance sliding scale insulin  - Held metformin  - Held liraglutide      Chronic meds  - continue PTA inhalers  - continue PTA atorvastatin  - continue PTA omeprazole  - continue PTA CaCarbonate-Vit D         Diet: Combination Diet Regular Diet Adult; Moderate Consistent Carb (60 g CHO per Meal) Diet    DVT Prophylaxis: Enoxaparin (Lovenox) SQ  Beckwith Catheter: Not present  Fluids: PO  Lines: None     Cardiac Monitoring: None  Code Status: Full Code      Clinically Significant Risk Factors Present on Admission                      # DMII: A1C = 12.2 % (Ref range: 0.0 - 5.6 %) within past 6 months    # Obesity: Estimated body mass index is 34.48 kg/m  as calculated from the following:    Height as of this encounter: 1.562 m (5' 1.5\").    Weight as of this encounter: 84.1 kg " (185 lb 8 oz).       # Financial/Environmental Concerns:           Disposition Plan     Expected Discharge Date: 03/23/2024      Destination: home with help/services            Elizabeth Ontiveros MD  Hospitalist Service  [facility]  Securely message with [employer] (more info)  Text page via [employer] Paging/Directory   ______________________________________________________________________    Interval History   No acute events overnight..     Patient extremely upset this morning about not being able to order any food she wants from the cafeteria due to carb consistent diet. Educated on importance of BG control when treating infection. She threatened to leave AMA. Agreed to change to regular adult diet.     Pain of LLE improving. Some improvement in erythema today.     Physical Exam   Vital Signs: Temp: 97.2  F (36.2  C) Temp src: Oral BP: 113/64 Pulse: 95   Resp: 16 SpO2: 94 % O2 Device: None (Room air)    Weight: 185 lbs 8 oz    Constitutional: anxious and tearful, cooperative, and appears stated age  Eyes: Lids and lashes normal, no obvious abnormalities   ENT: without obvious abnormality,  oral pharynx with moist mucous membranes  Respiratory: No increased work of breathing, good air exchange, clear to auscultation bilaterally, no crackles or wheezing  Cardiovascular: regular rate and rhythm, and no murmur noted  GI:  normal bowel sounds, soft, non-distended, non-tender  Skin: area of erythema on the majority of the back of her left lower leg with purulent ulcer noted in the center; exquisitely tender to palpation; erythema and edema improved from yesterday, erythema extends slightly beyond line drawn yesterday.   Neurologic: Awake, alert, oriented     Medical Decision Making       Please see A&P for additional details of medical decision making.      Data   ------------------------- PAST 24 HR DATA REVIEWED -----------------------------------------------    I have personally reviewed the following  data over the past 24 hrs:    8.3  \   9.6 (L)   / 396     N/A N/A N/A /  99   N/A N/A 0.46 (L) \       Imaging results reviewed over the past 24 hrs:   No results found for this or any previous visit (from the past 24 hour(s)).

## 2024-03-24 ENCOUNTER — APPOINTMENT (OUTPATIENT)
Dept: PHYSICAL THERAPY | Facility: HOSPITAL | Age: 59
DRG: 603 | End: 2024-03-24
Payer: MEDICARE

## 2024-03-24 LAB
CREAT SERPL-MCNC: 0.47 MG/DL (ref 0.51–0.95)
EGFRCR SERPLBLD CKD-EPI 2021: >90 ML/MIN/1.73M2
ERYTHROCYTE [DISTWIDTH] IN BLOOD BY AUTOMATED COUNT: 15.8 % (ref 10–15)
GLUCOSE BLDC GLUCOMTR-MCNC: 113 MG/DL (ref 70–99)
GLUCOSE BLDC GLUCOMTR-MCNC: 153 MG/DL (ref 70–99)
GLUCOSE BLDC GLUCOMTR-MCNC: 176 MG/DL (ref 70–99)
GLUCOSE BLDC GLUCOMTR-MCNC: 254 MG/DL (ref 70–99)
HCT VFR BLD AUTO: 33.5 % (ref 35–47)
HGB BLD-MCNC: 10.2 G/DL (ref 11.7–15.7)
MCH RBC QN AUTO: 23.7 PG (ref 26.5–33)
MCHC RBC AUTO-ENTMCNC: 30.4 G/DL (ref 31.5–36.5)
MCV RBC AUTO: 78 FL (ref 78–100)
PLATELET # BLD AUTO: 463 10E3/UL (ref 150–450)
RBC # BLD AUTO: 4.31 10E6/UL (ref 3.8–5.2)
WBC # BLD AUTO: 9.8 10E3/UL (ref 4–11)

## 2024-03-24 PROCEDURE — 120N000001 HC R&B MED SURG/OB

## 2024-03-24 PROCEDURE — 250N000011 HC RX IP 250 OP 636

## 2024-03-24 PROCEDURE — 99233 SBSQ HOSP IP/OBS HIGH 50: CPT | Mod: GC

## 2024-03-24 PROCEDURE — 250N000013 HC RX MED GY IP 250 OP 250 PS 637

## 2024-03-24 PROCEDURE — 97116 GAIT TRAINING THERAPY: CPT | Mod: GP

## 2024-03-24 PROCEDURE — 36415 COLL VENOUS BLD VENIPUNCTURE: CPT | Performed by: FAMILY MEDICINE

## 2024-03-24 PROCEDURE — 97162 PT EVAL MOD COMPLEX 30 MIN: CPT | Mod: GP

## 2024-03-24 PROCEDURE — 85027 COMPLETE CBC AUTOMATED: CPT

## 2024-03-24 PROCEDURE — 258N000003 HC RX IP 258 OP 636

## 2024-03-24 PROCEDURE — 82565 ASSAY OF CREATININE: CPT | Performed by: FAMILY MEDICINE

## 2024-03-24 PROCEDURE — 87070 CULTURE OTHR SPECIMN AEROBIC: CPT

## 2024-03-24 RX ORDER — IBUPROFEN 200 MG
400 TABLET ORAL EVERY 6 HOURS PRN
Status: DISCONTINUED | OUTPATIENT
Start: 2024-03-24 | End: 2024-03-30 | Stop reason: HOSPADM

## 2024-03-24 RX ADMIN — VANCOMYCIN HYDROCHLORIDE 1250 MG: 5 INJECTION, POWDER, LYOPHILIZED, FOR SOLUTION INTRAVENOUS at 10:00

## 2024-03-24 RX ADMIN — FLUTICASONE FUROATE AND VILANTEROL TRIFENATATE 1 PUFF: 200; 25 POWDER RESPIRATORY (INHALATION) at 07:46

## 2024-03-24 RX ADMIN — Medication 1 TABLET: at 07:47

## 2024-03-24 RX ADMIN — ACETAMINOPHEN 650 MG: 325 TABLET ORAL at 20:48

## 2024-03-24 RX ADMIN — ACETAMINOPHEN 650 MG: 325 TABLET ORAL at 12:42

## 2024-03-24 RX ADMIN — INSULIN ASPART 1 UNITS: 100 INJECTION, SOLUTION INTRAVENOUS; SUBCUTANEOUS at 17:02

## 2024-03-24 RX ADMIN — Medication 3 MG: at 20:40

## 2024-03-24 RX ADMIN — VANCOMYCIN HYDROCHLORIDE 1250 MG: 5 INJECTION, POWDER, LYOPHILIZED, FOR SOLUTION INTRAVENOUS at 21:05

## 2024-03-24 RX ADMIN — PANTOPRAZOLE SODIUM 40 MG: 40 TABLET, DELAYED RELEASE ORAL at 07:47

## 2024-03-24 RX ADMIN — OXYCODONE HYDROCHLORIDE 5 MG: 5 TABLET ORAL at 15:00

## 2024-03-24 RX ADMIN — INSULIN ASPART 1 UNITS: 100 INJECTION, SOLUTION INTRAVENOUS; SUBCUTANEOUS at 07:49

## 2024-03-24 RX ADMIN — SALINE NASAL SPRAY 1 SPRAY: 1.5 SOLUTION NASAL at 22:18

## 2024-03-24 RX ADMIN — ACETAMINOPHEN 650 MG: 325 TABLET ORAL at 17:02

## 2024-03-24 RX ADMIN — Medication 1 TABLET: at 19:53

## 2024-03-24 RX ADMIN — HUMAN INSULIN 12 UNITS: 100 INJECTION, SUSPENSION SUBCUTANEOUS at 20:40

## 2024-03-24 RX ADMIN — ALBUTEROL SULFATE 2 PUFF: 90 AEROSOL, METERED RESPIRATORY (INHALATION) at 20:56

## 2024-03-24 RX ADMIN — ATORVASTATIN CALCIUM 40 MG: 40 TABLET, FILM COATED ORAL at 07:47

## 2024-03-24 RX ADMIN — ENOXAPARIN SODIUM 40 MG: 40 INJECTION SUBCUTANEOUS at 20:40

## 2024-03-24 RX ADMIN — ACETAMINOPHEN 650 MG: 325 TABLET ORAL at 08:20

## 2024-03-24 RX ADMIN — IBUPROFEN 400 MG: 200 TABLET, FILM COATED ORAL at 19:53

## 2024-03-24 ASSESSMENT — ACTIVITIES OF DAILY LIVING (ADL)
ADLS_ACUITY_SCORE: 26

## 2024-03-24 NOTE — PLAN OF CARE
"  Problem: Adult Inpatient Plan of Care  Goal: Plan of Care Review  Description: The Plan of Care Review/Shift note should be completed every shift.  The Outcome Evaluation is a brief statement about your assessment that the patient is improving, declining, or no change.  This information will be displayed automatically on your shift  note.  Outcome: Progressing  Goal: Patient-Specific Goal (Individualized)  Description: You can add care plan individualizations to a care plan. Examples of Individualization might be:  \"Parent requests to be called daily at 9am for status\", \"I have a hard time hearing out of my right ear\", or \"Do not touch me to wake me up as it startles  me\".  Outcome: Progressing  Goal: Absence of Hospital-Acquired Illness or Injury  Outcome: Progressing  Intervention: Identify and Manage Fall Risk  Recent Flowsheet Documentation  Taken 3/24/2024 0000 by Pema Omalley RN  Safety Promotion/Fall Prevention:   assistive device/personal items within reach   nonskid shoes/slippers when out of bed   patient and family education   room near nurse's station   safety round/check completed  Intervention: Prevent Skin Injury  Recent Flowsheet Documentation  Taken 3/24/2024 0000 by Pema Omalley RN  Body Position: position changed independently  Goal: Optimal Comfort and Wellbeing  Outcome: Progressing  Goal: Readiness for Transition of Care  Outcome: Progressing     Problem: Pain Acute  Goal: Optimal Pain Control and Function  Outcome: Progressing  Intervention: Prevent or Manage Pain  Recent Flowsheet Documentation  Taken 3/24/2024 0000 by Pema Omalley RN  Medication Review/Management: medications reviewed     Problem: Infection  Goal: Absence of Infection Signs and Symptoms  Outcome: Progressing     Problem: Comorbidity Management  Goal: Blood Glucose Levels Within Targeted Range  Outcome: Progressing  Intervention: Monitor and Manage Glycemia  Recent Flowsheet Documentation  Taken 3/24/2024 0000 by " Pema Omalley RN  Medication Review/Management: medications reviewed     Problem: Anxiety  Goal: Anxiety Reduction or Resolution  Outcome: Progressing   Goal Outcome Evaluation:  Pt slept well overnight, awake with cares. Pt independent in room and is able to make her needs known. No complaints of pain thus far, will cont to monitor and treat as needed.

## 2024-03-24 NOTE — PLAN OF CARE
Problem: Adult Inpatient Plan of Care  Goal: Absence of Hospital-Acquired Illness or Injury  Intervention: Prevent Skin Injury  Recent Flowsheet Documentation  Taken 3/24/2024 0820 by Felicity Orta RN  Body Position:   position changed independently   position maintained     Problem: Adult Inpatient Plan of Care  Goal: Absence of Hospital-Acquired Illness or Injury  Intervention: Prevent and Manage VTE (Venous Thromboembolism) Risk  Recent Flowsheet Documentation  Taken 3/24/2024 0820 by Felicity Orta RN  VTE Prevention/Management: SCDs (sequential compression devices) off     Problem: Adult Inpatient Plan of Care  Goal: Optimal Comfort and Wellbeing  Intervention: Monitor Pain and Promote Comfort  Recent Flowsheet Documentation  Taken 3/24/2024 0915 by Felicity Orta RN  Pain Management Interventions:   care clustered   pillow support provided   quiet environment facilitated   rest  Taken 3/24/2024 0820 by Felicity Orta RN  Pain Management Interventions: medication (see MAR)     Problem: Pain Acute  Goal: Optimal Pain Control and Function  Intervention: Develop Pain Management Plan  Recent Flowsheet Documentation  Taken 3/24/2024 0915 by Felicity Orta RN  Pain Management Interventions:   care clustered   pillow support provided   quiet environment facilitated   rest  Taken 3/24/2024 0820 by Felicity Orta RN  Pain Management Interventions: medication (see MAR)  Intervention: Prevent or Manage Pain  Recent Flowsheet Documentation  Taken 3/24/2024 0820 by Felicity Orta RN  Medication Review/Management: medications reviewed     Problem: Comorbidity Management  Goal: Blood Glucose Levels Within Targeted Range  Intervention: Monitor and Manage Glycemia  Recent Flowsheet Documentation  Taken 3/24/2024 0820 by Felicity Orta RN  Medication Review/Management: medications reviewed   Goal Outcome Evaluation:  Patient alert and oriented x4. Moving independently in the  room. Vitals stable on room air. IV antibiotics. Redness and swelling in LLE. Regular diet with adequate intake. Blood sugars stable. Pain in LLE and head managed using PRN tylenol.

## 2024-03-24 NOTE — PLAN OF CARE
Problem: Adult Inpatient Plan of Care  Goal: Absence of Hospital-Acquired Illness or Injury  Intervention: Identify and Manage Fall Risk  Recent Flowsheet Documentation  Taken 3/23/2024 1630 by Christina Patterson RN  Safety Promotion/Fall Prevention:   assistive device/personal items within reach   nonskid shoes/slippers when out of bed   patient and family education   room near nurse's station   safety round/check completed  Goal: Optimal Comfort and Wellbeing  Intervention: Monitor Pain and Promote Comfort  Recent Flowsheet Documentation  Taken 3/23/2024 1940 by Christina Patterson RN  Pain Management Interventions: medication (see MAR)  Taken 3/23/2024 1600 by Christina Patterson RN  Pain Management Interventions: pillow support provided     Problem: Pain Acute  Goal: Optimal Pain Control and Function  Intervention: Develop Pain Management Plan  Recent Flowsheet Documentation  Taken 3/23/2024 1940 by Christina Patterson RN  Pain Management Interventions: medication (see MAR)  Taken 3/23/2024 1600 by Christina Patterson RN  Pain Management Interventions: pillow support provided  Intervention: Prevent or Manage Pain  Recent Flowsheet Documentation  Taken 3/23/2024 1630 by Christina Patterson RN  Medication Review/Management: medications reviewed  Intervention: Optimize Psychosocial Wellbeing  Recent Flowsheet Documentation  Taken 3/23/2024 1630 by Christina Patterson RN  Supportive Measures:   active listening utilized   goal-setting facilitated   positive reinforcement provided   self-care encouraged   relaxation techniques promoted   verbalization of feelings encouraged     Problem: Comorbidity Management  Goal: Blood Glucose Levels Within Targeted Range  Intervention: Monitor and Manage Glycemia  Recent Flowsheet Documentation  Taken 3/23/2024 1630 by Christina Patterson RN  Medication Review/Management: medications reviewed     Problem: Anxiety  Goal: Anxiety Reduction or Resolution  Intervention: Promote Anxiety Reduction  Recent  Flowsheet Documentation  Taken 3/23/2024 1630 by Christina Patterson RN  Supportive Measures:   active listening utilized   goal-setting facilitated   positive reinforcement provided   self-care encouraged   relaxation techniques promoted   verbalization of feelings encouraged   Goal Outcome Evaluation:         Aox4, calm and cooperative this shift. Pain in LLE stable and controlled. Redness and swelling in LLE, no drainage. Independent in room.

## 2024-03-24 NOTE — PROGRESS NOTES
03/24/24 1326   Appointment Info   Signing Clinician's Name / Credentials (PT) Sandy Sorenson PT   Living Environment   People in Home alone   Current Living Arrangements apartment  (Indep housing with help if needed)   Home Accessibility stairs to enter home   Number of Stairs, Main Entrance greater than 10 stairs  (Step down to the staff l;evel area. Pt does go down for laundry)   Stair Railings, Main Entrance railings on both sides of stairs   Living Environment Comments Laundry on lower level. Pt does not use help but will use them for setting up rides.   Self-Care   Current Activity Tolerance moderate   Equipment Currently Used at Home   (no equipment and the pt is not interested in using a walker at home or any AD.)   Fall history within last six months no   Activity/Exercise/Self-Care Comment Pt is indp with all mobility, Assisted with transportation.   General Information   Onset of Illness/Injury or Date of Surgery 03/22/24   Referring Physician Elizabeth Ontiveros MD   Patient/Family Therapy Goals Statement (PT) Pt wants to go home.   Pertinent History of Current Problem (include personal factors and/or comorbidities that impact the POC) Per the chart, Mary Ann Olson is a 58 year old female with PMHx of Type 2 DM insulin dependent, GERD, COPD, HLD, presenting with cellulitis worsening on oral antibiotics admitted on 3/22/2024 for IV antibiotics.      Left lower leg cellulitis  Initially diagnosed with LLE cellulitis in ED on 3/18 and was started on amoxicillin and doxycycline. Noted in clinic to have signficant worsening of erythema despite PO antibiotics and was admitted on 3/22 for IV antibiotics.   Existing Precautions/Restrictions   (Cellulitis L distal LE)   Weight-Bearing Status - LLE weight-bearing as tolerated   Weight-Bearing Status - RLE weight-bearing as tolerated   Cognition   Orientation Status (Cognition) person;place;time;situation;oriented to   Pain Assessment   Patient Currently in Pain    (Pt has left calf area pain she rated at a 5/10. Pt c/o a HA)   Integumentary/Edema   Integumentary/Edema Comments erythema distal medical calf area L LE   Range of Motion (ROM)   ROM Comment R LE WFL and L LE limited by pain.   Strength (Manual Muscle Testing)   Strength Comments Pt is able to WB and walk on bilat  LEs   Bed Mobility   Comment, (Bed Mobility) Supine<>sit indep   Transfers   Comment, (Transfers) Sit<>stand with FWW with supervision.  Some cues for hand placement.   Gait/Stairs (Locomotion)   [place] Level (Gait) verbal cues;1 person assist;supervision   Assistive Device (Gait) walker, rolling platform   Distance in Feet (Gait) 10'   Pattern (Gait) swing-through   Deviations/Abnormal Patterns (Gait) gait speed decreased   Balance   Balance Comments Supervision with FWW   Sensory Examination   Sensory Perception WNL   Sensory Perception Comments Bil LEs   Clinical Impression   Criteria for Skilled Therapeutic Intervention Yes, treatment indicated   PT Diagnosis (PT) Impaired functional mobility   Influenced by the following impairments Increased L LE pain, dec endurence, pt is not at her PLOF.   Functional limitations due to impairments transfers, gait steps.   Clinical Presentation (PT Evaluation Complexity) evolving   Clinical Presentation Rationale Pt presents medically diagnosed.   Clinical Decision Making (Complexity) low complexity   Planned Therapy Interventions (PT) gait training;stair training;transfer training   Risk & Benefits of therapy have been explained evaluation/treatment results reviewed;care plan/treatment goals reviewed;risks/benefits reviewed;patient;participants voiced agreement with care plan   PT Total Evaluation Time   PT Eval, Moderate Complexity Minutes (17013) 15   Physical Therapy Goals   PT Frequency Daily   PT Predicted Duration/Target Date for Goal Attainment 03/27/24   PT Goals Transfers;Gait;Stairs   PT: Transfers Independent;Sit to/from stand;Bed to/from  chair;Assistive device  (or possibly without AD)   PT: Gait Modified independent;Rolling walker;150 feet  (or possible without AD)   PT: Stairs Supervision/stand-by assist;10 stairs;Rail on both sides   Interventions   Interventions Quick Adds Gait Training   Gait Training   Gait Training Minutes (13967) 10   Symptoms Noted During/After Treatment (Gait Training) increased pain  (Pt c/o a HA and L LE pain.  Nursing notified.)   Distance in Feet 40'   Physical Assistance Level (Gait Training) verbal cues;1 person assist;supervision   Weight Bearing (Gait Training) weight-bearing as tolerated   Assistive Device (Gait Training) rolling walker  (FWW)   Pattern Analysis (Gait Training) swing-through gait   Gait Analysis Deviations decreased alisia;decreased step length   Impairments (Gait Analysis/Training) pain   PT Discharge Planning   PT Plan transfers and gait w/o AD if able, steps,  (Pt does not want to use any AD at dc.)   PT Discharge Recommendation (DC Rec) home with assist   PT Rationale for DC Rec Pt does have L LE pain  She was walking today with a walker with supervision and had  no LOB.  Pt did not want to try steps today.  She shouild be able to progress to dc to home.  Pt does not have any AD at home. She does not want any home PT.   PT Brief overview of current status PT eval, indep with bed mobility, transfers with FWW with supervision and ambulated 50' with FWW with supervision.  Some assist with the IV.   PT Equipment Needed at Discharge walker, rolling;cane, straight  (TBD.)   Total Session Time   Timed Code Treatment Minutes 10   Total Session Time (sum of timed and untimed services) 25

## 2024-03-24 NOTE — PROGRESS NOTES
[facility]    Medicine Progress Note - Hospitalist Service    Date of Admission:  3/22/2024    Assessment & Plan   Mary Ann Olson is a 58 year old female with PMHx of Type 2 DM insulin dependent, GERD, COPD, HLD, presenting with cellulitis worsening on oral antibiotics admitted on 3/22/2024 for IV antibiotics.      Left lower leg cellulitis  Initially diagnosed with LLE cellulitis in ED on 3/18 and was started on amoxicillin and doxycycline. Noted in clinic to have signficant worsening of erythema despite PO antibiotics and was admitted on 3/22 for IV antibiotics. Normal WBC. POCUS in ED with no evidence of abscess. Started on IV vancomycin. Now with some improvement of erythema and pain. IV extravasated 3/24 AM, replaced PIV in LUE, wonder if this may be reason for continued spread despite IV vancomycin yesterday.  - Continue IV vancomycin (started 3/22/24)  - Wound cultures ordered  - Blood cultures 3/18 with NGTD     Type 2 DM   Last A1c was 12.2 on 1/19/24.  Currently on liraglutide, metformin, and 15U NPH BID. Patient adamant about not doing a carb consistent diet. Educated on importance of BG control while treating infection. Patient threatened to leave AMA if we did not give a regular diet, so this was ordered.   - NPH 12u BID  - Medium resistance sliding scale insulin  - Held metformin  - Held liraglutide      Chronic meds  - continue PTA inhalers  - continue PTA atorvastatin  - continue PTA omeprazole  - continue PTA CaCarbonate-Vit D     Diet: Regular Diet Adult    DVT Prophylaxis: Enoxaparin (Lovenox) SQ  Beckwith Catheter: Not present  Lines: None     Cardiac Monitoring: None  Code Status: Full Code      Clinically Significant Risk Factors                        # DMII: A1C = 12.2 % (Ref range: 0.0 - 5.6 %) within past 6 months, PRESENT ON ADMISSION  # Obesity: Estimated body mass index is 34.48 kg/m  as calculated from the following:    Height as of this encounter: 1.562 m (5'  "1.5\").    Weight as of this encounter: 84.1 kg (185 lb 8 oz)., PRESENT ON ADMISSION     # Financial/Environmental Concerns:           Disposition Plan      Expected Discharge Date: 03/24/2024      Destination: home with help/services            The patient's care was discussed with the Attending Physician, Dr. Brown .    Mohan Larose MD  Hospitalist Service  [facility]  Securely message with [employer] (more info)  Text page via [employer] Paging/Directory   ______________________________________________________________________    Interval History   Patient quite upset, threatening to leave this AM if not able to get Soy milk, similar episode yesterday AM regarding low carb diet. Calmed down by and cooperative by my arrival. Reports L leg is still uncomfortable, but improved slightly. Redness still outside margins from prior. IV extravasated and replaced w/ LUE PIV.    Physical Exam   Vital Signs: Temp: 98.2  F (36.8  C) Temp src: Oral BP: 125/73 Pulse: 98   Resp: 16 SpO2: 95 % O2 Device: None (Room air)    Weight: 185 lbs 8 oz    Constitutional:  Lying comfortably in bed, cooperative, and appears stated age  Eyes: Lids and lashes normal, no obvious abnormalities   ENT: without obvious abnormality,  oral pharynx with moist mucous membranes  Respiratory: No increased work of breathing, good air exchange, clear to auscultation bilaterally, no crackles or wheezing  Cardiovascular: regular rate and rhythm, and no murmur noted  GI:  normal bowel sounds, soft, non-distended, non-tender  Skin: area of erythema on the majority of the back of her left lower leg with purulent ulcer noted in the center; exquisitely tender to palpation; erythema and edema improved from yesterday, erythema extends slightly beyond line drawn on admission.   Neurologic: Awake, alert, oriented     Data     I have personally reviewed the following data over the past 24 hrs:    9.8  \   10.2 (L)   / 463 (H)     N/A N/A N/A /  " 153 (H)   N/A N/A 0.47 (L) \       Imaging results reviewed over the past 24 hrs:   No results found for this or any previous visit (from the past 24 hour(s)).

## 2024-03-25 ENCOUNTER — APPOINTMENT (OUTPATIENT)
Dept: PHYSICAL THERAPY | Facility: HOSPITAL | Age: 59
DRG: 603 | End: 2024-03-25
Payer: MEDICARE

## 2024-03-25 LAB
CREAT SERPL-MCNC: 0.45 MG/DL (ref 0.51–0.95)
EGFRCR SERPLBLD CKD-EPI 2021: >90 ML/MIN/1.73M2
ERYTHROCYTE [DISTWIDTH] IN BLOOD BY AUTOMATED COUNT: 15.8 % (ref 10–15)
GLUCOSE BLDC GLUCOMTR-MCNC: 115 MG/DL (ref 70–99)
GLUCOSE BLDC GLUCOMTR-MCNC: 143 MG/DL (ref 70–99)
GLUCOSE BLDC GLUCOMTR-MCNC: 159 MG/DL (ref 70–99)
GLUCOSE BLDC GLUCOMTR-MCNC: 178 MG/DL (ref 70–99)
GLUCOSE BLDC GLUCOMTR-MCNC: 180 MG/DL (ref 70–99)
HCT VFR BLD AUTO: 30.7 % (ref 35–47)
HGB BLD-MCNC: 9.4 G/DL (ref 11.7–15.7)
MCH RBC QN AUTO: 23.9 PG (ref 26.5–33)
MCHC RBC AUTO-ENTMCNC: 30.6 G/DL (ref 31.5–36.5)
MCV RBC AUTO: 78 FL (ref 78–100)
PLATELET # BLD AUTO: 390 10E3/UL (ref 150–450)
RBC # BLD AUTO: 3.93 10E6/UL (ref 3.8–5.2)
VANCOMYCIN SERPL-MCNC: 15.2 UG/ML
WBC # BLD AUTO: 8.2 10E3/UL (ref 4–11)

## 2024-03-25 PROCEDURE — 250N000013 HC RX MED GY IP 250 OP 250 PS 637

## 2024-03-25 PROCEDURE — 120N000001 HC R&B MED SURG/OB

## 2024-03-25 PROCEDURE — 250N000011 HC RX IP 250 OP 636: Mod: JZ

## 2024-03-25 PROCEDURE — 250N000011 HC RX IP 250 OP 636

## 2024-03-25 PROCEDURE — 80202 ASSAY OF VANCOMYCIN: CPT | Performed by: STUDENT IN AN ORGANIZED HEALTH CARE EDUCATION/TRAINING PROGRAM

## 2024-03-25 PROCEDURE — 82565 ASSAY OF CREATININE: CPT | Performed by: STUDENT IN AN ORGANIZED HEALTH CARE EDUCATION/TRAINING PROGRAM

## 2024-03-25 PROCEDURE — 258N000003 HC RX IP 258 OP 636

## 2024-03-25 PROCEDURE — 36415 COLL VENOUS BLD VENIPUNCTURE: CPT

## 2024-03-25 PROCEDURE — 85027 COMPLETE CBC AUTOMATED: CPT

## 2024-03-25 PROCEDURE — 99233 SBSQ HOSP IP/OBS HIGH 50: CPT | Mod: GC

## 2024-03-25 PROCEDURE — 97116 GAIT TRAINING THERAPY: CPT | Mod: GP

## 2024-03-25 RX ORDER — CLINDAMYCIN PHOSPHATE 600 MG/50ML
600 INJECTION, SOLUTION INTRAVENOUS EVERY 8 HOURS
Status: DISCONTINUED | OUTPATIENT
Start: 2024-03-25 | End: 2024-03-29

## 2024-03-25 RX ADMIN — FLUTICASONE FUROATE AND VILANTEROL TRIFENATATE 1 PUFF: 200; 25 POWDER RESPIRATORY (INHALATION) at 09:14

## 2024-03-25 RX ADMIN — ATORVASTATIN CALCIUM 40 MG: 40 TABLET, FILM COATED ORAL at 09:14

## 2024-03-25 RX ADMIN — Medication 3 MG: at 21:58

## 2024-03-25 RX ADMIN — INSULIN ASPART 1 UNITS: 100 INJECTION, SOLUTION INTRAVENOUS; SUBCUTANEOUS at 06:59

## 2024-03-25 RX ADMIN — CLINDAMYCIN IN 5 PERCENT DEXTROSE 600 MG: 12 INJECTION, SOLUTION INTRAVENOUS at 21:41

## 2024-03-25 RX ADMIN — HUMAN INSULIN 12 UNITS: 100 INJECTION, SUSPENSION SUBCUTANEOUS at 21:40

## 2024-03-25 RX ADMIN — ALBUTEROL SULFATE 2 PUFF: 90 AEROSOL, METERED RESPIRATORY (INHALATION) at 04:25

## 2024-03-25 RX ADMIN — ENOXAPARIN SODIUM 40 MG: 40 INJECTION SUBCUTANEOUS at 21:40

## 2024-03-25 RX ADMIN — Medication 1 TABLET: at 09:14

## 2024-03-25 RX ADMIN — ACETAMINOPHEN 650 MG: 325 TABLET ORAL at 04:25

## 2024-03-25 RX ADMIN — PANTOPRAZOLE SODIUM 40 MG: 40 TABLET, DELAYED RELEASE ORAL at 09:14

## 2024-03-25 RX ADMIN — IBUPROFEN 400 MG: 200 TABLET, FILM COATED ORAL at 09:13

## 2024-03-25 RX ADMIN — ACETAMINOPHEN 650 MG: 325 TABLET ORAL at 20:21

## 2024-03-25 RX ADMIN — IBUPROFEN 400 MG: 200 TABLET, FILM COATED ORAL at 15:40

## 2024-03-25 RX ADMIN — INSULIN ASPART 1 UNITS: 100 INJECTION, SOLUTION INTRAVENOUS; SUBCUTANEOUS at 11:03

## 2024-03-25 RX ADMIN — INSULIN ASPART 1 UNITS: 100 INJECTION, SOLUTION INTRAVENOUS; SUBCUTANEOUS at 16:29

## 2024-03-25 RX ADMIN — CLINDAMYCIN IN 5 PERCENT DEXTROSE 600 MG: 12 INJECTION, SOLUTION INTRAVENOUS at 13:17

## 2024-03-25 RX ADMIN — Medication 1 TABLET: at 20:21

## 2024-03-25 RX ADMIN — VANCOMYCIN HYDROCHLORIDE 1250 MG: 5 INJECTION, POWDER, LYOPHILIZED, FOR SOLUTION INTRAVENOUS at 09:21

## 2024-03-25 RX ADMIN — ACETAMINOPHEN 650 MG: 325 TABLET ORAL at 11:56

## 2024-03-25 ASSESSMENT — ACTIVITIES OF DAILY LIVING (ADL)
ADLS_ACUITY_SCORE: 26
ADLS_ACUITY_SCORE: 27
ADLS_ACUITY_SCORE: 27
ADLS_ACUITY_SCORE: 26
ADLS_ACUITY_SCORE: 27
ADLS_ACUITY_SCORE: 26
ADLS_ACUITY_SCORE: 27
ADLS_ACUITY_SCORE: 26
ADLS_ACUITY_SCORE: 27
ADLS_ACUITY_SCORE: 26
ADLS_ACUITY_SCORE: 27
ADLS_ACUITY_SCORE: 27
ADLS_ACUITY_SCORE: 26

## 2024-03-25 NOTE — PROGRESS NOTES
"Care Management Follow Up    Length of Stay (days): 3    Expected Discharge Date: 03/26/2024     Concerns to be Addressed: remains on IV ABX    Patient plan of care discussed at interdisciplinary rounds: Yes    Anticipated Discharge Disposition:  home  Anticipated Discharge Services:    Anticipated Discharge DME:      Patient/family educated on Medicare website which has current facility and service quality ratings:    Education Provided on the Discharge Plan:    Patient/Family in Agreement with the Plan:      Referrals Placed by CM/SW:  none at this time  Private pay costs discussed: Not applicable    Additional Information:  Social History per previous CM note:   \"Patient lives in a low income 4 plex apartment where they offer services to assist with transportation. [employer] associated with the apartment \"and I can call them to help me with certain things\".  She lives alone and is independent with her ADLs and most of her IADLs. She reports that she did have housekeeping services through her CAD[employer] waiver but recently fired the person for using to much chemicals around the home.  ILS worker Tanya (did not know last name) [phone]. Patient uses no DME. [employer] to transport home.  [phone].\"    Therapy recommendation is home and pt also declines home care.    3/25 per provider update pt is not medically ready to discharge. Still working up for the cellulitis.     RNCM to follow for medical progression, recommendations, and final discharge plan.      Krupa Bran RN      "

## 2024-03-25 NOTE — PHARMACY-VANCOMYCIN DOSING SERVICE
Pharmacy Vancomycin Note  Date of Service 2024  Patient's  1965   58 year old, female    Indication: Skin and Soft Tissue Infection  Day of Therapy: 4  Current vancomycin regimen:  1250 mg IV q12h  Current vancomycin monitoring method: AUC  Current vancomycin therapeutic monitoring goal: 400-600 mg*h/L    [employer] Prediction of Current Vancomycin Regimen  Regimen: 1250 mg IV every 12 hours.  Start time: 22:00 on 2024  Exposure target: AUC24 (range)400-600 mg/L.hr   AUC24,ss: 463 mg/L.hr  Probability of AUC24 > 400: 65 %  Ctrough,ss: 13.5 mg/L  Probability of Ctrough,ss > 20: 22 %  Probability of nephrotoxicity (Lodise LESLYE ): 9 %    Current estimated CrCl = Estimated Creatinine Clearance: 133.9 mL/min (A) (based on SCr of 0.45 mg/dL (L)).    Creatinine for last 3 days  3/22/2024:  1:29 PM Creatinine 0.51 mg/dL  3/23/2024:  6:57 AM Creatinine 0.46 mg/dL  3/24/2024:  7:46 AM Creatinine 0.47 mg/dL  3/25/2024:  6:11 AM Creatinine 0.45 mg/dL    Recent Vancomycin Levels (past 3 days)  3/25/2024:  6:11 AM Vancomycin 15.2 ug/mL    Vancomycin IV Administrations (past 72 hours)                     vancomycin (VANCOCIN) 1,250 mg in sodium chloride 0.9 % 250 mL intermittent infusion (mg) 1,250 mg New Bag 24     1,250 mg New Bag  1000     1,250 mg New Bag 24     1,250 mg New Bag  1158     1,250 mg New Bag 24 215    vancomycin (VANCOCIN) 2,000 mg in sodium chloride 0.9 % 500 mL intermittent infusion (mg) 2,000 mg New Bag 24 1402                    Nephrotoxins and other renal medications (From now, onward)      Start     Dose/Rate Route Frequency Ordered Stop    24 175  ibuprofen (ADVIL/MOTRIN) tablet 400 mg         400 mg Oral EVERY 6 HOURS PRN 24 17524 220  vancomycin (VANCOCIN) 1,250 mg in sodium chloride 0.9 % 250 mL intermittent infusion         1,250 mg  over 90 Minutes Intravenous EVERY 12 HOURS 24 1898                 Contrast  Orders - past 72 hours (72h ago, onward)      None            Interpretation of levels and current regimen:  Vancomycin level is reflective of -600    Has serum creatinine changed greater than 50% in last 72 hours: No    Urine output:  unable to determine    Renal Function: Improving    [employer] Prediction of Planned New Vancomycin Regimen  Regimen: 1250 mg IV every 12 hours.  Start time: 09:05 on 03/25/2024  Exposure target: AUC24 (range)400-600 mg/L.hr   AUC24,ss: 439 mg/L.hr  Probability of AUC24 > 400: 75 %  Ctrough,ss: 12.4 mg/L  Probability of Ctrough,ss > 20: 0 %  Probability of nephrotoxicity (Lodise LESLYE 2009): 8 %    Plan:  Continue Current Dose  Vancomycin monitoring method: AUC  Vancomycin therapeutic monitoring goal: 400-600 mg*h/L  Pharmacy will check vancomycin levels as appropriate in 3-5 Days.  Serum creatinine levels will be ordered daily for the first week of therapy and at least twice weekly for subsequent weeks.    Nicollette McMann, [facility]

## 2024-03-25 NOTE — PLAN OF CARE
Goal Outcome Evaluation:      Problem: Comorbidity Management  Goal: Blood Glucose Levels Within Targeted Range  Intervention: Monitor and Manage Glycemia  Recent Flowsheet Documentation  Taken 3/25/2024 0845 by Katt Valencia RN  Medication Review/Management: medications reviewed     Problem: Anxiety  Goal: Anxiety Reduction or Resolution  Outcome: Progressing     Problem: Pain Acute  Goal: Optimal Pain Control and Function  Outcome: Progressing  Intervention: Prevent or Manage Pain  Recent Flowsheet Documentation  Taken 3/25/2024 0845 by Katt Valencia RN  Medication Review/Management: medications reviewed   Pt AO x4. C/o L leg pain, PRN tylenol and ibuprofen given. Anxious at times about Abx not working, pt was comforted and she seemed better after provider changed Abx. . Independent in room.

## 2024-03-25 NOTE — PLAN OF CARE
Problem: Adult Inpatient Plan of Care  Goal: Absence of Hospital-Acquired Illness or Injury  Intervention: Identify and Manage Fall Risk  Recent Flowsheet Documentation  Taken 3/24/2024 1600 by Christina Patterson RN  Safety Promotion/Fall Prevention:   assistive device/personal items within reach   nonskid shoes/slippers when out of bed   safety round/check completed  Goal: Optimal Comfort and Wellbeing  Intervention: Monitor Pain and Promote Comfort  Recent Flowsheet Documentation  Taken 3/24/2024 1702 by Christina Patterson RN  Pain Management Interventions: medication (see MAR)     Problem: Pain Acute  Goal: Optimal Pain Control and Function  Intervention: Develop Pain Management Plan  Recent Flowsheet Documentation  Taken 3/24/2024 1702 by Christina Patterson RN  Pain Management Interventions: medication (see MAR)  Intervention: Prevent or Manage Pain  Recent Flowsheet Documentation  Taken 3/24/2024 1600 by Christina Patterson RN  Medication Review/Management: medications reviewed  Intervention: Optimize Psychosocial Wellbeing  Recent Flowsheet Documentation  Taken 3/24/2024 1600 by Christina Patterson RN  Supportive Measures:   active listening utilized   goal-setting facilitated   positive reinforcement provided   relaxation techniques promoted   verbalization of feelings encouraged     Problem: Comorbidity Management  Goal: Blood Glucose Levels Within Targeted Range  Intervention: Monitor and Manage Glycemia  Recent Flowsheet Documentation  Taken 3/24/2024 1600 by Christina Patterson RN  Medication Review/Management: medications reviewed     Problem: Anxiety  Goal: Anxiety Reduction or Resolution  Intervention: Promote Anxiety Reduction  Recent Flowsheet Documentation  Taken 3/24/2024 1600 by Christina Patterson RN  Supportive Measures:   active listening utilized   goal-setting facilitated   positive reinforcement provided   relaxation techniques promoted   verbalization of feelings encouraged   Goal Outcome Evaluation:            Pt frustrated and emotional/tearful at start of shift due to not getting what she wants for dinner, wanting to leave AMA. MD notified, allergy to cheese removed from list as kitchen detects it as an overall allergy to dairy products thus limiting meal options. Explained to pt, verbalized understanding. Pt has calmed down since, cooperative with care, good appetite for dinner. High blood sugars needing insulin coverage. Pt concerned of blood sugar levels and how it might also affect infection but still insisting to stay on regular diet instead of carb controlled diet. Mild to moderate anxiety, pain in LLE, stable and controlled with meds and comfort measures.  Independent in room.

## 2024-03-25 NOTE — PLAN OF CARE
Physical Therapy Discharge Summary    Reason for therapy discharge:    Patient/family request discontinuation of services.    Progress towards therapy goal(s). See goals on Care Plan in Epic electronic health record for goal details.  Goals partially met.  Barriers to achieving goals:   patient declined further pT services.    Therapy recommendation(s):    Recommend ambulation with nursing staff while remains in hospital.

## 2024-03-25 NOTE — PROGRESS NOTES
"[facility]    Progress Note - Hospitalist Service       Date of Admission:  3/22/2024    Assessment & Plan   Mary Ann Olson is a 58 year old female with PMHx of Type 2 DM insulin dependent, GERD, COPD, HLD, presenting with cellulitis worsening on oral antibiotics admitted on 3/22/2024 for IV antibiotics.      Left lower leg cellulitis  Initially diagnosed with LLE cellulitis in ED on 3/18 and was started on amoxicillin and doxycycline. Noted in clinic to have signficant worsening of erythema despite PO antibiotics and was admitted on 3/22 for IV antibiotics. Normal WBC. POCUS in ED with no evidence of abscess. Started on IV vancomycin. Initial improvement of erythema and pain but then worsening.  IV extravasated 3/24 AM.   - no response to IV vancomycin (3/22 - 3/25))  - Started Clindamycin 600mg [id number] on 3/25  - Wound cultures collected 3/24 NGTD  - Blood cultures 3/18 with NGTD     Type 2 DM   Last A1c was 12.2 on 1/19/24.  Currently on liraglutide, metformin, and 15U NPH BID. Patient adamant about not doing a carb consistent diet. Educated on importance of BG control while treating infection. Patient threatened to leave AMA if we did not give a regular diet, so this was ordered.   - NPH 12u BID  - Medium resistance sliding scale insulin  - Held metformin  - Held liraglutide      Chronic meds  - continue PTA inhalers  - continue PTA atorvastatin  - continue PTA omeprazole  - continue PTA CaCarbonate-Vit D      Diet: Regular Diet Adult    DVT Prophylaxis: Enoxaparin (Lovenox) SQ  Beckwith Catheter: Not present  Lines: None     Cardiac Monitoring: None  Code Status: Full Code     Clinically Significant Risk Factors                        # DMII: A1C = 12.2 % (Ref range: 0.0 - 5.6 %) within past 6 months, PRESENT ON ADMISSION  # Obesity: Estimated body mass index is 34.48 kg/m  as calculated from the following:    Height as of this encounter: 1.562 m (5' 1.5\").    Weight as of this " encounter: 84.1 kg (185 lb 8 oz)., PRESENT ON ADMISSION     # Financial/Environmental Concerns:           Disposition Plan      Expected Discharge Date: 03/26/2024    Discharge Delays: IV Medication - consider oral or Home Infusion  Destination: home with help/services          The patient's care was discussed with the Attending Physician, Dr. Arguello .    Miguel Bell MD  Hospitalist Service  [facility]  Securely message with [employer] (more info)  Text page via [employer] Paging/Directory   ______________________________________________________________________    Interval History   Vitally stable, does not appear systemically ill. LLE erythema is spreading and is now involving most of the calf up to almost the knee (see media tab for picture)    Physical Exam   Vital Signs: Temp: 98.3  F (36.8  C) Temp src: Oral BP: 123/59 Pulse: 90   Resp: 16 SpO2: 96 % O2 Device: None (Room air)    Weight: 185 lbs 8 oz    Constitutional:  Lying comfortably in bed, cooperative, and appears stated age  Eyes: Lids and lashes normal, no obvious abnormalities   ENT: without obvious abnormality,  oral pharynx with moist mucous membranes  Respiratory: No increased work of breathing, good air exchange, clear to auscultation bilaterally, no crackles or wheezing  Cardiovascular: regular rate and rhythm, and no murmur noted  GI:  normal bowel sounds, soft, non-distended, non-tender  Skin: area of erythema on the majority of the back of her left lower leg with purulent ulcer noted in the center; exquisitely tender to palpation; erythema and edema significantly worse than admission. (See media tab for pictures)  Neurologic: Awake, alert, oriented         Data     I have personally reviewed the following data over the past 24 hrs:    8.2  \   9.4 (L)   / 390     N/A N/A N/A /  143 (H)   N/A N/A 0.45 (L) \

## 2024-03-25 NOTE — PLAN OF CARE
Problem: Adult Inpatient Plan of Care  Goal: Absence of Hospital-Acquired Illness or Injury  Intervention: Identify and Manage Fall Risk  Recent Flowsheet Documentation  Taken 3/25/2024 0024 by Christina Patterson RN  Safety Promotion/Fall Prevention:   assistive device/personal items within reach   nonskid shoes/slippers when out of bed   safety round/check completed  Taken 3/24/2024 1600 by Christina Patterson RN  Safety Promotion/Fall Prevention:   assistive device/personal items within reach   nonskid shoes/slippers when out of bed   safety round/check completed  Goal: Optimal Comfort and Wellbeing  Intervention: Monitor Pain and Promote Comfort  Recent Flowsheet Documentation  Taken 3/24/2024 1702 by Christina Patterson RN  Pain Management Interventions: medication (see MAR)     Problem: Pain Acute  Goal: Optimal Pain Control and Function  Intervention: Develop Pain Management Plan  Recent Flowsheet Documentation  Taken 3/24/2024 1702 by Christina Patterson RN  Pain Management Interventions: medication (see MAR)  Intervention: Prevent or Manage Pain  Recent Flowsheet Documentation  Taken 3/25/2024 0024 by Christina Patterson RN  Medication Review/Management: medications reviewed  Taken 3/24/2024 1600 by Christina Patterson RN  Medication Review/Management: medications reviewed  Intervention: Optimize Psychosocial Wellbeing  Recent Flowsheet Documentation  Taken 3/24/2024 1600 by Christina Patterson RN  Supportive Measures:   active listening utilized   goal-setting facilitated   positive reinforcement provided   relaxation techniques promoted   verbalization of feelings encouraged     Problem: Comorbidity Management  Goal: Blood Glucose Levels Within Targeted Range  Intervention: Monitor and Manage Glycemia  Recent Flowsheet Documentation  Taken 3/25/2024 0024 by Christina Patterson RN  Medication Review/Management: medications reviewed  Taken 3/24/2024 1600 by Christina Patterson RN  Medication Review/Management: medications  reviewed     Problem: Anxiety  Goal: Anxiety Reduction or Resolution  Intervention: Promote Anxiety Reduction  Recent Flowsheet Documentation  Taken 3/24/2024 1600 by Christina Patterson RN  Supportive Measures:   active listening utilized   goal-setting facilitated   positive reinforcement provided   relaxation techniques promoted   verbalization of feelings encouraged   Goal Outcome Evaluation:            Aox4, calm and cooperative. Vitals stable. Pain stable and controlled with prn tylenol. Redness and swelling, no drainage noted in LLE, continue to monitor. Independent in room.

## 2024-03-26 ENCOUNTER — APPOINTMENT (OUTPATIENT)
Dept: ULTRASOUND IMAGING | Facility: HOSPITAL | Age: 59
DRG: 603 | End: 2024-03-26
Attending: FAMILY MEDICINE
Payer: MEDICARE

## 2024-03-26 LAB
BACTERIA ABSC ANAEROBE+AEROBE CULT: ABNORMAL
CREAT SERPL-MCNC: 0.54 MG/DL (ref 0.51–0.95)
EGFRCR SERPLBLD CKD-EPI 2021: >90 ML/MIN/1.73M2
ERYTHROCYTE [DISTWIDTH] IN BLOOD BY AUTOMATED COUNT: 15.9 % (ref 10–15)
GLUCOSE BLDC GLUCOMTR-MCNC: 122 MG/DL (ref 70–99)
GLUCOSE BLDC GLUCOMTR-MCNC: 149 MG/DL (ref 70–99)
GLUCOSE BLDC GLUCOMTR-MCNC: 149 MG/DL (ref 70–99)
GLUCOSE BLDC GLUCOMTR-MCNC: 167 MG/DL (ref 70–99)
GLUCOSE BLDC GLUCOMTR-MCNC: 235 MG/DL (ref 70–99)
GRAM STAIN RESULT: ABNORMAL
GRAM STAIN RESULT: ABNORMAL
HCT VFR BLD AUTO: 31.7 % (ref 35–47)
HGB BLD-MCNC: 9.6 G/DL (ref 11.7–15.7)
HOLD SPECIMEN: NORMAL
MCH RBC QN AUTO: 23.8 PG (ref 26.5–33)
MCHC RBC AUTO-ENTMCNC: 30.3 G/DL (ref 31.5–36.5)
MCV RBC AUTO: 79 FL (ref 78–100)
PLATELET # BLD AUTO: 464 10E3/UL (ref 150–450)
RBC # BLD AUTO: 4.03 10E6/UL (ref 3.8–5.2)
WBC # BLD AUTO: 9.1 10E3/UL (ref 4–11)

## 2024-03-26 PROCEDURE — 250N000013 HC RX MED GY IP 250 OP 250 PS 637

## 2024-03-26 PROCEDURE — 99232 SBSQ HOSP IP/OBS MODERATE 35: CPT | Mod: GC

## 2024-03-26 PROCEDURE — 76882 US LMTD JT/FCL EVL NVASC XTR: CPT | Mod: LT

## 2024-03-26 PROCEDURE — 82565 ASSAY OF CREATININE: CPT | Performed by: STUDENT IN AN ORGANIZED HEALTH CARE EDUCATION/TRAINING PROGRAM

## 2024-03-26 PROCEDURE — 36415 COLL VENOUS BLD VENIPUNCTURE: CPT | Performed by: STUDENT IN AN ORGANIZED HEALTH CARE EDUCATION/TRAINING PROGRAM

## 2024-03-26 PROCEDURE — 250N000011 HC RX IP 250 OP 636

## 2024-03-26 PROCEDURE — 85027 COMPLETE CBC AUTOMATED: CPT

## 2024-03-26 PROCEDURE — 120N000001 HC R&B MED SURG/OB

## 2024-03-26 RX ADMIN — PANTOPRAZOLE SODIUM 40 MG: 40 TABLET, DELAYED RELEASE ORAL at 08:01

## 2024-03-26 RX ADMIN — FLUTICASONE FUROATE AND VILANTEROL TRIFENATATE 1 PUFF: 200; 25 POWDER RESPIRATORY (INHALATION) at 08:01

## 2024-03-26 RX ADMIN — ALBUTEROL SULFATE 2 PUFF: 90 AEROSOL, METERED RESPIRATORY (INHALATION) at 16:51

## 2024-03-26 RX ADMIN — INSULIN ASPART 1 UNITS: 100 INJECTION, SOLUTION INTRAVENOUS; SUBCUTANEOUS at 16:56

## 2024-03-26 RX ADMIN — Medication 3 MG: at 21:49

## 2024-03-26 RX ADMIN — CLINDAMYCIN IN 5 PERCENT DEXTROSE 600 MG: 12 INJECTION, SOLUTION INTRAVENOUS at 04:43

## 2024-03-26 RX ADMIN — ACETAMINOPHEN 650 MG: 325 TABLET ORAL at 16:01

## 2024-03-26 RX ADMIN — ACETAMINOPHEN 650 MG: 325 TABLET ORAL at 04:42

## 2024-03-26 RX ADMIN — IBUPROFEN 400 MG: 200 TABLET, FILM COATED ORAL at 17:03

## 2024-03-26 RX ADMIN — ACETAMINOPHEN 650 MG: 325 TABLET ORAL at 09:57

## 2024-03-26 RX ADMIN — Medication 1 TABLET: at 08:01

## 2024-03-26 RX ADMIN — Medication 1 TABLET: at 19:49

## 2024-03-26 RX ADMIN — CLINDAMYCIN IN 5 PERCENT DEXTROSE 600 MG: 12 INJECTION, SOLUTION INTRAVENOUS at 19:49

## 2024-03-26 RX ADMIN — INSULIN ASPART 1 UNITS: 100 INJECTION, SOLUTION INTRAVENOUS; SUBCUTANEOUS at 08:03

## 2024-03-26 RX ADMIN — CLINDAMYCIN IN 5 PERCENT DEXTROSE 600 MG: 12 INJECTION, SOLUTION INTRAVENOUS at 11:51

## 2024-03-26 RX ADMIN — HUMAN INSULIN 12 UNITS: 100 INJECTION, SUSPENSION SUBCUTANEOUS at 21:15

## 2024-03-26 RX ADMIN — ENOXAPARIN SODIUM 40 MG: 40 INJECTION SUBCUTANEOUS at 21:15

## 2024-03-26 RX ADMIN — ATORVASTATIN CALCIUM 40 MG: 40 TABLET, FILM COATED ORAL at 08:01

## 2024-03-26 RX ADMIN — Medication 1 LOZENGE: at 18:50

## 2024-03-26 RX ADMIN — Medication 1 LOZENGE: at 21:49

## 2024-03-26 RX ADMIN — ALBUTEROL SULFATE 2 PUFF: 90 AEROSOL, METERED RESPIRATORY (INHALATION) at 04:45

## 2024-03-26 ASSESSMENT — ACTIVITIES OF DAILY LIVING (ADL)
ADLS_ACUITY_SCORE: 27

## 2024-03-26 NOTE — PROGRESS NOTES
"[facility]    Progress Note - Hospitalist Service       Date of Admission:  3/22/2024    Assessment & Plan   Mary Ann Olson is a 58 year old female with PMHx of Type 2 DM insulin dependent, GERD, COPD, HLD, presenting with cellulitis worsening on oral antibiotics admitted on 3/22/2024 for IV antibiotics.      Left lower leg cellulitis  Initially diagnosed with LLE cellulitis in ED on 3/18 and was started on amoxicillin and doxycycline. Noted in clinic to have signficant worsening of erythema despite PO antibiotics and was admitted on 3/22 for IV antibiotics. Normal WBC. POCUS in ED with no evidence of abscess. Started on IV vancomycin. Initial improvement of erythema and pain but then worsening.  IV extravasated 3/24 AM.   - no response to IV vancomycin (3/22 - 3/25))  - Started Clindamycin 600mg [id number] on 3/25  - Wound cultures collected 3/24 NGTD  - Blood cultures 3/18 with NGTD     Type 2 DM   Last A1c was 12.2 on 1/19/24.  Currently on liraglutide, metformin, and 15U NPH BID. Patient adamant about not doing a carb consistent diet. Educated on importance of BG control while treating infection. Patient threatened to leave AMA if we did not give a regular diet, so this was ordered.   - NPH 12u BID  - Medium resistance sliding scale insulin  - Held metformin  - Held liraglutide      Chronic meds  - continue PTA inhalers  - continue PTA atorvastatin  - continue PTA omeprazole  - continue PTA CaCarbonate-Vit D      Diet: Regular Diet Adult    DVT Prophylaxis: Enoxaparin (Lovenox) SQ  Beckwith Catheter: Not present  Lines: None     Cardiac Monitoring: None  Code Status: Full Code     Clinically Significant Risk Factors                        # DMII: A1C = 12.2 % (Ref range: 0.0 - 5.6 %) within past 6 months, PRESENT ON ADMISSION  # Obesity: Estimated body mass index is 34.48 kg/m  as calculated from the following:    Height as of this encounter: 1.562 m (5' 1.5\").    Weight as of this " encounter: 84.1 kg (185 lb 8 oz)., PRESENT ON ADMISSION     # Financial/Environmental Concerns:           Disposition Plan      Expected Discharge Date: 03/26/2024    Discharge Delays: IV Medication - consider oral or Home Infusion  Destination: home with help/services          The patient's care was discussed with the Attending Physician, Dr. Arguello .    Miguel Bell MD  Hospitalist Service  [facility]  Securely message with [employer] (more info)  Text page via [employer] Paging/Directory   ______________________________________________________________________    Interval History   No acute events.  No significant changes in LLE erythema.  Still very red and tender.  No systemic symptoms, afebrile and VSS    Physical Exam   Vital Signs: Temp: 97.7  F (36.5  C) Temp src: Oral BP: 122/71 Pulse: 94   Resp: 16 SpO2: 92 % O2 Device: None (Room air)    Weight: 185 lbs 8 oz    Constitutional:  Lying comfortably in bed, cooperative, and appears stated age  Eyes: Lids and lashes normal, no obvious abnormalities   ENT: without obvious abnormality,  oral pharynx with moist mucous membranes  Respiratory: No increased work of breathing, good air exchange, clear to auscultation bilaterally, no crackles or wheezing  Cardiovascular: regular rate and rhythm, and no murmur noted  GI:  normal bowel sounds, soft, non-distended, non-tender  Skin: area of erythema on the majority of the back of her left lower leg with purulent ulcer noted in the center; exquisitely tender to palpation; erythema and edema significantly worse than admission. (See media tab for pictures)  Neurologic: Awake, alert, oriented       Data     I have personally reviewed the following data over the past 24 hrs:    9.1  \   9.6 (L)   / 464 (H)     N/A N/A N/A /  149 (H)   N/A N/A 0.54 \       Imaging results reviewed over the past 24 hrs:   No results found for this or any previous visit (from the past 24 hour(s)).

## 2024-03-26 NOTE — PLAN OF CARE
Problem: Pain Acute  Goal: Optimal Pain Control and Function  Outcome: Progressing  Intervention: Develop Pain Management Plan  Recent Flowsheet Documentation  Taken 3/26/2024 0442 by Venita Reyes RN  Pain Management Interventions:   medication (see MAR)   pillow support provided   repositioned  Taken 3/26/2024 0055 by Venita Reyes RN  Pain Management Interventions: declines  Intervention: Prevent or Manage Pain  Recent Flowsheet Documentation  Taken 3/26/2024 0055 by Venita Reyes RN  Medication Review/Management: medications reviewed  Intervention: Optimize Psychosocial Wellbeing  Recent Flowsheet Documentation  Taken 3/26/2024 0055 by Venita Reyes RN  Supportive Measures: active listening utilized     Problem: Infection  Goal: Absence of Infection Signs and Symptoms  Outcome: Progressing   Goal Outcome Evaluation:  No new issues overnight.  Pt irritated about multiple interruptions in the night.  Reports chronic pain and requested tylenol for pain.  Left leg remains reddened, swollen and progressing past the outline.  Clindamycin infused overnight.  Rescue inhaler used x1 to help with shortness of breath.

## 2024-03-26 NOTE — PLAN OF CARE
Patient is A&Ox4. Her pain is controlled with oral pain meds. Only able to get pain down to a 4.  She is having arthritis pain in back and neck. Up with standby assist.  Problem: Adult Inpatient Plan of Care  Goal: Plan of Care Review  Description: The Plan of Care Review/Shift note should be completed every shift.  The Outcome Evaluation is a brief statement about your assessment that the patient is improving, declining, or no change.  This information will be displayed automatically on your shift  note.  Outcome: Progressing  Goal: Optimal Comfort and Wellbeing  Outcome: Progressing  Intervention: Monitor Pain and Promote Comfort  Recent Flowsheet Documentation  Taken 3/25/2024 1540 by Carley Eugene RN  Pain Management Interventions: medication (see MAR)     Problem: Pain Acute  Goal: Optimal Pain Control and Function  Outcome: Progressing  Intervention: Develop Pain Management Plan  Recent Flowsheet Documentation  Taken 3/25/2024 1540 by Craley Eugene RN  Pain Management Interventions: medication (see MAR)  Intervention: Prevent or Manage Pain  Recent Flowsheet Documentation  Taken 3/25/2024 1800 by Carley Eugene RN  Medication Review/Management: medications reviewed  Intervention: Optimize Psychosocial Wellbeing  Recent Flowsheet Documentation  Taken 3/25/2024 1800 by Carley Eugene RN  Supportive Measures:   active listening utilized   goal-setting facilitated   positive reinforcement provided   relaxation techniques promoted   verbalization of feelings encouraged     Problem: Infection  Goal: Absence of Infection Signs and Symptoms  Outcome: Progressing     Problem: Comorbidity Management  Goal: Blood Glucose Levels Within Targeted Range  Outcome: Progressing  Intervention: Monitor and Manage Glycemia  Recent Flowsheet Documentation  Taken 3/25/2024 1800 by Carley Eugene RN  Medication Review/Management: medications reviewed     Problem: Anxiety  Goal: Anxiety Reduction or Resolution  Outcome:  Progressing  Intervention: Promote Anxiety Reduction  Recent Flowsheet Documentation  Taken 3/25/2024 1800 by Carley Eugene RN  Supportive Measures:   active listening utilized   goal-setting facilitated   positive reinforcement provided   relaxation techniques promoted   verbalization of feelings encouraged     Problem: Adult Inpatient Plan of Care  Goal: Absence of Hospital-Acquired Illness or Injury  Intervention: Identify and Manage Fall Risk  Recent Flowsheet Documentation  Taken 3/25/2024 1800 by Carley Eugene RN  Safety Promotion/Fall Prevention: activity supervised  Intervention: Prevent Skin Injury  Recent Flowsheet Documentation  Taken 3/25/2024 2200 by Carley Eugene RN  Body Position: right  Taken 3/25/2024 2000 by Carley Eugene RN  Body Position: sitting up in bed  Taken 3/25/2024 1800 by Carley Eugene RN  Body Position: position changed independently  Taken 3/25/2024 1540 by Carley Eugene RN  Body Position: position changed independently  Intervention: Prevent and Manage VTE (Venous Thromboembolism) Risk  Recent Flowsheet Documentation  Taken 3/25/2024 1800 by Carley Eugene RN  VTE Prevention/Management: SCDs (sequential compression devices) off     Problem: Comorbidity Management  Goal: Maintenance of COPD Symptom Control  Intervention: Maintain COPD Symptom Control  Recent Flowsheet Documentation  Taken 3/25/2024 1800 by Carley Eugene RN  Supportive Measures:   active listening utilized   goal-setting facilitated   positive reinforcement provided   relaxation techniques promoted   verbalization of feelings encouraged  Medication Review/Management: medications reviewed   Goal Outcome Evaluation:

## 2024-03-26 NOTE — PROGRESS NOTES
"Care Management Follow Up    Length of Stay (days): 4    Expected Discharge Date: 03/27/2024     Concerns to be Addressed: medical progression   Patient plan of care discussed at interdisciplinary rounds: Yes    Anticipated Discharge Disposition: home      Anticipated Discharge Services:    Anticipated Discharge DME:      Patient/family educated on Medicare website which has current facility and service quality ratings:    Education Provided on the Discharge Plan:    Patient/Family in Agreement with the Plan:      Referrals Placed by CM/SW:  none at this time  Private pay costs discussed: Not applicable    Additional Information:  Social History per previous CM note:   \"Patient lives in a low income 4 plex apartment where they offer services to assist with transportation. [employer] associated with the apartment \"and I can call them to help me with certain things\".  She lives alone and is independent with her ADLs and most of her IADLs. She reports that she did have housekeeping services through her CAD[employer] waiver but recently fired the person for using to much chemicals around the home.  ILS worker Tanya (did not know last name) [phone]. Patient uses no DME. [employer] to transport home.  [phone].\"     Therapy recommendation is home and pt also declines home care.     3/26 per provider update pt is not medically ready to discharge. Still working up for the cellulitis and pt remains in IV ABX.    RNCM to follow for medical progression, recommendations, and final discharge plan.      Krupa Bran RN      "

## 2024-03-27 LAB
CREAT SERPL-MCNC: 0.48 MG/DL (ref 0.51–0.95)
EGFRCR SERPLBLD CKD-EPI 2021: >90 ML/MIN/1.73M2
ERYTHROCYTE [DISTWIDTH] IN BLOOD BY AUTOMATED COUNT: 15.6 % (ref 10–15)
GLUCOSE BLDC GLUCOMTR-MCNC: 120 MG/DL (ref 70–99)
GLUCOSE BLDC GLUCOMTR-MCNC: 145 MG/DL (ref 70–99)
GLUCOSE BLDC GLUCOMTR-MCNC: 159 MG/DL (ref 70–99)
GLUCOSE BLDC GLUCOMTR-MCNC: 169 MG/DL (ref 70–99)
GLUCOSE BLDC GLUCOMTR-MCNC: 255 MG/DL (ref 70–99)
HCT VFR BLD AUTO: 30.8 % (ref 35–47)
HGB BLD-MCNC: 9.2 G/DL (ref 11.7–15.7)
MCH RBC QN AUTO: 23.7 PG (ref 26.5–33)
MCHC RBC AUTO-ENTMCNC: 29.9 G/DL (ref 31.5–36.5)
MCV RBC AUTO: 79 FL (ref 78–100)
PLATELET # BLD AUTO: 440 10E3/UL (ref 150–450)
RBC # BLD AUTO: 3.89 10E6/UL (ref 3.8–5.2)
WBC # BLD AUTO: 7.6 10E3/UL (ref 4–11)

## 2024-03-27 PROCEDURE — 99232 SBSQ HOSP IP/OBS MODERATE 35: CPT | Mod: GC

## 2024-03-27 PROCEDURE — 36415 COLL VENOUS BLD VENIPUNCTURE: CPT | Performed by: STUDENT IN AN ORGANIZED HEALTH CARE EDUCATION/TRAINING PROGRAM

## 2024-03-27 PROCEDURE — 250N000013 HC RX MED GY IP 250 OP 250 PS 637

## 2024-03-27 PROCEDURE — 85027 COMPLETE CBC AUTOMATED: CPT

## 2024-03-27 PROCEDURE — 250N000011 HC RX IP 250 OP 636

## 2024-03-27 PROCEDURE — 82565 ASSAY OF CREATININE: CPT | Performed by: STUDENT IN AN ORGANIZED HEALTH CARE EDUCATION/TRAINING PROGRAM

## 2024-03-27 PROCEDURE — 120N000001 HC R&B MED SURG/OB

## 2024-03-27 RX ADMIN — ATORVASTATIN CALCIUM 40 MG: 40 TABLET, FILM COATED ORAL at 08:24

## 2024-03-27 RX ADMIN — Medication 1 TABLET: at 20:19

## 2024-03-27 RX ADMIN — PANTOPRAZOLE SODIUM 40 MG: 40 TABLET, DELAYED RELEASE ORAL at 08:24

## 2024-03-27 RX ADMIN — CLINDAMYCIN IN 5 PERCENT DEXTROSE 600 MG: 12 INJECTION, SOLUTION INTRAVENOUS at 20:49

## 2024-03-27 RX ADMIN — ACETAMINOPHEN 650 MG: 325 TABLET ORAL at 04:21

## 2024-03-27 RX ADMIN — FLUTICASONE FUROATE AND VILANTEROL TRIFENATATE 1 PUFF: 200; 25 POWDER RESPIRATORY (INHALATION) at 08:24

## 2024-03-27 RX ADMIN — ACETAMINOPHEN 650 MG: 325 TABLET ORAL at 09:54

## 2024-03-27 RX ADMIN — HUMAN INSULIN 12 UNITS: 100 INJECTION, SUSPENSION SUBCUTANEOUS at 20:49

## 2024-03-27 RX ADMIN — CLINDAMYCIN IN 5 PERCENT DEXTROSE 600 MG: 12 INJECTION, SOLUTION INTRAVENOUS at 04:21

## 2024-03-27 RX ADMIN — INSULIN ASPART 1 UNITS: 100 INJECTION, SOLUTION INTRAVENOUS; SUBCUTANEOUS at 11:01

## 2024-03-27 RX ADMIN — Medication 1 TABLET: at 08:24

## 2024-03-27 RX ADMIN — SALINE NASAL SPRAY 1 SPRAY: 1.5 SOLUTION NASAL at 20:52

## 2024-03-27 RX ADMIN — ACETAMINOPHEN 650 MG: 325 TABLET ORAL at 16:21

## 2024-03-27 RX ADMIN — ACETAMINOPHEN 650 MG: 325 TABLET ORAL at 20:19

## 2024-03-27 RX ADMIN — ENOXAPARIN SODIUM 40 MG: 40 INJECTION SUBCUTANEOUS at 20:19

## 2024-03-27 RX ADMIN — IBUPROFEN 400 MG: 200 TABLET, FILM COATED ORAL at 13:21

## 2024-03-27 RX ADMIN — Medication 3 MG: at 20:50

## 2024-03-27 RX ADMIN — ALBUTEROL SULFATE 2 PUFF: 90 AEROSOL, METERED RESPIRATORY (INHALATION) at 04:23

## 2024-03-27 RX ADMIN — CLINDAMYCIN IN 5 PERCENT DEXTROSE 600 MG: 12 INJECTION, SOLUTION INTRAVENOUS at 12:01

## 2024-03-27 RX ADMIN — INSULIN ASPART 1 UNITS: 100 INJECTION, SOLUTION INTRAVENOUS; SUBCUTANEOUS at 08:25

## 2024-03-27 RX ADMIN — SALINE NASAL SPRAY 1 SPRAY: 1.5 SOLUTION NASAL at 20:48

## 2024-03-27 ASSESSMENT — ACTIVITIES OF DAILY LIVING (ADL)
ADLS_ACUITY_SCORE: 27
ADLS_ACUITY_SCORE: 28
ADLS_ACUITY_SCORE: 27
ADLS_ACUITY_SCORE: 27
ADLS_ACUITY_SCORE: 28
ADLS_ACUITY_SCORE: 27
ADLS_ACUITY_SCORE: 27
ADLS_ACUITY_SCORE: 28
ADLS_ACUITY_SCORE: 27
ADLS_ACUITY_SCORE: 28
ADLS_ACUITY_SCORE: 28
ADLS_ACUITY_SCORE: 27
ADLS_ACUITY_SCORE: 29
ADLS_ACUITY_SCORE: 28
ADLS_ACUITY_SCORE: 28

## 2024-03-27 NOTE — PLAN OF CARE
Problem: Adult Inpatient Plan of Care  Goal: Optimal Comfort and Wellbeing  Intervention: Monitor Pain and Promote Comfort  Recent Flowsheet Documentation  Taken 3/27/2024 1039 by Felicity Orta RN  Pain Management Interventions:   care clustered   distraction   emotional support   pillow support provided   quiet environment facilitated   rest  Taken 3/27/2024 0954 by Felicity Orta RN  Pain Management Interventions: medication (see MAR)  Taken 3/27/2024 0824 by Felicity Orta RN  Pain Management Interventions:   care clustered   distraction   emotional support   food   pillow support provided   quiet environment facilitated   rest     Problem: Pain Acute  Goal: Optimal Pain Control and Function  Intervention: Develop Pain Management Plan  Recent Flowsheet Documentation  Taken 3/27/2024 1039 by Felicity Orta RN  Pain Management Interventions:   care clustered   distraction   emotional support   pillow support provided   quiet environment facilitated   rest  Taken 3/27/2024 0954 by Felicity Orta RN  Pain Management Interventions: medication (see MAR)  Taken 3/27/2024 0824 by Felicity Orta RN  Pain Management Interventions:   care clustered   distraction   emotional support   food   pillow support provided   quiet environment facilitated   rest  Intervention: Prevent or Manage Pain  Recent Flowsheet Documentation  Taken 3/27/2024 0824 by Felicity Orta RN  Medication Review/Management: medications reviewed  Intervention: Optimize Psychosocial Wellbeing  Recent Flowsheet Documentation  Taken 3/27/2024 0824 by Felicity Orta RN  Supportive Measures: active listening utilized     Problem: Comorbidity Management  Goal: Maintenance of COPD Symptom Control  Intervention: Maintain COPD Symptom Control  Recent Flowsheet Documentation  Taken 3/27/2024 0824 by Felicity Orta RN  Supportive Measures: active listening utilized  Medication Review/Management: medications  reviewed     Problem: Comorbidity Management  Goal: Maintenance of Behavioral Health Symptom Control  Intervention: Maintain Behavioral Health Symptom Control  Recent Flowsheet Documentation  Taken 3/27/2024 0661 by Felicity Orta RN  Medication Review/Management: medications reviewed   Goal Outcome Evaluation:  Patient alert and oriented x4. Moving independently in the room. Ambulating in hallway. Vitals stable on room air. IV antibiotics. Redness and swelling in LLE. Regular diet with adequate intake. Blood sugars stable. Pain in LLE managed using PRN tylenol and PRN ibuprofen. Saline locked.

## 2024-03-27 NOTE — PLAN OF CARE
Patient is A&Ox4. She rotates Tylenol and Ibuprofen for pain. Up to bathroom with standby assist. Patient did not want to go to ultrasound because it was getting too late. After some encouragement she went to get the ultrasound.   Problem: Adult Inpatient Plan of Care  Goal: Optimal Comfort and Wellbeing  Outcome: Progressing  Intervention: Monitor Pain and Promote Comfort  Recent Flowsheet Documentation  Taken 3/26/2024 1703 by Carley Eugene, RN  Pain Management Interventions:   care clustered   distraction   emotional support   pillow support provided   quiet environment facilitated   rest  Goal: Readiness for Transition of Care  Outcome: Progressing     Problem: Pain Acute  Goal: Optimal Pain Control and Function  Outcome: Progressing  Intervention: Develop Pain Management Plan  Recent Flowsheet Documentation  Taken 3/26/2024 1703 by Carley Eugene RN  Pain Management Interventions:   care clustered   distraction   emotional support   pillow support provided   quiet environment facilitated   rest  Intervention: Prevent or Manage Pain  Recent Flowsheet Documentation  Taken 3/26/2024 1733 by Carley Eugene, RN  Medication Review/Management: medications reviewed     Problem: Comorbidity Management  Goal: Blood Glucose Levels Within Targeted Range  Outcome: Progressing  Intervention: Monitor and Manage Glycemia  Recent Flowsheet Documentation  Taken 3/26/2024 1733 by Carley Eugene, RN  Medication Review/Management: medications reviewed  Goal: Maintenance of Behavioral Health Symptom Control  Outcome: Progressing  Intervention: Maintain Behavioral Health Symptom Control  Recent Flowsheet Documentation  Taken 3/26/2024 1733 by Carley Eugene, RN  Medication Review/Management: medications reviewed     Problem: Anxiety  Goal: Anxiety Reduction or Resolution  Outcome: Progressing     Problem: Adult Inpatient Plan of Care  Goal: Absence of Hospital-Acquired Illness or Injury  Intervention: Identify and Manage Fall  Risk  Recent Flowsheet Documentation  Taken 3/26/2024 1733 by Carley Eugene RN  Safety Promotion/Fall Prevention:   increased rounding and observation   activity supervised   room near nurse's station  Intervention: Prevent Skin Injury  Recent Flowsheet Documentation  Taken 3/26/2024 1733 by Carley Eugene RN  Body Position: position changed independently  Intervention: Prevent and Manage VTE (Venous Thromboembolism) Risk  Recent Flowsheet Documentation  Taken 3/26/2024 1733 by Carley Eugene RN  VTE Prevention/Management: SCDs (sequential compression devices) off     Problem: Comorbidity Management  Goal: Maintenance of COPD Symptom Control  Intervention: Maintain COPD Symptom Control  Recent Flowsheet Documentation  Taken 3/26/2024 1733 by Carley Eugene RN  Medication Review/Management: medications reviewed   Goal Outcome Evaluation:

## 2024-03-27 NOTE — PROGRESS NOTES
"    [facility]    Progress Note - Hospitalist Service       Date of Admission:  3/22/2024    Assessment & Plan   Mary Ann Olson is a 58 year old female with PMHx of Type 2 DM insulin dependent, GERD, COPD, HLD, presenting with cellulitis worsening on oral antibiotics admitted on 3/22/2024 for IV antibiotics.      Left lower leg cellulitis  Initially diagnosed with LLE cellulitis in ED on 3/18 and was started on amoxicillin and doxycycline. Noted in clinic to have signficant worsening of erythema despite PO antibiotics and was admitted on 3/22 for IV antibiotics. Normal WBC. POCUS in ED with no evidence of abscess. Started on IV vancomycin. Initial improvement of erythema and pain but then worsening.  IV extravasated 3/24 AM.   - no response to IV vancomycin (3/22 - 3/25))  - Started Clindamycin 600mg [id number] on 3/25  - Wound cultures collected 3/24 +Staph epidermidis  - Blood cultures 3/18 with NGTD     Type 2 DM   Last A1c was 12.2 on 1/19/24.  Currently on liraglutide, metformin, and 15U NPH BID. Patient adamant about not doing a carb consistent diet. Educated on importance of BG control while treating infection. Patient threatened to leave AMA if we did not give a regular diet, so this was ordered.   - NPH 12u BID  - Medium resistance sliding scale insulin  - Held metformin  - Held liraglutide      Chronic meds  - continue PTA inhalers  - continue PTA atorvastatin  - continue PTA omeprazole  - continue PTA CaCarbonate-Vit D      Diet: Regular Diet Adult    DVT Prophylaxis: Enoxaparin (Lovenox) SQ  Beckwith Catheter: Not present  Lines: None     Cardiac Monitoring: None  Code Status: Full Code     Clinically Significant Risk Factors                        # DMII: A1C = 12.2 % (Ref range: 0.0 - 5.6 %) within past 6 months   # Obesity: Estimated body mass index is 34.48 kg/m  as calculated from the following:    Height as of this encounter: 1.562 m (5' 1.5\").    Weight as of this encounter: " 84.1 kg (185 lb 8 oz).      # Financial/Environmental Concerns:           Disposition Plan      Expected Discharge Date: 03/27/2024    Discharge Delays: IV Medication - consider oral or Home Infusion  Destination: home with help/services          The patient's care was discussed with the Attending Physician, Dr. Arguello .    Miguel Bell MD  Hospitalist Service  [facility]  Securely message with [employer] (more info)  Text page via [employer] Paging/Directory   ______________________________________________________________________    Interval History   No acute events. Erythema on LLE still significant but fading     Physical Exam   Vital Signs: Temp: 97.7  F (36.5  C) Temp src: Oral BP: 122/76 Pulse: 99   Resp: 18 SpO2: 93 % O2 Device: None (Room air)    Weight: 185 lbs 8 oz    Constitutional:  Lying comfortably in bed, cooperative, and appears stated age  Eyes: Lids and lashes normal, no obvious abnormalities   ENT: without obvious abnormality,  oral pharynx with moist mucous membranes  Respiratory: No increased work of breathing, good air exchange, clear to auscultation bilaterally, no crackles or wheezing  Cardiovascular: regular rate and rhythm, and no murmur noted  GI:  normal bowel sounds, soft, non-distended, non-tender  Skin: area of erythema on the majority of the back of her left lower leg with purulent ulcer noted in the center; exquisitely tender to palpation; erythema and edema improving. (See media tab for pictures)  Neurologic: Awake, alert, oriented       Data     I have personally reviewed the following data over the past 24 hrs:    7.6  \   9.2 (L)   / 440     N/A N/A N/A /  145 (H)   N/A N/A 0.48 (L) \       Imaging results reviewed over the past 24 hrs:   Recent Results (from the past 24 hour(s))   US Lower Extremity Non Vascular Left    Narrative    EXAM: US LOWER EXTREMITY NON VASCULAR LEFT  LOCATION: [facility]  DATE:  3/26/2024    INDICATION: Swelling redness.  eval for abscess  COMPARISON: None.  TECHNIQUE: Routine.    FINDINGS: Area of concern in the inferior left calf was examined.     There is extensive subcutaneous edema and skin thickening. No abscess. No thrombophlebitis.

## 2024-03-27 NOTE — PLAN OF CARE
Problem: Adult Inpatient Plan of Care  Goal: Optimal Comfort and Wellbeing  Outcome: Progressing  Intervention: Monitor Pain and Promote Comfort  Recent Flowsheet Documentation  Taken 3/27/2024 0421 by Veniat Reyes RN  Pain Management Interventions: medication (see MAR)     Problem: Pain Acute  Goal: Optimal Pain Control and Function  Outcome: Progressing  Intervention: Develop Pain Management Plan  Recent Flowsheet Documentation  Taken 3/27/2024 0421 by Venita Reyes RN  Pain Management Interventions: medication (see MAR)  Intervention: Prevent or Manage Pain  Recent Flowsheet Documentation  Taken 3/27/2024 0421 by Venita Reyes RN  Medication Review/Management: medications reviewed  Intervention: Optimize Psychosocial Wellbeing  Recent Flowsheet Documentation  Taken 3/27/2024 0421 by Venita Reyes RN  Supportive Measures: active listening utilized     Problem: Infection  Goal: Absence of Infection Signs and Symptoms  Outcome: Progressing   Goal Outcome Evaluation:  Pt slept well the beginning of the shift.  Continues to c/o interruptions at night.  Cares clustered as much as possible to promote rest.  Tylenol given for LLE pain.  Continues to be red and warm.  No change in erythema.  Clindamycin infused without incident.

## 2024-03-28 ENCOUNTER — APPOINTMENT (OUTPATIENT)
Dept: PHYSICAL THERAPY | Facility: HOSPITAL | Age: 59
DRG: 603 | End: 2024-03-28
Payer: MEDICARE

## 2024-03-28 LAB
ERYTHROCYTE [DISTWIDTH] IN BLOOD BY AUTOMATED COUNT: 15.7 % (ref 10–15)
GLUCOSE BLDC GLUCOMTR-MCNC: 129 MG/DL (ref 70–99)
GLUCOSE BLDC GLUCOMTR-MCNC: 147 MG/DL (ref 70–99)
GLUCOSE BLDC GLUCOMTR-MCNC: 164 MG/DL (ref 70–99)
GLUCOSE BLDC GLUCOMTR-MCNC: 97 MG/DL (ref 70–99)
HCT VFR BLD AUTO: 30.5 % (ref 35–47)
HGB BLD-MCNC: 9.2 G/DL (ref 11.7–15.7)
HOLD SPECIMEN: NORMAL
MCH RBC QN AUTO: 24 PG (ref 26.5–33)
MCHC RBC AUTO-ENTMCNC: 30.2 G/DL (ref 31.5–36.5)
MCV RBC AUTO: 80 FL (ref 78–100)
PLATELET # BLD AUTO: 463 10E3/UL (ref 150–450)
RBC # BLD AUTO: 3.83 10E6/UL (ref 3.8–5.2)
WBC # BLD AUTO: 7.1 10E3/UL (ref 4–11)

## 2024-03-28 PROCEDURE — 97535 SELF CARE MNGMENT TRAINING: CPT | Mod: GP

## 2024-03-28 PROCEDURE — 250N000013 HC RX MED GY IP 250 OP 250 PS 637

## 2024-03-28 PROCEDURE — 36415 COLL VENOUS BLD VENIPUNCTURE: CPT

## 2024-03-28 PROCEDURE — 250N000011 HC RX IP 250 OP 636: Mod: JZ

## 2024-03-28 PROCEDURE — 85027 COMPLETE CBC AUTOMATED: CPT

## 2024-03-28 PROCEDURE — 120N000001 HC R&B MED SURG/OB

## 2024-03-28 PROCEDURE — 99232 SBSQ HOSP IP/OBS MODERATE 35: CPT | Mod: GC

## 2024-03-28 RX ADMIN — IBUPROFEN 400 MG: 200 TABLET, FILM COATED ORAL at 09:23

## 2024-03-28 RX ADMIN — ACETAMINOPHEN 650 MG: 325 TABLET ORAL at 14:57

## 2024-03-28 RX ADMIN — Medication 1 TABLET: at 08:00

## 2024-03-28 RX ADMIN — INSULIN ASPART 1 UNITS: 100 INJECTION, SOLUTION INTRAVENOUS; SUBCUTANEOUS at 08:02

## 2024-03-28 RX ADMIN — Medication 1 TABLET: at 19:39

## 2024-03-28 RX ADMIN — CLINDAMYCIN IN 5 PERCENT DEXTROSE 600 MG: 12 INJECTION, SOLUTION INTRAVENOUS at 20:55

## 2024-03-28 RX ADMIN — HUMAN INSULIN 12 UNITS: 100 INJECTION, SUSPENSION SUBCUTANEOUS at 20:54

## 2024-03-28 RX ADMIN — IBUPROFEN 400 MG: 200 TABLET, FILM COATED ORAL at 01:24

## 2024-03-28 RX ADMIN — CLINDAMYCIN IN 5 PERCENT DEXTROSE 600 MG: 12 INJECTION, SOLUTION INTRAVENOUS at 12:27

## 2024-03-28 RX ADMIN — Medication 3 MG: at 20:54

## 2024-03-28 RX ADMIN — ACETAMINOPHEN 650 MG: 325 TABLET ORAL at 19:39

## 2024-03-28 RX ADMIN — ACETAMINOPHEN 650 MG: 325 TABLET ORAL at 08:32

## 2024-03-28 RX ADMIN — CLINDAMYCIN IN 5 PERCENT DEXTROSE 600 MG: 12 INJECTION, SOLUTION INTRAVENOUS at 04:29

## 2024-03-28 RX ADMIN — FLUTICASONE FUROATE AND VILANTEROL TRIFENATATE 1 PUFF: 200; 25 POWDER RESPIRATORY (INHALATION) at 08:00

## 2024-03-28 RX ADMIN — ENOXAPARIN SODIUM 40 MG: 40 INJECTION SUBCUTANEOUS at 20:55

## 2024-03-28 RX ADMIN — ACETAMINOPHEN 650 MG: 325 TABLET ORAL at 00:56

## 2024-03-28 RX ADMIN — IBUPROFEN 400 MG: 200 TABLET, FILM COATED ORAL at 20:54

## 2024-03-28 RX ADMIN — ATORVASTATIN CALCIUM 40 MG: 40 TABLET, FILM COATED ORAL at 08:00

## 2024-03-28 RX ADMIN — PANTOPRAZOLE SODIUM 40 MG: 40 TABLET, DELAYED RELEASE ORAL at 08:00

## 2024-03-28 ASSESSMENT — ACTIVITIES OF DAILY LIVING (ADL)
ADLS_ACUITY_SCORE: 28

## 2024-03-28 NOTE — PLAN OF CARE
Problem: Adult Inpatient Plan of Care  Goal: Absence of Hospital-Acquired Illness or Injury  Intervention: Identify and Manage Fall Risk  Recent Flowsheet Documentation  Taken 3/28/2024 0823 by Orta, Felicity, RN  Safety Promotion/Fall Prevention:   increased rounding and observation   activity supervised   room near nurse's station     Problem: Adult Inpatient Plan of Care  Goal: Absence of Hospital-Acquired Illness or Injury  Intervention: Prevent Skin Injury  Recent Flowsheet Documentation  Taken 3/28/2024 0823 by Felicity Orta RN  Body Position:   position changed independently   position maintained     Problem: Adult Inpatient Plan of Care  Goal: Optimal Comfort and Wellbeing  Intervention: Monitor Pain and Promote Comfort  Recent Flowsheet Documentation  Taken 3/28/2024 0923 by Felicity Orta RN  Pain Management Interventions: medication (see MAR)  Taken 3/28/2024 0832 by Felicity Orta RN  Pain Management Interventions: medication (see MAR)     Problem: Pain Acute  Goal: Optimal Pain Control and Function  Intervention: Develop Pain Management Plan  Recent Flowsheet Documentation  Taken 3/28/2024 0923 by Felicity Orta RN  Pain Management Interventions: medication (see MAR)  Taken 3/28/2024 0832 by Felicity Orta RN  Pain Management Interventions: medication (see MAR)  Intervention: Prevent or Manage Pain  Recent Flowsheet Documentation  Taken 3/28/2024 0823 by Felicity Orta RN  Sensory Stimulation Regulation: care clustered  Medication Review/Management: medications reviewed  Intervention: Optimize Psychosocial Wellbeing  Recent Flowsheet Documentation  Taken 3/28/2024 0823 by Felicity Orta RN  Supportive Measures: active listening utilized     Problem: Comorbidity Management  Goal: Maintenance of COPD Symptom Control  Intervention: Maintain COPD Symptom Control  Recent Flowsheet Documentation  Taken 3/28/2024 0823 by Felicity Orta RN  Supportive  Measures: active listening utilized  Medication Review/Management: medications reviewed     Problem: Comorbidity Management  Goal: Maintenance of Behavioral Health Symptom Control  Intervention: Maintain Behavioral Health Symptom Control  Recent Flowsheet Documentation  Taken 3/28/2024 0823 by Felicity Orta RN  Medication Review/Management: medications reviewed     Problem: Anxiety  Goal: Anxiety Reduction or Resolution  Intervention: Promote Anxiety Reduction  Recent Flowsheet Documentation  Taken 3/28/2024 0823 by Felicity Orta RN  Supportive Measures: active listening utilized   Goal Outcome Evaluation:  Patient alert and oriented x4. Moving independently in the room. Vitals stable on room air. IV antibiotics. Redness and swelling in LLE. Lymph wrap applied to LLE. Regular diet with adequate intake. Blood sugars stable. Pain in LLE managed using PRN tylenol and PRN ibuprofen. Saline locked.

## 2024-03-28 NOTE — PLAN OF CARE
Problem: Adult Inpatient Plan of Care  Goal: Absence of Hospital-Acquired Illness or Injury  Intervention: Identify and Manage Fall Risk  Recent Flowsheet Documentation  Taken 3/27/2024 1800 by Christina Patterson RN  Safety Promotion/Fall Prevention:   assistive device/personal items within reach   nonskid shoes/slippers when out of bed   room near nurse's station   safety round/check completed  Goal: Optimal Comfort and Wellbeing  Intervention: Monitor Pain and Promote Comfort  Recent Flowsheet Documentation  Taken 3/27/2024 1621 by Christina Patterson RN  Pain Management Interventions: medication (see MAR)     Problem: Pain Acute  Goal: Optimal Pain Control and Function  Intervention: Develop Pain Management Plan  Recent Flowsheet Documentation  Taken 3/27/2024 1621 by Christina Patterson RN  Pain Management Interventions: medication (see MAR)  Intervention: Prevent or Manage Pain  Recent Flowsheet Documentation  Taken 3/27/2024 1800 by Christina Patterson RN  Medication Review/Management: medications reviewed     Problem: Comorbidity Management  Goal: Blood Glucose Levels Within Targeted Range  Intervention: Monitor and Manage Glycemia  Recent Flowsheet Documentation  Taken 3/27/2024 1800 by Christina Patterson RN  Medication Review/Management: medications reviewed  Goal: Maintenance of COPD Symptom Control  Intervention: Maintain COPD Symptom Control  Recent Flowsheet Documentation  Taken 3/27/2024 1800 by Christina Patterson RN  Medication Review/Management: medications reviewed  Goal: Maintenance of Behavioral Health Symptom Control  Intervention: Maintain Behavioral Health Symptom Control  Recent Flowsheet Documentation  Taken 3/27/2024 1800 by Christina Patterson RN  Medication Review/Management: medications reviewed   Goal Outcome Evaluation:         Aox4, mild anxiety, cooperative. Pain stable and controlled with prn tylenol. LLE swelling and redness, no drainage. Vitals stable. Independent in room.

## 2024-03-28 NOTE — PLAN OF CARE
Problem: Pain Acute  Goal: Optimal Pain Control and Function  Outcome: Progressing     Problem: Infection  Goal: Absence of Infection Signs and Symptoms  Outcome: Progressing     Problem: Comorbidity Management  Goal: Maintenance of Behavioral Health Symptom Control  Outcome: Progressing     Problem: Anxiety  Goal: Anxiety Reduction or Resolution  Outcome: Progressing   Goal Outcome Evaluation:    Cares clustered to promote sleep. LLE red and swollen. Prn tylenol and ibuprofen given for pain per request. Up independently in room. Iv antibiotics given per orders.

## 2024-03-28 NOTE — PROGRESS NOTES
SPIRITUAL HEALTH SERVICES Note     Saw pt Mary Ann Olson and administered Oriental orthodox sacrament of anointing for the healing of the sick.    Fr. Rojelio Soria

## 2024-03-28 NOTE — PROGRESS NOTES
"    [facility]    Progress Note - Hospitalist Service       Date of Admission:  3/22/2024    Assessment & Plan   Mary Ann Olson is a 58 year old female with PMHx of Type 2 DM insulin dependent, GERD, COPD, HLD, presenting with cellulitis worsening on oral antibiotics admitted on 3/22/2024 for IV antibiotics.      Left lower leg cellulitis  Initially diagnosed with LLE cellulitis in ED on 3/18 and was started on amoxicillin and doxycycline. Noted in clinic to have signficant worsening of erythema despite PO antibiotics and was admitted on 3/22 for IV antibiotics. Normal WBC. POCUS in ED with no evidence of abscess. Started on IV vancomycin but switched to IV clindamycin.  Improving slowly.  - no response to IV vancomycin (3/22 - 3/25))  - Started Clindamycin 600mg [id number] on 3/25  - Wound cultures collected 3/24 +Staph epidermidis  - Blood cultures 3/18 with NGTD  - lymphedema team consulted on 3/28     Type 2 DM   Last A1c was 12.2 on 1/19/24.  Currently on liraglutide, metformin, and 15U NPH BID. Patient adamant about not doing a carb consistent diet. Educated on importance of BG control while treating infection. Patient threatened to leave AMA if we did not give a regular diet, so this was ordered.   - NPH 12u BID  - Medium resistance sliding scale insulin  - Held metformin  - Held liraglutide      Chronic meds  - continue PTA inhalers  - continue PTA atorvastatin  - continue PTA omeprazole  - continue PTA CaCarbonate-Vit D      Diet: Regular Diet Adult    DVT Prophylaxis: Enoxaparin (Lovenox) SQ  Beckwith Catheter: Not present  Lines: None     Cardiac Monitoring: None  Code Status: Full Code     Clinically Significant Risk Factors                        # DMII: A1C = 12.2 % (Ref range: 0.0 - 5.6 %) within past 6 months   # Obesity: Estimated body mass index is 34.48 kg/m  as calculated from the following:    Height as of this encounter: 1.562 m (5' 1.5\").    Weight as of this encounter: " 84.1 kg (185 lb 8 oz).      # Financial/Environmental Concerns:           Disposition Plan      Expected Discharge Date: 03/28/2024    Discharge Delays: IV Medication - consider oral or Home Infusion  Destination: home with help/services          The patient's care was discussed with the Attending Physician, Dr. Arguello .    Miguel Bell MD  Hospitalist Service  [facility]  Securely message with [employer] (more info)  Text page via [employer] Paging/Directory   ______________________________________________________________________    Interval History   No acute events.      Physical Exam   Vital Signs: Temp: 98.2  F (36.8  C) Temp src: Oral BP: 120/66 Pulse: 80   Resp: 17 SpO2: 95 % O2 Device: None (Room air)    Weight: 185 lbs 8 oz    Constitutional:  Lying comfortably in bed, cooperative, and appears stated age  Eyes: Lids and lashes normal, no obvious abnormalities   ENT: without obvious abnormality,  oral pharynx with moist mucous membranes  Respiratory: No increased work of breathing, good air exchange, clear to auscultation bilaterally, no crackles or wheezing  Cardiovascular: regular rate and rhythm, and no murmur noted  GI:  normal bowel sounds, soft, non-distended, non-tender  Skin: area of erythema on the majority of the back of her left lower leg with purulent ulcer noted in the center; exquisitely tender to palpation; erythema and edema improving. (See media tab for pictures)  Neurologic: Awake, alert, oriented       Data     I have personally reviewed the following data over the past 24 hrs:    7.1  \   9.2 (L)   / 463 (H)     N/A N/A N/A /  147 (H)   N/A N/A N/A \       Imaging results reviewed over the past 24 hrs:   No results found for this or any previous visit (from the past 24 hour(s)).

## 2024-03-29 ENCOUNTER — APPOINTMENT (OUTPATIENT)
Dept: PHYSICAL THERAPY | Facility: HOSPITAL | Age: 59
DRG: 603 | End: 2024-03-29
Payer: MEDICARE

## 2024-03-29 PROBLEM — D56.3 ALPHA THALASSEMIA SILENT CARRIER: Status: ACTIVE | Noted: 2017-10-18

## 2024-03-29 LAB
CREAT SERPL-MCNC: 0.51 MG/DL (ref 0.51–0.95)
EGFRCR SERPLBLD CKD-EPI 2021: >90 ML/MIN/1.73M2
ERYTHROCYTE [DISTWIDTH] IN BLOOD BY AUTOMATED COUNT: 15.5 % (ref 10–15)
GLUCOSE BLDC GLUCOMTR-MCNC: 111 MG/DL (ref 70–99)
GLUCOSE BLDC GLUCOMTR-MCNC: 142 MG/DL (ref 70–99)
GLUCOSE BLDC GLUCOMTR-MCNC: 163 MG/DL (ref 70–99)
GLUCOSE BLDC GLUCOMTR-MCNC: 166 MG/DL (ref 70–99)
GLUCOSE BLDC GLUCOMTR-MCNC: 170 MG/DL (ref 70–99)
HCT VFR BLD AUTO: 32.9 % (ref 35–47)
HGB BLD-MCNC: 9.7 G/DL (ref 11.7–15.7)
MCH RBC QN AUTO: 23.2 PG (ref 26.5–33)
MCHC RBC AUTO-ENTMCNC: 29.5 G/DL (ref 31.5–36.5)
MCV RBC AUTO: 79 FL (ref 78–100)
PLATELET # BLD AUTO: 550 10E3/UL (ref 150–450)
RBC # BLD AUTO: 4.18 10E6/UL (ref 3.8–5.2)
WBC # BLD AUTO: 8.2 10E3/UL (ref 4–11)

## 2024-03-29 PROCEDURE — 250N000011 HC RX IP 250 OP 636

## 2024-03-29 PROCEDURE — 250N000013 HC RX MED GY IP 250 OP 250 PS 637

## 2024-03-29 PROCEDURE — 97535 SELF CARE MNGMENT TRAINING: CPT | Mod: GP

## 2024-03-29 PROCEDURE — 82565 ASSAY OF CREATININE: CPT | Performed by: FAMILY MEDICINE

## 2024-03-29 PROCEDURE — 120N000001 HC R&B MED SURG/OB

## 2024-03-29 PROCEDURE — 85027 COMPLETE CBC AUTOMATED: CPT

## 2024-03-29 PROCEDURE — 36415 COLL VENOUS BLD VENIPUNCTURE: CPT | Performed by: FAMILY MEDICINE

## 2024-03-29 PROCEDURE — 99232 SBSQ HOSP IP/OBS MODERATE 35: CPT | Mod: GC

## 2024-03-29 RX ORDER — CLINDAMYCIN HCL 150 MG
450 CAPSULE ORAL EVERY 8 HOURS
Status: DISCONTINUED | OUTPATIENT
Start: 2024-03-30 | End: 2024-03-29

## 2024-03-29 RX ORDER — CLINDAMYCIN HCL 150 MG
300 CAPSULE ORAL EVERY 6 HOURS SCHEDULED
Status: DISCONTINUED | OUTPATIENT
Start: 2024-03-29 | End: 2024-03-29

## 2024-03-29 RX ORDER — CLINDAMYCIN HCL 150 MG
450 CAPSULE ORAL EVERY 8 HOURS
Status: DISCONTINUED | OUTPATIENT
Start: 2024-03-30 | End: 2024-03-30 | Stop reason: HOSPADM

## 2024-03-29 RX ADMIN — INSULIN ASPART 1 UNITS: 100 INJECTION, SOLUTION INTRAVENOUS; SUBCUTANEOUS at 08:40

## 2024-03-29 RX ADMIN — ACETAMINOPHEN 650 MG: 325 TABLET ORAL at 21:43

## 2024-03-29 RX ADMIN — Medication 3 MG: at 20:17

## 2024-03-29 RX ADMIN — PANTOPRAZOLE SODIUM 40 MG: 40 TABLET, DELAYED RELEASE ORAL at 08:39

## 2024-03-29 RX ADMIN — IBUPROFEN 400 MG: 200 TABLET, FILM COATED ORAL at 10:57

## 2024-03-29 RX ADMIN — ACETAMINOPHEN 650 MG: 325 TABLET ORAL at 17:26

## 2024-03-29 RX ADMIN — ATORVASTATIN CALCIUM 40 MG: 40 TABLET, FILM COATED ORAL at 08:39

## 2024-03-29 RX ADMIN — CLINDAMYCIN HYDROCHLORIDE 300 MG: 150 CAPSULE ORAL at 11:00

## 2024-03-29 RX ADMIN — FLUTICASONE FUROATE AND VILANTEROL TRIFENATATE 1 PUFF: 200; 25 POWDER RESPIRATORY (INHALATION) at 08:39

## 2024-03-29 RX ADMIN — Medication 1 TABLET: at 08:39

## 2024-03-29 RX ADMIN — ACETAMINOPHEN 650 MG: 325 TABLET ORAL at 00:25

## 2024-03-29 RX ADMIN — ACETAMINOPHEN 650 MG: 325 TABLET ORAL at 06:32

## 2024-03-29 RX ADMIN — INSULIN ASPART 1 UNITS: 100 INJECTION, SOLUTION INTRAVENOUS; SUBCUTANEOUS at 17:23

## 2024-03-29 RX ADMIN — CLINDAMYCIN IN 5 PERCENT DEXTROSE 600 MG: 12 INJECTION, SOLUTION INTRAVENOUS at 04:15

## 2024-03-29 RX ADMIN — Medication 1 TABLET: at 20:17

## 2024-03-29 RX ADMIN — ENOXAPARIN SODIUM 40 MG: 40 INJECTION SUBCUTANEOUS at 20:17

## 2024-03-29 RX ADMIN — CLINDAMYCIN HYDROCHLORIDE 300 MG: 150 CAPSULE ORAL at 17:22

## 2024-03-29 ASSESSMENT — ACTIVITIES OF DAILY LIVING (ADL)
ADLS_ACUITY_SCORE: 28

## 2024-03-29 NOTE — PROGRESS NOTES
[facility]    Progress Note - Hospitalist Service       Date of Admission:  3/22/2024    Assessment & Plan   Mary Ann Olson is a 58 year old female with PMHx of Type 2 DM insulin dependent, GERD, COPD, HLD, presenting with cellulitis worsening on oral antibiotics admitted on 3/22/2024 for IV antibiotics.     NPH to 13U twice daily on 3/29     Left lower leg cellulitis  Initially diagnosed with LLE cellulitis in ED on 3/18 and was started on amoxicillin and doxycycline. Noted in clinic to have signficant worsening of erythema despite PO antibiotics and was admitted on 3/22 for IV antibiotics. Normal WBC. POCUS in ED with no evidence of abscess. Started on IV vancomycin but switched to IV clindamycin.  Improving slowly. Transitioned to oral clindamycin on 3/29.  - no response to IV vancomycin (3/22 - 3/25))  - Started IV Clindamycin 600mg [id number] on 3/25-3/29  - Started PO Clindamycin on 3/29  - Wound cultures collected 3/24 +Staph epidermidis  - Blood cultures 3/18 with NGTD  - lymphedema team consulted on 3/28     Type 2 DM   Last A1c was 12.2 on 1/19/24.  Currently on liraglutide, metformin, and 15U NPH BID. Patient adamant about not doing a carb consistent diet. Educated on importance of BG control while treating infection. Patient threatened to leave AMA if we did not give a regular diet, so this was ordered.   - NPH 12u BID  - Medium resistance sliding scale insulin  - Held metformin  - Held liraglutide      Chronic meds  - continue PTA inhalers  - continue PTA atorvastatin  - continue PTA omeprazole  - continue PTA CaCarbonate-Vit D      Diet: Regular Diet Adult    DVT Prophylaxis: Enoxaparin (Lovenox) SQ  Beckwith Catheter: Not present  Lines: None     Cardiac Monitoring: None  Code Status: Full Code     Clinically Significant Risk Factors                        # DMII: A1C = 12.2 % (Ref range: 0.0 - 5.6 %) within past 6 months   # Obesity: Estimated body mass index is 34.3 kg/m   "as calculated from the following:    Height as of this encounter: 1.562 m (5' 1.5\").    Weight as of this encounter: 83.7 kg (184 lb 8.4 oz).      # Financial/Environmental Concerns:           Disposition Plan      Expected Discharge Date: 03/29/2024    Discharge Delays: IV Medication - consider oral or Home Infusion  Destination: home with help/services          The patient's care was discussed with the Attending Physician, Dr. Leonard .    Miguel Bell MD  Hospitalist Service  [facility]  Securely message with [employer] (more info)  Text page via [facility] Paging/Directory   ______________________________________________________________________    Interval History   No acute events. Swelling improved with wraps, slow fading of erythema    Physical Exam   Vital Signs: Temp: 98  F (36.7  C) Temp src: Oral BP: 121/72 Pulse: 82   Resp: 15 SpO2: 94 % O2 Device: None (Room air)    Weight: 184 lbs 8.4 oz    Constitutional:  Lying comfortably in bed, cooperative, and appears stated age  Eyes: Lids and lashes normal, no obvious abnormalities   ENT: without obvious abnormality,  oral pharynx with moist mucous membranes  Respiratory: No increased work of breathing, good air exchange, clear to auscultation bilaterally, no crackles or wheezing  Cardiovascular: regular rate and rhythm, and no murmur noted  GI:  normal bowel sounds, soft, non-distended, non-tender  Skin: area of erythema on the majority of the back of her left lower leg with area in the center that appears to be resolved small abscess;  tender to palpation; erythema and edema improving. (See media tab for pictures)  Neurologic: Awake, alert, oriented       Data     I have personally reviewed the following data over the past 24 hrs:    8.2  \   9.7 (L)   / 550 (H)     N/A N/A N/A /  142 (H)   N/A N/A 0.51 \       Imaging results reviewed over the past 24 hrs:   No results found for this or any previous visit (from the past 24 " hour(s)).

## 2024-03-29 NOTE — PLAN OF CARE
Goal Outcome Evaluation:      Problem: Adult Inpatient Plan of Care  Goal: Optimal Comfort and Wellbeing  Intervention: Monitor Pain and Promote Comfort  Recent Flowsheet Documentation  Taken 3/29/2024 1057 by Sofie Low RN  Pain Management Interventions: medication (see MAR)  Taken 3/29/2024 0830 by Sofie Low RN  Pain Management Interventions:   distraction   diversional activity provided   emotional support   medication offered but refused   pillow support provided   repositioned   rest     Problem: Infection  Goal: Absence of Infection Signs and Symptoms  Outcome: Progressing     Problem: Comorbidity Management  Goal: Blood Glucose Levels Within Targeted Range  Intervention: Monitor and Manage Glycemia  Recent Flowsheet Documentation  Taken 3/29/2024 0830 by Sofie Low RN  Medication Review/Management: medications reviewed       Patient is pleasant, cooperative, alert and oriented x 4.  Pain to LLE relieved with PRN ibuprofen.  Lymphedema wrap reapplied by therapy.  Up independently in the room.  Transitioned from IV to oral abx.  IV discontinued per patient request and approved by MD.  Continue plan of care.

## 2024-03-29 NOTE — PLAN OF CARE
Problem: Adult Inpatient Plan of Care  Goal: Optimal Comfort and Wellbeing  Outcome: Progressing  Intervention: Monitor Pain and Promote Comfort  Recent Flowsheet Documentation  Taken 3/29/2024 0030 by Lazara Garcia RN  Pain Management Interventions:   medication (see MAR)   pillow support provided     Problem: Pain Acute  Goal: Optimal Pain Control and Function  Outcome: Progressing  Intervention: Develop Pain Management Plan  Recent Flowsheet Documentation  Taken 3/29/2024 0030 by Lazara Garcia RN  Pain Management Interventions:   medication (see MAR)   pillow support provided     Problem: Infection  Goal: Absence of Infection Signs and Symptoms  Outcome: Progressing     Problem: Comorbidity Management  Goal: Maintenance of Behavioral Health Symptom Control  Outcome: Progressing     Problem: Anxiety  Goal: Anxiety Reduction or Resolution  Outcome: Progressing   Goal Outcome Evaluation:  Patient AOX4. Pain managed with PRN tylenol and ibuprofen. LLE wrapped with Lymph wrap. On IV ABX. Requested not to be woken up for 0200 BG check.

## 2024-03-29 NOTE — PROGRESS NOTES
"Care Management Follow Up    Length of Stay (days): 7    Expected Discharge Date: 03/30/2024     Concerns to be Addressed: transition to oral ABX and monitor for a day    Patient plan of care discussed at interdisciplinary rounds: Yes    Anticipated Discharge Disposition:  home     Anticipated Discharge Services:  none  Anticipated Discharge DME:      Patient/family educated on Medicare website which has current facility and service quality ratings:    Education Provided on the Discharge Plan:    Patient/Family in Agreement with the Plan:      Referrals Placed by CM/SW:  none  Private pay costs discussed: Not applicable    Additional Information:  Social History per previous CM note:   \"Patient lives in a low income 4 plex apartment where they offer services to assist with transportation. [employer] associated with the apartment \"and I can call them to help me with certain things\".  She lives alone and is independent with her ADLs and most of her IADLs. She reports that she did have housekeeping services through her CADI waiver but recently fired the person for using to much chemicals around the home.  ILS worker Tanya (did not know last name) [phone]. Patient uses no DME. [employer] to transport home.  [phone].\"     Therapy recommendation is home and pt also declines home care.     3/29 per provider update pt is not medically ready to discharge. plan to transition to oral ABX and monitor for one more day.    May need to help pt set up transport a discharge.    Krupa Bran RN      "

## 2024-03-29 NOTE — PROGRESS NOTES
CLINICAL NUTRITION SERVICES     Reviewed nutrition risk factors due to LOS. Pt is tolerating diet, eating 100% per nursing documentation. No nutrition issues identified at this time. RD will follow peripherally at this time, unless consulted.

## 2024-03-29 NOTE — PLAN OF CARE
Problem: Adult Inpatient Plan of Care  Goal: Absence of Hospital-Acquired Illness or Injury  Intervention: Identify and Manage Fall Risk  Recent Flowsheet Documentation  Taken 3/28/2024 1555 by Christina Patterson RN  Safety Promotion/Fall Prevention:   assistive device/personal items within reach   nonskid shoes/slippers when out of bed   safety round/check completed  Goal: Optimal Comfort and Wellbeing  Intervention: Monitor Pain and Promote Comfort  Recent Flowsheet Documentation  Taken 3/28/2024 1555 by Christina Patterson RN  Pain Management Interventions:   distraction   pillow support provided     Problem: Pain Acute  Goal: Optimal Pain Control and Function  Intervention: Develop Pain Management Plan  Recent Flowsheet Documentation  Taken 3/28/2024 1555 by Christina Patterson RN  Pain Management Interventions:   distraction   pillow support provided  Intervention: Prevent or Manage Pain  Recent Flowsheet Documentation  Taken 3/28/2024 1555 by Christina Patterson RN  Medication Review/Management: medications reviewed  Intervention: Optimize Psychosocial Wellbeing  Recent Flowsheet Documentation  Taken 3/28/2024 1555 by Christina Patterson RN  Supportive Measures:   active listening utilized   verbalization of feelings encouraged   goal-setting facilitated     Problem: Comorbidity Management  Goal: Blood Glucose Levels Within Targeted Range  Intervention: Monitor and Manage Glycemia  Recent Flowsheet Documentation  Taken 3/28/2024 1555 by Christina Patterson RN  Medication Review/Management: medications reviewed  Goal: Maintenance of COPD Symptom Control  Intervention: Maintain COPD Symptom Control  Recent Flowsheet Documentation  Taken 3/28/2024 1555 by Christina Patterson RN  Supportive Measures:   active listening utilized   verbalization of feelings encouraged   goal-setting facilitated  Medication Review/Management: medications reviewed  Goal: Maintenance of Behavioral Health Symptom Control  Intervention: Maintain  Behavioral Health Symptom Control  Recent Flowsheet Documentation  Taken 3/28/2024 1555 by Christina Patterson, RN  Medication Review/Management: medications reviewed     Problem: Anxiety  Goal: Anxiety Reduction or Resolution  Intervention: Promote Anxiety Reduction  Recent Flowsheet Documentation  Taken 3/28/2024 1555 by Christina Patterson, RN  Supportive Measures:   active listening utilized   verbalization of feelings encouraged   goal-setting facilitated   Goal Outcome Evaluation:             Aox4, mild anxiety, cooperative and pleasant. Blood sugars within desired range this shift. Pain stable and controlled with prn tylenol and ibuprofen. Lymph wrap clean/dry/intact in LLE. Independent in room.

## 2024-03-30 ENCOUNTER — APPOINTMENT (OUTPATIENT)
Dept: PHYSICAL THERAPY | Facility: HOSPITAL | Age: 59
DRG: 603 | End: 2024-03-30
Payer: MEDICARE

## 2024-03-30 VITALS
HEART RATE: 87 BPM | BODY MASS INDEX: 33.96 KG/M2 | HEIGHT: 62 IN | WEIGHT: 184.53 LBS | DIASTOLIC BLOOD PRESSURE: 71 MMHG | TEMPERATURE: 98.4 F | OXYGEN SATURATION: 95 % | SYSTOLIC BLOOD PRESSURE: 125 MMHG | RESPIRATION RATE: 18 BRPM

## 2024-03-30 LAB
ERYTHROCYTE [DISTWIDTH] IN BLOOD BY AUTOMATED COUNT: 15.5 % (ref 10–15)
GLUCOSE BLDC GLUCOMTR-MCNC: 281 MG/DL (ref 70–99)
GLUCOSE BLDC GLUCOMTR-MCNC: 85 MG/DL (ref 70–99)
HCT VFR BLD AUTO: 32 % (ref 35–47)
HGB BLD-MCNC: 9.8 G/DL (ref 11.7–15.7)
MCH RBC QN AUTO: 23.7 PG (ref 26.5–33)
MCHC RBC AUTO-ENTMCNC: 30.6 G/DL (ref 31.5–36.5)
MCV RBC AUTO: 77 FL (ref 78–100)
PLATELET # BLD AUTO: 557 10E3/UL (ref 150–450)
RBC # BLD AUTO: 4.14 10E6/UL (ref 3.8–5.2)
WBC # BLD AUTO: 8.1 10E3/UL (ref 4–11)

## 2024-03-30 PROCEDURE — 99238 HOSP IP/OBS DSCHRG MGMT 30/<: CPT | Mod: GC

## 2024-03-30 PROCEDURE — 36415 COLL VENOUS BLD VENIPUNCTURE: CPT

## 2024-03-30 PROCEDURE — 250N000013 HC RX MED GY IP 250 OP 250 PS 637

## 2024-03-30 PROCEDURE — 97535 SELF CARE MNGMENT TRAINING: CPT | Mod: GP

## 2024-03-30 PROCEDURE — 85027 COMPLETE CBC AUTOMATED: CPT

## 2024-03-30 RX ORDER — OXYCODONE HYDROCHLORIDE 5 MG/1
2.5 TABLET ORAL EVERY 6 HOURS PRN
Qty: 2 TABLET | Refills: 0 | Status: ON HOLD | OUTPATIENT
Start: 2024-03-30 | End: 2024-04-04

## 2024-03-30 RX ORDER — CLINDAMYCIN HCL 150 MG
450 CAPSULE ORAL 3 TIMES DAILY
Qty: 18 CAPSULE | Refills: 0 | Status: SHIPPED | OUTPATIENT
Start: 2024-03-30 | End: 2024-04-02

## 2024-03-30 RX ORDER — OYSTER SHELL CALCIUM WITH VITAMIN D 500; 200 MG/1; [IU]/1
1 TABLET, FILM COATED ORAL 2 TIMES DAILY
Qty: 90 TABLET | Refills: 3 | Status: SHIPPED | OUTPATIENT
Start: 2024-03-30 | End: 2024-05-24

## 2024-03-30 RX ADMIN — ATORVASTATIN CALCIUM 40 MG: 40 TABLET, FILM COATED ORAL at 09:23

## 2024-03-30 RX ADMIN — INSULIN ASPART 3 UNITS: 100 INJECTION, SOLUTION INTRAVENOUS; SUBCUTANEOUS at 07:08

## 2024-03-30 RX ADMIN — ACETAMINOPHEN 650 MG: 325 TABLET ORAL at 04:37

## 2024-03-30 RX ADMIN — PANTOPRAZOLE SODIUM 40 MG: 40 TABLET, DELAYED RELEASE ORAL at 09:24

## 2024-03-30 RX ADMIN — Medication 1 TABLET: at 09:23

## 2024-03-30 RX ADMIN — ACETAMINOPHEN 650 MG: 325 TABLET ORAL at 09:22

## 2024-03-30 RX ADMIN — CLINDAMYCIN HYDROCHLORIDE 450 MG: 150 CAPSULE ORAL at 13:49

## 2024-03-30 RX ADMIN — ALBUTEROL SULFATE 2 PUFF: 90 AEROSOL, METERED RESPIRATORY (INHALATION) at 04:44

## 2024-03-30 RX ADMIN — CLINDAMYCIN HYDROCHLORIDE 450 MG: 150 CAPSULE ORAL at 04:45

## 2024-03-30 RX ADMIN — FLUTICASONE FUROATE AND VILANTEROL TRIFENATATE 1 PUFF: 200; 25 POWDER RESPIRATORY (INHALATION) at 09:22

## 2024-03-30 ASSESSMENT — ACTIVITIES OF DAILY LIVING (ADL)
ADLS_ACUITY_SCORE: 28

## 2024-03-30 NOTE — DISCHARGE SUMMARY
"[facility]  Discharge Summary - Medicine & Pediatrics       Date of Admission:  3/22/2024  Date of Discharge:  3/30/2024  2:25 PM  Discharging Provider: Dr. Maria and Dr. Rosenstein  Discharge Service: Hospitalist Service    Discharge Diagnoses   Diagnoses of Cellulitis, unspecified cellulitis site and Type 2 diabetes mellitus without complication, without long-term current use of insulin (H) were pertinent to this visit.    Clinically Significant Risk Factors     # DMII: A1C = 12.2 % (Ref range: 0.0 - 5.6 %) within past 6 months    # Obesity: Estimated body mass index is 34.3 kg/m  as calculated from the following:    Height as of this encounter: 1.562 m (5' 1.5\").    Weight as of this encounter: 83.7 kg (184 lb 8.4 oz).       Follow-ups Needed After Discharge   Follow-up Appointments     Follow-up and recommended labs and tests       Follow up with primary care provider, Joanna Farah, or any   available resident at clinic, within 3-4 days, for hospital follow- up. No   follow up labs or test are needed.          Discharge Disposition   Discharged to home  Condition at discharge: Stable    Hospital Course   Mary Ann Olson was admitted on 3/22/2024 for cellulitis.  The following problems were addressed during her hospitalization:    Left lower leg cellulitis  Initially diagnosed in the ED on 3/18 and started on amoxicillin and doxycycline.  However, was seen in follow-up in clinic and had worsening of the erythema despite oral antibiotics and so was admitted on 3/22.  She had a normal white blood cell count and no evidence of abscess on point-of-care ultrasound.  She was started on IV vancomycin but did not have any improvement with this over a few days and so was switched to IV clindamycin on 3/25, after which she improved significantly.  She was transition to oral clindamycin on 3/29 and was discharged with 2 more days of this antibiotic to total of a 7-day course of the " clindamycin alone.  Wound cultures on 3/24 were positive for Staph epidermidis.  Blood cultures had no growth to date.  The lymphedema team was consulted and wrapped her legs which was helpful.    Type 2 Diabetes Mellitus  A1c of 12.2 on 1/19/2024.  Currently on liraglutide, metformin, 15 units of NPH twice daily.  Is not controlling her diet well at home.  She was treated with 13 units of NPH twice daily while in the hospital.  She was educated on diabetes effect on infections.  She will follow-up in clinic for ongoing management of her diabetes.    Consultations This Hospital Stay   PHARMACY TO DOSE VANCO  CARE MANAGEMENT / SOCIAL WORK IP CONSULT  PHARMACY TO DOSE VANCO  PHYSICAL THERAPY ADULT IP CONSULT  LYMPHEDEMA THERAPY IP CONSULT    Code Status   Prior       The patient was discussed with Dr. Rosenstein Kimberly Lundeen, MD  [facility], PGY-3  Contact via [employer] marcel or pager #: [phone].    ______________________________________________________________________    Physical Exam   Vital Signs: Temp: 98.4  F (36.9  C) Temp src: Oral BP: 125/71 Pulse: 87   Resp: 18 SpO2: 95 % O2 Device: None (Room air)    Weight: 184 lbs 8.4 oz  GEN: Patient sitting comfortably in no acute distress.  HEEN: Head is atraumatic, normocephalic, eyes anicteric, mucous membranes moist.  CV: Regular rate and rhythm without obvious murmurs.  PULM: Clear to auscultation bilaterally without wheezing or rales.  ABD: Soft, nontender, bowel sounds present.  NEURO: Alert and oriented x3.  No focal motor abnormalities.  Face symmetric.  PSYCH: Appropriate affect.  SKIN: No rashes, bruising, or other lesions on visible skin.  The left lower leg is wrapped with lymphedema wrap and was not fully examined today.        Primary Care Physician   Joanna Farah    Discharge Orders      Reason for your hospital stay    Cellulitis (skin infection) of your left leg     Follow-up and recommended labs and  tests     Follow up with primary care provider, Joanna Farah, or any available resident at clinic, within 3-4 days, for hospital follow- up. No follow up labs or test are needed.     Activity    Your activity upon discharge: activity as tolerated     Diet    Follow this diet upon discharge: Orders Placed This Encounter      Regular Diet Adult       Significant Results and Procedures   Most Recent 3 CBC's:  Recent Labs   Lab Test 03/30/24  0603 03/29/24  0749 03/28/24  0631   WBC 8.1 8.2 7.1   HGB 9.8* 9.7* 9.2*   MCV 77* 79 80   * 550* 463*     Most Recent 3 BMP's:  Recent Labs   Lab Test 03/30/24  1122 03/30/24  0703 03/29/24 2016 03/29/24  1144 03/29/24  0749 03/27/24  0809 03/27/24  0547 03/26/24  0702 03/26/24  0614 03/22/24  1655 03/22/24  1329 03/18/24  1643 03/12/24  1148   NA  --   --   --   --   --   --   --   --   --   --  141 138 145   POTASSIUM  --   --   --   --   --   --   --   --   --   --  4.3 3.7 4.4   CHLORIDE  --   --   --   --   --   --   --   --   --   --  105 99 104   CO2  --   --   --   --   --   --   --   --   --   --  25 24 29   BUN  --   --   --   --   --   --   --   --   --   --  11.3 10.6 10   CR  --   --   --   --  0.51  --  0.48*  --  0.54   < > 0.51 0.53 0.70   ANIONGAP  --   --   --   --   --   --   --   --   --   --  11 15 12   GHAZAL  --   --   --   --   --   --   --   --   --   --  9.8 9.6 10.4*   GLC 85 281* 170*   < >  --    < >  --    < >  --    < > 72 141* 128*    < > = values in this interval not displayed.     Most Recent Hemoglobin A1c:  Recent Labs   Lab Test 01/19/24  1635   A1C 12.2*   ,   Results for orders placed or performed during the hospital encounter of 03/22/24   US Lower Extremity Non Vascular Left    Narrative    EXAM: US LOWER EXTREMITY NON VASCULAR LEFT  LOCATION: [facility]  DATE: 3/26/2024    INDICATION: Swelling redness.  eval for abscess  COMPARISON: None.  TECHNIQUE: Routine.    FINDINGS: Area of concern in the  inferior left calf was examined.     There is extensive subcutaneous edema and skin thickening. No abscess. No thrombophlebitis.         *Note: Due to a large number of results and/or encounters for the requested time period, some results have not been displayed. A complete set of results can be found in Results Review.       Discharge Medications   Discharge Medication List as of 3/30/2024  1:43 PM        START taking these medications    Details   clindamycin (CLEOCIN) 150 MG capsule Take 3 capsules (450 mg) by mouth 3 times daily for 2 days, Disp-18 capsule, R-0, E-Prescribe      oxyCODONE (ROXICODONE) 5 MG tablet Take 0.5 tablets (2.5 mg) by mouth every 6 hours as needed for severe pain, Disp-2 tablet, R-0, E-Prescribe           CONTINUE these medications which have CHANGED    Details   Calcium Carbonate-Vitamin D 500-5 MG-MCG TABS Take 1 tablet by mouth 2 times daily, Disp-90 tablet, R-3, E-Prescribe           CONTINUE these medications which have NOT CHANGED    Details   acetaminophen (TYLENOL) 500 MG tablet Take 1-2 tablets (500-1,000 mg) by mouth every 6 hours as needed for pain, R-0, No Print Out      albuterol (PROAIR HFA/PROVENTIL HFA/VENTOLIN HFA) 108 (90 Base) MCG/ACT inhaler INHALE 2 PUFFS INTO LUNGS EVERY 4 HOURS AS NEEDED FOR SHORTNESS OF BREATH OR  WHEEZING, Disp-18 g, R-1, E-PrescribePharmacy may dispense brand covered by insurance (Proair, or proventil or ventolin or generic albuterol inhaler)      atorvastatin (LIPITOR) 40 MG tablet Take 1 tablet (40 mg) by mouth daily, Disp-90 tablet, R-3, E-Prescribe      budesonide-formoterol (SYMBICORT) 160-4.5 MCG/ACT Inhaler Inhale 2 puffs by mouth twice daily, Disp-11 g, R-3, E-Prescribe      hydrOXYzine (ATARAX) 25 MG tablet Take 1 tablet (25 mg) by mouth 3 times daily as needed for itching, Disp-20 tablet, R-0, E-Prescribe      insulin  UNIT/ML vial Inject 15 Units Subcutaneous 2 times daily, Disp-10 mL, R-1, E-Prescribe      ipratropium -  albuterol 0.5 mg/2.5 mg/3 mL (DUONEB) 0.5-2.5 (3) MG/3ML neb solution Take 1 vial (3 mLs) by nebulization 2 times daily as needed for shortness of breath, wheezing or cough, Disp-90 mL, R-3, E-Prescribe      liraglutide (VICTOZA) 18 MG/3ML solution Inject 0.6 mg Subcutaneous daily, Disp-6 mL, R-1, E-Prescribe      loratadine (CLARITIN) 10 MG tablet Take 1 tablet (10 mg) by mouth daily as needed for allergies, Disp-30 tablet, R-3, E-Prescribe      magnesium hydroxide (MILK OF MAGNESIA) 400 MG/5ML suspension Take 30 mLs by mouth 2 times daily as needed for constipation, Disp-354 mL, R-0, Local Print      melatonin 3 MG tablet Take 3 mg by mouth nightly as needed for sleep, Historical      metFORMIN (GLUCOPHAGE) 1000 MG tablet Take 1 tablet (1,000 mg) by mouth 2 times daily (with meals), Disp-180 tablet, R-3, E-Prescribe      Multiple Vitamins-Minerals (CENTRUM SILVER 50+WOMEN PO) Take 1 tablet by mouth daily, Historical      omeprazole (PRILOSEC) 20 MG DR capsule Take 1 capsule (20 mg) by mouth daily, Disp-90 capsule, R-3, E-Prescribe      vitamin D2 (ERGOCALCIFEROL) 37834 units (1250 mcg) capsule Take 1 capsule (50,000 Units) by mouth once a week, Disp-12 capsule, R-3, E-Prescribe           STOP taking these medications       albuterol (PROVENTIL) (2.5 MG/3ML) 0.083% neb solution Comments:   Reason for Stopping:         amoxicillin (AMOXIL) 500 MG capsule Comments:   Reason for Stopping:         doxycycline hyclate (VIBRAMYCIN) 100 MG capsule Comments:   Reason for Stopping:             Allergies   Allergies   Allergen Reactions    Gadolinium Derivatives Hives    Iodinated Contrast Media Hives    Azithromycin Other (See Comments) and Rash     Erythema Nodosum x2    Keflex [Cephalexin] Rash    Aspirin Other (See Comments)    Cefuroxime Itching and Other (See Comments)    Contrast Dye Hives     IV    Corylus Other (See Comments)    Diatrizoate Hives    Iodixanol Unknown    Nuts Other (See Comments)     hazelnuts     Septra [Sulfamethoxazole-Trimethoprim] Hives    Sulfa Antibiotics Hives    Metronidazole Itching and Rash    Nitrofurantoin Itching and Rash    Quinolones Rash

## 2024-03-30 NOTE — PROGRESS NOTES
Care Management Discharge Note    Discharge Date: 03/30/2024       Discharge Disposition:  home    Discharge Services:  none    Discharge DME:  none    Discharge Transportation: CM offered to set up transport but pt told CM she has a ride.    Private pay costs discussed: Not applicable    Does the patient's insurance plan have a 3 day qualifying hospital stay waiver?  No    PAS Confirmation Code:    Patient/family educated on Medicare website which has current facility and service quality ratings:      Education Provided on the Discharge Plan:    Persons Notified of Discharge Plans: yes  Patient/Family in Agreement with the Plan:      Handoff Referral Completed: Yes    Additional Information:  Pt adequate for discharge. No CM needs identified. CM will sign off.    Krupa Bran RN

## 2024-03-30 NOTE — PLAN OF CARE
Goal Outcome Evaluation:      Plan of Care Reviewed With: patient    Overall Patient Progress: no changeOverall Patient Progress: no change         Vitals:    03/28/24 2219 03/29/24 0802 03/29/24 1555 03/30/24 0437   BP: 112/63 121/72 123/70 132/78   BP Location: Right arm Right arm Right arm Right arm   Patient Position:    Semi-Brenner's   Cuff Size:    Adult Regular   Pulse: 85 82 90 94   Resp: 15 15 16 18   Temp: 97.9  F (36.6  C) 98  F (36.7  C) 97.9  F (36.6  C) 98.3  F (36.8  C)   TempSrc: Oral Oral Oral Oral   SpO2: 95% 94% 94% 93%   Weight:       Height:        Left leg pain. Left lymph wrap on. Independent In the room. Room air. Lungs clear and diminished but complained of Shortness of breath. Prn rescue inhaler given and patient wanted antibiotic this morning early and sleep until days shift. Berta Reed RN   9475 patient complained of stomach upset most likely due to oral antibiotic. Gave some water, angel crackers, and jello. Berta Reed RN

## 2024-03-30 NOTE — PLAN OF CARE
Goal Outcome Evaluation:      Problem: Adult Inpatient Plan of Care  Goal: Optimal Comfort and Wellbeing  Outcome: Adequate for Care Transition  Intervention: Monitor Pain and Promote Comfort  Recent Flowsheet Documentation  Taken 3/30/2024 4610 by Sofie Low RN  Pain Management Interventions: (lymphedema wrap removed per pt request)   medication (see MAR)   distraction   diversional activity provided   emotional support   pillow support provided   repositioned   rest     Problem: Adult Inpatient Plan of Care  Goal: Readiness for Transition of Care  Outcome: Adequate for Care Transition       Patient is pleasant, cooperative, alert and oriented x 4.  Pain to LLE relieved with PRN tylenol.  Patient requested to have lymphedema wrap removed and refused to have it replaced.  Dressing changed to left calf which has small amount of drainage from cellulitis site. Discharging home on oral abx.  Home medications given to patient, discharge instructions reviewed and all questions answered.  All belongings returned.  No concerns reported at time of discharge.

## 2024-03-30 NOTE — PLAN OF CARE
Lymphedema Discharge Summary    Reason for therapy discharge:    Discharged to home.    Progress towards therapy goal(s). See goals on Care Plan in [facility] electronic health record for goal details.  Goals partially met.  Barriers to achieving goals:   discharge from facility.    Therapy recommendation(s):    LLE edema appears primarily resolved, patient has TG size F to take home. Consider OP lymphedema if edema becomes significant.

## 2024-03-30 NOTE — PLAN OF CARE
"  Problem: Adult Inpatient Plan of Care  Goal: Plan of Care Review  Description: The Plan of Care Review/Shift note should be completed every shift.  The Outcome Evaluation is a brief statement about your assessment that the patient is improving, declining, or no change.  This information will be displayed automatically on your shift  note.  Outcome: Progressing  Goal: Patient-Specific Goal (Individualized)  Description: You can add care plan individualizations to a care plan. Examples of Individualization might be:  \"Parent requests to be called daily at 9am for status\", \"I have a hard time hearing out of my right ear\", or \"Do not touch me to wake me up as it startles  me\".  Outcome: Progressing  Goal: Absence of Hospital-Acquired Illness or Injury  Outcome: Progressing  Intervention: Identify and Manage Fall Risk  Recent Flowsheet Documentation  Taken 3/29/2024 1700 by Rolo Lopez RN  Safety Promotion/Fall Prevention: activity supervised  Intervention: Prevent and Manage VTE (Venous Thromboembolism) Risk  Recent Flowsheet Documentation  Taken 3/29/2024 1700 by Rolo Lopez RN  VTE Prevention/Management: SCDs (sequential compression devices) off  Intervention: Prevent Infection  Recent Flowsheet Documentation  Taken 3/29/2024 1700 by Rolo Lopez RN  Infection Prevention: hand hygiene promoted  Goal: Optimal Comfort and Wellbeing  Outcome: Progressing  Goal: Readiness for Transition of Care  Outcome: Progressing     Problem: Pain Acute  Goal: Optimal Pain Control and Function  Outcome: Progressing  Intervention: Prevent or Manage Pain  Recent Flowsheet Documentation  Taken 3/29/2024 1700 by Rolo Lopez RN  Bowel Elimination Promotion: adequate fluid intake promoted  Medication Review/Management: medications reviewed     Problem: Infection  Goal: Absence of Infection Signs and Symptoms  Outcome: Progressing     Problem: Comorbidity Management  Goal: Blood Glucose Levels Within Targeted " Range  Outcome: Progressing  Intervention: Monitor and Manage Glycemia  Recent Flowsheet Documentation  Taken 3/29/2024 1700 by Rolo Lopez RN  Medication Review/Management: medications reviewed  Goal: Maintenance of COPD Symptom Control  Outcome: Progressing  Intervention: Maintain COPD Symptom Control  Recent Flowsheet Documentation  Taken 3/29/2024 1700 by Rolo Lopez RN  Medication Review/Management: medications reviewed  Goal: Maintenance of Behavioral Health Symptom Control  Outcome: Progressing  Intervention: Maintain Behavioral Health Symptom Control  Recent Flowsheet Documentation  Taken 3/29/2024 1700 by Rolo Lopez RN  Medication Review/Management: medications reviewed     Problem: Anxiety  Goal: Anxiety Reduction or Resolution  Outcome: Progressing   Goal Outcome Evaluation:         Pt is alert and oriented x4, able to communicate her needs. Pain was managed with PRN acetaminophen per pt request. Pt wants to sleep early and doesn't wants to be waken up by staff. Vitas are stable and will continue to monitor.

## 2024-03-30 NOTE — PROGRESS NOTES
Patient refused vitals and assessments for night shift. MD is aware and set meds up for after 6 am.

## 2024-03-31 ENCOUNTER — TELEPHONE (OUTPATIENT)
Dept: FAMILY MEDICINE | Facility: CLINIC | Age: 59
End: 2024-03-31
Payer: MEDICARE

## 2024-03-31 NOTE — TELEPHONE ENCOUNTER
Responding to clinic page re: diarrhea in the setting of starting antibiotics. Per chart review, patient was admitted from 3/22 - 3/30 for left LE cellulitis. Discharged with two days of clindamycin (to compete 7 day course which started in the hospital).     Returned call at 1:44 PM.     HPI:   - diarrhea just started this afternoon, 2 episodes   - patient has Crohn's baseline   - no blood in stool, no melena   - no fevers, no chills   - no nausea or vomiting   - able to keep food and water down just fine     Discussed neck steps with patient.  Given that she has had just 2 episodes of loose stools and otherwise has been asymptomatic, do not think this requires urgent workup in the emergency department.  Advised that patient continues to monitor symptoms at home, focus on oral hydration, and call back tomorrow should her symptoms persist.  Patient is agreeable to this plan.  Does not sound like a IBD flare at this time given lack of blood in stool.  Patient certainly at risk for infectious colitis or C. difficile given recent hospitalization, but again given the short duration of symptoms thus far would advise patient continue to monitor the symptoms at home prior to coming in for further evaluation.    Return precautions provided including fever, chills, blood in stool, inability to maintain hydration, severe abdominal pain.    Giovanna Stout MD PGY-2  [facility]

## 2024-03-31 NOTE — TELEPHONE ENCOUNTER
Received clinic page re: ongoing diarrhea. See my telephone encounter from earlier today detailing patient history.     Returned page at 6:22 PM.     HPI:   - Patient reports two additional episodes of loose stools   - Four loose stools total throughout day   - Has now also had one episode of emesis   - Again no blood in stool, no dark tarry stool, no blood in emesis   - No fevers or chills   - Has been able to keep some food and water down     Discussed options including presentation to the emergency department versus presentation to urgent care versus being seen in clinic early this week.  Patient is hesitant to come to the emergency department or urgent care based on the fact that staff who provide her rides end their shift at 11.  Discussed that it could be appropriate to be seen in clinic this week as previously discussed given the patient has been able to keep down food, has not had bloody stool, has not had fevers.    Ultimately patient remains undecided as to where she would like to be evaluated.  Will talk with staff.  Offered again to assist her in scheduling follow-up with our clinic, she will call our clinic if this is necessary.    Return precautions reviewed including dizziness, lightheadedness, blood in stool, blood in emesis.  Patient voices understanding.    Giovanna Stout MD PGY-2  [facility]

## 2024-04-01 ENCOUNTER — PATIENT OUTREACH (OUTPATIENT)
Dept: CARE COORDINATION | Facility: CLINIC | Age: 59
End: 2024-04-01
Payer: MEDICARE

## 2024-04-01 NOTE — PROGRESS NOTES
[facility]: [facility] [place]    Background: Transitional Care Management program identified per system criteria and reviewed by [facility] team for possible outreach.    Assessment: Upon chart review, [facility] Team member will not proceed with patient outreach related to this episode of Transitional Care Management program due to reason below:    Patient has discharged to a Memory Care, Long-term Care, Assisted Living or Group Home where patient is receiving on-site support with their daily cares, including support with hospital follow up plan.    Plan: Transitional Care Management episode addressed appropriately per reason noted above.      NILSA Esteban  [facility]    *Connected Care Resource Team does NOT follow patient ongoing. Referrals are identified based on internal discharge reports and the outreach is to ensure patient has an understanding of their discharge instructions.

## 2024-04-01 NOTE — TELEPHONE ENCOUNTER
"Received another clinic page re: \"antibiotic question\".     Returned page with phone call 7:55 PM.     HPI:   - unable to get urgent care appointment due to it being a holiday   - wonders if she can stop her antibiotic due to side effect diarrhea  - however, wonders if diarrhea is resolving \" I haven't had any more episodes since we last talked\"   - discussed it is difficult for me to advise she stop her antibiotic course without seeing her and evaluating her cellulitis   -     "

## 2024-04-02 ENCOUNTER — PATIENT OUTREACH (OUTPATIENT)
Dept: CARE COORDINATION | Facility: CLINIC | Age: 59
End: 2024-04-02

## 2024-04-02 ENCOUNTER — HOSPITAL ENCOUNTER (INPATIENT)
Facility: HOSPITAL | Age: 59
LOS: 3 days | Discharge: HOME | DRG: 603 | End: 2024-04-05
Attending: EMERGENCY MEDICINE | Admitting: FAMILY MEDICINE
Payer: MEDICARE

## 2024-04-02 ENCOUNTER — OFFICE VISIT (OUTPATIENT)
Dept: FAMILY MEDICINE | Facility: CLINIC | Age: 59
End: 2024-04-02
Payer: MEDICARE

## 2024-04-02 ENCOUNTER — APPOINTMENT (OUTPATIENT)
Dept: CT IMAGING | Facility: HOSPITAL | Age: 59
DRG: 603 | End: 2024-04-02
Payer: MEDICARE

## 2024-04-02 VITALS
OXYGEN SATURATION: 94 % | HEIGHT: 61 IN | WEIGHT: 180 LBS | SYSTOLIC BLOOD PRESSURE: 135 MMHG | BODY MASS INDEX: 33.99 KG/M2 | TEMPERATURE: 98.1 F | HEART RATE: 108 BPM | DIASTOLIC BLOOD PRESSURE: 80 MMHG

## 2024-04-02 DIAGNOSIS — L03.116 CELLULITIS OF LEFT LOWER LEG: ICD-10-CM

## 2024-04-02 DIAGNOSIS — D50.9 IRON DEFICIENCY ANEMIA, UNSPECIFIED IRON DEFICIENCY ANEMIA TYPE: Primary | ICD-10-CM

## 2024-04-02 DIAGNOSIS — L03.116 CELLULITIS OF LEFT LEG: ICD-10-CM

## 2024-04-02 DIAGNOSIS — Z09 HOSPITAL DISCHARGE FOLLOW-UP: Primary | ICD-10-CM

## 2024-04-02 LAB
ANION GAP SERPL CALCULATED.3IONS-SCNC: 15 MMOL/L (ref 7–15)
BASOPHILS # BLD AUTO: 0.1 10E3/UL (ref 0–0.2)
BASOPHILS NFR BLD AUTO: 1 %
BUN SERPL-MCNC: 14.8 MG/DL (ref 8–23)
CALCIUM SERPL-MCNC: 9.9 MG/DL (ref 8.6–10)
CHLORIDE SERPL-SCNC: 101 MMOL/L (ref 98–107)
CREAT SERPL-MCNC: 0.53 MG/DL (ref 0.51–0.95)
CREAT SERPL-MCNC: 0.66 MG/DL (ref 0.51–0.95)
CRP SERPL-MCNC: 55.7 MG/L
DEPRECATED HCO3 PLAS-SCNC: 24 MMOL/L (ref 22–29)
EGFRCR SERPLBLD CKD-EPI 2021: >90 ML/MIN/1.73M2
EGFRCR SERPLBLD CKD-EPI 2021: >90 ML/MIN/1.73M2
EOSINOPHIL # BLD AUTO: 0.2 10E3/UL (ref 0–0.7)
EOSINOPHIL NFR BLD AUTO: 2 %
ERYTHROCYTE [DISTWIDTH] IN BLOOD BY AUTOMATED COUNT: 15.7 % (ref 10–15)
GLUCOSE BLDC GLUCOMTR-MCNC: 100 MG/DL (ref 70–99)
GLUCOSE SERPL-MCNC: 109 MG/DL (ref 70–99)
HCT VFR BLD AUTO: 35.3 % (ref 35–47)
HGB BLD-MCNC: 10.7 G/DL (ref 11.7–15.7)
IMM GRANULOCYTES # BLD: 0 10E3/UL
IMM GRANULOCYTES NFR BLD: 0 %
LYMPHOCYTES # BLD AUTO: 1.7 10E3/UL (ref 0.8–5.3)
LYMPHOCYTES NFR BLD AUTO: 14 %
MCH RBC QN AUTO: 23.4 PG (ref 26.5–33)
MCHC RBC AUTO-ENTMCNC: 30.3 G/DL (ref 31.5–36.5)
MCV RBC AUTO: 77 FL (ref 78–100)
MONOCYTES # BLD AUTO: 0.6 10E3/UL (ref 0–1.3)
MONOCYTES NFR BLD AUTO: 5 %
NEUTROPHILS # BLD AUTO: 10 10E3/UL (ref 1.6–8.3)
NEUTROPHILS NFR BLD AUTO: 78 %
NRBC # BLD AUTO: 0 10E3/UL
NRBC BLD AUTO-RTO: 0 /100
PLATELET # BLD AUTO: 659 10E3/UL (ref 150–450)
POTASSIUM SERPL-SCNC: 3.9 MMOL/L (ref 3.4–5.3)
PROCALCITONIN SERPL IA-MCNC: 0.15 NG/ML
RBC # BLD AUTO: 4.57 10E6/UL (ref 3.8–5.2)
SODIUM SERPL-SCNC: 140 MMOL/L (ref 135–145)
WBC # BLD AUTO: 12.6 10E3/UL (ref 4–11)

## 2024-04-02 PROCEDURE — 84145 PROCALCITONIN (PCT): CPT | Performed by: EMERGENCY MEDICINE

## 2024-04-02 PROCEDURE — 120N000001 HC R&B MED SURG/OB

## 2024-04-02 PROCEDURE — 80048 BASIC METABOLIC PNL TOTAL CA: CPT | Performed by: EMERGENCY MEDICINE

## 2024-04-02 PROCEDURE — 82565 ASSAY OF CREATININE: CPT

## 2024-04-02 PROCEDURE — 96374 THER/PROPH/DIAG INJ IV PUSH: CPT

## 2024-04-02 PROCEDURE — 250N000011 HC RX IP 250 OP 636: Performed by: FAMILY MEDICINE

## 2024-04-02 PROCEDURE — 99285 EMERGENCY DEPT VISIT HI MDM: CPT

## 2024-04-02 PROCEDURE — 250N000013 HC RX MED GY IP 250 OP 250 PS 637: Performed by: EMERGENCY MEDICINE

## 2024-04-02 PROCEDURE — 250N000013 HC RX MED GY IP 250 OP 250 PS 637

## 2024-04-02 PROCEDURE — 258N000003 HC RX IP 258 OP 636: Performed by: FAMILY MEDICINE

## 2024-04-02 PROCEDURE — 85025 COMPLETE CBC W/AUTO DIFF WBC: CPT | Performed by: EMERGENCY MEDICINE

## 2024-04-02 PROCEDURE — 86140 C-REACTIVE PROTEIN: CPT | Performed by: EMERGENCY MEDICINE

## 2024-04-02 PROCEDURE — 36415 COLL VENOUS BLD VENIPUNCTURE: CPT

## 2024-04-02 PROCEDURE — 73700 CT LOWER EXTREMITY W/O DYE: CPT | Mod: LT

## 2024-04-02 PROCEDURE — 99215 OFFICE O/P EST HI 40 MIN: CPT | Mod: GC

## 2024-04-02 PROCEDURE — 82962 GLUCOSE BLOOD TEST: CPT

## 2024-04-02 PROCEDURE — 250N000011 HC RX IP 250 OP 636: Performed by: EMERGENCY MEDICINE

## 2024-04-02 PROCEDURE — 258N000003 HC RX IP 258 OP 636

## 2024-04-02 PROCEDURE — 250N000011 HC RX IP 250 OP 636

## 2024-04-02 PROCEDURE — 36415 COLL VENOUS BLD VENIPUNCTURE: CPT | Performed by: EMERGENCY MEDICINE

## 2024-04-02 RX ORDER — IPRATROPIUM BROMIDE AND ALBUTEROL SULFATE 2.5; .5 MG/3ML; MG/3ML
1 SOLUTION RESPIRATORY (INHALATION) 2 TIMES DAILY PRN
Status: DISCONTINUED | OUTPATIENT
Start: 2024-04-02 | End: 2024-04-05 | Stop reason: HOSPADM

## 2024-04-02 RX ORDER — FLUTICASONE FUROATE AND VILANTEROL 200; 25 UG/1; UG/1
1 POWDER RESPIRATORY (INHALATION) DAILY
Status: DISCONTINUED | OUTPATIENT
Start: 2024-04-03 | End: 2024-04-05 | Stop reason: HOSPADM

## 2024-04-02 RX ORDER — ONDANSETRON 2 MG/ML
4 INJECTION INTRAMUSCULAR; INTRAVENOUS EVERY 6 HOURS PRN
Status: DISCONTINUED | OUTPATIENT
Start: 2024-04-02 | End: 2024-04-05 | Stop reason: HOSPADM

## 2024-04-02 RX ORDER — ACETAMINOPHEN 325 MG/1
650 TABLET ORAL ONCE
Status: COMPLETED | OUTPATIENT
Start: 2024-04-02 | End: 2024-04-02

## 2024-04-02 RX ORDER — LANOLIN ALCOHOL/MO/W.PET/CERES
3 CREAM (GRAM) TOPICAL
Status: DISCONTINUED | OUTPATIENT
Start: 2024-04-02 | End: 2024-04-05 | Stop reason: HOSPADM

## 2024-04-02 RX ORDER — ATORVASTATIN CALCIUM 40 MG/1
40 TABLET, FILM COATED ORAL DAILY
Status: DISCONTINUED | OUTPATIENT
Start: 2024-04-03 | End: 2024-04-05 | Stop reason: HOSPADM

## 2024-04-02 RX ORDER — LIDOCAINE 40 MG/G
CREAM TOPICAL
Status: DISCONTINUED | OUTPATIENT
Start: 2024-04-02 | End: 2024-04-05 | Stop reason: HOSPADM

## 2024-04-02 RX ORDER — AMOXICILLIN 250 MG
1 CAPSULE ORAL 2 TIMES DAILY PRN
Status: DISCONTINUED | OUTPATIENT
Start: 2024-04-02 | End: 2024-04-05 | Stop reason: HOSPADM

## 2024-04-02 RX ORDER — SODIUM CHLORIDE 9 MG/ML
INJECTION, SOLUTION INTRAVENOUS CONTINUOUS
Status: DISCONTINUED | OUTPATIENT
Start: 2024-04-02 | End: 2024-04-03

## 2024-04-02 RX ORDER — ONDANSETRON 2 MG/ML
4 INJECTION INTRAMUSCULAR; INTRAVENOUS ONCE
Status: COMPLETED | OUTPATIENT
Start: 2024-04-02 | End: 2024-04-02

## 2024-04-02 RX ORDER — DEXTROSE MONOHYDRATE 25 G/50ML
25-50 INJECTION, SOLUTION INTRAVENOUS
Status: DISCONTINUED | OUTPATIENT
Start: 2024-04-02 | End: 2024-04-05 | Stop reason: HOSPADM

## 2024-04-02 RX ORDER — AMOXICILLIN 250 MG
2 CAPSULE ORAL 2 TIMES DAILY PRN
Status: DISCONTINUED | OUTPATIENT
Start: 2024-04-02 | End: 2024-04-05 | Stop reason: HOSPADM

## 2024-04-02 RX ORDER — NICOTINE POLACRILEX 4 MG
15-30 LOZENGE BUCCAL
Status: DISCONTINUED | OUTPATIENT
Start: 2024-04-02 | End: 2024-04-05 | Stop reason: HOSPADM

## 2024-04-02 RX ORDER — ALBUTEROL SULFATE 90 UG/1
2 AEROSOL, METERED RESPIRATORY (INHALATION) EVERY 4 HOURS PRN
Status: DISCONTINUED | OUTPATIENT
Start: 2024-04-02 | End: 2024-04-05 | Stop reason: HOSPADM

## 2024-04-02 RX ORDER — CLINDAMYCIN PHOSPHATE 900 MG/50ML
900 INJECTION, SOLUTION INTRAVENOUS EVERY 8 HOURS
Status: DISCONTINUED | OUTPATIENT
Start: 2024-04-03 | End: 2024-04-04

## 2024-04-02 RX ORDER — CLINDAMYCIN PHOSPHATE 600 MG/50ML
600 INJECTION, SOLUTION INTRAVENOUS ONCE
Status: COMPLETED | OUTPATIENT
Start: 2024-04-02 | End: 2024-04-02

## 2024-04-02 RX ORDER — ACETAMINOPHEN 325 MG/1
650 TABLET ORAL EVERY 4 HOURS PRN
Status: DISCONTINUED | OUTPATIENT
Start: 2024-04-02 | End: 2024-04-05 | Stop reason: HOSPADM

## 2024-04-02 RX ORDER — ENOXAPARIN SODIUM 100 MG/ML
40 INJECTION SUBCUTANEOUS EVERY 24 HOURS
Status: DISCONTINUED | OUTPATIENT
Start: 2024-04-02 | End: 2024-04-05 | Stop reason: HOSPADM

## 2024-04-02 RX ORDER — CALCIUM CARBONATE 500 MG/1
1000 TABLET, CHEWABLE ORAL 4 TIMES DAILY PRN
Status: DISCONTINUED | OUTPATIENT
Start: 2024-04-02 | End: 2024-04-05 | Stop reason: HOSPADM

## 2024-04-02 RX ORDER — ONDANSETRON 4 MG/1
4 TABLET, ORALLY DISINTEGRATING ORAL EVERY 6 HOURS PRN
Status: DISCONTINUED | OUTPATIENT
Start: 2024-04-02 | End: 2024-04-05 | Stop reason: HOSPADM

## 2024-04-02 RX ORDER — PANTOPRAZOLE SODIUM 40 MG/1
40 TABLET, DELAYED RELEASE ORAL
Status: DISCONTINUED | OUTPATIENT
Start: 2024-04-03 | End: 2024-04-05 | Stop reason: HOSPADM

## 2024-04-02 RX ORDER — LACTOBACILLUS RHAMNOSUS GG 10B CELL
1 CAPSULE ORAL 2 TIMES DAILY
Status: DISCONTINUED | OUTPATIENT
Start: 2024-04-02 | End: 2024-04-05 | Stop reason: HOSPADM

## 2024-04-02 RX ORDER — VANCOMYCIN HYDROCHLORIDE 1 G/200ML
1000 INJECTION, SOLUTION INTRAVENOUS EVERY 12 HOURS
Status: DISCONTINUED | OUTPATIENT
Start: 2024-04-03 | End: 2024-04-03

## 2024-04-02 RX ORDER — ACETAMINOPHEN 650 MG/1
650 SUPPOSITORY RECTAL EVERY 4 HOURS PRN
Status: DISCONTINUED | OUTPATIENT
Start: 2024-04-02 | End: 2024-04-05 | Stop reason: HOSPADM

## 2024-04-02 RX ADMIN — Medication 3 MG: at 22:24

## 2024-04-02 RX ADMIN — ACETAMINOPHEN 650 MG: 325 TABLET, FILM COATED ORAL at 16:38

## 2024-04-02 RX ADMIN — CLINDAMYCIN IN 5 PERCENT DEXTROSE 600 MG: 12 INJECTION, SOLUTION INTRAVENOUS at 17:15

## 2024-04-02 RX ADMIN — SODIUM CHLORIDE, PRESERVATIVE FREE: 5 INJECTION INTRAVENOUS at 20:40

## 2024-04-02 RX ADMIN — ACETAMINOPHEN 650 MG: 325 TABLET ORAL at 20:50

## 2024-04-02 RX ADMIN — FAMOTIDINE 20 MG: 10 INJECTION, SOLUTION INTRAVENOUS at 16:34

## 2024-04-02 RX ADMIN — VANCOMYCIN HYDROCHLORIDE 1500 MG: 5 INJECTION, POWDER, LYOPHILIZED, FOR SOLUTION INTRAVENOUS at 20:41

## 2024-04-02 RX ADMIN — Medication 1 CAPSULE: at 22:30

## 2024-04-02 RX ADMIN — ENOXAPARIN SODIUM 40 MG: 40 INJECTION SUBCUTANEOUS at 20:50

## 2024-04-02 ASSESSMENT — ACTIVITIES OF DAILY LIVING (ADL)
ADLS_ACUITY_SCORE: 37
ADLS_ACUITY_SCORE: 37
DEPENDENT_IADLS:: INDEPENDENT
ADLS_ACUITY_SCORE: 37

## 2024-04-02 ASSESSMENT — COLUMBIA-SUICIDE SEVERITY RATING SCALE - C-SSRS
2. HAVE YOU ACTUALLY HAD ANY THOUGHTS OF KILLING YOURSELF IN THE PAST MONTH?: NO
6. HAVE YOU EVER DONE ANYTHING, STARTED TO DO ANYTHING, OR PREPARED TO DO ANYTHING TO END YOUR LIFE?: YES
1. IN THE PAST MONTH, HAVE YOU WISHED YOU WERE DEAD OR WISHED YOU COULD GO TO SLEEP AND NOT WAKE UP?: NO

## 2024-04-02 NOTE — ED PROVIDER NOTES
EMERGENCY DEPARTMENT ENCOUNTER      NAME: Mary Ann Olson  AGE: 58 year old female  YOB: 1965  MRN: [id number]  EVALUATION DATE & TIME: No admission date for patient encounter.    PCP: Joanna Farah    ED PROVIDER: Tony Schuler M.D.      Chief Complaint   Patient presents with    Leg Pain       FINAL IMPRESSION:  1. Cellulitis of left lower leg        ED COURSE & MEDICAL DECISION MAKIN year old female presents to the Emergency Department for evaluation of left lower extremity swelling and pain.  Patient referred from clinic today due to increasing redness.  She had been hospitalized last month for cellulitis, ultimately responded to IV clindamycin and transition to p.o.  Recently completed antibiotics but there is a patch of definitely cellulitic appearing redness on her left posterior thigh.  She had previously had a negative DVT ultrasound.  Redness has expanded even a little bit beyond the borders demarcated in clinic today.  Patient is otherwise not systemically ill.  She is afebrile.  Lab evaluation obtained as below.  With no fever, I think there would be limited utility to collecting cultures again, her previous cultures have been negative.  She was started on clindamycin after discussion with on-call resident.  Patient mentions some abdominal pain, with a reassuring exam, which resolved with Tylenol and Pepcid.  She was admitted to med surg floor.    2:45 PM Met with and introduced myself to the patient. Discussed history and plan of care.   4:22 PM I rechecked and reevaluated the patient.   4:58 PM I spoke to Dr. Jose FENG, discussing patient's symptoms and further plan of care.     At the conclusion of the encounter I discussed the results of all of the tests and the disposition. The questions were answered. The patient or family acknowledged understanding and was agreeable with the care plan.       Medical Decision Making  Obtained supplemental history:Supplemental  history obtained?: Documented in chart  Reviewed external records: External records reviewed?: Other: Chart review of 4/2/24 at [facility] [place]  Care impacted by chronic illness:Diabetes, Hyperlipidemia, and Other: Cellulitis, asthma  Care significantly affected by social determinants of health:N/A  Did you consider but not order tests?: Work up considered but not performed and documented in chart, if applicable  Did you interpret images independently?: Independent interpretation of ECG and images noted in documentation, when applicable.  Consultation discussion with other provider:Did you involve another provider (consultant, , pharmacy, etc.)?: I discussed the care with another health care provider, see documentation for details.  Admit.        MEDICATIONS GIVEN IN THE EMERGENCY:  Medications   albuterol (PROVENTIL HFA/VENTOLIN HFA) inhaler (has no administration in time range)   atorvastatin (LIPITOR) tablet 40 mg (has no administration in time range)   fluticasone-vilanterol (BREO ELLIPTA) 200-25 MCG/ACT inhaler 1 puff (has no administration in time range)   ipratropium - albuterol 0.5 mg/2.5 mg/3 mL (DUONEB) neb solution 3 mL (has no administration in time range)   omeprazole (PriLOSEC) CR capsule 20 mg (has no administration in time range)   clindamycin (CLEOCIN) 900 mg in 50 mL NS intermittent infusion (has no administration in time range)   lidocaine 1 % 0.1-1 mL (has no administration in time range)   lidocaine (LMX4) cream (has no administration in time range)   sodium chloride (PF) 0.9% PF flush 3 mL (3 mLs Intracatheter $Given 4/2/24 2040)   sodium chloride (PF) 0.9% PF flush 3 mL (has no administration in time range)   senna-docusate (SENOKOT-S/PERICOLACE) 8.6-50 MG per tablet 1 tablet (has no administration in time range)     Or   senna-docusate (SENOKOT-S/PERICOLACE) 8.6-50 MG per tablet 2 tablet (has no administration in time range)   calcium carbonate (TUMS) chewable tablet  1,000 mg (has no administration in time range)   glucose gel 15-30 g (has no administration in time range)     Or   dextrose 50 % injection 25-50 mL (has no administration in time range)     Or   glucagon injection 1 mg (has no administration in time range)   enoxaparin ANTICOAGULANT (LOVENOX) injection 40 mg (40 mg Subcutaneous $Given 4/2/24 2050)   sodium chloride 0.9 % infusion ( Intravenous $New Bag 4/2/24 2040)   acetaminophen (TYLENOL) tablet 650 mg (650 mg Oral $Given 4/2/24 2050)     Or   acetaminophen (TYLENOL) Suppository 650 mg ( Rectal See Alternative 4/2/24 2050)   ondansetron (ZOFRAN ODT) ODT tab 4 mg (has no administration in time range)     Or   ondansetron (ZOFRAN) injection 4 mg (has no administration in time range)   insulin NPH injection 12 Units (has no administration in time range)   insulin NPH injection 12 Units (has no administration in time range)   insulin aspart (NovoLOG) injection (RAPID ACTING) (has no administration in time range)   insulin aspart (NovoLOG) injection (RAPID ACTING) (has no administration in time range)   vancomycin (VANCOCIN) 1,500 mg in sodium chloride 0.9 % 250 mL intermittent infusion (1,500 mg Intravenous $New Bag 4/2/24 2041)     Followed by   vancomycin (VANCOCIN) 1,000 mg in 200 mL dextrose intermittent infusion (has no administration in time range)   ondansetron (ZOFRAN) injection 4 mg (4 mg Intravenous Not Given 4/2/24 1844)   acetaminophen (TYLENOL) tablet 650 mg (650 mg Oral $Given 4/2/24 1638)   famotidine (PEPCID) injection 20 mg (20 mg Intravenous $Given 4/2/24 1634)   clindamycin (CLEOCIN) 600 mg in 50 mL D5W intermittent infusion (0 mg Intravenous Stopped 4/2/24 1815)       NEW PRESCRIPTIONS STARTED AT TODAY'S ER VISIT  New Prescriptions    No medications on file          =================================================================    HPI    Patient information was obtained from: Patient    Use of : N/A       Mary Ann Olson is a 58  year old female with a pertinent history of hyperlipidemia, type 2 diabetes, asthma, and cellulitis who presents to this ED by walk in for evaluation of leg pain.     The patient reports having cellulitis on her left leg and has been taking ABX for 2-3 weeks, but it has not been getting better. It has worsened and is red. The patient did have an episode of diarrhea and vomiting, she also raises concerns of a little red spot on her right leg. To add on, she does take insulin for her diabetes.     Per chart review patient presented to [facility] on 4/2/24 for evaluation of hospital discharge follow-up. Patient was admitted at [facility] 3/22-3/30 for LLE cellulitis that failed outpatient treatment with amoxicillin and doxycycline. She had taken IV clindamycin which was effective and was sent home with 2 more days of orals for today 7 day course. Given history of failing multiple antibiotics and no clear micro culprit, recommended patient re-present at [facility] for ongoing IV antibiotics. Unfortunately was not able to directly re-admit to [facility] inpatient service d/t bed availability so called ED provider for warm handoff and notified resident team about her likely re-admission in the coming hours.        REVIEW OF SYSTEMS   All systems reviewed and negative except as noted in HPI.    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    Antihistamines overdose, intentional self-harm, initial encounter (H)     Asthma     Bipolar 1 disorder (H) hx of bipolar listed in h and p    Bipolar 2 disorder (H)     Bipolar I disorder, single manic episode (H)     Created by Conversion  Replacement Utility updated for latest IMO load    Cellulitis 2006 left ankle, 2008 right foot    COPD (chronic obstructive pulmonary disease) (H)     Crohn's disease (H)     arthritis associated with crohn's    Diabetes mellitus (H)     Diabetes  mellitus, type 2 (H)     Fibrocystic breast     Heat stroke     when a young child     Hyperlipidemia     Idiopathic acute pancreatitis 07/14/2015    Irritable bowel syndrome     Created by Conversion     Kidney stone     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     Overdose     Pyoderma gangrenosum (H28)     2016    Sacroiliitis (H24) 2004    Skin lesion of left leg 08/27/2015    Suicidal ideation     Suicide attempt (H)     Traumatic iritis 07/21/2015    Umbilical hernia     Uncomplicated asthma        PAST SURGICAL HISTORY:  Past Surgical History:   Procedure Laterality Date    BIOPSY BREAST Left     BIOPSY OF BREAST, INCISIONAL      Description: Biopsy Breast Open;  Recorded: 10/28/2010;    HC REMOVAL OF TONSILS,<13 Y/O      Description: Tonsillectomy;  Recorded: 07/08/2009;    ZZC LIGATE FALLOPIAN TUBE      Description: Tubal Ligation;  Recorded: 07/08/2009;           CURRENT MEDICATIONS:    Current Facility-Administered Medications   Medication Dose Route Frequency Provider Last Rate Last Admin    acetaminophen (TYLENOL) tablet 650 mg  650 mg Oral Q4H PRN Sergo Doll MD   650 mg at 04/02/24 2050    Or    acetaminophen (TYLENOL) Suppository 650 mg  650 mg Rectal Q4H PRN Sergo Doll MD        albuterol (PROVENTIL HFA/VENTOLIN HFA) inhaler  2 puff Inhalation Q4H PRSergo Hunter MD        [START ON 4/3/2024] atorvastatin (LIPITOR) tablet 40 mg  40 mg Oral Daily Sergo Doll MD        calcium carbonate (TUMS) chewable tablet 1,000 mg  1,000 mg Oral 4x Daily PRN Sergo Doll MD        [START ON 4/3/2024] clindamycin (CLEOCIN) 900 mg in 50 mL NS intermittent infusion  900 mg Intravenous Q8H Sergo Doll MD        glucose gel 15-30 g  15-30 g Oral Q15 Min PRSergo Hunter MD        Or    dextrose 50 % injection 25-50 mL  25-50 mL Intravenous Q15 Min PRSergo Hunter MD        Or    glucagon injection 1 mg  1 mg Subcutaneous Q15 Min PRSergo Hunter MD         enoxaparin ANTICOAGULANT (LOVENOX) injection 40 mg  40 mg Subcutaneous Q24H Sergo Doll MD   40 mg at 04/02/24 2050    [START ON 4/3/2024] fluticasone-vilanterol (BREO ELLIPTA) 200-25 MCG/ACT inhaler 1 puff  1 puff Inhalation Daily Sergo Doll MD        [START ON 4/3/2024] insulin aspart (NovoLOG) injection (RAPID ACTING)  1-7 Units Subcutaneous TID AC Sergo Doll MD        insulin aspart (NovoLOG) injection (RAPID ACTING)  1-5 Units Subcutaneous At Bedtime Sergo Doll MD        [START ON 4/3/2024] insulin NPH injection 12 Units  12 Units Subcutaneous QAM AC Sergo Doll MD        insulin NPH injection 12 Units  12 Units Subcutaneous At Bedtime Sergo Doll MD        ipratropium - albuterol 0.5 mg/2.5 mg/3 mL (DUONEB) neb solution 3 mL  1 vial Nebulization BID PRN Sergo Doll MD        lidocaine (LMX4) cream   Topical Q1H PRN Sergo Doll MD        lidocaine 1 % 0.1-1 mL  0.1-1 mL Other Q1H PRN Sergo Doll MD        [START ON 4/3/2024] omeprazole (PriLOSEC) CR capsule 20 mg  20 mg Oral Daily Sergo Doll MD        ondansetron (ZOFRAN ODT) ODT tab 4 mg  4 mg Oral Q6H PRN Sergo Doll MD        Or    ondansetron (ZOFRAN) injection 4 mg  4 mg Intravenous Q6H PRN Sergo Doll MD        senna-docusate (SENOKOT-S/PERICOLACE) 8.6-50 MG per tablet 1 tablet  1 tablet Oral BID PRSergo Hunter MD        Or    senna-docusate (SENOKOT-S/PERICOLACE) 8.6-50 MG per tablet 2 tablet  2 tablet Oral BID PRSergo Hunter MD        sodium chloride (PF) 0.9% PF flush 3 mL  3 mL Intracatheter Q8H Sergo Doll MD   3 mL at 04/02/24 2040    sodium chloride (PF) 0.9% PF flush 3 mL  3 mL Intracatheter q1 min prn Sergo Doll MD        sodium chloride 0.9 % infusion   Intravenous Continuous Sergo Doll  mL/hr at 04/02/24 2040 New Bag at 04/02/24 2040    vancomycin (VANCOCIN) 1,500 mg in sodium chloride 0.9 % 250 mL intermittent  infusion  1,500 mg Intravenous Once Isacc Arguello MD   1,500 mg at 04/02/24 2041    Followed by    [START ON 4/3/2024] vancomycin (VANCOCIN) 1,000 mg in 200 mL dextrose intermittent infusion  1,000 mg Intravenous Q12H Isacc Arguello MD         Current Outpatient Medications   Medication Sig Dispense Refill    acetaminophen (TYLENOL) 500 MG tablet Take 1-2 tablets (500-1,000 mg) by mouth every 6 hours as needed for pain  0    albuterol (PROAIR HFA/PROVENTIL HFA/VENTOLIN HFA) 108 (90 Base) MCG/ACT inhaler INHALE 2 PUFFS INTO LUNGS EVERY 4 HOURS AS NEEDED FOR SHORTNESS OF BREATH OR  WHEEZING 18 g 1    atorvastatin (LIPITOR) 40 MG tablet Take 1 tablet (40 mg) by mouth daily 90 tablet 3    budesonide-formoterol (SYMBICORT) 160-4.5 MCG/ACT Inhaler Inhale 2 puffs by mouth twice daily 11 g 3    Calcium Carbonate-Vitamin D 500-5 MG-MCG TABS Take 1 tablet by mouth 2 times daily 90 tablet 3    hydrOXYzine (ATARAX) 25 MG tablet Take 1 tablet (25 mg) by mouth 3 times daily as needed for itching 20 tablet 0    insulin  UNIT/ML vial Inject 15 Units Subcutaneous 2 times daily 10 mL 1    ipratropium - albuterol 0.5 mg/2.5 mg/3 mL (DUONEB) 0.5-2.5 (3) MG/3ML neb solution Take 1 vial (3 mLs) by nebulization 2 times daily as needed for shortness of breath, wheezing or cough 90 mL 3    liraglutide (VICTOZA) 18 MG/3ML solution Inject 0.6 mg Subcutaneous daily 6 mL 1    loratadine (CLARITIN) 10 MG tablet Take 1 tablet (10 mg) by mouth daily as needed for allergies 30 tablet 3    magnesium hydroxide (MILK OF MAGNESIA) 400 MG/5ML suspension Take 30 mLs by mouth 2 times daily as needed for constipation 354 mL 0    melatonin 3 MG tablet Take 3 mg by mouth nightly as needed for sleep      metFORMIN (GLUCOPHAGE) 1000 MG tablet Take 1 tablet (1,000 mg) by mouth 2 times daily (with meals) 180 tablet 3    Multiple Vitamins-Minerals (CENTRUM SILVER 50+WOMEN PO) Take 1 tablet by mouth daily      omeprazole (PRILOSEC) 20 MG DR  capsule Take 1 capsule (20 mg) by mouth daily 90 capsule 3    oxyCODONE (ROXICODONE) 5 MG tablet Take 0.5 tablets (2.5 mg) by mouth every 6 hours as needed for severe pain 2 tablet 0    vitamin D2 (ERGOCALCIFEROL) 18148 units (1250 mcg) capsule Take 1 capsule (50,000 Units) by mouth once a week 12 capsule 3         ALLERGIES:  Allergies   Allergen Reactions    Gadolinium Derivatives Hives    Iodinated Contrast Media Hives    Azithromycin Other (See Comments) and Rash     Erythema Nodosum x2    Keflex [Cephalexin] Rash    Aspirin Other (See Comments)    Cefuroxime Itching and Other (See Comments)    Contrast Dye Hives     IV    Corylus Other (See Comments)    Diatrizoate Hives    Iodixanol Unknown    Nuts Other (See Comments)     hazelnuts    Septra [Sulfamethoxazole-Trimethoprim] Hives    Sulfa Antibiotics Hives    Metronidazole Itching and Rash    Nitrofurantoin Itching and Rash    Quinolones Rash       FAMILY HISTORY:  Family History   Problem Relation Age of Onset    Coronary Artery Disease Mother     Diabetes Father     Breast Cancer Maternal Grandmother     Asthma Son     Asthma Daughter     Hemophilia Other     Diabetes Type 2  Father     Factor VIII deficiency Other        SOCIAL HISTORY:   Social History     Socioeconomic History    Marital status: Single   Tobacco Use    Smoking status: Former     Packs/day: .5     Types: Cigarettes     Passive exposure: Past    Smokeless tobacco: Never    Tobacco comments:     2-3 cig/day   Vaping Use    Vaping Use: Never used   Substance and Sexual Activity    Alcohol use: No    Drug use: No     Social Determinants of Health     Financial Resource Strain: Low Risk  (12/5/2023)    Financial Resource Strain     Within the past 12 months, have you or your family members you live with been unable to get utilities (heat, electricity) when it was really needed?: No   Food Insecurity: High Risk (12/5/2023)    Food Insecurity     Within the past 12 months, did you worry that your  "food would run out before you got money to buy more?: Yes     Within the past 12 months, did the food you bought just not last and you didn t have money to get more?: Yes   Transportation Needs: High Risk (12/5/2023)    Transportation Needs     Within the past 12 months, has lack of transportation kept you from medical appointments, getting your medicines, non-medical meetings or appointments, work, or from getting things that you need?: Yes   Interpersonal Safety: Low Risk  (1/29/2024)    Interpersonal Safety     Do you feel physically and emotionally safe where you currently live?: Yes     Within the past 12 months, have you been hit, slapped, kicked or otherwise physically hurt by someone?: No     Within the past 12 months, have you been humiliated or emotionally abused in other ways by your partner or ex-partner?: No   Housing Stability: Low Risk  (12/5/2023)    Housing Stability     Do you have housing? : Yes     Are you worried about losing your housing?: No       VITALS:  BP (!) 147/75   Pulse 106   Temp 98.1  F (36.7  C) (Oral)   Resp 18   Ht 1.562 m (5' 1.5\")   Wt 81.8 kg (180 lb 6.4 oz)   SpO2 96%   BMI 33.53 kg/m      PHYSICAL EXAM    Constitutional: Chronically ill-appearing middle-age female patient, sitting in chair, no acute distress  HENT: Normocephalic, Atraumatic. Neck Supple.  Eyes: EOMI, Conjunctiva normal.  Respiratory: Breathing comfortably on room air. Speaks full sentences easily. Lungs clear to ascultation.  Cardiovascular: Normal heart rate, Regular rhythm. Trace bilateral lower extremity peripheral edema.  Abdomen: Soft, nontender  Musculoskeletal: Good range of motion in all major joints. No major deformities noted.  Integument: There is a patch of erythema warmth and cellulitic change involving the left medial lower leg and extending to involve a portion of the left calf.  There is some superficial soft tissue breakdown without any deep ulceration.  Area affected is not " circumferential.  Compartments are soft.  Neurologic: Alert & awake, Normal motor function, Normal sensory function, No focal deficits noted.   Psychiatric: Cooperative. Affect appropriate.     LAB:  All pertinent labs reviewed and interpreted.  Labs Ordered and Resulted from Time of ED Arrival to Time of ED Departure   BASIC METABOLIC PANEL - Abnormal       Result Value    Sodium 140      Potassium 3.9      Chloride 101      Carbon Dioxide (CO2) 24      Anion Gap 15      Urea Nitrogen 14.8      Creatinine 0.66      GFR Estimate >90      Calcium 9.9      Glucose 109 (*)    CRP INFLAMMATION - Abnormal    CRP Inflammation 55.70 (*)    CBC WITH PLATELETS AND DIFFERENTIAL - Abnormal    WBC Count 12.6 (*)     RBC Count 4.57      Hemoglobin 10.7 (*)     Hematocrit 35.3      MCV 77 (*)     MCH 23.4 (*)     MCHC 30.3 (*)     RDW 15.7 (*)     Platelet Count 659 (*)     % Neutrophils 78      % Lymphocytes 14      % Monocytes 5      % Eosinophils 2      % Basophils 1      % Immature Granulocytes 0      NRBCs per 100 WBC 0      Absolute Neutrophils 10.0 (*)     Absolute Lymphocytes 1.7      Absolute Monocytes 0.6      Absolute Eosinophils 0.2      Absolute Basophils 0.1      Absolute Immature Granulocytes 0.0      Absolute NRBCs 0.0     PROCALCITONIN - Normal    Procalcitonin 0.15     CREATININE - Normal    Creatinine 0.53      GFR Estimate >90     GLUCOSE MONITOR NURSING POCT   GLUCOSE MONITOR NURSING POCT   GLUCOSE MONITOR NURSING POCT   GLUCOSE MONITOR NURSING POCT         I, Haven Alford, am serving as a scribe to document services personally performed by Dr. Tony Schuler, based on my observation and the provider's statements to me. I, Tony Schuler MD attest that Haven Alford is acting in a scribe capacity, has observed my performance of the services and has documented them in accordance with my direction.    Tony Schuler M.D.  Emergency Medicine  [facility] EMERGENCY DEPARTMENT  [id number]  [facility] [address]  [phone]  Dept: [phone]       Tony Schuler MD  04/02/24 2102

## 2024-04-02 NOTE — H&P
[facility]    History and Physical - Hospitalist Service       Date of Admission:  4/2/2024    Assessment & Plan      Mary Ann Olson is a 58 year old female with past medical history of T2DM, GERD, COPD, HLD, and recent admission for left lower leg cellulitis admitted on 4/2/2024 for worsening of her cellulitis since discharge on 3/30/24.     Left lower leg cellulitis  Was initially seen in the ED on 3/18 for LLE cellulitis and started on amoxicillin and doxycycline and then admitted on 3/22 for worsening despite treatment. Ultrasound did not demonstrate any abscess formation. She was initially started on vancomycin and then transitioned to IV clindamycin with significant improvement. She was discharged home on 3/30 with two days of oral clindamycin to complete a total of 7 days. Blood cultures during this admission were negative but had a wound culture positive for staph epidermidis. She was seen in clinic on 4/2/24 at [facility] and noticed to have worsening of erythema with minor bleeding but no active purulence or discharge. ED work-up notable for WBC of 12.6 (up from 8.1 3 days ago) and CRP of 55.7, concerning for worsening of cellulitis and possible abscess formation.   -IV clindamycin 900 mg x 5 days   -IV vancomycin, pharmacy to dose   -CT left lower extremity to assess for abscess formation     T2DM  A1c of 12.2 on 1/19/24. Currently takes liraglutide, metformin, and 15U of NPH twice daily.   -12U NPH BID   -Medium dose sliding scale     COPD  Currently well-controlled and asymptomatic.   -PTA breo ellipta     HLD  -PTA atorvastatin         Diet: Combination Diet Regular Diet Adult    DVT Prophylaxis: Enoxaparin (Lovenox) SQ  Beckwith Catheter: Not present  Fluids: NS @ 100mL/hr  Lines: None     Cardiac Monitoring: None  Code Status: Full Code      Clinically Significant Risk Factors Present on Admission                      # DMII: A1C = 12.2 % (Ref range: 0.0 - 5.6 %) within past  "6 months    # Obesity: Estimated body mass index is 33.53 kg/m  as calculated from the following:    Height as of this encounter: 1.562 m (5' 1.5\").    Weight as of this encounter: 81.8 kg (180 lb 6.4 oz).       # Financial/Environmental Concerns:           Disposition Plan      Expected Discharge Date: 04/04/2024                  WILL COURTNEY MD  Hospitalist Service  [facility]  Securely message with [employer] (more info)  Text page via [employer] Paging/Directory   ______________________________________________________________________    Chief Complaint   Worsening cellulitis         History of Present Illness   Mary Ann Olson is a 58 year old female who presents after being seen at [facility] for hospital follow-up for cellulitis. Was recently admitted for cellulitis and discharged on 3/30/24. During her admission, she received IV vancomycin, then IV clindamycin, and was discharged with oral clindamycin. She states that since discharge, she has noticed a worsening of the redness in her left calf with spreading redness and some mild bleeding/purulence in the center. She states she completed her full course of antibiotics. She denies any fevers, chills, bodyaches, or dizziness. She also endorses some diarrhea once or twice per day since discharge.       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    Antihistamines overdose, intentional self-harm, initial encounter (H)     Asthma     Bipolar 1 disorder (H) hx of bipolar listed in h and p    Bipolar 2 disorder (H)     Bipolar I disorder, single manic episode (H)     Created by Conversion  Replacement Utility updated for latest IMO load    Cellulitis 2006 left ankle, 2008 right foot    COPD (chronic obstructive pulmonary disease) (H)     Crohn's disease (H)     arthritis associated with crohn's    Diabetes mellitus (H)     Diabetes mellitus, type 2 (H)     Fibrocystic " breast     Heat stroke     when a young child     Hyperlipidemia     Idiopathic acute pancreatitis 07/14/2015    Irritable bowel syndrome     Created by Conversion     Kidney stone     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     Overdose     Pyoderma gangrenosum (H28)     2016    Sacroiliitis (H24) 2004    Skin lesion of left leg 08/27/2015    Suicidal ideation     Suicide attempt (H)     Traumatic iritis 07/21/2015    Umbilical hernia     Uncomplicated asthma        Past Surgical History   Past Surgical History:   Procedure Laterality Date    BIOPSY BREAST Left     BIOPSY OF BREAST, INCISIONAL      Description: Biopsy Breast Open;  Recorded: 10/28/2010;    HC REMOVAL OF TONSILS,<11 Y/O      Description: Tonsillectomy;  Recorded: 07/08/2009;    ZZC LIGATE FALLOPIAN TUBE      Description: Tubal Ligation;  Recorded: 07/08/2009;       Prior to Admission Medications   Prior to Admission Medications   Prescriptions Last Dose Informant Patient Reported? Taking?   Calcium Carbonate-Vitamin D 500-5 MG-MCG TABS Unknown at pt states hasnt picked up med yet due to price  No Yes   Sig: Take 1 tablet by mouth 2 times daily   Multiple Vitamins-Minerals (CENTRUM SILVER 50+WOMEN PO) 4/2/2024 at am  Yes Yes   Sig: Take 1 tablet by mouth daily   acetaminophen (TYLENOL) 500 MG tablet 4/2/2024 at am  No Yes   Sig: Take 1-2 tablets (500-1,000 mg) by mouth every 6 hours as needed for pain   albuterol (PROAIR HFA/PROVENTIL HFA/VENTOLIN HFA) 108 (90 Base) MCG/ACT inhaler 4/2/2024 at am  No Yes   Sig: INHALE 2 PUFFS INTO LUNGS EVERY 4 HOURS AS NEEDED FOR SHORTNESS OF BREATH OR  WHEEZING   atorvastatin (LIPITOR) 40 MG tablet 4/2/2024 at am  No Yes   Sig: Take 1 tablet (40 mg) by mouth daily   budesonide-formoterol (SYMBICORT) 160-4.5 MCG/ACT Inhaler 4/2/2024 at am  No Yes   Sig: Inhale 2 puffs by mouth twice daily   hydrOXYzine (ATARAX) 25 MG tablet Past Week at prn  No Yes   Sig: Take 1 tablet (25 mg) by mouth 3 times daily as needed for  itching   insulin  UNIT/ML vial 4/2/2024 at am  No Yes   Sig: Inject 15 Units Subcutaneous 2 times daily   ipratropium - albuterol 0.5 mg/2.5 mg/3 mL (DUONEB) 0.5-2.5 (3) MG/3ML neb solution Past Week at prn  No Yes   Sig: Take 1 vial (3 mLs) by nebulization 2 times daily as needed for shortness of breath, wheezing or cough   liraglutide (VICTOZA) 18 MG/3ML solution 4/2/2024 at am  No Yes   Sig: Inject 0.6 mg Subcutaneous daily   loratadine (CLARITIN) 10 MG tablet More than a month at prn  No Yes   Sig: Take 1 tablet (10 mg) by mouth daily as needed for allergies   magnesium hydroxide (MILK OF MAGNESIA) 400 MG/5ML suspension More than a month at prn  No Yes   Sig: Take 30 mLs by mouth 2 times daily as needed for constipation   melatonin 3 MG tablet 4/1/2024 at pm  Yes Yes   Sig: Take 3 mg by mouth nightly as needed for sleep   metFORMIN (GLUCOPHAGE) 1000 MG tablet 4/2/2024 at am  No Yes   Sig: Take 1 tablet (1,000 mg) by mouth 2 times daily (with meals)   omeprazole (PRILOSEC) 20 MG DR capsule 4/2/2024 at am  No Yes   Sig: Take 1 capsule (20 mg) by mouth daily   oxyCODONE (ROXICODONE) 5 MG tablet Past Week at prn hs supply  No Yes   Sig: Take 0.5 tablets (2.5 mg) by mouth every 6 hours as needed for severe pain   vitamin D2 (ERGOCALCIFEROL) 45955 units (1250 mcg) capsule Past Week at past week  No Yes   Sig: Take 1 capsule (50,000 Units) by mouth once a week      Facility-Administered Medications: None           Physical Exam   Vital Signs: Temp: 98.1  F (36.7  C) Temp src: Oral BP: (!) 147/75 Pulse: 106   Resp: 18 SpO2: 96 % O2 Device: None (Room air)    Weight: 180 lbs 6.4 oz    GEN: Pleasant female, in no acute distress.   HEENT: Normocephalic, atraumatic. Extraoccular eye movements intact. Anicteric sclera. Moist mucous membranes.   NECK: Supple. No cervical or supraclavicular adenopathy.   PULM: Non-labored breathing. No use of accessory muscles. Clear to ausculation bilaterally. No wheezes or crackles.    CV: Regular rate and rhythm. Normal S1, S2. No rubs, murmurs, or gallops.    ABDOMEN: Normoactive bowel sounds. Non-tender to palpation. Non-distended.    EXTREMITES:  Left lower extremity wrapped and bandaged. 4cm circular, well-circumscribed area of erythema on the right calf.   NEURO:  Awake. Oriented to person, place, time and situation. Cranial nerves 2-12 grossly intact. Moving all extremities.    PSYCH: Calm. Appropriate affect, insight, judgment.       Medical Decision Making             Data     I have personally reviewed the following data over the past 24 hrs:    12.6 (H)  \   10.7 (L)   / 659 (H)     140 101 14.8 /  109 (H)   3.9 24 0.66 \     Procal: 0.15 CRP: 55.70 (H) Lactic Acid: N/A         Imaging results reviewed over the past 24 hrs:   No results found for this or any previous visit (from the past 24 hour(s)).

## 2024-04-02 NOTE — MEDICATION SCRIBE - ADMISSION MEDICATION HISTORY
Medication Scribe Admission Medication History    Admission medication history is complete. The information provided in this note is only as accurate as the sources available at the time of the update.    Information Source(s): Patient and CareEverywhere/SureScripts via in-person    Pertinent Information:     Changes made to PTA medication list:  Added: None  Deleted: None  Changed: None    Allergies reviewed with patient and updates made in EHR: yes    Medication History Completed By: STAR PRINCE 4/2/2024 6:21 PM    PTA Med List   Medication Sig Last Dose    acetaminophen (TYLENOL) 500 MG tablet Take 1-2 tablets (500-1,000 mg) by mouth every 6 hours as needed for pain 4/2/2024 at am    albuterol (PROAIR HFA/PROVENTIL HFA/VENTOLIN HFA) 108 (90 Base) MCG/ACT inhaler INHALE 2 PUFFS INTO LUNGS EVERY 4 HOURS AS NEEDED FOR SHORTNESS OF BREATH OR  WHEEZING 4/2/2024 at am    atorvastatin (LIPITOR) 40 MG tablet Take 1 tablet (40 mg) by mouth daily 4/2/2024 at am    budesonide-formoterol (SYMBICORT) 160-4.5 MCG/ACT Inhaler Inhale 2 puffs by mouth twice daily 4/2/2024 at am    Calcium Carbonate-Vitamin D 500-5 MG-MCG TABS Take 1 tablet by mouth 2 times daily Unknown at pt states hasnt picked up med yet due to price    hydrOXYzine (ATARAX) 25 MG tablet Take 1 tablet (25 mg) by mouth 3 times daily as needed for itching Past Week at prn    insulin  UNIT/ML vial Inject 15 Units Subcutaneous 2 times daily 4/2/2024 at am    ipratropium - albuterol 0.5 mg/2.5 mg/3 mL (DUONEB) 0.5-2.5 (3) MG/3ML neb solution Take 1 vial (3 mLs) by nebulization 2 times daily as needed for shortness of breath, wheezing or cough Past Week at prn    liraglutide (VICTOZA) 18 MG/3ML solution Inject 0.6 mg Subcutaneous daily 4/2/2024 at am    loratadine (CLARITIN) 10 MG tablet Take 1 tablet (10 mg) by mouth daily as needed for allergies More than a month at prn    magnesium hydroxide (MILK OF MAGNESIA) 400 MG/5ML suspension Take 30 mLs by mouth  2 times daily as needed for constipation More than a month at prn    melatonin 3 MG tablet Take 3 mg by mouth nightly as needed for sleep 4/1/2024 at pm    metFORMIN (GLUCOPHAGE) 1000 MG tablet Take 1 tablet (1,000 mg) by mouth 2 times daily (with meals) 4/2/2024 at am    Multiple Vitamins-Minerals (CENTRUM SILVER 50+WOMEN PO) Take 1 tablet by mouth daily 4/2/2024 at am    omeprazole (PRILOSEC) 20 MG DR capsule Take 1 capsule (20 mg) by mouth daily 4/2/2024 at am    oxyCODONE (ROXICODONE) 5 MG tablet Take 0.5 tablets (2.5 mg) by mouth every 6 hours as needed for severe pain Past Week at prn hs supply    vitamin D2 (ERGOCALCIFEROL) 23478 units (1250 mcg) capsule Take 1 capsule (50,000 Units) by mouth once a week Past Week at past week

## 2024-04-02 NOTE — PROGRESS NOTES
Preceptor Attestation:   Patient seen, evaluated and discussed with the resident. I have verified the content of the note, which accurately reflects my assessment of the patient and the plan of care.  Supervising Physician:Keisha Guerra MD  [facility]

## 2024-04-02 NOTE — ED TRIAGE NOTES
Pt arrives to ED from home due to cellulitis of left leg. Pt has been taking ABX for 2-3 weeks but it is not getting better. Leg is currently wrapped.     Pt concerned for cellulitis on right leg as well     Triage Assessment (Adult)       Row Name 04/02/24 1439          Triage Assessment    Airway WDL WDL        Respiratory WDL    Respiratory WDL WDL        Skin Circulation/Temperature WDL    Skin Circulation/Temperature WDL WDL        Cardiac WDL    Cardiac WDL X;rhythm     Pulse Rate & Regularity tachycardic        Peripheral/Neurovascular WDL    Peripheral Neurovascular WDL WDL        Cognitive/Neuro/Behavioral WDL    Cognitive/Neuro/Behavioral WDL WDL

## 2024-04-02 NOTE — PROGRESS NOTES
"  Assessment & Plan     Hospital discharge follow-up  Cellulitis of left leg  Was admitted at [facility] 3/22-3/30 for LLE cellulitis that failed outpatient treatment with amoxicillin and doxycycline. Started on IV vancomycin but no response so transitioned to IV clindamycin which was effective. Transitioned and sent home with 2 more days of orals for total 7 day course. Blood cultures negative, wound cultures only positive for Staph epidermidis.  Patient affirms completing abx. Still feeling unwell, worried that infection hasn't gotten better. Vitals reassuring, but on physical exam, erythematous border continues to extend/worsen. Center of infection with minor bleeding and appears wet, but no active purulent discharge or fluid collection appreciated.   Given history of failing multiple antibiotics and no clear micro culprit, recommended patient re-present at [facility] for ongoing IV antibiotics. Unfortunately was not able to directly re-admit to [facility] inpatient service d/t bed availability so called ED provider for warm handoff and notified resident team about her likely re-admission in the coming hours.       MED REC REQUIRED  Post Medication Reconciliation Status:  Discharge medications reconciled, continue medications without change      No follow-ups on file.    Rasheeda Reese is a 58 year old, presenting for the following health issues:  Hospital F/U (Had diarrhea this morning)        4/2/2024    10:29 AM   Additional Questions   Roomed by Randi   Accompanied by self         4/2/2024    Information    services provided? No     [facility]     Hospital follow up. Was at [facility] for LLE cellulitis on 3/22-3/30.   Finished clindamycin yesterday. Was having bad diarrhea but has been better the past few days.   Sugars: \"They were good this morning,\" 118 or 119. Was 139 yesterday. \"Not often\" over 130. Unsure what mealtimes have been, didn't bring meter today.   Not sleeping well. Having " chills, no fevers.         4/2/2024    10:29 AM   Post Discharge Outreach   Admission Date 3/22/2024   Reason for Admission Diagnoses of Cellulitis, unspecified cellulitis site and Type 2 diabetes mellitus without complication, without long-term current use of insulin (H) were pertinent to this visit.   Discharge Date 3/30/2024   Discharge Diagnosis Diagnoses of Cellulitis, unspecified cellulitis site and Type 2 diabetes mellitus without complication, without long-term current use of insulin (H) were pertinent to this visit.   How are you doing now that you are home? Pt is doing better   How are your symptoms? (Red Flag symptoms escalate to triage hotline per guidelines) Improved   Do you feel your condition is stable enough to be safe at home until your provider visit? Yes   Does the patient have their discharge instructions?  Yes   Does the patient have questions regarding their discharge instructions?  No   Were you started on any new medications or were there changes to any of your previous medications?  Yes   Does the patient have all of their medications? Yes   Do you have questions regarding any of your medications?  No   Do you have all of your needed medical supplies or equipment (DME)?  (i.e. oxygen tank, CPAP, cane, etc.) No - What equipment or supplies are needed?   Discharge follow-up appointment scheduled within 14 calendar days?  Yes   Discharge Follow Up Appointment Date 4/2/2024   Discharge Follow Up Appointment Scheduled with? Primary Care Provider     Hospital Follow-up Visit:    Hospital/Nursing Home/IP Rehab Facility: [facility]  Date of Admission: 3/22/24  Date of Discharge: 3/30/24  Reason(s) for Admission: LLE cellulitis    Was your hospitalization related to COVID-19? No   Problems taking medications regularly:  None  Medication changes since discharge: None  Problems adhering to non-medication therapy:  None    Summary of hospitalization:  [facility]  "hospital discharge summary reviewed  Diagnostic Tests/Treatments reviewed.  Follow up needed: none  Other Healthcare Providers Involved in Patient s Care:         None  Update since discharge: worsened. Sent back to [facility] for readmission.     Plan of care communicated with patient           Review of Systems  Constitutional, HEENT, cardiovascular, pulmonary, gi and gu systems are negative, except as otherwise noted.      Objective    /80   Pulse 108   Temp 98.1  F (36.7  C) (Oral)   Ht 1.561 m (5' 1.46\")   Wt 81.6 kg (180 lb)   SpO2 94%   BMI 33.51 kg/m    Body mass index is 33.51 kg/m .  Physical Exam   GENERAL: alert and no distress, appears fatigued but non-toxic.  EYES: Eyes grossly normal to inspection.  RESP: no increased work of breathing.   SKIN: See pictures below.   NEURO: no focal deficits.  PSYCH: mentation appears normal, affect normal/bright.                Signed Electronically by: Nahun Thompson MD    "

## 2024-04-02 NOTE — ED NOTES
Expected Patient Referral to ED  11:51 AM    Referring Clinic/Provider:  [facility]    Reason for referral/Clinical facts:  Discharged a few days ago after admission for cellulitis.  Was on IV clinda.  Sx now worsening on oral abx.  VSS. Will need to get back on IV abx    Recommendations provided:  Send to ED for further evaluation    Caller was informed that this institution does possess the capabilities and/or resources to provide for patient and should be transferred to our facility.    Discussed that if direct admit is sought and any hurdles are encountered, this ED would be happy to see the patient and evaluate.    Informed caller that recommendations provided are recommendations based only on the facts provided and that they responsible to accept or reject the advice, or to seek a formal in person consultation as needed and that this ED will see/treat patient should they arrive.      Minesh Sanz DO  [facility] EMERGENCY DEPARTMENT  [address] [place] [id number]  [phone]       Minesh Sanz DO  04/02/24 1152     No

## 2024-04-02 NOTE — PROGRESS NOTES
Clinic Care Coordination Contact  [facility]: Post-Discharge Note  SITUATION                                                      Admission:    Admission Date: 03/22/24   Reason for Admission: Diagnoses of Cellulitis, unspecified cellulitis site and Type 2 diabetes mellitus without complication, without long-term current use of insulin (H) were pertinent to this visit.  Discharge:   Discharge Date: 03/30/24  Discharge Diagnosis: Diagnoses of Cellulitis, unspecified cellulitis site and Type 2 diabetes mellitus without complication, without long-term current use of insulin (H) were pertinent to this visit.    BACKGROUND                                                      Per hospital discharge summary and inpatient provider notes:      ASSESSMENT           Discharge Assessment  How are you doing now that you are home?: Pt is doing better  How are your symptoms? (Red Flag symptoms escalate to triage hotline per guidelines): Improved  Do you feel your condition is stable enough to be safe at home until your provider visit?: Yes  Does the patient have their discharge instructions? : Yes  Does the patient have questions regarding their discharge instructions? : No  Were you started on any new medications or were there changes to any of your previous medications? : Yes  Does the patient have all of their medications?: Yes  Do you have questions regarding any of your medications? : No  Do you have all of your needed medical supplies or equipment (DME)?  (i.e. oxygen tank, CPAP, cane, etc.): No - What equipment or supplies are needed?  Discharge follow-up appointment scheduled within 14 calendar days? : Yes  Discharge Follow Up Appointment Date: 04/02/24  Discharge Follow Up Appointment Scheduled with?: Primary Care Provider                  PLAN                                                      Outpatient Plan:      Future Appointments   Date Time Provider Department [place]   4/8/2024  3:20 PM Joanna Farah  Michelle, MD [facility]   4/8/2024  4:00 PM Valeria Jackson, [facility]         For any urgent concerns, please contact our 24 hour nurse triage line: [phone]       VALERIO Butler

## 2024-04-03 LAB
ANION GAP SERPL CALCULATED.3IONS-SCNC: 10 MMOL/L (ref 7–15)
BUN SERPL-MCNC: 9.9 MG/DL (ref 8–23)
CALCIUM SERPL-MCNC: 8.9 MG/DL (ref 8.6–10)
CHLORIDE SERPL-SCNC: 105 MMOL/L (ref 98–107)
CREAT SERPL-MCNC: 0.55 MG/DL (ref 0.51–0.95)
CRP SERPL-MCNC: 51.8 MG/L
DEPRECATED HCO3 PLAS-SCNC: 24 MMOL/L (ref 22–29)
EGFRCR SERPLBLD CKD-EPI 2021: >90 ML/MIN/1.73M2
ERYTHROCYTE [DISTWIDTH] IN BLOOD BY AUTOMATED COUNT: 15.8 % (ref 10–15)
GLUCOSE BLDC GLUCOMTR-MCNC: 104 MG/DL (ref 70–99)
GLUCOSE BLDC GLUCOMTR-MCNC: 122 MG/DL (ref 70–99)
GLUCOSE BLDC GLUCOMTR-MCNC: 168 MG/DL (ref 70–99)
GLUCOSE BLDC GLUCOMTR-MCNC: 171 MG/DL (ref 70–99)
GLUCOSE BLDC GLUCOMTR-MCNC: 87 MG/DL (ref 70–99)
GLUCOSE SERPL-MCNC: 106 MG/DL (ref 70–99)
HCT VFR BLD AUTO: 29.7 % (ref 35–47)
HGB BLD-MCNC: 9 G/DL (ref 11.7–15.7)
MCH RBC QN AUTO: 23.7 PG (ref 26.5–33)
MCHC RBC AUTO-ENTMCNC: 30.3 G/DL (ref 31.5–36.5)
MCV RBC AUTO: 78 FL (ref 78–100)
PLATELET # BLD AUTO: 535 10E3/UL (ref 150–450)
POTASSIUM SERPL-SCNC: 3.6 MMOL/L (ref 3.4–5.3)
RBC # BLD AUTO: 3.8 10E6/UL (ref 3.8–5.2)
SODIUM SERPL-SCNC: 139 MMOL/L (ref 135–145)
WBC # BLD AUTO: 7.9 10E3/UL (ref 4–11)

## 2024-04-03 PROCEDURE — 120N000001 HC R&B MED SURG/OB

## 2024-04-03 PROCEDURE — 86140 C-REACTIVE PROTEIN: CPT

## 2024-04-03 PROCEDURE — 250N000011 HC RX IP 250 OP 636

## 2024-04-03 PROCEDURE — 250N000013 HC RX MED GY IP 250 OP 250 PS 637

## 2024-04-03 PROCEDURE — 99222 1ST HOSP IP/OBS MODERATE 55: CPT | Mod: AI

## 2024-04-03 PROCEDURE — 82962 GLUCOSE BLOOD TEST: CPT

## 2024-04-03 PROCEDURE — 258N000003 HC RX IP 258 OP 636: Performed by: FAMILY MEDICINE

## 2024-04-03 PROCEDURE — G0463 HOSPITAL OUTPT CLINIC VISIT: HCPCS

## 2024-04-03 PROCEDURE — 80048 BASIC METABOLIC PNL TOTAL CA: CPT

## 2024-04-03 PROCEDURE — 250N000011 HC RX IP 250 OP 636: Performed by: FAMILY MEDICINE

## 2024-04-03 PROCEDURE — 36415 COLL VENOUS BLD VENIPUNCTURE: CPT

## 2024-04-03 PROCEDURE — 250N000012 HC RX MED GY IP 250 OP 636 PS 637

## 2024-04-03 PROCEDURE — 85027 COMPLETE CBC AUTOMATED: CPT

## 2024-04-03 PROCEDURE — 99222 1ST HOSP IP/OBS MODERATE 55: CPT | Performed by: INTERNAL MEDICINE

## 2024-04-03 RX ORDER — MULTIPLE VITAMINS W/ MINERALS TAB 9MG-400MCG
1 TAB ORAL DAILY
Status: DISCONTINUED | OUTPATIENT
Start: 2024-04-03 | End: 2024-04-05 | Stop reason: HOSPADM

## 2024-04-03 RX ORDER — CEFAZOLIN SODIUM 1 G/50ML
1250 SOLUTION INTRAVENOUS EVERY 12 HOURS
Status: DISCONTINUED | OUTPATIENT
Start: 2024-04-03 | End: 2024-04-04

## 2024-04-03 RX ORDER — OXYCODONE HYDROCHLORIDE 5 MG/1
5 TABLET ORAL EVERY 4 HOURS PRN
Status: DISCONTINUED | OUTPATIENT
Start: 2024-04-03 | End: 2024-04-04

## 2024-04-03 RX ORDER — SUMATRIPTAN 50 MG/1
50 TABLET, FILM COATED ORAL ONCE
Qty: 1 TABLET | Refills: 0 | Status: COMPLETED | OUTPATIENT
Start: 2024-04-03 | End: 2024-04-03

## 2024-04-03 RX ADMIN — ACETAMINOPHEN 650 MG: 325 TABLET ORAL at 06:02

## 2024-04-03 RX ADMIN — Medication 3 MG: at 21:39

## 2024-04-03 RX ADMIN — HUMAN INSULIN 12 UNITS: 100 INJECTION, SUSPENSION SUBCUTANEOUS at 08:05

## 2024-04-03 RX ADMIN — Medication 1 CAPSULE: at 21:26

## 2024-04-03 RX ADMIN — ACETAMINOPHEN 650 MG: 325 TABLET ORAL at 21:26

## 2024-04-03 RX ADMIN — CLINDAMYCIN PHOSPHATE 900 MG: 900 INJECTION, SOLUTION INTRAVENOUS at 18:11

## 2024-04-03 RX ADMIN — ENOXAPARIN SODIUM 40 MG: 40 INJECTION SUBCUTANEOUS at 21:29

## 2024-04-03 RX ADMIN — PANTOPRAZOLE SODIUM 40 MG: 40 TABLET, DELAYED RELEASE ORAL at 06:02

## 2024-04-03 RX ADMIN — OXYCODONE HYDROCHLORIDE 5 MG: 5 TABLET ORAL at 18:03

## 2024-04-03 RX ADMIN — Medication 1 CAPSULE: at 08:04

## 2024-04-03 RX ADMIN — OXYCODONE HYDROCHLORIDE 5 MG: 5 TABLET ORAL at 13:40

## 2024-04-03 RX ADMIN — VANCOMYCIN HYDROCHLORIDE 1250 MG: 5 INJECTION, POWDER, LYOPHILIZED, FOR SOLUTION INTRAVENOUS at 21:35

## 2024-04-03 RX ADMIN — INSULIN ASPART 1 UNITS: 100 INJECTION, SOLUTION INTRAVENOUS; SUBCUTANEOUS at 18:06

## 2024-04-03 RX ADMIN — CLINDAMYCIN PHOSPHATE 900 MG: 900 INJECTION, SOLUTION INTRAVENOUS at 00:27

## 2024-04-03 RX ADMIN — ATORVASTATIN CALCIUM 40 MG: 40 TABLET, FILM COATED ORAL at 08:04

## 2024-04-03 RX ADMIN — SUMATRIPTAN SUCCINATE 50 MG: 50 TABLET ORAL at 19:10

## 2024-04-03 RX ADMIN — VANCOMYCIN HYDROCHLORIDE 1000 MG: 1 INJECTION, SOLUTION INTRAVENOUS at 08:04

## 2024-04-03 RX ADMIN — ACETAMINOPHEN 650 MG: 325 TABLET ORAL at 00:27

## 2024-04-03 RX ADMIN — FLUTICASONE FUROATE AND VILANTEROL TRIFENATATE 1 PUFF: 200; 25 POWDER RESPIRATORY (INHALATION) at 08:06

## 2024-04-03 RX ADMIN — ACETAMINOPHEN 650 MG: 325 TABLET ORAL at 10:13

## 2024-04-03 RX ADMIN — Medication 1 TABLET: at 21:26

## 2024-04-03 RX ADMIN — Medication 1 TABLET: at 12:51

## 2024-04-03 RX ADMIN — CLINDAMYCIN PHOSPHATE 900 MG: 900 INJECTION, SOLUTION INTRAVENOUS at 08:08

## 2024-04-03 ASSESSMENT — ACTIVITIES OF DAILY LIVING (ADL)
DEPENDENT_IADLS:: INDEPENDENT
ADLS_ACUITY_SCORE: 38
ADLS_ACUITY_SCORE: 40
ADLS_ACUITY_SCORE: 38
ADLS_ACUITY_SCORE: 40
ADLS_ACUITY_SCORE: 38
ADLS_ACUITY_SCORE: 40
ADLS_ACUITY_SCORE: 38

## 2024-04-03 NOTE — PHARMACY-VANCOMYCIN DOSING SERVICE
Pharmacy Vancomycin Initial Note  Date of Service 2024  Patient's  1965  58 year old, female    Indication: Skin and Soft Tissue Infection    Current estimated CrCl = Estimated Creatinine Clearance: 90 mL/min (based on SCr of 0.66 mg/dL).    Creatinine for last 3 days  2024:  3:58 PM Creatinine 0.66 mg/dL    Recent Vancomycin Level(s) for last 3 days  No results found for requested labs within last 3 days.      Vancomycin IV Administrations (past 72 hours)        No vancomycin orders with administrations in past 72 hours.                    Nephrotoxins and other renal medications (From now, onward)      Start     Dose/Rate Route Frequency Ordered Stop    24 0800  vancomycin (VANCOCIN) 1,000 mg in 200 mL dextrose intermittent infusion        See Hyperspace for full Linked Orders Report.    1,000 mg  200 mL/hr over 1 Hours Intravenous EVERY 12 HOURS 24  vancomycin (VANCOCIN) 1,500 mg in sodium chloride 0.9 % 250 mL intermittent infusion        See Hyperspace for full Linked Orders Report.    1,500 mg  over 90 Minutes Intravenous ONCE 24              Contrast Orders - past 72 hours (72h ago, onward)      None            [employer] Prediction of Planned Initial Vancomycin Regimen  Loading dose: 1500 mg at 19:00 2024.  Regimen: 1000 mg IV every 12 hours.  Start time: 07:00 on 2024  Exposure target: AUC24 (range)400-600 mg/L.hr   AUC24,ss: 447 mg/L.hr  Probability of AUC24 > 400: 62 %  Ctrough,ss: 13.7 mg/L  Probability of Ctrough,ss > 20: 21 %  Probability of nephrotoxicity (Lodise LESLYE ): 9 %          Plan:  Start vancomycin  1500mg IV once then 1000 mg IV q12h.   Vancomycin monitoring method: AUC  Vancomycin therapeutic monitoring goal: 400-600 mg*h/L  Pharmacy will check vancomycin levels as appropriate in 1-3 Days.    Serum creatinine levels will be ordered daily for the first week of therapy and at least twice weekly for subsequent  weeks.      Danielle Fuentes, [facility]

## 2024-04-03 NOTE — PLAN OF CARE
"  Problem: Adult Inpatient Plan of Care  Goal: Plan of Care Review  Description: The Plan of Care Review/Shift note should be completed every shift.  The Outcome Evaluation is a brief statement about your assessment that the patient is improving, declining, or no change.  This information will be displayed automatically on your shift  note.  Outcome: Not Progressing  Goal: Patient-Specific Goal (Individualized)  Description: You can add care plan individualizations to a care plan. Examples of Individualization might be:  \"Parent requests to be called daily at 9am for status\", \"I have a hard time hearing out of my right ear\", or \"Do not touch me to wake me up as it startles  me\".  Outcome: Not Progressing  Goal: Absence of Hospital-Acquired Illness or Injury  Outcome: Not Progressing  Intervention: Identify and Manage Fall Risk  Recent Flowsheet Documentation  Taken 4/3/2024 0000 by Rosita Fischer RN  Safety Promotion/Fall Prevention:   activity supervised   clutter free environment maintained   nonskid shoes/slippers when out of bed  Taken 4/2/2024 2030 by Rosita Fischer RN  Safety Promotion/Fall Prevention:   activity supervised   clutter free environment maintained   nonskid shoes/slippers when out of bed  Intervention: Prevent Skin Injury  Recent Flowsheet Documentation  Taken 4/3/2024 0000 by Rosita Fischer RN  Body Position: position changed independently  Taken 4/2/2024 2030 by Rosita Fischer RN  Body Position: position changed independently  Goal: Optimal Comfort and Wellbeing  Outcome: Not Progressing  Goal: Readiness for Transition of Care  Outcome: Not Progressing   Goal Outcome Evaluation:  Pt is alert and oriented, uses call light appropriately.  Pt is up with SBA when she has IV running.  Pt decided that she was voiding too frequently, so she refused having the IV running around 0400.  [facility] officer, but no response.  Left sticky note.  Pt will continue on IV antibiotics.                "

## 2024-04-03 NOTE — PROGRESS NOTES
[facility]    Progress Note - Hospitalist Service       Date of Admission:  4/2/2024    Assessment & Plan   Mary Ann Olson is a 58 year old female admitted on 4/2/2024 with past medical history of T2DM, GERD, COPD, HLD, and recent admission for left lower leg cellulitis admitted on 4/2/2024 for worsening of her cellulitis since discharge on 3/30/24.     LLE Cellullitis   Was initially seen in the ED on 3/18 for LLE cellulitis and started on amoxicillin and doxycycline and then admitted on 3/22 for worsening despite treatment. Ultrasound did not demonstrate any abscess formation. She was initially started on vancomycin and then transitioned to IV clindamycin with significant improvement. She was discharged home on 3/30 with two days of oral clindamycin to complete a total of 7 days. Blood cultures during this admission were negative but had a wound culture positive for staph epidermidis. She was seen in clinic on 4/2/24 at [facility] and noticed to have worsening of erythema with minor bleeding but no active purulence or discharge. ED work-up notable for WBC of 12.6 (up from 8.1 3 days ago) and CRP of 55.7, concerning for worsening of cellulitis and possible abscess formation. On exam 4/3/24 notable tenderness to palpation which is atypical for cellulitis, consider erysipelas with surrounding yellow/green drainage without pus, skin breakdown noted superficially. Peripheral pulses LE 2+ BL, low suspicion of venous insufficiency or pyoderma gangrenosum, She does have bright erythematous 2 cm lesion of R shin, blanching to pressure, markedly tender, could consider erythema nodosum however is unilateral but in the setting of documented IBD history    Unclear why she had recurrence of wound after initial improvement on clindamycin, suspect inadequate treatment time course but unsure at this time vs. Inadequate abx coverage    CT tibia fibula 4/2/24 - no discrete abscess on non-contrast exam,  "no evidence for deep compartment involvement or soft tissue gas  WBC 7.9, CRP 51.8, pt afebrile without signs of systemic infection    -ID Consulted 4/3/24 - recs appreciated  -Continue IV vancomycin, pharmacy to dose, q12h  -Continue IV clindamycin 900 mg q8h  -WOC consult placed for wound care mgmt  -Consider Narrow abx coverage pending ID recommendations  -Could consider steroids if not improving in the next few days however she has significant psychiatric history which would lead me to exercise caution    T2DM  A1c of 12.2 on 1/19/24. Currently takes liraglutide, metformin, and 15U of NPH twice daily. BG have been low 100's since admission on current inpatient regimen  -Continue 12U NPH BID   -Medium dose sliding scale      COPD  Currently well-controlled and asymptomatic.   -PTA breo ellipta     Hx Crohns Disease  Per chart review diagnosed in 2016, not currently on any medications and denies abdominal pain, bowel symptoms, no melena or hematochezia     HLD  -PTA atorvastatin     Continued PTA multivitamin, calcium carbonate per pt. request          Diet: Combination Diet Regular Diet Adult    DVT Prophylaxis: Enoxaparin (Lovenox) SQ  Beckwith Catheter: Not present  Fluids: NS @ 100 mL/hr  Lines: None     Cardiac Monitoring: None  Code Status: Full Code      Clinically Significant Risk Factors Present on Admission                      # DMII: A1C = 12.2 % (Ref range: 0.0 - 5.6 %) within past 6 months    # Obesity: Estimated body mass index is 33.53 kg/m  as calculated from the following:    Height as of this encounter: 1.562 m (5' 1.5\").    Weight as of this encounter: 81.8 kg (180 lb 6.4 oz).       # Financial/Environmental Concerns:           Disposition Plan      Expected Discharge Date: 04/04/2024                The patient's care was discussed with the Attending Physician, Dr. Arguello .    Ajit Byrnes MD  Hospitalist Service  [facility]  Securely message with Sarina (more " info)  Text page via [facility] Paging/Directory   ______________________________________________________________________    Interval History   No acute events overnight. Patient continuing IV antibiotics, endorsing significant pain to LLE but states well controlled with PRN tylenol. Did remove dressing and Left LE wound is erythematous, very tender to palpation. Did discuss her wound on her R shin and R 2nd toe which have been present for a couple weeks prior to admission per patient. VSS, refused IV fluids last PM. Encourage PO intake    Physical Exam   Vital Signs: Temp: 97.9  F (36.6  C) Temp src: Oral BP: 103/59 Pulse: 92   Resp: 16 SpO2: 96 % O2 Device: None (Room air)    Weight: 180 lbs 6.4 oz    Physical Exam  Constitutional:       General: She is not in acute distress.     Appearance: Normal appearance. She is not ill-appearing.   HENT:      Mouth/Throat:      Mouth: Mucous membranes are moist.      Pharynx: Oropharynx is clear.   Cardiovascular:      Rate and Rhythm: Normal rate and regular rhythm.      Pulses: Normal pulses.      Heart sounds: Normal heart sounds.   Pulmonary:      Effort: Pulmonary effort is normal.      Breath sounds: Normal breath sounds.   Abdominal:      General: Bowel sounds are normal.      Palpations: Abdomen is soft.   Musculoskeletal:      Right lower leg: No edema.      Left lower leg: No edema.   Skin:     General: Skin is warm.      Capillary Refill: Capillary refill takes less than 2 seconds.      Comments: Removed dressing to examine LLE    Notable for signficant erythema in 7-8 cm distribution over L calf, very tender to palpation. Superficial skin excoriations and breakdown noted with no clear evidence of abscess or ulceration. Some yellow/green drainage noted on bandage without pus. Skin does feel warm to palpation. See media tab for image    R LE - R shin 2 cm warm, erythematous nodule noted, not enhancing, blanching but tender to palpation, does appear slightly raised  however exam limited due to pain. No evidence of lymphatic streaking, venous insufficiency, or ulceration. Small 1 cm wound with scab formation over dorsal R 2nd toe   Neurological:      General: No focal deficit present.      Mental Status: She is alert and oriented to person, place, and time. Mental status is at baseline.   Psychiatric:         Mood and Affect: Mood normal.         Behavior: Behavior normal.         Data     I have personally reviewed the following data over the past 24 hrs:    7.9  \   9.0 (L)   / 535 (H)     139 105 9.9 /  104 (H)   3.6 24 0.55 \     Procal: 0.15 CRP: 51.80 (H) Lactic Acid: N/A         Imaging results reviewed over the past 24 hrs:   Recent Results (from the past 24 hour(s))   CT Tibia Fibula Lower Leg Left wo Contrast    Narrative    EXAM: CT TIBIA FIBULA LOWER LEG LEFT WO CONTRAST  LOCATION: [facility]  DATE: 4/2/2024    INDICATION: Concern for abscess. Infection and cellulitis.  COMPARISON: None.  TECHNIQUE: Noncontrast. Axial, sagittal and coronal thin-section reconstruction. Dose reduction techniques were used.     FINDINGS:   There is reticulation soft tissue stranding and poorly defined fluid within the subcutaneous tissues about the calf most pronounced along the proximal to mid calf medially, posteriorly and laterally. There is skin thickening which is most pronounced   along the distal calf and ankle. There is no well-defined fluid collection to suggest abscess on this noncontrast study.    -There is no cortical destructive change to suggest osteomyelitis. No evidence of an acute fracture involving the tibia or fibula.      Impression    IMPRESSION:  1.  Findings compatible with soft tissue infection/cellulitis about the calf. While there is poorly defined fluid reticulation and skin thickening, there is no discrete abscess on this noncontrast exam. Note is made that MRI is more sensitive for soft   tissue abscess detection.    2.  No CT  evidence for deep compartment involvement or soft tissue gas.     Ajit Byrnes MD  PGY-1  [facility] Medicine Residency  [facility]   April 3, 2024

## 2024-04-03 NOTE — PHARMACY-VANCOMYCIN DOSING SERVICE
Pharmacy Empir[facility] Dose Change Per Policy - Vancomycin    Original Dose Ordered: 1000 mg IV q12h    Loading dose: 1500 mg  Regimen: 1000 mg IV every 12 hours.  Start time: 20:04 on 04/03/2024  Exposure target: AUC24 (range)400-600 mg/L.hr   AUC24,ss: 390 mg/L.hr  Probability of AUC24 > 400: 47 %  Ctrough,ss: 11.3 mg/L  Probability of Ctrough,ss > 20: 13 %  Probability of nephrotoxicity (Lodise LESLYE 2009): 7 %    Dose Changed To: 1250 mg IV q12h    Loading dose: 1500 mg  Regimen: 1250 mg IV every 12 hours.  Start time: 20:04 on 04/03/2024  Exposure target: AUC24 (range)400-600 mg/L.hr   AUC24,ss: 486 mg/L.hr  Probability of AUC24 > 400: 69 %  Ctrough,ss: 14.3 mg/L  Probability of Ctrough,ss > 20: 26 %  Probability of nephrotoxicity (Lodise LESLYE 2009): 9 %    Will order vancomycin level for tomorrow am.    This dose change was based on the pharmacist's assessment of this patient's age, weight, concurrent drug therapy, treatment goals, whether patient's creatinine clearance adequately indicates renal function (factoring in age, muscle mass, fluid and clinical status), and, if applicable, prior pharmacokinetic data.    Creatinine Clearance=     Estimated Creatinine Clearance: 108 mL/min (based on SCr of 0.55 mg/dL).  Will continue to follow and modify dosage according to levels, organ function and clinical condition   Onelia EcheverriaD

## 2024-04-03 NOTE — DISCHARGE INSTRUCTIONS
WOC DISCHARGE INSTRUCTIONS:  LLE wound(s): Monday/Wednesday/Friday and PRN if dressing soiled, saturated or falls off  Cleanse wound with Vashe moistened gauze for 5-10 minutes, gently pat dry  Use adaptic as primary dressing with ABD as secondary  Wrap with kerlix to secure

## 2024-04-03 NOTE — CONSULTS
Infectious Disease Consultation:  Requesting MD:  Reason for consult:      HISTORY:  Pt recently treated for cellulitis, this is pasted from the recent Discharge summary:  Left lower leg cellulitis  Initially diagnosed in the ED on 3/18 and started on amoxicillin and doxycycline.  However, was seen in follow-up in clinic and had worsening of the erythema despite oral antibiotics and so was admitted on 3/22.  She had a normal white blood cell count and no evidence of abscess on point-of-care ultrasound.  She was started on IV vancomycin but did not have any improvement with this over a few days and so was switched to IV clindamycin on 3/25, after which she improved significantly.  She was transition to oral clindamycin on 3/29 and was discharged with 2 more days of this antibiotic to total of a 7-day course of the clindamycin alone.  Wound cultures on 3/24 were positive for Staph epidermidis.  Blood cultures had no growth to date.  The lymphedema team was consulted and wrapped her legs which was helpful.    Just a couple days now off abx and back to ED with LLE redness.  She looks to maybe have 3 skin issues happening on my exam, the L shin cellulitis but also a R shin nodosum looking lesion and a R toe lesion that fits folliculitis or small furuncle.  She has crohns on no biologics.           Pertinent past history, past infectious disease history:       REVIEW OF SYSTEMS   All systems reviewed and negative except as noted in HPI.     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    Antihistamines overdose, intentional self-harm, initial encounter (H)      Asthma      Bipolar 1 disorder (H) hx of bipolar listed in h and p    Bipolar 2 disorder (H)      Bipolar I disorder, single manic episode (H)       Created by Conversion  Replacement Utility updated for latest IMO load    Cellulitis 2006 left ankle, 2008  right foot    COPD (chronic obstructive pulmonary disease) (H)      Crohn's disease (H)       arthritis associated with crohn's    Diabetes mellitus (H)      Diabetes mellitus, type 2 (H)      Fibrocystic breast      Heat stroke       when a young child     Hyperlipidemia      Idiopathic acute pancreatitis 07/14/2015    Irritable bowel syndrome       Created by Conversion     Kidney stone      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      Overdose      Pyoderma gangrenosum (H28)       2016    Sacroiliitis (H24) 2004    Skin lesion of left leg 08/27/2015    Suicidal ideation      Suicide attempt (H)      Traumatic iritis 07/21/2015    Umbilical hernia      Uncomplicated asthma              PAST SURGICAL HISTORY:  Past Surgical History         Past Surgical History:   Procedure Laterality Date    BIOPSY BREAST Left      BIOPSY OF BREAST, INCISIONAL         Description: Biopsy Breast Open;  Recorded: 10/28/2010;    HC REMOVAL OF TONSILS,<13 Y/O         Description: Tonsillectomy;  Recorded: 07/08/2009;    ZZC LIGATE FALLOPIAN TUBE         Description: Tubal Ligation;  Recorded: 07/08/2009;                  CURRENT MEDICATIONS:    Current Facility-Administered Medications             Current Facility-Administered Medications   Medication Dose Route Frequency Provider Last Rate Last Admin    acetaminophen (TYLENOL) tablet 650 mg  650 mg Oral Q4H PRN Sergo Doll MD   650 mg at 04/02/24 2050     Or    acetaminophen (TYLENOL) Suppository 650 mg  650 mg Rectal Q4H PRN Sergo Doll MD        albuterol (PROVENTIL HFA/VENTOLIN HFA) inhaler  2 puff Inhalation Q4H PRN Sergo Doll MD        [START ON 4/3/2024] atorvastatin (LIPITOR) tablet 40 mg  40 mg Oral Daily Sergo Doll MD        calcium carbonate (TUMS) chewable tablet 1,000 mg  1,000 mg Oral 4x Daily PRN Sergo Doll MD        [START ON 4/3/2024] clindamycin (CLEOCIN) 900 mg in 50 mL NS intermittent infusion  900 mg Intravenous Q8H Sharmila  Sergo STAPLES MD        glucose gel 15-30 g  15-30 g Oral Q15 Min PRN Sergo Doll MD         Or    dextrose 50 % injection 25-50 mL  25-50 mL Intravenous Q15 Min PRSergo Hunter MD         Or    glucagon injection 1 mg  1 mg Subcutaneous Q15 Min PRSergo Hunter MD        enoxaparin ANTICOAGULANT (LOVENOX) injection 40 mg  40 mg Subcutaneous Q24H Sergo Doll MD   40 mg at 04/02/24 2050    [START ON 4/3/2024] fluticasone-vilanterol (BREO ELLIPTA) 200-25 MCG/ACT inhaler 1 puff  1 puff Inhalation Daily Sergo Doll MD        [START ON 4/3/2024] insulin aspart (NovoLOG) injection (RAPID ACTING)  1-7 Units Subcutaneous TID AC Sergo Doll MD        insulin aspart (NovoLOG) injection (RAPID ACTING)  1-5 Units Subcutaneous At Bedtime Sergo Doll MD        [START ON 4/3/2024] insulin NPH injection 12 Units  12 Units Subcutaneous QAM AC Sergo Doll MD        insulin NPH injection 12 Units  12 Units Subcutaneous At Bedtime Sergo Doll MD        ipratropium - albuterol 0.5 mg/2.5 mg/3 mL (DUONEB) neb solution 3 mL  1 vial Nebulization BID PRN Sergo Doll MD        lidocaine (LMX4) cream   Topical Q1H PRN Sergo Doll MD        lidocaine 1 % 0.1-1 mL  0.1-1 mL Other Q1H PRN Sergo Doll MD        [START ON 4/3/2024] omeprazole (PriLOSEC) CR capsule 20 mg  20 mg Oral Daily Sergo Doll MD        ondansetron (ZOFRAN ODT) ODT tab 4 mg  4 mg Oral Q6H PRN Sergo Doll MD         Or    ondansetron (ZOFRAN) injection 4 mg  4 mg Intravenous Q6H PRN Sergo Doll MD        senna-docusate (SENOKOT-S/PERICOLACE) 8.6-50 MG per tablet 1 tablet  1 tablet Oral BID PRN Sergo Doll MD         Or    senna-docusate (SENOKOT-S/PERICOLACE) 8.6-50 MG per tablet 2 tablet  2 tablet Oral BID PRN Sergo Doll MD        sodium chloride (PF) 0.9% PF flush 3 mL  3 mL Intracatheter Q8H Sergo Doll MD   3 mL at 04/02/24 2040    sodium chloride  (PF) 0.9% PF flush 3 mL  3 mL Intracatheter q1 min prn Sergo Doll MD        sodium chloride 0.9 % infusion   Intravenous Continuous Sergo Doll  mL/hr at 04/02/24 2040 New Bag at 04/02/24 2040    vancomycin (VANCOCIN) 1,500 mg in sodium chloride 0.9 % 250 mL intermittent infusion  1,500 mg Intravenous Once Isacc Arguello MD   1,500 mg at 04/02/24 2041     Followed by    [START ON 4/3/2024] vancomycin (VANCOCIN) 1,000 mg in 200 mL dextrose intermittent infusion  1,000 mg Intravenous Q12H Isacc Arguello MD                 Current Outpatient Medications   Medication Sig Dispense Refill    acetaminophen (TYLENOL) 500 MG tablet Take 1-2 tablets (500-1,000 mg) by mouth every 6 hours as needed for pain   0    albuterol (PROAIR HFA/PROVENTIL HFA/VENTOLIN HFA) 108 (90 Base) MCG/ACT inhaler INHALE 2 PUFFS INTO LUNGS EVERY 4 HOURS AS NEEDED FOR SHORTNESS OF BREATH OR  WHEEZING 18 g 1    atorvastatin (LIPITOR) 40 MG tablet Take 1 tablet (40 mg) by mouth daily 90 tablet 3    budesonide-formoterol (SYMBICORT) 160-4.5 MCG/ACT Inhaler Inhale 2 puffs by mouth twice daily 11 g 3    Calcium Carbonate-Vitamin D 500-5 MG-MCG TABS Take 1 tablet by mouth 2 times daily 90 tablet 3    hydrOXYzine (ATARAX) 25 MG tablet Take 1 tablet (25 mg) by mouth 3 times daily as needed for itching 20 tablet 0    insulin  UNIT/ML vial Inject 15 Units Subcutaneous 2 times daily 10 mL 1    ipratropium - albuterol 0.5 mg/2.5 mg/3 mL (DUONEB) 0.5-2.5 (3) MG/3ML neb solution Take 1 vial (3 mLs) by nebulization 2 times daily as needed for shortness of breath, wheezing or cough 90 mL 3    liraglutide (VICTOZA) 18 MG/3ML solution Inject 0.6 mg Subcutaneous daily 6 mL 1    loratadine (CLARITIN) 10 MG tablet Take 1 tablet (10 mg) by mouth daily as needed for allergies 30 tablet 3    magnesium hydroxide (MILK OF MAGNESIA) 400 MG/5ML suspension Take 30 mLs by mouth 2 times daily as needed for constipation 354 mL 0    melatonin 3  MG tablet Take 3 mg by mouth nightly as needed for sleep        metFORMIN (GLUCOPHAGE) 1000 MG tablet Take 1 tablet (1,000 mg) by mouth 2 times daily (with meals) 180 tablet 3    Multiple Vitamins-Minerals (CENTRUM SILVER 50+WOMEN PO) Take 1 tablet by mouth daily        omeprazole (PRILOSEC) 20 MG DR capsule Take 1 capsule (20 mg) by mouth daily 90 capsule 3    oxyCODONE (ROXICODONE) 5 MG tablet Take 0.5 tablets (2.5 mg) by mouth every 6 hours as needed for severe pain 2 tablet 0    vitamin D2 (ERGOCALCIFEROL) 38680 units (1250 mcg) capsule Take 1 capsule (50,000 Units) by mouth once a week 12 capsule 3               ALLERGIES:        Allergies   Allergen Reactions    Gadolinium Derivatives Hives    Iodinated Contrast Media Hives    Azithromycin Other (See Comments) and Rash       Erythema Nodosum x2    Keflex [Cephalexin] Rash    Aspirin Other (See Comments)    Cefuroxime Itching and Other (See Comments)    Contrast Dye Hives       IV    Corylus Other (See Comments)    Diatrizoate Hives    Iodixanol Unknown    Nuts Other (See Comments)       hazelnuts    Septra [Sulfamethoxazole-Trimethoprim] Hives    Sulfa Antibiotics Hives    Metronidazole Itching and Rash    Nitrofurantoin Itching and Rash    Quinolones Rash         FAMILY HISTORY:  Family History         Family History   Problem Relation Age of Onset    Coronary Artery Disease Mother      Diabetes Father      Breast Cancer Maternal Grandmother      Asthma Son      Asthma Daughter      Hemophilia Other      Diabetes Type 2  Father      Factor VIII deficiency Other              SOCIAL HISTORY:   Social History            Socioeconomic History    Marital status: Single   Tobacco Use    Smoking status: Former       Packs/day: .5       Types: Cigarettes       Passive exposure: Past    Smokeless tobacco: Never    Tobacco comments:       2-3 cig/day   Vaping Use    Vaping Use: Never used   Substance and Sexual Activity    Alcohol use: No    Drug use: No      Social  "Determinants of Health           Financial Resource Strain: Low Risk  (12/5/2023)     Financial Resource Strain      Within the past 12 months, have you or your family members you live with been unable to get utilities (heat, electricity) when it was really needed?: No   Food Insecurity: High Risk (12/5/2023)     Food Insecurity      Within the past 12 months, did you worry that your food would run out before you got money to buy more?: Yes      Within the past 12 months, did the food you bought just not last and you didn t have money to get more?: Yes   Transportation Needs: High Risk (12/5/2023)     Transportation Needs      Within the past 12 months, has lack of transportation kept you from medical appointments, getting your medicines, non-medical meetings or appointments, work, or from getting things that you need?: Yes   Interpersonal Safety: Low Risk  (1/29/2024)     Interpersonal Safety      Do you feel physically and emotionally safe where you currently live?: Yes      Within the past 12 months, have you been hit, slapped, kicked or otherwise physically hurt by someone?: No      Within the past 12 months, have you been humiliated or emotionally abused in other ways by your partner or ex-partner?: No   Housing Stability: Low Risk  (12/5/2023)     Housing Stability      Do you have housing? : Yes      Are you worried about losing your housing?: No       Medications:  Reviewed prior to admission meds as applicable in chart review.  Current meds are reviewed in the EMR listed MAR.     ANTIBIOTICS:    Current:V/clinda   Prior:   Allergy to:    SH/FH and  travel history(if applicable to consult):  above  REVIEW OF SYSTEMS:  All other systems negative    EXAMINATION:  /59   Pulse 92   Temp 97.9  F (36.6  C) (Oral)   Resp 16   Ht 1.562 m (5' 1.5\")   Wt 81.8 kg (180 lb 6.4 oz)   SpO2 96%   BMI 33.53 kg/m    Alert, awake  Vitals tabulated above, reviewed  HEENT:nl, tattoo L neck  Neck supple without " "lymphadenopathy  Sclera clear  CARDIOVASCULAR regular rate and rhythm, no murmur  Lungs CLEAR TO AUSCULTATION   Abdomen soft, NT/ND, absent HEPATOSPLENOMEGALY  Skin normal  Joints normal  Neurologic exam non focal  Wound:  NA        CLINICAL DATABASE FOR---LAB/MICRO/CULTURES/IMAGING STUDIES:  Lab Results   Component Value Date    WBC 7.9 04/03/2024    WBC 11.8 08/29/2014     Lab Results   Component Value Date    RBC 3.80 04/03/2024    RBC 4.41 08/29/2014     Lab Results   Component Value Date    HGB 9.0 04/03/2024    HGB 13.6 11/16/2020     Lab Results   Component Value Date    HCT 29.7 04/03/2024    HCT 42.6 11/16/2020     No components found for: \"MCT\"  Lab Results   Component Value Date    MCV 78 04/03/2024    MCV 95.7 11/16/2020     Lab Results   Component Value Date    MCH 23.7 04/03/2024    MCH 30.6 11/16/2020     Lab Results   Component Value Date    MCHC 30.3 04/03/2024    MCHC 31.9 11/16/2020     Lab Results   Component Value Date    RDW 15.8 04/03/2024    RDW 12.5 11/28/2017     Lab Results   Component Value Date     04/03/2024     08/29/2014       Last Comprehensive Metabolic Panel:  Sodium   Date Value Ref Range Status   04/03/2024 139 135 - 145 mmol/L Final     Comment:     Reference intervals for this test were updated on 09/26/2023 to more accurately reflect our healthy population. There may be differences in the flagging of prior results with similar values performed with this method. Interpretation of those prior results can be made in the context of the updated reference intervals.    11/16/2020 140.0 133.0 - 144.0 mmol/L Final     Potassium   Date Value Ref Range Status   04/03/2024 3.6 3.4 - 5.3 mmol/L Final   03/12/2024 4.4 3.4 - 5.3 mmol/L Final   11/16/2020 4.5 3.4 - 5.3 mmol/L Final     Chloride   Date Value Ref Range Status   04/03/2024 105 98 - 107 mmol/L Final   03/12/2024 104 94 - 109 mmol/L Final   11/16/2020 100.0 94.0 - 109.0 mmol/L Final     Carbon Dioxide   Date Value " Ref Range Status   11/16/2020 30.0 20.0 - 32.0 mmol/L Final     Carbon Dioxide (CO2)   Date Value Ref Range Status   04/03/2024 24 22 - 29 mmol/L Final   03/12/2024 29 20 - 32 mmol/L Final     Anion Gap   Date Value Ref Range Status   04/03/2024 10 7 - 15 mmol/L Final   03/12/2024 12 3 - 14 mmol/L Final     Glucose   Date Value Ref Range Status   04/03/2024 106 (H) 70 - 99 mg/dL Final   03/12/2024 128 (H) 70 - 99 mg/dL Final   11/16/2020 113.0 (H) 60.0 - 109.0 mg/dL Final     GLUCOSE BY METER POCT   Date Value Ref Range Status   04/03/2024 104 (H) 70 - 99 mg/dL Final     Urea Nitrogen   Date Value Ref Range Status   04/03/2024 9.9 8.0 - 23.0 mg/dL Final   03/12/2024 10 7 - 30 mg/dL Final   11/16/2020 18.0 7.0 - 30.0 mg/dL Final     Creatinine   Date Value Ref Range Status   04/03/2024 0.55 0.51 - 0.95 mg/dL Final   11/16/2020 0.5 (L) 0.6 - 1.3 mg/dL Final     GFR Estimate   Date Value Ref Range Status   04/03/2024 >90 >60 mL/min/1.73m2 Final   04/06/2021 >60 >60 mL/min/1.73m2 Final   08/29/2014 90 >60 mL/min/1.7m2 Final     Comment:     Non  GFR Calc     Calcium   Date Value Ref Range Status   04/03/2024 8.9 8.6 - 10.0 mg/dL Final   11/16/2020 9.6 8.5 - 10.4 mg/dL Final       Liver Function Studies -   Recent Labs   Lab Test 09/01/23  1950   PROTTOTAL 6.1*   ALBUMIN 3.8   BILITOTAL 0.2   ALKPHOS 77   AST 16   ALT 17       Sed Rate   Date Value Ref Range Status   08/29/2014 56 (H) 0 - 30 mm/h Final     Erythrocyte Sedimentation Rate   Date Value Ref Range Status   09/01/2023 19 0 - 30 mm/hr Final       CRP Inflammation   Date Value Ref Range Status   08/29/2014 75.0 (H) 0.0 - 8.0 mg/L Final     CRP   Date Value Ref Range Status   05/03/2018 0.1 0.0 - 0.8 mg/dL Final               IMPRESSION:  Stasis dermatitis LLE likely with superinfection celllitis (mild)  Nodosum lesion (maybe) R leg  Follicular pustule R toe    PLAN:  Decolonize on leaving with bactroban, hibiclens  She thought the L leg began  with a nodular lesion like the R leg has now.  ED notes in past brought up pyoderma, but I doubt that.   Will follow  She's already better should not need long in hospital          YANET HOLLINS MD  [facility]  Office [phone]

## 2024-04-03 NOTE — CONSULTS
Care Management Initial Consult    General Information  Assessment completed with: Patient, pt  Type of CM/SW Visit: Initial Assessment    Primary Care Provider verified and updated as needed: Yes   Readmission within the last 30 days: previous discharge plan unsuccessful, lack of support   Return Category: Exacerbation of disease  Reason for Consult: discharge planning  Advance Care Planning: Advance Care Planning Reviewed: no concerns identified, verified with patient     General Information Comments: recent admit 3/22-3/30, lives alone w/ILS workers for support and goal is home w/assist and ILS worker to transport    Communication Assessment  Patient's communication style: spoken language (English or Bilingual)             Cognitive  Cognitive/Neuro/Behavioral: WDL                      Living Environment:   People in home: alone     Current living Arrangements: apartment      Able to return to prior arrangements: yes       Family/Social Support:  Care provided by: other (see comments) (ILS workers)  Provides care for: no one  Marital Status: Single  Other (specify) (ILS workers)          Description of Support System: Involved, Supportive    Support Assessment: Adequate family and caregiver support, Adequate social supports    Current Resources:   Patient receiving home care services: No     Community Resources: County Programs, County Worker  Equipment currently used at home:    Supplies currently used at home: None    Employment/Financial:  Employment Status:          Financial Concerns: none   Referral to Financial Worker: No       Does the patient's insurance plan have a 3 day qualifying hospital stay waiver?  No    Lifestyle & Psychosocial Needs:  Social Determinants of Health     Food Insecurity: High Risk (12/5/2023)    Food Insecurity     Within the past 12 months, did you worry that your food would run out before you got money to buy more?: Yes     Within the past 12 months, did the food you bought just  not last and you didn t have money to get more?: Yes   Depression: Not at risk (3/22/2024)    PHQ-2     PHQ-2 Score: 2   Housing Stability: Low Risk  (12/5/2023)    Housing Stability     Do you have housing? : Yes     Are you worried about losing your housing?: No   Tobacco Use: Medium Risk (4/2/2024)    Patient History     Smoking Tobacco Use: Former     Smokeless Tobacco Use: Never     Passive Exposure: Past   Financial Resource Strain: Low Risk  (12/5/2023)    Financial Resource Strain     Within the past 12 months, have you or your family members you live with been unable to get utilities (heat, electricity) when it was really needed?: No   Alcohol Use: Not on file   Transportation Needs: High Risk (12/5/2023)    Transportation Needs     Within the past 12 months, has lack of transportation kept you from medical appointments, getting your medicines, non-medical meetings or appointments, work, or from getting things that you need?: Yes   Physical Activity: Not on file   Interpersonal Safety: Low Risk  (1/29/2024)    Interpersonal Safety     Do you feel physically and emotionally safe where you currently live?: Yes     Within the past 12 months, have you been hit, slapped, kicked or otherwise physically hurt by someone?: No     Within the past 12 months, have you been humiliated or emotionally abused in other ways by your partner or ex-partner?: No   Stress: Not on file   Social Connections: Not on file       Functional Status:  Prior to admission patient needed assistance:   Dependent ADLs:: Independent  Dependent IADLs:: Independent  Assesssment of Functional Status: Not at baseline with ADL Functioning, Not at baseline with mobility    Mental Health Status:  Mental Health Status: No Current Concerns       Chemical Dependency Status:                Values/Beliefs:  Spiritual, Cultural Beliefs, Jain Practices, Values that affect care:                 Additional Information:  Assessed, recent admit 3/22-3/30,  lives alone w/ILS workers for support and goal is home w/assist and ILS worker to transport. CM to follow for discharge needs.    Erica Patrick RN

## 2024-04-03 NOTE — CONSULTS
[facility]  WOC Nurse Inpatient Assessment     Consulted for: LLE cellulitis      Patient History (according to provider note(s):      Mary Ann Olson is a 58 year old female with past medical history of T2DM, GERD, COPD, HLD, and recent admission for left lower leg cellulitis admitted on 4/2/2024 for worsening of her cellulitis since discharge on 3/30/24.     Assessment:      Areas visualized during today's visit:  LLE    Skin Injury Location: posterior LLE        Last photo: 4/3  Skin injury due to:  possible cellulitis   Skin history and plan of care:   recent hospital stay for same issue, sent home on PO abx but per patient they were ineffective  Affected area:      Skin assessment: Dry/flaky, Dry drainage, Erythema, and Serous crusting     Measurements (length x width x depth, in cm) 20 cm  x 20 cm approx area covered     Color: pink     Temperature  warm     Drainage: small .      Color: serous      Odor: none  Pain: mild, shooting  Pain interventions prior to dressing change: patient tolerated well and slow and gentle cares   Treatment goal: Drainage control and Protection  STATUS: initial assessment  Supplies ordered: ordered vashe and supplies stored on unit       Treatment Plan:     LLE wound(s): Monday/Wednesday/Friday and PRN if dressing soiled, saturated or falls off  Cleanse wound with Vashe moistened gauze for 5-10 minutes, gently pat dry  Use adaptic as primary dressing with ABD as secondary  Wrap with kerlix to secure     Orders: Written    RECOMMEND PRIMARY TEAM ORDER: None, at this time  Education provided: plan of care  Discussed plan of care with: Patient  WOC nurse follow-up plan: weekly  Notify WOC if wound(s) deteriorate.  Nursing to notify the Provider(s) and re-consult the WOC Nurse if new skin concern.    DATA:     Current support surface: Standard  Standard gel/foam mattress (IsoFlex, Atmos air, etc)  Containment of urine/stool: Continent of bladder and  Continent of bowel  BMI: Body mass index is 33.53 kg/m .   Active diet order: Orders Placed This Encounter      Combination Diet Regular Diet Adult     Output: I/O last 3 completed shifts:  In: 200 [P.O.:200]  Out: -      Labs:   Recent Labs   Lab 04/03/24  0700   HGB 9.0*   WBC 7.9     Pressure injury risk assessment:   Sensory Perception: 4-->no impairment  Moisture: 4-->rarely moist  Activity: 3-->walks occasionally  Mobility: 3-->slightly limited  Nutrition: 3-->adequate  Friction and Shear: 3-->no apparent problem  Rodrigo Score: 20    DOMINIC Santoyo RN [facility] services  Pager no longer in use, please contact through [employer] group: [facility]

## 2024-04-04 ENCOUNTER — TELEPHONE (OUTPATIENT)
Dept: DERMATOLOGY | Facility: CLINIC | Age: 59
End: 2024-04-04
Payer: COMMERCIAL

## 2024-04-04 LAB
CREAT SERPL-MCNC: 0.51 MG/DL (ref 0.51–0.95)
EGFRCR SERPLBLD CKD-EPI 2021: >90 ML/MIN/1.73M2
ERYTHROCYTE [DISTWIDTH] IN BLOOD BY AUTOMATED COUNT: 15.8 % (ref 10–15)
GLUCOSE BLDC GLUCOMTR-MCNC: 125 MG/DL (ref 70–99)
GLUCOSE BLDC GLUCOMTR-MCNC: 127 MG/DL (ref 70–99)
GLUCOSE BLDC GLUCOMTR-MCNC: 135 MG/DL (ref 70–99)
GLUCOSE BLDC GLUCOMTR-MCNC: 143 MG/DL (ref 70–99)
GLUCOSE BLDC GLUCOMTR-MCNC: 151 MG/DL (ref 70–99)
HCT VFR BLD AUTO: 30.3 % (ref 35–47)
HGB BLD-MCNC: 9.4 G/DL (ref 11.7–15.7)
MCH RBC QN AUTO: 23.9 PG (ref 26.5–33)
MCHC RBC AUTO-ENTMCNC: 31 G/DL (ref 31.5–36.5)
MCV RBC AUTO: 77 FL (ref 78–100)
PLATELET # BLD AUTO: 512 10E3/UL (ref 150–450)
RBC # BLD AUTO: 3.94 10E6/UL (ref 3.8–5.2)
VANCOMYCIN SERPL-MCNC: 14 UG/ML
WBC # BLD AUTO: 8 10E3/UL (ref 4–11)

## 2024-04-04 PROCEDURE — 250N000011 HC RX IP 250 OP 636: Performed by: FAMILY MEDICINE

## 2024-04-04 PROCEDURE — 36415 COLL VENOUS BLD VENIPUNCTURE: CPT | Performed by: FAMILY MEDICINE

## 2024-04-04 PROCEDURE — 250N000011 HC RX IP 250 OP 636

## 2024-04-04 PROCEDURE — 250N000013 HC RX MED GY IP 250 OP 250 PS 637

## 2024-04-04 PROCEDURE — 120N000001 HC R&B MED SURG/OB

## 2024-04-04 PROCEDURE — 258N000003 HC RX IP 258 OP 636: Performed by: FAMILY MEDICINE

## 2024-04-04 PROCEDURE — 80202 ASSAY OF VANCOMYCIN: CPT | Performed by: FAMILY MEDICINE

## 2024-04-04 PROCEDURE — 82728 ASSAY OF FERRITIN: CPT | Performed by: FAMILY MEDICINE

## 2024-04-04 PROCEDURE — 999N000248 HC STATISTIC IV INSERT WITH US BY RN

## 2024-04-04 PROCEDURE — 82565 ASSAY OF CREATININE: CPT | Performed by: FAMILY MEDICINE

## 2024-04-04 PROCEDURE — 85027 COMPLETE CBC AUTOMATED: CPT

## 2024-04-04 PROCEDURE — 99232 SBSQ HOSP IP/OBS MODERATE 35: CPT | Mod: GC

## 2024-04-04 PROCEDURE — 99232 SBSQ HOSP IP/OBS MODERATE 35: CPT | Performed by: INTERNAL MEDICINE

## 2024-04-04 RX ORDER — NALOXONE HYDROCHLORIDE 0.4 MG/ML
0.2 INJECTION, SOLUTION INTRAMUSCULAR; INTRAVENOUS; SUBCUTANEOUS
Status: DISCONTINUED | OUTPATIENT
Start: 2024-04-04 | End: 2024-04-05 | Stop reason: HOSPADM

## 2024-04-04 RX ORDER — NALOXONE HYDROCHLORIDE 0.4 MG/ML
0.4 INJECTION, SOLUTION INTRAMUSCULAR; INTRAVENOUS; SUBCUTANEOUS
Status: DISCONTINUED | OUTPATIENT
Start: 2024-04-04 | End: 2024-04-05 | Stop reason: HOSPADM

## 2024-04-04 RX ADMIN — Medication 1 TABLET: at 19:22

## 2024-04-04 RX ADMIN — CLINDAMYCIN PHOSPHATE 900 MG: 900 INJECTION, SOLUTION INTRAVENOUS at 09:06

## 2024-04-04 RX ADMIN — Medication 1 CAPSULE: at 19:22

## 2024-04-04 RX ADMIN — Medication 1 TABLET: at 09:05

## 2024-04-04 RX ADMIN — FLUTICASONE FUROATE AND VILANTEROL TRIFENATATE 1 PUFF: 200; 25 POWDER RESPIRATORY (INHALATION) at 09:06

## 2024-04-04 RX ADMIN — INSULIN ASPART 1 UNITS: 100 INJECTION, SOLUTION INTRAVENOUS; SUBCUTANEOUS at 16:43

## 2024-04-04 RX ADMIN — ACETAMINOPHEN 650 MG: 325 TABLET ORAL at 04:37

## 2024-04-04 RX ADMIN — INSULIN ASPART 1 UNITS: 100 INJECTION, SOLUTION INTRAVENOUS; SUBCUTANEOUS at 08:23

## 2024-04-04 RX ADMIN — CLINDAMYCIN PHOSPHATE 900 MG: 900 INJECTION, SOLUTION INTRAVENOUS at 00:49

## 2024-04-04 RX ADMIN — ACETAMINOPHEN 650 MG: 325 TABLET ORAL at 20:42

## 2024-04-04 RX ADMIN — ENOXAPARIN SODIUM 40 MG: 40 INJECTION SUBCUTANEOUS at 19:22

## 2024-04-04 RX ADMIN — Medication 1 CAPSULE: at 09:06

## 2024-04-04 RX ADMIN — ACETAMINOPHEN 650 MG: 325 TABLET ORAL at 16:04

## 2024-04-04 RX ADMIN — Medication 3 MG: at 21:36

## 2024-04-04 RX ADMIN — PANTOPRAZOLE SODIUM 40 MG: 40 TABLET, DELAYED RELEASE ORAL at 05:47

## 2024-04-04 RX ADMIN — ATORVASTATIN CALCIUM 40 MG: 40 TABLET, FILM COATED ORAL at 09:05

## 2024-04-04 RX ADMIN — ACETAMINOPHEN 650 MG: 325 TABLET ORAL at 11:27

## 2024-04-04 RX ADMIN — CLINDAMYCIN PHOSPHATE 900 MG: 900 INJECTION, SOLUTION INTRAVENOUS at 16:10

## 2024-04-04 RX ADMIN — HUMAN INSULIN 12 UNITS: 100 INJECTION, SUSPENSION SUBCUTANEOUS at 08:24

## 2024-04-04 ASSESSMENT — ACTIVITIES OF DAILY LIVING (ADL)
ADLS_ACUITY_SCORE: 39
ADLS_ACUITY_SCORE: 24
ADLS_ACUITY_SCORE: 24
ADLS_ACUITY_SCORE: 39
ADLS_ACUITY_SCORE: 24
ADLS_ACUITY_SCORE: 40
ADLS_ACUITY_SCORE: 24
ADLS_ACUITY_SCORE: 39
ADLS_ACUITY_SCORE: 24

## 2024-04-04 NOTE — PROGRESS NOTES
[facility]    Progress Note - Hospitalist Service       Date of Admission:  4/2/2024    Assessment & Plan   Mary Ann Olson is a 58 year old female admitted on 4/2/2024 with past medical history of T2DM, GERD, COPD, HLD, and recent admission for left lower leg cellulitis admitted on 4/2/2024 for worsening of her cellulitis since discharge on 3/30/24.      LLE Cellullitis   Was initially seen in the ED on 3/18 for LLE cellulitis and started on amoxicillin and doxycycline and then admitted on 3/22 for worsening despite treatment. Ultrasound did not demonstrate any abscess formation. She was initially started on vancomycin and then transitioned to IV clindamycin with significant improvement. She was discharged home on 3/30 with two days of oral clindamycin to complete a total of 7 days. Blood cultures during this admission were negative but had a wound culture positive for staph epidermidis. She was seen in clinic on 4/2/24 at [facility] and noticed to have worsening of erythema with minor bleeding but no active purulence or discharge. ED work-up notable for WBC of 12.6 (up from 8.1 3 days ago) and CRP of 55.7, concerning for worsening of cellulitis and possible abscess formation. Clear improvement on exam 4/4/24, erythema has decreased in size within marked borders. Still some yellow/green oozing on bandages but no evidence of new pus formation or ulceration. Pain seems improved on exam as well, pt. Declining PO oxycodone so will discontinue PRN. Currently managing with tylenol. No clinical evidence of systemic infection  -ID to reassess 4/4/24, states no need for additional abx or hospitalization  -Pt would like to stay the night for resolution of abdominal pain  -Called [facility] derm to schedule patient follow up for outpatient for evaluation of cellulitis with poss. Biopsy, erythema nodosum        CT tibia fibula 4/2/24 - no discrete abscess on non-contrast exam, no evidence for deep  "compartment involvement or soft tissue gas  WBC 7.9, CRP 51.8, pt afebrile without signs of systemic infection     -ID Consulted 4/3/24 - recs appreciated  -Continue IV vancomycin, pharmacy to dose, q12h  -Continue IV clindamycin 900 mg q8h  -WOC consult placed for wound care mgmt  -Consider [place] abx coverage pending ID recommendations  -Could consider steroids if not improving in the next few days however she has significant psychiatric history which would lead me to exercise caution     T2DM  A1c of 12.2 on 1/19/24. Currently takes liraglutide, metformin, and 15U of NPH twice daily. BG have been low 100's since admission on current inpatient regimen  -Continue 12U NPH BID   -Medium dose sliding scale      COPD  Currently well-controlled and asymptomatic.   -PTA breo ellipta      Hx Crohns Disease  Per chart review diagnosed in 2016, not currently on any medications and denies abdominal pain, bowel symptoms, no melena or hematochezia  Pt endorsing BL LQ abdominal pain starting AM 4/4/24. Abdomen soft on exam, patient endorses small BM and passing flatus this AM. Denies N/V, ate this morning without issues. Not currently on any medications for crohn's disease for the past 2 years pt reports due to lack of insurance coverage. Given equivocal findings on exam I do not recommend further evaluation at this time but continue to monitor     HLD  -PTA atorvastatin      Continued PTA multivitamin, calcium carbonate per pt. request           Diet: Combination Diet Regular Diet Adult    DVT Prophylaxis: Enoxaparin (Lovenox) SQ  Beckwith Catheter: Not present  Fluids: PO  Lines: None     Cardiac Monitoring: None  Code Status: Full Code      Clinically Significant Risk Factors                        # DMII: A1C = 12.2 % (Ref range: 0.0 - 5.6 %) within past 6 months, PRESENT ON ADMISSION  # Obesity: Estimated body mass index is 31.26 kg/m  as calculated from the following:    Height as of this encounter: 1.626 m (5' 4\").    " "Weight as of this encounter: 82.6 kg (182 lb 1.6 oz)., PRESENT ON ADMISSION     # Financial/Environmental Concerns: none         Disposition Plan      Expected Discharge Date: 04/04/2024    Discharge Delays: IV Medication - consider oral or Home Infusion            The patient's care was discussed with the Attending Physician, Dr. Arguello .    Ajit Byrnes MD  Hospitalist Service  [facility]  Securely message with [employer] (more info)  Text page via [employer] Paging/Directory   ______________________________________________________________________    Interval History   Patient rating \"burning\" pain in her LLE 10/10, declining PO oxy will discontinue today. States tylenol PRN has been somewhat effective. Endorsing some pain in her L eye, I did assess and her visual fields are intact x4 BL, EOM intact, PERRLA, no eyelid swelling or erythema present. She states drinking cold water improved her pain. Got sumatriptan last night for suspected migraine headache which she states was not effective. Reassured patient she likely does not have uveitis which she was concerned about. Does endorse waxing and waning blurry vision but not appreciated on my exam 4/4/24. No scleral discoloration.    Endorsing stomach upset from antibiotics, will discuss with ID about continuing IV abx today    Physical Exam   Vital Signs: Temp: 98.6  F (37  C) Temp src: Oral BP: 128/63 Pulse: 87   Resp: 18 SpO2: 96 % O2 Device: None (Room air)    Weight: 182 lbs 1.6 oz    Physical Exam  Constitutional:       General: She is not in acute distress.     Appearance: Normal appearance. She is not ill-appearing.   HENT:      Mouth/Throat:      Mouth: Mucous membranes are moist.      Pharynx: Oropharynx is clear.   Eyes:      General:         Left eye: No discharge.      Extraocular Movements: Extraocular movements intact.      Conjunctiva/sclera: Conjunctivae normal.      Pupils: Pupils are equal, round, and reactive to light. "   Cardiovascular:      Rate and Rhythm: Normal rate and regular rhythm.      Pulses: Normal pulses.      Heart sounds: Normal heart sounds.   Pulmonary:      Effort: Pulmonary effort is normal.      Breath sounds: Normal breath sounds.   Abdominal:      General: Bowel sounds are normal.      Palpations: Abdomen is soft.      Tenderness: There is abdominal tenderness.      Comments: Tenderness to LQ BL  Soft abdomen on exam, no distension, masses, or guarding appreciated  Bowel sounds appreciated x4 quadrants   Musculoskeletal:      Right lower leg: No edema.      Left lower leg: No edema.   Skin:     Findings: Erythema present.      Comments: LLE improved on exam 4/4/24, erythema has decreased in size since 4/3/24, still TTP and some warmth appreciated on palpation. Slight yellow/green fluid spotting on dressing from superficial skin lesions, improving. Left dressing open for ID to evaluate   Neurological:      Mental Status: She is alert.        Data     I have personally reviewed the following data over the past 24 hrs:    8.0  \   9.4 (L)   / 512 (H)     N/A N/A N/A /  135 (H)   N/A N/A 0.51 \       Imaging results reviewed over the past 24 hrs:   No results found for this or any previous visit (from the past 24 hour(s)).    Ajit Byrnes MD  PGY-1  [facility] Medicine Residency  [facility]   April 4, 2024

## 2024-04-04 NOTE — PHARMACY-VANCOMYCIN DOSING SERVICE
Pharmacy Vancomycin Note  Date of Service 2024  Patient's  1965   58 year old, female    Indication: Skin and Soft Tissue Infection  Day of Therapy: 3  Current vancomycin regimen:  1250 mg IV q12h  Current vancomycin monitoring method: AUC  Current vancomycin therapeutic monitoring goal: 400-600 mg*h/L    Insight[employer] Prediction of Current Vancomycin Regimen  Regimen: 1250 mg IV every 12 hours.  Start time: 09:35 on 2024  Exposure target: AUC24 (range)400-600 mg/L.hr   AUC24,ss: 482 mg/L.hr  Probability of AUC24 > 400: 89 %  Ctrough,ss: 14.1 mg/L  Probability of Ctrough,ss > 20: 8 %  Probability of nephrotoxicity (Lodise LESLYE ): 9 %    Current estimated CrCl = Estimated Creatinine Clearance: 123.6 mL/min (based on SCr of 0.51 mg/dL).    Creatinine for last 3 days  2024:  3:58 PM Creatinine 0.66 mg/dL;  7:19 PM Creatinine 0.53 mg/dL  4/3/2024:  7:00 AM Creatinine 0.55 mg/dL  2024:  5:51 AM Creatinine 0.51 mg/dL    Recent Vancomycin Levels (past 3 days)  2024:  5:51 AM Vancomycin 14.0 ug/mL    Vancomycin IV Administrations (past 72 hours)                     vancomycin (VANCOCIN) 1,250 mg in sodium chloride 0.9 % 250 mL intermittent infusion (mg) 1,250 mg New Bag 24 2135    vancomycin (VANCOCIN) 1,000 mg in 200 mL dextrose intermittent infusion (mg) 1,000 mg New Bag 24 0804    vancomycin (VANCOCIN) 1,500 mg in sodium chloride 0.9 % 250 mL intermittent infusion (mg) 1,500 mg New Bag 24 204                    Nephrotoxins and other renal medications (From now, onward)      Start     Dose/Rate Route Frequency Ordered Stop    24  vancomycin (VANCOCIN) 1,250 mg in sodium chloride 0.9 % 250 mL intermittent infusion        See Hyperspace for full Linked Orders Report.    1,250 mg  over 90 Minutes Intravenous EVERY 12 HOURS 24 1244                 Contrast Orders - past 72 hours (72h ago, onward)      None            Interpretation of levels and current  regimen:  Vancomycin level is reflective of -600    Has serum creatinine changed greater than 50% in last 72 hours: No    Urine output:  unable to determine    Renal Function: Stable    Plan:  Continue Current Dose  Vancomycin monitoring method: AUC  Vancomycin therapeutic monitoring goal: 400-600 mg*h/L  Pharmacy will check vancomycin levels as appropriate in 1-3 Days.  Serum creatinine levels will be ordered daily for the first week of therapy and at least twice weekly for subsequent weeks.    Jhony Scott RPH

## 2024-04-04 NOTE — PLAN OF CARE
"Problem: Adult Inpatient Plan of Care  Goal: Absence of Hospital-Acquired Illness or Injury  Outcome: Progressing  Intervention: Identify and Manage Fall Risk  Recent Flowsheet Documentation  Taken 4/3/2024 1720 by Minesh Bartholomew RN  Safety Promotion/Fall Prevention:   activity supervised   assistive device/personal items within reach   clutter free environment maintained   lighting adjusted   nonskid shoes/slippers when out of bed   patient and family education   safety round/check completed   supervised activity     Problem: Adult Inpatient Plan of Care  Goal: Optimal Comfort and Wellbeing  Outcome: Progressing  Intervention: Monitor Pain and Promote Comfort  Recent Flowsheet Documentation  Taken 4/3/2024 1910 by Minesh Bartholomew, RN  Pain Management Interventions: medication (see MAR)  Taken 4/3/2024 1900 by Minesh Bartholomew RN  Pain Management Interventions: repositioned  Taken 4/3/2024 1803 by Minesh Bartholomew RN  Pain Management Interventions: medication (see MAR)     Problem: Adult Inpatient Plan of Care  Goal: Readiness for Transition of Care  Outcome: Not Progressing     Problem: Skin or Soft Tissue Infection  Goal: Absence of Infection Signs and Symptoms  Outcome: Progressing     Pt alert and oriented. Dx: cellulitis of LLE. Pt recently admitted and was discharged on PO clindamycin, but came back to ED after going to her clinic and MD saw no improvement to her LLE cellulitis. Pt c/o \"20/10\" pain to LLE. Dr. Castillo notified of increased pain and encouraged to continue using PRN oxycodone. PRN oxycodone given, which was somewhat effective. Pt does state having pain to her LLE intermittently. Kerlix dressing to Pt also complained of sharp pain to her left eye and periorbital area. House officer notified. Ice pack and PRN acetaminophen given while awaiting response. Pt stated pain is better. Per pt, this pain has been ongoing for the past 3 weeks. Sticky note left for rounding team. Pt refused to have coccyx/buttocks " "assess for skin assessment. Pt refused admission questions at this time and stated, \"Do it later.\" IV clindamycin and IV vancomycin given this shift. Pt educated on using the call light for transfers and cares. Pt refused to have bed alarm on, even w/ education stating, \"It will be really loud and make my head hurt more. I'll call.\" Pt was stand by assist when pt transferred from the wheelchair to the bed when she transferred from the ED.  "

## 2024-04-04 NOTE — PLAN OF CARE
Patient alert and oriented. Patient had fluctuations in her mood today, sometimes irritable and then also weepy/tearful.  Infectious disease stopped by and stated she will need a dermatology appointment and does not need antibiotics.  Wound care provided to lower left leg after MD's assessed today.  Patient refused her Vancomycin today after having abdominal discomfort and ID's visit today. Joanie Burrell RN     Problem: Adult Inpatient Plan of Care  Goal: Absence of Hospital-Acquired Illness or Injury  Outcome: Progressing  Intervention: Identify and Manage Fall Risk  Recent Flowsheet Documentation  Taken 4/4/2024 0900 by Joanie Burrell RN  Safety Promotion/Fall Prevention: lighting adjusted  Intervention: Prevent Skin Injury  Recent Flowsheet Documentation  Taken 4/4/2024 0900 by Joanie Burrell RN  Body Position: position changed independently  Intervention: Prevent Infection  Recent Flowsheet Documentation  Taken 4/4/2024 0900 by Joanie Burrell RN  Infection Prevention:   rest/sleep promoted   single patient room provided  Goal: Optimal Comfort and Wellbeing  Outcome: Progressing     Problem: Skin or Soft Tissue Infection  Goal: Absence of Infection Signs and Symptoms  Outcome: Progressing  Intervention: Minimize and Manage Infection Progression  Recent Flowsheet Documentation  Taken 4/4/2024 0900 by Joanie Burrell RN  Infection Prevention:   rest/sleep promoted   single patient room provided   Goal Outcome Evaluation:

## 2024-04-04 NOTE — PLAN OF CARE
"  Problem: Adult Inpatient Plan of Care  Goal: Plan of Care Review  Description: The Plan of Care Review/Shift note should be completed every shift.  The Outcome Evaluation is a brief statement about your assessment that the patient is improving, declining, or no change.  This information will be displayed automatically on your shift  note.  Outcome: Progressing  Goal: Patient-Specific Goal (Individualized)  Description: You can add care plan individualizations to a care plan. Examples of Individualization might be:  \"Parent requests to be called daily at 9am for status\", \"I have a hard time hearing out of my right ear\", or \"Do not touch me to wake me up as it startles  me\".  Outcome: Progressing  Goal: Absence of Hospital-Acquired Illness or Injury  Outcome: Progressing  Intervention: Identify and Manage Fall Risk  Recent Flowsheet Documentation  Taken 4/4/2024 0049 by Donna Monaco, RN  Safety Promotion/Fall Prevention: lighting adjusted  Intervention: Prevent Skin Injury  Recent Flowsheet Documentation  Taken 4/4/2024 0049 by Donna Monaco, RN  Body Position: position changed independently  Intervention: Prevent Infection  Recent Flowsheet Documentation  Taken 4/4/2024 0049 by Donna Monaco, RN  Infection Prevention:   rest/sleep promoted   single patient room provided  Goal: Optimal Comfort and Wellbeing  Outcome: Progressing  Goal: Readiness for Transition of Care  Outcome: Progressing  Intervention: Mutually Develop Transition Plan  Recent Flowsheet Documentation  Taken 4/4/2024 0437 by Donna Monaco, RN  Equipment Currently Used at Home: none   Goal Outcome Evaluation:       Pt a/o / frustrated about not understanding what is going on more than the cellulitis. Pt rates pain \"burning\" and up to 20 of  0-10. Pt declines prn oxycodone but took prn tylenol. Pt up independently in room, refusing bed alarm (noted to be steady on feet). Will monitor.     Pt refusing lab draws this morning despite " education d/t being tired. Pt stated she probably would let lab draws be done after 0900. Lab notified and will update on coming RN.       Pt called back and said she would allow lab to draw now. Lab notified and drawn.

## 2024-04-04 NOTE — PROGRESS NOTES
"[facility] Progress Note    SUBJECTIVE:  leg examined.  Super well demarcated nummular lesion with a purple border area.  This is not ecthyma.  No central eschar either.        REVIEW OF SYSTEMS:  Negative unless as listed above.  Social history, Family history, Medications: reviewed.    OBJECTIVE:  /63 (BP Location: Left arm)   Pulse 87   Temp 98.6  F (37  C) (Oral)   Resp 18   Ht 1.626 m (5' 4\")   Wt 82.6 kg (182 lb 1.6 oz)   SpO2 96%   BMI 31.26 kg/m                PHYSICAL EXAM:  Alert, awake  Vitals tabulated above, reviewed  Neck supple without lymphadenopathy  Sclera normal color, not injected  CARDIOVASCULAR regular rate and rhythm, no murmur  Lungs CLEAR TO AUSCULTATION   Abdomen soft, NT/ND, absent HEPATOSPLENOMEGALY  Skin normal  Joints normal  Neurologic exam non focal    Antibiotics:    Pertinent labs:    Recent Labs   Lab Test 04/04/24  0551 04/03/24  0700 04/02/24  1558   WBC 8.0 7.9 12.6*   HGB 9.4* 9.0* 10.7*   HCT 30.3* 29.7* 35.3   MCV 77* 78 77*   * 535* 659*       Lab Results   Component Value Date    RBC 3.94 04/04/2024    RBC 4.41 08/29/2014     Lab Results   Component Value Date    HGB 9.4 04/04/2024    HGB 13.6 11/16/2020     Lab Results   Component Value Date    HCT 30.3 04/04/2024    HCT 42.6 11/16/2020     No components found for: \"MCT\"  Lab Results   Component Value Date    MCV 77 04/04/2024    MCV 95.7 11/16/2020     Lab Results   Component Value Date    MCH 23.9 04/04/2024    MCH 30.6 11/16/2020     Lab Results   Component Value Date    MCHC 31.0 04/04/2024    MCHC 31.9 11/16/2020     Lab Results   Component Value Date    RDW 15.8 04/04/2024    RDW 12.5 11/28/2017     Lab Results   Component Value Date     04/04/2024     08/29/2014       Last Comprehensive Metabolic Panel:  Sodium   Date Value Ref Range Status   04/03/2024 139 135 - 145 mmol/L Final     Comment:     Reference intervals for this test were updated on 09/26/2023 to more " accurately reflect our healthy population. There may be differences in the flagging of prior results with similar values performed with this method. Interpretation of those prior results can be made in the context of the updated reference intervals.    11/16/2020 140.0 133.0 - 144.0 mmol/L Final     Potassium   Date Value Ref Range Status   04/03/2024 3.6 3.4 - 5.3 mmol/L Final   03/12/2024 4.4 3.4 - 5.3 mmol/L Final   11/16/2020 4.5 3.4 - 5.3 mmol/L Final     Chloride   Date Value Ref Range Status   04/03/2024 105 98 - 107 mmol/L Final   03/12/2024 104 94 - 109 mmol/L Final   11/16/2020 100.0 94.0 - 109.0 mmol/L Final     Carbon Dioxide   Date Value Ref Range Status   11/16/2020 30.0 20.0 - 32.0 mmol/L Final     Carbon Dioxide (CO2)   Date Value Ref Range Status   04/03/2024 24 22 - 29 mmol/L Final   03/12/2024 29 20 - 32 mmol/L Final     Anion Gap   Date Value Ref Range Status   04/03/2024 10 7 - 15 mmol/L Final   03/12/2024 12 3 - 14 mmol/L Final     Glucose   Date Value Ref Range Status   03/12/2024 128 (H) 70 - 99 mg/dL Final   11/16/2020 113.0 (H) 60.0 - 109.0 mg/dL Final     GLUCOSE BY METER POCT   Date Value Ref Range Status   04/04/2024 135 (H) 70 - 99 mg/dL Final     Urea Nitrogen   Date Value Ref Range Status   04/03/2024 9.9 8.0 - 23.0 mg/dL Final   03/12/2024 10 7 - 30 mg/dL Final   11/16/2020 18.0 7.0 - 30.0 mg/dL Final     Creatinine   Date Value Ref Range Status   04/04/2024 0.51 0.51 - 0.95 mg/dL Final   11/16/2020 0.5 (L) 0.6 - 1.3 mg/dL Final     GFR Estimate   Date Value Ref Range Status   04/04/2024 >90 >60 mL/min/1.73m2 Final   04/06/2021 >60 >60 mL/min/1.73m2 Final   08/29/2014 90 >60 mL/min/1.7m2 Final     Comment:     Non  GFR Calc     Calcium   Date Value Ref Range Status   04/03/2024 8.9 8.6 - 10.0 mg/dL Final   11/16/2020 9.6 8.5 - 10.4 mg/dL Final       Liver Function Studies -   Recent Labs   Lab Test 09/01/23  1950   PROTTOTAL 6.1*   ALBUMIN 3.8   BILITOTAL 0.2    ALKPHOS 77   AST 16   ALT 17       Sed Rate   Date Value Ref Range Status   08/29/2014 56 (H) 0 - 30 mm/h Final     Erythrocyte Sedimentation Rate   Date Value Ref Range Status   09/01/2023 19 0 - 30 mm/hr Final       CRP Inflammation   Date Value Ref Range Status   08/29/2014 75.0 (H) 0.0 - 8.0 mg/L Final     CRP   Date Value Ref Range Status   05/03/2018 0.1 0.0 - 0.8 mg/dL Final               MICROBIOLOGY DATA:        IMAGING/RADIOLOGY:          ASSESSMENT:  Skin lesion in host with crohns, and probably E nodosum on contralateral leg    RECOMMENDATION:  No point in continued hospitalization or abx  Send home with asap consult to  derm.  Delaney has passed away and I don't think his practice exists any more.  She needs to be seen by next week in my opinion.  Bx will need to be done.   YANET Sommer MD  Office [phone] option 2 to desk staffleg

## 2024-04-04 NOTE — TELEPHONE ENCOUNTER
Left message to call back to move appt up for leg biopsy, per Dr. Osborn. Number provided.   Was going to schedule 4/9/24 at 0845 with Dr. Too Santiago.

## 2024-04-04 NOTE — DISCHARGE SUMMARY
"[facility]  Discharge Summary - Medicine & Pediatrics       Date of Admission:  4/2/2024  Date of Discharge:  4/4/2024  Discharging Provider: Dr. Byrnes, Dr. Claire  Discharge Service: Hospitalist Service    Discharge Diagnoses   LLE Cellulitis, improving  T2DM, stable  Hx Crohn's Disease  Microcytic Anemia    Clinically Significant Risk Factors     # DMII: A1C = 12.2 % (Ref range: 0.0 - 5.6 %) within past 6 months    # Obesity: Estimated body mass index is 31.26 kg/m  as calculated from the following:    Height as of this encounter: 1.626 m (5' 4\").    Weight as of this encounter: 82.6 kg (182 lb 1.6 oz).       Follow-ups Needed After Discharge   Follow up with  Dermatology within 1-2 weeks    Unresulted Labs Ordered in the Past 30 Days of this Admission       Date and Time Order Name Status Description    4/4/2024  3:14 PM Ferritin In process         These results will be followed up by PCP    Discharge Disposition   Discharged to home  Condition at discharge: Stable    Hospital Course   Mary Ann Olson was admitted on 4/2/2024 for recurrence of LLE cellulitis since prior discharge 3/30/24. The following problems were addressed during her hospitalization:    LLE Cellullitis   Previous admission on 3/18 for LLE cellulitis and started on amoxicillin and doxycycline and then admitted on 3/22 for worsening despite treatment. Ultrasound did not demonstrate any abscess formation. She was initially started on vancomycin and then transitioned to IV clindamycin with significant improvement. She was discharged home on 3/30 with two days of oral clindamycin to complete a total of 7 days. Blood cultures during this admission were negative but had a wound culture positive for staph epidermidis.    Patient was seen in clinic on 4/2/24 at [facility] and noticed to have worsening of erythema with minor bleeding but no active purulence or discharge. ED work-up notable for WBC of 12.6 (up from 8.1 3 " days ago) and CRP of 55.7, concerning for worsening of cellulitis and possible abscess formation.    Readmission 4/2/24: CT tibia fibula 4/2/24 - no discrete abscess on non-contrast exam, no evidence for deep compartment involvement or soft tissue gas     Pt. Received IV abx including vanc and clindamycin, noted improvement in pain and presentation of LLE cellulitis 4/4/24. Patient was evaluated by ID 4/4 who recommended no further hospitalization and antibiotics. Recommended follow up with dermatology at the [facility] give patient's history of crohn's disease not currently on medications for the past 2 years due to insurance coverage issues, she also was noted to have suspected erythema nodosum on R shin.  -Called [facility] dermatology, spoke with Dr. Osborn and they will contact patient to set up outpatient follow up appointment for poss. Biopsy, Apt scheduled for April 9th    Microcytic anemia  Hgb slightly downtrending during admission, HgB 9.6 4/5/24, MCV 77. Iron studies indicating Total Iron 17, , Sat Index 6  -Ferritin study pending  -discharge on oral iron supplementation    T2DM  -BG's stable throughout admission 120's-140's on 12 units NPH BID  -Continue NPH 12u BID outpatient    Consultations This Hospital Stay   PHARMACY TO DOSE VANCO  INFECTIOUS DISEASES IP CONSULT  WOUND OSTOMY CONTINENCE NURSE  IP CONSULT  WOUND OSTOMY CONTINENCE NURSE  IP CONSULT  CARE MANAGEMENT / SOCIAL WORK IP CONSULT    Code Status   Full Code       The patient was discussed with Dr. Jos Byrnes MD  [address] [address]  Phone: [phone]  Fax: [phone]  ______________________________________________________________________    Physical Exam   Vital Signs: Temp: 98.1  F (36.7  C) Temp src: Oral BP: 119/70 Pulse: 89   Resp: 18 SpO2: 95 % O2 Device: None (Room air)    Weight: 182 lbs 1.6 oz    Physical Exam  Constitutional:        General: She is not in acute distress.     Appearance: Normal appearance. She is not ill-appearing.   HENT:      Mouth/Throat:      Mouth: Mucous membranes are moist.      Pharynx: Oropharynx is clear.   Eyes:      General:         Left eye: No discharge.      Extraocular Movements: Extraocular movements intact.      Conjunctiva/sclera: Conjunctivae normal.      Pupils: Pupils are equal, round, and reactive to light.   Cardiovascular:      Rate and Rhythm: Normal rate and regular rhythm.      Pulses: Normal pulses.      Heart sounds: Normal heart sounds.   Pulmonary:      Effort: Pulmonary effort is normal.      Breath sounds: Normal breath sounds.   Abdominal:      General: Bowel sounds are normal.      Palpations: Abdomen is soft.      Tenderness: There is abdominal tenderness.      Comments: Tenderness to LQ BL  Soft abdomen on exam, no distension, masses, or guarding appreciated  Bowel sounds appreciated x4 quadrants   Musculoskeletal:      Right lower leg: No edema.      Left lower leg: No edema.   Skin:     Findings: Erythema present.      Comments: LLE improved on exam 4/5/24, erythema has decreased in size since 4/3/24, still TTP and some warmth appreciated on palpation. Slight yellow/green fluid spotting on dressing from superficial skin lesions, improving.    Neurological:      Mental Status: She is alert.          Primary Care Physician   Joanna Farah    Discharge Orders      Reason for your hospital stay    Cellulitis L Lower Extremity     Follow-up and recommended labs and tests     Follow up with primary care provider, Joanna Farah, within 7 days for hospital follow- up.  The following labs/tests are recommended: follow up on iron studies, ferritin, CBC, CRP.      Pt. Will follow up with [facility] dermatology outpatient, scheduling dept. To call patient to set up     Activity    Your activity upon discharge: activity as tolerated     Diet    Follow this diet upon discharge: Orders  Placed This Encounter      Combination Diet Regular Diet Adult       Significant Results and Procedures   Most Recent 3 CBC's:  Recent Labs   Lab Test 04/05/24  0556 04/04/24  0551 04/03/24  0700   WBC 8.4 8.0 7.9   HGB 9.6* 9.4* 9.0*   MCV 77* 77* 78   * 512* 535*     Most Recent 3 BMP's:  Recent Labs   Lab Test 04/05/24  0651 04/05/24  0556 04/04/24  2137 04/04/24  1633 04/04/24  0815 04/04/24  0551 04/03/24  0747 04/03/24  0700 04/02/24  1919 04/02/24  1558 03/22/24  1655 03/22/24  1329   NA  --   --   --   --   --   --   --  139  --  140  --  141   POTASSIUM  --   --   --   --   --   --   --  3.6  --  3.9  --  4.3   CHLORIDE  --   --   --   --   --   --   --  105  --  101  --  105   CO2  --   --   --   --   --   --   --  24  --  24  --  25   BUN  --   --   --   --   --   --   --  9.9  --  14.8  --  11.3   CR  --  0.49*  --   --   --  0.51  --  0.55   < > 0.66   < > 0.51   ANIONGAP  --   --   --   --   --   --   --  10  --  15  --  11   GHAZAL  --   --   --   --   --   --   --  8.9  --  9.9  --  9.8   *  --  127* 143*   < >  --    < > 106*   < > 109*   < > 72    < > = values in this interval not displayed.     7-Day Micro Results       No results found for the last 168 hours.        ,   Results for orders placed or performed during the hospital encounter of 04/02/24   CT Tibia Fibula Lower Leg Left wo Contrast    Narrative    EXAM: CT TIBIA FIBULA LOWER LEG LEFT WO CONTRAST  LOCATION: [facility]  DATE: 4/2/2024    INDICATION: Concern for abscess. Infection and cellulitis.  COMPARISON: None.  TECHNIQUE: Noncontrast. Axial, sagittal and coronal thin-section reconstruction. Dose reduction techniques were used.     FINDINGS:   There is reticulation soft tissue stranding and poorly defined fluid within the subcutaneous tissues about the calf most pronounced along the proximal to mid calf medially, posteriorly and laterally. There is skin thickening which is most pronounced   along  the distal calf and ankle. There is no well-defined fluid collection to suggest abscess on this noncontrast study.    -There is no cortical destructive change to suggest osteomyelitis. No evidence of an acute fracture involving the tibia or fibula.      Impression    IMPRESSION:  1.  Findings compatible with soft tissue infection/cellulitis about the calf. While there is poorly defined fluid reticulation and skin thickening, there is no discrete abscess on this noncontrast exam. Note is made that MRI is more sensitive for soft   tissue abscess detection.    2.  No CT evidence for deep compartment involvement or soft tissue gas.     *Note: Due to a large number of results and/or encounters for the requested time period, some results have not been displayed. A complete set of results can be found in Results Review.       Discharge Medications   Current Discharge Medication List        START taking these medications    Details   ferrous sulfate (FEROSUL) 325 (65 Fe) MG tablet Take 1 tablet (325 mg) by mouth daily (with breakfast)  Qty: 90 tablet, Refills: 3    Associated Diagnoses: Iron deficiency anemia, unspecified iron deficiency anemia type           CONTINUE these medications which have NOT CHANGED    Details   acetaminophen (TYLENOL) 500 MG tablet Take 1-2 tablets (500-1,000 mg) by mouth every 6 hours as needed for pain  Refills: 0    Associated Diagnoses: Pain      albuterol (PROAIR HFA/PROVENTIL HFA/VENTOLIN HFA) 108 (90 Base) MCG/ACT inhaler INHALE 2 PUFFS INTO LUNGS EVERY 4 HOURS AS NEEDED FOR SHORTNESS OF BREATH OR  WHEEZING  Qty: 18 g, Refills: 1    Comments: Pharmacy may dispense brand covered by insurance (Proair, or proventil or ventolin or generic albuterol inhaler)  Associated Diagnoses: COPD exacerbation (H)      atorvastatin (LIPITOR) 40 MG tablet Take 1 tablet (40 mg) by mouth daily  Qty: 90 tablet, Refills: 3    Associated Diagnoses: Type 2 diabetes mellitus without complication, without long-term  current use of insulin (H)      budesonide-formoterol (SYMBICORT) 160-4.5 MCG/ACT Inhaler Inhale 2 puffs by mouth twice daily  Qty: 11 g, Refills: 3    Associated Diagnoses: Moderate persistent asthma without complication      Calcium Carbonate-Vitamin D 500-5 MG-MCG TABS Take 1 tablet by mouth 2 times daily  Qty: 90 tablet, Refills: 3    Associated Diagnoses: Type 2 diabetes mellitus without complication, without long-term current use of insulin (H)      hydrOXYzine (ATARAX) 25 MG tablet Take 1 tablet (25 mg) by mouth 3 times daily as needed for itching  Qty: 20 tablet, Refills: 0    Associated Diagnoses: Anxiety      insulin  UNIT/ML vial Inject 15 Units Subcutaneous 2 times daily  Qty: 10 mL, Refills: 1    Associated Diagnoses: Type 2 diabetes mellitus with hyperglycemia, with long-term current use of insulin (H)      ipratropium - albuterol 0.5 mg/2.5 mg/3 mL (DUONEB) 0.5-2.5 (3) MG/3ML neb solution Take 1 vial (3 mLs) by nebulization 2 times daily as needed for shortness of breath, wheezing or cough  Qty: 90 mL, Refills: 3    Associated Diagnoses: COPD exacerbation (H)      liraglutide (VICTOZA) 18 MG/3ML solution Inject 0.6 mg Subcutaneous daily  Qty: 6 mL, Refills: 1    Associated Diagnoses: Type 2 diabetes mellitus with hyperglycemia, with long-term current use of insulin (H)      loratadine (CLARITIN) 10 MG tablet Take 1 tablet (10 mg) by mouth daily as needed for allergies  Qty: 30 tablet, Refills: 3    Associated Diagnoses: Seasonal allergic rhinitis due to other allergic trigger      magnesium hydroxide (MILK OF MAGNESIA) 400 MG/5ML suspension Take 30 mLs by mouth 2 times daily as needed for constipation  Qty: 354 mL, Refills: 0    Associated Diagnoses: Constipation, unspecified constipation type      melatonin 3 MG tablet Take 3 mg by mouth nightly as needed for sleep      metFORMIN (GLUCOPHAGE) 1000 MG tablet Take 1 tablet (1,000 mg) by mouth 2 times daily (with meals)  Qty: 180 tablet, Refills:  3    Associated Diagnoses: Type 2 diabetes mellitus without complication, without long-term current use of insulin (H)      Multiple Vitamins-Minerals (CENTRUM SILVER 50+WOMEN PO) Take 1 tablet by mouth daily      omeprazole (PRILOSEC) 20 MG DR capsule Take 1 capsule (20 mg) by mouth daily  Qty: 90 capsule, Refills: 3    Associated Diagnoses: Gastroesophageal reflux disease without esophagitis      vitamin D2 (ERGOCALCIFEROL) 14557 units (1250 mcg) capsule Take 1 capsule (50,000 Units) by mouth once a week  Qty: 12 capsule, Refills: 3    Associated Diagnoses: Postmenopausal status           STOP taking these medications       oxyCODONE (ROXICODONE) 5 MG tablet Comments:   Reason for Stopping:             Allergies   Allergies   Allergen Reactions    Gadolinium Derivatives Hives    Iodinated Contrast Media Hives    Azithromycin Other (See Comments) and Rash     Erythema Nodosum x2    Keflex [Cephalexin] Rash    Aspirin Other (See Comments)    Cefuroxime Itching and Other (See Comments)    Contrast Dye Hives     IV    Corylus Other (See Comments)    Diatrizoate Hives    Iodixanol Unknown    Nuts Other (See Comments)     hazelnuts    Septra [Sulfamethoxazole-Trimethoprim] Hives    Sulfa Antibiotics Hives    Metronidazole Itching and Rash    Nitrofurantoin Itching and Rash    Quinolones Rash       Ajit Byrnes MD  PGY-1  [facility] Medicine Residency  [facility]   April 5, 2024

## 2024-04-05 VITALS
SYSTOLIC BLOOD PRESSURE: 119 MMHG | BODY MASS INDEX: 31.09 KG/M2 | HEART RATE: 89 BPM | WEIGHT: 182.1 LBS | DIASTOLIC BLOOD PRESSURE: 70 MMHG | HEIGHT: 64 IN | RESPIRATION RATE: 18 BRPM | OXYGEN SATURATION: 95 % | TEMPERATURE: 98.1 F

## 2024-04-05 LAB
CREAT SERPL-MCNC: 0.49 MG/DL (ref 0.51–0.95)
CRP SERPL-MCNC: 73.2 MG/L
EGFRCR SERPLBLD CKD-EPI 2021: >90 ML/MIN/1.73M2
ERYTHROCYTE [DISTWIDTH] IN BLOOD BY AUTOMATED COUNT: 15.9 % (ref 10–15)
FERRITIN SERPL-MCNC: 143 NG/ML (ref 11–328)
GLUCOSE BLDC GLUCOMTR-MCNC: 117 MG/DL (ref 70–99)
GLUCOSE BLDC GLUCOMTR-MCNC: 143 MG/DL (ref 70–99)
HCT VFR BLD AUTO: 31.2 % (ref 35–47)
HGB BLD-MCNC: 9.6 G/DL (ref 11.7–15.7)
IRON BINDING CAPACITY (ROCHE): 263 UG/DL (ref 240–430)
IRON SATN MFR SERPL: 6 % (ref 15–46)
IRON SERPL-MCNC: 17 UG/DL (ref 37–145)
MCH RBC QN AUTO: 23.8 PG (ref 26.5–33)
MCHC RBC AUTO-ENTMCNC: 30.8 G/DL (ref 31.5–36.5)
MCV RBC AUTO: 77 FL (ref 78–100)
PLATELET # BLD AUTO: 544 10E3/UL (ref 150–450)
RBC # BLD AUTO: 4.03 10E6/UL (ref 3.8–5.2)
WBC # BLD AUTO: 8.4 10E3/UL (ref 4–11)

## 2024-04-05 PROCEDURE — 82565 ASSAY OF CREATININE: CPT | Performed by: FAMILY MEDICINE

## 2024-04-05 PROCEDURE — 85027 COMPLETE CBC AUTOMATED: CPT

## 2024-04-05 PROCEDURE — 36415 COLL VENOUS BLD VENIPUNCTURE: CPT

## 2024-04-05 PROCEDURE — 99238 HOSP IP/OBS DSCHRG MGMT 30/<: CPT | Mod: GC

## 2024-04-05 PROCEDURE — 83550 IRON BINDING TEST: CPT

## 2024-04-05 PROCEDURE — 86140 C-REACTIVE PROTEIN: CPT

## 2024-04-05 PROCEDURE — 250N000013 HC RX MED GY IP 250 OP 250 PS 637

## 2024-04-05 PROCEDURE — 99232 SBSQ HOSP IP/OBS MODERATE 35: CPT | Performed by: INTERNAL MEDICINE

## 2024-04-05 RX ORDER — FERROUS SULFATE 325(65) MG
325 TABLET ORAL
Qty: 90 TABLET | Refills: 3 | Status: SHIPPED | OUTPATIENT
Start: 2024-04-05 | End: 2024-05-24

## 2024-04-05 RX ADMIN — ACETAMINOPHEN 650 MG: 325 TABLET ORAL at 03:33

## 2024-04-05 RX ADMIN — HUMAN INSULIN 12 UNITS: 100 INJECTION, SUSPENSION SUBCUTANEOUS at 06:52

## 2024-04-05 RX ADMIN — Medication 1 CAPSULE: at 09:15

## 2024-04-05 RX ADMIN — ATORVASTATIN CALCIUM 40 MG: 40 TABLET, FILM COATED ORAL at 09:15

## 2024-04-05 RX ADMIN — Medication 1 TABLET: at 09:16

## 2024-04-05 RX ADMIN — INSULIN ASPART 1 UNITS: 100 INJECTION, SOLUTION INTRAVENOUS; SUBCUTANEOUS at 06:54

## 2024-04-05 RX ADMIN — Medication 1 TABLET: at 09:15

## 2024-04-05 RX ADMIN — PANTOPRAZOLE SODIUM 40 MG: 40 TABLET, DELAYED RELEASE ORAL at 06:54

## 2024-04-05 RX ADMIN — ACETAMINOPHEN 650 MG: 325 TABLET ORAL at 09:35

## 2024-04-05 RX ADMIN — FLUTICASONE FUROATE AND VILANTEROL TRIFENATATE 1 PUFF: 200; 25 POWDER RESPIRATORY (INHALATION) at 09:18

## 2024-04-05 ASSESSMENT — ACTIVITIES OF DAILY LIVING (ADL)
ADLS_ACUITY_SCORE: 24

## 2024-04-05 NOTE — PLAN OF CARE
"  Problem: Adult Inpatient Plan of Care  Goal: Plan of Care Review  Description: The Plan of Care Review/Shift note should be completed every shift.  The Outcome Evaluation is a brief statement about your assessment that the patient is improving, declining, or no change.  This information will be displayed automatically on your shift  note.  Outcome: Progressing  Goal: Patient-Specific Goal (Individualized)  Description: You can add care plan individualizations to a care plan. Examples of Individualization might be:  \"Parent requests to be called daily at 9am for status\", \"I have a hard time hearing out of my right ear\", or \"Do not touch me to wake me up as it startles  me\".  Outcome: Progressing  Goal: Absence of Hospital-Acquired Illness or Injury  Outcome: Progressing  Intervention: Prevent and Manage VTE (Venous Thromboembolism) Risk  Recent Flowsheet Documentation  Taken 4/4/2024 1600 by Rolo Lopez RN  VTE Prevention/Management: SCDs (sequential compression devices) off  Goal: Optimal Comfort and Wellbeing  Outcome: Progressing  Goal: Readiness for Transition of Care  Outcome: Progressing     Problem: Skin or Soft Tissue Infection  Goal: Absence of Infection Signs and Symptoms  Outcome: Progressing   Goal Outcome Evaluation:         Pt is alert   Problem: Adult Inpatient Plan of Care  Goal: Plan of Care Review  Description: The Plan of Care Review/Shift note should be completed every shift.  The Outcome Evaluation is a brief statement about your assessment that the patient is improving, declining, or no change.  This information will be displayed automatically on your shift  note.  4/4/2024 2255 by Rolo Lopez RN  Outcome: Progressing  4/4/2024 2255 by Rolo Lopez RN  Outcome: Progressing  Goal: Patient-Specific Goal (Individualized)  Description: You can add care plan individualizations to a care plan. Examples of Individualization might be:  \"Parent requests to be called daily at 9am " "for status\", \"I have a hard time hearing out of my right ear\", or \"Do not touch me to wake me up as it startles  me\".  4/4/2024 2255 by Rolo Lopez RN  Outcome: Progressing  4/4/2024 2255 by Rolo Lopez RN  Outcome: Progressing  Goal: Absence of Hospital-Acquired Illness or Injury  4/4/2024 2255 by Rolo Lopez RN  Outcome: Progressing  4/4/2024 2255 by Rolo Lopez RN  Outcome: Progressing  Intervention: Prevent and Manage VTE (Venous Thromboembolism) Risk  Recent Flowsheet Documentation  Taken 4/4/2024 1600 by Rolo Lopez RN  VTE Prevention/Management: SCDs (sequential compression devices) off  Goal: Optimal Comfort and Wellbeing  4/4/2024 2255 by Rolo Lopez RN  Outcome: Progressing  4/4/2024 2255 by Rolo Lopez RN  Outcome: Progressing  Goal: Readiness for Transition of Care  4/4/2024 2255 by Rolo Lopez RN  Outcome: Progressing  4/4/2024 2255 by Rolo Lopez RN  Outcome: Progressing     Problem: Skin or Soft Tissue Infection  Goal: Absence of Infection Signs and Symptoms  4/4/2024 2255 by Rolo Lopez RN  Outcome: Progressing  4/4/2024 2255 by Rolo Lopez RN  Outcome: Progressing       Pt is alert and oriented x4, pain was managed with PRN acetaminophen. Left leg is dressing is clean dry and intact, pt is independent in the room. Pt VSS and will continue to monitor.           "

## 2024-04-05 NOTE — PROGRESS NOTES
Care Management Discharge Note    Discharge Date: 04/05/2024       Discharge Disposition: Home    Discharge Services: None    Discharge DME: None    Discharge Transportation:  family    Private pay costs discussed: Not applicable    Does the patient's insurance plan have a 3 day qualifying hospital stay waiver?  No    Education Provided on the Discharge Plan:  per care team    Persons Notified of Discharge Plans: yes    Patient/Family in Agreement with the Plan: yes    Handoff Referral Completed: Yes    Additional Information:    Patient to return home at discharge. She is to follow up with [facility].    12:54 PM  Spoke with Shantal at [facility] [phone]. She states she will transport patient home.   Patient lives in a supportive living community in her apartment alone.  Discharge orders and AVS faxed to [facility] [phone].      Daiana Jason RN

## 2024-04-05 NOTE — PROGRESS NOTES
"[place] Infectious Disease Progress Note    SUBJECTIVE: off abx.  Now has a follicular type lesion on the R arm.  The R leg lesion is tender so I don't think that is tineal.   The arm lesion is tender also.  The toe lesion has erupted and is healing.  The L leg is bandaged.        REVIEW OF SYSTEMS:  Negative unless as listed above.  Social history, Family history, Medications: reviewed.    OBJECTIVE:  /70 (BP Location: Right arm)   Pulse 89   Temp 98.1  F (36.7  C) (Oral)   Resp 18   Ht 1.626 m (5' 4\")   Wt 82.6 kg (182 lb 1.6 oz)   SpO2 95%   BMI 31.26 kg/m                PHYSICAL EXAM:  Alert, awake  Vitals tabulated above, reviewed  Neck supple without lymphadenopathy  Sclera normal color, not injected  CARDIOVASCULAR regular rate and rhythm, no murmur  Lungs CLEAR TO AUSCULTATION   Abdomen soft, NT/ND, absent HEPATOSPLENOMEGALY  Skin normal  Joints normal  Neurologic exam non focal    Antibiotics:    Pertinent labs:    Recent Labs   Lab Test 04/05/24  0556 04/04/24  0551 04/03/24  0700   WBC 8.4 8.0 7.9   HGB 9.6* 9.4* 9.0*   HCT 31.2* 30.3* 29.7*   MCV 77* 77* 78   * 512* 535*       Lab Results   Component Value Date    RBC 3.94 04/04/2024    RBC 4.41 08/29/2014     Lab Results   Component Value Date    HGB 9.4 04/04/2024    HGB 13.6 11/16/2020     Lab Results   Component Value Date    HCT 30.3 04/04/2024    HCT 42.6 11/16/2020     No components found for: \"MCT\"  Lab Results   Component Value Date    MCV 77 04/04/2024    MCV 95.7 11/16/2020     Lab Results   Component Value Date    MCH 23.9 04/04/2024    MCH 30.6 11/16/2020     Lab Results   Component Value Date    MCHC 31.0 04/04/2024    MCHC 31.9 11/16/2020     Lab Results   Component Value Date    RDW 15.8 04/04/2024    RDW 12.5 11/28/2017     Lab Results   Component Value Date     04/04/2024     08/29/2014       Last Comprehensive Metabolic Panel:  Sodium   Date Value Ref Range Status   04/03/2024 139 135 - 145 mmol/L " Final     Comment:     Reference intervals for this test were updated on 09/26/2023 to more accurately reflect our healthy population. There may be differences in the flagging of prior results with similar values performed with this method. Interpretation of those prior results can be made in the context of the updated reference intervals.    11/16/2020 140.0 133.0 - 144.0 mmol/L Final     Potassium   Date Value Ref Range Status   04/03/2024 3.6 3.4 - 5.3 mmol/L Final   03/12/2024 4.4 3.4 - 5.3 mmol/L Final   11/16/2020 4.5 3.4 - 5.3 mmol/L Final     Chloride   Date Value Ref Range Status   04/03/2024 105 98 - 107 mmol/L Final   03/12/2024 104 94 - 109 mmol/L Final   11/16/2020 100.0 94.0 - 109.0 mmol/L Final     Carbon Dioxide   Date Value Ref Range Status   11/16/2020 30.0 20.0 - 32.0 mmol/L Final     Carbon Dioxide (CO2)   Date Value Ref Range Status   04/03/2024 24 22 - 29 mmol/L Final   03/12/2024 29 20 - 32 mmol/L Final     Anion Gap   Date Value Ref Range Status   04/03/2024 10 7 - 15 mmol/L Final   03/12/2024 12 3 - 14 mmol/L Final     Glucose   Date Value Ref Range Status   03/12/2024 128 (H) 70 - 99 mg/dL Final   11/16/2020 113.0 (H) 60.0 - 109.0 mg/dL Final     GLUCOSE BY METER POCT   Date Value Ref Range Status   04/05/2024 143 (H) 70 - 99 mg/dL Final     Urea Nitrogen   Date Value Ref Range Status   04/03/2024 9.9 8.0 - 23.0 mg/dL Final   03/12/2024 10 7 - 30 mg/dL Final   11/16/2020 18.0 7.0 - 30.0 mg/dL Final     Creatinine   Date Value Ref Range Status   04/05/2024 0.49 (L) 0.51 - 0.95 mg/dL Final   11/16/2020 0.5 (L) 0.6 - 1.3 mg/dL Final     GFR Estimate   Date Value Ref Range Status   04/05/2024 >90 >60 mL/min/1.73m2 Final   04/06/2021 >60 >60 mL/min/1.73m2 Final   08/29/2014 90 >60 mL/min/1.7m2 Final     Comment:     Non  GFR Calc     Calcium   Date Value Ref Range Status   04/03/2024 8.9 8.6 - 10.0 mg/dL Final   11/16/2020 9.6 8.5 - 10.4 mg/dL Final       Liver Function Studies -    Recent Labs   Lab Test 09/01/23  1950   PROTTOTAL 6.1*   ALBUMIN 3.8   BILITOTAL 0.2   ALKPHOS 77   AST 16   ALT 17       Sed Rate   Date Value Ref Range Status   08/29/2014 56 (H) 0 - 30 mm/h Final     Erythrocyte Sedimentation Rate   Date Value Ref Range Status   09/01/2023 19 0 - 30 mm/hr Final       CRP Inflammation   Date Value Ref Range Status   08/29/2014 75.0 (H) 0.0 - 8.0 mg/L Final     CRP   Date Value Ref Range Status   05/03/2018 0.1 0.0 - 0.8 mg/dL Final               MICROBIOLOGY DATA:        IMAGING/RADIOLOGY:          ASSESSMENT:  Skin lesion in host with crohns, and probably E nodosum on contralateral leg  Probably having staph folliculitis also multifocal  Bad dentition.      RECOMMENDATION:  She got a derm appt April 9 which should be soon enough.   Seems to have EN or PD on the legs and probably staph folliculitis on arm and toe.  I think not treating anything is the way to go so that derm can assess, manage and treat how they deem appropriate next wk.     Sign off shanon.    YANET Sommer MD  Office [phone] option 2 to desk sejal

## 2024-04-05 NOTE — PROGRESS NOTES
Discussed discharge paperwork with pt, pt voiced understanding. Wound care supplies sent with pt. Personal belongings sent with pt. Pt discharged via w/c accompanied by aide to meet personal transportation at hospital entrance.

## 2024-04-05 NOTE — PLAN OF CARE
"  Problem: Adult Inpatient Plan of Care  Goal: Plan of Care Review  Description: The Plan of Care Review/Shift note should be completed every shift.  The Outcome Evaluation is a brief statement about your assessment that the patient is improving, declining, or no change.  This information will be displayed automatically on your shift  note.  4/5/2024 0648 by Rolo Lopez RN  Outcome: Progressing  4/4/2024 2255 by Rolo Lopez RN  Outcome: Progressing  4/4/2024 2255 by Rolo Lopez RN  Outcome: Progressing  Goal: Patient-Specific Goal (Individualized)  Description: You can add care plan individualizations to a care plan. Examples of Individualization might be:  \"Parent requests to be called daily at 9am for status\", \"I have a hard time hearing out of my right ear\", or \"Do not touch me to wake me up as it startles  me\".  4/5/2024 0648 by Rolo Lopez RN  Outcome: Progressing  4/4/2024 2255 by Rolo Lopez RN  Outcome: Progressing  4/4/2024 2255 by Rolo Lopez RN  Outcome: Progressing  Goal: Absence of Hospital-Acquired Illness or Injury  4/5/2024 0648 by Rolo Lopez RN  Outcome: Progressing  4/4/2024 2255 by Rolo Lopez RN  Outcome: Progressing  4/4/2024 2255 by Rolo Lopez RN  Outcome: Progressing  Intervention: Prevent Infection  Recent Flowsheet Documentation  Taken 4/5/2024 0000 by oRlo Lopez RN  Infection Prevention: rest/sleep promoted  Goal: Optimal Comfort and Wellbeing  4/5/2024 0648 by Rolo Lopez RN  Outcome: Progressing  4/4/2024 2255 by Rolo Lopez RN  Outcome: Progressing  4/4/2024 2255 by Rolo Lopez RN  Outcome: Progressing  Goal: Readiness for Transition of Care  4/5/2024 0648 by Rolo Lopez RN  Outcome: Progressing  4/4/2024 2255 by Rolo Lopez RN  Outcome: Progressing  4/4/2024 2255 by Rolo Lopez, RN  Outcome: Progressing     Problem: Skin or Soft Tissue Infection  Goal: " Absence of Infection Signs and Symptoms  4/5/2024 0648 by Rolo Lopez RN  Outcome: Progressing  4/4/2024 2255 by Rloo Lopez RN  Outcome: Progressing  4/4/2024 2255 by Rolo Lopez RN  Outcome: Progressing  Intervention: Minimize and Manage Infection Progression  Recent Flowsheet Documentation  Taken 4/5/2024 0000 by Rolo Lopez RN  Infection Prevention: rest/sleep promoted     Problem: Comorbidity Management  Goal: Maintenance of COPD Symptom Control  Outcome: Progressing   Goal Outcome Evaluation:             Pt is alert and oriented x4, slept well during the night, PRN acetaminophen was given for pain and it was effective. VSS and will continue to monitor.

## 2024-04-07 ENCOUNTER — TELEPHONE (OUTPATIENT)
Dept: FAMILY MEDICINE | Facility: CLINIC | Age: 59
End: 2024-04-07
Payer: COMMERCIAL

## 2024-04-08 ENCOUNTER — HOSPITAL ENCOUNTER (OUTPATIENT)
Facility: HOSPITAL | Age: 59
Setting detail: OBSERVATION
Discharge: NURSING, REHAB OR LONG-TERM CARE | End: 2024-04-10
Attending: EMERGENCY MEDICINE | Admitting: STUDENT IN AN ORGANIZED HEALTH CARE EDUCATION/TRAINING PROGRAM
Payer: MEDICARE

## 2024-04-08 ENCOUNTER — APPOINTMENT (OUTPATIENT)
Dept: ULTRASOUND IMAGING | Facility: HOSPITAL | Age: 59
End: 2024-04-08
Payer: MEDICARE

## 2024-04-08 ENCOUNTER — PATIENT OUTREACH (OUTPATIENT)
Dept: CARE COORDINATION | Facility: CLINIC | Age: 59
End: 2024-04-08

## 2024-04-08 DIAGNOSIS — R52 PAIN: ICD-10-CM

## 2024-04-08 DIAGNOSIS — K50.10 CROHN'S DISEASE OF LARGE INTESTINE WITHOUT COMPLICATION (H): ICD-10-CM

## 2024-04-08 DIAGNOSIS — L03.116 CELLULITIS OF LEFT LOWER LEG: ICD-10-CM

## 2024-04-08 DIAGNOSIS — Z09 HOSPITAL DISCHARGE FOLLOW-UP: ICD-10-CM

## 2024-04-08 DIAGNOSIS — F51.01 PRIMARY INSOMNIA: Primary | ICD-10-CM

## 2024-04-08 LAB
ALBUMIN SERPL BCG-MCNC: 4 G/DL (ref 3.5–5.2)
ALP SERPL-CCNC: 115 U/L (ref 40–150)
ALT SERPL W P-5'-P-CCNC: 13 U/L (ref 0–50)
ANION GAP SERPL CALCULATED.3IONS-SCNC: 16 MMOL/L (ref 7–15)
AST SERPL W P-5'-P-CCNC: 16 U/L (ref 0–45)
BASOPHILS # BLD AUTO: 0.1 10E3/UL (ref 0–0.2)
BASOPHILS NFR BLD AUTO: 1 %
BILIRUB DIRECT SERPL-MCNC: <0.2 MG/DL (ref 0–0.3)
BILIRUB SERPL-MCNC: 0.3 MG/DL
BUN SERPL-MCNC: 12.3 MG/DL (ref 8–23)
CALCIUM SERPL-MCNC: 10 MG/DL (ref 8.6–10)
CHLORIDE SERPL-SCNC: 98 MMOL/L (ref 98–107)
CREAT SERPL-MCNC: 0.46 MG/DL (ref 0.51–0.95)
CRP SERPL-MCNC: 62.7 MG/L
DEPRECATED HCO3 PLAS-SCNC: 23 MMOL/L (ref 22–29)
EGFRCR SERPLBLD CKD-EPI 2021: >90 ML/MIN/1.73M2
EOSINOPHIL # BLD AUTO: 0.3 10E3/UL (ref 0–0.7)
EOSINOPHIL NFR BLD AUTO: 3 %
ERYTHROCYTE [DISTWIDTH] IN BLOOD BY AUTOMATED COUNT: 16.1 % (ref 10–15)
GLUCOSE BLDC GLUCOMTR-MCNC: 101 MG/DL (ref 70–99)
GLUCOSE BLDC GLUCOMTR-MCNC: 158 MG/DL (ref 70–99)
GLUCOSE BLDC GLUCOMTR-MCNC: 72 MG/DL (ref 70–99)
GLUCOSE SERPL-MCNC: 84 MG/DL (ref 70–99)
HBA1C MFR BLD: 8.5 %
HCT VFR BLD AUTO: 32.2 % (ref 35–47)
HGB BLD-MCNC: 9.9 G/DL (ref 11.7–15.7)
HOLD SPECIMEN: NORMAL
IMM GRANULOCYTES # BLD: 0 10E3/UL
IMM GRANULOCYTES NFR BLD: 0 %
LACTATE SERPL-SCNC: 1.6 MMOL/L (ref 0.7–2)
LYMPHOCYTES # BLD AUTO: 1.6 10E3/UL (ref 0.8–5.3)
LYMPHOCYTES NFR BLD AUTO: 17 %
MAGNESIUM SERPL-MCNC: 1.9 MG/DL (ref 1.7–2.3)
MCH RBC QN AUTO: 23.7 PG (ref 26.5–33)
MCHC RBC AUTO-ENTMCNC: 30.7 G/DL (ref 31.5–36.5)
MCV RBC AUTO: 77 FL (ref 78–100)
MONOCYTES # BLD AUTO: 0.6 10E3/UL (ref 0–1.3)
MONOCYTES NFR BLD AUTO: 6 %
NEUTROPHILS # BLD AUTO: 6.7 10E3/UL (ref 1.6–8.3)
NEUTROPHILS NFR BLD AUTO: 73 %
NRBC # BLD AUTO: 0 10E3/UL
NRBC BLD AUTO-RTO: 0 /100
PLATELET # BLD AUTO: 675 10E3/UL (ref 150–450)
POTASSIUM SERPL-SCNC: 4.2 MMOL/L (ref 3.4–5.3)
PROCALCITONIN SERPL IA-MCNC: 0.14 NG/ML
PROT SERPL-MCNC: 7.3 G/DL (ref 6.4–8.3)
RBC # BLD AUTO: 4.18 10E6/UL (ref 3.8–5.2)
SODIUM SERPL-SCNC: 137 MMOL/L (ref 135–145)
WBC # BLD AUTO: 9.3 10E3/UL (ref 4–11)

## 2024-04-08 PROCEDURE — 250N000012 HC RX MED GY IP 250 OP 636 PS 637

## 2024-04-08 PROCEDURE — 99222 1ST HOSP IP/OBS MODERATE 55: CPT | Mod: GC

## 2024-04-08 PROCEDURE — 82248 BILIRUBIN DIRECT: CPT | Performed by: EMERGENCY MEDICINE

## 2024-04-08 PROCEDURE — 99285 EMERGENCY DEPT VISIT HI MDM: CPT | Mod: 25

## 2024-04-08 PROCEDURE — 83036 HEMOGLOBIN GLYCOSYLATED A1C: CPT

## 2024-04-08 PROCEDURE — 96361 HYDRATE IV INFUSION ADD-ON: CPT

## 2024-04-08 PROCEDURE — G0378 HOSPITAL OBSERVATION PER HR: HCPCS

## 2024-04-08 PROCEDURE — 83735 ASSAY OF MAGNESIUM: CPT | Performed by: EMERGENCY MEDICINE

## 2024-04-08 PROCEDURE — 250N000011 HC RX IP 250 OP 636: Performed by: EMERGENCY MEDICINE

## 2024-04-08 PROCEDURE — 87070 CULTURE OTHR SPECIMN AEROBIC: CPT | Performed by: EMERGENCY MEDICINE

## 2024-04-08 PROCEDURE — 84145 PROCALCITONIN (PCT): CPT | Performed by: EMERGENCY MEDICINE

## 2024-04-08 PROCEDURE — 258N000003 HC RX IP 258 OP 636

## 2024-04-08 PROCEDURE — 250N000013 HC RX MED GY IP 250 OP 250 PS 637

## 2024-04-08 PROCEDURE — 82962 GLUCOSE BLOOD TEST: CPT

## 2024-04-08 PROCEDURE — 83605 ASSAY OF LACTIC ACID: CPT | Performed by: EMERGENCY MEDICINE

## 2024-04-08 PROCEDURE — 96374 THER/PROPH/DIAG INJ IV PUSH: CPT

## 2024-04-08 PROCEDURE — 250N000013 HC RX MED GY IP 250 OP 250 PS 637: Performed by: FAMILY MEDICINE

## 2024-04-08 PROCEDURE — 80048 BASIC METABOLIC PNL TOTAL CA: CPT | Performed by: EMERGENCY MEDICINE

## 2024-04-08 PROCEDURE — 86140 C-REACTIVE PROTEIN: CPT | Performed by: EMERGENCY MEDICINE

## 2024-04-08 PROCEDURE — 87040 BLOOD CULTURE FOR BACTERIA: CPT | Performed by: EMERGENCY MEDICINE

## 2024-04-08 PROCEDURE — 85025 COMPLETE CBC W/AUTO DIFF WBC: CPT | Performed by: EMERGENCY MEDICINE

## 2024-04-08 PROCEDURE — 93925 LOWER EXTREMITY STUDY: CPT

## 2024-04-08 PROCEDURE — 258N000003 HC RX IP 258 OP 636: Performed by: EMERGENCY MEDICINE

## 2024-04-08 PROCEDURE — 36415 COLL VENOUS BLD VENIPUNCTURE: CPT | Performed by: EMERGENCY MEDICINE

## 2024-04-08 PROCEDURE — 96375 TX/PRO/DX INJ NEW DRUG ADDON: CPT

## 2024-04-08 RX ORDER — LIDOCAINE 40 MG/G
CREAM TOPICAL
Status: DISCONTINUED | OUTPATIENT
Start: 2024-04-08 | End: 2024-04-10 | Stop reason: HOSPADM

## 2024-04-08 RX ORDER — ALBUTEROL SULFATE 90 UG/1
2 AEROSOL, METERED RESPIRATORY (INHALATION) EVERY 4 HOURS PRN
Status: DISCONTINUED | OUTPATIENT
Start: 2024-04-08 | End: 2024-04-10 | Stop reason: HOSPADM

## 2024-04-08 RX ORDER — ENOXAPARIN SODIUM 100 MG/ML
40 INJECTION SUBCUTANEOUS EVERY 24 HOURS
Status: DISCONTINUED | OUTPATIENT
Start: 2024-04-08 | End: 2024-04-10 | Stop reason: HOSPADM

## 2024-04-08 RX ORDER — ONDANSETRON 2 MG/ML
4 INJECTION INTRAMUSCULAR; INTRAVENOUS EVERY 6 HOURS PRN
Status: DISCONTINUED | OUTPATIENT
Start: 2024-04-08 | End: 2024-04-10 | Stop reason: HOSPADM

## 2024-04-08 RX ORDER — CLINDAMYCIN PHOSPHATE 900 MG/50ML
900 INJECTION, SOLUTION INTRAVENOUS ONCE
Qty: 50 ML | Refills: 0 | Status: COMPLETED | OUTPATIENT
Start: 2024-04-08 | End: 2024-04-08

## 2024-04-08 RX ORDER — FERROUS SULFATE 325(65) MG
325 TABLET ORAL
Status: DISCONTINUED | OUTPATIENT
Start: 2024-04-09 | End: 2024-04-10 | Stop reason: HOSPADM

## 2024-04-08 RX ORDER — OYSTER SHELL CALCIUM WITH VITAMIN D 500; 200 MG/1; [IU]/1
1 TABLET, FILM COATED ORAL 2 TIMES DAILY
Status: DISCONTINUED | OUTPATIENT
Start: 2024-04-08 | End: 2024-04-08

## 2024-04-08 RX ORDER — DEXTROSE MONOHYDRATE 25 G/50ML
25-50 INJECTION, SOLUTION INTRAVENOUS
Status: DISCONTINUED | OUTPATIENT
Start: 2024-04-08 | End: 2024-04-10 | Stop reason: HOSPADM

## 2024-04-08 RX ORDER — ERGOCALCIFEROL 1.25 MG/1
50000 CAPSULE, LIQUID FILLED ORAL WEEKLY
Status: DISCONTINUED | OUTPATIENT
Start: 2024-04-12 | End: 2024-04-10 | Stop reason: HOSPADM

## 2024-04-08 RX ORDER — ACETAMINOPHEN 325 MG/1
975 TABLET ORAL 3 TIMES DAILY
Status: DISCONTINUED | OUTPATIENT
Start: 2024-04-08 | End: 2024-04-10 | Stop reason: HOSPADM

## 2024-04-08 RX ORDER — AMOXICILLIN 250 MG
2 CAPSULE ORAL 2 TIMES DAILY PRN
Status: DISCONTINUED | OUTPATIENT
Start: 2024-04-08 | End: 2024-04-10 | Stop reason: HOSPADM

## 2024-04-08 RX ORDER — PROCHLORPERAZINE MALEATE 10 MG
10 TABLET ORAL EVERY 6 HOURS PRN
Status: DISCONTINUED | OUTPATIENT
Start: 2024-04-08 | End: 2024-04-10 | Stop reason: HOSPADM

## 2024-04-08 RX ORDER — ONDANSETRON 4 MG/1
4 TABLET, ORALLY DISINTEGRATING ORAL EVERY 6 HOURS PRN
Status: DISCONTINUED | OUTPATIENT
Start: 2024-04-08 | End: 2024-04-10 | Stop reason: HOSPADM

## 2024-04-08 RX ORDER — HYDROXYZINE HYDROCHLORIDE 25 MG/1
25 TABLET, FILM COATED ORAL 3 TIMES DAILY PRN
Status: DISCONTINUED | OUTPATIENT
Start: 2024-04-08 | End: 2024-04-10 | Stop reason: HOSPADM

## 2024-04-08 RX ORDER — KETOROLAC TROMETHAMINE 15 MG/ML
15 INJECTION, SOLUTION INTRAMUSCULAR; INTRAVENOUS EVERY 6 HOURS PRN
Status: DISCONTINUED | OUTPATIENT
Start: 2024-04-08 | End: 2024-04-10 | Stop reason: HOSPADM

## 2024-04-08 RX ORDER — IPRATROPIUM BROMIDE AND ALBUTEROL SULFATE 2.5; .5 MG/3ML; MG/3ML
1 SOLUTION RESPIRATORY (INHALATION) 2 TIMES DAILY PRN
Status: DISCONTINUED | OUTPATIENT
Start: 2024-04-08 | End: 2024-04-10 | Stop reason: HOSPADM

## 2024-04-08 RX ORDER — KETOROLAC TROMETHAMINE 15 MG/ML
15 INJECTION, SOLUTION INTRAMUSCULAR; INTRAVENOUS ONCE
Status: COMPLETED | OUTPATIENT
Start: 2024-04-08 | End: 2024-04-08

## 2024-04-08 RX ORDER — PROCHLORPERAZINE 25 MG
25 SUPPOSITORY, RECTAL RECTAL EVERY 12 HOURS PRN
Status: DISCONTINUED | OUTPATIENT
Start: 2024-04-08 | End: 2024-04-10 | Stop reason: HOSPADM

## 2024-04-08 RX ORDER — CEFAZOLIN SODIUM 1 G/50ML
20 SOLUTION INTRAVENOUS ONCE
Status: COMPLETED | OUTPATIENT
Start: 2024-04-08 | End: 2024-04-08

## 2024-04-08 RX ORDER — FLUTICASONE FUROATE AND VILANTEROL 200; 25 UG/1; UG/1
1 POWDER RESPIRATORY (INHALATION) EVERY EVENING
Status: DISCONTINUED | OUTPATIENT
Start: 2024-04-08 | End: 2024-04-10 | Stop reason: HOSPADM

## 2024-04-08 RX ORDER — AMOXICILLIN 250 MG
1 CAPSULE ORAL 2 TIMES DAILY PRN
Status: DISCONTINUED | OUTPATIENT
Start: 2024-04-08 | End: 2024-04-10 | Stop reason: HOSPADM

## 2024-04-08 RX ORDER — CALCIUM CARBONATE 500 MG/1
1000 TABLET, CHEWABLE ORAL 4 TIMES DAILY PRN
Status: DISCONTINUED | OUTPATIENT
Start: 2024-04-08 | End: 2024-04-10 | Stop reason: HOSPADM

## 2024-04-08 RX ORDER — SODIUM CHLORIDE 9 MG/ML
INJECTION, SOLUTION INTRAVENOUS CONTINUOUS
Status: DISCONTINUED | OUTPATIENT
Start: 2024-04-08 | End: 2024-04-08

## 2024-04-08 RX ORDER — PANTOPRAZOLE SODIUM 40 MG/1
40 TABLET, DELAYED RELEASE ORAL
Status: DISCONTINUED | OUTPATIENT
Start: 2024-04-09 | End: 2024-04-10 | Stop reason: HOSPADM

## 2024-04-08 RX ORDER — ATORVASTATIN CALCIUM 40 MG/1
40 TABLET, FILM COATED ORAL DAILY
Status: DISCONTINUED | OUTPATIENT
Start: 2024-04-09 | End: 2024-04-10 | Stop reason: HOSPADM

## 2024-04-08 RX ORDER — MULTIPLE VITAMINS W/ MINERALS TAB 9MG-400MCG
1 TAB ORAL DAILY
Status: DISCONTINUED | OUTPATIENT
Start: 2024-04-09 | End: 2024-04-10 | Stop reason: HOSPADM

## 2024-04-08 RX ORDER — NICOTINE POLACRILEX 4 MG
15-30 LOZENGE BUCCAL
Status: DISCONTINUED | OUTPATIENT
Start: 2024-04-08 | End: 2024-04-10 | Stop reason: HOSPADM

## 2024-04-08 RX ADMIN — ACETAMINOPHEN 975 MG: 325 TABLET ORAL at 20:43

## 2024-04-08 RX ADMIN — KETOROLAC TROMETHAMINE 15 MG: 15 INJECTION, SOLUTION INTRAMUSCULAR; INTRAVENOUS at 11:57

## 2024-04-08 RX ADMIN — ACETAMINOPHEN 975 MG: 325 TABLET ORAL at 14:53

## 2024-04-08 RX ADMIN — VANCOMYCIN HYDROCHLORIDE 1750 MG: 5 INJECTION, POWDER, LYOPHILIZED, FOR SOLUTION INTRAVENOUS at 12:00

## 2024-04-08 RX ADMIN — Medication 1 TABLET: at 20:41

## 2024-04-08 RX ADMIN — FLUTICASONE FUROATE AND VILANTEROL TRIFENATATE 1 PUFF: 200; 25 POWDER RESPIRATORY (INHALATION) at 20:42

## 2024-04-08 RX ADMIN — INSULIN GLARGINE 15 UNITS: 100 INJECTION, SOLUTION SUBCUTANEOUS at 23:12

## 2024-04-08 RX ADMIN — SODIUM CHLORIDE 1239 ML: 9 INJECTION, SOLUTION INTRAVENOUS at 10:58

## 2024-04-08 RX ADMIN — Medication 5 MG: at 23:05

## 2024-04-08 RX ADMIN — SODIUM CHLORIDE: 9 INJECTION, SOLUTION INTRAVENOUS at 14:54

## 2024-04-08 RX ADMIN — CLINDAMYCIN PHOSPHATE 900 MG: 900 INJECTION, SOLUTION INTRAVENOUS at 11:26

## 2024-04-08 ASSESSMENT — ACTIVITIES OF DAILY LIVING (ADL)
DEPENDENT_IADLS:: CLEANING;COOKING;LAUNDRY;SHOPPING;MEAL PREPARATION;TRANSPORTATION
ADLS_ACUITY_SCORE: 37
ADLS_ACUITY_SCORE: 26
ADLS_ACUITY_SCORE: 37

## 2024-04-08 NOTE — PROGRESS NOTES
Clinic Care Coordination Contact  Follow Up Progress Note      Assessment: I got a  message from  about the pt. It seems like she talked to the pt, and pt was not sure if the pt should go to the ED or come in to [facility]. She wanted me to follow up with the pt to see if the [facility] or to the [facility]. I called and talked to the pt, pt stated that she can not come in today to [facility], because she could not get a ride. Pt stated that her medical rides, needs a two day prior notice to have them take her. Pt stated that she will get a ride to the ED, since her medical rides does not need prior notice of rides. She is going to call [employer] to setup a ride to the ED today.     Care Gaps:    Health Maintenance Due   Topic Date Due    COPD ACTION PLAN  Never done    ADVANCE CARE PLANNING  11/15/2017    ZOSTER IMMUNIZATION (3 of 3) 11/30/2018    MAMMO SCREENING  09/10/2021    MEDICARE ANNUAL WELLNESS VISIT  11/02/2022    COLORECTAL CANCER SCREENING  11/22/2022    INFLUENZA VACCINE (1) 09/01/2023    COVID-19 Vaccine (3 - 2023-24 season) 09/01/2023           Care Plans      Intervention/Education provided during outreach:               Plan:     Care Coordinator will follow up in

## 2024-04-08 NOTE — TELEPHONE ENCOUNTER
"Patient called clinic triage line again with \"wound on leg\", see previous note from around 8:00 PM.     She says her pain is severe, worse than it has been before. She was able to get limited sleep due to the pain. She has been taking Tylenol and it has not helped her pain. With her uncontrolled pain that has progressed, I recommended that she be evaluated today. Discussed getting her in for an appointment at [facility] this morning versus presenting to the emergency room if she doesn't feel like she can't wait due to the pain. She says she won't have a ride for a couple of hours so she will think about it. I will have our social work call the patient at clinic opening to make sure she has a plan in place for the day and to offer her an appointment at that time if that is what she decides.     Lis Leigh MD  [facility], PGY-3    "

## 2024-04-08 NOTE — CONSULTS
"Care Management Initial Consult    General Information  Assessment completed with: Patient, Caregiver, Unad and IHS worker from [employer] present in the room with her.  Type of CM/SW Visit: Initial Assessment    Primary Care Provider verified and updated as needed: Yes   Readmission within the last 30 days: previous discharge plan unsuccessful (4/2/24 - 4/5/24)   Return Category: Progression of disease  Reason for Consult: discharge planning  Advance Care Planning: Advance Care Planning Reviewed: no concerns identified          Communication Assessment  Patient's communication style: spoken language (English or Bilingual)             Cognitive  Cognitive/Neuro/Behavioral:                        Living Environment:   People in home: alone, other (see comments) (\"Has cargiving staff there most of the time.)     Current living Arrangements: apartment (\"1st floor apartment. Only a few steps into the building. I have to use a flight of steps to get to the office\".)      Able to return to prior arrangements: yes       Family/Social Support:  Care provided by: self, other (see comments) (IHS worker)  Provides care for: no one, unable/limited ability to care for self     Other (specify) (IHS worker from [employer], friends)          Description of Support System: Supportive, Involved    Support Assessment: Adequate family and caregiver support, Adequate social supports, Patient communicates needs well met    Current Resources:   Patient receiving home care services: No     Community Resources: County Programs, County Worker, Housekeeping/Chore Agency (\"IHS worker with her M-F 24/7 and on Sat and Sun from [phone]. I'm supposed to have housekeeping help also, but I fired them.\")  Equipment currently used at home: none  Supplies currently used at home: Other (\"I have glasses at home\")    Employment/Financial:  Employment Status: disabled     Employment/ Comments: \"no  history\"  Financial Concerns:   " "  Referral to Financial Worker: No       Does the patient's insurance plan have a 3 day qualifying hospital stay waiver?  No      Functional Status:  Prior to admission patient needed assistance:   Dependent ADLs:: Independent, Ambulation-no assistive device  Dependent IADLs:: Cleaning, Cooking, Laundry, Shopping, Meal Preparation, Transportation  Assesssment of Functional Status: At functional baseline    Mental Health Status:          Chemical Dependency Status:                Values/Beliefs:  Spiritual, Cultural Beliefs, Jehovah's witness Practices, Values that affect care:                 Additional Information:  Mary Ann lives in an apartment alone. It is a \"1st floor apartment. Only a few steps into the building. I have to use a flight of steps to get to the office\".    She has an \"IHS worker from [employer] with her M-F 24/7 and on Sat and Sun from [phone].\" \"They help with IADL needs and can help with ADLs. I'm supposed to have housekeeping help also, but I fired them.\"    Unknown discharge needs at this time.    IHS worker to transport at discharge.    MEEK was given and discussed. All questions were answered.    CM to follow for medical progression of care, discharge recommendations, and final discharge plan.    Contacts:  [employer] phone: [phone], fax [phone].    Erin Higgins RN    "

## 2024-04-08 NOTE — ED TRIAGE NOTES
Pt was recently hospitalized for left leg cellulitis. Is due to see the dermatoligist tommorow for this lesion and a possible  biopsy.  Pt is here today as her left leg pain became horrible last night.  Rates pain 10/10.  Wound is oozing. Last took tylenol  at 6am.

## 2024-04-08 NOTE — PHARMACY-ADMISSION MEDICATION HISTORY
Pharmacist Admission Medication History    Admission medication history is complete. The information provided in this note is only as accurate as the sources available at the time of the update.    Information Source(s): Patient, Hospital records, and CareEverywhere/SureScripts via in-person    Pertinent Information: brief history completed due to patient's significant pain level. Patient reported no new or changed prescriptions, OTCs, or supplements since recent discharge on 4/5/24.    Changes made to PTA medication list:  Added: None  Deleted: None  Changed: None    Allergies reviewed with patient and updates made in EHR: yes    Medication History Completed By: Nicollette McMann, [facility] 4/8/2024 12:21 PM    PTA Med List   Medication Sig Last Dose    acetaminophen (TYLENOL) 500 MG tablet Take 1-2 tablets (500-1,000 mg) by mouth every 6 hours as needed for pain  at PRN    albuterol (PROAIR HFA/PROVENTIL HFA/VENTOLIN HFA) 108 (90 Base) MCG/ACT inhaler INHALE 2 PUFFS INTO LUNGS EVERY 4 HOURS AS NEEDED FOR SHORTNESS OF BREATH OR  WHEEZING  at PRN    atorvastatin (LIPITOR) 40 MG tablet Take 1 tablet (40 mg) by mouth daily 4/8/2024    budesonide-formoterol (SYMBICORT) 160-4.5 MCG/ACT Inhaler Inhale 2 puffs by mouth twice daily 4/8/2024    Calcium Carbonate-Vitamin D 500-5 MG-MCG TABS Take 1 tablet by mouth 2 times daily 4/8/2024    ferrous sulfate (FEROSUL) 325 (65 Fe) MG tablet Take 1 tablet (325 mg) by mouth daily (with breakfast) 4/8/2024    hydrOXYzine (ATARAX) 25 MG tablet Take 1 tablet (25 mg) by mouth 3 times daily as needed for itching     insulin  UNIT/ML vial Inject 15 Units Subcutaneous 2 times daily 4/8/2024 at AM    ipratropium - albuterol 0.5 mg/2.5 mg/3 mL (DUONEB) 0.5-2.5 (3) MG/3ML neb solution Take 1 vial (3 mLs) by nebulization 2 times daily as needed for shortness of breath, wheezing or cough  at PRN    liraglutide (VICTOZA) 18 MG/3ML solution Inject 0.6 mg Subcutaneous daily 4/8/2024 at AM     loratadine (CLARITIN) 10 MG tablet Take 1 tablet (10 mg) by mouth daily as needed for allergies  at PRN    magnesium hydroxide (MILK OF MAGNESIA) 400 MG/5ML suspension Take 30 mLs by mouth 2 times daily as needed for constipation     melatonin 3 MG tablet Take 3 mg by mouth nightly as needed for sleep  at PRN    metFORMIN (GLUCOPHAGE) 1000 MG tablet Take 1 tablet (1,000 mg) by mouth 2 times daily (with meals) 4/8/2024 at AM    Multiple Vitamins-Minerals (CENTRUM SILVER 50+WOMEN PO) Take 1 tablet by mouth daily 4/8/2024 at AM    omeprazole (PRILOSEC) 20 MG DR capsule Take 1 capsule (20 mg) by mouth daily 4/8/2024 at AM    vitamin D2 (ERGOCALCIFEROL) 89015 units (1250 mcg) capsule Take 1 capsule (50,000 Units) by mouth once a week Past Week

## 2024-04-08 NOTE — PHARMACY-VANCOMYCIN DOSING SERVICE
Pharmacy Vancomycin Initial Note  Date of Service 2024  Patient's  1965  58 year old, female    Indication: Sepsis and Skin and Soft Tissue Infection    Current estimated CrCl = Estimated Creatinine Clearance: 123.1 mL/min (A) (based on SCr of 0.49 mg/dL (L)).    Creatinine for last 3 days  No results found for requested labs within last 3 days.    Recent Vancomycin Level(s) for last 3 days  No results found for requested labs within last 3 days.      Vancomycin IV Administrations (past 72 hours)        No vancomycin orders with administrations in past 72 hours.                    Nephrotoxins and other renal medications (From now, onward)      Start     Dose/Rate Route Frequency Ordered Stop    24 1130  vancomycin (VANCOCIN) 1,750 mg in sodium chloride 0.9 % 500 mL intermittent infusion         20 mg/kg × 82.6 kg  over 2 Hours Intravenous ONCE 24 1108              Contrast Orders - past 72 hours (72h ago, onward)      None                Plan:  Start vancomycin 1750 mg (~20 mg/kg) mg IV x1 dose.  Please re-consult pharmacy for continued dosing inpatient.    Nicollette McMann, [facility]

## 2024-04-08 NOTE — H&P
[facility]    History and Physical - Hospitalist Service       Date of Admission:  4/8/2024    Assessment & Plan      Mary Ann Olson is a 58 year old female admitted on 4/8/2024. She has a history of HTN, T2DM on insulin, crohn's not on medication, BPD, GERD, COPD and is admitted for recurrence of LLE cellullitis with poor wound healing suspicious of venous insufficiency ulcer vs. Inflammatory skin ulcer 2/2 autoimmune process    LLE Cellulitis  LLE ulceration  Previous admission on 3/18 for LLE cellulitis and started on amoxicillin and doxycycline and then admitted on 3/22 for worsening despite treatment. Ultrasound did not demonstrate any abscess formation. She was initially started on vancomycin and then transitioned to IV clindamycin with significant improvement. She was discharged home on 3/30 with two days of oral clindamycin to complete a total of 7 days. Blood cultures during this admission were negative but had a wound culture positive for staph epidermidis.     Patient was seen in clinic on 4/2/24 at [facility] and noticed to have worsening of erythema with minor bleeding but no active purulence or discharge. ED work-up notable for WBC of 12.6 (up from 8.1 3 days ago) and CRP of 55.7, concerning for worsening of cellulitis and possible abscess formation.     Readmission 4/2/24: CT tibia fibula 4/2/24 - no discrete abscess on non-contrast exam, no evidence for deep compartment involvement or soft tissue gas      Pt. Received IV abx including vanc and clindamycin, noted improvement in pain and presentation of LLE cellulitis 4/4/24. Patient was evaluated by ID 4/4 who recommended no further hospitalization and antibiotics. [facility]  give patient's history of crohn's disease not currently on medications for the past 2 years due to insurance coverage issues, she also was noted to have suspected erythema nodosum on R shin.  -Called [facility]  dermatology, spoke with Dr. Osborn and they will contact patient to set up outpatient follow up appointment for poss. Biopsy, Apt scheduled for April 9th however she stated she had to cancel this appointment due to rehospitalization 4/8/24    Pt presents with severe 10/10 pain in her LLE and notes a change in coloration of skin lesion, now appears with purple discoloration in 4-5 cm distribution medial aspect shin, suspect poss. Underlying venous and vascular insufficiency in setting of T2DM leading to poor wound healing. Labs on admission significant for CRP 62.7, BMP mostly wnl, procalcitonin and lactate wnl, cbc demonstrating wbc 9.3 wnl. Blood cultures pending, aerbic wound bacterial culture L leg pending  -s/p IV vanc, IV clindamycin x1 dose  -I do not suspect additional infectious etiology, will not continue abx at this time  -HELEN's - pending  -US LE arterial - pending  -WOC consult placed  -pain mgmt with tylenol, toradol (pt does not want opiates)  -reached out to Dr. Santiago, [facility] dermatology to see if they recommend  biopsy here vs in derm clinic and if she can get rescheduled this week    T2DM  A1C on admission 8.5. Will have more aggressive goal for BG control in the hospital, target post-prandial <200, fasting , BG's this admission have been 120's-140's  -Hold PTA victoza, metformin  -Hold PTA NPH  -Start lantus 15 units bedtime  -Start Novolog 5 units BID w/meals  -medium sliding scale insulin      Microcytic anemia  Hgb slightly downtrending during admission, HgB 9.6 4/5/24, MCV 77. Iron studies indicating Total Iron 17, , Sat Index 6, Ferritin wnl. Hgb this admission 9.9. Could consider crohn's vs. Malabsorption due to other autoimmune conditions I.e. celiac disease. Pt. Denies acute blood loss  -Cont. PTA iron supplementation    Chronic Conditions:  COPD: Cont. PTA symbicort, albuterol, duonebs  DLD: Cont. PTA lipitor  GERD:  cont. Pta omeprazole  Cont. Pta atarax          Diet:  "Combination Diet Regular Diet Adult    DVT Prophylaxis: Enoxaparin (Lovenox) SQ  Beckwith Catheter: Not present  Fluids: NS @ 100 ml/hr  Lines: None     Cardiac Monitoring: None  Code Status: Full Code      Clinically Significant Risk Factors Present on Admission                      # DMII: A1C = 12.2 % (Ref range: 0.0 - 5.6 %) within past 6 months    # Obesity: Estimated body mass index is 33.29 kg/m  as calculated from the following:    Height as of this encounter: 1.575 m (5' 2\").    Weight as of this encounter: 82.6 kg (182 lb).       # Financial/Environmental Concerns:           Disposition Plan      Expected Discharge Date: 04/09/2024      Destination: home with help/services          The patient's care was discussed with the Attending Physician, Dr. Kong .      Ajit Byrnes MD  Hospitalist Service  [facility]  Securely message with [employer] (more info)  Text page via [employer] Paging/Directory   ______________________________________________________________________    Chief Complaint   Severe L leg pain, wound increasing in size with discoloration    History is obtained from the patient    History of Present Illness   Mary Ann Olson is a 58 year old female who presents with readmission for LLE leg wound, pt has been on our service multiple admissions sice 3/22/24 for LLE cellulitis, initially responded well to IV vancomycin and clindamycin, discharged 3/30 with oral clindamycin, however returned 4/2/24 with increased pain and wound erythema, drainage, was treated again with IV vanc and clindamycin, evaluated by ID 4/4 who felt patient was improving, pt was discharged home 4/4/24 with plan for outpatient derm follow up 4/9/24 however returns for admission today 4/8/24 with worsening pain and purple discoloration of her LLE wound.    Patient endorses chills and diaphoresis morning of admission, denies fevers, N/V, chest pain or SOB. No bowel or bladder symptoms. Patient endorsing LLQ " abdominal pain which is not new. She is understandably frustrated with her lack of improvement on multiple hospital admissions for LLE wound.       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    Antihistamines overdose, intentional self-harm, initial encounter (H)     Asthma     Bipolar 1 disorder (H) hx of bipolar listed in h and p    Bipolar 2 disorder (H)     Bipolar I disorder, single manic episode (H)     Created by Conversion  Replacement Utility updated for latest IMO load    Cellulitis 2006 left ankle, 2008 right foot    COPD (chronic obstructive pulmonary disease) (H)     Crohn's disease (H)     arthritis associated with crohn's    Diabetes mellitus (H)     Diabetes mellitus, type 2 (H)     Fibrocystic breast     Heat stroke     when a young child     Hyperlipidemia     Idiopathic acute pancreatitis 07/14/2015    Irritable bowel syndrome     Created by Conversion     Kidney stone     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     Overdose     Pyoderma gangrenosum (H28)     2016    Sacroiliitis (H24) 2004    Skin lesion of left leg 08/27/2015    Suicidal ideation     Suicide attempt (H)     Traumatic iritis 07/21/2015    Umbilical hernia     Uncomplicated asthma        Past Surgical History   Past Surgical History:   Procedure Laterality Date    BIOPSY BREAST Left     BIOPSY OF BREAST, INCISIONAL      Description: Biopsy Breast Open;  Recorded: 10/28/2010;    HC REMOVAL OF TONSILS,<11 Y/O      Description: Tonsillectomy;  Recorded: 07/08/2009;    ZZC LIGATE FALLOPIAN TUBE      Description: Tubal Ligation;  Recorded: 07/08/2009;       Prior to Admission Medications   Prior to Admission Medications   Prescriptions Last Dose Informant Patient Reported? Taking?   Calcium Carbonate-Vitamin D 500-5 MG-MCG TABS 4/8/2024 Self No Yes   Sig: Take 1 tablet by mouth 2 times daily   Multiple Vitamins-Minerals (CENTRUM SILVER 50+WOMEN PO) 4/8/2024  at AM Self Yes Yes   Sig: Take 1 tablet by mouth daily   acetaminophen (TYLENOL) 500 MG tablet  at PRN Self No Yes   Sig: Take 1-2 tablets (500-1,000 mg) by mouth every 6 hours as needed for pain   albuterol (PROAIR HFA/PROVENTIL HFA/VENTOLIN HFA) 108 (90 Base) MCG/ACT inhaler  at PRN Self No Yes   Sig: INHALE 2 PUFFS INTO LUNGS EVERY 4 HOURS AS NEEDED FOR SHORTNESS OF BREATH OR  WHEEZING   atorvastatin (LIPITOR) 40 MG tablet 4/8/2024 Self No Yes   Sig: Take 1 tablet (40 mg) by mouth daily   budesonide-formoterol (SYMBICORT) 160-4.5 MCG/ACT Inhaler 4/8/2024 Self No Yes   Sig: Inhale 2 puffs by mouth twice daily   ferrous sulfate (FEROSUL) 325 (65 Fe) MG tablet 4/8/2024 Self No Yes   Sig: Take 1 tablet (325 mg) by mouth daily (with breakfast)   hydrOXYzine (ATARAX) 25 MG tablet  Self No Yes   Sig: Take 1 tablet (25 mg) by mouth 3 times daily as needed for itching   insulin  UNIT/ML vial 4/8/2024 at AM Self No Yes   Sig: Inject 15 Units Subcutaneous 2 times daily   ipratropium - albuterol 0.5 mg/2.5 mg/3 mL (DUONEB) 0.5-2.5 (3) MG/3ML neb solution  at PRN Self No Yes   Sig: Take 1 vial (3 mLs) by nebulization 2 times daily as needed for shortness of breath, wheezing or cough   liraglutide (VICTOZA) 18 MG/3ML solution 4/8/2024 at AM Self No Yes   Sig: Inject 0.6 mg Subcutaneous daily   loratadine (CLARITIN) 10 MG tablet  at PRN Self No Yes   Sig: Take 1 tablet (10 mg) by mouth daily as needed for allergies   magnesium hydroxide (MILK OF MAGNESIA) 400 MG/5ML suspension  Self No Yes   Sig: Take 30 mLs by mouth 2 times daily as needed for constipation   melatonin 3 MG tablet  at PRN Self Yes Yes   Sig: Take 3 mg by mouth nightly as needed for sleep   metFORMIN (GLUCOPHAGE) 1000 MG tablet 4/8/2024 at AM Self No Yes   Sig: Take 1 tablet (1,000 mg) by mouth 2 times daily (with meals)   omeprazole (PRILOSEC) 20 MG DR capsule 4/8/2024 at AM Self No Yes   Sig: Take 1 capsule (20 mg) by mouth daily   vitamin D2  (ERGOCALCIFEROL) 48333 units (1250 mcg) capsule Past Week Self No Yes   Sig: Take 1 capsule (50,000 Units) by mouth once a week      Facility-Administered Medications: None         Physical Exam   Vital Signs: Temp: (!) 96.7  F (35.9  C) Temp src: Tympanic BP: 122/60 Pulse: 93   Resp: 12 SpO2: 98 %      Weight: 182 lbs 0 oz    Physical Exam  Constitutional:       General: She is not in acute distress.     Appearance: Normal appearance. She is not ill-appearing.   HENT:      Mouth/Throat:      Mouth: Mucous membranes are dry.      Pharynx: Oropharynx is clear.   Eyes:      Extraocular Movements: Extraocular movements intact.      Conjunctiva/sclera: Conjunctivae normal.      Pupils: Pupils are equal, round, and reactive to light.   Cardiovascular:      Rate and Rhythm: Normal rate and regular rhythm.      Pulses: Normal pulses.      Heart sounds: Normal heart sounds.   Pulmonary:      Effort: Pulmonary effort is normal.      Breath sounds: Normal breath sounds.   Abdominal:      General: Bowel sounds are normal.      Palpations: Abdomen is soft.      Tenderness: There is abdominal tenderness.   Musculoskeletal:      Right lower leg: No edema.      Left lower leg: No edema.   Skin:     Capillary Refill: Capillary refill takes less than 2 seconds.      Findings: Erythema and lesion present.      Comments: Very significant change in LLE wound from prior admission 4/4/24, noting new purple discoloration in 4-5 cm distribution medial shin/ankle. See media tab for new image 4/8/24    Still has suspected erythema nodosum on R shin, has not changed    New cat scratch and bite on L arm, appears to be small 2-3 cm wound with overlying scabbing, does not appear infectious   Neurological:      General: No focal deficit present.      Mental Status: She is alert and oriented to person, place, and time. Mental status is at baseline.   Psychiatric:         Mood and Affect: Mood normal.         Behavior: Behavior normal.             Data     I have personally reviewed the following data over the past 24 hrs:    9.3  \   9.9 (L)   / 675 (H)     137 98 12.3 /  84   4.2 23 0.46 (L) \     ALT: 13 AST: 16 AP: 115 TBILI: 0.3   ALB: 4.0 TOT PROTEIN: 7.3 LIPASE: N/A     Procal: 0.14 CRP: 62.70 (H) Lactic Acid: 1.6         Imaging results reviewed over the past 24 hrs:   No results found for this or any previous visit (from the past 24 hour(s)).    Ajit Byrnes MD  PGY-1  [facility]  [facility]   April 8, 2024
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
## 2024-04-08 NOTE — ED NOTES
[facility] ED Handoff Report    ED Chief Complaint: The patient arrives with staff at her side. Patient has been dealing with LLE cellulitis since 3/18, she was admitted on 4/4. Pt was seen and tx with dozy and amoxicillin and did not improve; she than was admitted and received cleocin and vancomycin and had notable improvement. Pt presents tearful she is exhausted with dealing with this cellulitis ongoing. Pt had a dermatology appointment on 4/7, awaiting possible biopsy.     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    Antihistamines overdose, intentional self-harm, initial encounter (H)     Asthma     Bipolar 1 disorder (H) hx of bipolar listed in h and p    Bipolar 2 disorder (H)     Bipolar I disorder, single manic episode (H)     Created by Conversion  Replacement Utility updated for latest IMO load    Cellulitis 2006 left ankle, 2008 right foot    COPD (chronic obstructive pulmonary disease) (H)     Crohn's disease (H)     arthritis associated with crohn's    Diabetes mellitus (H)     Diabetes mellitus, type 2 (H)     Fibrocystic breast     Heat stroke     when a young child     Hyperlipidemia     Idiopathic acute pancreatitis 07/14/2015    Irritable bowel syndrome     Created by Conversion     Kidney stone     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     Elimination Status: Continent: Yes     Activity Level:  "SBA    Patients Preferred Language:  English     Needed: No    Vital Signs:  /60   Pulse 93   Temp (!) 96.7  F (35.9  C) (Tympanic)   Resp 12   Ht 1.575 m (5' 2\")   Wt 82.6 kg (182 lb)   SpO2 98%   BMI 33.29 kg/m       Cardiac Rhythm: Patient refuses cardiac monitoring    Pain Score: 6/10, pt was given toradol for her wound pain    Is the Patient Confused:  No    Last Food or Drink: 04/08/24 at prior to arrival to the ED    Focused Assessment:  The patient has a large wound on the left lower extremity oozing with purulent drainage. Pt had the wound covered upon arrival    Tests Performed: Done: Labs    Treatments Provided:  Patient received IV vancomycin, Cleocin, NS bolus 1239, and toradol.    Family Dynamics/Concerns: No    Family Updated On Visitor Policy: Yes    Plan of Care Communicated to Family: Yes    Who Was Updated about Plan of Care: staff    Belongings Checklist Done and Signed by Patient: Yes    Belongings Sent with Patient:     Medications sent with patient: none    Covid: asymptomatic , NA    Additional Information: none    RN: Adelaida Green RN 4/8/2024 1:50 PM        "

## 2024-04-08 NOTE — ED NOTES
Bed: [place] [address]  Expected date:   Expected time:   Means of arrival:   Comments:  Room 10

## 2024-04-08 NOTE — TELEPHONE ENCOUNTER
"Responded to clinic triage page regarding \" Matter keeps coming from wounds, several dressing changes\".     Patient recently hospitalized from 3/22-3/30 for cellulitis, readmitted on 4/2 after progressive erythema of same lesion. ID evaluated during that hospitalization and they were more concerned for skin manifestations of her Crohn's disease. She has an appointment scheduled with dermatology on 4/9 for biopsy.     Called patient, who reports concern that she is having more fluid discharge, has been having to change the dressing a couple of times a day ever since discharge. States her wound looks darker in color, a dark red, and is more puffy. Denies fever. Reports that the fluid is yellow with blood tinge. Pain is about the same as it has been throughout illness course. States that her leg itself is not more swollen.     Discussed with Mary Ann that her derm appointment is very important, and the highest priority to get to. Discussed that with her pain about the same, no significant swelling of her affected extremity, and lack of fever, I do not think she needs to go to the emergency room tonight. Discussed that if she is not feeling better in the morning, she should call [facility] for an appointment so that someone can visualize the wound, but emphasized that this would not replace the derm appointment. She understands and agrees to this plan.     Lis Leigh MD  [facility] Residency, PGY-3    "

## 2024-04-09 ENCOUNTER — TELEPHONE (OUTPATIENT)
Dept: DERMATOLOGY | Facility: CLINIC | Age: 59
End: 2024-04-09

## 2024-04-09 LAB
ANION GAP SERPL CALCULATED.3IONS-SCNC: 11 MMOL/L (ref 7–15)
BUN SERPL-MCNC: 9.5 MG/DL (ref 8–23)
CALCIUM SERPL-MCNC: 9.7 MG/DL (ref 8.6–10)
CHLORIDE SERPL-SCNC: 103 MMOL/L (ref 98–107)
CREAT SERPL-MCNC: 0.51 MG/DL (ref 0.51–0.95)
DEPRECATED HCO3 PLAS-SCNC: 24 MMOL/L (ref 22–29)
EGFRCR SERPLBLD CKD-EPI 2021: >90 ML/MIN/1.73M2
ERYTHROCYTE [DISTWIDTH] IN BLOOD BY AUTOMATED COUNT: 16.2 % (ref 10–15)
GLUCOSE BLDC GLUCOMTR-MCNC: 109 MG/DL (ref 70–99)
GLUCOSE BLDC GLUCOMTR-MCNC: 142 MG/DL (ref 70–99)
GLUCOSE BLDC GLUCOMTR-MCNC: 145 MG/DL (ref 70–99)
GLUCOSE BLDC GLUCOMTR-MCNC: 90 MG/DL (ref 70–99)
GLUCOSE BLDC GLUCOMTR-MCNC: 97 MG/DL (ref 70–99)
GLUCOSE SERPL-MCNC: 147 MG/DL (ref 70–99)
HCT VFR BLD AUTO: 29.9 % (ref 35–47)
HGB BLD-MCNC: 8.9 G/DL (ref 11.7–15.7)
MCH RBC QN AUTO: 23.2 PG (ref 26.5–33)
MCHC RBC AUTO-ENTMCNC: 29.8 G/DL (ref 31.5–36.5)
MCV RBC AUTO: 78 FL (ref 78–100)
PLATELET # BLD AUTO: 528 10E3/UL (ref 150–450)
POTASSIUM SERPL-SCNC: 4.1 MMOL/L (ref 3.4–5.3)
RBC # BLD AUTO: 3.84 10E6/UL (ref 3.8–5.2)
SODIUM SERPL-SCNC: 138 MMOL/L (ref 135–145)
WBC # BLD AUTO: 6.9 10E3/UL (ref 4–11)

## 2024-04-09 PROCEDURE — 88305 TISSUE EXAM BY PATHOLOGIST: CPT | Mod: 26 | Performed by: DERMATOLOGY

## 2024-04-09 PROCEDURE — 250N000013 HC RX MED GY IP 250 OP 250 PS 637: Performed by: FAMILY MEDICINE

## 2024-04-09 PROCEDURE — 250N000013 HC RX MED GY IP 250 OP 250 PS 637

## 2024-04-09 PROCEDURE — 82962 GLUCOSE BLOOD TEST: CPT

## 2024-04-09 PROCEDURE — 80048 BASIC METABOLIC PNL TOTAL CA: CPT

## 2024-04-09 PROCEDURE — 88313 SPECIAL STAINS GROUP 2: CPT | Mod: 26 | Performed by: DERMATOLOGY

## 2024-04-09 PROCEDURE — 11104 PUNCH BX SKIN SINGLE LESION: CPT | Mod: GC

## 2024-04-09 PROCEDURE — G0378 HOSPITAL OBSERVATION PER HR: HCPCS

## 2024-04-09 PROCEDURE — 36415 COLL VENOUS BLD VENIPUNCTURE: CPT

## 2024-04-09 PROCEDURE — 88313 SPECIAL STAINS GROUP 2: CPT | Mod: TC,59

## 2024-04-09 PROCEDURE — 11104 PUNCH BX SKIN SINGLE LESION: CPT

## 2024-04-09 PROCEDURE — 11105 PUNCH BX SKIN EA SEP/ADDL: CPT

## 2024-04-09 PROCEDURE — 85027 COMPLETE CBC AUTOMATED: CPT

## 2024-04-09 PROCEDURE — G0463 HOSPITAL OUTPT CLINIC VISIT: HCPCS

## 2024-04-09 PROCEDURE — 88312 SPECIAL STAINS GROUP 1: CPT | Mod: 26 | Performed by: DERMATOLOGY

## 2024-04-09 PROCEDURE — 11105 PUNCH BX SKIN EA SEP/ADDL: CPT | Mod: GC | Performed by: STUDENT IN AN ORGANIZED HEALTH CARE EDUCATION/TRAINING PROGRAM

## 2024-04-09 RX ORDER — ACETAMINOPHEN 325 MG/1
650 TABLET ORAL ONCE
Status: COMPLETED | OUTPATIENT
Start: 2024-04-09 | End: 2024-04-09

## 2024-04-09 RX ORDER — LIDOCAINE HYDROCHLORIDE AND EPINEPHRINE 10; 10 MG/ML; UG/ML
3 INJECTION, SOLUTION INFILTRATION; PERINEURAL ONCE
Status: DISCONTINUED | OUTPATIENT
Start: 2024-04-09 | End: 2024-04-10 | Stop reason: HOSPADM

## 2024-04-09 RX ORDER — MAGNESIUM CARB/ALUMINUM HYDROX 105-160MG
296 TABLET,CHEWABLE ORAL ONCE
Qty: 296 ML | Refills: 0 | Status: COMPLETED | OUTPATIENT
Start: 2024-04-09 | End: 2024-04-09

## 2024-04-09 RX ADMIN — PANTOPRAZOLE SODIUM 40 MG: 40 TABLET, DELAYED RELEASE ORAL at 07:04

## 2024-04-09 RX ADMIN — Medication 1 TABLET: at 08:27

## 2024-04-09 RX ADMIN — ACETAMINOPHEN 975 MG: 325 TABLET ORAL at 15:00

## 2024-04-09 RX ADMIN — INSULIN GLARGINE 15 UNITS: 100 INJECTION, SOLUTION SUBCUTANEOUS at 22:41

## 2024-04-09 RX ADMIN — ALBUTEROL SULFATE 2 PUFF: 90 AEROSOL, METERED RESPIRATORY (INHALATION) at 20:01

## 2024-04-09 RX ADMIN — ACETAMINOPHEN 975 MG: 325 TABLET ORAL at 08:27

## 2024-04-09 RX ADMIN — ACETAMINOPHEN 975 MG: 325 TABLET ORAL at 20:00

## 2024-04-09 RX ADMIN — Medication 1 TABLET: at 20:00

## 2024-04-09 RX ADMIN — ACETAMINOPHEN 650 MG: 325 TABLET ORAL at 03:45

## 2024-04-09 RX ADMIN — FLUTICASONE FUROATE AND VILANTEROL TRIFENATATE 1 PUFF: 200; 25 POWDER RESPIRATORY (INHALATION) at 20:00

## 2024-04-09 RX ADMIN — SENNOSIDES AND DOCUSATE SODIUM 1 TABLET: 8.6; 5 TABLET ORAL at 17:11

## 2024-04-09 RX ADMIN — FERROUS SULFATE TAB 325 MG (65 MG ELEMENTAL FE) 325 MG: 325 (65 FE) TAB at 08:27

## 2024-04-09 RX ADMIN — ATORVASTATIN CALCIUM 40 MG: 40 TABLET, FILM COATED ORAL at 08:27

## 2024-04-09 ASSESSMENT — ACTIVITIES OF DAILY LIVING (ADL)
ADLS_ACUITY_SCORE: 26

## 2024-04-09 NOTE — CONSULTS
[facility]  WO Nurse Inpatient Assessment     Consulted for: LLE    Summary: 4/9 patient known to WO team.  Stated she didn't like using the Vashe, agreed to try something different.    Patient History (according to provider note(s):      Mary Ann Olson is a patient of Joanna Henderson here for wound with eyrthema, nodular vascular lesions, granulomatous wound with breakdown of skin suspicious for inflammatory etiology  Multiple areas extending circumferentially around calf with pus drainage.    Assessment:      Areas visualized during today's visit:  LLE    Skin Injury Location: LLE medial/lateral       Medial                                                              lateral  Last photo: 4/9  Skin injury due to: Unclear etiology  Skin history and plan of care:   Dealing with cellulitis, but patient stated both her dog and cat have bumped into and damaged skin on her leg  Affected area:      Skin assessment: Blistering, Dry drainage, Erosion of epidermis, Erythema, Lesions-raised, and Lesions-satellite     Measurements (length x width x depth, in cm) 15 cm  x 10 cm  approx     Color: pink     Temperature  warm     Drainage: scant .      Color: serous      Odor: none  Pain: moderate and tension to hands, feet and body, sharp and tender  Pain interventions prior to dressing change: patient tolerated well and slow and gentle cares   Treatment goal: Infection control/prevention, Increase moisture , and Protection  STATUS: initial assessment  Supplies ordered: ordered microklenz, medihoney and supplies stored on unit       Treatment Plan:     LLE wound(s): Every other day and PRN if dressing soiled, saturated or falls off   Cleanse wound with Microklenz and gently pat dry  Put medihoney on 3x8 adaptic and place on wound.  Cover with ABD pad and secure with kerlix, do not tape to skin     Orders: Written    RECOMMEND PRIMARY TEAM ORDER: None, at this time  Education provided:  plan of care  Discussed plan of care with: Patient  WO nurse follow-up plan: weekly  Notify WOC if wound(s) deteriorate.  Nursing to notify the Provider(s) and re-consult the WO Nurse if new skin concern.    DATA:     Current support surface: Standard  Standard gel/foam mattress (IsoFlex, Atmos air, etc)  Containment of urine/stool: Continent of bladder and Continent of bowel  BMI: Body mass index is 33.29 kg/m .   Active diet order: Orders Placed This Encounter      Combination Diet Regular Diet Adult     Output: I/O last 3 completed shifts:  In: 1239 [IV Piggyback:1239]  Out: -      Labs:   Recent Labs   Lab 04/09/24  0717 04/08/24  1043   ALBUMIN  --  4.0   HGB 8.9* 9.9*   WBC 6.9 9.3   A1C  --  8.5*     Pressure injury risk assessment:   Sensory Perception: 3-->slightly limited  Moisture: 4-->rarely moist  Activity: 4-->walks frequently  Mobility: 4-->no limitation  Nutrition: 3-->adequate  Friction and Shear: 3-->no apparent problem  Rodrigo Score: 21    DEWAYNE SantoyoN RN [facility] services  Pager no longer in use, please contact through [facility]: [facility] [employer] [facility]

## 2024-04-09 NOTE — PLAN OF CARE
"PRIMARY DIAGNOSIS: \"GENERIC\" NURSING  OUTPATIENT/OBSERVATION GOALS TO BE MET BEFORE DISCHARGE:  ADLs back to baseline: Yes    Activity and level of assistance: Ambulating independently.    Pain status: Improved-controlled with oral pain medications.    Return to near baseline physical activity: Yes     Discharge Planner Nurse   Safe discharge environment identified: Yes  Barriers to discharge: Yes       Entered by: Rush Beltran RN 04/09/2024 9:07 AM     Please review provider order for any additional goals.   Nurse to notify provider when observation goals have been met and patient is ready for discharge.      "

## 2024-04-09 NOTE — PLAN OF CARE
"PRIMARY DIAGNOSIS: \"GENERIC\" NURSING  OUTPATIENT/OBSERVATION GOALS TO BE MET BEFORE DISCHARGE:  ADLs back to baseline: Yes    Activity and level of assistance: Ambulating independently.    Pain status: Improved-controlled with oral pain medications.    Return to near baseline physical activity: Yes     Discharge Planner Nurse   Safe discharge environment identified: No  Barriers to discharge: Yes       Entered by: Racquel Felipe RN 04/09/2024 6:09 AM     Please review provider order for any additional goals.   Nurse to notify provider when observation goals have been met and patient is ready for discharge.    Pt denies nausea. Dressing intact on left lower leg. Slept in between cares.   "

## 2024-04-09 NOTE — DISCHARGE INSTRUCTIONS
WOC DISCHARGE INSTRUCTIONS:  LLE wound(s): Every other day and PRN if dressing soiled, saturated or falls off   Cleanse wound with Microklenz and gently pat dry  Put medihoney on 3x8 adaptic and place on wound.  Cover with ABD pad and secure with kerlix, do not tape to skin

## 2024-04-09 NOTE — PLAN OF CARE
Problem: Adult Inpatient Plan of Care  Goal: Optimal Comfort and Wellbeing  Outcome: Progressing    Problem: Skin Injury Risk Increased  Goal: Skin Health and Integrity  Intervention: Optimize Skin Protection  Recent Flowsheet Documentation  Taken 4/9/2024 0906 by Rush Beltran, RN  Activity Management:   activity adjusted per tolerance   ambulated in room  Head of Bed (HOB) Positioning: HOB at 30 degrees    Goal Outcome Evaluation:      Plan of Care Reviewed With: patient    A&Ox4. RA. Able to make needs known. C/o L leg pain & abd discomfort. Tylernol given. No PIV. BG of 145 & 90. Up independently. Continue POC.

## 2024-04-09 NOTE — PLAN OF CARE
PRIMARY DIAGNOSIS: Cellulitis of LLE  OUTPATIENT/OBSERVATION GOALS TO BE MET BEFORE DISCHARGE:  ADLs back to baseline: Yes    Activity and level of assistance: Ambulating independently.    Pain status: Improved-controlled with oral pain medications.    Return to near baseline physical activity: Yes     Discharge Planner Nurse   Safe discharge environment identified: Yes  Barriers to discharge: Yes       Entered by: Rush Beltran RN 04/09/2024 12:24 PM     Please review provider order for any additional goals.   Nurse to notify provider when observation goals have been met and patient is ready for discharge.

## 2024-04-09 NOTE — PLAN OF CARE
PRIMARY DIAGNOSIS: Cellulitis  OUTPATIENT/OBSERVATION GOALS TO BE MET BEFORE DISCHARGE:  ADLs back to baseline: No    Activity and level of assistance: Ambulating independently.    Pain status c/o pain but declines pain meds    Return to near baseline physical activity: No     Discharge Planner Nurse   Safe discharge environment identified: Yes  Barriers to discharge: Yes       Entered by: Caio Leon RN 04/08/2024 11:30 PM  Pt is A&O X4 c/o being anxious refused anxiety meds. Refused to keep IV on. Melatonin was given.

## 2024-04-09 NOTE — PROVIDER NOTIFICATION
Pt refused to keep her IV line wanted to rip it off provider was notified and let the IV to be take out IV was removed.

## 2024-04-09 NOTE — TELEPHONE ENCOUNTER
Bebeto Hoang Eamonn, MD  Cc: Ajit Byrnes MD  Hello,    The patient declined scheduling right now as she is not sure when she is being discharged from the hospital and how the TCU placement will go/duration. She said she is going to defer scheduling or seeing Dr. MARIANA Santiago post TCU placement/being discharged.    Thanks,  Bebeto Hoang, EMT-B          Previous Messages       ----- Message -----  From: Too Santiago MD  Sent: 4/9/2024   3:16 PM CDT  To: Bebeto Hoang; Ajit Byrnes MD  Subject: RE: Ulceration: biopsy?                          Bebeto could you offer her an 11:45 Thursday spot?  ----- Message -----

## 2024-04-09 NOTE — PROGRESS NOTES
Care Management Follow Up    Length of Stay (days): 0    Expected Discharge Date: 04/09/2024     Concerns to be Addressed: discharge planning     Patient plan of care discussed at interdisciplinary rounds: Yes    Anticipated Discharge Disposition:  home no needs   Anticipated Discharge Services:  n/a  Anticipated Discharge DME:  n/a    Patient/family educated on Medicare website which has current facility and service quality ratings:  yes  Education Provided on the Discharge Plan:  yes  Patient/Family in Agreement with the Plan:  yes    Referrals Placed by CM/SW:  yes  Private pay costs discussed: transportation costs    Additional Information:  SW spoke with MD who feels that TCU would be appropriate and therapy evals ordered. SW will follow for recommendations; met with pt who is agreeable to TCU and stated that she would like to remain in the area if possible. Referral sent to [facility] and pending. Care management following.  11:16 AM    Pt accepted to [facility]; attempted to discuss with pt but sleeping and would not wake up. Care management will follow in AM of 4/10.  3:51 PM    Brenna Kjellberg, BSW  4/9/2024

## 2024-04-09 NOTE — TELEPHONE ENCOUNTER
Called patient and left a V.M requesting patient to call back the clinic call back number ([phone]) for scheduling. Pt deferred scheduling at this time.

## 2024-04-09 NOTE — PROGRESS NOTES
[facility]    Progress Note - Hospitalist Service       Date of Admission:  4/8/2024    Assessment & Plan   Mary Ann Olson is a 58 year old female admitted on 4/8/2024. She has a history of HTN, T2DM on insulin, crohn's not on medication, BPD, GERD, COPD and is admitted for recurrence of LLE cellullitis with poor wound healing suspicious of venous insufficiency ulcer vs. Inflammatory skin ulcer 2/2 autoimmune process     LLE Cellulitis  LLE ulceration  Previous admission on 3/18 for LLE cellulitis and started on amoxicillin and doxycycline and then admitted on 3/22 for worsening despite treatment. Ultrasound did not demonstrate any abscess formation. She was initially started on vancomycin and then transitioned to IV clindamycin with significant improvement. She was discharged home on 3/30 with two days of oral clindamycin to complete a total of 7 days. Blood cultures during this admission were negative but had a wound culture positive for staph epidermidis.     Patient was seen in clinic on 4/2/24 at [facility] and noticed to have worsening of erythema with minor bleeding but no active purulence or discharge. ED work-up notable for WBC of 12.6 (up from 8.1 3 days ago) and CRP of 55.7, concerning for worsening of cellulitis and possible abscess formation.     Readmission 4/2/24: CT tibia fibula 4/2/24 - no discrete abscess on non-contrast exam, no evidence for deep compartment involvement or soft tissue gas      Pt. Received IV abx including vanc and clindamycin, noted improvement in pain and presentation of LLE cellulitis 4/4/24. Patient was evaluated by ID 4/4 who recommended no further hospitalization and antibiotics. [facility]  give patient's history of crohn's disease not currently on medications for the past 2 years due to insurance coverage issues, she also was noted to have suspected erythema nodosum on R shin.  -Called [facility]  dermatology, spoke with Dr. Osborn and they will contact patient to set up outpatient follow up appointment for poss. Biopsy, Apt scheduled for April 9th however she stated she had to cancel this appointment due to rehospitalization 4/8/24     Pt presents with severe 10/10 pain in her LLE and notes a change in coloration of skin lesion, now appears with purple discoloration in 4-5 cm distribution medial aspect shin, suspect poss. Underlying venous and vascular insufficiency in setting of T2DM leading to poor wound healing. Labs on admission significant for CRP 62.7, BMP mostly wnl, procalcitonin and lactate wnl, cbc demonstrating wbc 9.3 wnl. Blood cultures pending, aerbic wound bacterial culture L leg pending  -s/p IV vanc, IV clindamycin x1 dose  -I do not suspect additional infectious etiology, will not continue abx at this time  -US LE arterial - no evidence of arterial insufficiency  -pain mgmt with tylenol  -reached out to Dr. Santiago, [facility] dermatology to see if they recommend  biopsy here vs in derm clinic and if she can get rescheduled this week  -Discussed case with Dr. Santiago, who recommended punch biopsy while here in clinic  -Obtained punch biopsy x2 of LLE erythematous, granular lesion with skin breakdown and vascular nodules, suspect inflammatory wound  -Discussed case with social work, recommend she moves to the floor and requesting TCU placement as patient cannot care for wound at home  -PT referral placed  -WOC consult placed     T2DM  A1C on admission 8.5. Will have more aggressive goal for BG control in the hospital, target post-prandial <200, fasting , BG's this admission have been 120's-140's  -Hold PTA victoza, metformin  -Hold PTA NPH  lantus 15 units bedtime  Novolog 5 units BID w/meals  -medium sliding scale insulin    Hx Crohns  Patient not currently on medication, has not established care with GI in awhile outpatient. Will consult GI today to re-establish care and start poss. Medical  "management or schedule OP follow up  -GI consulted, recs appreciated        Microcytic anemia  Hgb slightly downtrending during admission, HgB 9.6 4/5/24, MCV 77. Iron studies indicating Total Iron 17, , Sat Index 6, Ferritin wnl. Hgb this admission 9.9. Could consider crohn's vs. Malabsorption due to other autoimmune conditions I.e. celiac disease. Pt. Denies acute blood loss  -Cont. PTA iron supplementation     Chronic Conditions:  COPD: Cont. PTA symbicort, albuterol, duonebs  DLD: Cont. PTA lipitor  GERD:  cont. Pta omeprazole  Cont. Pta atarax        Diet: Combination Diet Regular Diet Adult    DVT Prophylaxis: Enoxaparin (Lovenox) SQ  Beckwith Catheter: Not present  Fluids: PO intake  Lines: None     Cardiac Monitoring: None  Code Status: Full Code      Clinically Significant Risk Factors Present on Admission                      # DMII: A1C = 8.5 % (Ref range: <5.7 %) within past 6 months    # Obesity: Estimated body mass index is 33.29 kg/m  as calculated from the following:    Height as of this encounter: 1.575 m (5' 2\").    Weight as of this encounter: 82.6 kg (182 lb).       # Financial/Environmental Concerns:           Disposition Plan     Expected Discharge Date: 04/09/2024      Destination: home with help/services          The patient's care was discussed with the Attending Physician, Dr. Farah .    Ajit Byrnes MD  Hospitalist Service  [facility]  Securely message with [employer] (more info)  Text page via [employer] Paging/Directory   ______________________________________________________________________    Interval History   Patient endorsing pain, itching of her LLE overnight. Received tylenol which is minimally helpful, states toradol was not effective, does not want oxycodone    Physical Exam   Vital Signs: Temp: 98.7  F (37.1  C) Temp src: Oral BP: 131/72 Pulse: 94   Resp: 18 SpO2: 96 % O2 Device: None (Room air)    Weight: 182 lbs 0 oz    Physical Exam  Constitutional:  "      Appearance: Normal appearance.   Cardiovascular:      Rate and Rhythm: Normal rate and regular rhythm.   Pulmonary:      Effort: Pulmonary effort is normal.      Breath sounds: Normal breath sounds.   Abdominal:      General: Bowel sounds are normal.      Palpations: Abdomen is soft.      Tenderness: There is abdominal tenderness.   Skin:     Comments: See media tab for update images LLE wound   Neurological:      Mental Status: She is alert.          Medical Decision Making     Data     I have personally reviewed the following data over the past 24 hrs:    6.9  \   8.9 (L)   / 528 (H)     138 103 9.5 /  145 (H)   4.1 24 0.51 \     ALT: N/A AST: N/A AP: N/A TBILI: N/A   ALB: N/A TOT PROTEIN: N/A LIPASE: N/A     TSH: N/A T4: N/A A1C: N/A     Procal: N/A CRP: N/A Lactic Acid: N/A         Imaging results reviewed over the past 24 hrs:   Recent Results (from the past 24 hour(s))   US Lower Extremity Arterial Duplex Bilateral    Narrative    EXAM: US LOWER EXTREMITY ARTERIAL DUPLEX BILATERAL  LOCATION: [facility]  DATE: 4/8/2024    INDICATION: Hx T2DM on insulin, recurrent cellulitis, ulcer, please eval for vascular etiology  COMPARISON: None.  TECHNIQUE: Duplex utilizing 2D gray-scale imaging, Doppler interrogation with color-flow and spectral waveform analysis.    FINDINGS:    RIGHT LOWER EXTREMITY ARTERIAL ASSESSMENT:  External iliac artery 144 cm/s  Common femoral artery: 112 cm/s  Profunda femoris artery: 98 cm/s  SFA (proximal): 115 cm/s  SFA (mid): 138 cm/s  SFA (distal): 120 cm/s  Popliteal artery:  cm/s  Posterior tibial artery: 90 cm/s  Anterior tibial artery: 117 cm/s  Dorsalis pedis artery: 122 cm/s    LEFT LOWER EXTREMITY ARTERIAL ASSESSMENT:  External iliac artery 142 cm/s  Common femoral artery: 140 cm/s  Profunda femoris artery: 80 cm/s  SFA (proximal): 149 cm/s  SFA (mid): 155 cm/s  SFA (distal): 157 cm/s  Popliteal artery: 121-99 cm/s  Posterior tibial artery: 147  cm/s  Anterior tibial artery: 81 cm/s  Dorsalis pedis artery: 85 cm/s    Multiphasic waveforms are noted throughout.      Impression    IMPRESSION:  1.  No definite significant stenoses are identified in the sampled arteries of the lower extremities.     Ajit Byrnes MD  PGY-1  [facility] Medicine Residency  [facility]   April 9, 2024

## 2024-04-09 NOTE — PLAN OF CARE
"Goal Outcome Evaluation:  PRIMARY DIAGNOSIS: SOFT TISSUE INFECTIONS  OUTPATIENT/OBSERVATION GOALS TO BE MET BEFORE DISCHARGE:  Vitals sign stable or return to baseline: Yes    Tolerating oral antibiotics or has home infusion set up if applicable: No    Pain status: Improved-controlled with oral pain medications.    Return to near baseline physical activity: No    Discharge Planner Nurse   Safe discharge environment identified: No  Barriers to discharge: Yes            Entered by: Kait Garza RN 04/08/2024 10:43 PM     Please review provider order for any additional goals.     Nurse to notify provider when observation goals have been met and patient is ready for discharge.       C/o pain. Offered IV toradol, patient refused and took scheduled tylenol instead. Refused IVF d/t discomfort. \"I'll think about it later. I don't want it now, take it off.\"               "

## 2024-04-09 NOTE — PROCEDURES
Skin Procedure Note    Mary Ann Olson is a patient of Joanna Henderson here for wound with eyrthema, nodular vascular lesions, granulomatous wound with breakdown of skin suspicious for inflammatory etiology  Multiple areas extending circumferentially around calf with pus drainage.    Consent: Obtained verbal consent. Risks, benefits and alternatives were discussed. Patient's questions were elicited and answered.   Procedure safety checklist was completed:  Yes  Time Out (Pause for the Cause) completed: Yes    Preoperative Diagnosis: inflammatory wound LLE   Location: LEFT and lower legs   Size:  5-6 cm  Postoperative Diagnosis: same    Technique:   Skin prep Betadine  Anesthesia 0.5 ml, 1 cc 1% lidocaine, with epi   Procedure   Hemostasis  Pressure with 4x4 gauze  vasline    EBL:    minimal  Complications:  No  Tolerance:   Pt tolerated procedure well and was in stable condition.   Pathology sent Yes    Removed 3 mm skin with punch biopsy x2, sample 1 proximal LLE, sample 2 distal LLE, see media tab for images. Sent to pathology.    Instructions:    Pt should keep dressing in place for 24 hours, then may change and apply antibiotic ointment and simple bandage. May shower after 24 hours.  Pt was instructed to call if bleeding, severe pain or foul smell.         Resident: Ajit Byrnes MD  Faculty: Dr. Joanna Farah     Faculty Supervision of Residents   I waspjaymie for uncomplicated skin punch biopsy performed by resident physician  No immediate complications procedure.  EBL<1cc    See resident note for complete details regarding above procedure    DOS 4/9/24    Joanna Farah MD

## 2024-04-09 NOTE — PLAN OF CARE
"PRIMARY DIAGNOSIS: \"GENERIC\" NURSING  OUTPATIENT/OBSERVATION GOALS TO BE MET BEFORE DISCHARGE:  ADLs back to baseline: Yes    Activity and level of assistance: Ambulating independently.    Pain status: Improved-controlled with oral pain medications.    Return to near baseline physical activity: No     Discharge Planner Nurse   Safe discharge environment identified: No  Barriers to discharge: Yes       Entered by: Racquel Felipe RN 04/09/2024 5:04 AM     Please review provider order for any additional goals.   Nurse to notify provider when observation goals have been met and patient is ready for discharge.  "

## 2024-04-09 NOTE — PLAN OF CARE
Notified Resident at 0320 AM regarding  pt wanting something for pain but tylenol is scheduled and no IV access for toradol .      Spoke with: Dr. Shah    Orders were obtained. See one time dose for tylenol.

## 2024-04-09 NOTE — CONSULTS
[facility]- GASTROENTEROLOGY CONSULTATION      Mary Ann Olson  [address]  [place] [address]  58 year old female     Admission Date/Time: 4/8/2024  Primary Care Provider: Joanna Farah     We were asked to see the patient in consultation by Joanna Farah for evaluation of abdominal pain and Crohn's disease.    ASSESSMENT:    Left lower extremity cellulitis on her shin and calf.  Crohn's colitis diagnosed in 2006 off all medications for almost 10 years, previously was on Remicade and Humira.  No evidence of active Crohn's on multiple CT scans abdomen pelvis over the past year.  Abdominal bloating discomfort constipation cannot exclude Crohn's but probably more related to constipation that was seen on recent CAT scan a few weeks ago and her active infection with decreased mobility and numerous antibiotics, as well as possible diabetic enteropathy from poorly controlled diabetes mellitus.   She is tolerating regular diet.  Diabetes mellitus with elevated A1c of 8.5  Chronic stable anemia without signs of active GI bleeding    RECOMMENDATIONS:  Bowel regimen  Continue regular diet  Improved diabetes control  Treat underlying infection and try to avoid further antibiotics and narcotic pain medications which could worsen GI symptoms  Low threshold for repeating abdominal imaging with IV contrast.  Follow-up with [facility]  as an outpatient.  She asked if she needs to go back on Crohn's medications would need a colonoscopy first to reassess her disease.    Julius Rouse MD   Cell [phone]  After 5 PM, please call [phone]    45 minutes of total time was spent providing patient care, including patient evaluation, reviewing documentation/test results, coordination of care with other providers, and .  ________________________________________________________________________        HPI:  Mary Ann Olson is a 58 year old female who diabetes and COPD has been dealing  with ongoing left lower extremity cellulitis for what sounds like a few months.  She has been on and off antibiotics.  She says that her intestines and her Crohn's were feeling fine until she was on antibiotics but now that she is on antibiotics repeatedly she is having generalized abdominal discomfort and bloating.  This also coincides to at time of infection with decreased mobility and intermittent pain medications although she is not on any narcotics now.  She did have CT scan abdomen pelvis without contrast March 18, 2024 there was fatty liver normal bowel moderate stool through the colon on images that I reviewed personally.    She is tolerating a regular diet, ate an omelette this morning but thought that maybe it was too heavy with the cheese and now her stomach is somewhat upset.  She has decreased appetite but is still eating.  This morning she had soft incomplete bowel movements and took some prunes and had a slightly loose stool.  She still feels like she needs to have more bowel movements.    She was diagnosed with Crohn's of the colon in 2006 she was briefly on Remicade then Humira but she stopped that on her own about 7 years ago and has not been on meds since.  She has been to our clinic last in 2020 we recommended colonoscopy and restarting meds but she did not follow through.  There is no blood in the stool now.     ROS: A comprehensive ten point review of systems was negative aside from those in mentioned in the HPI.      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    Antihistamines overdose, intentional self-harm, initial encounter (H)     Asthma     Bipolar 1 disorder (H) hx of bipolar listed in h and p    Bipolar 2 disorder (H)     Bipolar I disorder, single manic episode (H)     Created by Conversion  Replacement Utility updated for latest IMO load    Cellulitis 2006 left ankle, 2008 right foot    COPD (chronic  obstructive pulmonary disease) (H)     Crohn's disease (H)     arthritis associated with crohn's    Diabetes mellitus (H)     Diabetes mellitus, type 2 (H)     Fibrocystic breast     Heat stroke     when a young child     Hyperlipidemia     Idiopathic acute pancreatitis 07/14/2015    Irritable bowel syndrome     Created by Conversion     Kidney stone     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     Overdose     Pyoderma gangrenosum (H28)     2016    Sacroiliitis (H24) 2004    Skin lesion of left leg 08/27/2015    Suicidal ideation     Suicide attempt (H)     Traumatic iritis 07/21/2015    Umbilical hernia     Uncomplicated asthma      SOCIAL HISTORY:  Social History     Tobacco Use    Smoking status: Former     Packs/day: .5     Types: Cigarettes     Passive exposure: Past    Smokeless tobacco: Never    Tobacco comments:     2-3 cig/day   Vaping Use    Vaping Use: Never used   Substance Use Topics    Alcohol use: No    Drug use: No     FAMILY HISTORY:  Family History   Problem Relation Age of Onset    Coronary Artery Disease Mother     Diabetes Father     Breast Cancer Maternal Grandmother     Asthma Son     Asthma Daughter     Hemophilia Other     Diabetes Type 2  Father     Factor VIII deficiency Other      ALLERGIES:   Allergies   Allergen Reactions    Gadolinium Derivatives Hives    Iodinated Contrast Media Hives    Azithromycin Other (See Comments) and Rash     Erythema Nodosum x2    Keflex [Cephalexin] Rash    Aspirin Other (See Comments)    Cefuroxime Itching and Other (See Comments)    Contrast Dye Hives     IV    Corylus Other (See Comments)    Diatrizoate Hives    Iodixanol Unknown    Nuts Other (See Comments)     hazelnuts    Septra [Sulfamethoxazole-Trimethoprim] Hives    Sulfa Antibiotics Hives    Metronidazole Itching and Rash    Nitrofurantoin Itching and Rash    Quinolones Rash     MEDICATIONS:   Prior to Admission medications    Medication Sig Start Date End Date Taking? Authorizing Provider    acetaminophen (TYLENOL) 500 MG tablet Take 1-2 tablets (500-1,000 mg) by mouth every 6 hours as needed for pain 6/12/23  Yes Mich Cordero MD   albuterol (PROAIR HFA/PROVENTIL HFA/VENTOLIN HFA) 108 (90 Base) MCG/ACT inhaler INHALE 2 PUFFS INTO LUNGS EVERY 4 HOURS AS NEEDED FOR SHORTNESS OF BREATH OR  WHEEZING 3/1/24  Yes Joanna Farah MD   atorvastatin (LIPITOR) 40 MG tablet Take 1 tablet (40 mg) by mouth daily 7/3/23  Yes Joanna Farah MD   budesonide-formoterol (SYMBICORT) 160-4.5 MCG/ACT Inhaler Inhale 2 puffs by mouth twice daily 2/2/24  Yes Joanna Farah MD   Calcium Carbonate-Vitamin D 500-5 MG-MCG TABS Take 1 tablet by mouth 2 times daily 3/30/24  Yes Lucrecia Maria MD   ferrous sulfate (FEROSUL) 325 (65 Fe) MG tablet Take 1 tablet (325 mg) by mouth daily (with breakfast) 4/5/24  Yes Ajit Byrnes MD   hydrOXYzine (ATARAX) 25 MG tablet Take 1 tablet (25 mg) by mouth 3 times daily as needed for itching 9/19/23  Yes Joanna Farah MD   insulin  UNIT/ML vial Inject 15 Units Subcutaneous 2 times daily 2/27/24  Yes Joanna Farah MD   ipratropium - albuterol 0.5 mg/2.5 mg/3 mL (DUONEB) 0.5-2.5 (3) MG/3ML neb solution Take 1 vial (3 mLs) by nebulization 2 times daily as needed for shortness of breath, wheezing or cough 3/20/24  Yes Claudia Chapa MD   liraglutide (VICTOZA) 18 MG/3ML solution Inject 0.6 mg Subcutaneous daily 2/27/24  Yes Joanna Farah MD   loratadine (CLARITIN) 10 MG tablet Take 1 tablet (10 mg) by mouth daily as needed for allergies 10/26/23  Yes Krupa Gilmore, PA-C   magnesium hydroxide (MILK OF MAGNESIA) 400 MG/5ML suspension Take 30 mLs by mouth 2 times daily as needed for constipation 6/12/23  Yes Mich Cordero MD   melatonin 3 MG tablet Take 3 mg by mouth nightly as needed for sleep   Yes Reported, Patient   metFORMIN (GLUCOPHAGE) 1000 MG tablet Take 1 tablet (1,000 mg) by  "mouth 2 times daily (with meals) 12/13/23  Yes Joanna Farah MD   Multiple Vitamins-Minerals (CENTRUM SILVER 50+WOMEN PO) Take 1 tablet by mouth daily   Yes Unknown, Entered By History   omeprazole (PRILOSEC) 20 MG DR capsule Take 1 capsule (20 mg) by mouth daily 12/13/23  Yes Joanna Farah MD   vitamin D2 (ERGOCALCIFEROL) 02402 units (1250 mcg) capsule Take 1 capsule (50,000 Units) by mouth once a week 12/13/23  Yes Joanna Farah MD       PHYSICAL EXAM:   /72 (BP Location: Left arm)   Pulse 94   Temp 98.7  F (37.1  C) (Oral)   Resp 18   Ht 1.575 m (5' 2\")   Wt 82.6 kg (182 lb)   SpO2 96%   BMI 33.29 kg/m     GEN: Alert, oriented x3, No distress.  FREDIS: Atraumatic, Oropharynx without erythema, exudate, or ulcers, No scleral icterus.  Poor dentition.  NECK: Supple.    LYMPH: No LAD noted.  CV: RRR, no LE edema  LUNGS: Clear to auscultation bilaterally  ABD: +Bowel sounds, soft, , non-distended, no rebound or guarding.  SKIN: No rash, No jaundice   NEURO: Alert and oriented      ADDITIONAL DATA:   I reviewed the patient's new clinical lab test results.   Recent Labs   Lab Test 04/09/24  0717 04/08/24  1043 04/05/24  0556 09/18/23  1447 09/01/23  1950 03/18/19  0109 03/11/19  0232 03/05/19  0349   WBC 6.9 9.3 8.4   < > 11.4*   < > 12.0* 9.0   HGB 8.9* 9.9* 9.6*   < > 10.5*   < > 14.4 13.8   MCV 78 77* 77*   < > 83   < > 91 91   * 675* 544*   < > 347   < > 282 209   INR  --   --   --   --  0.90  --  0.85* 0.82*    < > = values in this interval not displayed.     Recent Labs   Lab Test 04/09/24  0722 04/09/24  0717 04/09/24  0315 04/08/24  1737 04/08/24  1043 04/05/24  0651 04/05/24  0556 04/03/24  0747 04/03/24  0700   NA  --  138  --   --  137  --   --   --  139   POTASSIUM  --  4.1  --   --  4.2  --   --   --  3.6   CHLORIDE  --  103  --   --  98  --   --   --  105   CO2  --  24  --   --  23  --   --   --  24   BUN  --  9.5  --   --  12.3  --   --   --  9.9   CR  --  " 0.51  --   --  0.46*  --  0.49*   < > 0.55   ANIONGAP  --  11  --   --  16*  --   --   --  10   GHAZAL  --  9.7  --   --  10.0  --   --   --  8.9   * 147* 97   < > 84   < >  --    < > 106*    < > = values in this interval not displayed.     Recent Labs   Lab Test 04/08/24  1043 09/01/23  1950 08/30/23  0126 08/24/23  1940 08/14/23  1734 08/14/23  1354 07/20/23  1314 06/11/23  2312 06/11/23 2057 06/03/23 2020 06/03/23 1952   ALBUMIN 4.0 3.8 4.1   < >  --  4.1  --   --  4.1  --  4.4   BILITOTAL 0.3 0.2 <0.2   < >  --  0.2  --   --  0.4  --  0.2   ALT 13 17 18   < >  --  13  --   --  20  --  15   AST 16 16 23   < >  --  19  --   --  21  --  16   PROTEIN  --   --   --   --  Negative  --  Negative 20*  --    < >  --    LIPASE  --   --   --   --   --  21  --   --  21  --  12*    < > = values in this interval not displayed.        Imaging:  No current

## 2024-04-10 ENCOUNTER — APPOINTMENT (OUTPATIENT)
Dept: PHYSICAL THERAPY | Facility: HOSPITAL | Age: 59
End: 2024-04-10
Payer: MEDICARE

## 2024-04-10 ENCOUNTER — PATIENT OUTREACH (OUTPATIENT)
Dept: CARE COORDINATION | Facility: CLINIC | Age: 59
End: 2024-04-10

## 2024-04-10 VITALS
DIASTOLIC BLOOD PRESSURE: 75 MMHG | WEIGHT: 182 LBS | TEMPERATURE: 97.4 F | SYSTOLIC BLOOD PRESSURE: 132 MMHG | RESPIRATION RATE: 16 BRPM | OXYGEN SATURATION: 97 % | HEART RATE: 91 BPM | BODY MASS INDEX: 33.49 KG/M2 | HEIGHT: 62 IN

## 2024-04-10 LAB
ANION GAP SERPL CALCULATED.3IONS-SCNC: 10 MMOL/L (ref 7–15)
BACTERIA WND CULT: ABNORMAL
BUN SERPL-MCNC: 7.1 MG/DL (ref 8–23)
CALCIUM SERPL-MCNC: 9.7 MG/DL (ref 8.6–10)
CHLORIDE SERPL-SCNC: 101 MMOL/L (ref 98–107)
CREAT SERPL-MCNC: 0.55 MG/DL (ref 0.51–0.95)
DEPRECATED HCO3 PLAS-SCNC: 30 MMOL/L (ref 22–29)
EGFRCR SERPLBLD CKD-EPI 2021: >90 ML/MIN/1.73M2
ERYTHROCYTE [DISTWIDTH] IN BLOOD BY AUTOMATED COUNT: 16.2 % (ref 10–15)
GLUCOSE BLDC GLUCOMTR-MCNC: 128 MG/DL (ref 70–99)
GLUCOSE BLDC GLUCOMTR-MCNC: 144 MG/DL (ref 70–99)
GLUCOSE BLDC GLUCOMTR-MCNC: 168 MG/DL (ref 70–99)
GLUCOSE SERPL-MCNC: 133 MG/DL (ref 70–99)
HCT VFR BLD AUTO: 31.9 % (ref 35–47)
HGB BLD-MCNC: 9.5 G/DL (ref 11.7–15.7)
MCH RBC QN AUTO: 23.2 PG (ref 26.5–33)
MCHC RBC AUTO-ENTMCNC: 29.8 G/DL (ref 31.5–36.5)
MCV RBC AUTO: 78 FL (ref 78–100)
PLATELET # BLD AUTO: 566 10E3/UL (ref 150–450)
POTASSIUM SERPL-SCNC: 4.2 MMOL/L (ref 3.4–5.3)
RBC # BLD AUTO: 4.09 10E6/UL (ref 3.8–5.2)
SODIUM SERPL-SCNC: 141 MMOL/L (ref 135–145)
WBC # BLD AUTO: 8.3 10E3/UL (ref 4–11)

## 2024-04-10 PROCEDURE — 36415 COLL VENOUS BLD VENIPUNCTURE: CPT

## 2024-04-10 PROCEDURE — 80048 BASIC METABOLIC PNL TOTAL CA: CPT

## 2024-04-10 PROCEDURE — 82962 GLUCOSE BLOOD TEST: CPT

## 2024-04-10 PROCEDURE — 85027 COMPLETE CBC AUTOMATED: CPT

## 2024-04-10 PROCEDURE — 99238 HOSP IP/OBS DSCHRG MGMT 30/<: CPT | Mod: GC

## 2024-04-10 PROCEDURE — G0378 HOSPITAL OBSERVATION PER HR: HCPCS

## 2024-04-10 PROCEDURE — 97110 THERAPEUTIC EXERCISES: CPT | Mod: GP

## 2024-04-10 PROCEDURE — 250N000013 HC RX MED GY IP 250 OP 250 PS 637: Performed by: FAMILY MEDICINE

## 2024-04-10 PROCEDURE — 250N000013 HC RX MED GY IP 250 OP 250 PS 637

## 2024-04-10 PROCEDURE — 97161 PT EVAL LOW COMPLEX 20 MIN: CPT | Mod: GP

## 2024-04-10 RX ORDER — ACETAMINOPHEN 500 MG
1000 TABLET ORAL EVERY 6 HOURS PRN
Qty: 90 TABLET | Refills: 3 | Status: SHIPPED | OUTPATIENT
Start: 2024-04-10

## 2024-04-10 RX ADMIN — Medication 5 MG: at 00:56

## 2024-04-10 RX ADMIN — Medication 1 TABLET: at 09:16

## 2024-04-10 RX ADMIN — Medication 1 TABLET: at 09:17

## 2024-04-10 RX ADMIN — ACETAMINOPHEN 975 MG: 325 TABLET ORAL at 09:17

## 2024-04-10 RX ADMIN — ACETAMINOPHEN 975 MG: 325 TABLET ORAL at 15:15

## 2024-04-10 RX ADMIN — ATORVASTATIN CALCIUM 40 MG: 40 TABLET, FILM COATED ORAL at 09:17

## 2024-04-10 RX ADMIN — PANTOPRAZOLE SODIUM 40 MG: 40 TABLET, DELAYED RELEASE ORAL at 09:16

## 2024-04-10 ASSESSMENT — ACTIVITIES OF DAILY LIVING (ADL)
ADLS_ACUITY_SCORE: 26
DEPENDENT_IADLS:: INDEPENDENT

## 2024-04-10 NOTE — PROGRESS NOTES
04/10/24 0930   Appointment Info   Signing Clinician's Name / Credentials (PT) Sandy Sorenson PT   Quick Adds   Quick Adds Certification   Living Environment   People in Home alone   Current Living Arrangements apartment   Home Accessibility stairs to enter home   Number of Stairs, Main Entrance 4   Stair Railings, Main Entrance railings on both sides of stairs   Living Environment Comments Laundry on lower level. Pt does not use help but will use them for setting up rides.   Self-Care   Current Activity Tolerance moderate   Equipment Currently Used at Home none   Fall history within last six months no   Activity/Exercise/Self-Care Comment Pt is indp with all mobility, Assisted with transportation.   General Information   Onset of Illness/Injury or Date of Surgery 04/08/24   Referring Physician Ajit Byrnes MD   Patient/Family Therapy Goals Statement (PT) none stated.   Pertinent History of Current Problem (include personal factors and/or comorbidities that impact the POC) Per the chart, Mary Ann Olson is a 58 year old female admitted on 4/8/2024. She has a history of HTN, T2DM on insulin, crohn's not on medication, BPD, GERD, COPD and is admitted for recurrence of LLE cellullitis with poor wound healing suspicious of venous insufficiency ulcer vs. Inflammatory skin ulcer 2/2 autoimmune process     LLE Cellulitis  LLE ulceration  Previous admission on 3/18 for LLE cellulitis and started on amoxicillin and doxycycline and then admitted on 3/22 for worsening despite treatment. Ultrasound did not demonstrate any abscess formation. She was initially started on vancomycin and then transitioned to IV clindamycin with significant improvement. She was discharged home on 3/30 with two days of oral clindamycin to complete a total of 7 days. Blood cultures during this admission were negative but had a wound culture positive for staph epidermidis.   Existing Precautions/Restrictions   (cellulitis L LE)   Weight-Bearing  Status - LLE weight-bearing as tolerated   Weight-Bearing Status - RLE weight-bearing as tolerated   Cognition   Orientation Status (Cognition) oriented to;person;place;situation;time   Pain Assessment   Patient Currently in Pain   (Pt did c/o some discomfort in the L distal LE with gait)   Integumentary/Edema   Edema Precaution Comments bandaged L distal L LE and she has a small area of erythema on the R ant shin area distal to the knee.   Posture    Posture Comments good posture.   Range of Motion (ROM)   ROM Comment R LE WFL and left LE limited by pain.   Strength (Manual Muscle Testing)   Strength Comments Able to WB bilat LEs   Bed Mobility   Comment, (Bed Mobility) Supine<>sit indep without the bed rail.   Transfers   Comment, (Transfers) Sit<>stand indep without AD   Gait/Stairs (Locomotion)   [place] Level (Gait) verbal cues   Assistive Device (Gait) other (see comments)  (none)   Distance in Feet (Gait) 15'   Pattern (Gait) swing-through   Deviations/Abnormal Patterns (Gait) gait speed decreased   Balance   Balance Comments indep and no LOB.   Sensory Examination   Sensory Perception Comments Pt could feel light touch bilat LEs   Clinical Impression   Criteria for Skilled Therapeutic Intervention Yes, treatment indicated   PT Diagnosis (PT) Impaired functional mobility   Influenced by the following impairments LE pain, dec endurance.   Functional limitations due to impairments steps   Clinical Presentation (PT Evaluation Complexity) stable   Clinical Presentation Rationale Pt presents medically diagnosed.   Clinical Decision Making (Complexity) low complexity   Planned Therapy Interventions (PT) stair training   Risk & Benefits of therapy have been explained evaluation/treatment results reviewed;patient;participants voiced agreement with care plan   PT Total Evaluation Time   PT Eval, Low Complexity Minutes (10411) 15   Therapy Certification   Start of care date 04/10/24   Certification date from  04/10/24   Certification date to 04/11/24   Medical Diagnosis Cellulitis of  the L  LE   Physical Therapy Goals   PT Frequency One time eval and treatment only   PT Predicted Duration/Target Date for Goal Attainment 04/10/24   PT: Stairs Modified independent;10 stairs;Rail on both sides   Interventions   Interventions Quick Adds Therapeutic Procedure   Therapeutic Procedure/Exercise   Ther. Procedure: strength, endurance, ROM, flexibillity Minutes (03323) 10   Treatment Detail/Skilled Intervention bilat LE ex x 10 reps with cues for technique.   Gait Training   Gait Training Minutes (84279) 10   Symptoms Noted During/After Treatment (Gait Training) increased pain   Treatment Detail/Skilled Intervention Pt walked slowly and had no LOB.  Some cues for direction.   Distance in Feet 200   [place] Level (Gait Training) independent   Physical Assistance Level (Gait Training) verbal cues;1 person assist   Weight Bearing (Gait Training) weight-bearing as tolerated   Assistive Device (Gait Training) other (see comments)  (none)   Pattern Analysis (Gait Training) swing-through gait   Gait Analysis Deviations decreased alisia   Impairments (Gait Analysis/Training) pain   Assistive Device (Stairs)   (Pt did refuse to do steps today.)   PT Discharge Planning   PT Plan steps   PT Discharge Recommendation (DC Rec) home;other (see comments)   PT Rationale for DC Rec Pt is going to TCU for wound care per the MD.  She can work on steps there and increasing strength. Pt is moving well with mobility.   PT Brief overview of current status Indep with bed mobility, transfers and gait  No LOB but does have some L LE pain with gait.  Steps to shalonda.   PT Equipment Needed at Discharge other (see comments)   Total Session Time   Timed Code Treatment Minutes 20   Total Session Time (sum of timed and untimed services) 35   [facility]  OUTPATIENT PHYSICAL THERAPY EVALUATION  PLAN OF TREATMENT FOR OUTPATIENT  REHABILITATION  (COMPLETE FOR INITIAL CLAIMS ONLY)  Patient's Last Name, First Name, M.I.  YOB: 1965  Mary Ann Olson                        Provider's Name  [facility] Medical Record No.  [id number]                             Onset Date:  04/08/24   Start of Care Date:  04/10/24   Type:     _X_PT   ___OT   ___SLP Medical Diagnosis:  Cellulitis of  the L  LE              PT Diagnosis:  Impaired functional mobility Visits from SOC:  1     See note for plan of treatment, functional goals and certification details    I CERTIFY THE NEED FOR THESE SERVICES FURNISHED UNDER        THIS PLAN OF TREATMENT AND WHILE UNDER MY CARE     (Physician co-signature of this document indicates review and certification of the therapy plan).

## 2024-04-10 NOTE — DISCHARGE SUMMARY
"[facility]  Discharge Summary - Medicine & Pediatrics       Date of Admission:  4/8/2024  Date of Discharge:  4/10/2024  Discharging Provider: Dr. Byrnes, Dr. Rosenstein  Discharge Service: Hospitalist Service    Discharge Diagnoses   LLE Cellulitis vs LLE inflammatory wound w/ulceration  T2DM  Hx Crohns Disease  Microcytic anemia    Clinically Significant Risk Factors     # DMII: A1C = 8.5 % (Ref range: <5.7 %) within past 6 months  # Obesity: Estimated body mass index is 33.29 kg/m  as calculated from the following:    Height as of this encounter: 1.575 m (5' 2\").    Weight as of this encounter: 82.6 kg (182 lb).       Follow-ups Needed After Discharge   Pt needs outpatient dermatology follow up w/ Santiago    [facility] follow up with Dr. Farah, PCP within 7 days    Unresulted Labs Ordered in the Past 30 Days of this Admission       Date and Time Order Name Status Description    4/9/2024 10:43 AM Surgical Pathology Exam In process     4/8/2024 10:45 AM Wound Aerobic Bacterial Culture Routine Without Gram Stain Preliminary     4/8/2024 10:45 AM Blood Culture Peripheral Blood Preliminary         These results will be followed up by PCP    Discharge Disposition   Discharged to rehabilitation facility  Condition at discharge: Stable    Hospital Course   Mary Ann Olson was admitted on 4/8/2024 for recurrence of LLE inflammatory wound with ulceration, previously on our service multiple admissions for cellulitis. The following problems were addressed during her hospitalization:    Previous admission on 3/18 for LLE cellulitis and started on amoxicillin and doxycycline and then admitted on 3/22 for worsening despite treatment. Ultrasound did not demonstrate any abscess formation. She was initially started on vancomycin and then transitioned to IV clindamycin with significant improvement. She was discharged home on 3/30 with two days of oral clindamycin to complete a total of 7 days. Blood " cultures during this admission were negative but had a wound culture positive for staph epidermidis.     Patient was seen in clinic on 4/2/24 at [facility] and noticed to have worsening of erythema with minor bleeding but no active purulence or discharge. ED work-up notable for WBC of 12.6 (up from 8.1 3 days ago) and CRP of 55.7, concerning for worsening of cellulitis and possible abscess formation.     Readmission 4/2/24: CT tibia fibula 4/2/24 - no discrete abscess on non-contrast exam, no evidence for deep compartment involvement or soft tissue gas      Pt. Received IV abx including vanc and clindamycin, noted improvement in pain and presentation of LLE cellulitis 4/4/24. Patient was evaluated by ID 4/4 who recommended no further hospitalization and antibiotics. Recommended follow up with dermatology at the [facility] give patient's history of crohn's disease not currently on medications for the past 2 years due to insurance coverage issues, she also was noted to have suspected erythema nodosum on R shin.  -Called [facility] dermatology, spoke with Dr. Osborn and they will contact patient to set up outpatient follow up appointment for poss. Biopsy, Apt scheduled for April 9th however she stated she had to cancel this appointment due to rehospitalization 4/8/24     Pt presented 4/8/24 with severe 10/10 pain in her LLE and noted a change in coloration of skin lesion, now appears with purple discoloration in 4-5 cm distribution medial aspect shin, suspect poss. Underlying venous and vascular insufficiency in setting of T2DM leading to poor wound healing. Labs on admission significant for CRP 62.7, BMP mostly wnl, procalcitonin and lactate wnl, cbc demonstrating wbc 9.3 wnl. Blood cultures pending, aerbic wound bacterial culture L leg pending  -US LE arterial - no evidence of arterial insufficiency  -pain mgmt with tylenol  -reached out to Dr. Santiago, [facility] dermatology to see if they recommend  biopsy here vs in derm clinic and if  she can get rescheduled this week  -Discussed case with Dr. Santiago, who recommended punch biopsy while here in hospital  -Obtained punch biopsy x2 of LLE erythematous, granular lesion with skin breakdown and vascular nodules, suspect inflammatory wound  -Discussed case with social work, recommend she moves to the floor and requested TCU placement as patient cannot care for wound at home. [facility] 4/10/24  -Working on getting her set up for dermatology outpatient after TCU placement, reached out to Dr. Santiago's team 4/10/24 to discuss openings  -Placed dermatology referral outpatient    T2DM  BG stable on lantus 15 units daily, novolog 5 units BID w/meals  -Will discharge pt on her home insulin regimen, NPH 15 units BID    Hx Crohns  Microcytic anemia  GI was consulted this hospital stay, evaluated patient 4/9/24 and recommended bowel regimen, regular diet, diabetes mgmt, avoid abx and narcotic pain medications in setting of LLQ abdominal tenderness which may also be 2/2 constipation. Pt did have bloody BM x1 4/10/24, however HgB has remained stable 9-10 throughout admission. Workup prior hospitalization significant for AROLDO likely 2/2 crohn's. Started pt on oral iron supplementation however has been refusing due to constipation  -Follow up with [facility] outpatient, discussed needing colonoscopy first to reassess her disease  -GI adult  referral placed         Consultations This Hospital Stay   PHARMACY TO DOSE VANCO  CARE MANAGEMENT / SOCIAL WORK IP CONSULT  GASTROENTEROLOGY IP CONSULT  WOUND OSTOMY CONTINENCE NURSE  IP CONSULT  PHYSICAL THERAPY ADULT IP CONSULT    Code Status   Full Code       The patient was discussed with Dr. Rosenstein Ryan R. Bjerke, MD  [facility] Team Service  [facility] EXTENDED RECOVERY AND SHORT STAY  [address] [place] [id number]  Phone: [phone]  Fax:  [phone]  ______________________________________________________________________    Physical Exam   Vital Signs: Temp: 97.9  F (36.6  C) Temp src: Oral BP: 113/63 Pulse: 85   Resp: 18 SpO2: 95 % O2 Device: None (Room air)    Weight: 182 lbs 0 oz    Physical Exam  Constitutional:       Appearance: Normal appearance.   HENT:      Mouth/Throat:      Mouth: Mucous membranes are moist.      Pharynx: Oropharynx is clear.   Cardiovascular:      Rate and Rhythm: Normal rate and regular rhythm.      Pulses: Normal pulses.      Heart sounds: Normal heart sounds.   Pulmonary:      Effort: Pulmonary effort is normal.      Breath sounds: Normal breath sounds.   Abdominal:      General: Bowel sounds are normal.      Palpations: Abdomen is soft.      Tenderness: There is abdominal tenderness.      Comments: LLQ tender to palpation, abdomen soft   Musculoskeletal:      Right lower leg: No edema.      Left lower leg: No edema.   Skin:     Comments: Did not undress wound 4/10/24, see media tab for images from 4/9/24   Neurological:      General: No focal deficit present.      Mental Status: She is alert and oriented to person, place, and time. Mental status is at baseline.   Psychiatric:         Mood and Affect: Mood normal.         Behavior: Behavior normal.            Primary Care Physician   Joanna Farah    Discharge Orders   No discharge procedures on file.    Significant Results and Procedures   Most Recent 3 CBC's:  Recent Labs   Lab Test 04/10/24  0731 04/09/24  0717 04/08/24  1043   WBC 8.3 6.9 9.3   HGB 9.5* 8.9* 9.9*   MCV 78 78 77*   * 528* 675*     Most Recent 3 BMP's:  Recent Labs   Lab Test 04/10/24  0852 04/10/24  0731 04/10/24  0102 04/09/24  0722 04/09/24  0717 04/08/24  1737 04/08/24  1043   NA  --  141  --   --  138  --  137   POTASSIUM  --  4.2  --   --  4.1  --  4.2   CHLORIDE  --  101  --   --  103  --  98   CO2  --  30*  --   --  24  --  23   BUN  --  7.1*  --   --  9.5  --  12.3   CR  --   0.55  --   --  0.51  --  0.46*   ANIONGAP  --  10  --   --  11  --  16*   GHAZAL  --  9.7  --   --  9.7  --  10.0   * 133* 144*   < > 147*   < > 84    < > = values in this interval not displayed.     Most Recent Hemoglobin A1c:  Recent Labs   Lab Test 04/08/24  1043   A1C 8.5*     Most Recent 6 glucoses:  Recent Labs   Lab Test 04/10/24  0852 04/10/24  0731 04/10/24  0102 04/09/24  2205 04/09/24  1655 04/09/24  1205   * 133* 144* 142* 109* 90     Most Recent ESR & CRP:  Recent Labs   Lab Test 04/08/24  1043 04/02/24  1558 09/01/23  1950 05/03/18  1712 05/03/18  1712   SED  --   --  19   < >  --    CRP  --   --   --   --  0.1   CRPI 62.70*   < > <3.00   < >  --     < > = values in this interval not displayed.   ,   Results for orders placed or performed during the hospital encounter of 04/08/24   US Lower Extremity Arterial Duplex Bilateral    Narrative    EXAM: US LOWER EXTREMITY ARTERIAL DUPLEX BILATERAL  LOCATION: [facility]  DATE: 4/8/2024    INDICATION: Hx T2DM on insulin, recurrent cellulitis, ulcer, please eval for vascular etiology  COMPARISON: None.  TECHNIQUE: Duplex utilizing 2D gray-scale imaging, Doppler interrogation with color-flow and spectral waveform analysis.    FINDINGS:    RIGHT LOWER EXTREMITY ARTERIAL ASSESSMENT:  External iliac artery 144 cm/s  Common femoral artery: 112 cm/s  Profunda femoris artery: 98 cm/s  SFA (proximal): 115 cm/s  SFA (mid): 138 cm/s  SFA (distal): 120 cm/s  Popliteal artery:  cm/s  Posterior tibial artery: 90 cm/s  Anterior tibial artery: 117 cm/s  Dorsalis pedis artery: 122 cm/s    LEFT LOWER EXTREMITY ARTERIAL ASSESSMENT:  External iliac artery 142 cm/s  Common femoral artery: 140 cm/s  Profunda femoris artery: 80 cm/s  SFA (proximal): 149 cm/s  SFA (mid): 155 cm/s  SFA (distal): 157 cm/s  Popliteal artery: 121-99 cm/s  Posterior tibial artery: 147 cm/s  Anterior tibial artery: 81 cm/s  Dorsalis pedis artery: 85  cm/s    Multiphasic waveforms are noted throughout.      Impression    IMPRESSION:  1.  No definite significant stenoses are identified in the sampled arteries of the lower extremities.     *Note: Due to a large number of results and/or encounters for the requested time period, some results have not been displayed. A complete set of results can be found in Results Review.       Discharge Medications   Current Discharge Medication List        CONTINUE these medications which have NOT CHANGED    Details   acetaminophen (TYLENOL) 500 MG tablet Take 1-2 tablets (500-1,000 mg) by mouth every 6 hours as needed for pain  Refills: 0    Associated Diagnoses: Pain      albuterol (PROAIR HFA/PROVENTIL HFA/VENTOLIN HFA) 108 (90 Base) MCG/ACT inhaler INHALE 2 PUFFS INTO LUNGS EVERY 4 HOURS AS NEEDED FOR SHORTNESS OF BREATH OR  WHEEZING  Qty: 18 g, Refills: 1    Comments: Pharmacy may dispense brand covered by insurance (Proair, or proventil or ventolin or generic albuterol inhaler)  Associated Diagnoses: COPD exacerbation (H)      atorvastatin (LIPITOR) 40 MG tablet Take 1 tablet (40 mg) by mouth daily  Qty: 90 tablet, Refills: 3    Associated Diagnoses: Type 2 diabetes mellitus without complication, without long-term current use of insulin (H)      budesonide-formoterol (SYMBICORT) 160-4.5 MCG/ACT Inhaler Inhale 2 puffs by mouth twice daily  Qty: 11 g, Refills: 3    Associated Diagnoses: Moderate persistent asthma without complication      Calcium Carbonate-Vitamin D 500-5 MG-MCG TABS Take 1 tablet by mouth 2 times daily  Qty: 90 tablet, Refills: 3    Associated Diagnoses: Type 2 diabetes mellitus without complication, without long-term current use of insulin (H)      ferrous sulfate (FEROSUL) 325 (65 Fe) MG tablet Take 1 tablet (325 mg) by mouth daily (with breakfast)  Qty: 90 tablet, Refills: 3    Associated Diagnoses: Iron deficiency anemia, unspecified iron deficiency anemia type      hydrOXYzine (ATARAX) 25 MG tablet Take  1 tablet (25 mg) by mouth 3 times daily as needed for itching  Qty: 20 tablet, Refills: 0    Associated Diagnoses: Anxiety      insulin  UNIT/ML vial Inject 15 Units Subcutaneous 2 times daily  Qty: 10 mL, Refills: 1    Associated Diagnoses: Type 2 diabetes mellitus with hyperglycemia, with long-term current use of insulin (H)      ipratropium - albuterol 0.5 mg/2.5 mg/3 mL (DUONEB) 0.5-2.5 (3) MG/3ML neb solution Take 1 vial (3 mLs) by nebulization 2 times daily as needed for shortness of breath, wheezing or cough  Qty: 90 mL, Refills: 3    Associated Diagnoses: COPD exacerbation (H)      liraglutide (VICTOZA) 18 MG/3ML solution Inject 0.6 mg Subcutaneous daily  Qty: 6 mL, Refills: 1    Associated Diagnoses: Type 2 diabetes mellitus with hyperglycemia, with long-term current use of insulin (H)      loratadine (CLARITIN) 10 MG tablet Take 1 tablet (10 mg) by mouth daily as needed for allergies  Qty: 30 tablet, Refills: 3    Associated Diagnoses: Seasonal allergic rhinitis due to other allergic trigger      magnesium hydroxide (MILK OF MAGNESIA) 400 MG/5ML suspension Take 30 mLs by mouth 2 times daily as needed for constipation  Qty: 354 mL, Refills: 0    Associated Diagnoses: Constipation, unspecified constipation type      melatonin 3 MG tablet Take 3 mg by mouth nightly as needed for sleep      metFORMIN (GLUCOPHAGE) 1000 MG tablet Take 1 tablet (1,000 mg) by mouth 2 times daily (with meals)  Qty: 180 tablet, Refills: 3    Associated Diagnoses: Type 2 diabetes mellitus without complication, without long-term current use of insulin (H)      Multiple Vitamins-Minerals (CENTRUM SILVER 50+WOMEN PO) Take 1 tablet by mouth daily      omeprazole (PRILOSEC) 20 MG DR capsule Take 1 capsule (20 mg) by mouth daily  Qty: 90 capsule, Refills: 3    Associated Diagnoses: Gastroesophageal reflux disease without esophagitis      vitamin D2 (ERGOCALCIFEROL) 75148 units (1250 mcg) capsule Take 1 capsule (50,000 Units) by  mouth once a week  Qty: 12 capsule, Refills: 3    Associated Diagnoses: Postmenopausal status           Allergies   Allergies   Allergen Reactions    Gadolinium Derivatives Hives    Iodinated Contrast Media Hives    Azithromycin Other (See Comments) and Rash     Erythema Nodosum x2    Keflex [Cephalexin] Rash    Aspirin Other (See Comments)    Cefuroxime Itching and Other (See Comments)    Contrast Dye Hives     IV    Corylus Other (See Comments)    Diatrizoate Hives    Iodixanol Unknown    Nuts Other (See Comments)     hazelnuts    Septra [Sulfamethoxazole-Trimethoprim] Hives    Sulfa Antibiotics Hives    Metronidazole Itching and Rash    Nitrofurantoin Itching and Rash    Quinolones Rash     Ajit Byrnes MD  PGY-1  [facility]  [facility]   April 10, 2024

## 2024-04-10 NOTE — PROGRESS NOTES
Clinic Care Coordination Contact  [facility]: Post-Discharge Note  SITUATION                                                      Admission:    Admission Date: 04/08/24   Reason for Admission: LLE Cellulitis vs LLE inflammatory wound w/ulceration  T2DM  Hx Crohns Disease  Microcytic anemia  Discharge:   Discharge Date: 04/10/24  Discharge Diagnosis: LLE Cellulitis vs LLE inflammatory wound w/ulceration  T2DM  Hx Crohns Disease  Microcytic anemia    BACKGROUND                                                      Per hospital discharge summary and inpatient provider notes:      ASSESSMENT           Discharge Assessment  How are you doing now that you are home?: Pt is dischareged to rehabilitation  How are your symptoms? (Red Flag symptoms escalate to triage hotline per guidelines): Improved  Do you feel your condition is stable enough to be safe at home until your provider visit?: Yes  Does the patient have their discharge instructions? : Yes  Does the patient have questions regarding their discharge instructions? : No  Were you started on any new medications or were there changes to any of your previous medications? : No  Does the patient have all of their medications?: Yes  Do you have questions regarding any of your medications? : No  Do you have all of your needed medical supplies or equipment (DME)?  (i.e. oxygen tank, CPAP, cane, etc.): No - What equipment or supplies are needed?  Discharge follow-up appointment scheduled within 14 calendar days? : No  Is patient agreeable to assistance with scheduling? : Yes                  PLAN                                                      Outpatient Plan:      Future Appointments   Date Time Provider Department Center   4/29/2024  2:40 PM Brown, Kathryn Michelle, MD [facility]   4/29/2024  3:00 PM Valeria Jackson, [facility]         For any urgent concerns, please contact our 24 hour nurse triage line: [phone]       Prashanth Toney  BSW

## 2024-04-10 NOTE — PLAN OF CARE
Problem: Adult Inpatient Plan of Care  Goal: Absence of Hospital-Acquired Illness or Injury  Intervention: Prevent Skin Injury  Recent Flowsheet Documentation  Taken 4/9/2024 2230 by Valeria Weir RN  Body Position: position changed independently  Taken 4/9/2024 1713 by Valeria Weir RN  Body Position: position changed independently     Problem: Adult Inpatient Plan of Care  Goal: Absence of Hospital-Acquired Illness or Injury  Intervention: Prevent Infection  Recent Flowsheet Documentation  Taken 4/9/2024 1713 by Valeria Weir RN  Infection Prevention: hand hygiene promoted     Problem: Adult Inpatient Plan of Care  Goal: Optimal Comfort and Wellbeing  Intervention: Monitor Pain and Promote Comfort  Recent Flowsheet Documentation  Taken 4/9/2024 1713 by Valeria Weir RN  Pain Management Interventions: medication (see MAR)   Goal Outcome Evaluation:       Pt has been complaining of abdominal and LLE pain but has only taken scheduled tylenol due to no IV access. Pt ambulates to bathroom independently. Pt LLE wound care was performed this shift and is now wrapped with a kerlix. Pt states she will no longer take any iron pills.

## 2024-04-10 NOTE — PLAN OF CARE
PRIMARY DIAGNOSIS: Cellulitis of LLE  OUTPATIENT/OBSERVATION GOALS TO BE MET BEFORE DISCHARGE:  ADLs back to baseline: Yes    Activity and level of assistance: Ambulating independently.    Pain status: Improved-controlled with oral pain medications.    Return to near baseline physical activity: Yes     Discharge Planner Nurse   Safe discharge environment identified: Yes  Barriers to discharge: No       Entered by: Rush Beltran RN 04/10/2024 1:13 PM     Please review provider order for any additional goals.   Nurse to notify provider when observation goals have been met and patient is ready for discharge.

## 2024-04-10 NOTE — PLAN OF CARE
"PRIMARY DIAGNOSIS: \"GENERIC\" NURSING  OUTPATIENT/OBSERVATION GOALS TO BE MET BEFORE DISCHARGE:  ADLs back to baseline: Yes    Activity and level of assistance: Ambulating independently.    Pain status: Improved-controlled with oral pain medications. Abdominal discomfort and bloating.     Return to near baseline physical activity: Yes     Discharge Planner Nurse   Safe discharge environment identified: Yes  Barriers to discharge: No       Entered by: Rush Beltran RN 04/10/2024 9:12 AM     Please review provider order for any additional goals.   Nurse to notify provider when observation goals have been met and patient is ready for discharge.      "

## 2024-04-10 NOTE — PLAN OF CARE
Problem: Adult Inpatient Plan of Care  Goal: Optimal Comfort and Wellbeing  Outcome: Progressing    Goal Outcome Evaluation:      Plan of Care Reviewed With: patient    A&Ox4. RA. Able to make needs known. C/o abd discomfort. Reported blood in stool. MD notified. No PIV. BG of 168 & 128. Up independently. Dressing change due 4/11. Discharge to TCU today. Ride setup between [id number]. Done.

## 2024-04-10 NOTE — PLAN OF CARE
Patient discharged to transitional care unit at 1515 via Health plan transportation.  Accompanied by self and staff.  Discharge instructions were reviewed with patient, opportunity offered to ask questions.    Prescriptions printed and given to patient/family.  Access discontinued: Yes  Care plan and education discontinued: Yes  Belongings were sent home with patient/family:  Cell phone/electronics: 1, Clothing: Shirt(s): 1, Pants: 1, and Outerwear: 1, Purse/Wallet: 1, and Shoes: 2 .

## 2024-04-10 NOTE — PROGRESS NOTES
Care Management Discharge Note    Discharge Date: 04/09/2024       Discharge Disposition:  [facility]    Discharge Services:  therapy and wound care    Discharge DME:  none    Discharge Transportation: [facility] Wheelchair    Private pay costs discussed: transportation costs and insurance costs co-pays and deductibles    Does the patient's insurance plan have a 3 day qualifying hospital stay waiver?  No    PAS Confirmation Code: [id number]  Patient/family educated on Medicare website which has current facility and service quality ratings:  yes    Education Provided on the Discharge Plan:  TBD  Persons Notified of Discharge Plans: Patient  Patient/Family in Agreement with the Plan:  yes    Handoff Referral Completed: Yes    Additional Information:  MD updates, pt is medically ready to discharge to TCU for wound care and PT therapy as needed, ETA for transport is 2:38p-3:22p,  PAS done [id number].     Erica Patrick RN

## 2024-04-10 NOTE — PLAN OF CARE
Physical Therapy Discharge Summary    Reason for therapy discharge:    Discharged to transitional care facility.    Progress towards therapy goal(s). See goals on Care Plan in [facility] electronic health record for goal details.  Goals not met.  Barriers to achieving goals:   Pt did not want to do steps. .    Therapy recommendation(s):    Pt to go to TCU for wound care.  Therapy can progress with steps in that setting.  Pt is indep with mobility and has not shalonda steps yet.

## 2024-04-10 NOTE — PLAN OF CARE
"PRIMARY DIAGNOSIS: \"GENERIC\" NURSING  OUTPATIENT/OBSERVATION GOALS TO BE MET BEFORE DISCHARGE:  ADLs back to baseline: Yes    Activity and level of assistance: Ambulating independently.    Pain status: Improved with use of alternative comfort measures i.e.: positioning    Return to near baseline physical activity: Yes     Discharge Planner Nurse   Safe discharge environment identified: Yes  Barriers to discharge: No       Entered by: Julienne Husain RN 04/10/2024 3:21 AM     Please review provider order for any additional goals.   Nurse to notify provider when observation goals have been met and patient is ready for discharge.Goal Outcome Evaluation:                        "

## 2024-04-10 NOTE — PLAN OF CARE
Problem: Adult Inpatient Plan of Care  Goal: Absence of Hospital-Acquired Illness or Injury  Intervention: Prevent Skin Injury  Recent Flowsheet Documentation  Taken 4/9/2024 2230 by Valeria Weir RN  Body Position: position changed independently  Taken 4/9/2024 1713 by Valeria Weir RN  Body Position: position changed independently   Goal Outcome Evaluation:       PRIMARY DIAGNOSIS: ACUTE PAIN  OUTPATIENT/OBSERVATION GOALS TO BE MET BEFORE DISCHARGE:  1. Pain Status: Improved-controlled with oral pain medications.    2. Return to near baseline physical activity: Yes    3. Cleared for discharge by consultants (if involved): No    Discharge Planner Nurse   Safe discharge environment identified: Yes  Barriers to discharge: Yes       Entered by: Valeria Weir RN 04/09/2024 11:15 PM     Please review provider order for any additional goals.   Nurse to notify provider when observation goals have been met and patient is ready for discharge.

## 2024-04-10 NOTE — PLAN OF CARE
PRIMARY DIAGNOSIS: ACUTE PAIN  OUTPATIENT/OBSERVATION GOALS TO BE MET BEFORE DISCHARGE:  1. Pain Status: Improved-controlled with oral pain medications.    2. Return to near baseline physical activity: Yes    3. Cleared for discharge by consultants (if involved): No    Discharge Planner Nurse   Safe discharge environment identified: Yes  Barriers to discharge: Yes       Entered by: Valeria Weir RN 04/09/2024 7:47 PM     Please review provider order for any additional goals.   Nurse to notify provider when observation goals have been met and patient is ready for discharge.Goal Outcome Evaluation:       Pt significant pain in abdomen and left leg but pt does not want IV access to receive pain meds, is receiving scheduled tylenol. [facility] nurse ordered wound care supplies, RN cleansed with microklenz spray, placed medihoney on adaptic pads and wrapped with ABD pad and kerlix. Pt is highly anxious but AxO4. Pt has been ambulating independently to bathroom.

## 2024-04-10 NOTE — PLAN OF CARE
Problem: Adult Inpatient Plan of Care  Goal: Optimal Comfort and Wellbeing  Outcome: Progressing     Problem: Adult Inpatient Plan of Care  Goal: Absence of Hospital-Acquired Illness or Injury  Intervention: Identify and Manage Fall Risk  Recent Flowsheet Documentation  Taken 4/10/2024 0000 by Julienne Husain RN  Safety Promotion/Fall Prevention: clutter free environment maintained  Intervention: Prevent Skin Injury  Recent Flowsheet Documentation  Taken 4/10/2024 0000 by Julienne Husain RN  Body Position: position changed independently  Device Skin Pressure Protection: absorbent pad utilized/changed  Intervention: Prevent and Manage VTE (Venous Thromboembolism) Risk  Recent Flowsheet Documentation  Taken 4/10/2024 0000 by Julienne Husain RN  VTE Prevention/Management: (LLE wound wrapped) other (see comments)  Intervention: Prevent Infection  Recent Flowsheet Documentation  Taken 4/10/2024 0000 by Julienne Husain RN  Infection Prevention: hand hygiene promoted     Problem: Skin Injury Risk Increased  Goal: Skin Health and Integrity  Intervention: Plan: Nurse Driven Intervention: Moisture Management  Recent Flowsheet Documentation  Taken 4/10/2024 0000 by Julienne Hsuain RN  Moisture Interventions: Encourage regular toileting  Bathing/Skin Care: (pt bedresting) other (see comments)  Intervention: Plan: Nurse Driven Intervention: Friction and Shear  Recent Flowsheet Documentation  Taken 4/10/2024 0000 by Julienne Husain RN  Friction/Shear Interventions: HOB 30 degrees or less  Intervention: Optimize Skin Protection  Recent Flowsheet Documentation  Taken 4/10/2024 0000 by Julienne Husain RN  Activity Management: ambulated to bathroom  Head of Bed (HOB) Positioning: HOB at 20-30 degrees   Goal Outcome Evaluation:       Pt is A&Ox4, VSS. On RA. BG checks were 144, 142. No IV access. Pt ambulates to bathroom independently. Pt's  LLE wound care performed due to pt's dressing was  saturated with drainage. Re-wrapped with abd pads and Kerlix. Reg diet. Independent. Possible TCU placement.

## 2024-04-11 ENCOUNTER — TELEPHONE (OUTPATIENT)
Dept: DERMATOLOGY | Facility: CLINIC | Age: 59
End: 2024-04-11
Payer: COMMERCIAL

## 2024-04-11 NOTE — TELEPHONE ENCOUNTER
"Called patient again to offer appointment with Dr. SHEA Santiago.   Pt declined and stated \"I do not want to see a dermatologist as I am tired of people poking and prodding my skin and they already took biopsies.\" Writer stated he would document this and stop calling, and if the patient changes her mind and wishes to be seen she can always reach out to [phone].    -Bebeto Hoang, EMT-B    "

## 2024-04-11 NOTE — TELEPHONE ENCOUNTER
[facility] Call Center    Phone Message    May a detailed message be left on voicemail: yes     Reason for Call: Appointment Intake    Referring Provider Name: Ajit Byrnes MD  Diagnosis and/or Symptoms: Cellulitis of left lower leg [L03.116]  Skin lesion  Have reached out to Dr. Santiago regarding this patient, recurrent admissions for LLE cellulitis that improved with abx, however still has large ulcerated wound LLE, biopsied inpatient 4/9/24, results pending. Please schedule Outpatient for evaluation  Pt said she would like to know what this Appt is for. Pt does not want to be poked anymore, it is too painful. Please call pt back to discuss. I am not sure if this is a follow-up Appt. Pt said she had a Bx done either 04/09 or 04/10/24. Please review and call pt back to discuss and possible schedule. Thanks     Action Taken: Message routed to:  [facility] Derm    Travel Screening: Not Applicable

## 2024-04-11 NOTE — TELEPHONE ENCOUNTER
Ajit Byrnes MD  Bebeto Hoang  Great! I really appreciate it, she has been discharged from our service so will probably have to reach her by phone to schedule. Thank you so much for taking the time.    Best,  Dr. Byrnes          Previous Messages       ----- Message -----  From: Viktor Bebeto  Sent: 4/11/2024   1:09 PM CDT  To: Ajit Byrnes MD  Subject: RE: Ulceration: biopsy?                          Hello,    I can get her scheduled for 10:15 AM on 4/16/24 or at 11:45 AM that Thursday (4/18/24).    Thanks,  MAURICIO Perez-B  ----- Message -----  From: Ajit Byrnes MD  Sent: 4/10/2024   9:51 AM CDT  To: Bebeto Hoang  Subject: RE: Ulceration: biopsy?                          Reggie Tate,    Patient will be discharging to TCU facility today, so should be able to schedule her an appointment with you guys as soon as is feasible, I really appreciate your patience with this and thank you for helping take care of this patient. Please let me know what you have available.    Thanks,  Dr. Byrnes  ----- Message -----  From: Bebeto Hoang  Sent: 4/9/2024   4:35 PM CDT  To: Too Santiago MD; Ajit Byrnes MD  Subject: RE: Ulceration: biopsy?                          Hello,    The patient declined scheduling right now as she is not sure when she is being discharged from the hospital and how the TCU placement will go/duration. She said she is going to defer scheduling or seeing Dr. MARIANA Santiago post TCU placement/being discharged.    Thanks,  Bebeto Hoang EMT-B  ----- Message -----  From: Too Santiago MD  Sent: 4/9/2024   3:16 PM CDT  To: Bebeto Hoang; Aijt Byrnes MD  Subject: RE: Ulceration: biopsy?                          Bebeto could you offer her an 11:45 Thursday spot?  ----- Message -----  From: Ajit Byrnes MD  Sent: 4/9/2024   2:54 PM CDT  To: Too Santiago MD  Subject: RE: Ulceration: biopsy?                          Hi doc,    Obtained punch biopsy x2 today of LLE wound, I have attached new pictures in the chart  today in the media tab. I am wondering if there is any way you would be able to see her in clinic this week or next week as I think we have run out of options here for diagnosis and management outside of pain mgmt. Patient does not appear infectious and her hold up is TCU placement since she has been unable to care for this wound on her own at home. Please let me know.    Thanks!  Dr. Byrnes  ----- Message -----  From: Too Santiago MD  Sent: 4/8/2024   5:13 PM CDT  To: Ajit Byrnes MD  Subject: RE: Ulceration: biopsy?                          Chon Fong,    Yes if she is okay with having more than 1 biopsy that will not hurt.  You could do a biopsy there, and then whenever she makes it to me the results may already be available which can help my visit with her be more fruitful, if the results are nonspecific then I can rebiopsy lesion.    EM  ----- Message -----  From: Ajit Byrnes MD  Sent: 4/8/2024   4:24 PM CDT  To: Too Santiago MD  Subject: Ulceration: biopsy?                              Hi doc,    This patient was scheduled to see you 4/9/24 for a biopsy, however she ended up coming back into the hospital due to severe pain and changes of her wound and she cancelled her appointment, looks like the cellulitis has mostly resolved and I do not suspect new infectious etiology, however the wound appears have significant ulceration and purple discoloration, located medial to her L calf/shin. I have attached a new image from today in the media tab.    I still think she needs a skin biopsy given her crohn's history and elevated inflammatory markers throughout admission, I also suspect venous insufficiency and her suboptimally controlled T2DM contributing. Will order HELEN's here at [facility] to assess peripheral vascular disease.    Should we get a biopsy here in the hospital or would you prefer to have that done at derm clinic? I am hoping she can still get in to see you or one of your colleagues this week, and  I apologize for the mess here.    Thanks,  Ajit Byrnes MD  PGY-1  [facility]  [facility]  April 8, 2024

## 2024-04-11 NOTE — TELEPHONE ENCOUNTER
Actively being addressed by care team in staff messages and separate encounter. Copied this note to that encounter and closing encounter.    Bebeto Honag, EMT-B

## 2024-04-11 NOTE — TELEPHONE ENCOUNTER
Damaris Alvarado   Coordinator     Telephone Encounter  Signed     Creation Time: 4/11/2024  1:54 PM     [facility] Call Center     Phone Message     May a detailed message be left on voicemail: yes      Reason for Call: Appointment Intake    Referring Provider Name: Ajit Byrnes MD  Diagnosis and/or Symptoms: Cellulitis of left lower leg [L03.116]  Skin lesion  Have reached out to Dr. Santiago regarding this patient, recurrent admissions for LLE cellulitis that improved with abx, however still has large ulcerated wound LLE, biopsied inpatient 4/9/24, results pending. Please schedule Outpatient for evaluation  Pt said she would like to know what this Appt is for. Pt does not want to be poked anymore, it is too painful. Please call pt back to discuss. I am not sure if this is a follow-up Appt. Pt said she had a Bx done either 04/09 or 04/10/24. Please review and call pt back to discuss and possible schedule. Thanks      Action Taken: Message routed to:  [facility] Derm     Travel Screening: Not Applicable

## 2024-04-13 LAB — BACTERIA BLD CULT: NO GROWTH

## 2024-04-15 ENCOUNTER — TELEPHONE (OUTPATIENT)
Dept: FAMILY MEDICINE | Facility: CLINIC | Age: 59
End: 2024-04-15

## 2024-04-15 NOTE — TELEPHONE ENCOUNTER
Patient called in asking about results for the biopsy taken in hospital last week.  Please contact her with those -- thank you

## 2024-04-16 LAB
PATH REPORT.COMMENTS IMP SPEC: NORMAL
PATH REPORT.FINAL DX SPEC: NORMAL
PATH REPORT.GROSS SPEC: NORMAL
PATH REPORT.MICROSCOPIC SPEC OTHER STN: NORMAL
PATH REPORT.RELEVANT HX SPEC: NORMAL
PHOTO IMAGE: NORMAL

## 2024-04-16 NOTE — TELEPHONE ENCOUNTER
Writer requested lab to contact outside lab to check on specimen, it still says pending. Awaiting callback from outside lab. Timo SMITH

## 2024-04-17 NOTE — TELEPHONE ENCOUNTER
Order Questions    Question Answer   Service Type: General Dermatology   Reason for Referral: Skin Lesion   Is the lesion bleeding, changing, or growing? Yes   Scheduling Instructions: [facility] will call you to coordinate your care as prescribed by your provider. If you don't hear from a representative within 2 business days, please call [phone].   Additional Information: Have reached out to Dr. Santiago regarding this patient, recurrent admissions for LLE cellulitis that improved with abx, however still has large ulcerated wound LLE, biopsied inpatient 4/9/24, results pending. Please schedule Outpatient for evaluation            Reference Links           Associated Diagnoses    Cellulitis of left lower leg [L03.116]

## 2024-04-18 ENCOUNTER — MEDICAL CORRESPONDENCE (OUTPATIENT)
Dept: HEALTH INFORMATION MANAGEMENT | Facility: CLINIC | Age: 59
End: 2024-04-18

## 2024-04-18 ENCOUNTER — TELEPHONE (OUTPATIENT)
Dept: DERMATOLOGY | Facility: CLINIC | Age: 59
End: 2024-04-18
Payer: COMMERCIAL

## 2024-04-18 NOTE — TELEPHONE ENCOUNTER
Patient confirmed scheduled appointment:  Date: 5/9/24  Time: 1::45 PM  Visit type: HFU  Provider: Dr. Santiago  Location: [facility]

## 2024-04-18 NOTE — TELEPHONE ENCOUNTER
Patient Contacted, pt will call back to schedule     Appointment type: HFU  Provider: Dr. Santiago  Return date: 1st available   Specialty phone number: [phone]

## 2024-04-18 NOTE — TELEPHONE ENCOUNTER
[facility] Call Center    Phone Message    May a detailed message be left on voicemail: yes     Reason for Call: Other: Pt returning a call to schedule HFU. Please call back at [phone]. Thank you.      Action Taken: Message routed to:  [facility] Derm    Travel Screening: Not Applicable

## 2024-04-20 ENCOUNTER — TELEPHONE (OUTPATIENT)
Dept: FAMILY MEDICINE | Facility: CLINIC | Age: 59
End: 2024-04-20
Payer: COMMERCIAL

## 2024-04-20 NOTE — TELEPHONE ENCOUNTER
Responded to after-hours clinic phone call from patient.    She has had a dry cough for 2 days.  This morning, she was coughing and brought up some bright red blood in her phlegm. This only happened one time and it was a small amount. Otherwise the phlegm has been green. She has a bit of shortness of breath, states it worsens with lying flat. No chest pain. No fever or chills - she did check her temperature and it was 98.4.     Reassured that it was only a small amount of blood in the phlegm - this is the main reason she called. Discussed that with her shortness of breath however, I would recommend she be seen today in urgent care or ER, especially if this worsens at all or if she has more blood in her phlegm. If she feels better later today, could be seen in clinic on Monday (already has appointment).    Lucrecia Maria MD  [facility], PGY-3  Contact via [employer] or pager #: [phone].

## 2024-04-22 ENCOUNTER — ANCILLARY PROCEDURE (OUTPATIENT)
Dept: GENERAL RADIOLOGY | Facility: CLINIC | Age: 59
End: 2024-04-22
Attending: STUDENT IN AN ORGANIZED HEALTH CARE EDUCATION/TRAINING PROGRAM
Payer: MEDICARE

## 2024-04-22 ENCOUNTER — DOCUMENTATION ONLY (OUTPATIENT)
Dept: FAMILY MEDICINE | Facility: CLINIC | Age: 59
End: 2024-04-22

## 2024-04-22 ENCOUNTER — OFFICE VISIT (OUTPATIENT)
Dept: FAMILY MEDICINE | Facility: CLINIC | Age: 59
End: 2024-04-22
Payer: MEDICARE

## 2024-04-22 ENCOUNTER — TELEPHONE (OUTPATIENT)
Dept: FAMILY MEDICINE | Facility: CLINIC | Age: 59
End: 2024-04-22

## 2024-04-22 VITALS
TEMPERATURE: 98.4 F | BODY MASS INDEX: 31.83 KG/M2 | HEART RATE: 109 BPM | HEIGHT: 62 IN | SYSTOLIC BLOOD PRESSURE: 132 MMHG | RESPIRATION RATE: 20 BRPM | WEIGHT: 173 LBS | OXYGEN SATURATION: 95 % | DIASTOLIC BLOOD PRESSURE: 82 MMHG

## 2024-04-22 DIAGNOSIS — B35.8: ICD-10-CM

## 2024-04-22 DIAGNOSIS — R06.02 SOB (SHORTNESS OF BREATH): ICD-10-CM

## 2024-04-22 DIAGNOSIS — Z12.11 SCREEN FOR COLON CANCER: Primary | ICD-10-CM

## 2024-04-22 DIAGNOSIS — J44.1 COPD EXACERBATION (H): Primary | ICD-10-CM

## 2024-04-22 DIAGNOSIS — N89.8 VAGINAL ITCHING: ICD-10-CM

## 2024-04-22 LAB
CLUE CELLS: ABNORMAL
TRICHOMONAS, WET PREP: ABNORMAL
WBC'S/HIGH POWER FIELD, WET PREP: ABNORMAL
YEAST, WET PREP: ABNORMAL

## 2024-04-22 PROCEDURE — 99214 OFFICE O/P EST MOD 30 MIN: CPT | Performed by: STUDENT IN AN ORGANIZED HEALTH CARE EDUCATION/TRAINING PROGRAM

## 2024-04-22 PROCEDURE — 71046 X-RAY EXAM CHEST 2 VIEWS: CPT | Mod: TC | Performed by: RADIOLOGY

## 2024-04-22 PROCEDURE — 87210 SMEAR WET MOUNT SALINE/INK: CPT | Performed by: STUDENT IN AN ORGANIZED HEALTH CARE EDUCATION/TRAINING PROGRAM

## 2024-04-22 RX ORDER — DOXYCYCLINE 100 MG/1
100 CAPSULE ORAL 2 TIMES DAILY
Qty: 14 CAPSULE | Refills: 0 | Status: SHIPPED | OUTPATIENT
Start: 2024-04-22 | End: 2024-05-24

## 2024-04-22 NOTE — PROGRESS NOTES
Forms Request:  Today's Date: April 22, 2024   Form Type: Home Care Orders, Verbal Order: 4/18/24 - Skilled nursing; 2wk 8wk start date 4/20/24 - 6/14/24 & 4/20/24 - Skilled nursing; 2 PRN: Wound care concerns; start date: 4/20/24 - 6/16/24  Where is the form from: [facility]  How was form received: Fax  NORIS on file: NO n/a  How is form being returned: Fax  Fax Number: [phone]  PCP: Joanna Farah   Color Team: Yellow Team  Form Given to: Call Center

## 2024-04-22 NOTE — PROGRESS NOTES
"  Assessment & Plan     Screen for colon cancer-due to see GI regardless.  Will have staff call [facility] and request follow up visit for Chron's    Vaginal itching-wet prep pending.  No blood or flank pain.   - Wet preparation    SOB (shortness of breath)-lungs clear without wheeze today.  Does have hx poorly controlled asthma.  Nebulizer not currently working .   - XR CHEST 2 VW; Future    Granulomatous dermatitis: skin lesion left stable.  Skin lesion right is growing and is now 3-4cm in diameter without ulceration.    Painful to the touch Will reach out to derm to see if any tx can be initiated prior to visit. Wound care coming to patient home to help with wraps and cares. These should continue.       Rasheeda Reese is a 58 year old, presenting for the following health issues:  Hospital F/U (For legs)      4/22/2024    10:21 AM   Additional Questions   Roomed by Beatris   Accompanied by SALVADOR         4/22/2024    Information    services provided? No     HPI         Objective    /82   Pulse 109   Temp 98.4  F (36.9  C)   Resp 20   Ht 1.575 m (5' 2\")   Wt 78.5 kg (173 lb)   SpO2 95%   BMI 31.64 kg/m    Body mass index is 31.64 kg/m .  Physical Exam   GEN: NAD  HEENT: head atraumatic, normocephalic, moist mucous membranes, eyes anicteric  CV: RRR w/o M/R/G  PULM: CTAB  ABD: soft, non-tender, no appreciable masses, no guarding, BS present  Neuro: alert and oriented x 3, CN II-XII intact, normal gross motor movements  Psych: appropriate  Ext: no peripheral edema      LLE Skin Bx:   A. Left leg below knee, site 1:  - Suppurative and tuberculoid granulomatous dermatitis - (see comment and description)     B. Left leg below knee, site 2:  - Suppurative and tuberculoid granulomatous dermatitis - (see comment and description)  Signed Electronically by: Joanna Farah MD    "

## 2024-04-22 NOTE — TELEPHONE ENCOUNTER
Patient called in asking about a antibiotic Dr Farah said she would send over today.  Nothing showing -- please send if able -- thank you

## 2024-04-23 ENCOUNTER — TELEPHONE (OUTPATIENT)
Dept: DERMATOLOGY | Facility: CLINIC | Age: 59
End: 2024-04-23
Payer: COMMERCIAL

## 2024-04-23 NOTE — TELEPHONE ENCOUNTER
Too Santiago MD Brown, Kathryn Michelle, MD  Cc: Bebeto Hoang; P [facility]  Hi Amie,    The appearance and biopsy findings are concerning for a mycobacterial infection (chelonae, abscessus, fortuitum, etc). I will get her in this week or next for additional biopsies to better evaluate for infection. Metatstatic crohns shouldn't have so much suppuration *usually*.        Bebeto,    Could you offer Mary Ann an add on spot prior to the start of afternoon clinic (12:45) this week or next. This appointment will be primarily for biopsies to better assess for infection. We will run some tests (16s, 28s PCR and mycobacterial seq / [facility]) that haven't been done yet.    - Too          Previous Messages

## 2024-04-23 NOTE — TELEPHONE ENCOUNTER
"Called and rescheduled patient with SHEA Santiago for earlier appointment per staff messages. 12:45 time unavailable per patient; scheduled for 11:45 approved by provider.  Pt was very adamant about \"no biopsies being taken. I refuse to be poke and prodded by any more needles my leg is already too painful.\"      -Bebeto Hoang, EMT-B    "

## 2024-04-23 NOTE — TELEPHONE ENCOUNTER
Too Santiago MD Cayard, Cole; Joanna Farah MD  Called pt and discussed the rationale for an additional biopsy. She was very against it due to fears about pain associated with the biopsies. I encouraged her to think on it, as it may be the key to getting her the right treatment.    EM         Resident

## 2024-04-24 ENCOUNTER — TELEPHONE (OUTPATIENT)
Dept: FAMILY MEDICINE | Facility: CLINIC | Age: 59
End: 2024-04-24
Payer: COMMERCIAL

## 2024-04-24 DIAGNOSIS — R05.1 ACUTE COUGH: Primary | ICD-10-CM

## 2024-04-24 RX ORDER — BENZONATATE 100 MG/1
100 CAPSULE ORAL 3 TIMES DAILY PRN
Qty: 45 CAPSULE | Refills: 0 | Status: SHIPPED | OUTPATIENT
Start: 2024-04-24 | End: 2024-05-24

## 2024-04-24 NOTE — TELEPHONE ENCOUNTER
Writer returned patient's call to further discuss. New onset cough, dry, worst at nighttime while lying down. Also endorsing new onset of congestion, patient sounded congested on the phone as well. Talking in complete sentences. Used inhalers and neb, not helpful. Endorses some SOB during coughing fits. Transportation is a barrier for the patient. Advised will discuss with  this afternoon during her precepting and will call back. Mary Ann agreeable to plan. Timo SMITH

## 2024-04-24 NOTE — TELEPHONE ENCOUNTER
Discussed with  who is sending in some benzonatate and recommended pt use symbicort up to 10 times a day prn for cough/shortness of breath due to steroid component. Writer called patient and provided education on symbicort use, encouraged patient to use symbicort as rescue inhaler at this time. Advised of medication sent for cough, patient agreeable to plan and will call if not effective. Timo SMITH

## 2024-04-24 NOTE — TELEPHONE ENCOUNTER
Patient called stating she just saw Dr. Farah 2 days ago and since she started her antibiotic, she's been having a cough that kept her up all night. She would like to speak to a nurse, informed her I can send a message but an appointment may be needed. Patient would like to speak to someone since she was just in 2 days ago to see Gagan. Please call and advise further thank you.

## 2024-04-25 ENCOUNTER — TELEPHONE (OUTPATIENT)
Dept: FAMILY MEDICINE | Facility: CLINIC | Age: 59
End: 2024-04-25

## 2024-04-25 DIAGNOSIS — B35.8: Primary | ICD-10-CM

## 2024-04-25 NOTE — TELEPHONE ENCOUNTER
[facility] phone call message - order or referral request for patient:     Order or referral being requested: Shower chair      Additional Comments: Gabbi called stating patient is requesting for a shower chair. If able please put in order, if a visit is needed she asked if this can be discussed and documented on her upcoming visit 4/29/24 thank you.    OK to leave a message on voice mail? Yes    Primary language: English      needed? No    Call taken on April 25, 2024 at 2:56 PM by Vern Alford

## 2024-04-26 ENCOUNTER — DOCUMENTATION ONLY (OUTPATIENT)
Dept: FAMILY MEDICINE | Facility: CLINIC | Age: 59
End: 2024-04-26
Payer: COMMERCIAL

## 2024-04-26 ENCOUNTER — TELEPHONE (OUTPATIENT)
Dept: FAMILY MEDICINE | Facility: CLINIC | Age: 59
End: 2024-04-26
Payer: COMMERCIAL

## 2024-04-26 NOTE — PROGRESS NOTES
Date form completed: 4/24/24  Form sent via: Fax  Date faxed or mailed: 4/25/24  Fax # or Address: [phone]  Completed by: Ana Laura Stephens

## 2024-04-26 NOTE — PROGRESS NOTES
Forms Request:  Today's Date: April 26, 2024   Form Type: Home Care Orders, Verbal Order: 4/24/24 Doxycycline start  Where is the form from: [facility]  How was form received: Fax  NORIS on file: NO   How is form being returned: Fax  Fax Number/Address: [phone]  PCP: Joanna Farah   Color Team: Yellow Team  Form Given to: Call Center

## 2024-04-26 NOTE — TELEPHONE ENCOUNTER
Returning after hours clinic call. Mary Ann is a 59 yo woman w/ pmh of asthma-COPD overlap syndrome, DM II, and Crohn's disease calling in regarding ongoing cough. Was seen in clinic 4/24 by PCP Dr. Farah for the same and at that time given script for Doxycycline + tessalon pearls for COPD exacerbation. Avoiding steroids if at all possible d/t hx of diabetes and current leg wound. Patient reports that these have not been helping and she continues to have prominent cough that is keeping her up at night. Denies feeling so short of breath that she cannot breathe, and endorses that her main complaint is congestion and lack of sleep d/t cough. Given CXR was clear in clinic two days ago and main complaint is symptom management I do not feel that she requires urgent/emergent evaluation at this time. She is currently using her Symbicort inhaler only two or three times a day. This does give good, although short term relief of her symptoms. Discussed that she can be using this up to 10x daily. Also encourage warm tea w/ honey as effective cough suppressant in addition to other measures she is implementing. Discussed signs and symptoms warranting visit to the ER and both pt and myself agree we are not to that point at the time being. Of note, she does have scheduled appt w/ Dr. Farah on Monday.

## 2024-04-29 ENCOUNTER — TELEPHONE (OUTPATIENT)
Dept: FAMILY MEDICINE | Facility: CLINIC | Age: 59
End: 2024-04-29

## 2024-04-29 ENCOUNTER — OFFICE VISIT (OUTPATIENT)
Dept: FAMILY MEDICINE | Facility: CLINIC | Age: 59
End: 2024-04-29
Payer: MEDICARE

## 2024-04-29 ENCOUNTER — OFFICE VISIT (OUTPATIENT)
Dept: PHARMACY | Facility: CLINIC | Age: 59
End: 2024-04-29
Payer: COMMERCIAL

## 2024-04-29 ENCOUNTER — TELEPHONE (OUTPATIENT)
Dept: DERMATOLOGY | Facility: CLINIC | Age: 59
End: 2024-04-29

## 2024-04-29 VITALS
OXYGEN SATURATION: 93 % | WEIGHT: 172 LBS | SYSTOLIC BLOOD PRESSURE: 128 MMHG | RESPIRATION RATE: 20 BRPM | DIASTOLIC BLOOD PRESSURE: 84 MMHG | HEART RATE: 101 BPM | TEMPERATURE: 98.4 F | BODY MASS INDEX: 31.65 KG/M2 | HEIGHT: 62 IN

## 2024-04-29 DIAGNOSIS — Z79.4 TYPE 2 DIABETES MELLITUS WITH HYPERGLYCEMIA, WITH LONG-TERM CURRENT USE OF INSULIN (H): ICD-10-CM

## 2024-04-29 DIAGNOSIS — K50.10 CROHN'S DISEASE OF LARGE INTESTINE WITHOUT COMPLICATION (H): ICD-10-CM

## 2024-04-29 DIAGNOSIS — J44.89 ASTHMA-COPD OVERLAP SYNDROME (H): Primary | ICD-10-CM

## 2024-04-29 DIAGNOSIS — E11.65 TYPE 2 DIABETES MELLITUS WITH HYPERGLYCEMIA, WITH LONG-TERM CURRENT USE OF INSULIN (H): ICD-10-CM

## 2024-04-29 DIAGNOSIS — B35.8: ICD-10-CM

## 2024-04-29 DIAGNOSIS — J45.40 MODERATE PERSISTENT ASTHMA WITHOUT COMPLICATION: ICD-10-CM

## 2024-04-29 DIAGNOSIS — Z12.11 SCREEN FOR COLON CANCER: Primary | ICD-10-CM

## 2024-04-29 PROCEDURE — 99207 PR NO CHARGE LOS: CPT | Performed by: PHARMACIST

## 2024-04-29 PROCEDURE — 99214 OFFICE O/P EST MOD 30 MIN: CPT | Performed by: STUDENT IN AN ORGANIZED HEALTH CARE EDUCATION/TRAINING PROGRAM

## 2024-04-29 RX ORDER — ATORVASTATIN CALCIUM 40 MG/1
40 TABLET, FILM COATED ORAL DAILY
Qty: 90 TABLET | Refills: 3 | Status: ON HOLD | OUTPATIENT
Start: 2024-04-29 | End: 2024-05-29

## 2024-04-29 RX ORDER — BLOOD SUGAR DIAGNOSTIC
STRIP MISCELLANEOUS
Qty: 100 EACH | Refills: 3 | Status: SHIPPED | OUTPATIENT
Start: 2024-04-29

## 2024-04-29 RX ORDER — LIRAGLUTIDE 6 MG/ML
1.2 INJECTION SUBCUTANEOUS DAILY
Qty: 6 ML | Refills: 3 | Status: SHIPPED | OUTPATIENT
Start: 2024-04-29 | End: 2024-05-24

## 2024-04-29 NOTE — TELEPHONE ENCOUNTER
Patient Contacted, pt did not want to schedule the following:    Appointment type: New   Provider:  Any providers   Return date: 1st available   Specialty phone number: [phone]

## 2024-04-29 NOTE — Clinical Note
To not add to the burden of Mary Ann's appointment needs, I didn't recommend any specific follow up for me and her. Will you pull me in for a covisit in a few months? I'll put a reminder on my panel notes too.

## 2024-04-29 NOTE — Clinical Note
Please call [facility].  Patient est patient of Dr Beckwith.  Can they please call patient to re-establish care?

## 2024-04-29 NOTE — PROGRESS NOTES
Medication Therapy Management (MTM) Encounter    ASSESSMENT:                            Medication Adherence/Access: needs close monitoring. Unclear if insurance changed or not.     Type 2 diabetes mellitus with hyperglycemia, with long-term current use of insulin (H)  A1c above goal <7%. Need improvement in blood glucose for would healing. Fasting blood glucose at goal per patient report, A1c above goal indicates likely need improvement in post-prandial blood glucose. Opportunity to titrate GLP1. Have seen weight change since started GLP1 end of February 2024, unclear if due to drug vs. Other comorbid conditions/hospitalizations. Will taper insulin to prevent fasting hypoglycemia risk.     Asthma-COPD overlap syndrome (H)  Pt declined filling out ACT today. However recent data indicating high symptom burden. Opportunity to use SMART therapy with Dulera per patient preference.     PLAN:                            Decrease NPH to 13 units twice daily   Increase Victoza to 1.2 mg/day  Stop Symbicort, start Dulera    Follow-up: Return in about 3 months (around 7/29/2024) for or sooner as needed per patient or provider request.    Medication issues to be addressed at a future visit     If insurance coverage improved, consider switching to once daily basal insulin    SUBJECTIVE/OBJECTIVE:                          Mary Ann Olson is a 58 year old female coming in for pharmacist visit.  used for visit (Y/N): No; English.     Reason for visit: follow up.    Allergies/ADRs: Reviewed in chart  Past Medical History: Reviewed in chart  Social History     Tobacco Use    Smoking status: Former     Current packs/day: 0.50     Types: Cigarettes     Passive exposure: Past    Smokeless tobacco: Never    Tobacco comments:     2-3 cig/day   Vaping Use    Vaping status: Never Used   Substance Use Topics    Alcohol use: No    Drug use: No     ^Reviewed today    Medication Adherence/Access: Concerns for medicine cost,  "insurance changed again. Unclear if has MA or not for Rx.     Type 2 diabetes mellitus with hyperglycemia, with long-term current use of insulin (H)  Current medicines:  NPH 15 units twice daily - obtains via via. Needs insulin syringe/needles  Victoza 0.6 mg/day  Metformin 1000 mg twice daily     Patient history on efficacy/safety:   Fasting blood glucose mostly 100-120s per patient report. No concerns for hypoglycemia. Has noticed impact of Victoza on appetite.     Asthma-COPD overlap syndrome (H)  Current medicines:  Symbicort 2 puffs twice daily - concern for taste with use. Would like to switch back to Dulera if possible. Also has been using as needed  Spiriva 2 puffs daily  Albuterol as needed + ipratropium/albuterol as needed     Patient history on efficacy/safety:   Concerns recently for exacerbation.         Not taking oral iron due to constipation concerns.     Today's Vitals:   BP Readings from Last 1 Encounters:   04/29/24 128/84     Pulse Readings from Last 1 Encounters:   04/29/24 101     Wt Readings from Last 1 Encounters:   04/29/24 172 lb (78 kg)     Ht Readings from Last 1 Encounters:   04/29/24 5' 2\" (1.575 m)     Estimated body mass index is 31.46 kg/m  as calculated from the following:    Height as of an earlier encounter on 4/29/24: 5' 2\" (1.575 m).    Weight as of an earlier encounter on 4/29/24: 172 lb (78 kg).    Temp Readings from Last 1 Encounters:   04/29/24 98.4  F (36.9  C)       ----------------  Post Discharge Medication Reconciliation Status: discharge medications reconciled and changed, per note/orders.    I spent 30 minutes with this patient today. All changes were made via collaborative practice agreement with Joanna Farah MD. A copy of the visit note was provided to the patient's provider(s).    The patient was given to the patient a summary of these recommendations. See Provider note/AVS from today.     Valeria Hall, Pharm.D., [facility]  Phone: [phone]    For insurance/tracking purposes, MTM Team Documentation:   Medication Therapy Recommendations  Type 2 diabetes mellitus with hyperglycemia, with long-term current use of insulin (H)    Current Medication: liraglutide (VICTOZA) 18 MG/3ML solution   Rationale: Dose too low - Dosage too low - Effectiveness   Recommendation: Increase Dose   Status: Accepted per CPA

## 2024-04-29 NOTE — TELEPHONE ENCOUNTER
[facility] phone call message- general phone call:    Reason for call: Gabbi called reporting interim home care letting patient know that medica is not paying for wound care home care and they will continue for this week but after they will discharge her from home care orders due to citizenship issues. Gabbi wants Dr. Farah to be aware of her wound and if provider wants to explore other options or does patient need to go to hospital.    Gabbi also reported that patient cancelled dermatology appointments due to being ill and they offered her out to November 2024, wants Dr. Farah aware of this as well, since she is getting discharged after this week from wound care home care order. If any questions/concerns please reach out to Gabbi. Thank you        Return call needed: No    OK to leave a message on voice mail? Yes    Primary language: English      needed? No    Call taken on April 29, 2024 at 10:50 AM by Vern Alford

## 2024-04-30 NOTE — PROGRESS NOTES
Assessment & Plan     1. Screen for colon cancer  -colonoscopies through [facility].  Due for repeat colon    2. Type 2 diabetes mellitus with hyperglycemia, with long-term current use of insulin (H)needs refill NPH  - insulin  UNIT/ML vial; Inject 13 Units Subcutaneous 2 times daily  Dispense: 10 mL; Refill: 1    3. Moderate persistent asthma without complication  -identifies better control in past with dulera.  Symptoms improved from visit last week but not well controlled.  Wiill monitor sx     4. Persistent leg wound: etiology unclear. not responsive to antibiotics inpatient.  Recent doxy due to respiratory illness, again no effect from a skin standpoint.  In need of derm visit and repeat biopsy.  Patient apprehensive about this.  Also about to lose home care services due to lack of citizenship.  Continue wound cares for now.     5. Crohn's disease of large intestine without complication (H)=in need of GI follow up.        Rasheeda Reese is a 58 year old, presenting for the following health issues:  RECHECK (toe)      4/29/2024     3:07 PM   Additional Questions   Roomed by Beatris   Accompanied by SALVADOR         4/29/2024    Information    services provided? No     HPI   Patient presents today to follow-up a few issues    Bilateral leg wounds  Since last visit leg wounds have persisted.  Wound on the right continues to be tender and is enlarging in size.  There are no open areas or weeping areas on this side.  Left lower leg wound with several open areas and weeping with some purulent like material present.  I recently received a notification that patient will not receive home care wound services any longer due to her lack of citizenship.  Patient is concerned about how she will care for her leg at home without assistance.  She did have a wound visit this morning prior to visit today.  Patient is incidentally on doxycycline for a possible upper respiratory tract infection.  This has not  "affected progression or course of lower leg wounds.    Shortness of breath and cough  Patient treated for possible upper respiratory tract infection last visit.  She was not given prednisone due to her negative reactions to prednisone and how she feels on this medication.  She often refuses to take this medication.  Patient reports that today she is breathing better.  Cough is improved.  She has been taking her inhalers as prescribed.  She is seeing the pharmacist today and they are adjusting her controller medication see pharmacy note.    History of close disease.  Patient has not seen gastroenterology since hospital discharge.  She has no appointment scheduled.  She typically follows with Dr. Beckwith with [facility].  Currently not on any form of therapy for known Crohn's disease.  Despite this GI symptoms are not her primary complaint today.  She is not complaining of significant abdominal pain or other GI symptoms.    Patient denies fever sweats chills nausea vomiting.      Objective    /84   Pulse 101   Temp 98.4  F (36.9  C)   Resp 20   Ht 1.575 m (5' 2\")   Wt 78 kg (172 lb)   SpO2 93%   BMI 31.46 kg/m    Body mass index is 31.46 kg/m .  Physical Exam   GEN: NAD  HEENT: head atraumatic, normocephalic, moist mucous membranes, eyes anicteric  CV: RRR w/o M/R/G  PULM: CTAB  ABD: soft, non-tender, no appreciable masses, no guarding, BS present  Neuro: alert and oriented x 3, CN II-XII intact, normal gross motor movements  Psych: appropriate  Ext: no peripheral edema  SKIN: lower leg wounds very tender to the touch.  See images below.  Right leg wound enlarged from previous encounter.  Left lower leg wound erythematous with several open areas draining yellow/clear material    Left lower leg medial:         Left lower leg lateral:       Right shin:         Lab Results   Component Value Date    A1C 8.5 04/08/2024    A1C 12.2 01/19/2024    A1C 7.7 08/15/2023    A1C 7.7 08/01/2022    A1C 6.4 11/02/2021    A1C " 5.9 11/16/2020    A1C 6.0 04/13/2020    A1C 6.3 11/18/2019    A1C 6.7 02/09/2019    A1C 6.6 08/28/2018       Signed Electronically by: Joanna Farah MD

## 2024-05-03 ENCOUNTER — TELEPHONE (OUTPATIENT)
Dept: FAMILY MEDICINE | Facility: CLINIC | Age: 59
End: 2024-05-03
Payer: COMMERCIAL

## 2024-05-03 NOTE — TELEPHONE ENCOUNTER
"Patient called in this morning asking about a \"referral to the [facility]\" that she said you told her you would do for her leg.  I did not see anything in referrals-- she also asked about an infusion.  Please contact her when able -- thank you   "

## 2024-05-06 ENCOUNTER — PATIENT OUTREACH (OUTPATIENT)
Dept: CARE COORDINATION | Facility: CLINIC | Age: 59
End: 2024-05-06
Payer: COMMERCIAL

## 2024-05-06 NOTE — PROGRESS NOTES
Clinic Care Coordination Contact  Follow Up Progress Note      Assessment: The pt was recently in the ED, I called to check up on the pt and help the pt setup a ED follow up. I called the pt, but got her vm, so I left a vm for the pt to give me a call back. Pt has a follow up with  on 05/14/2024 at 11:00am.    Care Gaps:    Health Maintenance Due   Topic Date Due    COPD ACTION PLAN  Never done    ZOSTER IMMUNIZATION (3 of 3) 11/30/2018    MAMMO SCREENING  09/10/2021    MEDICARE ANNUAL WELLNESS VISIT  11/02/2022    COLORECTAL CANCER SCREENING  11/22/2022    COVID-19 Vaccine (3 - 2023-24 season) 09/01/2023           Care Plans      Intervention/Education provided during outreach:               Plan:     Care Coordinator will follow up in

## 2024-05-07 ENCOUNTER — TELEPHONE (OUTPATIENT)
Dept: FAMILY MEDICINE | Facility: CLINIC | Age: 59
End: 2024-05-07
Payer: COMMERCIAL

## 2024-05-07 DIAGNOSIS — J44.1 COPD EXACERBATION (H): ICD-10-CM

## 2024-05-07 NOTE — TELEPHONE ENCOUNTER
[facility] phone call message- medication clarification/question:    Full Medication Name: albuterol (PROAIR HFA/PROVENTIL HFA/VENTOLIN HFA) 108 (90 Base) MCG/ACT inhaler        Question: patient called in requesting refill on above medication -- please fill to below pharmacy if able     Pharmacy confirmed as    [id number] - [place], [place] - [address] [place] [address] [address]  : Yes    OK to leave a message on voice mail? Yes    Primary language: English      needed? No    Call taken on May 7, 2024 at 3:24 PM by Ana Laura Stephens

## 2024-05-08 ENCOUNTER — TELEPHONE (OUTPATIENT)
Dept: FAMILY MEDICINE | Facility: CLINIC | Age: 59
End: 2024-05-08
Payer: COMMERCIAL

## 2024-05-08 RX ORDER — ALBUTEROL SULFATE 90 UG/1
AEROSOL, METERED RESPIRATORY (INHALATION)
Qty: 18 G | Refills: 3 | Status: SHIPPED | OUTPATIENT
Start: 2024-05-08

## 2024-05-08 NOTE — TELEPHONE ENCOUNTER
[facility] phone call message- general phone call:    Reason for call: Patient called wants to let Dr. Farah know that she figured out what she has, its a staph infection and she was prescribed antibiotic Clindamycin. She also reports that she has a dermatology appointment scheduled for 6/27/24.     Return call needed: No    OK to leave a message on voice mail? Yes    Primary language: English      needed? No    Call taken on May 8, 2024 at 3:32 PM by Vern Alford

## 2024-05-10 ENCOUNTER — DOCUMENTATION ONLY (OUTPATIENT)
Dept: FAMILY MEDICINE | Facility: CLINIC | Age: 59
End: 2024-05-10
Payer: COMMERCIAL

## 2024-05-10 NOTE — PROGRESS NOTES
Forms Request:  Today's Date: May 10, 2024   Form Type: Home Care Orders, Home Health Care Certification and Plan of Care: 4/18/24 - 6/16/24  Where is the form from: [facility]  How was form received: Fax  NORIS on file: NO   How is form being returned: Fax  Fax Number/Address: [phone]  PCP: Joanna Farah   Color Team: Yellow Team  Form Given to: Call Center

## 2024-05-11 ENCOUNTER — TELEPHONE (OUTPATIENT)
Dept: FAMILY MEDICINE | Facility: CLINIC | Age: 59
End: 2024-05-11
Payer: COMMERCIAL

## 2024-05-11 NOTE — TELEPHONE ENCOUNTER
"Telephone Encounter    Received clinic page from patient.  Called patient back.    Patient has a history of recurrent cellulitis of her lower extremities.  She has had several recent hospitalizations for this requiring IV antibiotics.  She states she was seen at [facility] on 5/5/2024.  Someone from their ER recently called her stating that her \"cultures grew staph that was resistant to antibiotics.\"  I am unable to see this culture result in epic.  She is currently on clindamycin.    Patient states that she has a right leg wound that has progressed to significant right leg swelling, erythema, and warmth since she was seen on the fifth.      I strongly advised that patient be seen in the emergency department today and that she will likely require admission and IV antibiotics.    Patient became tearful and very upset on the phone.  She is frustrated with the medical care she has received and frustrated with her several recent hospitalizations.  She does not believe that there are any antibiotics that would be able to treat her infection.  I stated that we have several IV antibiotics that we can use, but she repeatedly states she does not believe me. She continues to say that none of the antibiotics have worked for her and that she doesn't want to sit in the hallway of the emergency department \"for nothing.\"     I again strongly encouraged patient to go to any ED for evaluation. We discussed the risks of progression of this infection, including sepsis, loss of limb, and even death. She states she understands this risk and will go to the ED, likely [facility] per her preference.      Elizabeth Ontiveros MD, PGY-2  [facility]   Pager #: [phone]   "

## 2024-05-13 ENCOUNTER — DOCUMENTATION ONLY (OUTPATIENT)
Dept: FAMILY MEDICINE | Facility: CLINIC | Age: 59
End: 2024-05-13
Payer: COMMERCIAL

## 2024-05-13 ENCOUNTER — TELEPHONE (OUTPATIENT)
Dept: FAMILY MEDICINE | Facility: CLINIC | Age: 59
End: 2024-05-13

## 2024-05-13 NOTE — TELEPHONE ENCOUNTER
[facility] called asking about orders to start home care -- they are not wound specialists so would need to know what to order from [facility] for wound supplies -- has several questions she needs to talk with Dr about first before starting care -- could you please contact her [phone] Safia

## 2024-05-13 NOTE — PROGRESS NOTES
Forms Request:  Today's Date: May 13, 2024   Form Type: Home Care Orders, Verbal order: 5/11/24 - Discontinue: skilled nursing 2 prn: wound care....  Where is the form from: [facility]  How was form received: Fax  NORIS on file: NO   How is form being returned: Fax  Fax Number/Address: [phone]  PCP: Joanna Farah   Color Team: Yellow Team  Form Given to: Call Center

## 2024-05-13 NOTE — PROGRESS NOTES
Forms Request:  Today's Date: May 13, 2024   Form Type: Home Care Orders, Verbal Order: Clindamycin 75 mg 3 caps QID for 7 days (5/10/24-5/16/24)...  Where is the form from: [facility]  How was form received: Fax  NORIS on file: NO   How is form being returned: Fax  Fax Number/Address: [phone]  PCP: Joanna Farah   Color Team: Yellow Team  Form Given to: Call Center

## 2024-05-14 ENCOUNTER — PATIENT OUTREACH (OUTPATIENT)
Dept: CARE COORDINATION | Facility: CLINIC | Age: 59
End: 2024-05-14

## 2024-05-14 DIAGNOSIS — Z53.9 DIAGNOSIS NOT YET DEFINED: Primary | ICD-10-CM

## 2024-05-14 PROCEDURE — G0180 MD CERTIFICATION HHA PATIENT: HCPCS | Performed by: STUDENT IN AN ORGANIZED HEALTH CARE EDUCATION/TRAINING PROGRAM

## 2024-05-14 NOTE — PROGRESS NOTES
Clinic Care Coordination Contact  Follow Up Progress Note      Assessment:  The pt was recently in the ED, I called to check up on the pt and help the pt setup a ED follow up. The pt was at the [facility] for wound check. I called and talked to the pt, pt stated that she was admitted to the hospital and still there. I told her to give the clinic a call to schedule a hospital follow up.     Care Gaps:    Health Maintenance Due   Topic Date Due    COPD ACTION PLAN  Never done    ZOSTER IMMUNIZATION (3 of 3) 11/30/2018    MAMMO SCREENING  09/10/2021    MEDICARE ANNUAL WELLNESS VISIT  11/02/2022    COLORECTAL CANCER SCREENING  11/22/2022    COVID-19 Vaccine (3 - 2023-24 season) 09/01/2023           Care Plans      Intervention/Education provided during outreach:               Plan:     Care Coordinator will follow up in

## 2024-05-15 ENCOUNTER — PATIENT OUTREACH (OUTPATIENT)
Dept: CARE COORDINATION | Facility: CLINIC | Age: 59
End: 2024-05-15
Payer: COMMERCIAL

## 2024-05-15 ASSESSMENT — ACTIVITIES OF DAILY LIVING (ADL): DEPENDENT_IADLS:: INDEPENDENT

## 2024-05-15 NOTE — PROGRESS NOTES
Clinic Care Coordination Contact  Transitions of Care Outreach    Chief Complaint   Patient presents with    Clinic Care Coordination - Post Hospital     TCM-Hospital Follow up       Most Recent Admission Date: 05/11/2024   Most Recent Admission Diagnosis: 05/15/2024    Most Recent Discharge Date: Cellulitis of the Right Leg   Most Recent Discharge Diagnosis: Cellulitis of the Right Leg     Transitions of Care Assessment    Discharge Assessment  How are you doing now that you are home?: Pt is doing better, pt will call to schedule tomorrow.  How are your symptoms? (Red Flag symptoms escalate to triage hotline per guidelines): Improved  Do you know how to contact your clinic care team if you have future questions or changes to your health status? : Yes  Does the patient have their discharge instructions? : Yes  Does the patient have questions regarding their discharge instructions? : No  Were you started on any new medications or were there changes to any of your previous medications? : Yes  Does the patient have all of their medications?: Yes  Do you have questions regarding any of your medications? : No  Do you have all of your needed medical supplies or equipment (DME)?  (i.e. oxygen tank, CPAP, cane, etc.): No - What equipment or supplies are needed?                  Follow up Plan     Discharge Follow-Up  Discharge follow up appointment scheduled in alignment with recommended follow up timeframe or Transitions of Risk Category? (Low = within 30 days; Moderate= within 14 days; High= within 7 days): No    No future appointments.    Outpatient Plan as outlined on AVS reviewed with patient.      For any urgent concerns, please contact our 24 hour nurse triage line: [phone]       VALERIO Butler

## 2024-05-16 ENCOUNTER — TELEPHONE (OUTPATIENT)
Dept: FAMILY MEDICINE | Facility: CLINIC | Age: 59
End: 2024-05-16

## 2024-05-16 NOTE — TELEPHONE ENCOUNTER
Iris called stating that patient was released yesterday from [facility] -- [facility] is only able to do visits twice a week but the wound care is supposed to be changing dressing every other day-- they will see her this week and try to teach her how to change since they can only go 2 x week. DIPTI

## 2024-05-17 ENCOUNTER — MEDICAL CORRESPONDENCE (OUTPATIENT)
Dept: HEALTH INFORMATION MANAGEMENT | Facility: CLINIC | Age: 59
End: 2024-05-17
Payer: COMMERCIAL

## 2024-05-20 NOTE — PROGRESS NOTES
Date form completed: 5/8/24  Form sent via: Fax  Date faxed or mailed: 5/9/24  Fax # or Address: [phone]  Completed by: Ana Laura Stephens

## 2024-05-20 NOTE — PROGRESS NOTES
Date form completed: 5/14/24  Form sent via: Fax  Date faxed or mailed: 5/15/24  Fax # or Address: [phone]  Completed by: Ana Laura Stephens

## 2024-05-21 ENCOUNTER — DOCUMENTATION ONLY (OUTPATIENT)
Dept: FAMILY MEDICINE | Facility: CLINIC | Age: 59
End: 2024-05-21
Payer: COMMERCIAL

## 2024-05-21 NOTE — PROGRESS NOTES
Forms Request:  Today's Date: May 21, 2024   Form Type: Home Care Orders, Home Health Certification and Plan of Care: 5/17/24 - 7/15/24  Where is the form from: [facility]  How was form received: Fax  NORIS on file: NO   How is form being returned: Fax  Fax Number/Address: [phone]  PCP: Joanna Farah   Color Team: Yellow Team  Form Given to: Call Center

## 2024-05-21 NOTE — PROGRESS NOTES
Forms Request:  Today's Date: May 21, 2024   Form Type: Home Care Orders, SOC Order: 5/17/24 - Wound Care  Where is the form from: [facility]  How was form received: Fax  NORIS on file: NO   How is form being returned: Fax  Fax Number/Address: [phone]  PCP: Joanna Farah   Color Team: Yellow Team  Form Given to: Call Center

## 2024-05-21 NOTE — PROGRESS NOTES
Forms Request:  Today's Date: May 21, 2024   Form Type: DME Supplies, Confirmation of Order: Nebulizer & Compressor  Where is the form from: [facility]  How was form received: Fax  NORIS on file: NO   How is form being returned: Fax  Fax Number/Address: [phone]  PCP: Joanna Farah   Color Team: Yellow Team  Form Given to: Call Center

## 2024-05-24 ENCOUNTER — DOCUMENTATION ONLY (OUTPATIENT)
Dept: FAMILY MEDICINE | Facility: CLINIC | Age: 59
End: 2024-05-24

## 2024-05-24 ENCOUNTER — APPOINTMENT (OUTPATIENT)
Dept: CT IMAGING | Facility: CLINIC | Age: 59
DRG: 386 | End: 2024-05-24
Attending: EMERGENCY MEDICINE
Payer: MEDICARE

## 2024-05-24 ENCOUNTER — HOSPITAL ENCOUNTER (INPATIENT)
Facility: CLINIC | Age: 59
LOS: 3 days | Discharge: HOME HEALTH | DRG: 386 | End: 2024-05-29
Attending: EMERGENCY MEDICINE | Admitting: INTERNAL MEDICINE
Payer: MEDICARE

## 2024-05-24 ENCOUNTER — PATIENT OUTREACH (OUTPATIENT)
Dept: CARE COORDINATION | Facility: CLINIC | Age: 59
End: 2024-05-24
Payer: COMMERCIAL

## 2024-05-24 ENCOUNTER — MEDICAL CORRESPONDENCE (OUTPATIENT)
Dept: HEALTH INFORMATION MANAGEMENT | Facility: CLINIC | Age: 59
End: 2024-05-24

## 2024-05-24 DIAGNOSIS — R79.89 ELEVATED LACTIC ACID LEVEL: ICD-10-CM

## 2024-05-24 DIAGNOSIS — E11.65 TYPE 2 DIABETES MELLITUS WITH HYPERGLYCEMIA, WITH LONG-TERM CURRENT USE OF INSULIN (H): ICD-10-CM

## 2024-05-24 DIAGNOSIS — J44.89 ASTHMA-COPD OVERLAP SYNDROME (H): ICD-10-CM

## 2024-05-24 DIAGNOSIS — Z53.9 DIAGNOSIS NOT YET DEFINED: Primary | ICD-10-CM

## 2024-05-24 DIAGNOSIS — K50.10 CROHN'S DISEASE OF LARGE INTESTINE WITHOUT COMPLICATION (H): Chronic | ICD-10-CM

## 2024-05-24 DIAGNOSIS — L93.2 INFLAMMATORY AUTOIMMUNE DISORDER OF SKIN: Primary | ICD-10-CM

## 2024-05-24 DIAGNOSIS — L03.119 CELLULITIS OF LOWER EXTREMITY, UNSPECIFIED LATERALITY: ICD-10-CM

## 2024-05-24 DIAGNOSIS — Z79.4 TYPE 2 DIABETES MELLITUS WITH HYPERGLYCEMIA, WITH LONG-TERM CURRENT USE OF INSULIN (H): ICD-10-CM

## 2024-05-24 DIAGNOSIS — E11.9 TYPE 2 DIABETES MELLITUS WITHOUT COMPLICATION, WITHOUT LONG-TERM CURRENT USE OF INSULIN (H): ICD-10-CM

## 2024-05-24 LAB
ALBUMIN UR-MCNC: NEGATIVE MG/DL
ANION GAP SERPL CALCULATED.3IONS-SCNC: 15 MMOL/L (ref 7–15)
APPEARANCE UR: CLEAR
BACTERIA #/AREA URNS HPF: ABNORMAL /HPF
BASOPHILS # BLD AUTO: 0.1 10E3/UL (ref 0–0.2)
BASOPHILS NFR BLD AUTO: 1 %
BILIRUB UR QL STRIP: NEGATIVE
BUN SERPL-MCNC: 14.7 MG/DL (ref 8–23)
CALCIUM SERPL-MCNC: 10 MG/DL (ref 8.6–10)
CHLORIDE SERPL-SCNC: 99 MMOL/L (ref 98–107)
COLOR UR AUTO: ABNORMAL
CREAT SERPL-MCNC: 0.5 MG/DL (ref 0.51–0.95)
DEPRECATED HCO3 PLAS-SCNC: 24 MMOL/L (ref 22–29)
EGFRCR SERPLBLD CKD-EPI 2021: >90 ML/MIN/1.73M2
EOSINOPHIL # BLD AUTO: 0.2 10E3/UL (ref 0–0.7)
EOSINOPHIL NFR BLD AUTO: 2 %
ERYTHROCYTE [DISTWIDTH] IN BLOOD BY AUTOMATED COUNT: 17.6 % (ref 10–15)
GLUCOSE BLDC GLUCOMTR-MCNC: 125 MG/DL (ref 70–99)
GLUCOSE BLDC GLUCOMTR-MCNC: 151 MG/DL (ref 70–99)
GLUCOSE SERPL-MCNC: 133 MG/DL (ref 70–99)
GLUCOSE UR STRIP-MCNC: NEGATIVE MG/DL
HCT VFR BLD AUTO: 35.3 % (ref 35–47)
HGB BLD-MCNC: 10.7 G/DL (ref 11.7–15.7)
HGB UR QL STRIP: NEGATIVE
HOLD SPECIMEN: NORMAL
IMM GRANULOCYTES # BLD: 0 10E3/UL
IMM GRANULOCYTES NFR BLD: 0 %
KETONES UR STRIP-MCNC: NEGATIVE MG/DL
LACTATE SERPL-SCNC: 2.3 MMOL/L (ref 0.7–2)
LEUKOCYTE ESTERASE UR QL STRIP: ABNORMAL
LYMPHOCYTES # BLD AUTO: 2.2 10E3/UL (ref 0.8–5.3)
LYMPHOCYTES NFR BLD AUTO: 20 %
MCH RBC QN AUTO: 23.9 PG (ref 26.5–33)
MCHC RBC AUTO-ENTMCNC: 30.3 G/DL (ref 31.5–36.5)
MCV RBC AUTO: 79 FL (ref 78–100)
MONOCYTES # BLD AUTO: 0.5 10E3/UL (ref 0–1.3)
MONOCYTES NFR BLD AUTO: 5 %
MUCOUS THREADS #/AREA URNS LPF: PRESENT /LPF
NEUTROPHILS # BLD AUTO: 7.9 10E3/UL (ref 1.6–8.3)
NEUTROPHILS NFR BLD AUTO: 72 %
NITRATE UR QL: NEGATIVE
NRBC # BLD AUTO: 0 10E3/UL
NRBC BLD AUTO-RTO: 0 /100
PH UR STRIP: 5 [PH] (ref 5–7)
PLATELET # BLD AUTO: 434 10E3/UL (ref 150–450)
POTASSIUM SERPL-SCNC: 4.5 MMOL/L (ref 3.4–5.3)
RBC # BLD AUTO: 4.48 10E6/UL (ref 3.8–5.2)
RBC URINE: 2 /HPF
SODIUM SERPL-SCNC: 138 MMOL/L (ref 135–145)
SP GR UR STRIP: 1.01 (ref 1–1.03)
SQUAMOUS EPITHELIAL: <1 /HPF
UROBILINOGEN UR STRIP-MCNC: NORMAL MG/DL
WBC # BLD AUTO: 10.9 10E3/UL (ref 4–11)
WBC URINE: 23 /HPF

## 2024-05-24 PROCEDURE — 81001 URINALYSIS AUTO W/SCOPE: CPT | Performed by: INTERNAL MEDICINE

## 2024-05-24 PROCEDURE — 250N000013 HC RX MED GY IP 250 OP 250 PS 637: Performed by: INTERNAL MEDICINE

## 2024-05-24 PROCEDURE — 83605 ASSAY OF LACTIC ACID: CPT | Performed by: EMERGENCY MEDICINE

## 2024-05-24 PROCEDURE — 36415 COLL VENOUS BLD VENIPUNCTURE: CPT | Performed by: EMERGENCY MEDICINE

## 2024-05-24 PROCEDURE — 80048 BASIC METABOLIC PNL TOTAL CA: CPT | Performed by: EMERGENCY MEDICINE

## 2024-05-24 PROCEDURE — G0378 HOSPITAL OBSERVATION PER HR: HCPCS

## 2024-05-24 PROCEDURE — 96375 TX/PRO/DX INJ NEW DRUG ADDON: CPT

## 2024-05-24 PROCEDURE — 82962 GLUCOSE BLOOD TEST: CPT

## 2024-05-24 PROCEDURE — 96374 THER/PROPH/DIAG INJ IV PUSH: CPT

## 2024-05-24 PROCEDURE — 99222 1ST HOSP IP/OBS MODERATE 55: CPT | Performed by: INTERNAL MEDICINE

## 2024-05-24 PROCEDURE — 96360 HYDRATION IV INFUSION INIT: CPT

## 2024-05-24 PROCEDURE — 85025 COMPLETE CBC W/AUTO DIFF WBC: CPT | Performed by: EMERGENCY MEDICINE

## 2024-05-24 PROCEDURE — 250N000012 HC RX MED GY IP 250 OP 636 PS 637: Performed by: INTERNAL MEDICINE

## 2024-05-24 PROCEDURE — 87086 URINE CULTURE/COLONY COUNT: CPT | Performed by: INTERNAL MEDICINE

## 2024-05-24 PROCEDURE — 250N000011 HC RX IP 250 OP 636: Performed by: EMERGENCY MEDICINE

## 2024-05-24 PROCEDURE — 258N000003 HC RX IP 258 OP 636: Performed by: EMERGENCY MEDICINE

## 2024-05-24 PROCEDURE — 96361 HYDRATE IV INFUSION ADD-ON: CPT

## 2024-05-24 PROCEDURE — 99285 EMERGENCY DEPT VISIT HI MDM: CPT | Mod: 25

## 2024-05-24 PROCEDURE — 74176 CT ABD & PELVIS W/O CONTRAST: CPT | Mod: MG

## 2024-05-24 PROCEDURE — 87040 BLOOD CULTURE FOR BACTERIA: CPT | Performed by: EMERGENCY MEDICINE

## 2024-05-24 RX ORDER — ATORVASTATIN CALCIUM 40 MG/1
40 TABLET, FILM COATED ORAL DAILY
Status: DISCONTINUED | OUTPATIENT
Start: 2024-05-25 | End: 2024-05-29 | Stop reason: HOSPADM

## 2024-05-24 RX ORDER — AMOXICILLIN 250 MG
1 CAPSULE ORAL 2 TIMES DAILY PRN
Status: DISCONTINUED | OUTPATIENT
Start: 2024-05-24 | End: 2024-05-29 | Stop reason: HOSPADM

## 2024-05-24 RX ORDER — ENOXAPARIN SODIUM 100 MG/ML
40 INJECTION SUBCUTANEOUS EVERY 24 HOURS
Status: DISCONTINUED | OUTPATIENT
Start: 2024-05-24 | End: 2024-05-29 | Stop reason: HOSPADM

## 2024-05-24 RX ORDER — NALOXONE HYDROCHLORIDE 0.4 MG/ML
0.2 INJECTION, SOLUTION INTRAMUSCULAR; INTRAVENOUS; SUBCUTANEOUS
Status: DISCONTINUED | OUTPATIENT
Start: 2024-05-24 | End: 2024-05-29 | Stop reason: HOSPADM

## 2024-05-24 RX ORDER — ONDANSETRON 4 MG/1
4 TABLET, ORALLY DISINTEGRATING ORAL EVERY 6 HOURS PRN
Status: DISCONTINUED | OUTPATIENT
Start: 2024-05-24 | End: 2024-05-24

## 2024-05-24 RX ORDER — LIDOCAINE 40 MG/G
CREAM TOPICAL
Status: DISCONTINUED | OUTPATIENT
Start: 2024-05-24 | End: 2024-05-25

## 2024-05-24 RX ORDER — ACETAMINOPHEN 500 MG
1000 TABLET ORAL EVERY 6 HOURS PRN
Status: DISCONTINUED | OUTPATIENT
Start: 2024-05-24 | End: 2024-05-29 | Stop reason: HOSPADM

## 2024-05-24 RX ORDER — PANTOPRAZOLE SODIUM 40 MG/1
40 TABLET, DELAYED RELEASE ORAL DAILY
Status: DISCONTINUED | OUTPATIENT
Start: 2024-05-25 | End: 2024-05-29 | Stop reason: HOSPADM

## 2024-05-24 RX ORDER — HYDROCODONE BITARTRATE AND ACETAMINOPHEN 5; 325 MG/1; MG/1
1 TABLET ORAL ONCE
Status: COMPLETED | OUTPATIENT
Start: 2024-05-24 | End: 2024-05-24

## 2024-05-24 RX ORDER — LIDOCAINE 40 MG/G
CREAM TOPICAL
Status: DISCONTINUED | OUTPATIENT
Start: 2024-05-24 | End: 2024-05-29 | Stop reason: HOSPADM

## 2024-05-24 RX ORDER — DEXTROSE MONOHYDRATE 25 G/50ML
25-50 INJECTION, SOLUTION INTRAVENOUS
Status: DISCONTINUED | OUTPATIENT
Start: 2024-05-24 | End: 2024-05-24

## 2024-05-24 RX ORDER — FLUTICASONE FUROATE AND VILANTEROL 200; 25 UG/1; UG/1
1 POWDER RESPIRATORY (INHALATION) DAILY
Status: DISCONTINUED | OUTPATIENT
Start: 2024-05-24 | End: 2024-05-29 | Stop reason: HOSPADM

## 2024-05-24 RX ORDER — IPRATROPIUM BROMIDE AND ALBUTEROL SULFATE 2.5; .5 MG/3ML; MG/3ML
1 SOLUTION RESPIRATORY (INHALATION) 2 TIMES DAILY PRN
Status: DISCONTINUED | OUTPATIENT
Start: 2024-05-24 | End: 2024-05-29 | Stop reason: HOSPADM

## 2024-05-24 RX ORDER — LANOLIN ALCOHOL/MO/W.PET/CERES
3 CREAM (GRAM) TOPICAL AT BEDTIME
Status: DISCONTINUED | OUTPATIENT
Start: 2024-05-24 | End: 2024-05-29 | Stop reason: HOSPADM

## 2024-05-24 RX ORDER — ONDANSETRON 4 MG/1
4 TABLET, ORALLY DISINTEGRATING ORAL EVERY 6 HOURS PRN
Status: DISCONTINUED | OUTPATIENT
Start: 2024-05-24 | End: 2024-05-29 | Stop reason: HOSPADM

## 2024-05-24 RX ORDER — ALBUTEROL SULFATE 90 UG/1
2 AEROSOL, METERED RESPIRATORY (INHALATION)
Status: DISCONTINUED | OUTPATIENT
Start: 2024-05-24 | End: 2024-05-29 | Stop reason: HOSPADM

## 2024-05-24 RX ORDER — BUDESONIDE AND FORMOTEROL FUMARATE DIHYDRATE 160; 4.5 UG/1; UG/1
2 AEROSOL RESPIRATORY (INHALATION) 2 TIMES DAILY
Status: ON HOLD | COMMUNITY
End: 2024-05-29

## 2024-05-24 RX ORDER — ONDANSETRON 2 MG/ML
4 INJECTION INTRAMUSCULAR; INTRAVENOUS EVERY 6 HOURS PRN
Status: DISCONTINUED | OUTPATIENT
Start: 2024-05-24 | End: 2024-05-24

## 2024-05-24 RX ORDER — NALOXONE HYDROCHLORIDE 0.4 MG/ML
0.4 INJECTION, SOLUTION INTRAMUSCULAR; INTRAVENOUS; SUBCUTANEOUS
Status: DISCONTINUED | OUTPATIENT
Start: 2024-05-24 | End: 2024-05-29 | Stop reason: HOSPADM

## 2024-05-24 RX ORDER — DEXTROSE MONOHYDRATE 25 G/50ML
25-50 INJECTION, SOLUTION INTRAVENOUS
Status: DISCONTINUED | OUTPATIENT
Start: 2024-05-24 | End: 2024-05-29 | Stop reason: HOSPADM

## 2024-05-24 RX ORDER — ONDANSETRON 2 MG/ML
4 INJECTION INTRAMUSCULAR; INTRAVENOUS EVERY 6 HOURS PRN
Status: DISCONTINUED | OUTPATIENT
Start: 2024-05-24 | End: 2024-05-29 | Stop reason: HOSPADM

## 2024-05-24 RX ORDER — ERGOCALCIFEROL 1.25 MG/1
50000 CAPSULE, LIQUID FILLED ORAL WEEKLY
Status: DISCONTINUED | OUTPATIENT
Start: 2024-05-25 | End: 2024-05-29 | Stop reason: HOSPADM

## 2024-05-24 RX ORDER — NICOTINE POLACRILEX 4 MG
15-30 LOZENGE BUCCAL
Status: DISCONTINUED | OUTPATIENT
Start: 2024-05-24 | End: 2024-05-29 | Stop reason: HOSPADM

## 2024-05-24 RX ORDER — KETOROLAC TROMETHAMINE 15 MG/ML
15 INJECTION, SOLUTION INTRAMUSCULAR; INTRAVENOUS ONCE
Status: COMPLETED | OUTPATIENT
Start: 2024-05-24 | End: 2024-05-24

## 2024-05-24 RX ORDER — PROCHLORPERAZINE MALEATE 5 MG
10 TABLET ORAL EVERY 6 HOURS PRN
Status: DISCONTINUED | OUTPATIENT
Start: 2024-05-24 | End: 2024-05-29 | Stop reason: HOSPADM

## 2024-05-24 RX ORDER — HYDROMORPHONE HCL IN WATER/PF 6 MG/30 ML
0.2 PATIENT CONTROLLED ANALGESIA SYRINGE INTRAVENOUS
Status: DISCONTINUED | OUTPATIENT
Start: 2024-05-24 | End: 2024-05-29 | Stop reason: HOSPADM

## 2024-05-24 RX ORDER — AMOXICILLIN 250 MG
2 CAPSULE ORAL 2 TIMES DAILY PRN
Status: DISCONTINUED | OUTPATIENT
Start: 2024-05-24 | End: 2024-05-29 | Stop reason: HOSPADM

## 2024-05-24 RX ORDER — NICOTINE POLACRILEX 4 MG
15-30 LOZENGE BUCCAL
Status: DISCONTINUED | OUTPATIENT
Start: 2024-05-24 | End: 2024-05-24

## 2024-05-24 RX ORDER — PROCHLORPERAZINE 25 MG
25 SUPPOSITORY, RECTAL RECTAL EVERY 12 HOURS PRN
Status: DISCONTINUED | OUTPATIENT
Start: 2024-05-24 | End: 2024-05-29 | Stop reason: HOSPADM

## 2024-05-24 RX ORDER — HYDROMORPHONE HCL IN WATER/PF 6 MG/30 ML
0.4 PATIENT CONTROLLED ANALGESIA SYRINGE INTRAVENOUS
Status: DISCONTINUED | OUTPATIENT
Start: 2024-05-24 | End: 2024-05-29 | Stop reason: HOSPADM

## 2024-05-24 RX ORDER — OXYCODONE HYDROCHLORIDE 5 MG/1
5 TABLET ORAL EVERY 4 HOURS PRN
Status: DISCONTINUED | OUTPATIENT
Start: 2024-05-24 | End: 2024-05-29 | Stop reason: HOSPADM

## 2024-05-24 RX ADMIN — SODIUM CHLORIDE 2000 ML: 9 INJECTION, SOLUTION INTRAVENOUS at 16:16

## 2024-05-24 RX ADMIN — VANCOMYCIN HYDROCHLORIDE 1750 MG: 10 INJECTION, POWDER, LYOPHILIZED, FOR SOLUTION INTRAVENOUS at 17:33

## 2024-05-24 RX ADMIN — METFORMIN HYDROCHLORIDE 1000 MG: 500 TABLET ORAL at 20:30

## 2024-05-24 RX ADMIN — KETOROLAC TROMETHAMINE 15 MG: 15 INJECTION, SOLUTION INTRAMUSCULAR; INTRAVENOUS at 17:34

## 2024-05-24 RX ADMIN — Medication 3 MG: at 21:21

## 2024-05-24 RX ADMIN — INSULIN HUMAN 13 UNITS: 100 INJECTION, SUSPENSION SUBCUTANEOUS at 20:31

## 2024-05-24 RX ADMIN — ACETAMINOPHEN 1000 MG: 500 TABLET, FILM COATED ORAL at 21:21

## 2024-05-24 ASSESSMENT — COLUMBIA-SUICIDE SEVERITY RATING SCALE - C-SSRS
2. HAVE YOU ACTUALLY HAD ANY THOUGHTS OF KILLING YOURSELF IN THE PAST MONTH?: NO
6. HAVE YOU EVER DONE ANYTHING, STARTED TO DO ANYTHING, OR PREPARED TO DO ANYTHING TO END YOUR LIFE?: NO
1. IN THE PAST MONTH, HAVE YOU WISHED YOU WERE DEAD OR WISHED YOU COULD GO TO SLEEP AND NOT WAKE UP?: NO

## 2024-05-24 ASSESSMENT — ACTIVITIES OF DAILY LIVING (ADL)
ADLS_ACUITY_SCORE: 33
ADLS_ACUITY_SCORE: 33
ADLS_ACUITY_SCORE: 35
ADLS_ACUITY_SCORE: 33
ADLS_ACUITY_SCORE: 33
ADLS_ACUITY_SCORE: 37

## 2024-05-24 NOTE — ED NOTES
"[facility]  ED Nurse Handoff Report    ED Chief complaint: Wound Check  . ED Diagnosis:   Final diagnoses:   Cellulitis of lower extremity, unspecified laterality   Elevated lactic acid level       Allergies:   Allergies   Allergen Reactions    Gadolinium Derivatives Hives    Iodinated Contrast Media Hives    Trimethoprim Hives    Azithromycin Other (See Comments) and Rash     Erythema Nodosum x2    Keflex [Cephalexin] Rash    Aspirin Other (See Comments)    Cefuroxime Itching and Other (See Comments)    Contrast Dye Hives     IV    Corylus Other (See Comments)    Diatrizoate Hives    Iodixanol Unknown    Nuts Other (See Comments)     hazelnuts    Septra [Sulfamethoxazole-Trimethoprim] Hives    Sulfa Antibiotics Hives    Metronidazole Itching and Rash    Nitrofurantoin Itching and Rash    Quinolones Rash       Code Status: Full Code    Activity level - Baseline/Home:  standby.  Activity Level - Current:   independent.   Lift room needed: No.   Bariatric: No   Needed: No   Isolation: No.   Infection: Not Applicable.     Respiratory status: Room air    Vital Signs (within 30 minutes):   Vitals:    05/24/24 1353 05/24/24 1617 05/24/24 1622   BP: (!) 145/94 131/84    Pulse: 105 89    Resp: 16     Temp: 97.7  F (36.5  C)     TempSrc: Temporal     SpO2: 96% 96% 96%   Weight: 77.5 kg (170 lb 13.7 oz)     Height: 1.575 m (5' 2\")         Cardiac Rhythm:  ,      Pain level:    Patient confused: No.   Patient Falls Risk: nonskid shoes/slippers when out of bed.   Elimination Status: Has voided     Patient Report - Initial Complaint: wound check.   Focused Assessment: 58 year old female with history of Crohn's colitis who presents for a wound check. The patient has been experiencing on and off cellulitis for the past 2 months. She was discharged from [facility] on 5/15. When she was discharged she endorsed one bump over the infected area. During evaluation she endorses multiple bumps as " well as erythema. The patient reports chills and pain in the right leg as well. The patient denies fever, nausea, and vomiting.      Abnormal Results:   Labs Ordered and Resulted from Time of ED Arrival to Time of ED Departure   BASIC METABOLIC PANEL - Abnormal       Result Value    Sodium 138      Potassium 4.5      Chloride 99      Carbon Dioxide (CO2) 24      Anion Gap 15      Urea Nitrogen 14.7      Creatinine 0.50 (*)     GFR Estimate >90      Calcium 10.0      Glucose 133 (*)    LACTIC ACID WHOLE BLOOD WITH 1X REPEAT IN 2 HR WHEN >2 - Abnormal    Lactic Acid, Initial 2.3 (*)    CBC WITH PLATELETS AND DIFFERENTIAL - Abnormal    WBC Count 10.9      RBC Count 4.48      Hemoglobin 10.7 (*)     Hematocrit 35.3      MCV 79      MCH 23.9 (*)     MCHC 30.3 (*)     RDW 17.6 (*)     Platelet Count 434      % Neutrophils 72      % Lymphocytes 20      % Monocytes 5      % Eosinophils 2      % Basophils 1      % Immature Granulocytes 0      NRBCs per 100 WBC 0      Absolute Neutrophils 7.9      Absolute Lymphocytes 2.2      Absolute Monocytes 0.5      Absolute Eosinophils 0.2      Absolute Basophils 0.1      Absolute Immature Granulocytes 0.0      Absolute NRBCs 0.0     LACTIC ACID WHOLE BLOOD   BLOOD CULTURE   BLOOD CULTURE        CT Abdomen Pelvis w/o Contrast   Final Result   IMPRESSION:    1.  No noncontrast CT evidence of acute abnormalities in the abdomen or pelvis.             Treatments provided: see MAR  Family Comments: involved and supportive  OBS brochure/video discussed/provided to patient:  Yes  ED Medications:   Medications   lidocaine 1 % 0.1-1 mL (has no administration in time range)   lidocaine (LMX4) cream (has no administration in time range)   sodium chloride (PF) 0.9% PF flush 3 mL (has no administration in time range)   sodium chloride (PF) 0.9% PF flush 3 mL (has no administration in time range)   lidocaine 1 % 0.1-1 mL (has no administration in time range)   lidocaine (LMX4) cream (has no  administration in time range)   sodium chloride (PF) 0.9% PF flush 3 mL (has no administration in time range)   sodium chloride (PF) 0.9% PF flush 3 mL (has no administration in time range)   vancomycin (VANCOCIN) 1,750 mg in 0.9% NaCl 500 mL intermittent infusion (1,750 mg Intravenous $Given 5/24/24 1733)   HYDROcodone-acetaminophen (NORCO) 5-325 MG per tablet 1 tablet (has no administration in time range)   sodium chloride 0.9% BOLUS 2,000 mL (2,000 mLs Intravenous $New Bag 5/24/24 1616)   ketorolac (TORADOL) injection 15 mg (15 mg Intravenous $Given 5/24/24 1734)       Drips infusing:  No  For the majority of the shift this patient was Green.   Interventions performed were NA.    Sepsis treatment initiated: No    Cares/treatment/interventions/medications to be completed following ED care: see inpatient orders    ED Nurse Name: Bernice Guerrero RN  5:38 PM  RECEIVING UNIT ED HANDOFF REVIEW    Above ED Nurse Handoff Report was reviewed: Yes  Reviewed by: ARISTEO SPENCER RN on May 24, 2024 at 5:58 PM   I Sarina called the ED to inform them the note was read: Yes

## 2024-05-24 NOTE — ED PROVIDER NOTES
Emergency Department Note      History of Present Illness     Chief Complaint  Wound Check    HPI  Mary Ann Olson is a 58 year old female with history of Crohn's colitis who presents for a wound check. The patient has been experiencing on and off cellulitis for the past 2 months. She was discharged from [facility] on 5/15. When she was discharged she endorsed one bump over the infected area. During evaluation she endorses multiple bumps as well as erythema. The patient reports chills and pain in the right leg as well. The patient denies fever, nausea, and vomiting.     Independent Historian  Friend reports additional information as noted in the history above.    Review of External Notes  Admitted to [facility] on 5/11/24 with cellulitis and discharged 5/15/24, thought there was stasis changes and lymphedema with erythema.  Past Medical History   Medical History and Problem List  Past Medical History:   Diagnosis Date    Anemia     Antihistamines overdose, intentional self-harm, initial encounter (H)     Asthma     Bipolar 1 disorder (H) hx of bipolar listed in h and p    Bipolar 2 disorder (H)     Bipolar I disorder, single manic episode (H)     Cellulitis 2006 left ankle, 2008 right foot    COPD (chronic obstructive pulmonary disease) (H)     Crohn's disease (H)     Diabetes mellitus (H)     Diabetes mellitus, type 2 (H)     Fibrocystic breast     Heat stroke     Hyperlipidemia     Idiopathic acute pancreatitis 07/14/2015    Irritable bowel syndrome     Kidney stone     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      Surgical History   Past Surgical History:   Procedure Laterality Date    BIOPSY BREAST Left      "BIOPSY OF BREAST, INCISIONAL      Description: Biopsy Breast Open;  Recorded: 10/28/2010;     REMOVAL OF TONSILS,<11 Y/O      Description: Tonsillectomy;  Recorded: 07/08/2009;    [facility] LIGATE FALLOPIAN TUBE      Description: Tubal Ligation;  Recorded: 07/08/2009;     Physical Exam   Patient Vitals for the past 24 hrs:   BP Temp Temp src Pulse Resp SpO2 Height Weight   05/24/24 1835 (!) 143/87 97.3  F (36.3  C) Oral 82 16 97 % 1.575 m (5' 2\") 79.2 kg (174 lb 9.7 oz)   05/24/24 1746 -- -- -- -- -- 97 % -- --   05/24/24 1745 -- -- -- -- -- 98 % -- --   05/24/24 1743 -- -- -- -- -- 99 % -- --   05/24/24 1742 -- -- -- -- -- 96 % -- --   05/24/24 1740 -- -- -- -- -- 98 % -- --   05/24/24 1739 -- -- -- -- -- 98 % -- --   05/24/24 1738 -- -- -- -- -- 97 % -- --   05/24/24 1736 -- -- -- -- -- 98 % -- --   05/24/24 1735 -- -- -- -- -- 98 % -- --   05/24/24 1727 128/77 -- -- -- -- -- -- --   05/24/24 1622 -- -- -- -- -- 96 % -- --   05/24/24 1617 131/84 -- -- 89 -- 96 % -- --   05/24/24 1353 (!) 145/94 97.7  F (36.5  C) Temporal 105 16 96 % 1.575 m (5' 2\") 77.5 kg (170 lb 13.7 oz)     Physical Exam  General:  No respiratory distress    Cardiovascular: Good cap refill.    Respiratory: Breathing non labored.     Musculoskeletal: No tenderness. No bony deformity.     Skin: On both ventral shins, there are nodular areas of eyrthema with crusting and no confluence of erythema.    Neurologic: non focal      Psychiatric: Appropriate     Skin :   Diagnostics   Lab Results   Labs Ordered and Resulted from Time of ED Arrival to Time of ED Departure   BASIC METABOLIC PANEL - Abnormal       Result Value    Sodium 138      Potassium 4.5      Chloride 99      Carbon Dioxide (CO2) 24      Anion Gap 15      Urea Nitrogen 14.7      Creatinine 0.50 (*)     GFR Estimate >90      Calcium 10.0      Glucose 133 (*)    LACTIC ACID WHOLE BLOOD WITH 1X REPEAT IN 2 HR WHEN >2 - Abnormal    Lactic Acid, Initial 2.3 (*)    CBC WITH PLATELETS AND " DIFFERENTIAL - Abnormal    WBC Count 10.9      RBC Count 4.48      Hemoglobin 10.7 (*)     Hematocrit 35.3      MCV 79      MCH 23.9 (*)     MCHC 30.3 (*)     RDW 17.6 (*)     Platelet Count 434      % Neutrophils 72      % Lymphocytes 20      % Monocytes 5      % Eosinophils 2      % Basophils 1      % Immature Granulocytes 0      NRBCs per 100 WBC 0      Absolute Neutrophils 7.9      Absolute Lymphocytes 2.2      Absolute Monocytes 0.5      Absolute Eosinophils 0.2      Absolute Basophils 0.1      Absolute Immature Granulocytes 0.0      Absolute NRBCs 0.0     BLOOD CULTURE   BLOOD CULTURE       Imaging  CT Abdomen Pelvis w/o Contrast   Final Result   IMPRESSION:    1.  No noncontrast CT evidence of acute abnormalities in the abdomen or pelvis.                 Independent Interpretation  I reviewed the patient's CT scan and do not see signs of obstruction  ED Course    Medications Administered  Medications   lidocaine 1 % 0.1-1 mL (has no administration in time range)   lidocaine (LMX4) cream (has no administration in time range)   sodium chloride (PF) 0.9% PF flush 3 mL (has no administration in time range)   sodium chloride (PF) 0.9% PF flush 3 mL (has no administration in time range)   lidocaine 1 % 0.1-1 mL (has no administration in time range)   lidocaine (LMX4) cream (has no administration in time range)   sodium chloride (PF) 0.9% PF flush 3 mL (has no administration in time range)   sodium chloride (PF) 0.9% PF flush 3 mL (has no administration in time range)   acetaminophen (TYLENOL) tablet 1,000 mg (has no administration in time range)   albuterol (PROVENTIL HFA/VENTOLIN HFA) inhaler (has no administration in time range)   atorvastatin (LIPITOR) tablet 40 mg (has no administration in time range)   fluticasone-vilanterol (BREO ELLIPTA) 200-25 MCG/ACT inhaler 1 puff (has no administration in time range)   ipratropium - albuterol 0.5 mg/2.5 mg/3 mL (DUONEB) neb solution 3 mL (has no administration in time  range)   metFORMIN (GLUCOPHAGE) tablet 1,000 mg (has no administration in time range)   pantoprazole (PROTONIX) EC tablet 40 mg (has no administration in time range)   tiotropium (SPIRIVA RESPIMAT) inhalation aerosol 2 puff (has no administration in time range)   vitamin D2 (ERGOCALCIFEROL) 06977 units (1250 mcg) capsule 50,000 Units (has no administration in time range)   senna-docusate (SENOKOT-S/PERICOLACE) 8.6-50 MG per tablet 1 tablet (has no administration in time range)     Or   senna-docusate (SENOKOT-S/PERICOLACE) 8.6-50 MG per tablet 2 tablet (has no administration in time range)   ondansetron (ZOFRAN ODT) ODT tab 4 mg (has no administration in time range)     Or   ondansetron (ZOFRAN) injection 4 mg (has no administration in time range)   prochlorperazine (COMPAZINE) injection 10 mg (has no administration in time range)     Or   prochlorperazine (COMPAZINE) tablet 10 mg (has no administration in time range)     Or   prochlorperazine (COMPAZINE) suppository 25 mg (has no administration in time range)   enoxaparin ANTICOAGULANT (LOVENOX) injection 40 mg (has no administration in time range)   oxyCODONE IR (ROXICODONE) half-tab 2.5 mg (has no administration in time range)   oxyCODONE (ROXICODONE) tablet 5 mg (has no administration in time range)   HYDROmorphone (DILAUDID) injection 0.2 mg (has no administration in time range)   HYDROmorphone (DILAUDID) injection 0.4 mg (has no administration in time range)   glucose gel 15-30 g (has no administration in time range)     Or   dextrose 50 % injection 25-50 mL (has no administration in time range)     Or   glucagon injection 1 mg (has no administration in time range)   insulin NPH injection 13 Units (has no administration in time range)   naloxone (NARCAN) injection 0.2 mg (has no administration in time range)     Or   naloxone (NARCAN) injection 0.4 mg (has no administration in time range)     Or   naloxone (NARCAN) injection 0.2 mg (has no administration in  time range)     Or   naloxone (NARCAN) injection 0.4 mg (has no administration in time range)   sodium chloride 0.9% BOLUS 2,000 mL (0 mLs Intravenous ED/Periop/Clinic Infusing on Admission/transfer 5/24/24 1800)   vancomycin (VANCOCIN) 1,750 mg in 0.9% NaCl 500 mL intermittent infusion (1,750 mg Intravenous $Given 5/24/24 1733)   HYDROcodone-acetaminophen (NORCO) 5-325 MG per tablet 1 tablet (1 tablet Oral Not Given 5/24/24 1742)   ketorolac (TORADOL) injection 15 mg (15 mg Intravenous $Given 5/24/24 1734)       Procedures  Procedures     Discussion of Management  The patient's labs from recent admission were reviewed    Consultation I consulted with Dr. Linder who did agree to admit the patient.    Social Determinants of Health adding to complexity of care  Education/Literacy    ED Course  ED Course as of 05/24/24 2002   Fri May 24, 2024   1442 I obtained history and performed physical exam as noted above.    1702 I spoke with Dr. Sullivan, of the hospitalist team, regarding the patient. They accepted the patient for admission.       Medical Decision Making / Diagnosis   The patient had an elevated lactic acid level which I think is likely secondary to infection there as well as dehydration there is no current criteria met for sepsis        MDM  Mary Ann Olson is a 58 year old female who has had chronic stasis changes of her lower extremities she however is followed by wound nurse who thought the wounds look worse.  I did review the recent hospitalization where she underwent a biopsy.  There was some thought that there was chronic changes.  I felt that this may be due to colonization.  There is not signs of significant discoloration of the legs but definite nodularity with crusting this is consistent with infection.  Screening laboratory studies were sent and her lactic acid level is high which is concerning.  She was started on broad-spectrum antibiotics and admitted to the hospital.  She complained of some  abdominal discomfort which I think is more of a chronic type issue but I considered there could be intra-abdominal processes like diverticulitis thankfully her CT scan is negative.  She was admitted to observation status to ensure that her lactic acid level come down as well is to allow her cultures to return to see if there is any bacteremia.    Disposition  The patient was admitted to the hospital under the care of Dr. Linder    ICD-10 Codes:    ICD-10-CM    1. Cellulitis of lower extremity, unspecified laterality  L03.119       2. Elevated lactic acid level  R79.89            Discharge Medications  Current Discharge Medication List            Scribe Disclosure:  Mesha AVILA, am serving as a scribe at 2:51 PM on 5/24/2024 to document services personally performed by Sher Gillespie MD based on my observations and the provider's statements to me.        Sher Gillespie MD  05/24/24 2005

## 2024-05-24 NOTE — PROGRESS NOTES
Clinic Care Coordination Contact  Follow Up Progress Note      Assessment: The pt was recently in the ED, I called to check up on the pt and help the pt setup a ED follow up. The pt was at [facility] for wound check. I called and talked to the pt, pt stated that she still there, pt stated that she has not been seen yet. I reminded the pt of her appointment with  on 06/04/2024 at 4:00pm.    Care Gaps:    Health Maintenance Due   Topic Date Due    COPD ACTION PLAN  Never done    ZOSTER IMMUNIZATION (3 of 3) 11/30/2018    MAMMO SCREENING  09/10/2021    MEDICARE ANNUAL WELLNESS VISIT  11/02/2022    COLORECTAL CANCER SCREENING  11/22/2022    COVID-19 Vaccine (3 - 2023-24 season) 09/01/2023           Care Plans      Intervention/Education provided during outreach:               Plan:     Care Coordinator will follow up in    NOTIFICATION RETURN TO WORK / SCHOOL    2/27/2023 11:45 AM    Ms. Jj Judd Siikarannantie 58    Immunization History   Administered Date(s) Administered    COVID-19, PFIZER Bivalent BOOSTER, (age 12y+), IM, 30 mcg/0.3 mL dose 10/18/2022    COVID-19, PFIZER PURPLE top, DILUTE for use, (age 15 y+), IM, 30mcg/0.3mL 03/08/2021, 03/31/2021, 11/15/2021    Influenza Vaccine 09/19/2013, 10/27/2014    Influenza Vaccine Split 10/21/2010, 10/17/2012    Influenza, FLUAD, (age 72 y+), Adjuvanted 11/11/2021, 10/20/2022    Tdap 05/16/2019    Zoster Recombinant 09/17/2020, 01/26/2021             Sincerely,      Ijeoma Cross MD

## 2024-05-24 NOTE — PROGRESS NOTES
Date form completed: 5/17/24  Form sent via: Fax  Date faxed or mailed: 5/21/24  Fax # or Address: [phone]  Completed by: Won Schwartz MA

## 2024-05-24 NOTE — ED NOTES
Bed: ED16  Expected date: 5/24/24  Expected time:   Means of arrival:   Comments:  FT 5 when clean

## 2024-05-24 NOTE — ED TRIAGE NOTES
Chronic bilateral lower leg wounds. Patient reports that her home care nurse came to change her dressing today and suggested that she go to ER for evaluation. Patient reports an increase in redness near wounds.

## 2024-05-24 NOTE — PROGRESS NOTES
Date form completed: 5/17/24  Form sent via: Fax  Date faxed or mailed: 5/21/24  Fax # or Address: [phone]  Completed by: Won Schwartz MA

## 2024-05-24 NOTE — PHARMACY-ADMISSION MEDICATION HISTORY
"Pharmacist Admission Medication History    Admission medication history is complete. The information provided in this note is only as accurate as the sources available at the time of the update.    Information Source(s): Patient and CareEverywhere/SureScripts via in-person    Pertinent Information: None    Changes made to PTA medication list:  Added: Symbicort  Deleted: Benzonatate, Calcium-Vit D, Doxycycline, Dulera, Ferrous sulfate, Hydroxyzine, Liraglutide, Loratadine, Milk of Mag,   Changed: None    Allergies reviewed with patient and updates made in EHR: no    Medication History Completed By: Yuan O[facility]o [facility] 5/24/2024 5:48 PM    PTA Med List   Medication Sig Last Dose    acetaminophen (TYLENOL) 500 MG tablet Take 2 tablets (1,000 mg) by mouth every 6 hours as needed for pain Past Week    albuterol (PROAIR HFA/PROVENTIL HFA/VENTOLIN HFA) 108 (90 Base) MCG/ACT inhaler INHALE 2 PUFFS INTO LUNGS EVERY 4 HOURS AS NEEDED FOR SHORTNESS OF BREATH OR  WHEEZING 5/24/2024 at x1 ~0800    atorvastatin (LIPITOR) 40 MG tablet Take 1 tablet (40 mg) by mouth daily 5/24/2024 at AM    budesonide-formoterol (SYMBICORT) 160-4.5 MCG/ACT Inhaler Inhale 2 puffs into the lungs 2 times daily 5/24/2024 at x1    insulin  UNIT/ML vial Inject 13 Units Subcutaneous 2 times daily 5/24/2024 at x1 at ~0930    insulin syringe-needle U-100 (31G X 5/16\" 0.3 ML) 31G X 5/16\" 0.3 ML miscellaneous Use 2 syringes daily 5/24/2024    ipratropium - albuterol 0.5 mg/2.5 mg/3 mL (DUONEB) 0.5-2.5 (3) MG/3ML neb solution Take 1 vial (3 mLs) by nebulization 2 times daily as needed for shortness of breath, wheezing or cough 5/24/2024    melatonin 5 MG tablet Take 1 tablet (5 mg) by mouth nightly as needed for sleep 5/23/2024 at HS    metFORMIN (GLUCOPHAGE) 1000 MG tablet Take 1 tablet (1,000 mg) by mouth 2 times daily (with meals) 5/24/2024 at x1    Multiple Vitamins-Minerals (CENTRUM SILVER 50+WOMEN PO) Take 1 tablet by mouth daily 5/24/2024 at AM " "   omeprazole (PRILOSEC) 20 MG DR capsule Take 1 capsule (20 mg) by mouth daily 5/24/2024 at AM    tiotropium (SPIRIVA RESPIMAT) 2.5 MCG/ACT inhaler Inhale 2 puffs into the lungs daily 5/24/2024 at AM    vitamin D2 (ERGOCALCIFEROL) 68565 units (1250 mcg) capsule Take 1 capsule (50,000 Units) by mouth once a week Past Month at \"Usually forget\"     "

## 2024-05-24 NOTE — PROGRESS NOTES
Forms Request:  Today's Date: May 24, 2024   Form Type: Home Care Orders, Physician Order: 5/23/24 - FYI: Client is having significant leg pain...  Where is the form from: [facility]  How was form received: Fax  NORIS on file: NO   How is form being returned: Fax  Fax Number/Address: [phone]  PCP: Joanna Farah   Color Team: Yellow Team  Form Given to: [facility]

## 2024-05-24 NOTE — H&P
[facility]    Hospitalist Admission Note    Name: Mary Ann Olson    MRN: [id number]  YOB: 1965    Age: 58 year old  Date of admission: 5/24/2024  Primary care provider: Joanna Farah  Admitting physician : Ramon Sullivan M.D. ,M.B.A.       Brief summary of admission assessment/MDM:    Mary Ann Olson is a 58 year old  female  with past medical history is significant  for multiple medical problems including longstanding bilateral lower extremity wounds with history of recurrent admission recently discharged from [facility] mid  May who was referred  to emergency department due to concern for redness around the wounds.     Patient is afebrile on presentation.No leukocytosis but lactic acid was mildly elevated at 2.3.    Assessment and Plan       #1.Chronic bilateral lower extremity wounds dermatosis with acute cellulitis     -- Presents with increased redness noted  by home care nurse  and was sent to the ER for evaluation  --Patient was recently discharged from [facility] on May 15 for cellulitis of right leg cellulitis.  Recurrent admissions in the past for the same condition of bilateral extremity wounds and cellulitis.  Patient was discharged on clindamycin on May 15.  Patient has multiple antibiotic allergies  -- She was discharged on course of clindamycin but unfortunately she was not taking it since it was not working for her.  -- Patient was started on vancomycin in the ED, will continue vancomycin, monitor cultures, patient has multiple antibiotic allergies and spoke from last admission revealed coag negative staph resistant to ceftriaxone and doxycycline but this was thought to be skin colonization.  Of note the patient had a biopsy in the past which is concerning for inflammatory process such as pyoderma gangrenosum with underlying Crohn's disease.  It is reasonable to involve infectious service to assist with further evaluation  "and antibiotic recommendation.  Patient definitely needs to see a dermatologist as outpatient.  Wound care consult if available  She was [facility].  Recommendations are the followin. Cleanse wound bed with wound cleanser or NS and pat dry  2. Place [employer] non-adherant contact layer 4x5, cut to fit wound, over open wound(s).  3. Cover with 4x4 gauze and secure with kerlix.  4. Change every other day or as needed for saturation.     #2.  Elevated lactic acid at 2.3.  Afebrile, no leukocytosis.  Doubt severe sepsis with possible.  Continue antibiotic above, monitor cultures    #3.  Abdominal pain: Mild  --CT abdomen and pelvis showed no evidence of acute intra-abdominal process.  Of note patient has history of Crohn's disease.  Continue to monitor for now    Incidental findings that need to be followed by patient's PCP as an outpatient :   CT imaging studies results below.    Chronic  comorbid medical conditions:  Bilateral lower extremity wounds :She did undergo surgical biopsy of the skin of the left leg with pathology showing \"suppurative and tuberculoid granulomatous dermatitis.\" It was thought that she possibly had pyoderma gangrenosum and as such was given a referral to outpatient dermatology   Diabetes mellitus, type 2 (HC): Insulin requiring.  Patient takes insulin NPH 14 units twice a day.  On metformin 1 g twice a day.  Blood sugar controlled.  Sliding scale insulin.  Continue home regimen  Hyperlipidemia LDL goal < 100  ACP (advance care planning)  Prolonged Q-T interval on ECG  Tobacco dependence  COPD (chronic obstructive pulmonary disease) (HC)  Opioid use disorder, severe, in sustained remission (HC)  Hx of Polysubstance abuse (HC)  Bipolar affective disorder (HC)  Crohn's disease          # Admission Status: Observation unit.  May discharge home tomorrow after seen by infectious disease    # Diet:Diabetic diet    # Activity:Advance activity as " tolerated    # Education/Counseling :Discussed treatment plan with the patient    # Consults:Inpatient consult with infectious disease    # VTE prophylactic measures:prophylaxis against venous thromboembolism with Lovenox      # Code Status:Full Code    # Disposition:anticipate discharge to home and Anticipate discharge tomorrow        Disclaimer: This note consists of symbols derived from keyboarding, dictation and/or voice recognition software. As a result, there may be errors in the script that have gone undetected. Please consider this when interpreting information found in this chart.             Chief Complaint:     Bilateral extremity wounds and cellulitis  History is obtained from the patient, electronic health record, emergency department physician, and patient's family          History of Present Illness:      This patient is a 58 year old  female with a significant past medical history of recurrent cellulitis of lower extremities who presents with the following condition requiring a hospital admission:      Bilateral lower extremity wounds and cellulitis    Patient is 58-year-old female who was recently admitted to [facility] for right lower extremity cellulitis.  She was treated in the hospital and was discharged on clindamycin.  However she was not taking clindamycin since she thought it is not working for her.  She has been having moderate to severe pain of bilateral lower extremities.  She denies any fever or chills.  She has been having diffuse abdominal pain which has been mild and intermittent.  No nausea or vomiting.  Patient was followed by wound care nurse who saw her lower extremities and recommended to go to emergency department for evaluation.  Here in the emergency department, she was afebrile but she has bilateral lower extremity leg erythema and rash as seen above.  Patient was given vancomycin and was admission was requested.  Discharge documentation from [employer] system was  reviewed.           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    Antihistamines overdose, intentional self-harm, initial encounter (H)     Asthma     Bipolar 1 disorder (H) hx of bipolar listed in h and p    Bipolar 2 disorder (H)     Bipolar I disorder, single manic episode (H)     Created by Conversion  Replacement Utility updated for latest IMO load    Cellulitis 2006 left ankle, 2008 right foot    COPD (chronic obstructive pulmonary disease) (H)     Crohn's disease (H)     arthritis associated with crohn's    Diabetes mellitus (H)     Diabetes mellitus, type 2 (H)     Fibrocystic breast     Heat stroke     when a young child     Hyperlipidemia     Idiopathic acute pancreatitis 07/14/2015    Irritable bowel syndrome     Created by Conversion     Kidney stone     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     Overdose     Pyoderma gangrenosum (H28)     2016    Sacroiliitis (H24) 2004    Skin lesion of left leg 08/27/2015    Suicidal ideation     Suicide attempt (H)     Traumatic iritis 07/21/2015    Umbilical hernia     Uncomplicated asthma             Past Surgical History:     Past Surgical History:   Procedure Laterality Date    BIOPSY BREAST Left     BIOPSY OF BREAST, INCISIONAL      Description: Biopsy Breast Open;  Recorded: 10/28/2010;     REMOVAL OF TONSILS,<11 Y/O      Description: Tonsillectomy;  Recorded: 07/08/2009;    ZZC LIGATE FALLOPIAN TUBE      Description: Tubal Ligation;  Recorded: 07/08/2009;             Social History:     Social History     Tobacco Use    Smoking status: Former     Current packs/day: 0.50     Types: Cigarettes     Passive exposure: Past    Smokeless tobacco: Never    Tobacco comments:     2-3 cig/day   Substance Use Topics    Alcohol use: No             Family History:     Family History   Problem Relation Age of Onset    Coronary Artery Disease Mother     Diabetes Father     Breast Cancer  Maternal Grandmother     Asthma Son     Asthma Daughter     Hemophilia Other     Diabetes Type 2  Father     Factor VIII deficiency Other             Allergies:     Allergies   Allergen Reactions    Gadolinium Derivatives Hives    Iodinated Contrast Media Hives    Trimethoprim Hives    Azithromycin Other (See Comments) and Rash     Erythema Nodosum x2    Keflex [Cephalexin] Rash    Aspirin Other (See Comments)    Cefuroxime Itching and Other (See Comments)    Contrast Dye Hives     IV    Corylus Other (See Comments)    Diatrizoate Hives    Iodixanol Unknown    Nuts Other (See Comments)     hazelnuts    Septra [Sulfamethoxazole-Trimethoprim] Hives    Sulfa Antibiotics Hives    Metronidazole Itching and Rash    Nitrofurantoin Itching and Rash    Quinolones Rash             Medications:        Prior to Admission medications    Medication Sig Last Dose Taking? Auth Provider Long Term End Date   acetaminophen (TYLENOL) 500 MG tablet Take 2 tablets (1,000 mg) by mouth every 6 hours as needed for pain Past Week Yes Ajit Byrnes MD     albuterol (PROAIR HFA/PROVENTIL HFA/VENTOLIN HFA) 108 (90 Base) MCG/ACT inhaler INHALE 2 PUFFS INTO LUNGS EVERY 4 HOURS AS NEEDED FOR SHORTNESS OF BREATH OR  WHEEZING 5/24/2024 at x1 ~0800 Yes Joanna Farah MD Yes    atorvastatin (LIPITOR) 40 MG tablet Take 1 tablet (40 mg) by mouth daily 5/24/2024 at AM Yes Joanna Farah MD Yes    insulin  UNIT/ML vial Inject 13 Units Subcutaneous 2 times daily 5/24/2024 at x1 at ~0930 Yes Joanna Farah MD Yes    ipratropium - albuterol 0.5 mg/2.5 mg/3 mL (DUONEB) 0.5-2.5 (3) MG/3ML neb solution Take 1 vial (3 mLs) by nebulization 2 times daily as needed for shortness of breath, wheezing or cough  Yes Claudia Chapa MD Yes    melatonin 5 MG tablet Take 1 tablet (5 mg) by mouth nightly as needed for sleep 5/23/2024 at HS Yes Ajit Byrnes MD     metFORMIN (GLUCOPHAGE) 1000 MG tablet Take 1 tablet (1,000 mg) by  "mouth 2 times daily (with meals) 5/24/2024 at x1 Yes Joanna Farah MD Yes    omeprazole (PRILOSEC) 20 MG DR capsule Take 1 capsule (20 mg) by mouth daily 5/24/2024 Yes Joanna Farah MD     tiotropium (SPIRIVA RESPIMAT) 2.5 MCG/ACT inhaler Inhale 2 puffs into the lungs daily 5/24/2024 Yes Joanna Farah MD Yes    vitamin D2 (ERGOCALCIFEROL) 66863 units (1250 mcg) capsule Take 1 capsule (50,000 Units) by mouth once a week  Yes Joanna Farah MD     insulin syringe-needle U-100 (31G X 5/16\" 0.3 ML) 31G X 5/16\" 0.3 ML miscellaneous Use 2 syringes daily   Joanna Farah MD     Multiple Vitamins-Minerals (CENTRUM SILVER 50+WOMEN PO) Take 1 tablet by mouth daily   Unknown, Entered By History            Review of Systems:     A Comprehensive greater than 10 system review of systems was carried out.  Pertinent positives and negatives are noted above in HPI.  Otherwise negative for contributory information.           Physical Exam:     Vital signs were reviewed    Temp:  [97.7  F (36.5  C)] 97.7  F (36.5  C)  Pulse:  [] 89  Resp:  [16] 16  BP: (128-145)/(77-94) 128/77  SpO2:  [96 %] 96 %        GEN: awake, alert, cooperative, no apparent distress, oriented x 3    NECK:Supple ,no mass or thyromegaly     HEENT:  Normocephalic/atraumatic, no scleral icterus, no nasal discharge, mouth moist.    CV:  Regular rate and rhythm, no murmur or JVD.  S1 + S2 noted, no S3 or S4.    LUNGS:  Clear to auscultation bilaterally without rales/rhonchi/wheezing/retractions.  Symmetric chest rise on inhalation noted.    ABD:  Active bowel sounds, soft, non-tender/non-distended.  No rebound/guarding/rigidity.    EXT:  No edema.  No cyanosis.  No joint synovitis noted.Lower extremity pulses are normal bilaterally and     LGS: No cervical or axillary lymphadenopathy     SKIN:    Neurologic:Grossly intact,non focal . No acute focal neurologic deficit     Psychaitric exam: Mood and affect normal "                Data:       All laboratory and imaging data in the past 24 hours reviewed     Results for orders placed or performed during the hospital encounter of 05/24/24   CT Abdomen Pelvis w/o Contrast     Status: None    Narrative    EXAM: CT ABDOMEN PELVIS W/O CONTRAST  LOCATION: [facility]  DATE: 5/24/2024    INDICATION: abdominal pain; Crohn's colitis, wound check, cellulitis, increasing erythema, chills and right leg pain.  COMPARISON: 3/18/2024  TECHNIQUE: CT scan of the abdomen and pelvis was performed without IV contrast. Multiplanar reformats were obtained. Dose reduction techniques were used.  CONTRAST: None.    FINDINGS:   LOWER CHEST: There is new bibasilar atelectasis, right greater than left.    HEPATOBILIARY: No biliary dilatation. Small anterior right hepatic lobe lesion is unchanged. Interval improvement in hepatic steatosis.    PANCREAS: Unremarkable    SPLEEN: Unremarkable     ADRENAL GLANDS: 1.8 cm left adrenal adenoma is unchanged, for which no additional diagnostic imaging is recommended..    KIDNEYS/BLADDER: No evidence of nephroureterolithiasis. No bladder stones. Multiple pelvic phleboliths.    BOWEL: Normal caliber appendix. No bowel obstruction.    LYMPH NODES: Scattered subcentimeter retroperitoneal nodes are unchanged.    VASCULATURE: No abdominal aortic aneurysm.    PELVIC ORGANS: No free fluid.    MUSCULOSKELETAL: No acute bony abnormalities. Small fat-containing umbilical hernia.      Impression    IMPRESSION:   1.  No noncontrast CT evidence of acute abnormalities in the abdomen or pelvis.     Basic metabolic panel     Status: Abnormal   Result Value Ref Range    Sodium 138 135 - 145 mmol/L    Potassium 4.5 3.4 - 5.3 mmol/L    Chloride 99 98 - 107 mmol/L    Carbon Dioxide (CO2) 24 22 - 29 mmol/L    Anion Gap 15 7 - 15 mmol/L    Urea Nitrogen 14.7 8.0 - 23.0 mg/dL    Creatinine 0.50 (L) 0.51 - 0.95 mg/dL    GFR Estimate >90 >60 mL/min/1.73m2    Calcium 10.0  8.6 - 10.0 mg/dL    Glucose 133 (H) 70 - 99 mg/dL   Lactic Acid Whole Blood with 1X Repeat in 2 HR when >2     Status: Abnormal   Result Value Ref Range    Lactic Acid, Initial 2.3 (H) 0.7 - 2.0 mmol/L   CBC with platelets and differential     Status: Abnormal   Result Value Ref Range    WBC Count 10.9 4.0 - 11.0 10e3/uL    RBC Count 4.48 3.80 - 5.20 10e6/uL    Hemoglobin 10.7 (L) 11.7 - 15.7 g/dL    Hematocrit 35.3 35.0 - 47.0 %    MCV 79 78 - 100 fL    MCH 23.9 (L) 26.5 - 33.0 pg    MCHC 30.3 (L) 31.5 - 36.5 g/dL    RDW 17.6 (H) 10.0 - 15.0 %    Platelet Count 434 150 - 450 10e3/uL    % Neutrophils 72 %    % Lymphocytes 20 %    % Monocytes 5 %    % Eosinophils 2 %    % Basophils 1 %    % Immature Granulocytes 0 %    NRBCs per 100 WBC 0 <1 /100    Absolute Neutrophils 7.9 1.6 - 8.3 10e3/uL    Absolute Lymphocytes 2.2 0.8 - 5.3 10e3/uL    Absolute Monocytes 0.5 0.0 - 1.3 10e3/uL    Absolute Eosinophils 0.2 0.0 - 0.7 10e3/uL    Absolute Basophils 0.1 0.0 - 0.2 10e3/uL    Absolute Immature Granulocytes 0.0 <=0.4 10e3/uL    Absolute NRBCs 0.0 10e3/uL   Extra Tube ([place] Draw)     Status: None    Narrative    The following orders were created for panel order Extra Tube ([place] Draw).  Procedure                               Abnormality         Status                     ---------                               -----------         ------                     Extra Blue Top Tube[[id number]]                              Final result                 Please view results for these tests on the individual orders.   Extra Blue Top Tube     Status: None   Result Value Ref Range    Hold Specimen [facility]    CBC with platelets differential     Status: Abnormal    Narrative    The following orders were created for panel order CBC with platelets differential.  Procedure                               Abnormality         Status                     ---------                               -----------         ------                     CBC  with platelets and d...[[id number]]  Abnormal            Final result                 Please view results for these tests on the individual orders.        Recent Results (from the past 48 hour(s))   CT Abdomen Pelvis w/o Contrast    Narrative    EXAM: CT ABDOMEN PELVIS W/O CONTRAST  LOCATION: [facility]  DATE: 5/24/2024    INDICATION: abdominal pain; Crohn's colitis, wound check, cellulitis, increasing erythema, chills and right leg pain.  COMPARISON: 3/18/2024  TECHNIQUE: CT scan of the abdomen and pelvis was performed without IV contrast. Multiplanar reformats were obtained. Dose reduction techniques were used.  CONTRAST: None.    FINDINGS:   LOWER CHEST: There is new bibasilar atelectasis, right greater than left.    HEPATOBILIARY: No biliary dilatation. Small anterior right hepatic lobe lesion is unchanged. Interval improvement in hepatic steatosis.    PANCREAS: Unremarkable    SPLEEN: Unremarkable     ADRENAL GLANDS: 1.8 cm left adrenal adenoma is unchanged, for which no additional diagnostic imaging is recommended..    KIDNEYS/BLADDER: No evidence of nephroureterolithiasis. No bladder stones. Multiple pelvic phleboliths.    BOWEL: Normal caliber appendix. No bowel obstruction.    LYMPH NODES: Scattered subcentimeter retroperitoneal nodes are unchanged.    VASCULATURE: No abdominal aortic aneurysm.    PELVIC ORGANS: No free fluid.    MUSCULOSKELETAL: No acute bony abnormalities. Small fat-containing umbilical hernia.      Impression    IMPRESSION:   1.  No noncontrast CT evidence of acute abnormalities in the abdomen or pelvis.            All imaging studies reviewed by me.       Patient`s old medical records reviewed and case discussed with the ED physician.    ED course-Reviewed

## 2024-05-25 ENCOUNTER — APPOINTMENT (OUTPATIENT)
Dept: ULTRASOUND IMAGING | Facility: CLINIC | Age: 59
DRG: 386 | End: 2024-05-25
Attending: INTERNAL MEDICINE
Payer: MEDICARE

## 2024-05-25 LAB
ANION GAP SERPL CALCULATED.3IONS-SCNC: 13 MMOL/L (ref 7–15)
BUN SERPL-MCNC: 10.2 MG/DL (ref 8–23)
CALCIUM SERPL-MCNC: 9.2 MG/DL (ref 8.6–10)
CHLORIDE SERPL-SCNC: 106 MMOL/L (ref 98–107)
CREAT SERPL-MCNC: 0.51 MG/DL (ref 0.51–0.95)
DEPRECATED HCO3 PLAS-SCNC: 22 MMOL/L (ref 22–29)
EGFRCR SERPLBLD CKD-EPI 2021: >90 ML/MIN/1.73M2
ERYTHROCYTE [DISTWIDTH] IN BLOOD BY AUTOMATED COUNT: 17.4 % (ref 10–15)
GLUCOSE BLDC GLUCOMTR-MCNC: 100 MG/DL (ref 70–99)
GLUCOSE BLDC GLUCOMTR-MCNC: 104 MG/DL (ref 70–99)
GLUCOSE BLDC GLUCOMTR-MCNC: 109 MG/DL (ref 70–99)
GLUCOSE BLDC GLUCOMTR-MCNC: 123 MG/DL (ref 70–99)
GLUCOSE BLDC GLUCOMTR-MCNC: 76 MG/DL (ref 70–99)
GLUCOSE BLDC GLUCOMTR-MCNC: 93 MG/DL (ref 70–99)
GLUCOSE BLDC GLUCOMTR-MCNC: 98 MG/DL (ref 70–99)
GLUCOSE SERPL-MCNC: 101 MG/DL (ref 70–99)
HCT VFR BLD AUTO: 32.2 % (ref 35–47)
HGB BLD-MCNC: 9.7 G/DL (ref 11.7–15.7)
MAGNESIUM SERPL-MCNC: 1.7 MG/DL (ref 1.7–2.3)
MCH RBC QN AUTO: 23.8 PG (ref 26.5–33)
MCHC RBC AUTO-ENTMCNC: 30.1 G/DL (ref 31.5–36.5)
MCV RBC AUTO: 79 FL (ref 78–100)
PLATELET # BLD AUTO: 352 10E3/UL (ref 150–450)
POTASSIUM SERPL-SCNC: 4 MMOL/L (ref 3.4–5.3)
RBC # BLD AUTO: 4.08 10E6/UL (ref 3.8–5.2)
SODIUM SERPL-SCNC: 141 MMOL/L (ref 135–145)
WBC # BLD AUTO: 7.1 10E3/UL (ref 4–11)

## 2024-05-25 PROCEDURE — 80048 BASIC METABOLIC PNL TOTAL CA: CPT | Performed by: INTERNAL MEDICINE

## 2024-05-25 PROCEDURE — G0378 HOSPITAL OBSERVATION PER HR: HCPCS

## 2024-05-25 PROCEDURE — 83735 ASSAY OF MAGNESIUM: CPT | Performed by: INTERNAL MEDICINE

## 2024-05-25 PROCEDURE — 250N000013 HC RX MED GY IP 250 OP 250 PS 637: Performed by: INTERNAL MEDICINE

## 2024-05-25 PROCEDURE — 36415 COLL VENOUS BLD VENIPUNCTURE: CPT | Performed by: INTERNAL MEDICINE

## 2024-05-25 PROCEDURE — 258N000003 HC RX IP 258 OP 636: Performed by: INTERNAL MEDICINE

## 2024-05-25 PROCEDURE — 82962 GLUCOSE BLOOD TEST: CPT

## 2024-05-25 PROCEDURE — 99232 SBSQ HOSP IP/OBS MODERATE 35: CPT | Performed by: INTERNAL MEDICINE

## 2024-05-25 PROCEDURE — 99222 1ST HOSP IP/OBS MODERATE 55: CPT | Performed by: INTERNAL MEDICINE

## 2024-05-25 PROCEDURE — 76882 US LMTD JT/FCL EVL NVASC XTR: CPT | Mod: RT

## 2024-05-25 PROCEDURE — 96376 TX/PRO/DX INJ SAME DRUG ADON: CPT

## 2024-05-25 PROCEDURE — 250N000009 HC RX 250: Performed by: INTERNAL MEDICINE

## 2024-05-25 PROCEDURE — 85014 HEMATOCRIT: CPT | Performed by: INTERNAL MEDICINE

## 2024-05-25 PROCEDURE — 250N000011 HC RX IP 250 OP 636: Performed by: INTERNAL MEDICINE

## 2024-05-25 RX ORDER — HYDROXYZINE HYDROCHLORIDE 50 MG/1
50 TABLET, FILM COATED ORAL EVERY 6 HOURS PRN
Status: DISCONTINUED | OUTPATIENT
Start: 2024-05-25 | End: 2024-05-29 | Stop reason: HOSPADM

## 2024-05-25 RX ORDER — MAGNESIUM HYDROXIDE/ALUMINUM HYDROXICE/SIMETHICONE 120; 1200; 1200 MG/30ML; MG/30ML; MG/30ML
30 SUSPENSION ORAL EVERY 4 HOURS PRN
Status: DISCONTINUED | OUTPATIENT
Start: 2024-05-25 | End: 2024-05-29 | Stop reason: HOSPADM

## 2024-05-25 RX ORDER — KETOROLAC TROMETHAMINE 15 MG/ML
15 INJECTION, SOLUTION INTRAMUSCULAR; INTRAVENOUS EVERY 6 HOURS PRN
Status: DISCONTINUED | OUTPATIENT
Start: 2024-05-25 | End: 2024-05-29 | Stop reason: HOSPADM

## 2024-05-25 RX ORDER — MUPIROCIN 20 MG/G
OINTMENT TOPICAL DAILY
Status: DISCONTINUED | OUTPATIENT
Start: 2024-05-25 | End: 2024-05-29 | Stop reason: HOSPADM

## 2024-05-25 RX ORDER — HYDROXYZINE HYDROCHLORIDE 25 MG/1
25 TABLET, FILM COATED ORAL EVERY 6 HOURS PRN
Status: DISCONTINUED | OUTPATIENT
Start: 2024-05-25 | End: 2024-05-29 | Stop reason: HOSPADM

## 2024-05-25 RX ADMIN — METFORMIN HYDROCHLORIDE 1000 MG: 500 TABLET ORAL at 08:49

## 2024-05-25 RX ADMIN — VANCOMYCIN HYDROCHLORIDE 1500 MG: 10 INJECTION, POWDER, LYOPHILIZED, FOR SOLUTION INTRAVENOUS at 05:28

## 2024-05-25 RX ADMIN — INSULIN HUMAN 13 UNITS: 100 INJECTION, SUSPENSION SUBCUTANEOUS at 09:25

## 2024-05-25 RX ADMIN — METFORMIN HYDROCHLORIDE 1000 MG: 500 TABLET ORAL at 17:13

## 2024-05-25 RX ADMIN — PANTOPRAZOLE SODIUM 40 MG: 40 TABLET, DELAYED RELEASE ORAL at 06:48

## 2024-05-25 RX ADMIN — ACETAMINOPHEN 1000 MG: 500 TABLET, FILM COATED ORAL at 20:10

## 2024-05-25 RX ADMIN — INSULIN HUMAN 13 UNITS: 100 INJECTION, SUSPENSION SUBCUTANEOUS at 20:00

## 2024-05-25 RX ADMIN — VANCOMYCIN HYDROCHLORIDE 1500 MG: 10 INJECTION, POWDER, LYOPHILIZED, FOR SOLUTION INTRAVENOUS at 17:04

## 2024-05-25 RX ADMIN — ACETAMINOPHEN 1000 MG: 500 TABLET, FILM COATED ORAL at 06:53

## 2024-05-25 RX ADMIN — MUPIROCIN: 20 OINTMENT TOPICAL at 13:44

## 2024-05-25 RX ADMIN — ATORVASTATIN CALCIUM 40 MG: 40 TABLET, FILM COATED ORAL at 08:49

## 2024-05-25 RX ADMIN — KETOROLAC TROMETHAMINE 15 MG: 15 INJECTION, SOLUTION INTRAMUSCULAR; INTRAVENOUS at 15:05

## 2024-05-25 RX ADMIN — ERGOCALCIFEROL 50000 UNITS: 1.25 CAPSULE, LIQUID FILLED ORAL at 09:30

## 2024-05-25 RX ADMIN — Medication 3 MG: at 21:07

## 2024-05-25 RX ADMIN — UMECLIDINIUM 1 PUFF: 62.5 AEROSOL, POWDER ORAL at 20:13

## 2024-05-25 ASSESSMENT — ACTIVITIES OF DAILY LIVING (ADL)
ADLS_ACUITY_SCORE: 33
DEPENDENT_IADLS:: INDEPENDENT
ADLS_ACUITY_SCORE: 33

## 2024-05-25 NOTE — CONSULTS
Care Management Initial Consult    General Information  Assessment completed with: Patient,    Type of CM/SW Visit: Initial Assessment    Primary Care Provider verified and updated as needed:     Readmission within the last 30 days:        Reason for Consult: discharge planning  Advance Care Planning:            Communication Assessment  Patient's communication style: spoken language (English or Bilingual)    Hearing Difficulty or Deaf: no        Cognitive  Cognitive/Neuro/Behavioral: WDL                      Living Environment:   People in home: alone, other (see comments) (caregivers available at her building)     Current living Arrangements: apartment      Able to return to prior arrangements: yes       Family/Social Support:  Care provided by: self, other (see comments)  Provides care for:    Marital Status: Single  Friend          Description of Support System:           Current Resources:   Patient receiving home care services: Yes  Skilled Home Care Services: Skilled Nursing  Community Resources: County Worker  Equipment currently used at home: none  Supplies currently used at home: Wound Care Supplies (needs wound care supllies)    Employment/Financial:  Employment Status: disabled        Financial Concerns:             Does the patient's insurance plan have a 3 day qualifying hospital stay waiver?  Yes     Which insurance plan 3 day waiver is available? Alternative insurance waiver    Will the waiver be used for post-acute placement? No    Lifestyle & Psychosocial Needs:  Social Determinants of Health     Food Insecurity: High Risk (12/5/2023)    Food Insecurity     Within the past 12 months, did you worry that your food would run out before you got money to buy more?: Yes     Within the past 12 months, did the food you bought just not last and you didn t have money to get more?: Yes   Depression: Not at risk (3/22/2024)    PHQ-2     PHQ-2 Score: 2   Housing Stability: Low Risk  (4/16/2024)    Received from  [facility]    Housing Stability Vital Sign     Unable to Pay for Housing in the Last Year: No     Number of Places Lived in the Last Year: 1     In the last 12 months, was there a time when you did not have a steady place to sleep or slept in a shelter (including now)?: No   Tobacco Use: Medium Risk (4/2/2024)    Patient History     Smoking Tobacco Use: Former     Smokeless Tobacco Use: Never     Passive Exposure: Past   Financial Resource Strain: Low Risk  (12/5/2023)    Financial Resource Strain     Within the past 12 months, have you or your family members you live with been unable to get utilities (heat, electricity) when it was really needed?: No   Alcohol Use: Not on file   Transportation Needs: High Risk (12/5/2023)    Transportation Needs     Within the past 12 months, has lack of transportation kept you from medical appointments, getting your medicines, non-medical meetings or appointments, work, or from getting things that you need?: Yes   Physical Activity: Not on file   Interpersonal Safety: Unknown (4/16/2024)    Received from [facility]    Humiliation, Afraid, Rape, and Kick questionnaire     Fear of Current or Ex-Partner: Not on file     Emotionally Abused: Not on file     Physically Abused: No     Sexually Abused: No   Stress: Not on file   Social Connections: Unknown (5/11/2024)    [facility]    Social Connections     Frequency of Communication with Friends and Family: Not on file   Health Literacy: Not on file       Functional Status:  Prior to admission patient needed assistance:   Dependent ADLs:: Independent  Dependent IADLs:: Independent       Mental Health Status:  Mental Health Status: No Current Concerns       Chemical Dependency Status:  Chemical Dependency Status: No Current Concerns             Values/Beliefs:  Spiritual, Cultural Beliefs, Jain Practices, Values that affect care:                 Additional Information:  Met with  patient, she has Home care thru [facility], [phone], states she has no wound care supplies at home and the HC could not get any because she had not seen her PCP.  Patient very tearful about her services, states she is almost out of her diabetic medications and she does not have coverage for her meds, she states she has called her financial worker but has not received a call back.  Patient expresses many frustrations with the system.  Patient has a CADI waiver and worker, [place] [phone].  Patient lives in [facility] and states there are caregivers on site for if needed.  SW to follow and assist with discharge needs.     PRATIBHA Ferrell  [phone]

## 2024-05-25 NOTE — PLAN OF CARE
PRIMARY DIAGNOSIS: Bi Lower extremity wounds   OUTPATIENT/OBSERVATION GOALS TO BE MET BEFORE DISCHARGE:  1. Pain Status: Improved-controlled with oral pain medications.    2. Return to near baseline physical activity: Yes    3. Cleared for discharge by consultants (if involved): No    Discharge Planner Nurse   Safe discharge environment identified: Yes  Barriers to discharge: Yes  Vitals are Temp: 97.3  F (36.3  C) Temp src: Oral BP: 110/65 Pulse: 74   Resp: 16 SpO2: 94 %.  Patient is Alert and Oriented x4. They are Ind/SBA with no assistive devices .  Pt is a Mod carb diet.  They are complaining of 4/10 pain in their Lower extremities.  Tylenol given for pain.  Patient is Saline locked. ID and WOC to consult. BS checks. Lactic 2.3. Mag and K protocol. Vanco q12hr. Wounds on lower extremities with redness that is new, redness outlined.        Please review provider order for any additional goals.   Nurse to notify provider when observation goals have been met and patient is ready for discharge.

## 2024-05-25 NOTE — PLAN OF CARE
ROOM # 231    Living Situation (if not independent, order SW consult): home alone  Facility name:  : [facility] (friend)     Activity level at baseline: Ind  Activity level on admit: SBA    Who will be transporting you at discharge: friend      Patient registered to observation; given Patient Bill of Rights; given the opportunity to ask questions about observation status and their plan of care.  Patient has been oriented to the observation room, bathroom and call light is in place.    Discussed discharge goals and expectations with patient/family.

## 2024-05-25 NOTE — CONSULTS
Consult dictated, highly doubt any active infection here, this is almost certainly some inflammatory skin condition, really is not venous stasis even let alone cellulitis, most likely pyoderma gangrenosum or if not that some other Crohn's related condition, needs repeat visit with GI that she has not seen in years and dermatology which she has not yet seen short amount of vancomycin reasonable mainly for allergy reasons, the coag negative staph that been isolated from skin cultures is of no clinical significance and never a pathogen in this setting

## 2024-05-25 NOTE — PLAN OF CARE
"PRIMARY DIAGNOSIS: LE ERYTHEMA AND PAIN    OUTPATIENT/OBSERVATION GOALS TO BE MET BEFORE DISCHARGE:  Vitals sign stable or return to baseline: Yes    Tolerating oral antibiotics or has home infusion set up if applicable: Yes    Pain status: Improved-controlled with oral pain medications.    Return to near baseline physical activity: Yes    Discharge Planner Nurse   Safe discharge environment identified: Yes  Barriers to discharge: Yes       Entered by: ARISTEO SPENCER RN 05/25/2024    Vital signs:  Temp: 98  F (36.7  C) Temp src: Oral BP: 132/67 Pulse: 81   Resp: 16 SpO2: 96 % O2 Device: None (Room air)   Alert and oriented x4. Pain improved after Toradol. Seen by ID and per ID pt's wound to her lower extremities is not cellulitis. Dressing to LE c.d.I. IV saline locked. Up independently. On mod carb diet wit BG check ACHS. Call light in reach.    Please review provider order for any additional goals.     Nurse to notify provider when observation goals have been met and patient is ready for discharge.Goal Outcome Evaluation:      Plan of Care Reviewed With: patient    Overall Patient Progress: improvingOverall Patient Progress: improving    Outcome Evaluation: Seen by ID      Problem: Adult Inpatient Plan of Care  Goal: Plan of Care Review  Description: The Plan of Care Review/Shift note should be completed every shift.  The Outcome Evaluation is a brief statement about your assessment that the patient is improving, declining, or no change.  This information will be displayed automatically on your shift  note.  Outcome: Progressing  Flowsheets (Taken 5/25/2024 8543)  Outcome Evaluation: Seen by ID  Plan of Care Reviewed With: patient  Overall Patient Progress: improving  Goal: Patient-Specific Goal (Individualized)  Description: You can add care plan individualizations to a care plan. Examples of Individualization might be:  \"Parent requests to be called daily at 9am for status\", \"I have a hard time hearing out " "of my right ear\", or \"Do not touch me to wake me up as it startles  me\".  Outcome: Progressing  Goal: Absence of Hospital-Acquired Illness or Injury  Outcome: Progressing  Goal: Optimal Comfort and Wellbeing  Outcome: Progressing  Goal: Readiness for Transition of Care  Outcome: Progressing     Problem: Comorbidity Management  Goal: Blood Glucose Levels Within Targeted Range  Outcome: Progressing  Goal: Blood Pressure in Desired Range  Outcome: Progressing     Problem: Pain Acute  Goal: Optimal Pain Control and Function  Outcome: Progressing     "

## 2024-05-25 NOTE — PLAN OF CARE
10:35  Provider Notification: Pt c/o mild left side chest pain on left upper pectoral area.Does not radiate. Not related to moving her arm.   Vital signs are good.  She says she thinks it is anxiety.  The pain has already resolved after a few minutes. (Pt states this intermittent  chest pain has occurred before) Pt also wants some maalox for heartburn.  Also requesting Toradol and atarax. Vial signs stable and WDL.  See flowsheets.  MD updated in person.  Blood sugar was 76 before lunch.  Re-check after apple juice was 104.    PRIMARY DIAGNOSIS: Bilateral L/E wounds  OUTPATIENT/OBSERVATION GOALS TO BE MET BEFORE DISCHARGE:  ADLs back to baseline: Yes    Activity and level of assistance: Ambulating independently.    Pain status: Improved with use of alternative comfort measures i.e.: medication given in ED    Return to near baseline physical activity: Yes     Discharge Planner Nurse   Safe discharge environment identified: Yes  Barriers to discharge: Yes       Entered by: Kaylie Cortez RN 05/25/2024 10:33 AM     Please review provider order for any additional goals.   Nurse to notify provider when observation goals have been met and patient is ready for discharge.Goal Outcome Evaluation:      Plan of Care Reviewed With: patient

## 2024-05-25 NOTE — PLAN OF CARE
14:45 RN shift Summary 7-3: No c/o pain.  Alert and oriented.  Ambulates independently in room. Lungs clear.  97% RA. Mod carb diet.  Voiding without difficulty.  PIV saline locked. Dressings/wound care done to bilateral L/E.     PRIMARY DIAGNOSIS: SOFT TISSUE INFECTIONS  OUTPATIENT/OBSERVATION GOALS TO BE MET BEFORE DISCHARGE:  Vitals sign stable or return to baseline: Yes    Tolerating oral antibiotics or has home infusion set up if applicable: No    Pain status: Improved-controlled with oral pain medications.    Return to near baseline physical activity: Yes    Discharge Planner Nurse   Safe discharge environment identified: Yes  Barriers to discharge: Yes Continuing on IV antibiotics.       Entered by: Kaylie Cortez RN 05/25/2024 2:46 PM     Please review provider order for any additional goals.     Nurse to notify provider when observation goals have been met and patient is ready for discharge.  Goal Outcome Evaluation:      Plan of Care Reviewed With: patient    Overall Patient Progress: improvingOverall Patient Progress: improving    Outcome Evaluation: Continue IV vancomycin for L/E leg wounds      Problem: Adult Inpatient Plan of Care  Goal: Plan of Care Review  Description: The Plan of Care Review/Shift note should be completed every shift.  The Outcome Evaluation is a brief statement about your assessment that the patient is improving, declining, or no change.  This information will be displayed automatically on your shift  note.  5/25/2024 1445 by Kaylie Cortez, RN  Outcome: Progressing  Flowsheets (Taken 5/25/2024 1445)  Outcome Evaluation: Continue IV vancomycin for L/E leg wounds  Plan of Care Reviewed With: patient  Overall Patient Progress: improving  5/25/2024 1032 by Kaylie Cortez RN  Outcome: Progressing  Flowsheets (Taken 5/25/2024 1032)  Plan of Care Reviewed With: patient  Goal: Patient-Specific Goal (Individualized)  Description: You can add care plan individualizations to a care plan.  "Examples of Individualization might be:  \"Parent requests to be called daily at 9am for status\", \"I have a hard time hearing out of my right ear\", or \"Do not touch me to wake me up as it startles  me\".  5/25/2024 1445 by Kaylie Cortez RN  Outcome: Progressing  5/25/2024 1032 by Kaylie Cortez RN  Outcome: Progressing  Goal: Absence of Hospital-Acquired Illness or Injury  5/25/2024 1445 by Kaylie Cortez RN  Outcome: Progressing  5/25/2024 1032 by Kaylie Cortez RN  Outcome: Progressing  Intervention: Identify and Manage Fall Risk  Recent Flowsheet Documentation  Taken 5/25/2024 0850 by Kaylie Cortez RN  Safety Promotion/Fall Prevention:   nonskid shoes/slippers when out of bed   clutter free environment maintained  Intervention: Prevent Skin Injury  Recent Flowsheet Documentation  Taken 5/25/2024 0850 by Kaylie Cortez RN  Body Position: position changed independently  Goal: Optimal Comfort and Wellbeing  5/25/2024 1445 by Kaylie Cortez RN  Outcome: Progressing  5/25/2024 1032 by Kaylie Cortez RN  Outcome: Progressing  Intervention: Monitor Pain and Promote Comfort  Recent Flowsheet Documentation  Taken 5/25/2024 0850 by Kaylie Cortez RN  Pain Management Interventions: declines  Goal: Readiness for Transition of Care  5/25/2024 1445 by Kaylie Cortez RN  Outcome: Progressing  5/25/2024 1032 by Kaylie Cortez RN  Outcome: Progressing     Problem: Comorbidity Management  Goal: Blood Glucose Levels Within Targeted Range  5/25/2024 1445 by Kaylie Cortez RN  Outcome: Progressing  5/25/2024 1032 by Kaylie Cortez RN  Outcome: Progressing  Intervention: Monitor and Manage Glycemia  Recent Flowsheet Documentation  Taken 5/25/2024 0850 by Kaylie Cortez RN  Medication Review/Management: medications reviewed  Goal: Blood Pressure in Desired Range  5/25/2024 1445 by Kaylie Cortez, RN  Outcome: Progressing  5/25/2024 1032 by Kaylie Cortez RN  Outcome: Progressing  Intervention: Maintain " Blood Pressure Management  Recent Flowsheet Documentation  Taken 5/25/2024 0850 by Kaylie Cortez, RN  Medication Review/Management: medications reviewed     Problem: Pain Acute  Goal: Optimal Pain Control and Function  5/25/2024 1445 by Kaylie Cortez, RN  Outcome: Progressing  5/25/2024 1032 by Kaylie Cortez, RN  Outcome: Progressing  Intervention: Develop Pain Management Plan  Recent Flowsheet Documentation  Taken 5/25/2024 0850 by Kaylie Cortez, RN  Pain Management Interventions: declines  Intervention: Prevent or Manage Pain  Recent Flowsheet Documentation  Taken 5/25/2024 0850 by Kaylie Cortez, RN  Medication Review/Management: medications reviewed

## 2024-05-25 NOTE — PROGRESS NOTES
[facility]    Medicine Progress Note - Hospitalist Service    Date of Admission:  5/24/2024    Assessment & Plan   Mary Ann Olson is a 58 year old female admitted on 5/24/2024 with recurrent LE cellulitis. Although she has been treated multiple times for this issue at multiple different hospitals, she was evaluated at [facility] recently with a biopsy.  At that time, the infectious disease consultant clearly stated that the patient had something along the lines of pyoderma gangrenosum.  Dr. Wright fully agrees despite the finding of coag negative staph on biopsy.  He he indicates that the patient's recurrent areas of inflammation are immunologically mediated rather than infectious.  He was also able to get a history from the patient that she has ongoing, chronic diarrhea compatible with underlying untreated active Crohn's disease.    Diagnoses:  Bilateral lower extremity erythema with skin breakdown.  Although this is virtually identical to the appearance of the cellulitis, infectious disease has clearly stated that it is not infection.  Vancomycin has been discontinued.  The cause for the the general improvement, per the explanation of infectious disease consultant, is that the prior shunt presumably receives better maintenance and offloading of her lower extremities than she does at home.  The patient has been referred to a dermatologist but will be able to be seen for another several weeks.  Insurance may become an issue as well.  Crohn's disease.  This appears to be chronically untreated and active.  Lactic acidosis of unclear significance.  NIDDM.  Currently on metformin 1 g twice a day in addition to NPH insulin twice daily.    Plan:  1.  Continuous insulin and metformin as they are.  I wonder whether the lactic acid may be elevated on the basis of ongoing exposure to metformin.  2.  Discontinue IV antibiotics.  I believe it is reasonable to dress the wounds and the use of a  "topical antibiotic though according to infectious disease, this is unlikely to be significantly beneficial.  Notably, ultrasound of the right lower extremity shows no obvious drainable fluid accumulation.  3.  Wound management to include simple cleaning and covering of the apparent skin breakdown.  Additionally, the patient should try to elevate her legs is much as tolerable throughout the day.  I did encourage her to schedule walking in order to not become excessively weak.       Observation Goals: -diagnostic tests and consults completed and resulted, -vital signs normal or at patient baseline, Nurse to notify provider when observation goals have been met and patient is ready for discharge.  Diet: Moderate Consistent Carb (60 g CHO per Meal) Diet    DVT Prophylaxis: Low Risk/Ambulatory with no VTE prophylaxis indicated  Beckwith Catheter: Not present  Lines: None     Cardiac Monitoring: None  Code Status: Full Code      Clinically Significant Risk Factors Present on Admission                      # DMII: A1C = 8.5 % (Ref range: <5.7 %) within past 6 months    # Obesity: Estimated body mass index is 31.94 kg/m  as calculated from the following:    Height as of this encounter: 1.575 m (5' 2\").    Weight as of this encounter: 79.2 kg (174 lb 9.7 oz).       # Financial/Environmental Concerns:    # COPD: noted on problem list        Disposition Plan     Medically Ready for Discharge: Anticipated in 2-4 Days             Mina Nassar MD  Hospitalist Service  [facility]  Securely message with [employer] (more info)  Text page via [facility] Paging/Directory   ______________________________________________________________________    Interval History   Chart reviewed, pt interviewed.  I assumed care of the patient today.  I briefly was able to speak with the infectious disease consultant.    Confirmed with the patient that she has had recurrent issues similar appearing erythema of her lower extremities.  This " certainly seems to be much worse when she leaves her legs dependent for any length of time.  She has not had fevers, sweats or chills.  No generalized malaise.  Denies nausea, vomiting.  No weight loss or gain.    Physical Exam   Vital Signs: Temp: 98  F (36.7  C) Temp src: Oral BP: 132/67 Pulse: 81   Resp: 16 SpO2: 96 % O2 Device: None (Room air)    Weight: 174 lbs 9.67 oz    General Appearance: Alert, coherent, in no apparent distress  Respiratory: No increased work of breathing.  Clear to auscultation  Cardiovascular: Regular rate and rhythm without murmur  GI: Soft, nontender, nondistended  Skin: Bilateral lower extremity skin breakdown is scattered with suggestion of some areas of superficial fluid accumulation under the skin.  These are quite tender.  Other:      Medical Decision Making       45 MINUTES SPENT BY ME on the date of service doing chart review, history, exam, documentation & further activities per the note.      Data   Results for orders placed or performed during the hospital encounter of 05/24/24 (from the past 24 hour(s))   UA with Microscopic reflex to Culture    Specimen: Urine, Midstream   Result Value Ref Range    Color Urine Straw Colorless, Straw, Light Yellow, Yellow    Appearance Urine Clear Clear    Glucose Urine Negative Negative mg/dL    Bilirubin Urine Negative Negative    Ketones Urine Negative Negative mg/dL    Specific Gravity Urine 1.006 1.003 - 1.035    Blood Urine Negative Negative    pH Urine 5.0 5.0 - 7.0    Protein Albumin Urine Negative Negative mg/dL    Urobilinogen Urine Normal Normal, 2.0 mg/dL    Nitrite Urine Negative Negative    Leukocyte Esterase Urine Moderate (A) Negative    Bacteria Urine Few (A) None Seen /HPF    Mucus Urine Present (A) None Seen /LPF    RBC Urine 2 <=2 /HPF    WBC Urine 23 (H) <=5 /HPF    Squamous Epithelials Urine <1 <=1 /HPF    Narrative    Urine Culture ordered based on laboratory criteria   Glucose by meter   Result Value Ref Range     GLUCOSE BY METER POCT 151 (H) 70 - 99 mg/dL   Glucose by meter   Result Value Ref Range    GLUCOSE BY METER POCT 125 (H) 70 - 99 mg/dL   Basic metabolic panel   Result Value Ref Range    Sodium 141 135 - 145 mmol/L    Potassium 4.0 3.4 - 5.3 mmol/L    Chloride 106 98 - 107 mmol/L    Carbon Dioxide (CO2) 22 22 - 29 mmol/L    Anion Gap 13 7 - 15 mmol/L    Urea Nitrogen 10.2 8.0 - 23.0 mg/dL    Creatinine 0.51 0.51 - 0.95 mg/dL    GFR Estimate >90 >60 mL/min/1.73m2    Calcium 9.2 8.6 - 10.0 mg/dL    Glucose 101 (H) 70 - 99 mg/dL   CBC with platelets   Result Value Ref Range    WBC Count 7.1 4.0 - 11.0 10e3/uL    RBC Count 4.08 3.80 - 5.20 10e6/uL    Hemoglobin 9.7 (L) 11.7 - 15.7 g/dL    Hematocrit 32.2 (L) 35.0 - 47.0 %    MCV 79 78 - 100 fL    MCH 23.8 (L) 26.5 - 33.0 pg    MCHC 30.1 (L) 31.5 - 36.5 g/dL    RDW 17.4 (H) 10.0 - 15.0 %    Platelet Count 352 150 - 450 10e3/uL   Magnesium   Result Value Ref Range    Magnesium 1.7 1.7 - 2.3 mg/dL   Glucose by meter   Result Value Ref Range    GLUCOSE BY METER POCT 98 70 - 99 mg/dL   Glucose by meter   Result Value Ref Range    GLUCOSE BY METER POCT 109 (H) 70 - 99 mg/dL   Glucose by meter   Result Value Ref Range    GLUCOSE BY METER POCT 76 70 - 99 mg/dL   Glucose by meter   Result Value Ref Range    GLUCOSE BY METER POCT 104 (H) 70 - 99 mg/dL   US Lower Extremity Non Vascular Right    Narrative    EXAM: US LOWER EXTREMITY NON VASCULAR RIGHT  LOCATION: [facility]  DATE: 5/25/2024    INDICATION: pt with evident cellulitis.  Quetion whethter there may be superficial areas of drainable fluid v thrombophlebitis  COMPARISON: None.  TECHNIQUE: Routine.    FINDINGS: There is some induration in the subcutaneous fat just deep to the skin at the area of redness. No fluid pockets. Nothing concerning for abscess.      Impression    IMPRESSION:  1.  Induration in the subcutaneous fat deep to the area of clinical cellulitis.    2.  No drainable fluid pockets.    Glucose by meter   Result Value Ref Range    GLUCOSE BY METER POCT 93 70 - 99 mg/dL     *Note: Due to a large number of results and/or encounters for the requested time period, some results have not been displayed. A complete set of results can be found in Results Review.

## 2024-05-25 NOTE — PLAN OF CARE
PRIMARY DIAGNOSIS: Bi Lower extremity wounds   OUTPATIENT/OBSERVATION GOALS TO BE MET BEFORE DISCHARGE:  1. Pain Status: Improved-controlled with oral pain medications.    2. Return to near baseline physical activity: Yes    3. Cleared for discharge by consultants (if involved): No    Discharge Planner Nurse   Safe discharge environment identified: Yes  Barriers to discharge: Yes  Vitals are Temp: 97.3  F (36.3  C) Temp src: Oral BP: 110/65 Pulse: 74   Resp: 16 SpO2: 94 %.  Patient is Alert and Oriented x4. They are Ind/SBA with no assistive devices-refuses bed alarm .  Pt is a Mod carb diet.  They are complaining of 4/10 pain in their Lower extremities.  Tylenol given for pain.  Patient is Saline locked. ID and WOC to consult. BS checks. Lactic 2.3. Mag and K protocol. Vanco q12hr. Wounds on lower extremities with redness that is new, redness outlined.   Please review provider order for any additional goals.   Nurse to notify provider when observation goals have been met and patient is ready for discharge.

## 2024-05-25 NOTE — PHARMACY-VANCOMYCIN DOSING SERVICE
Pharmacy Vancomycin Initial Note  Date of Service May 24, 2024  Patient's  1965  58 year old, female    Indication: Skin and Soft Tissue Infection    Current estimated CrCl = Estimated Creatinine Clearance: 118 mL/min (A) (based on SCr of 0.5 mg/dL (L)).    Creatinine for last 3 days  2024:  3:26 PM Creatinine 0.50 mg/dL    Recent Vancomycin Level(s) for last 3 days  No results found for requested labs within last 3 days.      Vancomycin IV Administrations (past 72 hours)                     vancomycin (VANCOCIN) 1,750 mg in 0.9% NaCl 500 mL intermittent infusion (mg) 1,750 mg Given 24 1733                    Nephrotoxins and other renal medications (From now, onward)      Start     Dose/Rate Route Frequency Ordered Stop    24 0500  vancomycin (VANCOCIN) 1,500 mg in 0.9% NaCl 250 mL intermittent infusion         1,500 mg  over 90 Minutes Intravenous EVERY 12 HOURS 24 2136              Contrast Orders - past 72 hours (72h ago, onward)      None            [employer] Prediction of Planned Initial Vancomycin Regimen  Loading dose: N/A  Regimen: 1500 mg IV every 12 hours.  Start time: 05:33 on 2024  Exposure target: AUC24 (range)400-600 mg/L.hr   AUC24,ss: 556 mg/L.hr  Probability of AUC24 > 400: 81 %  Ctrough,ss: 15.9 mg/L  Probability of Ctrough,ss > 20: 34 %  Probability of nephrotoxicity (Lodise LESLYE ): 11 %          Plan:  Start vancomycin  1500 mg IV q12h.   Vancomycin monitoring method: AUC  Vancomycin therapeutic monitoring goal: 400-600 mg*h/L  Pharmacy will check vancomycin levels as appropriate in 1-3 Days.    Serum creatinine levels will be ordered a minimum of twice weekly.      Kermit Severson, [facility]

## 2024-05-26 LAB
ALBUMIN SERPL BCG-MCNC: 3.9 G/DL (ref 3.5–5.2)
ALP SERPL-CCNC: 104 U/L (ref 40–150)
ALT SERPL W P-5'-P-CCNC: 12 U/L (ref 0–50)
ANION GAP SERPL CALCULATED.3IONS-SCNC: 14 MMOL/L (ref 7–15)
AST SERPL W P-5'-P-CCNC: 13 U/L (ref 0–45)
BACTERIA UR CULT: NO GROWTH
BILIRUB SERPL-MCNC: 0.2 MG/DL
BUN SERPL-MCNC: 11.8 MG/DL (ref 8–23)
CALCIUM SERPL-MCNC: 9.4 MG/DL (ref 8.6–10)
CHLORIDE SERPL-SCNC: 101 MMOL/L (ref 98–107)
CREAT SERPL-MCNC: 0.5 MG/DL (ref 0.51–0.95)
DEPRECATED HCO3 PLAS-SCNC: 23 MMOL/L (ref 22–29)
EGFRCR SERPLBLD CKD-EPI 2021: >90 ML/MIN/1.73M2
ERYTHROCYTE [DISTWIDTH] IN BLOOD BY AUTOMATED COUNT: 17.2 % (ref 10–15)
GLUCOSE BLDC GLUCOMTR-MCNC: 104 MG/DL (ref 70–99)
GLUCOSE BLDC GLUCOMTR-MCNC: 126 MG/DL (ref 70–99)
GLUCOSE BLDC GLUCOMTR-MCNC: 134 MG/DL (ref 70–99)
GLUCOSE BLDC GLUCOMTR-MCNC: 157 MG/DL (ref 70–99)
GLUCOSE BLDC GLUCOMTR-MCNC: 75 MG/DL (ref 70–99)
GLUCOSE BLDC GLUCOMTR-MCNC: 75 MG/DL (ref 70–99)
GLUCOSE BLDC GLUCOMTR-MCNC: 95 MG/DL (ref 70–99)
GLUCOSE SERPL-MCNC: 137 MG/DL (ref 70–99)
HCT VFR BLD AUTO: 32.9 % (ref 35–47)
HGB BLD-MCNC: 10.2 G/DL (ref 11.7–15.7)
MAGNESIUM SERPL-MCNC: 1.7 MG/DL (ref 1.7–2.3)
MCH RBC QN AUTO: 24.1 PG (ref 26.5–33)
MCHC RBC AUTO-ENTMCNC: 31 G/DL (ref 31.5–36.5)
MCV RBC AUTO: 78 FL (ref 78–100)
PLATELET # BLD AUTO: 391 10E3/UL (ref 150–450)
POTASSIUM SERPL-SCNC: 4.2 MMOL/L (ref 3.4–5.3)
PROT SERPL-MCNC: 6.8 G/DL (ref 6.4–8.3)
RBC # BLD AUTO: 4.24 10E6/UL (ref 3.8–5.2)
SODIUM SERPL-SCNC: 138 MMOL/L (ref 135–145)
WBC # BLD AUTO: 8.4 10E3/UL (ref 4–11)

## 2024-05-26 PROCEDURE — 36415 COLL VENOUS BLD VENIPUNCTURE: CPT | Performed by: INTERNAL MEDICINE

## 2024-05-26 PROCEDURE — 82962 GLUCOSE BLOOD TEST: CPT

## 2024-05-26 PROCEDURE — 250N000013 HC RX MED GY IP 250 OP 250 PS 637: Performed by: INTERNAL MEDICINE

## 2024-05-26 PROCEDURE — 83735 ASSAY OF MAGNESIUM: CPT | Performed by: INTERNAL MEDICINE

## 2024-05-26 PROCEDURE — 80053 COMPREHEN METABOLIC PANEL: CPT | Performed by: INTERNAL MEDICINE

## 2024-05-26 PROCEDURE — 99207 PR NO BILLABLE SERVICE THIS VISIT: CPT | Performed by: INTERNAL MEDICINE

## 2024-05-26 PROCEDURE — 96376 TX/PRO/DX INJ SAME DRUG ADON: CPT

## 2024-05-26 PROCEDURE — 99232 SBSQ HOSP IP/OBS MODERATE 35: CPT | Performed by: INTERNAL MEDICINE

## 2024-05-26 PROCEDURE — 120N000001 HC R&B MED SURG/OB

## 2024-05-26 PROCEDURE — 250N000012 HC RX MED GY IP 250 OP 636 PS 637: Performed by: INTERNAL MEDICINE

## 2024-05-26 PROCEDURE — 85027 COMPLETE CBC AUTOMATED: CPT | Performed by: INTERNAL MEDICINE

## 2024-05-26 PROCEDURE — 250N000011 HC RX IP 250 OP 636: Performed by: INTERNAL MEDICINE

## 2024-05-26 PROCEDURE — G0378 HOSPITAL OBSERVATION PER HR: HCPCS

## 2024-05-26 PROCEDURE — 96375 TX/PRO/DX INJ NEW DRUG ADDON: CPT

## 2024-05-26 RX ORDER — PREDNISONE 20 MG/1
60 TABLET ORAL DAILY
Status: DISCONTINUED | OUTPATIENT
Start: 2024-05-26 | End: 2024-05-26

## 2024-05-26 RX ORDER — TRAZODONE HYDROCHLORIDE 50 MG/1
50 TABLET, FILM COATED ORAL AT BEDTIME
Status: DISCONTINUED | OUTPATIENT
Start: 2024-05-26 | End: 2024-05-29 | Stop reason: HOSPADM

## 2024-05-26 RX ADMIN — ACETAMINOPHEN 1000 MG: 500 TABLET, FILM COATED ORAL at 05:57

## 2024-05-26 RX ADMIN — PREDNISONE 30 MG: 20 TABLET ORAL at 17:17

## 2024-05-26 RX ADMIN — ATORVASTATIN CALCIUM 40 MG: 40 TABLET, FILM COATED ORAL at 08:14

## 2024-05-26 RX ADMIN — FLUTICASONE FUROATE AND VILANTEROL TRIFENATATE 1 PUFF: 200; 25 POWDER RESPIRATORY (INHALATION) at 08:15

## 2024-05-26 RX ADMIN — ACETAMINOPHEN 1000 MG: 500 TABLET, FILM COATED ORAL at 13:14

## 2024-05-26 RX ADMIN — INSULIN HUMAN 13 UNITS: 100 INJECTION, SUSPENSION SUBCUTANEOUS at 08:14

## 2024-05-26 RX ADMIN — METFORMIN HYDROCHLORIDE 1000 MG: 500 TABLET ORAL at 08:14

## 2024-05-26 RX ADMIN — ACETAMINOPHEN 1000 MG: 500 TABLET, FILM COATED ORAL at 22:34

## 2024-05-26 RX ADMIN — UMECLIDINIUM 1 PUFF: 62.5 AEROSOL, POWDER ORAL at 08:15

## 2024-05-26 RX ADMIN — METFORMIN HYDROCHLORIDE 1000 MG: 500 TABLET ORAL at 17:17

## 2024-05-26 RX ADMIN — KETOROLAC TROMETHAMINE 15 MG: 15 INJECTION, SOLUTION INTRAMUSCULAR; INTRAVENOUS at 06:51

## 2024-05-26 RX ADMIN — Medication 3 MG: at 20:11

## 2024-05-26 RX ADMIN — SENNOSIDES AND DOCUSATE SODIUM 1 TABLET: 50; 8.6 TABLET ORAL at 11:48

## 2024-05-26 RX ADMIN — HYDROMORPHONE HYDROCHLORIDE 0.2 MG: 0.2 INJECTION, SOLUTION INTRAMUSCULAR; INTRAVENOUS; SUBCUTANEOUS at 14:21

## 2024-05-26 RX ADMIN — MUPIROCIN: 20 OINTMENT TOPICAL at 08:14

## 2024-05-26 RX ADMIN — KETOROLAC TROMETHAMINE 15 MG: 15 INJECTION, SOLUTION INTRAMUSCULAR; INTRAVENOUS at 18:37

## 2024-05-26 RX ADMIN — PANTOPRAZOLE SODIUM 40 MG: 40 TABLET, DELAYED RELEASE ORAL at 06:51

## 2024-05-26 ASSESSMENT — ACTIVITIES OF DAILY LIVING (ADL)
ADLS_ACUITY_SCORE: 33

## 2024-05-26 NOTE — PLAN OF CARE
PRIMARY DIAGNOSIS: Cellulitis  OUTPATIENT/OBSERVATION GOALS TO BE MET BEFORE DISCHARGE:  ADLs back to baseline: Yes    Activity and level of assistance: Ambulating independently.    Pain status: Improved-controlled with oral pain medications.    Return to near baseline physical activity: Yes     Discharge Planner Nurse   Safe discharge environment identified: Yes  Barriers to discharge: Yes       Entered by: Elsa Kamara RN 05/26/2024 5:47 AM     Asleep in between cares. Refused VS checked at 4am. Plan GI consult.    Please review provider order for any additional goals.   Nurse to notify provider when observation goals have been met and patient is ready for discharge.

## 2024-05-26 NOTE — CONSULTS
GASTROENTEROLOGY CONSULTATION      Mary Ann Olson  [address]  [place] [address]  58 year old female     Admission Date/Time: 5/24/2024  Primary Care Provider: Joanna Farah     We were asked to see the patient in consultation by Dr. Nassar for evaluation of Crohn's disease.    ASSESSMENT:      #1 History of GI Crohn's   #2 B/L leg painful ulcerated nodules on ant. Rich with prior path showing granulomatous inflammation concerning for pyoderma gangrenosum  #3 Insulin dependent diabetes    The status of Crohn's involvement in the GI tract is unclear. Recent non-contrast CT with  no concerns. This will need to assessed as outpatient with likely colonoscopy and IBD clinic visit.     Regardless, her leg b/l condition has many classic clinical features and path also s/o of pyoderma gangrenosum. With distribution and size, doubt topical alone approach with topical steroid or cyclosporine will be sufficient.    With careful monitoring, I suggest prednisone treatment with quick taper (start at 60 mg daily x 3 days, then 40 mg daily for 1 week, and if ongoing improvement, reduce to 20 mg daily for next week then 10 mg daily for 1 week then 5 mg daily for 1 week then stop). If any concern for worsening cellulitis or sepsis with this, stop prednisone immediately and review with ID. Also risk of worsening diabetes and also concern for psychiatric adverse effects.     - will need dermatology consult for long term maintenance options (topical tacrolimus, po dapsone/minocycline etc vs immunosuppresive therapy such as humira).   Of course, GI evaluation as above will also determine maintenance strategy.    GI follow up ordered- [facility] will call to schedule.     I also suggest social work consult to figure out her health coverage status and options- she is very worried.         MD ANIKA Silverio - [facility]  [phone]      ________________________________________________________________________        HPI:  Mary Ann Olson is a 58 year old female with PMH as below who has been admitted with painful b/l leg wound. She has biopsies for this during last admission in early April. Reports Crohn's disease/colitis years ago and was on humira shots upto 5 years ago but then she lost insurance and could not afford. Since then she has been off treatment and has not have any GI follow up.      ROS: A comprehensive ten point review of systems was negative aside from those in mentioned in the HPI.      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    Antihistamines overdose, intentional self-harm, initial encounter (H)     Asthma     Bipolar 1 disorder (H) hx of bipolar listed in h and p    Bipolar 2 disorder (H)     Bipolar I disorder, single manic episode (H)     Created by Conversion  Replacement Utility updated for latest IMO load    Cellulitis 2006 left ankle, 2008 right foot    COPD (chronic obstructive pulmonary disease) (H)     Crohn's disease (H)     arthritis associated with crohn's    Diabetes mellitus (H)     Diabetes mellitus, type 2 (H)     Fibrocystic breast     Heat stroke     when a young child     Hyperlipidemia     Idiopathic acute pancreatitis 07/14/2015    Irritable bowel syndrome     Created by Conversion     Kidney stone     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     Overdose     Pyoderma gangrenosum (H28)     2016    Sacroiliitis (H24) 2004    Skin lesion of left leg 08/27/2015    Suicidal ideation     Suicide attempt (H)     Traumatic iritis 07/21/2015    Umbilical hernia     Uncomplicated asthma      SOCIAL HISTORY:  Social History     Tobacco Use    Smoking status: Former     Current packs/day: 0.50     Types: Cigarettes     Passive exposure: Past    Smokeless tobacco: Never    Tobacco comments:     2-3 cig/day  "  Vaping Use    Vaping status: Never Used   Substance Use Topics    Alcohol use: No    Drug use: No     FAMILY HISTORY:  Family History   Problem Relation Age of Onset    Coronary Artery Disease Mother     Diabetes Father     Breast Cancer Maternal Grandmother     Asthma Son     Asthma Daughter     Hemophilia Other     Diabetes Type 2  Father     Factor VIII deficiency Other      ALLERGIES:   Allergies   Allergen Reactions    Gadolinium Derivatives Hives    Iodinated Contrast Media Hives    Trimethoprim Hives    Azithromycin Other (See Comments) and Rash     Erythema Nodosum x2    Keflex [Cephalexin] Rash    Aspirin Other (See Comments)    Cefuroxime Itching and Other (See Comments)    Contrast Dye Hives     IV    Corylus Other (See Comments)    Diatrizoate Hives    Iodixanol Unknown    Nuts Other (See Comments)     hazelnuts    Septra [Sulfamethoxazole-Trimethoprim] Hives    Sulfa Antibiotics Hives    Metronidazole Itching and Rash    Nitrofurantoin Itching and Rash    Quinolones Rash     MEDICATIONS:   Prior to Admission medications    Medication Sig Start Date End Date Taking? Authorizing Provider   acetaminophen (TYLENOL) 500 MG tablet Take 2 tablets (1,000 mg) by mouth every 6 hours as needed for pain 4/10/24  Yes Ajit Byrnes MD   albuterol (PROAIR HFA/PROVENTIL HFA/VENTOLIN HFA) 108 (90 Base) MCG/ACT inhaler INHALE 2 PUFFS INTO LUNGS EVERY 4 HOURS AS NEEDED FOR SHORTNESS OF BREATH OR  WHEEZING 5/8/24  Yes Joanna Farah MD   atorvastatin (LIPITOR) 40 MG tablet Take 1 tablet (40 mg) by mouth daily 4/29/24  Yes Joanna Farah MD   budesonide-formoterol (SYMBICORT) 160-4.5 MCG/ACT Inhaler Inhale 2 puffs into the lungs 2 times daily   Yes Unknown, Entered By History   insulin  UNIT/ML vial Inject 13 Units Subcutaneous 2 times daily 4/29/24  Yes Joanna Farah MD   insulin syringe-needle U-100 (31G X 5/16\" 0.3 ML) 31G X 5/16\" 0.3 ML miscellaneous Use 2 syringes daily 4/29/24 " " Yes Joanna Farah MD   ipratropium - albuterol 0.5 mg/2.5 mg/3 mL (DUONEB) 0.5-2.5 (3) MG/3ML neb solution Take 1 vial (3 mLs) by nebulization 2 times daily as needed for shortness of breath, wheezing or cough 3/20/24  Yes Claudia Chapa MD   melatonin 5 MG tablet Take 1 tablet (5 mg) by mouth nightly as needed for sleep 4/10/24  Yes Ajit Byrnes MD   metFORMIN (GLUCOPHAGE) 1000 MG tablet Take 1 tablet (1,000 mg) by mouth 2 times daily (with meals) 12/13/23  Yes Joanna Farah MD   Multiple Vitamins-Minerals (CENTRUM SILVER 50+WOMEN PO) Take 1 tablet by mouth daily   Yes Unknown, Entered By History   omeprazole (PRILOSEC) 20 MG DR capsule Take 1 capsule (20 mg) by mouth daily 12/13/23  Yes Joanna Farah MD   tiotropium (SPIRIVA RESPIMAT) 2.5 MCG/ACT inhaler Inhale 2 puffs into the lungs daily 4/29/24  Yes Joanna Farah MD   vitamin D2 (ERGOCALCIFEROL) 55626 units (1250 mcg) capsule Take 1 capsule (50,000 Units) by mouth once a week 12/13/23  Yes Joanna Farah MD     PHYSICAL EXAM:   /71 (BP Location: Right arm)   Pulse 67   Temp 97.7  F (36.5  C) (Oral)   Resp 16   Ht 1.575 m (5' 2\")   Wt 79.2 kg (174 lb 9.7 oz)   SpO2 98%   BMI 31.94 kg/m     GEN: No distress, Alert, comfortable.  FREDIS: No pallor, No icterus, oral mucosa moist.    NECK: No thyromegaly. No mass.    HEART: RRR, S1 S2 normal.   LUNGS: CTA BL  ABD: soft, non-tender, non-distended, no peritoneal signs.  NEURO: No gross motor deficits.  PSYCH: A O X 3.  EXTREMITIES: B/L multifocal ulcerated nodules on the ant aspect of shin.      ADDITIONAL DATA:   I reviewed the patient's new clinical lab test results.   Recent Labs   Lab Test 05/26/24  0843 05/25/24  0610 05/24/24  1526 09/18/23  1447 09/01/23  1950 03/18/19  0109 03/11/19  0232 03/05/19  0349   WBC 8.4 7.1 10.9   < > 11.4*   < > 12.0* 9.0   HGB 10.2* 9.7* 10.7*   < > 10.5*   < > 14.4 13.8   MCV 78 79 79   < > 83   < > 91 91 "    352 434   < > 347   < > 282 209   INR  --   --   --   --  0.90  --  0.85* 0.82*    < > = values in this interval not displayed.     Recent Labs   Lab Test 05/26/24  1110 05/26/24  0843 05/26/24  0727 05/25/24  0650 05/25/24  0610 05/24/24 2000 05/24/24  1526   NA  --  138  --   --  141  --  138   POTASSIUM  --  4.2  --   --  4.0  --  4.5   CHLORIDE  --  101  --   --  106  --  99   CO2  --  23  --   --  22  --  24   BUN  --  11.8  --   --  10.2  --  14.7   CR  --  0.50*  --   --  0.51  --  0.50*   ANIONGAP  --  14  --   --  13  --  15   GHAZAL  --  9.4  --   --  9.2  --  10.0   GLC 95 137* 104*   < > 101*   < > 133*    < > = values in this interval not displayed.     Recent Labs   Lab Test 05/26/24  0843 05/24/24  1912 04/08/24  1043 09/01/23  1950 08/24/23  1940 08/14/23  1734 08/14/23  1354 07/20/23  1314 06/11/23  2312 06/11/23 2057 06/03/23 2020 06/03/23  1952   ALBUMIN 3.9  --  4.0 3.8   < >  --  4.1  --   --  4.1  --  4.4   BILITOTAL 0.2  --  0.3 0.2   < >  --  0.2  --   --  0.4  --  0.2   ALT 12  --  13 17   < >  --  13  --   --  20  --  15   AST 13  --  16 16   < >  --  19  --   --  21  --  16   PROTEIN  --  Negative  --   --   --  Negative  --  Negative   < >  --    < >  --    LIPASE  --   --   --   --   --   --  21  --   --  21  --  12*    < > = values in this interval not displayed.        I reviewed the patient's new imaging results.    Total time: 50 minutes.

## 2024-05-26 NOTE — PLAN OF CARE
PRIMARY DIAGNOSIS: Cellulitis  OUTPATIENT/OBSERVATION GOALS TO BE MET BEFORE DISCHARGE:  ADLs back to baseline: Yes    Activity and level of assistance: Ambulating independently.    Pain status: Improved-controlled with oral pain medications.    Return to near baseline physical activity: Yes     Discharge Planner Nurse   Safe discharge environment identified: Yes  Barriers to discharge: Yes       Entered by: Elsa Kamara RN 05/26/2024 5:49 AM    No concerns raised.     Please review provider order for any additional goals.   Nurse to notify provider when observation goals have been met and patient is ready for discharge.

## 2024-05-26 NOTE — PROGRESS NOTES
[facility]  Infectious Disease Progress Note          Assessment and Plan:   Date of Admission:  5/24/2024  Date of Consult (When I saw the patient): 05/26/24        Assessment & Plan  Mary Ann Olson is a 58 year old who was admitted on 5/24/2024.      Impression: 1 58-year-old female with 2+ months of bilateral leg erythema and nodular eruptions, biopsy showed vertically oriented granulomatous inflammatory process, has been repeated being treated for cellulitis described as venous stasis disease but neither of these is an accurate diagnosis nothing really to suggest cellulitis and really not venous stasis, seems highly likely me at this biopsy reflects some Crohn's related dermatologic condition either direct or indirect, possibly erythema nodosum, pyoderma gangrenosum or other  2 Crohn's disease, off treatment for 10 years previously quite significant disease was on biologic agents, has some chronic abdominal and GI symptoms, was seen by [facility] during April admission CTs have been negative no colonoscopy recently no other obvious extraintestinal Crohn's manifestations to start  3 diabetes   4 17 listed allergies, among them multiple antibiotics including quinolones, sulfa, cephalosporins, nitrofurantoin, azithromycin.  Very interesting azithromycin reason for listed allergy is erythema nodosum occurring due to the drug which is unlikely, probably was another skin manifestation indirectly related to Crohn's previous     REC 1 highly doubt this is infection, previous focus on coag negative staph isolate is not appropriate this is not a skin pathogen organism.  She thinks she responds to vancomycin but not other antibiotics overall doubt this is the case  2 has already been biopsied and very notable biopsy, suggesting  this is some type of inflammatory skin condition , quite possibly related directly or indirectly to her Crohn's disease needs dermatology evaluation, currently has an  appointment out many weeks hopefully can get sooner appointment   Will follow peripherally              Interval History:     no new complaints and doing well; no cp, sob, n/v/d, or abd pain.  A lot of pain in her legs no fever chills illness or positive microbiology  WBC nl              Medications:     Current Facility-Administered Medications   Medication Dose Route Frequency Provider Last Rate Last Admin    atorvastatin (LIPITOR) tablet 40 mg  40 mg Oral Daily Ramon Sullivan MD   40 mg at 05/26/24 0814    enoxaparin ANTICOAGULANT (LOVENOX) injection 40 mg  40 mg Subcutaneous Q24H Ramon Sullivan MD        fluticasone-vilanterol (BREO ELLIPTA) 200-25 MCG/ACT inhaler 1 puff  1 puff Inhalation Daily Ramon Sullivan MD   1 puff at 05/26/24 0815    insulin aspart (NovoLOG) injection (RAPID ACTING)  1-10 Units Subcutaneous TID AC Ramon Sullivan MD        insulin aspart (NovoLOG) injection (RAPID ACTING)  1-7 Units Subcutaneous At Bedtime Ramon Sullivan MD        insulin NPH injection 13 Units  13 Units Subcutaneous BID Ramon Sullivan MD   13 Units at 05/26/24 0814    melatonin tablet 3 mg  3 mg Oral At Bedtime Ramon Sullivan MD   3 mg at 05/25/24 2107    metFORMIN (GLUCOPHAGE) tablet 1,000 mg  1,000 mg Oral BID w/meals Ramon Sullivan MD   1,000 mg at 05/26/24 0814    mupirocin (BACTROBAN) 2 % ointment   Topical Daily Mina Nassar MD   Given at 05/26/24 0814    pantoprazole (PROTONIX) EC tablet 40 mg  40 mg Oral Daily Ramon Sullivan MD   40 mg at 05/26/24 0651    sodium chloride (PF) 0.9% PF flush 3 mL  3 mL Intracatheter Q8H Sher Gillespie MD   3 mL at 05/25/24 1355    sodium chloride (PF) 0.9% PF flush 3 mL  3 mL Intracatheter Q8H Sher Gillespie MD   3 mL at 05/25/24 2109    umeclidinium (INCRUSE ELLIPTA) 62.5 MCG/ACT inhaler 1 puff  1 puff Inhalation Daily Ramon Sullivan MD   1 puff at 05/26/24 0862    vitamin D2 (ERGOCALCIFEROL) 30109 units (1250 mcg) capsule  "50,000 Units  50,000 Units Oral Weekly Ramon Sullivan MD   50,000 Units at 05/25/24 0930                  Physical Exam:   Blood pressure 113/71, pulse 67, temperature 97.7  F (36.5  C), temperature source Oral, resp. rate 16, height 1.575 m (5' 2\"), weight 79.2 kg (174 lb 9.7 oz), SpO2 98%, not currently breastfeeding.  Wt Readings from Last 2 Encounters:   05/24/24 79.2 kg (174 lb 9.7 oz)   04/29/24 78 kg (172 lb)     Vital Signs with Ranges  Temp:  [97.4  F (36.3  C)-98  F (36.7  C)] 97.7  F (36.5  C)  Pulse:  [67-81] 67  Resp:  [15-16] 16  BP: (113-132)/(66-72) 113/71  SpO2:  [96 %-98 %] 98 %    Constitutional: Awake, alert, cooperative, no apparent distress     Lungs: Clear to auscultation bilaterally, no crackles or wheezing   Cardiovascular: Regular rate and rhythm, normal S1 and S2, and no murmur noted   Abdomen: Normal bowel sounds, soft, non-distended, non-tender   Skin: No rashes, no cyanosis, legs about the same   Other:           Data:   All microbiology laboratory data reviewed.  Recent Labs   Lab Test 05/26/24  0843 05/25/24  0610 05/24/24  1526   WBC 8.4 7.1 10.9   HGB 10.2* 9.7* 10.7*   HCT 32.9* 32.2* 35.3   MCV 78 79 79    352 434     Recent Labs   Lab Test 05/26/24  0843 05/25/24  0610 05/24/24  1526   CR 0.50* 0.51 0.50*     Recent Labs   Lab Test 09/01/23  1950   SED 19     No lab results found.    Invalid input(s): \"UC\"     "

## 2024-05-26 NOTE — PROGRESS NOTES
"[facility]    Medicine Progress Note - Hospitalist Service    Date of Admission:  5/24/2024    Assessment & Plan   Mary Ann Olson is a 58 year old female admitted on 5/24/2024 with recurrent LE cellulitis.     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  Since then she has been serially admitted with bilat LE \"cellulitis\".  She was again evaluated and treated with antibiotics again at [facility] recently, at which time, the infectious disease consultant clearly stated that the patient had an inflammatory rather than an infectious process.      Dr. Wright fully agrees despite the finding of coag negative staph on biopsy.  He he indicates that the patient's recurrent areas of inflammation are immunologically mediated rather than infectious.  He was also able to get a history from the patient that she has ongoing, chronic diarrhea compatible with underlying untreated active Crohn's disease.    It appears that she was first diagnosed, I believe in 2009.  She subsequently was managed on biologics (Humira).   Over the last couple of years, however the patient has not had drug insurance coverage for which reason she has not been able to be on therapy for the Crohn's.  She indicates that she frequently has diarrheal stools but denies significant hematochezia, severe abdominal pain, weight loss, fevers.  She typically will have 1-2 stools a day, sometimes they are formed and often they are diarrheal.    Today, we were fortunate to have Dr. Oliver from [facility] see the patient.  Although she does not have overwhelming evidence of active Crohn's disease, he felt that treating with steroids was both safe and appropriate as a trial to see if it would benefit the patient's lower extremity cellulitic " symptoms.      Diagnoses:  Bilateral lower extremity erythema with skin breakdown.  Although this is virtually identical to the appearance of the cellulitis, infectious disease has clearly stated that it is not infection.  Vancomycin has been discontinued.  The cause for the the general improvement, per the explanation of infectious disease consultant, is that the prior shunt presumably receives better maintenance and offloading of her lower extremities than she does at home.  The patient has been referred to a dermatologist but will be able to be seen for another several weeks.  Insurance may become an issue as well.  Crohn's disease.  This appears to be chronically untreated and active.  Lactic acidosis of unclear significance.  NIDDM.  Currently on metformin 1 g twice a day in addition to NPH insulin twice daily.    Plan:  1.  Continue insulin and metformin as they are.  I wonder whether the lactic acid may be elevated on the basis of ongoing exposure to metformin.  Now with the addition of steroids, we will need to carefully titrate insulin further.  2.  Discontinue IV antibiotics.  I believe it is reasonable to dress the wounds and the use of a topical antibiotic though according to infectious disease, this is unlikely to be significantly beneficial.  Notably, ultrasound of the right lower extremity shows no obvious drainable fluid accumulation.  3.  Wound management to include simple cleaning and covering of the apparent skin breakdown.  Additionally, the patient should try to elevate her legs is much as tolerable throughout the day.  I did encourage her to schedule walking in order to not become excessively weak.  4.  Following recommendations from [facility].  The patient will be started on prednisone at 60 mg daily (30 mg twice daily).         Observation Goals: -diagnostic tests and consults completed and resulted, -vital signs normal or at patient baseline, Nurse to notify provider when  "observation goals have been met and patient is ready for discharge.  Diet: Moderate Consistent Carb (60 g CHO per Meal) Diet    DVT Prophylaxis: Low Risk/Ambulatory with no VTE prophylaxis indicated  Beckwith Catheter: Not present  Lines: None     Cardiac Monitoring: None  Code Status: Full Code      Clinically Significant Risk Factors Present on Admission                      # DMII: A1C = 8.5 % (Ref range: <5.7 %) within past 6 months    # Obesity: Estimated body mass index is 31.94 kg/m  as calculated from the following:    Height as of this encounter: 1.575 m (5' 2\").    Weight as of this encounter: 79.2 kg (174 lb 9.7 oz).         # Financial/Environmental Concerns:    # COPD: noted on problem list        Disposition Plan     Medically Ready for Discharge: Anticipated in 2-4 Days       Mina Nassar MD  Hospitalist Service  [facility]  Securely message with [employer] (more info)  Text page via [employer] Paging/Directory   ______________________________________________________________________    Interval History   Complaint of some abdominal pain.  Patient is very concerned because she has not had a bowel movement in the last 24 hours.    I reviewed the history of her Crohn's colitis with the patient today.  I also reviewed the findings in the chart..      Physical Exam   Vital Signs: Temp: 98.2  F (36.8  C) Temp src: Oral BP: 126/67 Pulse: 70   Resp: 16 SpO2: 94 % O2 Device: None (Room air)    Weight: 174 lbs 9.67 oz    General Appearance: Alert, coherent, in no apparent distress  Respiratory: No increased work of breathing.  Clear to auscultation  Cardiovascular: Regular rate and rhythm without murmur  GI: Soft, nontender, nondistended  Skin: Bilateral lower extremity skin breakdown is scattered with suggestion of some areas of superficial fluid accumulation under the skin.  These are quite tender.  Other:      Medical Decision Making       45 MINUTES SPENT BY ME on the date of service doing " chart review, history, exam, documentation & further activities per the note.      Data   Results for orders placed or performed during the hospital encounter of 05/24/24 (from the past 24 hour(s))   Glucose by meter   Result Value Ref Range    GLUCOSE BY METER POCT 93 70 - 99 mg/dL   Glucose by meter   Result Value Ref Range    GLUCOSE BY METER POCT 123 (H) 70 - 99 mg/dL   Glucose by meter   Result Value Ref Range    GLUCOSE BY METER POCT 100 (H) 70 - 99 mg/dL   Glucose by meter   Result Value Ref Range    GLUCOSE BY METER POCT 75 70 - 99 mg/dL   Glucose by meter   Result Value Ref Range    GLUCOSE BY METER POCT 104 (H) 70 - 99 mg/dL   Magnesium   Result Value Ref Range    Magnesium 1.7 1.7 - 2.3 mg/dL   Comprehensive metabolic panel   Result Value Ref Range    Sodium 138 135 - 145 mmol/L    Potassium 4.2 3.4 - 5.3 mmol/L    Carbon Dioxide (CO2) 23 22 - 29 mmol/L    Anion Gap 14 7 - 15 mmol/L    Urea Nitrogen 11.8 8.0 - 23.0 mg/dL    Creatinine 0.50 (L) 0.51 - 0.95 mg/dL    GFR Estimate >90 >60 mL/min/1.73m2    Calcium 9.4 8.6 - 10.0 mg/dL    Chloride 101 98 - 107 mmol/L    Glucose 137 (H) 70 - 99 mg/dL    Alkaline Phosphatase 104 40 - 150 U/L    AST 13 0 - 45 U/L    ALT 12 0 - 50 U/L    Protein Total 6.8 6.4 - 8.3 g/dL    Albumin 3.9 3.5 - 5.2 g/dL    Bilirubin Total 0.2 <=1.2 mg/dL   CBC with platelets   Result Value Ref Range    WBC Count 8.4 4.0 - 11.0 10e3/uL    RBC Count 4.24 3.80 - 5.20 10e6/uL    Hemoglobin 10.2 (L) 11.7 - 15.7 g/dL    Hematocrit 32.9 (L) 35.0 - 47.0 %    MCV 78 78 - 100 fL    MCH 24.1 (L) 26.5 - 33.0 pg    MCHC 31.0 (L) 31.5 - 36.5 g/dL    RDW 17.2 (H) 10.0 - 15.0 %    Platelet Count 391 150 - 450 10e3/uL   Glucose by meter   Result Value Ref Range    GLUCOSE BY METER POCT 95 70 - 99 mg/dL     *Note: Due to a large number of results and/or encounters for the requested time period, some results have not been displayed. A complete set of results can be found in Results Review.

## 2024-05-26 NOTE — PROGRESS NOTES
[facility]    Medicine Progress Note - Hospitalist Service    Date of Admission:  5/24/2024    Assessment & Plan   Mary Ann Olson is a 58 year old female admitted on 5/24/2024 with recurrent LE cellulitis. Although she has been treated multiple times for this issue at multiple different hospitals, she was evaluated at [facility] recently with a biopsy.  At that time, the infectious disease consultant clearly stated that the patient had something along the lines of pyoderma gangrenosum.  Dr. Wright fully agrees despite the finding of coag negative staph on biopsy.  He he indicates that the patient's recurrent areas of inflammation are immunologically mediated rather than infectious.  He was also able to get a history from the patient that she has ongoing, chronic diarrhea compatible with underlying untreated active Crohn's disease.    Diagnoses:  Bilateral lower extremity erythema with skin breakdown.  Although this is virtually identical to the appearance of the cellulitis, infectious disease has clearly stated that it is not infection.  Vancomycin has been discontinued.  The cause for the the general improvement, per the explanation of infectious disease consultant, is that the prior shunt presumably receives better maintenance and offloading of her lower extremities than she does at home.  The patient has been referred to a dermatologist but will be able to be seen for another several weeks.  Insurance may become an issue as well.  Crohn's disease.  This appears to be chronically untreated and active.  Lactic acidosis of unclear significance.  NIDDM.  Currently on metformin 1 g twice a day in addition to NPH insulin twice daily.    Plan:  1.  Continuous insulin and metformin as they are.  I wonder whether the lactic acid may be elevated on the basis of ongoing exposure to metformin.  2.  Discontinue IV antibiotics.  I believe it is reasonable to dress the wounds and the use of a  "topical antibiotic though according to infectious disease, this is unlikely to be significantly beneficial.  Notably, ultrasound of the right lower extremity shows no obvious drainable fluid accumulation.  3.  Wound management to include simple cleaning and covering of the apparent skin breakdown.  Additionally, the patient should try to elevate her legs is much as tolerable throughout the day.  I did encourage her to schedule walking in order to not become excessively weak.       Observation Goals: -diagnostic tests and consults completed and resulted, -vital signs normal or at patient baseline, Nurse to notify provider when observation goals have been met and patient is ready for discharge.  Diet: Moderate Consistent Carb (60 g CHO per Meal) Diet    DVT Prophylaxis: Low Risk/Ambulatory with no VTE prophylaxis indicated  Becwkith Catheter: Not present  Lines: None     Cardiac Monitoring: None  Code Status: Full Code      Clinically Significant Risk Factors Present on Admission                      # DMII: A1C = 8.5 % (Ref range: <5.7 %) within past 6 months    # Obesity: Estimated body mass index is 31.94 kg/m  as calculated from the following:    Height as of this encounter: 1.575 m (5' 2\").    Weight as of this encounter: 79.2 kg (174 lb 9.7 oz).         # Financial/Environmental Concerns:    # COPD: noted on problem list        Disposition Plan     Medically Ready for Discharge: Anticipated in 2-4 Days       Mina Nassar MD  Hospitalist Service  [facility]  Securely message with happnchristian (more info)  Text page via [facility] Paging/Directory   ______________________________________________________________________    Interval History   Confirmed with the patient that she has had recurrent issues similar appearing erythema of her lower extremities.  Vanco stopped yesterday without evident worsening.     Today concerned about abd pain and constipation.     I was able to speak with both Alta Wright " and Sah regarding this pt's management today.   SW involved.  Complex situation.     Physical Exam   Vital Signs: Temp: 97.4  F (36.3  C) Temp src: Oral BP: 113/72 Pulse: 67   Resp: 16 SpO2: 96 % O2 Device: None (Room air)    Weight: 174 lbs 9.67 oz    General Appearance: Alert, coherent, in no apparent distress  Respiratory: No increased work of breathing.  Clear to auscultation  Cardiovascular: Regular rate and rhythm without murmur  GI: Soft, nontender, nondistended  Skin: Bilateral lower extremity skin breakdown is scattered with suggestion of some areas of superficial fluid accumulation under the skin.  These are quite tender.  Other:      Medical Decision Making       45 MINUTES SPENT BY ME on the date of service doing chart review, history, exam, documentation & further activities per the note.      Data   Results for orders placed or performed during the hospital encounter of 05/24/24 (from the past 24 hour(s))   Glucose by meter   Result Value Ref Range    GLUCOSE BY METER POCT 123 (H) 70 - 99 mg/dL   Glucose by meter   Result Value Ref Range    GLUCOSE BY METER POCT 100 (H) 70 - 99 mg/dL   Glucose by meter   Result Value Ref Range    GLUCOSE BY METER POCT 75 70 - 99 mg/dL   Glucose by meter   Result Value Ref Range    GLUCOSE BY METER POCT 104 (H) 70 - 99 mg/dL   Magnesium   Result Value Ref Range    Magnesium 1.7 1.7 - 2.3 mg/dL   Comprehensive metabolic panel   Result Value Ref Range    Sodium 138 135 - 145 mmol/L    Potassium 4.2 3.4 - 5.3 mmol/L    Carbon Dioxide (CO2) 23 22 - 29 mmol/L    Anion Gap 14 7 - 15 mmol/L    Urea Nitrogen 11.8 8.0 - 23.0 mg/dL    Creatinine 0.50 (L) 0.51 - 0.95 mg/dL    GFR Estimate >90 >60 mL/min/1.73m2    Calcium 9.4 8.6 - 10.0 mg/dL    Chloride 101 98 - 107 mmol/L    Glucose 137 (H) 70 - 99 mg/dL    Alkaline Phosphatase 104 40 - 150 U/L    AST 13 0 - 45 U/L    ALT 12 0 - 50 U/L    Protein Total 6.8 6.4 - 8.3 g/dL    Albumin 3.9 3.5 - 5.2 g/dL    Bilirubin Total 0.2 <=1.2  mg/dL   CBC with platelets   Result Value Ref Range    WBC Count 8.4 4.0 - 11.0 10e3/uL    RBC Count 4.24 3.80 - 5.20 10e6/uL    Hemoglobin 10.2 (L) 11.7 - 15.7 g/dL    Hematocrit 32.9 (L) 35.0 - 47.0 %    MCV 78 78 - 100 fL    MCH 24.1 (L) 26.5 - 33.0 pg    MCHC 31.0 (L) 31.5 - 36.5 g/dL    RDW 17.2 (H) 10.0 - 15.0 %    Platelet Count 391 150 - 450 10e3/uL   Glucose by meter   Result Value Ref Range    GLUCOSE BY METER POCT 95 70 - 99 mg/dL   Glucose by meter   Result Value Ref Range    GLUCOSE BY METER POCT 75 70 - 99 mg/dL     *Note: Due to a large number of results and/or encounters for the requested time period, some results have not been displayed. A complete set of results can be found in Results Review.

## 2024-05-26 NOTE — UTILIZATION REVIEW
Admission Status; Secondary Review Determination         Under the authority of the Utilization Management Committee, the utilization review process indicated a secondary review on the above patient.  The review outcome is based on review of the medical records, discussions with staff, and applying clinical experience noted on the date of the review.        (x)      Inpatient Status Appropriate - This patient's medical care is consistent with medical management for inpatient care and reasonable inpatient medical practice.          RATIONALE FOR DETERMINATION   The patient is a 58-year-old female admitted on 5/24/2024.  Patient has complex medical history who was referred to the emergency department due to redness around the wounds on her legs.  No leukocytosis but lactic acid was initially 2.3.  Infectious disease consultation was obtained and concluded that likely this is not cellulitis but more likely, confirmed by previous biopsy, but this is a autoimmune process.  The patient does have Crohn's disease and had more aggressive treatment in the past.  Recommendation from GI also concurred that skin lesions were likely due to an autoimmune process and recommended dermatology follow-up.  CT of the abdomen and pelvis showed no evidence of acute intra-abdominal process.  Based on second midnight stay due to sorting out appropriate strategic clinical plan at this time, recommend switching from observation to inpatient status.  Dr. Parents to be notified of this recommendation.      The severity of illness, intensity of service provided, expected LOS and risk for adverse outcome make the care complex, high risk and appropriate for hospital admission.        The information on this document is developed by the utilization review team in order for the business office to ensure compliance.  This only denotes the appropriateness of proper admission status and does not reflect the quality of care rendered.         The  definitions of Inpatient Status and Observation Status used in making the determination above are those provided in the CMS Coverage Manual, Chapter 1 and Chapter 6, section 70.4.      Sincerely,     Eh Sotelo MD  Physician Advisor  Utilization Review/ Case Management  [facility].

## 2024-05-26 NOTE — PLAN OF CARE
PRIMARY DIAGNOSIS: Cellulitis  OUTPATIENT/OBSERVATION GOALS TO BE MET BEFORE DISCHARGE:  ADLs back to baseline: Yes    Activity and level of assistance: Ambulating independently.    Pain status: Improved-controlled with oral pain medications.    Return to near baseline physical activity: Yes     Discharge Planner Nurse   Safe discharge environment identified: Yes  Barriers to discharge: Yes       Entered by: Elsa Kamara RN 05/25/2024 9:38 PM    Alert and oriented. Complained of leg pain,PRN tylenol given.Requested not to be disturbed after having the Melatonin, doesn't want vitals and blood sugar checks at midnight and 4 am.      Please review provider order for any additional goals.   Nurse to notify provider when observation goals have been met and patient is ready for discharge.

## 2024-05-26 NOTE — CONSULTS
[facility]    Infectious Disease Consultation     Date of Admission:  5/24/2024  Date of Consult (When I saw the patient): 05/26/24    Assessment & Plan   Mary Ann Olson is a 58 year old who was admitted on 5/24/2024.     Impression: 1 58-year-old female with 2+ months of bilateral leg erythema and nodular eruptions, biopsy showed vertically oriented granulomatous inflammatory process, has been repeated being treated for cellulitis described as venous stasis disease but neither of these is an accurate diagnosis nothing really to suggest cellulitis and really not venous stasis, seems highly likely me at this biopsy reflects some Crohn's related dermatologic condition either direct or indirect, possibly erythema nodosum, pyoderma gangrenosum or other  2 Crohn's disease, off treatment for 10 years previously quite significant disease was on biologic agents, has some chronic abdominal and GI symptoms, was seen by [facility] during April admission CTs have been negative no colonoscopy recently no other obvious extraintestinal Crohn's manifestations to start  3 diabetes   4 17 listed allergies, among them multiple antibiotics including quinolones, sulfa, cephalosporins, nitrofurantoin, azithromycin.  Very interesting azithromycin reason for listed allergy is erythema nodosum occurring due to the drug which is unlikely, probably was another skin manifestation indirectly related to Crohn's previous    REC 1 highly doubt this is infection, previous focus on coag negative staph isolate from the wounds is completely illogical that organism is never pathogen in this setting.  She thinks she responds to vancomycin but not other antibiotics so we will continue to bit longer but highly doubt this is cellulitis or infection  2 has already been biopsied and very notable biopsy, suggesting  this is some type of inflammatory skin condition , quite possibly related directly or indirectly to her Crohn's  disease needs dermatology evaluation, currently has an appointment out many weeks hopefully can get sooner appointment           Jason Wright MD    Reason for Consult   Reason for consult: I was asked to evaluate this patient for cellulitis.    Primary Care Physician   Joanna Farah    Chief Complaint   Leg pain    History is obtained from the patient and medical records    History of Present Illness   Mary Ann Olson is a 58 year old female who presents with readmission for what is being called recurrent cellulitis and venous stasis disease.  In reality has eruptive nodular skin lesions occurring in both legs, never any significant fever, no signs of infection or cellulitis, no real response to antibiotics, was recently given vancomycin based on skin cultures that grew coag negative staph which is obviously not a pathogen in the setting, and clindamycin which she stopped taking because it was not working.  She already has had a biopsy done which showed granulomatous type inflammation.  Nondiagnostic in terms of his specific disease but not suggesting strongly this is some type of inflammatory skin condition.  She has not had a dermatologic visit yet.  Was seen by [facility] based on her prior Crohn's disease and some degree of chronic abdominal symptoms.  CT scan was negative no colonoscopy was done recently for Crohn's in the past was quite active and required biologic    Past Medical History   I have reviewed this patient's medical history and updated it with pertinent information if needed.   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    Antihistamines overdose, intentional self-harm, initial encounter (H)     Asthma     Bipolar 1 disorder (H) hx of bipolar listed in h and p    Bipolar 2 disorder (H)     Bipolar I disorder, single manic episode (H)     Created by Conversion  Replacement Utility updated for latest IMO load    Cellulitis  2006 left ankle, 2008 right foot    COPD (chronic obstructive pulmonary disease) (H)     Crohn's disease (H)     arthritis associated with crohn's    Diabetes mellitus (H)     Diabetes mellitus, type 2 (H)     Fibrocystic breast     Heat stroke     when a young child     Hyperlipidemia     Idiopathic acute pancreatitis 07/14/2015    Irritable bowel syndrome     Created by Conversion     Kidney stone     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  Past Surgical History:   Procedure Laterality Date    BIOPSY BREAST Left     BIOPSY OF BREAST, INCISIONAL      Description: Biopsy Breast Open;  Recorded: 10/28/2010;    HC REMOVAL OF TONSILS,<13 Y/O      Description: Tonsillectomy;  Recorded: 07/08/2009;    ZZC LIGATE FALLOPIAN TUBE      Description: Tubal Ligation;  Recorded: 07/08/2009;       Prior to Admission Medications   Prior to Admission Medications   Prescriptions Last Dose Informant Patient Reported? Taking?   Multiple Vitamins-Minerals (CENTRUM SILVER 50+WOMEN PO) 5/24/2024 at AM Self Yes Yes   Sig: Take 1 tablet by mouth daily   acetaminophen (TYLENOL) 500 MG tablet Past Week  No Yes   Sig: Take 2 tablets (1,000 mg) by mouth every 6 hours as needed for pain   albuterol (PROAIR HFA/PROVENTIL HFA/VENTOLIN HFA) 108 (90 Base) MCG/ACT inhaler 5/24/2024 at x1 ~0800  No Yes   Sig: INHALE 2 PUFFS INTO LUNGS EVERY 4 HOURS AS NEEDED FOR SHORTNESS OF BREATH OR  WHEEZING   atorvastatin (LIPITOR) 40 MG tablet 5/24/2024 at AM  No Yes   Sig: Take 1 tablet (40 mg) by mouth daily   budesonide-formoterol (SYMBICORT) 160-4.5 MCG/ACT Inhaler 5/24/2024 at x1  Yes Yes  "  Sig: Inhale 2 puffs into the lungs 2 times daily   insulin  UNIT/ML vial 5/24/2024 at x1 at ~0930  No Yes   Sig: Inject 13 Units Subcutaneous 2 times daily   insulin syringe-needle U-100 (31G X 5/16\" 0.3 ML) 31G X 5/16\" 0.3 ML miscellaneous 5/24/2024  No Yes   Sig: Use 2 syringes daily   ipratropium - albuterol 0.5 mg/2.5 mg/3 mL (DUONEB) 0.5-2.5 (3) MG/3ML neb solution 5/24/2024 Self No Yes   Sig: Take 1 vial (3 mLs) by nebulization 2 times daily as needed for shortness of breath, wheezing or cough   melatonin 5 MG tablet 5/23/2024 at HS  No Yes   Sig: Take 1 tablet (5 mg) by mouth nightly as needed for sleep   metFORMIN (GLUCOPHAGE) 1000 MG tablet 5/24/2024 at x1 Self No Yes   Sig: Take 1 tablet (1,000 mg) by mouth 2 times daily (with meals)   omeprazole (PRILOSEC) 20 MG DR capsule 5/24/2024 at AM Self No Yes   Sig: Take 1 capsule (20 mg) by mouth daily   tiotropium (SPIRIVA RESPIMAT) 2.5 MCG/ACT inhaler 5/24/2024 at AM  No Yes   Sig: Inhale 2 puffs into the lungs daily   vitamin D2 (ERGOCALCIFEROL) 99860 units (1250 mcg) capsule Past Month at \"Usually forget\" Self No Yes   Sig: Take 1 capsule (50,000 Units) by mouth once a week      Facility-Administered Medications: None     Allergies   Allergies   Allergen Reactions    Gadolinium Derivatives Hives    Iodinated Contrast Media Hives    Trimethoprim Hives    Azithromycin Other (See Comments) and Rash     Erythema Nodosum x2    Keflex [Cephalexin] Rash    Aspirin Other (See Comments)    Cefuroxime Itching and Other (See Comments)    Contrast Dye Hives     IV    Corylus Other (See Comments)    Diatrizoate Hives    Iodixanol Unknown    Nuts Other (See Comments)     hazelnuts    Septra [Sulfamethoxazole-Trimethoprim] Hives    Sulfa Antibiotics Hives    Metronidazole Itching and Rash    Nitrofurantoin Itching and Rash    Quinolones Rash       Immunization History   Immunization History   Administered Date(s) Administered    COVID-19 MONOVALENT 12+ (Pfizer) " 05/12/2021, 12/14/2021    DT (PEDS <7y) 01/01/2003    DTAP (<7y) 01/01/2003    Flu, Unspecified 09/17/2014, 11/01/2018    HEPA 04/24/2008, 02/24/2009    HepB 12/18/2006, 02/13/2007, 07/25/2007, 08/06/2012, 09/24/2012, 02/26/2013    HepB, Unspecified 12/18/2006, 02/13/2007, 07/25/2007    Hepatitis A (ADULT 19+) 04/24/2008, 02/24/2009    Hepatitis B, Adult 12/18/2006, 02/13/2007, 07/25/2007    Hepatitis B, Peds 08/06/2012, 09/24/2012, 02/26/2013    Influenza (H1N1) 12/28/2009    Influenza (IIV3) PF 11/01/2000, 10/09/2001, 12/20/2002, 01/18/2005, 10/18/2005, 10/18/2006, 10/06/2009, 09/24/2012, 10/07/2013    Influenza Vaccine >6 months,quad, PF 09/24/2014, 10/11/2016, 10/05/2018, 11/18/2019    Influenza Vaccine IM Ages 6-35 Months 4 Valent (PF) 09/24/2014    Influenza Vaccine, 6+MO IM (QUADRIVALENT W/PRESERVATIVES) 10/06/2009, 10/05/2015, 10/11/2016, 10/05/2018    Mantoux Tuberculin Skin Test 07/23/2012, 08/03/2012    Pneumo Conj 13-V (2010&after) 02/23/2015    Pneumococcal 23 valent 04/07/2006, 02/24/2009, 01/26/2011, 08/19/2011    TD,PF 7+ (Tenivac) 01/01/2003, 12/01/2003    TDAP (Adacel,Boostrix) 07/23/2012, 06/20/2022    TDAP Vaccine (Adacel) 10/15/2009    TDAP Vaccine (Boostrix) 10/15/2009    Td (Adult), Adsorbed 12/01/2003    Tdap (Adult) Unspecified Formulation 01/01/2003, 10/15/2009    Zoster recombinant adjuvanted (SHINGRIX) 10/05/2018    Zoster vaccine, live 10/05/2018       Social History   I have reviewed this patient's social history and updated it with pertinent information if needed. Karenjose alberto PEÑA Wesley  reports that she has quit smoking. Her smoking use included cigarettes. She has been exposed to tobacco smoke. She has never used smokeless tobacco. She reports that she does not drink alcohol and does not use drugs.    Family History   I have reviewed this patient's family history and updated it with pertinent information if needed.   Family History   Problem Relation Age of Onset    Coronary Artery  "Disease Mother     Diabetes Father     Breast Cancer Maternal Grandmother     Asthma Son     Asthma Daughter     Hemophilia Other     Diabetes Type 2  Father     Factor VIII deficiency Other        Review of Systems   The 10 point Review of Systems is negative except as per the HPI of note does have some chronic abdominal and GI symptoms    Physical Exam   Temp: 97.4  F (36.3  C) Temp src: Oral BP: 113/72 Pulse: 67   Resp: 16 SpO2: 96 % O2 Device: None (Room air)    Vital Signs with Ranges  Temp:  [97.4  F (36.3  C)-98  F (36.7  C)] 97.4  F (36.3  C)  Pulse:  [67-81] 67  Resp:  [15-18] 16  BP: (113-132)/(66-75) 113/72  SpO2:  [96 %-97 %] 96 %  174 lbs 9.67 oz  Body mass index is 31.94 kg/m .    GENERAL APPEARANCE:  awake  EYES: Eyes grossly normal to inspection  NECK: no adenopathy  RESP: lungs clear   CV: regular rates and rhythm  LYMPHATICS: normal ant/post cervical and supraclavicular nodes  ABDOMEN: soft, nontender  MS: extremities normal  SKIN: Multiple slightly nodular slight skin breakdown areas in both legs right worse than left some erythema but not really warm to the right leg does not really look like cellulitis.        Data   All laboratory and imaging data in the past 24 hours reviewed  No results for input(s): \"CULT\" in the last 168 hours.  No lab results found.    Invalid input(s): \"UC\"       All cultures:  Recent Labs   Lab 05/24/24  1912 05/24/24  1526 05/24/24  1521   CULTURE No Growth No growth after 1 day No growth after 1 day      Blood culture:  Results for orders placed or performed during the hospital encounter of 05/24/24   Blood Culture Hand, Right    Specimen: Hand, Right; Blood   Result Value Ref Range    Culture No growth after 1 day    Blood Culture Peripheral Blood    Specimen: Peripheral Blood   Result Value Ref Range    Culture No growth after 1 day    Results for orders placed or performed during the hospital encounter of 04/08/24   Blood Culture Peripheral Blood    Specimen: " Peripheral Blood   Result Value Ref Range    Culture No Growth    Results for orders placed or performed during the hospital encounter of 03/18/24   Blood Culture Peripheral Blood    Specimen: Peripheral Blood   Result Value Ref Range    Culture No Growth    Blood Culture Hand, Right    Specimen: Hand, Right; Blood   Result Value Ref Range    Culture No Growth      *Note: Due to a large number of results and/or encounters for the requested time period, some results have not been displayed. A complete set of results can be found in Results Review.      Urine culture:  Results for orders placed or performed during the hospital encounter of 05/24/24   Urine Culture    Specimen: Urine, Midstream   Result Value Ref Range    Culture No Growth    Results for orders placed or performed during the hospital encounter of 08/14/23   Urine Culture    Specimen: Urine, Midstream   Result Value Ref Range    Culture <10,000 CFU/mL Mixture of urogenital garfield    Results for orders placed or performed in visit on 07/20/23   Urine Culture    Specimen: Urine, Midstream   Result Value Ref Range    Culture No Growth    Results for orders placed or performed during the hospital encounter of 06/11/23   Urine Culture    Specimen: Urine, Clean Catch   Result Value Ref Range    Culture <10,000 CFU/mL Mixture of urogenital garfield    Results for orders placed or performed during the hospital encounter of 06/03/23   Urine Culture    Specimen: Urine, Midstream   Result Value Ref Range    Culture <10,000 CFU/mL Mixture of urogenital garfield    Results for orders placed or performed in visit on 09/17/21   Urine Culture    Specimen: Urine, Midstream   Result Value Ref Range    Culture No Growth      *Note: Due to a large number of results and/or encounters for the requested time period, some results have not been displayed. A complete set of results can be found in Results Review.

## 2024-05-26 NOTE — PROGRESS NOTES
Complained of abdominal pain PRN initially gave tylenol no effect offered po Oxycodone but prefers to have IV toradol.    Elsa Kamara RN

## 2024-05-26 NOTE — PLAN OF CARE
PRIMARY DIAGNOSIS: ELEVATED LACTIC ACID LEVEL, LE WOUND   OUTPATIENT/OBSERVATION GOALS TO BE MET BEFORE DISCHARGE:  Vitals sign stable or return to baseline: Yes    Tolerating oral antibiotics or has home infusion set up if applicable: No abx discontinued     Pain status: Improved-controlled with oral pain medications.    Return to near baseline physical activity: Yes    Discharge Planner Nurse   Safe discharge environment identified: Yes  Barriers to discharge: Yes       Entered by: ARISTEO SPENCER RN 05/26/2024    Vital signs:  Temp: 97.4  F (36.3  C) Temp src: Oral BP: 113/72 Pulse: 67   Resp: 16 SpO2: 96 % O2 Device: None (Room air)   Alert and oriented x4. Reported pain in the abdomen and RLE improved after IV Toradol. Wound care completed. Redness to LE improved but RLE appeared more puffy around the bump/scab area and painful to touch. Pt said leg started to hurt more after getting an US done to that leg. On mod carb diet. Good appetite. Up independently. GI and WOC consulted. Call light in reach.       Please review provider order for any additional goals.     Nurse to notify provider when observation goals have been met and patient is ready for discharge.Goal Outcome Evaluation:      Plan of Care Reviewed With: patient    Overall Patient Progress: improvingOverall Patient Progress: improving    Outcome Evaluation: pain improving      Problem: Adult Inpatient Plan of Care  Goal: Plan of Care Review  Description: The Plan of Care Review/Shift note should be completed every shift.  The Outcome Evaluation is a brief statement about your assessment that the patient is improving, declining, or no change.  This information will be displayed automatically on your shift  note.  Outcome: Progressing  Flowsheets (Taken 5/26/2024 1000)  Outcome Evaluation: pain improving  Plan of Care Reviewed With: patient  Overall Patient Progress: improving  Goal: Patient-Specific Goal (Individualized)  Description: You can add  "care plan individualizations to a care plan. Examples of Individualization might be:  \"Parent requests to be called daily at 9am for status\", \"I have a hard time hearing out of my right ear\", or \"Do not touch me to wake me up as it startles  me\".  Outcome: Progressing  Goal: Absence of Hospital-Acquired Illness or Injury  Outcome: Progressing  Goal: Optimal Comfort and Wellbeing  Outcome: Progressing  Goal: Readiness for Transition of Care  Outcome: Progressing     Problem: Comorbidity Management  Goal: Blood Glucose Levels Within Targeted Range  Outcome: Progressing  Goal: Blood Pressure in Desired Range  Outcome: Progressing     Problem: Pain Acute  Goal: Optimal Pain Control and Function  Outcome: Progressing     "

## 2024-05-27 PROBLEM — L93.2: Status: ACTIVE | Noted: 2024-05-27

## 2024-05-27 PROBLEM — K50.10 CROHN'S DISEASE OF LARGE INTESTINE WITHOUT COMPLICATION (H): Chronic | Status: ACTIVE | Noted: 2020-03-12

## 2024-05-27 LAB
GLUCOSE BLDC GLUCOMTR-MCNC: 128 MG/DL (ref 70–99)
GLUCOSE BLDC GLUCOMTR-MCNC: 164 MG/DL (ref 70–99)
GLUCOSE BLDC GLUCOMTR-MCNC: 186 MG/DL (ref 70–99)
GLUCOSE BLDC GLUCOMTR-MCNC: 189 MG/DL (ref 70–99)
GLUCOSE BLDC GLUCOMTR-MCNC: 216 MG/DL (ref 70–99)
GLUCOSE BLDC GLUCOMTR-MCNC: 221 MG/DL (ref 70–99)
MAGNESIUM SERPL-MCNC: 1.8 MG/DL (ref 1.7–2.3)
POTASSIUM SERPL-SCNC: 4.2 MMOL/L (ref 3.4–5.3)

## 2024-05-27 PROCEDURE — 120N000001 HC R&B MED SURG/OB

## 2024-05-27 PROCEDURE — 94640 AIRWAY INHALATION TREATMENT: CPT

## 2024-05-27 PROCEDURE — 36415 COLL VENOUS BLD VENIPUNCTURE: CPT | Performed by: INTERNAL MEDICINE

## 2024-05-27 PROCEDURE — 84132 ASSAY OF SERUM POTASSIUM: CPT | Performed by: INTERNAL MEDICINE

## 2024-05-27 PROCEDURE — 250N000012 HC RX MED GY IP 250 OP 636 PS 637: Performed by: INTERNAL MEDICINE

## 2024-05-27 PROCEDURE — 99231 SBSQ HOSP IP/OBS SF/LOW 25: CPT | Performed by: INTERNAL MEDICINE

## 2024-05-27 PROCEDURE — 999N000147 HC STATISTIC PT IP EVAL DEFER

## 2024-05-27 PROCEDURE — 99232 SBSQ HOSP IP/OBS MODERATE 35: CPT | Performed by: INTERNAL MEDICINE

## 2024-05-27 PROCEDURE — 250N000013 HC RX MED GY IP 250 OP 250 PS 637: Performed by: INTERNAL MEDICINE

## 2024-05-27 PROCEDURE — 83735 ASSAY OF MAGNESIUM: CPT | Performed by: INTERNAL MEDICINE

## 2024-05-27 PROCEDURE — 999N000111 HC STATISTIC OT IP EVAL DEFER

## 2024-05-27 PROCEDURE — 87205 SMEAR GRAM STAIN: CPT | Performed by: INTERNAL MEDICINE

## 2024-05-27 RX ORDER — PREDNISONE 10 MG/1
TABLET ORAL
Qty: 52 TABLET | Refills: 0 | Status: SHIPPED | OUTPATIENT
Start: 2024-05-27

## 2024-05-27 RX ADMIN — INSULIN ASPART 2 UNITS: 100 INJECTION, SOLUTION INTRAVENOUS; SUBCUTANEOUS at 13:35

## 2024-05-27 RX ADMIN — PREDNISONE 30 MG: 20 TABLET ORAL at 08:25

## 2024-05-27 RX ADMIN — METFORMIN HYDROCHLORIDE 1000 MG: 500 TABLET ORAL at 18:14

## 2024-05-27 RX ADMIN — ATORVASTATIN CALCIUM 40 MG: 40 TABLET, FILM COATED ORAL at 08:23

## 2024-05-27 RX ADMIN — INSULIN ASPART 2 UNITS: 100 INJECTION, SOLUTION INTRAVENOUS; SUBCUTANEOUS at 18:14

## 2024-05-27 RX ADMIN — FLUTICASONE FUROATE AND VILANTEROL TRIFENATATE 1 PUFF: 200; 25 POWDER RESPIRATORY (INHALATION) at 07:58

## 2024-05-27 RX ADMIN — Medication 3 MG: at 21:35

## 2024-05-27 RX ADMIN — PANTOPRAZOLE SODIUM 40 MG: 40 TABLET, DELAYED RELEASE ORAL at 06:41

## 2024-05-27 RX ADMIN — ACETAMINOPHEN 1000 MG: 500 TABLET, FILM COATED ORAL at 15:11

## 2024-05-27 RX ADMIN — PREDNISONE 30 MG: 20 TABLET ORAL at 13:39

## 2024-05-27 RX ADMIN — ACETAMINOPHEN 1000 MG: 500 TABLET, FILM COATED ORAL at 20:50

## 2024-05-27 RX ADMIN — UMECLIDINIUM 1 PUFF: 62.5 AEROSOL, POWDER ORAL at 07:58

## 2024-05-27 RX ADMIN — METFORMIN HYDROCHLORIDE 1000 MG: 500 TABLET ORAL at 08:23

## 2024-05-27 ASSESSMENT — ACTIVITIES OF DAILY LIVING (ADL)
ADLS_ACUITY_SCORE: 33

## 2024-05-27 NOTE — PLAN OF CARE
"Shift from [id number]     Inpatient Progress Note:  For complete assessment see flow sheet documentation.     Orientation: AO x4  Neuros: WDL  Pain status: Denies  Activity: Up independently  Resp: WDL  Cardiac: WDL  GI: WDL  : WDL  Skin: Wounds on BLE  LDA: PIV in L lower forearm  Infusions: SL    Diet: Mod Carb  Pertinent Labs: ACHS blood glucose checks  Safety: Bed in low position, call light within reach  Consults: WOC  Discharge Plan: TBD    Goal Outcome Evaluation:      Plan of Care Reviewed With: patient    Overall Patient Progress: no changeOverall Patient Progress: no change    Outcome Evaluation: Leg pain and agitiated    Problem: Adult Inpatient Plan of Care  Goal: Plan of Care Review  Description: The Plan of Care Review/Shift note should be completed every shift.  The Outcome Evaluation is a brief statement about your assessment that the patient is improving, declining, or no change.  This information will be displayed automatically on your shift  note.  Outcome: Progressing  Flowsheets (Taken 5/27/2024 0608)  Outcome Evaluation: Leg pain and agitiated  Plan of Care Reviewed With: patient  Overall Patient Progress: no change  Goal: Patient-Specific Goal (Individualized)  Description: You can add care plan individualizations to a care plan. Examples of Individualization might be:  \"Parent requests to be called daily at 9am for status\", \"I have a hard time hearing out of my right ear\", or \"Do not touch me to wake me up as it startles  me\".  Outcome: Progressing  Goal: Absence of Hospital-Acquired Illness or Injury  Outcome: Progressing  Intervention: Prevent Skin Injury  Recent Flowsheet Documentation  Taken 5/27/2024 0235 by Yane Malin, RN  Body Position: position changed independently  Intervention: Prevent Infection  Recent Flowsheet Documentation  Taken 5/27/2024 0235 by Yane Malin, RN  Infection Prevention:   rest/sleep promoted   hand hygiene promoted  Goal: Optimal Comfort and " Wellbeing  Outcome: Progressing  Goal: Readiness for Transition of Care  Outcome: Progressing     Problem: Comorbidity Management  Goal: Blood Glucose Levels Within Targeted Range  Outcome: Progressing  Goal: Blood Pressure in Desired Range  Outcome: Progressing     Problem: Pain Acute  Goal: Optimal Pain Control and Function  Outcome: Progressing

## 2024-05-27 NOTE — PLAN OF CARE
Physical Therapy: Orders received. Chart reviewed and discussed with care team.? Physical Therapy not indicated due to pt mobilizing independently, no PT needs.? Defer discharge recommendations to medical team.? Will complete orders.

## 2024-05-27 NOTE — PROGRESS NOTES
[facility]  Infectious Disease Progress Note          Assessment and Plan:   Date of Admission:  5/24/2024  Date of Consult (When I saw the patient): 05/26/24        Assessment & Plan  Mary Ann Olson is a 58 year old who was admitted on 5/24/2024.      Impression: 1 58-year-old female with 2+ months of bilateral leg erythema and nodular eruptions, biopsy showed vertically oriented granulomatous inflammatory process, has been repeated being treated for cellulitis described as venous stasis disease but neither of these is an accurate diagnosis nothing really to suggest cellulitis and really not venous stasis, seems highly likely me at this biopsy reflects some Crohn's related dermatologic condition either direct or indirect, possibly erythema nodosum, pyoderma gangrenosum or other  2 Crohn's disease, off treatment for 10 years previously quite significant disease was on biologic agents, has some chronic abdominal and GI symptoms, was seen by [facility] during April admission CTs have been negative no colonoscopy recently no other obvious extraintestinal Crohn's manifestations to start  3 diabetes   4 17 listed allergies, among them multiple antibiotics including quinolones, sulfa, cephalosporins, nitrofurantoin, azithromycin.  Very interesting azithromycin reason for listed allergy is erythema nodosum occurring due to the drug which is unlikely, probably was another skin manifestation indirectly related to Crohn's previous     REC 1 highly doubt this is infection, previous focus on coag negative staph isolate is not appropriate this is not a skin pathogen organism.  She thinks she responds to vancomycin but not other antibiotics overall doubt this is the case  2 has already been biopsied and very notable biopsy, suggesting  this is some type of inflammatory skin condition , quite possibly related directly or indirectly to her Crohn's disease needs dermatology evaluation, currently has an  appointment out many weeks hopefully can get sooner appointment   Agree with GI plan for empiric steroids certainly no objection from an ID standpoint  Nothing here to suggest any infection will sign off              Interval History:     no new complaints and doing well; no cp, sob, n/v/d, or abd pain.  A lot of pain in her legs no fever chills illness or positive microbiology  WBC nl              Medications:     Current Facility-Administered Medications   Medication Dose Route Frequency Provider Last Rate Last Admin    atorvastatin (LIPITOR) tablet 40 mg  40 mg Oral Daily Ramon Sullivan MD   40 mg at 05/27/24 0823    enoxaparin ANTICOAGULANT (LOVENOX) injection 40 mg  40 mg Subcutaneous Q24H Ramon Sullivan MD        fluticasone-vilanterol (BREO ELLIPTA) 200-25 MCG/ACT inhaler 1 puff  1 puff Inhalation Daily Ramon Sullivan MD   1 puff at 05/27/24 0758    insulin aspart (NovoLOG) injection (RAPID ACTING)  1-10 Units Subcutaneous TID AC Ramon Sullivan MD   2 Units at 05/27/24 1335    insulin aspart (NovoLOG) injection (RAPID ACTING)  1-7 Units Subcutaneous At Bedtime Ramon Sullivan MD        insulin NPH injection 13 Units  13 Units Subcutaneous BID Mina Nassar MD        melatonin tablet 3 mg  3 mg Oral At Bedtime Ramon Sullivan MD   3 mg at 05/26/24 2011    metFORMIN (GLUCOPHAGE) tablet 1,000 mg  1,000 mg Oral BID w/meals Ramon Sullivan MD   1,000 mg at 05/27/24 0823    mupirocin (BACTROBAN) 2 % ointment   Topical Daily Mina Nassar MD   Given at 05/26/24 0814    pantoprazole (PROTONIX) EC tablet 40 mg  40 mg Oral Daily Ramon Sullivan MD   40 mg at 05/27/24 0641    predniSONE (DELTASONE) tablet 30 mg  30 mg Oral bid 08 & 14 Mina Nassar MD   30 mg at 05/27/24 1339    sodium chloride (PF) 0.9% PF flush 3 mL  3 mL Intracatheter Q8H Mina Nassar MD   3 mL at 05/27/24 0835    sodium chloride (PF) 0.9% PF flush 3 mL  3 mL Intracatheter Q8H Mina Nassar MD   3 mL at 05/25/24  "2109    traZODone (DESYREL) tablet 50 mg  50 mg Oral At Bedtime Mina Nassar MD        umeclidinium (INCRUSE ELLIPTA) 62.5 MCG/ACT inhaler 1 puff  1 puff Inhalation Daily Ramon Sullivan MD   1 puff at 05/27/24 0758    vitamin D2 (ERGOCALCIFEROL) 23117 units (1250 mcg) capsule 50,000 Units  50,000 Units Oral Weekly Ramon Sullivan MD   50,000 Units at 05/25/24 0930                  Physical Exam:   Blood pressure 128/84, pulse 95, temperature 98.2  F (36.8  C), temperature source Oral, resp. rate 16, height 1.575 m (5' 2\"), weight 79.2 kg (174 lb 9.7 oz), SpO2 95%, not currently breastfeeding.  Wt Readings from Last 2 Encounters:   05/24/24 79.2 kg (174 lb 9.7 oz)   04/29/24 78 kg (172 lb)     Vital Signs with Ranges  Temp:  [98  F (36.7  C)-98.2  F (36.8  C)] 98.2  F (36.8  C)  Pulse:  [70-95] 95  Resp:  [16] 16  BP: (126-151)/(67-84) 128/84  SpO2:  [94 %-95 %] 95 %    Constitutional: Awake, alert, cooperative, no apparent distress     Lungs: Clear to auscultation bilaterally, no crackles or wheezing   Cardiovascular: Regular rate and rhythm, normal S1 and S2, and no murmur noted   Abdomen: Normal bowel sounds, soft, non-distended, non-tender   Skin: No rashes, no cyanosis, legs about the same   Other:           Data:   All microbiology laboratory data reviewed.  Recent Labs   Lab Test 05/26/24  0843 05/25/24  0610 05/24/24  1526   WBC 8.4 7.1 10.9   HGB 10.2* 9.7* 10.7*   HCT 32.9* 32.2* 35.3   MCV 78 79 79    352 434     Recent Labs   Lab Test 05/26/24  0843 05/25/24  0610 05/24/24  1526   CR 0.50* 0.51 0.50*     Recent Labs   Lab Test 09/01/23  1950   SED 19     No lab results found.    Invalid input(s): \"UC\"     "

## 2024-05-27 NOTE — PROGRESS NOTES
Occupational Therapy: Orders received. Chart reviewed and discussed with care team.? Occupational Therapy not indicated due to pt indp in room, completing ADL indp, no skilled OT needs.? Defer discharge recommendations to care team.? Will complete orders.

## 2024-05-27 NOTE — PLAN OF CARE
PRIMARY DIAGNOSIS: ELEVATED LACTIC LEVEL, LE WOUND  OUTPATIENT/OBSERVATION GOALS TO BE MET BEFORE DISCHARGE:  Vitals sign stable or return to baseline: Yes    Tolerating oral antibiotics or has home infusion set up if applicable:  abx discontinued     Pain status: Toradol in use    Return to near baseline physical activity: Yes    Discharge Planner Nurse   Safe discharge environment identified: Yes  Barriers to discharge: Yes       Entered by: ARISTEO SPENCER RN 05/26/2024    Vital signs:  Temp: 98.2  F (36.8  C) Temp src: Oral BP: 126/67 Pulse: 70   Resp: 16 SpO2: 94 % O2 Device: None (Room air)      Alert and oriented x4. Reported pain in the abdomen and RLE improved after IV Toradol and Dilaudid. Wound care completed. Redness to LE improved but RLE appeared more puffy around the bump/scab area and painful to touch. Pt said leg started to hurt more after getting an US done to that leg. On mod carb diet. Good appetite. Prn Senna and Pruine juice given for feeling constipated/bloated. Last BM yesterday. Up independently. Seen by GI. Started on Prednisone. WOC consulted. Call light in reach.    Please review provider order for any additional goals.     Nurse to notify provider when observation goals have been met and patient is ready for discharge.Goal Outcome Evaluation:      Plan of Care Reviewed With: patient    Overall Patient Progress: no changeOverall Patient Progress: no change    Outcome Evaluation: c/o abd and leg pain      Problem: Adult Inpatient Plan of Care  Goal: Plan of Care Review  Description: The Plan of Care Review/Shift note should be completed every shift.  The Outcome Evaluation is a brief statement about your assessment that the patient is improving, declining, or no change.  This information will be displayed automatically on your shift  note.  5/26/2024 1904 by Aristeo Spencer RN  Outcome: Progressing  Flowsheets (Taken 5/26/2024 1904)  Outcome Evaluation: c/o abd and leg pain  Plan  "of Care Reviewed With: patient  Overall Patient Progress: no change  5/26/2024 1240 by Ray Ramirez RN  Outcome: Progressing  Flowsheets (Taken 5/26/2024 1240)  Outcome Evaluation: Senna given for feeling constipated.  Plan of Care Reviewed With: patient  Overall Patient Progress: improving  5/26/2024 1000 by Ray Ramirez RN  Outcome: Progressing  Flowsheets (Taken 5/26/2024 1000)  Outcome Evaluation: pain improving  Plan of Care Reviewed With: patient  Overall Patient Progress: improving  Goal: Patient-Specific Goal (Individualized)  Description: You can add care plan individualizations to a care plan. Examples of Individualization might be:  \"Parent requests to be called daily at 9am for status\", \"I have a hard time hearing out of my right ear\", or \"Do not touch me to wake me up as it startles  me\".  5/26/2024 1904 by Ray Ramirez RN  Outcome: Progressing  5/26/2024 1240 by Ray Ramirez RN  Outcome: Progressing  5/26/2024 1000 by Ray Ramirez RN  Outcome: Progressing  Goal: Absence of Hospital-Acquired Illness or Injury  5/26/2024 1904 by Ray Ramirez RN  Outcome: Progressing  5/26/2024 1240 by Ray Ramirez RN  Outcome: Progressing  5/26/2024 1000 by Ray Ramirez RN  Outcome: Progressing  Intervention: Identify and Manage Fall Risk  Recent Flowsheet Documentation  Taken 5/26/2024 1100 by Ray Ramirez RN  Safety Promotion/Fall Prevention: activity supervised  Intervention: Prevent Skin Injury  Recent Flowsheet Documentation  Taken 5/26/2024 1100 by Ray Ramirez RN  Body Position: position changed independently  Intervention: Prevent and Manage VTE (Venous Thromboembolism) Risk  Recent Flowsheet Documentation  Taken 5/26/2024 1100 by Ray Ramirez RN  VTE Prevention/Management: (dressing) other (see comments)  Goal: Optimal Comfort and Wellbeing  5/26/2024 1904 by Ray Ramirez RN  Outcome: Progressing  5/26/2024 1240 by " Ray Ramirez RN  Outcome: Progressing  5/26/2024 1000 by Ray Ramirez RN  Outcome: Progressing  Goal: Readiness for Transition of Care  5/26/2024 1904 by Ray Ramirez RN  Outcome: Progressing  5/26/2024 1240 by Ray Ramirez RN  Outcome: Progressing  5/26/2024 1000 by Ray Ramirez RN  Outcome: Progressing     Problem: Comorbidity Management  Goal: Blood Glucose Levels Within Targeted Range  5/26/2024 1904 by Ray Ramirez RN  Outcome: Progressing  5/26/2024 1240 by Ray Ramirez RN  Outcome: Progressing  5/26/2024 1000 by Ray Ramirez RN  Outcome: Progressing  Intervention: Monitor and Manage Glycemia  Recent Flowsheet Documentation  Taken 5/26/2024 1100 by Ray Ramirez RN  Medication Review/Management: medications reviewed  Goal: Blood Pressure in Desired Range  5/26/2024 1904 by Ray Ramirez RN  Outcome: Progressing  5/26/2024 1240 by Ray Ramirez RN  Outcome: Progressing  5/26/2024 1000 by Ray Ramirez RN  Outcome: Progressing  Intervention: Maintain Blood Pressure Management  Recent Flowsheet Documentation  Taken 5/26/2024 1100 by Ray Ramirez RN  Medication Review/Management: medications reviewed     Problem: Pain Acute  Goal: Optimal Pain Control and Function  5/26/2024 1904 by Ray Ramirez RN  Outcome: Progressing  5/26/2024 1240 by Ray Ramirez RN  Outcome: Progressing  5/26/2024 1000 by Ray Ramirez RN  Outcome: Progressing  Intervention: Prevent or Manage Pain  Recent Flowsheet Documentation  Taken 5/26/2024 1100 by Ray Ramirez RN  Medication Review/Management: medications reviewed

## 2024-05-27 NOTE — PLAN OF CARE
"Vitals: /84 (BP Location: Right arm)   Pulse 95   Temp 98.2  F (36.8  C) (Oral)   Resp 16   Ht 1.575 m (5' 2\")   Wt 79.2 kg (174 lb 9.7 oz)   SpO2 95%   BMI 31.94 kg/m       Neuro: alert and oriented x4. Able to make needs known.  Cardiac: wdl  Lungs: wdl  GI: wdl ex for mild abdominal pain and bloating occasionally. Had BM this morning. Denies nausea, vomiting.    : wdl  Pain: LE pain. Managed with Tylenol.   IV: saline locked  Labs/Imaging: Mg 1.8. K+4.2  Diet: mod carb diet. Good appetite.   Activity: up independently   Consult: GI, ID, WOC  Plan: continue PO Prednisone. Monitor LE wound. Discharge home tomorrow Vs Wednesday pending LE wound improvement.   Goal Outcome Evaluation:      Plan of Care Reviewed With: patient    Overall Patient Progress: improvingOverall Patient Progress: improving    Outcome Evaluation: redness improve      Problem: Adult Inpatient Plan of Care  Goal: Plan of Care Review  Description: The Plan of Care Review/Shift note should be completed every shift.  The Outcome Evaluation is a brief statement about your assessment that the patient is improving, declining, or no change.  This information will be displayed automatically on your shift  note.  Outcome: Progressing  Flowsheets (Taken 5/27/2024 1754)  Outcome Evaluation: redness improve  Plan of Care Reviewed With: patient  Overall Patient Progress: improving  Goal: Patient-Specific Goal (Individualized)  Description: You can add care plan individualizations to a care plan. Examples of Individualization might be:  \"Parent requests to be called daily at 9am for status\", \"I have a hard time hearing out of my right ear\", or \"Do not touch me to wake me up as it startles  me\".  Outcome: Progressing  Goal: Absence of Hospital-Acquired Illness or Injury  Outcome: Progressing  Intervention: Identify and Manage Fall Risk  Recent Flowsheet Documentation  Taken 5/27/2024 1100 by Ray Ramirez RN  Safety Promotion/Fall " Prevention:   room near nurse's station   room organization consistent  Intervention: Prevent Skin Injury  Recent Flowsheet Documentation  Taken 5/27/2024 1100 by Ray Ramirez RN  Body Position: position changed independently  Intervention: Prevent Infection  Recent Flowsheet Documentation  Taken 5/27/2024 1100 by Ray Ramirez RN  Infection Prevention: rest/sleep promoted  Goal: Optimal Comfort and Wellbeing  Outcome: Progressing  Goal: Readiness for Transition of Care  Outcome: Progressing     Problem: Comorbidity Management  Goal: Blood Glucose Levels Within Targeted Range  Outcome: Progressing  Intervention: Monitor and Manage Glycemia  Recent Flowsheet Documentation  Taken 5/27/2024 1100 by Ray Ramirez RN  Medication Review/Management: medications reviewed  Goal: Blood Pressure in Desired Range  Outcome: Progressing  Intervention: Maintain Blood Pressure Management  Recent Flowsheet Documentation  Taken 5/27/2024 1100 by Ray Ramirez RN  Medication Review/Management: medications reviewed     Problem: Pain Acute  Goal: Optimal Pain Control and Function  Outcome: Progressing  Intervention: Prevent or Manage Pain  Recent Flowsheet Documentation  Taken 5/27/2024 1100 by Ray Ramirez RN  Medication Review/Management: medications reviewed

## 2024-05-27 NOTE — PROGRESS NOTES
"[facility]    Medicine Progress Note - Hospitalist Service    Date of Admission:  5/24/2024    Assessment & Plan   Mary Ann Olson is a 58 year old female admitted on 5/24/2024 with recurrent LE cellulitis.     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  Since then she has been serially admitted with bilat LE \"cellulitis\".  She was again evaluated and treated with antibiotics again at [facility] recently, at which time, the infectious disease consultant clearly stated that the patient had an inflammatory rather than an infectious process.      Dr. Wright fully agrees despite the finding of coag negative staph on biopsy.  He he indicates that the patient's recurrent areas of inflammation are immunologically mediated rather than infectious.  He was also able to get a history from the patient that she has ongoing, chronic diarrhea compatible with underlying untreated active Crohn's disease.    It appears that she was first diagnosed, I believe in 2009.  She subsequently was managed on biologics (Humira).   Over the last couple of years, however the patient has not had drug insurance coverage for which reason she has not been able to be on therapy for the Crohn's.  She indicates that she frequently has diarrheal stools but denies significant hematochezia, severe abdominal pain, weight loss, fevers.  She typically will have 1-2 stools a day, sometimes they are formed and often they are diarrheal.    On 5/26, Dr. Oliver from [facility] saw the patient.  Although she does not have overwhelming evidence of active Crohn's disease, he felt that treating with steroids was both safe and appropriate as a trial to see if it would benefit the patient's lower extremity cellulitic symptoms.today, the pt appears already improved. "       Diagnoses:  Bilateral lower extremity erythema with skin breakdown.  Although this is virtually identical to the appearance of the cellulitis, infectious disease has clearly stated that it is not infection.  Vancomycin has been discontinued.  The cause for the the general improvement, per the explanation of infectious disease consultant, is that the prior shunt presumably receives better maintenance and offloading of her lower extremities than she does at home.  The patient has been referred to a dermatologist but will not be able to be seen for another several weeks.  Insurance may become an issue as well.  -today, I swabbed a draining pustule on the right shin. If it grows Staph epi, then no additional ABX treatment should be offered.  Could consider draining the remaining pockets but they are likely to drain on their own.  Crohn's disease.  This appears to be chronically untreated and active.  Lactic acidosis of unclear significance.  NIDDM.  Currently on metformin 1 g twice a day in addition to NPH insulin twice daily.  Insurance concerns: pt currently has not medication insurance and requests prescriptions to filled as part of the hospitalization. She will also need direction about getting cheaper insulin.      Plan:  1.  Continue insulin and metformin as they are.  I wonder whether the lactic acid may be elevated on the basis of ongoing exposure to metformin.  Now with the addition of steroids, we will need to carefully titrate insulin further.  2.  Discontinue IV antibiotics.    3.  Currently leaving the lower legs open to air.  Wound management to include simple cleaning and covering of the apparent skin breakdown if needed.  Additionally, the patient should try to elevate her legs is much as tolerable throughout the day.  I did encourage her to schedule walking in order to not become excessively weak.  4.  Following recommendations from [facility].  The patient will be started on  "prednisone at 60 mg daily (30 mg twice daily).    5.  Anticipate that pt will need medications filled at our pharmacy so they can be rolled into the hospital bill.           Diet: Moderate Consistent Carb (60 g CHO per Meal) Diet    DVT Prophylaxis: Low Risk/Ambulatory with no VTE prophylaxis indicated  Beckwith Catheter: Not present  Lines: None     Cardiac Monitoring: None  Code Status: Full Code      Clinically Significant Risk Factors Present on Admission                      # DMII: A1C = 8.5 % (Ref range: <5.7 %) within past 6 months    # Obesity: Estimated body mass index is 31.94 kg/m  as calculated from the following:    Height as of this encounter: 1.575 m (5' 2\").    Weight as of this encounter: 79.2 kg (174 lb 9.7 oz).         # Financial/Environmental Concerns:    # COPD: noted on problem list        Disposition Plan     Medically Ready for Discharge: Anticipated in 2-4 Days (possibly tomorrow assuming her wounds continue to improve and she is otherwise doing well)      Mina Nassar MD  Hospitalist Service  [facility]  Securely message with [employer] (more info)  Text page via [employer] Paging/Directory   ______________________________________________________________________    Interval History   Somewhat improved today.  Passed BM and feels this helped.  Abd pain improved. Rash over bilat lower shins also appear improved.     Physical Exam   Vital Signs: Temp: 98.2  F (36.8  C) Temp src: Oral BP: 128/84 Pulse: 95   Resp: 16 SpO2: 95 % O2 Device: None (Room air)    Weight: 174 lbs 9.67 oz    General Appearance: Alert, coherent, in no apparent distress  Respiratory: No increased work of breathing.  Clear to auscultation  Cardiovascular: Regular rate and rhythm without murmur  GI: Soft, nontender, nondistended  Skin: patchy erythematous areas appear less red and more brown now. Still with small areas of superficial fluctuance and a couple of draining pustules are noted. I did swab one of " the draining lesions on the right shin.      Medical Decision Making       45 MINUTES SPENT BY ME on the date of service doing chart review, history, exam, documentation & further activities per the note.      Data   Results for orders placed or performed during the hospital encounter of 05/24/24 (from the past 24 hour(s))   Glucose by meter   Result Value Ref Range    GLUCOSE BY METER POCT 75 70 - 99 mg/dL   Glucose by meter   Result Value Ref Range    GLUCOSE BY METER POCT 134 (H) 70 - 99 mg/dL   Glucose by meter   Result Value Ref Range    GLUCOSE BY METER POCT 126 (H) 70 - 99 mg/dL   Glucose by meter   Result Value Ref Range    GLUCOSE BY METER POCT 157 (H) 70 - 99 mg/dL   Glucose by meter   Result Value Ref Range    GLUCOSE BY METER POCT 164 (H) 70 - 99 mg/dL   Potassium   Result Value Ref Range    Potassium 4.2 3.4 - 5.3 mmol/L   Magnesium   Result Value Ref Range    Magnesium 1.8 1.7 - 2.3 mg/dL   Glucose by meter   Result Value Ref Range    GLUCOSE BY METER POCT 128 (H) 70 - 99 mg/dL   Glucose by meter   Result Value Ref Range    GLUCOSE BY METER POCT 189 (H) 70 - 99 mg/dL     *Note: Due to a large number of results and/or encounters for the requested time period, some results have not been displayed. A complete set of results can be found in Results Review.

## 2024-05-27 NOTE — PLAN OF CARE
"PRIMARY DIAGNOSIS: Bilateral lower leg wounds       Vitals are Temp: 98.2  F (36.8  C) Temp src: Oral BP: 126/67 Pulse: 70   Resp: 16 SpO2: 94 %.  Patient is Alert and Oriented x4. They are independent with no assistive devices .  Pt is a Mod carb diet.  They are complaining of 6/10 pain in their Lower legs.  Toradol given for pain.  Patient is Saline locked. BS checks-. ID following- wounds are from Crohn's related dermatologic condition, GI following. Started on Prednisone. WOC consulted. Mag and K protocol.  Refused trazodone stated it was a \"psych med\". Lower legs have redness and tenderness  Please review provider order for any additional goals.   Nurse to notify provider when observation goals have been met and patient is ready for discharge.    Problem: Adult Inpatient Plan of Care  Goal: Plan of Care Review  Description: The Plan of Care Review/Shift note should be completed every shift.  The Outcome Evaluation is a brief statement about your assessment that the patient is improving, declining, or no change.  This information will be displayed automatically on your shift  note.  Outcome: Progressing  Flowsheets (Taken 5/26/2024 2205)  Outcome Evaluation: Leg pain and agitated  Plan of Care Reviewed With: patient  Overall Patient Progress: no change  Goal: Patient-Specific Goal (Individualized)  Description: You can add care plan individualizations to a care plan. Examples of Individualization might be:  \"Parent requests to be called daily at 9am for status\", \"I have a hard time hearing out of my right ear\", or \"Do not touch me to wake me up as it startles  me\".  Outcome: Progressing  Goal: Absence of Hospital-Acquired Illness or Injury  Outcome: Progressing  Goal: Optimal Comfort and Wellbeing  Outcome: Progressing  Goal: Readiness for Transition of Care  Outcome: Progressing     Problem: Comorbidity Management  Goal: Blood Glucose Levels Within Targeted Range  Outcome: Progressing  Intervention: Monitor and " Manage Glycemia  Recent Flowsheet Documentation  Taken 5/26/2024 2018 by Katerin Ivy RN  Medication Review/Management: medications reviewed  Goal: Blood Pressure in Desired Range  Outcome: Progressing  Intervention: Maintain Blood Pressure Management  Recent Flowsheet Documentation  Taken 5/26/2024 2018 by Katerin Ivy RN  Medication Review/Management: medications reviewed     Problem: Pain Acute  Goal: Optimal Pain Control and Function  Outcome: Progressing  Intervention: Prevent or Manage Pain  Recent Flowsheet Documentation  Taken 5/26/2024 2018 by Katerin Ivy RN  Medication Review/Management: medications reviewed   Goal Outcome Evaluation:      Plan of Care Reviewed With: patient    Overall Patient Progress: no changeOverall Patient Progress: no change    Outcome Evaluation: Leg pain and agitated

## 2024-05-27 NOTE — PLAN OF CARE
"PRIMARY DIAGNOSIS: Bilateral lower leg wounds  OUTPATIENT/OBSERVATION GOALS TO BE MET BEFORE DISCHARGE:  1. Pain Status: Improved-controlled with oral pain medications.    2. Return to near baseline physical activity: Yes    3. Cleared for discharge by consultants (if involved): No    Discharge Planner Nurse   Safe discharge environment identified: Yes  Barriers to discharge: Yes       Vitals are Temp: 98.2  F (36.8  C) Temp src: Oral BP: 126/67 Pulse: 70   Resp: 16 SpO2: 94 %.  Patient is Alert and Oriented x4. They are independent with no assistive devices .  Pt is a Mod carb diet.  They are complaining of 6/10 pain in their Lower legs.  Toradol given for pain.  Patient is Saline locked. BS checks- refused NPH insulin for BS of 126. ID following- wounds are from Crohn's related dermatologic condition, GI following. Started on Prednisone. WOC consulted. Mag and K protocol.  Refused trazodone stated it was a \"psych med\".   Please review provider order for any additional goals.   Nurse to notify provider when observation goals have been met and patient is ready for discharge.  "

## 2024-05-28 LAB
GLUCOSE BLDC GLUCOMTR-MCNC: 110 MG/DL (ref 70–99)
GLUCOSE BLDC GLUCOMTR-MCNC: 140 MG/DL (ref 70–99)
GLUCOSE BLDC GLUCOMTR-MCNC: 143 MG/DL (ref 70–99)
GLUCOSE BLDC GLUCOMTR-MCNC: 183 MG/DL (ref 70–99)
GLUCOSE BLDC GLUCOMTR-MCNC: 191 MG/DL (ref 70–99)
MAGNESIUM SERPL-MCNC: 2 MG/DL (ref 1.7–2.3)
POTASSIUM SERPL-SCNC: 4.1 MMOL/L (ref 3.4–5.3)

## 2024-05-28 PROCEDURE — 250N000013 HC RX MED GY IP 250 OP 250 PS 637: Performed by: INTERNAL MEDICINE

## 2024-05-28 PROCEDURE — 36415 COLL VENOUS BLD VENIPUNCTURE: CPT | Performed by: INTERNAL MEDICINE

## 2024-05-28 PROCEDURE — G0463 HOSPITAL OUTPT CLINIC VISIT: HCPCS

## 2024-05-28 PROCEDURE — 999N000157 HC STATISTIC RCP TIME EA 10 MIN

## 2024-05-28 PROCEDURE — 99232 SBSQ HOSP IP/OBS MODERATE 35: CPT | Performed by: PHYSICIAN ASSISTANT

## 2024-05-28 PROCEDURE — 250N000013 HC RX MED GY IP 250 OP 250 PS 637: Performed by: NURSE PRACTITIONER

## 2024-05-28 PROCEDURE — 83735 ASSAY OF MAGNESIUM: CPT | Performed by: INTERNAL MEDICINE

## 2024-05-28 PROCEDURE — 84132 ASSAY OF SERUM POTASSIUM: CPT | Performed by: INTERNAL MEDICINE

## 2024-05-28 PROCEDURE — 250N000012 HC RX MED GY IP 250 OP 636 PS 637: Performed by: INTERNAL MEDICINE

## 2024-05-28 PROCEDURE — 94640 AIRWAY INHALATION TREATMENT: CPT

## 2024-05-28 PROCEDURE — 120N000001 HC R&B MED SURG/OB

## 2024-05-28 RX ORDER — SIMETHICONE 80 MG
80 TABLET,CHEWABLE ORAL 4 TIMES DAILY PRN
Status: DISCONTINUED | OUTPATIENT
Start: 2024-05-28 | End: 2024-05-29 | Stop reason: HOSPADM

## 2024-05-28 RX ADMIN — MUPIROCIN: 20 OINTMENT TOPICAL at 08:47

## 2024-05-28 RX ADMIN — ACETAMINOPHEN 1000 MG: 500 TABLET, FILM COATED ORAL at 04:06

## 2024-05-28 RX ADMIN — ACETAMINOPHEN 1000 MG: 500 TABLET, FILM COATED ORAL at 11:32

## 2024-05-28 RX ADMIN — METFORMIN HYDROCHLORIDE 1000 MG: 500 TABLET ORAL at 17:51

## 2024-05-28 RX ADMIN — PREDNISONE 30 MG: 20 TABLET ORAL at 08:40

## 2024-05-28 RX ADMIN — Medication 3 MG: at 21:29

## 2024-05-28 RX ADMIN — SIMETHICONE 80 MG: 80 TABLET, CHEWABLE ORAL at 15:33

## 2024-05-28 RX ADMIN — INSULIN ASPART 2 UNITS: 100 INJECTION, SOLUTION INTRAVENOUS; SUBCUTANEOUS at 17:52

## 2024-05-28 RX ADMIN — FLUTICASONE FUROATE AND VILANTEROL TRIFENATATE 1 PUFF: 200; 25 POWDER RESPIRATORY (INHALATION) at 08:08

## 2024-05-28 RX ADMIN — ALUMINUM HYDROXIDE, MAGNESIUM HYDROXIDE, AND SIMETHICONE 30 ML: 1200; 120; 1200 SUSPENSION ORAL at 14:29

## 2024-05-28 RX ADMIN — UMECLIDINIUM 1 PUFF: 62.5 AEROSOL, POWDER ORAL at 08:08

## 2024-05-28 RX ADMIN — METFORMIN HYDROCHLORIDE 1000 MG: 500 TABLET ORAL at 08:35

## 2024-05-28 RX ADMIN — ACETAMINOPHEN 1000 MG: 500 TABLET, FILM COATED ORAL at 21:28

## 2024-05-28 RX ADMIN — PANTOPRAZOLE SODIUM 40 MG: 40 TABLET, DELAYED RELEASE ORAL at 08:35

## 2024-05-28 RX ADMIN — INSULIN ASPART 1 UNITS: 100 INJECTION, SOLUTION INTRAVENOUS; SUBCUTANEOUS at 13:06

## 2024-05-28 RX ADMIN — ATORVASTATIN CALCIUM 40 MG: 40 TABLET, FILM COATED ORAL at 08:35

## 2024-05-28 RX ADMIN — PREDNISONE 30 MG: 20 TABLET ORAL at 14:25

## 2024-05-28 RX ADMIN — HYDROXYZINE HYDROCHLORIDE 25 MG: 25 TABLET, FILM COATED ORAL at 06:23

## 2024-05-28 ASSESSMENT — ACTIVITIES OF DAILY LIVING (ADL)
ADLS_ACUITY_SCORE: 33
ADLS_ACUITY_SCORE: 33
ADLS_ACUITY_SCORE: 34
ADLS_ACUITY_SCORE: 33
ADLS_ACUITY_SCORE: 34
ADLS_ACUITY_SCORE: 33
ADLS_ACUITY_SCORE: 34
ADLS_ACUITY_SCORE: 33
ADLS_ACUITY_SCORE: 34
ADLS_ACUITY_SCORE: 33

## 2024-05-28 NOTE — CONSULTS
SPIRITUAL HEALTH SERVICES - Consult Note  Obs 2    Referral Source/ Reason for Visit: Staff consult for emotional support.    Summary and Recommendations -     Brief discussion with patient regarding her autoimmune disease, which is her reason for admission.  She indicated that she is experiencing pain.    Plan: Patient stated that she wanted to rest.  SHS remains available if she wishes to have further conversation.    Rev. Gabbi Rodriguez M. Div.  Staff     SHS available 24/7 for emergent requests/ referrals, either by paging the on-call  or by entering an ASAP/STAT consult in [place], which will also page the on-call .      Assessment    Saw pt Mary Ann Olson regarding staff consult request for emotional support.    Patient/Family Understanding of Illness and Goals of Care - She understands that she is at  due to complications from an autoimmune disease.    Distress and Loss - Patient is wearying of her chronic condition; which limits her activities and lifestyle..    Strengths, Coping and Resources - She named her friend Venita as a supportive person in her life.    Meaning, Beliefs and Spirituality - Patient states that she is [employer].  She has no home parish at this time.  She was anointed in March, 2024 by Fr. Rojelio Khan.

## 2024-05-28 NOTE — PLAN OF CARE
"Shift from [id number]     Inpatient Progress Note:  For complete assessment see flow sheet documentation.     Orientation: AO x4  Neuros: WDL  Pain status: Denies  Activity: Up independently  Resp: WDL  Cardiac: WDL  GI: WDL  : WDL  Skin: Wounds on BLE  LDA: PIV in L lower forearm  Infusions: SL    Diet: Mod Carb  Pertinent Labs: ACHS blood glucose checks  Safety: Bed in low position, call light within reach  Consults: WOC  Discharge Plan: likely 5/28    Goal Outcome Evaluation:      Plan of Care Reviewed With: patient    Overall Patient Progress: improvingOverall Patient Progress: improving    Outcome Evaluation: redness improved    Problem: Adult Inpatient Plan of Care  Goal: Plan of Care Review  Description: The Plan of Care Review/Shift note should be completed every shift.  The Outcome Evaluation is a brief statement about your assessment that the patient is improving, declining, or no change.  This information will be displayed automatically on your shift  note.  Outcome: Progressing  Flowsheets (Taken 5/27/2024 0608)  Outcome Evaluation: Leg pain and agitiated  Plan of Care Reviewed With: patient  Overall Patient Progress: no change  Goal: Patient-Specific Goal (Individualized)  Description: You can add care plan individualizations to a care plan. Examples of Individualization might be:  \"Parent requests to be called daily at 9am for status\", \"I have a hard time hearing out of my right ear\", or \"Do not touch me to wake me up as it startles  me\".  Outcome: Progressing  Goal: Absence of Hospital-Acquired Illness or Injury  Outcome: Progressing  Intervention: Prevent Skin Injury  Recent Flowsheet Documentation  Taken 5/27/2024 0235 by Yane Malin RN  Body Position: position changed independently  Intervention: Prevent Infection  Recent Flowsheet Documentation  Taken 5/27/2024 0235 by Yane Malin, RN  Infection Prevention:   rest/sleep promoted   hand hygiene promoted  Goal: Optimal Comfort and " Wellbeing  Outcome: Progressing  Goal: Readiness for Transition of Care  Outcome: Progressing     Problem: Comorbidity Management  Goal: Blood Glucose Levels Within Targeted Range  Outcome: Progressing  Goal: Blood Pressure in Desired Range  Outcome: Progressing     Problem: Pain Acute  Goal: Optimal Pain Control and Function  Outcome: Progressing

## 2024-05-28 NOTE — PROGRESS NOTES
"GASTROENTEROLOGY PROGRESS NOTE     CC: Cellulitis      SUBJECTIVE:  Reports generalized abdominal pain, and bloating. Had two. Non bloody stools today. Denies upper GI symptoms      OBJECTIVE:  General Appearance:  Not in distress   /59 (BP Location: Right arm)   Pulse 71   Temp 98.3  F (36.8  C) (Oral)   Resp 16   Ht 1.575 m (5' 2\")   Wt 79.2 kg (174 lb 9.7 oz)   SpO2 94%   BMI 31.94 kg/m    Temp (24hrs), Av  F (36.7  C), Min:97.8  F (36.6  C), Max:98.3  F (36.8  C)    No data found.  No intake or output data in the 24 hours ending 24 1433     PHYSICAL EXAM  General: alert, oriented, NAD  SKIN: Raised lesions and erythema on both lower legs   EYES: No scleral icterus   RESPIRATORY: Non labored breathing  GASTROINTESTINAL: Active bowel sounds,  abdomen soft and generalized tenderness on palpation.   NEURO:Grossly WNL.    Additional Comments:  ROS, FH, SH: See initial GI consult for details.          Recent Labs   Lab Test 24  0843 24  0610 24  1526 23  1447 23  1950 19  0109 19  0232 19  0349   WBC 8.4 7.1 10.9   < > 11.4*   < > 12.0* 9.0   HGB 10.2* 9.7* 10.7*   < > 10.5*   < > 14.4 13.8   MCV 78 79 79   < > 83   < > 91 91    352 434   < > 347   < > 282 209   INR  --   --   --   --  0.90  --  0.85* 0.82*    < > = values in this interval not displayed.     Recent Labs   Lab Test 24  0710 24  0637 24  0843 24  0610 24  1526   POTASSIUM 4.1 4.2 4.2 4.0 4.5   CHLORIDE  --   --  101 106 99   CO2  --   --   22 24   BUN  --   --  11.8 10.2 14.7   ANIONGAP  --   --  14 13 15     Recent Labs   Lab Test 24  0843 24  1912 24  1043 23  1950 23  1940 23  1734 23  1354 23  1314 23  2312 23   ALBUMIN 3.9  --  4.0 3.8   < >  --  4.1  --   --  4.1  --  4.4   BILITOTAL 0.2  --  0.3 0.2   < >  --  0.2  --   --  0.4  --  0.2   ALT " 12  --  13 17   < >  --  13  --   --  20  --  15   AST 13  --  16 16   < >  --  19  --   --  21  --  16   PROTEIN  --  Negative  --   --   --  Negative  --  Negative   < >  --    < >  --    LIPASE  --   --   --   --   --   --  21  --   --  21  --  12*    < > = values in this interval not displayed.        Imaging and procedures:    I have reviewed the patient's new clinical lab results    Problem list pertaining to GI:  History of Crohn's disease   Pyoderma gangrenosum     Assessment:  This is a 58 y-o female with a history of LE cellulitis, evaluated by GI and ID and felt to have some Crohn's related dermatologic condition either direct or indirect, possibly erythema nodosum, pyoderma gangrenosum. GI consulted for the management of Crohn's.  No recent endoscopic report available for review. She was last seen in [facility] clinic in 2020 and was recommended endoscopic evaluation. However, this was not completed. She was recommended to return to Rheumatology for the management of sacroiliitis with Humira.     Plan:  - Continue short steroid taper recommended in consult note.  -Follow-up in dermatology for long term maintenance for pyoderma  - Follow-up in IBD clinic and endoscopic evaluation as recommended to evaluate Crohn's disease   - Simethicone 80 mg four times daily as needed  - GI will arrange outpatient follow-up  - GI will sign off    I will discuss with Dr. Godinez    Time spent: 20 minutes, greater than 50% of the visit was spent in counseling/coordination of care.     Jojo Adrian CNP  [facility] ([facility])  Mobile # [phone]

## 2024-05-28 NOTE — PLAN OF CARE
"INPATIENT NOTE: 0700-1930     PRIMARY PROBLEM: BLE Erythema/Nodular Eruptions/ BLE pain     Vital Signs: /75 (BP Location: Right arm)   Pulse 81   Temp 98.2  F (36.8  C) (Oral)   Resp 16   Ht 1.575 m (5' 2\")   Wt 79.2 kg (174 lb 9.7 oz)   SpO2 96%   BMI 31.94 kg/m           Orientation: A/O x4   Neuro: intact  Pain status: abd and leg pain - 6-8/10  Resp: Lung sounds clear, denies any SOB  Cardiac: WDL  GI: Bowel sounds Normoactive all quads. Last BM multiple today  : WDL  Skin: irritation/ scabs/ redness to bilat lower legs.  LDA: Peripheral IV to L wrist, SL  Pertinent Labs/Imaging: ACHS BS checks  Diet: mod carb  Activity: ind in room and halls  Alarms/Safety: All alarms on and audible  Consults: ID, WOC, SW  Discharge Plan: to home with previously set up home care  Discharge Date: planning for tomorrow 5/29     Pt pleasant. Complains of gas pain and bloating in abd. Maalox and simethicone tab given with little relief. Tylenol given for abd pain this afternoon. WOC orders for wound care in place - cleaned bilat LL and applied medicated cream this am. No drainage from pustules today and redness has decreased considerably. Hopeful to discharge tomorrow to home with steroids and follow up to GI for colonoscopy out pt.  ** Discharge meds and equipment in bag in med room + labeled bag with insulin in fridge to be added to discharge med bag.    Heena Thomas RN    Problem: Adult Inpatient Plan of Care  Goal: Plan of Care Review  Description: The Plan of Care Review/Shift note should be completed every shift.  The Outcome Evaluation is a brief statement about your assessment that the patient is improving, declining, or no change.  This information will be displayed automatically on your shift  note.  Outcome: Progressing  Flowsheets (Taken 5/28/2024 1451)  Outcome Evaluation: decreased redness and swelling to bilat legs, no weeping.  Plan of Care Reviewed With: patient  Overall Patient Progress: " "improving  Goal: Patient-Specific Goal (Individualized)  Description: You can add care plan individualizations to a care plan. Examples of Individualization might be:  \"Parent requests to be called daily at 9am for status\", \"I have a hard time hearing out of my right ear\", or \"Do not touch me to wake me up as it startles  me\".  Outcome: Progressing  Goal: Absence of Hospital-Acquired Illness or Injury  Outcome: Progressing  Intervention: Identify and Manage Fall Risk  Recent Flowsheet Documentation  Taken 5/28/2024 0800 by Heena Thomas RN  Safety Promotion/Fall Prevention:   assistive device/personal items within reach   clutter free environment maintained   lighting adjusted   nonskid shoes/slippers when out of bed   patient and family education   room organization consistent   safety round/check completed   supervised activity  Intervention: Prevent Skin Injury  Recent Flowsheet Documentation  Taken 5/28/2024 0800 by eHena Thomas RN  Body Position: position changed independently  Skin Protection:   adhesive use limited   transparent dressing maintained  Device Skin Pressure Protection:   absorbent pad utilized/changed   adhesive use limited  Intervention: Prevent and Manage VTE (Venous Thromboembolism) Risk  Recent Flowsheet Documentation  Taken 5/28/2024 0800 by Heena Thomas RN  VTE Prevention/Management: (dressings in place) other (see comments)  Intervention: Prevent Infection  Recent Flowsheet Documentation  Taken 5/28/2024 0800 by Heena Thomas RN  Infection Prevention:   rest/sleep promoted   single patient room provided   hand hygiene promoted   environmental surveillance performed  Goal: Optimal Comfort and Wellbeing  Outcome: Progressing  Intervention: Monitor Pain and Promote Comfort  Recent Flowsheet Documentation  Taken 5/28/2024 0800 by Heena Thomas RN  Pain Management Interventions:   medication (see MAR)   rest  Goal: Readiness for Transition of Care  Outcome: " Progressing     Problem: Comorbidity Management  Goal: Blood Glucose Levels Within Targeted Range  Outcome: Progressing  Intervention: Monitor and Manage Glycemia  Recent Flowsheet Documentation  Taken 5/28/2024 0800 by Heena Thomas RN  Glycemic Management:   blood glucose monitored   supplemental insulin given  Medication Review/Management: medications reviewed  Goal: Blood Pressure in Desired Range  Outcome: Progressing  Intervention: Maintain Blood Pressure Management  Recent Flowsheet Documentation  Taken 5/28/2024 0800 by Heena Thomas RN  Medication Review/Management: medications reviewed   Goal Outcome Evaluation:      Plan of Care Reviewed With: patient    Overall Patient Progress: improvingOverall Patient Progress: improving    Outcome Evaluation: decreased redness and swelling to bilat legs, no weeping.

## 2024-05-28 NOTE — PLAN OF CARE
"Care from [id number]    Inpatient Progress Note:  For complete assessment see flow sheet documentation.    /63 (BP Location: Right arm)   Pulse 76   Temp 97.8  F (36.6  C) (Oral)   Resp 18   Ht 1.575 m (5' 2\")   Wt 79.2 kg (174 lb 9.7 oz)   SpO2 96%   BMI 31.94 kg/m         Patient is A&Ox4, up independently in room, tylenol for pain, bilateral lower extremities wrapped, hydroxyzine given for itching, ID signed off, WOC following, K & Mag protocol- redraws this AM, SW following       Problem: Adult Inpatient Plan of Care  Goal: Absence of Hospital-Acquired Illness or Injury  Intervention: Identify and Manage Fall Risk  Recent Flowsheet Documentation  Taken 5/28/2024 0000 by Krupa Yarbrough RN  Safety Promotion/Fall Prevention: activity supervised  Intervention: Prevent Skin Injury  Recent Flowsheet Documentation  Taken 5/28/2024 0000 by Krupa Yarbrough RN  Body Position: position changed independently  Intervention: Prevent Infection  Recent Flowsheet Documentation  Taken 5/28/2024 0000 by Krupa Yarbrough RN  Infection Prevention: rest/sleep promoted  Goal: Optimal Comfort and Wellbeing  Intervention: Monitor Pain and Promote Comfort  Recent Flowsheet Documentation  Taken 5/28/2024 0406 by Krupa Yarbrough RN  Pain Management Interventions: medication (see MAR)  Taken 5/28/2024 0000 by Krupa Yarbrough RN  Pain Management Interventions: declines     Problem: Comorbidity Management  Goal: Blood Glucose Levels Within Targeted Range  Intervention: Monitor and Manage Glycemia  Recent Flowsheet Documentation  Taken 5/28/2024 0000 by Krupa Yarbrough RN  Medication Review/Management: medications reviewed  Goal: Blood Pressure in Desired Range  Intervention: Maintain Blood Pressure Management  Recent Flowsheet Documentation  Taken 5/28/2024 0000 by Krupa Yarbrough RN  Medication Review/Management: medications reviewed     Problem: Pain Acute  Goal: Optimal Pain Control and Function  Intervention: " Develop Pain Management Plan  Recent Flowsheet Documentation  Taken 5/28/2024 0406 by Krupa Yarbrough, RN  Pain Management Interventions: medication (see MAR)  Taken 5/28/2024 0000 by Krupa Yarbrough, RN  Pain Management Interventions: declines  Intervention: Prevent or Manage Pain  Recent Flowsheet Documentation  Taken 5/28/2024 0000 by Krupa Yarbrough, RN  Medication Review/Management: medications reviewed

## 2024-05-28 NOTE — CONSULTS
"[facility] Nurse Inpatient Assessment     Consulted for: Legs    Patient History (according to provider note(s):      Mary Ann Olson is a 58 year old female admitted on 5/24/2024 with recurrent LE cellulitis.      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  Since then she has been serially admitted with bilat LE \"cellulitis\".  She was again evaluated and treated with antibiotics again at [facility] recently, at which time, the infectious disease consultant clearly stated that the patient had an inflammatory rather than an infectious process.         Assessment:      Areas visualized during today's visit: Lower extremities     Wound location: Bilateral legs  Last photo: 5/28/24    Left medial calf      Right shin      Wound due to:  inflammatory skin condition   Wound history/plan of care: Patient with untreated Crohn's disease with suspected skin manifestations related to this.  Plan for patient to follow up with outpatient dermatologist   Wound base: Mix of red raised areas and dry drainage      Palpation of the wound bed: normal      Drainage: none     Description of drainage: none     Measurements (length x width x depth, in cm): Extensive areas ranging from 0.2x0.2cm to 0.5x1.5cm     Tunneling: N/A     Undermining: N/A  Periwound skin: Intact      Color: normal and consistent with surrounding tissue      Temperature: normal   Odor: none  Pain: mild  Pain interventions prior to dressing change: slow and gentle cares   Treatment goal: Heal   STATUS: initial assessment  Supplies ordered: at bedside       Treatment Plan:     Bilateral leg wound(s): Leave open to air unless areas start to drain, if areas start to drain:  Cleanse with wound cleanser  Cover with adaptic and dry gauze  Wrap with kerlix     Orders: " Written    RECOMMEND PRIMARY TEAM ORDER: None, at this time  Education provided: plan of care  Discussed plan of care with: Patient and Nurse  WO nurse follow-up plan: weekly  Notify WOC if wound(s) deteriorate.  Nursing to notify the Provider(s) and re-consult the WOC Nurse if new skin concern.    DATA:     Current support surface: Standard  Standard Isoflex gel  BMI: Body mass index is 31.94 kg/m .   Active diet order: Orders Placed This Encounter      Moderate Consistent Carb (60 g CHO per Meal) Diet      Diet     Output: No intake/output data recorded.     Labs:   Recent Labs   Lab 05/26/24  0843   ALBUMIN 3.9   HGB 10.2*   WBC 8.4     Pressure injury risk assessment:   Sensory Perception: 4-->no impairment  Moisture: 4-->rarely moist  Activity: 3-->walks occasionally  Mobility: 4-->no limitation  Nutrition: 3-->adequate  Friction and Shear: 3-->no apparent problem  Rodrigo Score: 21    Wang Limon RN CWOCN  Contact [facility] Nurse ([place])  Dept. Office Number: [phone]

## 2024-05-28 NOTE — DISCHARGE INSTRUCTIONS
Bilateral leg wound(s): Leave open to air unless areas start to drain, if areas start to drain:  Cleanse with wound cleanser  Cover with adaptic and dry gauze  Wrap with kerlix and change daily

## 2024-05-28 NOTE — PROGRESS NOTES
Care Management Follow Up    Length of Stay (days): 2    Expected Discharge Date: 05/28/2024     Concerns to be Addressed:       Patient plan of care discussed at interdisciplinary rounds: Yes    Anticipated Discharge Disposition: Home Care    Additional Information:  Updated SARAH Simmons P:[phone] with [facility] on potential discharge planned for tomorrow. Iris stated they had made one visit prior to admission. Pt had discharged from another hospital without any wound care directions or any supplies. Informed Iris that WOC has been consulted and will monitor for their recommendations. Iris would like an update on the recommendations and is asking for wound care supply order be sent to [facility] as pt's PCP would not provide an order until pt has her follow up appointment which is scheduled in June.  Will continue to follow for discharge planning.    Nusrat Wick RN   Inpatient Care Coordination  [facility] [place]   Phone: [phone]

## 2024-05-28 NOTE — PROGRESS NOTES
"[facility]  Internal Medicine  Progress Note    Date of Service: 5/28/2024    Patient: Mary Ann Olson  MRN: [id number]  Admission Date: 5/24/2024      Assessment & Plan:   Mary Ann Olson is a 58 year old female admitted on 5/24/2024 with concern for recurrent LE cellulitis, evaluated by GI and ID and felt to have some Crohn's related dermatologic condition either direct or indirect, possibly erythema nodosum, pyoderma gangrenosum or other.        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  Since then she has been serially admitted with bilat LE \"cellulitis\".  She was again evaluated and treated with antibiotics again at [facility] recently, at which time, the infectious disease consultant clearly stated that the patient had an inflammatory rather than an infectious process.       Dr. Wright with ID has consulted and fully agrees despite the finding of coag negative staph on biopsy.  He he indicates that the patient's recurrent areas of inflammation are immunologically mediated rather than infectious.  He was also able to get a history from the patient that she has ongoing, chronic diarrhea compatible with underlying untreated active Crohn's disease.     It appears that she was first diagnosed, I believe in 2009.  She subsequently was managed on biologics (Humira).   Over the last couple of years, however the patient has not had drug insurance coverage for which reason she has not been able to be on therapy for the Crohn's.  She indicates that she frequently has diarrheal stools but denies significant hematochezia, severe abdominal pain, weight loss, fevers.  She typically will have 1-2 stools a day, sometimes they are formed and often they are diarrheal.     On 5/26, Dr. Oliver from [facility] saw the patient.  " Although she does not have overwhelming evidence of active Crohn's disease, he felt that treating with steroids was both safe and appropriate as a trial to see if it would benefit the patient's lower extremity cellulitic symptoms and patient has reported a slow improvement since admission.      BLE Erythema/Nodular Eruptions  Likely due to inflammatory skin condition possibly related directly or indirectly to her Crohn's disease    - ID and GI have been consulted  - antibiotics discontinued  - wound culture from 5/27 is negative  - continue Prednisone taper  - has follow up with Dermatology on 6/27/24  - will place referral to [facility] to follow up after discharge  - follow up with PCP on 6/4/2  - SW/CC assisting with discharge planning and financial concerns  - wound care consult     Crohn's Disease  Per GI consult, the status of Crohn's involvement in the GI tract is unclear. Recent non-contrast CT with no concerns. This will need to assessed as outpatient with likely colonoscopy and IBD clinic visit.   - continue Prednisone taper as above  - follow up with [facility] in clinic    DM type 2  - continue Metformin, NPH and ISS protocol    HLD  - continue Atorvastatin    Asthma/COPD  - no signs of acute flare; continue inhalers    CODE: Full  DVT: Lovenox  Diet/fluids: regular  Disposition: anticipate home on 5/29.      Elizabeth Beckett MS, PA-C  Hospitalist Physician Assistant  [facility]      Subjective & Interval Hx:    Patient reports she feels her wounds have improved since discharge.  Has a mild stomach ache but is tolerating po.  Has had two stools this morning with is her baseline.  Feels she is not able to discharge today.    Last 24 hr care team notes reviewed.   ROS:  4 point ROS including Respiratory, CV, GI and , other than that noted in the HPI, is negative.    Physical Exam:    Blood pressure 125/75, pulse 85, temperature 97.9  F (36.6  C), temperature source Oral, resp. rate 18, height 1.575 m (5'  "2\"), weight 79.2 kg (174 lb 9.7 oz), SpO2 98%, not currently breastfeeding.  General: Alert, interactive, NAD  HEENT: AT/NC  Resp: clear to auscultation bilaterally, no crackles or wheezes  Cardiac: regular rate and rhythm, no murmur  Abdomen: Soft, nontender, nondistended. +BS.  Extremities: BLE with erythema within the marked borders.  Erythema is patchy with small dried pustules and non draining small nodules.  Skin: Warm and dry  Neuro: Alert & oriented x 3, Cns 2-12 intact, moves all extremities equally    Labs & Images:  Reviewed in Epic   Medications:    Current Facility-Administered Medications   Medication Dose Route Frequency Provider Last Rate Last Admin    acetaminophen (TYLENOL) tablet 1,000 mg  1,000 mg Oral Q6H PRN Ramon Sullivan MD   1,000 mg at 05/28/24 0406    albuterol (PROVENTIL HFA/VENTOLIN HFA) inhaler  2 puff Inhalation Q2H PRN Ramon Sullivan MD        alum & mag hydroxide-simethicone (MAALOX) suspension 30 mL  30 mL Oral Q4H PRN Mina Nassar MD        atorvastatin (LIPITOR) tablet 40 mg  40 mg Oral Daily Ramon Sullivan MD   40 mg at 05/28/24 0835    glucose gel 15-30 g  15-30 g Oral Q15 Min PRN Ramon Sullivan MD        Or    dextrose 50 % injection 25-50 mL  25-50 mL Intravenous Q15 Min PRN Ramon Sullivan MD        Or    glucagon injection 1 mg  1 mg Subcutaneous Q15 Min PRN Ramon Sullivan MD        enoxaparin ANTICOAGULANT (LOVENOX) injection 40 mg  40 mg Subcutaneous Q24H Ramon Sullivan MD        fluticasone-vilanterol (BREO ELLIPTA) 200-25 MCG/ACT inhaler 1 puff  1 puff Inhalation Daily Ramon Sullivan MD   1 puff at 05/28/24 0808    HYDROmorphone (DILAUDID) injection 0.2 mg  0.2 mg Intravenous Q2H PRN Ramon Sullivan MD   0.2 mg at 05/26/24 1421    HYDROmorphone (DILAUDID) injection 0.4 mg  0.4 mg Intravenous Q2H PRN Ramon Sullivan MD        hydrOXYzine HCl (ATARAX) tablet 25 mg  25 mg Oral Q6H PRN Mina Nassar MD   25 mg at 05/28/24 0623    Or "    hydrOXYzine HCl (ATARAX) tablet 50 mg  50 mg Oral Q6H PRN Mina Nassar MD        insulin aspart (NovoLOG) injection (RAPID ACTING)  1-10 Units Subcutaneous TID AC Ramon Sullivan MD   2 Units at 05/27/24 1814    insulin aspart (NovoLOG) injection (RAPID ACTING)  1-7 Units Subcutaneous At Bedtime Ramon Sullivan MD   1 Units at 05/27/24 2138    insulin NPH injection 13 Units  13 Units Subcutaneous BID Mina Nassar MD   13 Units at 05/28/24 0843    ipratropium - albuterol 0.5 mg/2.5 mg/3 mL (DUONEB) neb solution 3 mL  1 vial Nebulization BID PRN Ramon Sullivan MD        ketorolac (TORADOL) injection 15 mg  15 mg Intravenous Q6H PRN Mina Nassar MD   15 mg at 05/26/24 1837    lidocaine (LMX4) cream   Topical Q1H PRN Mina Nassar MD        lidocaine 1 % 0.1-1 mL  0.1-1 mL Other Q1H PRN Mina Nassar MD        melatonin tablet 3 mg  3 mg Oral At Bedtime Ramon Sullivan MD   3 mg at 05/27/24 2135    metFORMIN (GLUCOPHAGE) tablet 1,000 mg  1,000 mg Oral BID w/meals Ramon Sullivan MD   1,000 mg at 05/28/24 0835    mupirocin (BACTROBAN) 2 % ointment   Topical Daily Mina Nassar MD   Given at 05/28/24 0847    naloxone (NARCAN) injection 0.2 mg  0.2 mg Intravenous Q2 Min PRN Ramon Sullivan MD        Or    naloxone (NARCAN) injection 0.4 mg  0.4 mg Intravenous Q2 Min PRN Ramon Sullivan MD        Or    naloxone (NARCAN) injection 0.2 mg  0.2 mg Intramuscular Q2 Min PRN Ramon Sullivan MD        Or    naloxone (NARCAN) injection 0.4 mg  0.4 mg Intramuscular Q2 Min PRN Ramon Sullivan MD        ondansetron (ZOFRAN ODT) ODT tab 4 mg  4 mg Oral Q6H PRN Ramon Sullivan MD        Or    ondansetron (ZOFRAN) injection 4 mg  4 mg Intravenous Q6H PRN Ramon Sullivan MD        oxyCODONE (ROXICODONE) tablet 5 mg  5 mg Oral Q4H PRN Ramon Sullivan MD        oxyCODONE IR (ROXICODONE) half-tab 2.5 mg  2.5 mg Oral Q4H PRN Ramon Sullivan MD        pantoprazole (PROTONIX) EC tablet  40 mg  40 mg Oral Daily Ramon Sullivan MD   40 mg at 05/28/24 0835    predniSONE (DELTASONE) tablet 30 mg  30 mg Oral bid 08 & 14 Mina Nassar MD   30 mg at 05/28/24 0840    prochlorperazine (COMPAZINE) injection 10 mg  10 mg Intravenous Q6H PRN Ramon Sullivan MD        Or    prochlorperazine (COMPAZINE) tablet 10 mg  10 mg Oral Q6H PRN Ramon Sullivan MD        Or    prochlorperazine (COMPAZINE) suppository 25 mg  25 mg Rectal Q12H PRN Ramon Sullivan MD        senna-docusate (SENOKOT-S/PERICOLACE) 8.6-50 MG per tablet 1 tablet  1 tablet Oral BID PRN Ramon Sullivan MD   1 tablet at 05/26/24 1148    Or    senna-docusate (SENOKOT-S/PERICOLACE) 8.6-50 MG per tablet 2 tablet  2 tablet Oral BID PRN Ramon Sullivan MD        sodium chloride (PF) 0.9% PF flush 3 mL  3 mL Intracatheter Q8H Mina Nassar MD   3 mL at 05/28/24 0623    sodium chloride (PF) 0.9% PF flush 3 mL  3 mL Intracatheter q1 min prn Mina Nassar MD        sodium chloride (PF) 0.9% PF flush 3 mL  3 mL Intracatheter Q8H Mina Nassar MD   3 mL at 05/28/24 0214    sodium chloride (PF) 0.9% PF flush 3 mL  3 mL Intracatheter q1 min prn Mina Nassar MD        traZODone (DESYREL) tablet 50 mg  50 mg Oral At Bedtime Mina Nassar MD        umeclidinium (INCRUSE ELLIPTA) 62.5 MCG/ACT inhaler 1 puff  1 puff Inhalation Daily Ramon Sullivan MD   1 puff at 05/28/24 0808    vitamin D2 (ERGOCALCIFEROL) 07161 units (1250 mcg) capsule 50,000 Units  50,000 Units Oral Weekly Ramon Sullivan MD   50,000 Units at 05/25/24 0930

## 2024-05-29 ENCOUNTER — TELEPHONE (OUTPATIENT)
Dept: DERMATOLOGY | Facility: CLINIC | Age: 59
End: 2024-05-29
Payer: COMMERCIAL

## 2024-05-29 ENCOUNTER — DOCUMENTATION ONLY (OUTPATIENT)
Dept: FAMILY MEDICINE | Facility: CLINIC | Age: 59
End: 2024-05-29
Payer: COMMERCIAL

## 2024-05-29 VITALS
WEIGHT: 174.6 LBS | HEIGHT: 62 IN | BODY MASS INDEX: 32.13 KG/M2 | TEMPERATURE: 98 F | RESPIRATION RATE: 16 BRPM | OXYGEN SATURATION: 96 % | DIASTOLIC BLOOD PRESSURE: 82 MMHG | HEART RATE: 68 BPM | SYSTOLIC BLOOD PRESSURE: 134 MMHG

## 2024-05-29 LAB
BACTERIA ABSC ANAEROBE+AEROBE CULT: ABNORMAL
BACTERIA ABSC ANAEROBE+AEROBE CULT: ABNORMAL
BACTERIA BLD CULT: NO GROWTH
BACTERIA BLD CULT: NO GROWTH
GLUCOSE BLDC GLUCOMTR-MCNC: 102 MG/DL (ref 70–99)
GLUCOSE BLDC GLUCOMTR-MCNC: 139 MG/DL (ref 70–99)
GLUCOSE BLDC GLUCOMTR-MCNC: 156 MG/DL (ref 70–99)
GRAM STAIN RESULT: ABNORMAL
GRAM STAIN RESULT: ABNORMAL

## 2024-05-29 PROCEDURE — 99239 HOSP IP/OBS DSCHRG MGMT >30: CPT | Performed by: PHYSICIAN ASSISTANT

## 2024-05-29 PROCEDURE — 250N000013 HC RX MED GY IP 250 OP 250 PS 637: Performed by: INTERNAL MEDICINE

## 2024-05-29 PROCEDURE — 250N000012 HC RX MED GY IP 250 OP 636 PS 637: Performed by: INTERNAL MEDICINE

## 2024-05-29 PROCEDURE — 250N000013 HC RX MED GY IP 250 OP 250 PS 637: Performed by: NURSE PRACTITIONER

## 2024-05-29 RX ORDER — BUDESONIDE AND FORMOTEROL FUMARATE DIHYDRATE 160; 4.5 UG/1; UG/1
2 AEROSOL RESPIRATORY (INHALATION) 2 TIMES DAILY
Qty: 10.2 G | Refills: 0 | Status: SHIPPED | OUTPATIENT
Start: 2024-05-29 | End: 2024-07-15

## 2024-05-29 RX ORDER — SIMETHICONE 80 MG
80 TABLET,CHEWABLE ORAL 4 TIMES DAILY PRN
Qty: 120 TABLET | Refills: 0 | Status: SHIPPED | OUTPATIENT
Start: 2024-05-29

## 2024-05-29 RX ORDER — ATORVASTATIN CALCIUM 40 MG/1
40 TABLET, FILM COATED ORAL DAILY
Qty: 30 TABLET | Refills: 0 | Status: SHIPPED | OUTPATIENT
Start: 2024-05-29 | End: 2024-09-04

## 2024-05-29 RX ADMIN — PREDNISONE 30 MG: 20 TABLET ORAL at 08:05

## 2024-05-29 RX ADMIN — FLUTICASONE FUROATE AND VILANTEROL TRIFENATATE 1 PUFF: 200; 25 POWDER RESPIRATORY (INHALATION) at 08:07

## 2024-05-29 RX ADMIN — ATORVASTATIN CALCIUM 40 MG: 40 TABLET, FILM COATED ORAL at 07:53

## 2024-05-29 RX ADMIN — METFORMIN HYDROCHLORIDE 1000 MG: 500 TABLET ORAL at 07:53

## 2024-05-29 RX ADMIN — SIMETHICONE 80 MG: 80 TABLET, CHEWABLE ORAL at 07:52

## 2024-05-29 RX ADMIN — MUPIROCIN: 20 OINTMENT TOPICAL at 09:37

## 2024-05-29 RX ADMIN — PANTOPRAZOLE SODIUM 40 MG: 40 TABLET, DELAYED RELEASE ORAL at 07:53

## 2024-05-29 RX ADMIN — INSULIN ASPART 1 UNITS: 100 INJECTION, SOLUTION INTRAVENOUS; SUBCUTANEOUS at 07:53

## 2024-05-29 RX ADMIN — UMECLIDINIUM 1 PUFF: 62.5 AEROSOL, POWDER ORAL at 08:07

## 2024-05-29 RX ADMIN — ACETAMINOPHEN 1000 MG: 500 TABLET, FILM COATED ORAL at 05:53

## 2024-05-29 ASSESSMENT — ACTIVITIES OF DAILY LIVING (ADL)
ADLS_ACUITY_SCORE: 33
ADLS_ACUITY_SCORE: 33
CONCENTRATING,_REMEMBERING_OR_MAKING_DECISIONS_DIFFICULTY: NO
WEAR_GLASSES_OR_BLIND: YES
ADLS_ACUITY_SCORE: 33
ADLS_ACUITY_SCORE: 33
CHANGE_IN_FUNCTIONAL_STATUS_SINCE_ONSET_OF_CURRENT_ILLNESS/INJURY: NO
ADLS_ACUITY_SCORE: 33
ADLS_ACUITY_SCORE: 33
DRESSING/BATHING_DIFFICULTY: NO
ADLS_ACUITY_SCORE: 33
FALL_HISTORY_WITHIN_LAST_SIX_MONTHS: NO
WALKING_OR_CLIMBING_STAIRS: AMBULATION DIFFICULTY, ASSISTANCE 1 PERSON
ADLS_ACUITY_SCORE: 33
DIFFICULTY_EATING/SWALLOWING: NO
DOING_ERRANDS_INDEPENDENTLY_DIFFICULTY: YES
WALKING_OR_CLIMBING_STAIRS_DIFFICULTY: YES
ADLS_ACUITY_SCORE: 33
TOILETING_ISSUES: NO
ADLS_ACUITY_SCORE: 33
ADLS_ACUITY_SCORE: 33

## 2024-05-29 NOTE — PROGRESS NOTES
Care Management Discharge Note    Discharge Date: 05/29/2024     Discharge Disposition: Home Care    Education Provided on the Discharge Plan:  Yes  Persons Notified of Discharge Plans: Pt, home care   Patient/Family in Agreement with the Plan:  Yes     Handoff Referral Completed: Yes    Additional Information:  Pt discharging to home with [facility] RN, called and updated HC nurse Iris [phone], orders completed and faxed. Wound care scripts sent to [facility] at F: [phone]. Called and LM for [facility] worker Valencia [phone] requesting she follow-up after discharge. No further needs anticipated.     Update 1030: Spoke with Valencia who reports that pt needs refills of some of her medications before discharge. Met with pt at bedside, she has gotten her insulin and supplies filled but also needs her Lipitor, Spiriva and Metformin. Provider ordered and sent to discharge pharmacy. Updated NST and charge RN. Pt also complained of feeling lightheaded and shaky for a moment, she was concerned her BG might be high and requested it be checked. NST checked BG which was 102. LM updating bedside RN.     Sheri Bone RN BSN   Inpatient Care Coordination  [facility]   Phone [phone]

## 2024-05-29 NOTE — PLAN OF CARE
"Goal Outcome Evaluation:  [id number]    Inpatient Progress Note:    /72 (BP Location: Right arm)   Pulse 68   Temp 98  F (36.7  C) (Oral)   Resp 14   Ht 1.575 m (5' 2\")   Wt 79.2 kg (174 lb 9.7 oz)   SpO2 96%   BMI 31.94 kg/m         Orientation: Alert and Oriented x4  Neuro: WDL  Pain status: complaining of 7/10 pain Tylenol given for pain  Activity: ndependent with no assistive devices   Peripheral edema:   Resp: WDL  Cardiac: WDL  GI: BS present x4  :  WDL  Skin: scabs/redness lower legs  LDA: PIV  Infusions: SL  Pertinent Labs:   Diet: Reg  Consults: WOC, SW  Discharge Plan: discharge to home    Will continue to monitor and provide cares.     Nina Siegel RN         Plan of Care Reviewed With: patient    Overall Patient Progress: improvingOverall Patient Progress: improving    Outcome Evaluation: Patient is alert c/o mild abdominal pain declines intervation this time, resting, safety round completed.    Problem: Adult Inpatient Plan of Care  Goal: Plan of Care Review  Description: The Plan of Care Review/Shift note should be completed every shift.  The Outcome Evaluation is a brief statement about your assessment that the patient is improving, declining, or no change.  This information will be displayed automatically on your shift  note.  Outcome: Progressing  Flowsheets (Taken 5/28/2024 2042)  Outcome Evaluation: Patient is alert c/o mild abdominal pain declines intervation this time, resting, safety round completed.  Plan of Care Reviewed With: patient  Overall Patient Progress: improving  Goal: Patient-Specific Goal (Individualized)  Description: You can add care plan individualizations to a care plan. Examples of Individualization might be:  \"Parent requests to be called daily at 9am for status\", \"I have a hard time hearing out of my right ear\", or \"Do not touch me to wake me up as it startles  me\".  Outcome: Progressing  Goal: Absence of Hospital-Acquired Illness or Injury  Outcome: " Progressing  Goal: Optimal Comfort and Wellbeing  Outcome: Progressing  Intervention: Monitor Pain and Promote Comfort  Recent Flowsheet Documentation  Taken 5/28/2024 1935 by Nina Siegel RN  Pain Management Interventions: declines  Goal: Readiness for Transition of Care  Outcome: Progressing     Problem: Comorbidity Management  Goal: Blood Glucose Levels Within Targeted Range  Outcome: Progressing  Intervention: Monitor and Manage Glycemia  Recent Flowsheet Documentation  Taken 5/28/2024 1935 by Nina Siegel RN  Medication Review/Management: medications reviewed  Goal: Blood Pressure in Desired Range  Outcome: Progressing  Intervention: Maintain Blood Pressure Management  Recent Flowsheet Documentation  Taken 5/28/2024 1935 by Nina Siegel RN  Medication Review/Management: medications reviewed     Problem: Pain Acute  Goal: Optimal Pain Control and Function  Outcome: Progressing  Intervention: Develop Pain Management Plan  Recent Flowsheet Documentation  Taken 5/28/2024 1935 by Nina Siegel RN  Pain Management Interventions: declines  Intervention: Prevent or Manage Pain  Recent Flowsheet Documentation  Taken 5/28/2024 1935 by Nina Siegel RN  Sensory Stimulation Regulation:   care clustered   lighting decreased   quiet environment promoted  Medication Review/Management: medications reviewed  Intervention: Optimize Psychosocial Wellbeing  Recent Flowsheet Documentation  Taken 5/28/2024 1935 by Nina Siegel RN  Supportive Measures: relaxation techniques promoted

## 2024-05-29 NOTE — PLAN OF CARE
"Patient's After Visit Summary was reviewed with patient.  Patient verbalized understanding of After Visit Summary, recommended follow up and was given an opportunity to ask questions.   Discharge medications sent home with patient/family: YES   Discharged with family member  OBSERVATION patient END time:1200    Problem: Adult Inpatient Plan of Care  Goal: Plan of Care Review  Description: The Plan of Care Review/Shift note should be completed every shift.  The Outcome Evaluation is a brief statement about your assessment that the patient is improving, declining, or no change.  This information will be displayed automatically on your shift  note.  Recent Flowsheet Documentation  Taken 5/29/2024 1116 by Radha Thomas, RN  Outcome Evaluation: Discharging, awaiting medication from [facility] pharmacy  Plan of Care Reviewed With: patient  Overall Patient Progress: improving  5/29/2024 1114 by Radha Thomas, RN  Outcome: Met  Flowsheets (Taken 5/29/2024 1114)  Outcome Evaluation: Discharging, awaiting  Plan of Care Reviewed With: patient  Overall Patient Progress: improving  Goal: Patient-Specific Goal (Individualized)  Description: You can add care plan individualizations to a care plan. Examples of Individualization might be:  \"Parent requests to be called daily at 9am for status\", \"I have a hard time hearing out of my right ear\", or \"Do not touch me to wake me up as it startles  me\".  Outcome: Met  Goal: Absence of Hospital-Acquired Illness or Injury  Outcome: Met  Goal: Optimal Comfort and Wellbeing  Outcome: Met  Goal: Readiness for Transition of Care  Outcome: Met  Intervention: Mutually Develop Transition Plan  Recent Flowsheet Documentation  Taken 5/29/2024 1100 by Radha Thomas, RN  Equipment Currently Used at Home: none     Problem: Comorbidity Management  Goal: Blood Glucose Levels Within Targeted Range  Outcome: Met     Problem: Pain Acute  Goal: Optimal Pain Control and Function  Outcome: Met   "     Goal Outcome Evaluation:      Plan of Care Reviewed With: patient    Overall Patient Progress: improvingOverall Patient Progress: improving    Outcome Evaluation: Discharging, awaiting medications from Discharge pharmacy

## 2024-05-29 NOTE — DISCHARGE SUMMARY
"[facility] Discharge Summary          Mary Ann Olson MRN# [id number]   Age: 58 year old YOB: 1965     Date of Admission:  5/24/2024  Date of Discharge::  5/29/2024  Admitting Physician:  Mina Nassar MD  Discharge Physician:  Elizabeth Beckett PA-C  Primary Physician: Joanna Farah     Primary Discharge Diagnoses:   BLE Erythema/Nodular Eruptions  Likely due to inflammatory skin condition possibly related directly or indirectly to her Crohn's disease       Hospital Course:   For detail history, please refer to H & P from 5/24/2024. In brief, this is a 58 year old female admitted on 5/24/2024 with concern for recurrent LE cellulitis, evaluated by GI and ID and felt to have some Crohn's related dermatologic condition either direct or indirect, possibly erythema nodosum, pyoderma gangrenosum or other.        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  Since then she has been serially admitted with bilat LE \"cellulitis\".  She was again evaluated and treated with antibiotics again at [facility] recently, at which time, the infectious disease consultant clearly stated that the patient had an inflammatory rather than an infectious process.       Dr. Wright with ID has consulted and fully agrees despite the finding of coag negative staph on biopsy.  He he indicates that the patient's recurrent areas of inflammation are immunologically mediated rather than infectious.  He was also able to get a history from the patient that she has ongoing, chronic diarrhea compatible with underlying untreated active Crohn's disease.     It appears that she was first diagnosed, I believe in 2009.  She subsequently was managed on biologics (Humira).   Over the last couple of years, however the patient has not had drug insurance " coverage for which reason she has not been able to be on therapy for the Crohn's.  She indicates that she frequently has diarrheal stools but denies significant hematochezia, severe abdominal pain, weight loss, fevers.  She typically will have 1-2 stools a day, sometimes they are formed and often they are diarrheal.     On 5/26, Dr. Oliver from [facility] saw the patient.  Although she does not have overwhelming evidence of active Crohn's disease, he felt that treating with steroids was both safe and appropriate as a trial to see if it would benefit the patient's lower extremity cellulitic symptoms and patient has reported a slow improvement since admission.     BLE Erythema/Nodular Eruptions  possibly erythema nodosum, pyoderma gangrenosum  Likely due to inflammatory skin condition possibly related directly or indirectly to her Crohn's disease    - ID and GI have been consulted  - antibiotics discontinued  - wound culture from 5/27 was negative  - continue Prednisone taper upon discharge  - has follow up with Dermatology on 6/27/24  - will place referral to [facility] to follow up after discharge  - follow up with PCP on 6/4/2  - SW/CC assisted with discharge planning and financial concerns  - wound care consulted  - resume home RN services     Crohn's Disease  Per GI consult, the status of Crohn's involvement in the GI tract is unclear. Recent non-contrast CT with no concerns. This will need to assessed as outpatient with likely colonoscopy and IBD clinic visit.   - continue Prednisone taper as above  - follow up with [facility] in clinic    Procedures/Imaging:     Results for orders placed or performed during the hospital encounter of 05/24/24   CT Abdomen Pelvis w/o Contrast    Narrative    EXAM: CT ABDOMEN PELVIS W/O CONTRAST  LOCATION: [facility]  DATE: 5/24/2024    INDICATION: abdominal pain; Crohn's colitis, wound check, cellulitis, increasing erythema, chills and right leg  pain.  COMPARISON: 3/18/2024  TECHNIQUE: CT scan of the abdomen and pelvis was performed without IV contrast. Multiplanar reformats were obtained. Dose reduction techniques were used.  CONTRAST: None.    FINDINGS:   LOWER CHEST: There is new bibasilar atelectasis, right greater than left.    HEPATOBILIARY: No biliary dilatation. Small anterior right hepatic lobe lesion is unchanged. Interval improvement in hepatic steatosis.    PANCREAS: Unremarkable    SPLEEN: Unremarkable     ADRENAL GLANDS: 1.8 cm left adrenal adenoma is unchanged, for which no additional diagnostic imaging is recommended..    KIDNEYS/BLADDER: No evidence of nephroureterolithiasis. No bladder stones. Multiple pelvic phleboliths.    BOWEL: Normal caliber appendix. No bowel obstruction.    LYMPH NODES: Scattered subcentimeter retroperitoneal nodes are unchanged.    VASCULATURE: No abdominal aortic aneurysm.    PELVIC ORGANS: No free fluid.    MUSCULOSKELETAL: No acute bony abnormalities. Small fat-containing umbilical hernia.      Impression    IMPRESSION:   1.  No noncontrast CT evidence of acute abnormalities in the abdomen or pelvis.     US Lower Extremity Non Vascular Right    Narrative    EXAM: US LOWER EXTREMITY NON VASCULAR RIGHT  LOCATION: [facility]  DATE: 5/25/2024    INDICATION: pt with evident cellulitis.  Quetion whethter there may be superficial areas of drainable fluid v thrombophlebitis  COMPARISON: None.  TECHNIQUE: Routine.    FINDINGS: There is some induration in the subcutaneous fat just deep to the skin at the area of redness. No fluid pockets. Nothing concerning for abscess.      Impression    IMPRESSION:  1.  Induration in the subcutaneous fat deep to the area of clinical cellulitis.    2.  No drainable fluid pockets.     *Note: Due to a large number of results and/or encounters for the requested time period, some results have not been displayed. A complete set of results can be found in Results Review.  "    Allergies:     Allergies   Allergen Reactions    Gadolinium Derivatives Hives    Iodinated Contrast Media Hives    Trimethoprim Hives    Azithromycin Other (See Comments) and Rash     Erythema Nodosum x2    Keflex [Cephalexin] Rash    Aspirin Other (See Comments)    Cefuroxime Itching and Other (See Comments)    Contrast Dye Hives     IV    Corylus Other (See Comments)    Diatrizoate Hives    Iodixanol Unknown    Nuts Other (See Comments)     hazelnuts    Septra [Sulfamethoxazole-Trimethoprim] Hives    Sulfa Antibiotics Hives    Metronidazole Itching and Rash    Nitrofurantoin Itching and Rash    Quinolones Rash        Subjective:   Reports ongoing improvement in BLE wounds/redness since admission.  Has mild abdominal pain, but is tolerating po.  Had a BM today and denies any diarrhea.      Physical Exam:   Blood pressure (P) 130/79, pulse 68, temperature (P) 97.4  F (36.3  C), temperature source (P) Axillary, resp. rate (P) 16, height 1.575 m (5' 2\"), weight 79.2 kg (174 lb 9.7 oz), SpO2 (P) 95%, not currently breastfeeding.  General: Alert, interactive, NAD  HEENT: AT/NC  Resp: clear to auscultation bilaterally, no crackles or wheezes  Cardiac: regular rate and rhythm, no murmur  Abdomen: Soft, nontender, nondistended. +BS.  Extremities: BLE with erythema within the marked borders.  Erythema is patchy with small dried pustules and non draining small nodules.  Improved since exam the previous day.  Skin: Warm and dry  Neuro: Alert & oriented x 3, Cns 2-12 intact, moves all extremities equally   Discharge Medicatios:        Current Discharge Medication List        START taking these medications    Details   predniSONE (DELTASONE) 10 MG tablet 40 mg daily x 1 week, then 20 mg daily x a week, then 10 mg daily x 1 week, then 5 mg daily x 1 week, then stop.  Qty: 52 tablet, Refills: 0    Associated Diagnoses: Inflammatory autoimmune disorder of skin      simethicone (MYLICON) 80 MG chewable tablet Take 1 tablet (80 " "mg) by mouth 4 times daily as needed for cramping  Qty: 120 tablet, Refills: 0    Associated Diagnoses: Crohn's disease of large intestine without complication (H)           CONTINUE these medications which have CHANGED    Details   atorvastatin (LIPITOR) 40 MG tablet Take 1 tablet (40 mg) by mouth daily  Qty: 30 tablet, Refills: 0    Associated Diagnoses: Type 2 diabetes mellitus with hyperglycemia, with long-term current use of insulin (H)      budesonide-formoterol (SYMBICORT) 160-4.5 MCG/ACT Inhaler Inhale 2 puffs into the lungs 2 times daily  Qty: 10.2 g, Refills: 0    Associated Diagnoses: Asthma-COPD overlap syndrome (H)      insulin  UNIT/ML vial Inject 13 Units Subcutaneous 2 times daily    Associated Diagnoses: Type 2 diabetes mellitus with hyperglycemia, with long-term current use of insulin (H)      metFORMIN (GLUCOPHAGE) 1000 MG tablet Take 1 tablet (1,000 mg) by mouth 2 times daily (with meals)  Qty: 60 tablet, Refills: 0    Associated Diagnoses: Type 2 diabetes mellitus without complication, without long-term current use of insulin (H)           CONTINUE these medications which have NOT CHANGED    Details   acetaminophen (TYLENOL) 500 MG tablet Take 2 tablets (1,000 mg) by mouth every 6 hours as needed for pain  Qty: 90 tablet, Refills: 3    Associated Diagnoses: Pain      albuterol (PROAIR HFA/PROVENTIL HFA/VENTOLIN HFA) 108 (90 Base) MCG/ACT inhaler INHALE 2 PUFFS INTO LUNGS EVERY 4 HOURS AS NEEDED FOR SHORTNESS OF BREATH OR  WHEEZING  Qty: 18 g, Refills: 3    Comments: Pharmacy may dispense brand covered by insurance (Proair, or proventil or ventolin or generic albuterol inhaler)  Associated Diagnoses: COPD exacerbation (H)      insulin syringe-needle U-100 (31G X 5/16\" 0.3 ML) 31G X 5/16\" 0.3 ML miscellaneous Use 2 syringes daily  Qty: 100 each, Refills: 3    Associated Diagnoses: Type 2 diabetes mellitus with hyperglycemia, with long-term current use of insulin (H)      ipratropium - " albuterol 0.5 mg/2.5 mg/3 mL (DUONEB) 0.5-2.5 (3) MG/3ML neb solution Take 1 vial (3 mLs) by nebulization 2 times daily as needed for shortness of breath, wheezing or cough  Qty: 90 mL, Refills: 3    Associated Diagnoses: COPD exacerbation (H)      melatonin 5 MG tablet Take 1 tablet (5 mg) by mouth nightly as needed for sleep  Qty: 90 tablet, Refills: 3    Associated Diagnoses: Primary insomnia      Multiple Vitamins-Minerals (CENTRUM SILVER 50+WOMEN PO) Take 1 tablet by mouth daily      omeprazole (PRILOSEC) 20 MG DR capsule Take 1 capsule (20 mg) by mouth daily  Qty: 90 capsule, Refills: 3    Associated Diagnoses: Gastroesophageal reflux disease without esophagitis      tiotropium (SPIRIVA RESPIMAT) 2.5 MCG/ACT inhaler Inhale 2 puffs into the lungs daily  Qty: 4 g, Refills: 11    Associated Diagnoses: Asthma-COPD overlap syndrome (H)      vitamin D2 (ERGOCALCIFEROL) 59670 units (1250 mcg) capsule Take 1 capsule (50,000 Units) by mouth once a week  Qty: 12 capsule, Refills: 3    Associated Diagnoses: Postmenopausal status             Instructions Given to Patient as Discharge:     Discharge Procedure Orders   Medication Therapy Management Referral   Referral Priority: Routine Referral Type: Med Therapy Management   Requested Specialty: Pharmacist   Number of Visits Requested: 1     Adult GI  Referral - Consult Only   Standing Status: Future   Referral Priority: Routine: Next available opening Referral Type: Consultation   Requested Specialty: Gastroenterology   Number of Visits Requested: 1     Home Care Referral   Referral Priority: Routine: Next available opening Referral Type: Home Health Therapies & Aides   Number of Visits Requested: 1     Reason for your hospital stay   Order Comments: You were admitted again due to concern for pain and swelling in your legs.  Although this looks like a simple cellulitis, it is due to an inflammatory condition that is also causing the Crohns' disease. Your treatment  should include steroids, with decrease inflammation, not antibiotics.     Follow-up and recommended labs and tests    Order Comments: Follow up with primary care provider, Joanan Farah, within 2-3 weeks, for hospital follow-up and to look at the state of your lower leg wounds.     Activity   Order Comments: Your activity upon discharge: activity as tolerated     Order Specific Question Answer Comments   Is discharge order? Yes      Reason for your hospital stay   Order Comments: You were hospitalized due to BLE Erythema/Nodular Eruptions which is Likely due to inflammatory skin condition possibly related directly or indirectly to her Crohn's disease.  Infectious Disease was consulted and no further antibiotics are recommended.  GI was consulted and you were started on a Prednisone taper.     Follow-up and recommended labs and tests    Order Comments: Follow up with PCP in one week.  Follow up with Dermatology in June as scheduled.  Follow up with [facility]     Activity   Order Comments: Your activity upon discharge: activity as tolerated     Order Specific Question Answer Comments   Is discharge order? Yes      Follow Up ([facility])   Order Comments: Wound Care  Bilateral leg wound(s): Leave open to air unless areas start to drain, if areas start to drain:  1. Cleanse with wound cleanser  2. Cover with adaptic and dry gauze  3. Wrap with kerlix     Miscellaneous DME Order   Order Comments: DME Documentation:   Describe the reason for need to support medical necessity: BLE Erythema/Nodular Eruptions.     I, the undersigned, certify that the above prescribed supplies are medically necessary for this patient and is both reasonable and necessary in reference to accepted standards of medical and necessary in reference to accepted standards of medical practice in the treatment of this patient's condition and is not prescribed as a convenience.     Order Specific Question Answer Comments   PATIENT INSTRUCTIONS: Please  contact your preferred vendor or insurance provider for assistance in obtaining the ordered medical equipment item.    Start Date: 5/29/2024    DME Item Needed: wound cleanser, adaptic and dry gauze, kerlix    Length of Need: 30 days    DME Item Quantity: 30 days      Diet   Order Comments: Follow this diet upon discharge:      Moderate Consistent Carb (60 g CHO per Meal) Diet     Order Specific Question Answer Comments   Is discharge order? Yes      Diet   Order Comments: Follow this diet upon discharge: Orders Placed This Encounter      Moderate Consistent Carb (60 g CHO per Meal) Diet      Diet     Order Specific Question Answer Comments   Is discharge order? Yes        Pending Tests at Discharge:   none    Discharge Disposition:     Discharged to home     Elizabeth AUGUSTINE-C  Hospitalist Service  Pager [phone]    >30 minutes was spent in discharge planning, care coordination, physical examination and medication reconciliation on the date of discharge, 5/29/2024

## 2024-05-29 NOTE — TELEPHONE ENCOUNTER
Left Voicemail (1st Attempt) for the patient to call back and schedule the following:    Appointment type: FOLLOW UP    Provider: Jack    Return date: next available     Specialty phone number: [phone]    Additonal Notes: hospital follow up

## 2024-05-30 ENCOUNTER — DOCUMENTATION ONLY (OUTPATIENT)
Dept: FAMILY MEDICINE | Facility: CLINIC | Age: 59
End: 2024-05-30
Payer: COMMERCIAL

## 2024-05-30 ASSESSMENT — ACTIVITIES OF DAILY LIVING (ADL): DEPENDENT_IADLS:: INDEPENDENT

## 2024-05-30 NOTE — PROGRESS NOTES
Clinic Care Coordination Contact  Transitions of Care Outreach    Chief Complaint   Patient presents with    Clinic Care Coordination - Post Hospital     TCM-Hospital Follow up       Most Recent Admission Date: 4/8/2024   Most Recent Admission Diagnosis: Cellulitis of left lower leg - L03.116     Most Recent Discharge Date: 4/10/2024   Most Recent Discharge Diagnosis: Cellulitis of left lower leg - L03.116  Primary insomnia - F51.01  Crohn's disease of large intestine without complication (H) - K50.10  Pain - R52     Transitions of Care Assessment    Discharge Assessment  How are you doing now that you are home?: Pt is doing better  How are your symptoms? (Red Flag symptoms escalate to triage hotline per guidelines): Improved  Do you know how to contact your clinic care team if you have future questions or changes to your health status? : Yes  Does the patient have their discharge instructions? : Yes  Does the patient have questions regarding their discharge instructions? : No  Were you started on any new medications or were there changes to any of your previous medications? : Yes  Does the patient have all of their medications?: Yes  Do you have questions regarding any of your medications? : No  Do you have all of your needed medical supplies or equipment (DME)?  (i.e. oxygen tank, CPAP, cane, etc.): No - What equipment or supplies are needed?                  Follow up Plan     Discharge Follow-Up  Discharge follow up appointment scheduled in alignment with recommended follow up timeframe or Transitions of Risk Category? (Low = within 30 days; Moderate= within 14 days; High= within 7 days): Yes  Discharge Follow Up Appointment Date: 06/04/24  Discharge Follow Up Appointment Scheduled with?: Primary Care Provider    Future Appointments   Date Time Provider Department Center   6/4/2024  4:00 PM Brown, Kathryn Michelle, MD [facility]       Outpatient Plan as outlined on AVS reviewed with patient.      For any  urgent concerns, please contact our 24 hour nurse triage line: [phone]       VALERIO Butler

## 2024-05-30 NOTE — PROGRESS NOTES
Forms Request:  Today's Date: May 30, 2024   Form Type: Home Care Orders, Physician Order: 5/30/24 - Client discharged from hospital 5/29/24...  Where is the form from: [facility]  How was form received: Fax  NORIS on file: NO   How is form being returned: Fax  Fax Number/Address: [phone]  PCP: Joanna Farah   Color Team: Yellow Team  Form Given to: Call Center     Metabolic acidosis

## 2024-05-31 ENCOUNTER — PATIENT OUTREACH (OUTPATIENT)
Dept: CARE COORDINATION | Facility: CLINIC | Age: 59
End: 2024-05-31
Payer: COMMERCIAL

## 2024-05-31 NOTE — PROGRESS NOTES
[facility]: Transitions of Care Outreach  Chief Complaint   Patient presents with    Clinic Care Coordination - Post Hospital       Most Recent Admission Date: 5/24/2024   Most Recent Admission Diagnosis: Elevated lactic acid level - R79.89  Cellulitis of lower extremity, unspecified laterality - L03.119     Most Recent Discharge Date: 5/29/2024   Most Recent Discharge Diagnosis: Cellulitis of lower extremity, unspecified laterality - L03.119  Elevated lactic acid level - R79.89  Type 2 diabetes mellitus with hyperglycemia, with long-term current use of insulin (H) - E11.65, Z79.4  Inflammatory autoimmune disorder of skin - L93.2  Crohn's disease of large intestine without complication (H) - K50.10  Type 2 diabetes mellitus without complication, without long-term current use of insulin (H) - E11.9  Asthma-COPD overlap syndrome (H) - J44.89     Transitions of Care Assessment    Discharge Assessment  How are you doing now that you are home?: I have a little pain here and there.  How are your symptoms? (Red Flag symptoms escalate to triage hotline per guidelines): Improved  Do you know how to contact your clinic care team if you have future questions or changes to your health status? : Yes  Does the patient have their discharge instructions? : Yes  Does the patient have questions regarding their discharge instructions? : No  Were you started on any new medications or were there changes to any of your previous medications? : Yes  Does the patient have all of their medications?: Yes  Do you have questions regarding any of your medications? : No  Do you have all of your needed medical supplies or equipment (DME)?  (i.e. oxygen tank, CPAP, cane, etc.): Yes                  Follow up Plan     Discharge Follow-Up  Discharge follow up appointment scheduled in alignment with recommended follow up timeframe or Transitions of Risk Category? (Low = within 30 days; Moderate= within 14 days; High= within 7  days): Yes  Discharge Follow Up Appointment Date: 06/04/24  Discharge Follow Up Appointment Scheduled with?: Primary Care Provider    Future Appointments   Date Time Provider Department Center   6/4/2024  4:00 PM Joanna Farah MD [facility]       Outpatient Plan as outlined on AVS reviewed with patient.    For any urgent concerns, please contact our 24 hour nurse triage line: [phone] ([phone])       NILSA Juarez  [phone]  [facility]

## 2024-05-31 NOTE — PROGRESS NOTES
Date form completed: 5/24/24  Form sent via: Fax  Date faxed or mailed: 5.29.24  Fax # or Address: [phone]  Completed by: Won Schwartz MA

## 2024-05-31 NOTE — PROGRESS NOTES
Date form completed: 5.24.24  Form sent via: Fax  Date faxed or mailed: 5.29.24  Fax # or Address: [phone]  Completed by: Won Schwartz MA

## 2024-05-31 NOTE — PROGRESS NOTES
Date form completed: 5/24/24  Form sent via: Fax  Date faxed or mailed: 5/29/24  Fax # or Address: [phone]  Completed by: Won Schwartz MA

## 2024-06-03 ENCOUNTER — TELEPHONE (OUTPATIENT)
Dept: FAMILY MEDICINE | Facility: CLINIC | Age: 59
End: 2024-06-03
Payer: COMMERCIAL

## 2024-06-05 ENCOUNTER — TELEPHONE (OUTPATIENT)
Dept: DERMATOLOGY | Facility: CLINIC | Age: 59
End: 2024-06-05
Payer: COMMERCIAL

## 2024-06-05 ENCOUNTER — TELEPHONE (OUTPATIENT)
Dept: FAMILY MEDICINE | Facility: CLINIC | Age: 59
End: 2024-06-05
Payer: COMMERCIAL

## 2024-06-05 NOTE — TELEPHONE ENCOUNTER
[facility] phone call message- general phone call:    Reason for call: Iris called stating she had a visit with patient yesterday and wondering if provider still wants to continue nurse visits due to patient having no wound care, she stated patient wound is healed. Please call and advise further is want to discontinue nurse visits thank you.    Return call needed: Yes    OK to leave a message on voice mail? Yes    Primary language: English      needed? No    Call taken on June 5, 2024 at 2:01 PM by Vern Alford

## 2024-06-05 NOTE — TELEPHONE ENCOUNTER
Left Voicemail (2nd Attempt) for the patient to call back and schedule the following:    Appointment type: HFU  Provider: Dr. Santiago  Return date: 1st available  Specialty phone number: [phone]

## 2024-06-06 ENCOUNTER — MEDICAL CORRESPONDENCE (OUTPATIENT)
Dept: HEALTH INFORMATION MANAGEMENT | Facility: CLINIC | Age: 59
End: 2024-06-06
Payer: COMMERCIAL

## 2024-06-06 ENCOUNTER — DOCUMENTATION ONLY (OUTPATIENT)
Dept: FAMILY MEDICINE | Facility: CLINIC | Age: 59
End: 2024-06-06
Payer: COMMERCIAL

## 2024-06-06 NOTE — TELEPHONE ENCOUNTER
Iris from [facility] called in with verbal orders to discontinue wound care as patient no longer has open wounds.  She was seen yesterday by [facility] - 6/5.      Phone number for call to Iris: [phone]    Thank you

## 2024-06-06 NOTE — TELEPHONE ENCOUNTER
Will route again to PCP to review. Request is outside of nurse standing protocol to approval, needs to be reviewed by physician. Thank you. Neida SMITH

## 2024-06-06 NOTE — PROGRESS NOTES
Forms Request:  Today's Date: June 6, 2024   Form Type: Home Care Orders, ABIODUN Order: 6/4/24 - Client hospitalized for wounds...  Where is the form from: [facility]  How was form received: Fax  NORIS on file: NO   How is form being returned: Fax  Fax Number/Address: [phone]  PCP: Joanna Farah   Color Team: Yellow Team  Form Given to: Call Center

## 2024-06-10 ENCOUNTER — DOCUMENTATION ONLY (OUTPATIENT)
Dept: FAMILY MEDICINE | Facility: CLINIC | Age: 59
End: 2024-06-10
Payer: COMMERCIAL

## 2024-06-10 NOTE — PROGRESS NOTES
Forms Request:  Today's Date: Serina 10, 2024   Form Type: Home Care Orders, Discharge Order: 6/7/24 - Client doesn't require any more wound care. Discharging [facility].   Where is the form from: [facility]  How was form received: Fax  NORIS on file: NO   How is form being returned: Fax  Fax Number/Address: [phone]  PCP: Joanna Farah   Color Team: Yellow Team  Form Given to: Call Center

## 2024-06-11 NOTE — PROGRESS NOTES
Date form completed: 6/4/24  Form sent via: Fax  Date faxed or mailed: 6/6/24  Fax # or Address: [phone]  Completed by: Ana Laura Stephens

## 2024-06-11 NOTE — PROGRESS NOTES
Date form completed: 6/4/24  Form sent via: Fax  Date faxed or mailed: 6/6/24  Fax # or Address: [phone]  Completed by: Ana Laura Stephens

## 2024-06-12 ENCOUNTER — DOCUMENTATION ONLY (OUTPATIENT)
Dept: FAMILY MEDICINE | Facility: CLINIC | Age: 59
End: 2024-06-12
Payer: COMMERCIAL

## 2024-06-12 NOTE — PROGRESS NOTES
Forms Request:  Today's Date: June 12, 2024   Form Type:  Prescriber Orders: 5/22/24 - Patient completed 3 doses of venofer ,   Where is the form from: [facility]  How was form received: Fax  NORIS on file: NO   How is form being returned: Fax  Fax Number/Address: [phone]  PCP: Joanna Farah   Color Team: Yellow Team  Form Given to: Call Center    Date form completed: 6/4/24  Form sent via: Fax  Date faxed or mailed: 6/6/24  Fax # or Address: [phone]  Completed by: Ana Laura Stephens

## 2024-06-14 NOTE — PROGRESS NOTES
Preceptor Attestation:   Patient seen, evaluated and discussed with the resident. I have verified the content of the note, which accurately reflects my assessment of the patient and the plan of care.  Supervising Physician:Rafiq Krause MD  [facility]             wife (non-formulary at [facility] where Fm  provides it; risk of MI/CVA/hyperglycemai/falls)  -F/Upn MA/if elevated for B12 replacement  -F/U on B1  -F/U with [facility]  -monitor at [facility] Adelso Delaney MD

## 2024-06-18 ENCOUNTER — MEDICAL CORRESPONDENCE (OUTPATIENT)
Dept: HEALTH INFORMATION MANAGEMENT | Facility: CLINIC | Age: 59
End: 2024-06-18

## 2024-06-18 ENCOUNTER — OFFICE VISIT (OUTPATIENT)
Dept: FAMILY MEDICINE | Facility: CLINIC | Age: 59
End: 2024-06-18
Payer: MEDICARE

## 2024-06-18 VITALS
TEMPERATURE: 98.2 F | SYSTOLIC BLOOD PRESSURE: 119 MMHG | HEART RATE: 103 BPM | RESPIRATION RATE: 20 BRPM | OXYGEN SATURATION: 96 % | HEIGHT: 61 IN | BODY MASS INDEX: 34.39 KG/M2 | WEIGHT: 182.12 LBS | DIASTOLIC BLOOD PRESSURE: 76 MMHG

## 2024-06-18 DIAGNOSIS — R21 RASH AND NONSPECIFIC SKIN ERUPTION: Primary | ICD-10-CM

## 2024-06-18 DIAGNOSIS — Z79.4 TYPE 2 DIABETES MELLITUS WITH HYPERGLYCEMIA, WITH LONG-TERM CURRENT USE OF INSULIN (H): ICD-10-CM

## 2024-06-18 DIAGNOSIS — E11.65 TYPE 2 DIABETES MELLITUS WITH HYPERGLYCEMIA, WITH LONG-TERM CURRENT USE OF INSULIN (H): ICD-10-CM

## 2024-06-18 DIAGNOSIS — Z12.31 ENCOUNTER FOR SCREENING MAMMOGRAM FOR BREAST CANCER: ICD-10-CM

## 2024-06-18 PROCEDURE — 99214 OFFICE O/P EST MOD 30 MIN: CPT | Mod: GC

## 2024-06-18 RX ORDER — PREDNISONE 5 MG/1
5 TABLET ORAL DAILY
Qty: 7 TABLET | Refills: 0 | Status: SHIPPED | OUTPATIENT
Start: 2024-06-18 | End: 2024-06-25

## 2024-06-18 ASSESSMENT — PATIENT HEALTH QUESTIONNAIRE - PHQ9
SUM OF ALL RESPONSES TO PHQ QUESTIONS 1-9: 11
10. IF YOU CHECKED OFF ANY PROBLEMS, HOW DIFFICULT HAVE THESE PROBLEMS MADE IT FOR YOU TO DO YOUR WORK, TAKE CARE OF THINGS AT HOME, OR GET ALONG WITH OTHER PEOPLE: SOMEWHAT DIFFICULT
SUM OF ALL RESPONSES TO PHQ QUESTIONS 1-9: 11

## 2024-06-18 NOTE — PATIENT INSTRUCTIONS
- continue taking prednisone 5 mg daily for the next week  - change how you take NPH insulin to 13 units once daily.  - follow up in one month for diabetes.

## 2024-06-18 NOTE — PROGRESS NOTES
Assessment & Plan     (R21) Rash and nonspecific skin eruption  (primary encounter diagnosis)  Comment: history of recurrent LE rash. Was admitted to the hospital recently for concern for cellulitis and rash was thought to be inflammatory and likely related to crohn's. She was started on prednisone taper and has one week left of treatment and notes significant improvement in the rash. Patient has outpatient derm follow up scheduled 6/27.   Plan: predniSONE (DELTASONE) 5 MG tablet           (Z12.31) Encounter for screening mammogram for breast cancer  Comment: due for mammogram.   Plan: MA SCREENING DIGITAL LT           (E11.65,  Z79.4) Type 2 diabetes mellitus with hyperglycemia, with long-term current use of insulin (H)  Comment: A1c 8.2 two months ago. History of poorly controlled DM. Recently started on NPH 13 units twice daily after starting prednisone. Currently on the last week of taper on 5 mg. Reports BG 150s. Will reduce dose of NPH and have close follow up.  Plan: reduce dose of NPH to 13 units daily.            Follow up within one month for DM     Rasheeda Reese is a 58 year old, presenting for the following health issues:  Leg Heeling    HPI   Elena 59 yo female with history of T2DM, COPD, Crohn's and recurrent LE rash who is here for follow up of chronic leg rash. Patient was recently hospitalized at [facility] for this, at the time she was seen by ID and GI and rash was thought to be inflammatory related to her crohn' s. She was prescribed a steroid taper and advised to follow up with dermatology outpatient and GI. Dermatology appointment is 6/27. Patient reports rash is improved but she running out of prednisone and has one more week prior to stopping the taper.    Patient has history of poorly controlled T2DM, her last A1c 8.5 two months ago. She was on NPH 13 units twice daily since discharge from hospital since starting steroids. Currently on the last week of prednisone and will start 5 mg  "tomorrow. Advised to reduce NPH dose to 13 units once daily. She doesn't check sugars daily, last check 140-150 yesterday.    Review of Systems  Constitutional, HEENT, cardiovascular, pulmonary, gi and gu systems are negative, except as otherwise noted.      Objective    /76 (BP Location: Right arm, Patient Position: Sitting, Cuff Size: Adult Regular)   Pulse 103   Temp 98.2  F (36.8  C)   Resp 20   Ht 1.55 m (5' 1.02\")   Wt 82.6 kg (182 lb 1.9 oz)   SpO2 96%   BMI 34.38 kg/m    Body mass index is 34.38 kg/m .  Physical Exam   GENERAL: alert and no distress  RESP: lungs clear to auscultation - no wheezes  CV:  normal S1 S2, no murmur, no peripheral edema  MS: no gross musculoskeletal defects noted, no edema  SKIN: rash on both legs is improved, few erythematous nodules on left leg, no drainage or signs of infection, right shin with hyperpigmented area but rash resolved.           Signed Electronically by: JAILYN Perales PGY2    .undefine  Answers submitted by the patient for this visit:  Patient Health Questionnaire (Submitted on 6/18/2024)  If you checked off any problems, how difficult have these problems made it for you to do your work, take care of things at home, or get along with other people?: Somewhat difficult  PHQ9 TOTAL SCORE: 11    "

## 2024-06-20 ENCOUNTER — MEDICAL CORRESPONDENCE (OUTPATIENT)
Dept: HEALTH INFORMATION MANAGEMENT | Facility: CLINIC | Age: 59
End: 2024-06-20
Payer: COMMERCIAL

## 2024-06-21 NOTE — PROGRESS NOTES
Date form completed: 6/20/24  Form sent via: Fax  Date faxed or mailed: 6/20/24  Fax # or Address: [phone]  Completed by: Won Schwartz MA

## 2024-06-21 NOTE — PROGRESS NOTES
Date form completed: 6/18/24  Form sent via: Fax  Date faxed or mailed: 6.20.24  Fax # or Address: [phone]  Completed by: Won Schwartz MA

## 2024-06-28 ENCOUNTER — TRANSCRIBE ORDERS (OUTPATIENT)
Dept: OTHER | Age: 59
End: 2024-06-28

## 2024-06-28 DIAGNOSIS — L30.9 DERMATITIS: Primary | ICD-10-CM

## 2024-07-08 ENCOUNTER — PATIENT OUTREACH (OUTPATIENT)
Dept: CARE COORDINATION | Facility: CLINIC | Age: 59
End: 2024-07-08
Payer: COMMERCIAL

## 2024-07-08 NOTE — PROGRESS NOTES
Clinic Care Coordination Contact  Follow Up Progress Note      Assessment: The pt was recently in the ED, I called to check up on the pt and help setup a ED follow up. I called the pt,but got his vm, so I left a vm for the pt to give me a call back. The pt has a follow up on 07/15/2024 at 3:20pm with .    Care Gaps:    Health Maintenance Due   Topic Date Due    COPD ACTION PLAN  Never done    ZOSTER IMMUNIZATION (3 of 3) 11/30/2018    MAMMO SCREENING  09/10/2021    MEDICARE ANNUAL WELLNESS VISIT  11/02/2022    COLORECTAL CANCER SCREENING  11/22/2022    COVID-19 Vaccine (3 - 2023-24 season) 09/01/2023    RSV VACCINE (Pregnancy & 60+) (1 - 1-dose 60+ series) Never done

## 2024-07-10 ENCOUNTER — DOCUMENTATION ONLY (OUTPATIENT)
Dept: CARE COORDINATION | Facility: CLINIC | Age: 59
End: 2024-07-10
Payer: COMMERCIAL

## 2024-07-10 ENCOUNTER — TELEPHONE (OUTPATIENT)
Dept: FAMILY MEDICINE | Facility: CLINIC | Age: 59
End: 2024-07-10
Payer: COMMERCIAL

## 2024-07-10 DIAGNOSIS — E11.9 TYPE 2 DIABETES MELLITUS WITHOUT COMPLICATION, WITHOUT LONG-TERM CURRENT USE OF INSULIN (H): ICD-10-CM

## 2024-07-10 NOTE — TELEPHONE ENCOUNTER
Writer called Mary Ann to check current blood glucose per 's request. Mary Ann just ate, writer request she check in one hour and call back to report bg. Writer advised patient PCP and Valeria Galloway are aware of her medication concern and will work on a solution and will advise her by end of the day per . Mary Ann verbalized understanding and agreeable to plan. Timo SMITH

## 2024-07-10 NOTE — TELEPHONE ENCOUNTER
Patient left a voicemail stating she is out of her diabetes medication, and no money to pay for it. Wondering what she needs to do and if anyone can help. Please advise thank you.

## 2024-07-10 NOTE — TELEPHONE ENCOUNTER
Writer called Mary Ann to check bg. 277 with insulin and metformin. Out of metformin. Out of atorvastatin. Has one vial insulin left. Advised of market RX program, patient declined food shelves. Writer provided information on [employer] [facility] and patient agreeable. Verbal hand-off provided to PCP.

## 2024-07-10 NOTE — CONFIDENTIAL NOTE
SW submit online application for [facility] program for patient this date.    TERRY STEWART, RANJITH, [facility]

## 2024-07-10 NOTE — CONFIDENTIAL NOTE
SW submitted referral for Food Resource Navigator this date.    TERRY STEWART, RANJITH, [facility]

## 2024-07-15 ENCOUNTER — PATIENT OUTREACH (OUTPATIENT)
Dept: CARE COORDINATION | Facility: CLINIC | Age: 59
End: 2024-07-15

## 2024-07-15 ENCOUNTER — OFFICE VISIT (OUTPATIENT)
Dept: FAMILY MEDICINE | Facility: CLINIC | Age: 59
End: 2024-07-15
Payer: MEDICARE

## 2024-07-15 VITALS
HEART RATE: 94 BPM | TEMPERATURE: 98.2 F | OXYGEN SATURATION: 98 % | DIASTOLIC BLOOD PRESSURE: 80 MMHG | BODY MASS INDEX: 34.93 KG/M2 | WEIGHT: 185 LBS | SYSTOLIC BLOOD PRESSURE: 121 MMHG | HEIGHT: 61 IN

## 2024-07-15 DIAGNOSIS — Z12.11 SCREEN FOR COLON CANCER: Primary | ICD-10-CM

## 2024-07-15 DIAGNOSIS — E11.65 TYPE 2 DIABETES MELLITUS WITH HYPERGLYCEMIA, WITHOUT LONG-TERM CURRENT USE OF INSULIN (H): ICD-10-CM

## 2024-07-15 DIAGNOSIS — Z59.71 UNDERINSURED: ICD-10-CM

## 2024-07-15 DIAGNOSIS — Z29.11 NEED FOR VACCINATION AGAINST RESPIRATORY SYNCYTIAL VIRUS: ICD-10-CM

## 2024-07-15 DIAGNOSIS — J44.89 ASTHMA-COPD OVERLAP SYNDROME (H): ICD-10-CM

## 2024-07-15 PROCEDURE — 99213 OFFICE O/P EST LOW 20 MIN: CPT | Performed by: STUDENT IN AN ORGANIZED HEALTH CARE EDUCATION/TRAINING PROGRAM

## 2024-07-15 RX ORDER — RESPIRATORY SYNCYTIAL VIRUS VACCINE 120MCG/0.5
0.5 KIT INTRAMUSCULAR ONCE
Qty: 1 EACH | Refills: 0 | Status: SHIPPED | OUTPATIENT
Start: 2024-07-15 | End: 2024-07-15

## 2024-07-15 RX ORDER — BUDESONIDE AND FORMOTEROL FUMARATE DIHYDRATE 160; 4.5 UG/1; UG/1
2 AEROSOL RESPIRATORY (INHALATION) 2 TIMES DAILY
Qty: 10.2 G | Refills: 0 | Status: SHIPPED | OUTPATIENT
Start: 2024-07-15

## 2024-07-15 NOTE — PROGRESS NOTES
Food Resource Navigator Contact    FRN - Initial Outreach    Reason for call: Obesity  Type 2 Diabetes  Other: Behavioral    Food Insecurity: No Food Insecurity (4/16/2024)    Received from [employer]    Hunger Vital Sign     Worried About Running Out of Food in the Last Year: Never true     Ran Out of Food in the Last Year: Never true     Housing Stability: Low Risk  (4/16/2024)    Received from [employer]    Housing Stability Vital Sign     Unable to Pay for Housing in the Last Year: No     Number of Places Lived in the Last Year: 1     In the last 12 months, was there a time when you did not have a steady place to sleep or slept in a shelter (including now)?: No     Financial Resource Strain: Low Risk  (12/5/2023)    Financial Resource Strain     Within the past 12 months, have you or your family members you live with been unable to get utilities (heat, electricity) when it was really needed?: No     Transportation Needs: No Transportation Needs (4/16/2024)    Received from [employer]    PRAPARMINDER - Transportation     Lack of Transportation (Medical): No     Lack of Transportation (Non-Medical): No       The patient was provided with the following food resources:  Food Resource Packet  [facility]  Feeding [facility] Flyer mailed to pt with FRP--Hot Food program on Tuesdays   [place] [employer]--Flyer mailed to pt with FRP    The patient was provided the following community resources:  None at this time--pt states she is working with [facility] [employer]     I have discussed the following goals with the patient: Pt encouraged to review Welcome Packet for [employer] [facility] when mail comes to her home to look at site of grocery bus. I briefly went over a few of the [facility] locations and pt states she was in hurry and she don't have time. I also asked if it was ok to send a text to her phone of the website to look at location stops and she stated it won't be helpful since she has to get on  website and use a special glass, so she rather wait for the Welcome Packet for grocery bus and then arrange a ride to take her.    I will also send a Food Resource Packet to the pt with $40 Cub gc. Pt took another call while she put me on hold for over 5 mins and I disconnected the call. I will include my card for her to call me if she has questions.       Van Castillo  [facility] Food Resource Navigator  [employer]   Cell: [phone]

## 2024-07-15 NOTE — LETTER
RETURN TO WORK/SCHOOL FORM    7/15/2024    Re: Mary Ann Olson  1965      To Whom It May Concern:     Mary Ann Olson is a patient in my care.  Patient has a history of anxiety, depression, and panic attack.  She benefits from a  animal that helps with her symptoms from these conditions.     I recommend one  for this animal to help with her known chronic mental health conditions.           Jaonna Farah MD  7/15/2024 3:56 PM

## 2024-07-15 NOTE — PROGRESS NOTES
Clinic Care Coordination Contact  Follow Up Progress Note      Assessment: There was a care coordination referral put in for the pt for help with Medicare, and MA. I met with the pt, pt stated that she has Medicare Part A, and B, she just does not have Part D. Part D, is the part that pays for medication. Pt stated that she also has MA through the state. Pt stated that there is a mix up, they have some information wrong, or something that the pt can not get Part D. I told the pt that she will need to go to [facility] and talk to them, there is nothing Medicare can do for her, since they get her information from the [facility]. Pt stated that she has tried to contact her [facility] worker, but unable to. I told her that I have had the same issue, she will need to go to the [facility] and talk to them. They just need to update some information into the [facility] system, and it will send this information to Medicare, then she will have Part D. I gave the pt [facility] address and phone number to go. Pt stated that she will go and see. No further contact needed.     Care Gaps:    Health Maintenance Due   Topic Date Due    COPD ACTION PLAN  Never done    ZOSTER IMMUNIZATION (3 of 3) 11/30/2018    MAMMO SCREENING  09/10/2021    MEDICARE ANNUAL WELLNESS VISIT  11/02/2022    COLORECTAL CANCER SCREENING  11/22/2022    COVID-19 Vaccine (3 - 2023-24 season) 09/01/2023    RSV VACCINE (Pregnancy & 60+) (1 - 1-dose 60+ series) Never done    LIPID  08/15/2024    DIABETIC FOOT EXAM  08/15/2024

## 2024-07-23 NOTE — PROGRESS NOTES
"  Assessment & Plan     Need for vaccination against respiratory syncytial virus-recommend strongly for patient especially given known asthma.  Patient accepts today.  - respiratory syncytial virus vaccine, bivalent (ABRYSVO) injection; Inject 0.5 mLs into the muscle once for 1 dose    Screen for colon cancer-due for this service.  Right now patient is overwhelmed with lack of financial ability to pay for housing food and medications.  She will consider in the future.  Will continue to discuss as things move forward.  Currently prioritizing housing food and medication security.      Asthma-COPD overlap syndrome (H)-currently with difficulty paying for inhalers.  Will try spiriva.  Consider generic airduo      Underinsured-discussed increasing supplements for food to offset increased cost of medications as she has little to no coverage in this area.  Primary care clinic coordination consult placed.  Social work met with the patient today and recommended going down to the county decided to talk with the representative to help with MA and designation for medication coverage.  - Primary Care - Care Coordination Referral; Future        Subjective   Mary Ann is a 59 year old, presenting for the following health issues:  Letter for School/Work (For  dog), Breathing Problem, and Recheck Medication    HPI           Objective    /80   Pulse 94   Temp 98.2  F (36.8  C)   Ht 1.549 m (5' 1\")   Wt 83.9 kg (185 lb)   SpO2 98%   BMI 34.96 kg/m    Body mass index is 34.96 kg/m .  Physical Exam   GEN: NAD  HEENT: head atraumatic, normocephalic, moist mucous membranes, eyes anicteric  CV: RRR w/o M/R/G  PULM: CTAB  ABD: soft, non-tender, no appreciable masses, no guarding, BS present  Neuro: alert and oriented x 3, CN II-XII intact, normal gross motor movements  Psych: appropriate  Ext: no peripheral edema      Signed Electronically by: Joanna Farah MD    "

## 2024-07-25 ENCOUNTER — TELEPHONE (OUTPATIENT)
Dept: FAMILY MEDICINE | Facility: CLINIC | Age: 59
End: 2024-07-25
Payer: COMMERCIAL

## 2024-07-25 DIAGNOSIS — R19.7 DIARRHEA OF PRESUMED INFECTIOUS ORIGIN: Primary | ICD-10-CM

## 2024-07-26 ENCOUNTER — PATIENT OUTREACH (OUTPATIENT)
Dept: CARE COORDINATION | Facility: CLINIC | Age: 59
End: 2024-07-26
Payer: COMMERCIAL

## 2024-07-26 NOTE — TELEPHONE ENCOUNTER
Called patient about diarrhea.    Patient has been ill for 2 to 3 weeks.  Over this time.  She has had stomach aches and significant diarrhea.  At the beginning the diarrhea was darker but then about a week and a half ago she thought she was getting over it but then it transitioned to green stools.  She is having about 5-6 bowel movements a day.  It can be triggered by eating.  She is eating small amounts but she gets very nauseous with eating she feels somewhat dehydrated but says she is able to drink fluids okay.    Patient tried Pepto-Bismol and that did not help too much for    Denies fever, blood in stool, chest pain, shortness of breath, chills.    Abdominal pain with diarrhea  Patient has been experiencing abdominal pain with diarrhea for 2 to 3 weeks that seemed like it was improving but then started worsening and about a week and a half ago.  She is not having any fevers or chills or blood in her stool to suggest a bacterial infectious diarrhea.  She is having less than 10 bowel movements a day and has not had recent antibiotic usage so C. difficile is less likely.  I suspect that she may have a viral gastroenteritis or potentially some type of Crohn's flare.   -Keep clinic appointment at [facility] on 7/29  -Offered Imodium to help with diarrhea but patient declined that she does not have adequate transportation to go pick it up  -Reviewed return precautions to go to the ER or urgent care: Nausea so bad that she is unable to remain hydrated, orthostatic symptoms, extreme fatigue, chest pain, difficulty breathing, feeling ill enough that she feels like she will not be able to wait until Monday

## 2024-07-26 NOTE — PROGRESS NOTES
Clinic Care Coordination Contact  Follow Up Progress Note      Assessment: There was a care coordination referral put in for the pt for food, and medication affordability. I called and talked to the pt, I asked the pt about her citizenship status in the [place]. Pt stated that she is a permanent residence. She stated that there was a mix up, when she went to make a copy of her green card, and they forgot to send it to the [facility] [place] main office. So they did not have the pt as a permanent residence, so she did not qualify for [facility] and [facility] services. Pt stated that she was able to verify with them, so she should be able to get [facility] and state services now. The pt can get a secondary insurance through the state now. I will send her information to [employer] to sign her up for next year [employer] program. For now, I can have [employer] sign her up for our [employer]. No further contact needed.     Care Gaps:    Health Maintenance Due   Topic Date Due    COPD ACTION PLAN  Never done    ZOSTER IMMUNIZATION (3 of 3) 11/30/2018    MAMMO SCREENING  09/10/2021    MEDICARE ANNUAL WELLNESS VISIT  11/02/2022    COLORECTAL CANCER SCREENING  11/22/2022    COVID-19 Vaccine (3 - 2023-24 season) 09/01/2023    RSV VACCINE (Pregnancy & 60+) (1 - 1-dose 60+ series) Never done    LIPID  08/15/2024    DIABETIC FOOT EXAM  08/15/2024           Care Plans      Intervention/Education provided during outreach:               Plan:     Care Coordinator will follow up in

## 2024-07-29 ENCOUNTER — PATIENT OUTREACH (OUTPATIENT)
Dept: CARE COORDINATION | Facility: CLINIC | Age: 59
End: 2024-07-29

## 2024-07-29 NOTE — PROGRESS NOTES
Clinic Care Coordination Contact  Follow Up Progress Note      Assessment:  The pt was recently in the ED, I called to check up on the pt and help the pt setup a ED follow up. The pt was at [facility] for abdominal pain called and talked to the pt, pt stated that she still at the hospital, they going to do a colonoscopy. She will give the clinic a call once she is discharged.    Care Gaps:    Health Maintenance Due   Topic Date Due    COPD ACTION PLAN  Never done    ZOSTER IMMUNIZATION (3 of 3) 11/30/2018    MAMMO SCREENING  09/10/2021    MEDICARE ANNUAL WELLNESS VISIT  11/02/2022    COLORECTAL CANCER SCREENING  11/22/2022    COVID-19 Vaccine (3 - 2023-24 season) 09/01/2023    RSV VACCINE (Pregnancy & 60+) (1 - 1-dose 60+ series) Never done    LIPID  08/15/2024    DIABETIC FOOT EXAM  08/15/2024    EYE EXAM  08/28/2024

## 2024-08-04 ENCOUNTER — TELEPHONE (OUTPATIENT)
Dept: FAMILY MEDICINE | Facility: CLINIC | Age: 59
End: 2024-08-04
Payer: COMMERCIAL

## 2024-08-04 NOTE — TELEPHONE ENCOUNTER
5:21 PM    CC: Clinic page, stepped on jak nail question  Cleaning up in [place], had cheap flip flops on, felt something punctured L foot. When she looked it was a jak screw. Patient has since removed screw. Endorses mild pain and scant bleeding, but otherwise feeling well. She is worried because she is a diabetic and wondering if she needs to go to ED.   -Per chart rvw, last TDAP in Jun 2022 - UTD. No need to repeat vaccination.   -Currently, patient does not need to go to ED. Did rvw ED precautions with patients and sx to monitor for. She expressed understanding and agreeable.   -She should still be seen in clinic to follow-up on this wound. Patient will call tomorrow to set up an appt.    Maikel Graham MD PGY2  [facility]

## 2024-08-06 ENCOUNTER — PATIENT OUTREACH (OUTPATIENT)
Dept: CARE COORDINATION | Facility: CLINIC | Age: 59
End: 2024-08-06
Payer: COMMERCIAL

## 2024-08-06 ASSESSMENT — ACTIVITIES OF DAILY LIVING (ADL): DEPENDENT_IADLS:: INDEPENDENT

## 2024-08-06 NOTE — PROGRESS NOTES
Clinic Care Coordination Contact  Transitions of Care Outreach    Chief Complaint   Patient presents with    Clinic Care Coordination - Post Hospital     TCM-Hospital Follow up       Most Recent Admission Date: 07/25/2024  Most Recent Admission Diagnosis: Colitis (Primary Dx);   Fever in adult;   Leukocytosis, unspecified type;   Crohn's disease with complication, unspecified gastrointestinal tract location (HC);   Diarrhea, unspecified type;   Food insecurity;   Other problems related to housing and economic circumstances;   Low income;   Iron deficiency anemia, unspecified iron deficiency anemia type;   Type 2 diabetes mellitus with hyperglycemia, with long-term current use of insulin (HC);   Current use of steroid medication;   Post-menopausal bleeding     Most Recent Discharge Date: 08/02/2024  Most Recent Discharge Diagnosis: Colitis (Primary Dx);   Fever in adult;   Leukocytosis, unspecified type;   Crohn's disease with complication, unspecified gastrointestinal tract location (HC);   Diarrhea, unspecified type;   Food insecurity;   Other problems related to housing and economic circumstances;   Low income;   Iron deficiency anemia, unspecified iron deficiency anemia type;   Type 2 diabetes mellitus with hyperglycemia, with long-term current use of insulin (HC);   Current use of steroid medication;   Post-menopausal bleeding     Transitions of Care Assessment    Discharge Assessment  How are you doing now that you are home?: Pt is doing better  How are your symptoms? (Red Flag symptoms escalate to triage hotline per guidelines): Improved  Do you know how to contact your clinic care team if you have future questions or changes to your health status? : Yes  Does the patient have their discharge instructions? : Yes  Does the patient have questions regarding their discharge instructions? : No  Were you started on any new medications or were there changes to any of your previous medications? : Yes  Does the patient  have all of their medications?: Yes  Do you have questions regarding any of your medications? : No  Do you have all of your needed medical supplies or equipment (DME)?  (i.e. oxygen tank, CPAP, cane, etc.): No - What equipment or supplies are needed?                  Follow up Plan     Discharge Follow-Up  Discharge follow up appointment scheduled in alignment with recommended follow up timeframe or Transitions of Risk Category? (Low = within 30 days; Moderate= within 14 days; High= within 7 days): Yes  Discharge Follow Up Appointment Date: 08/08/24  Discharge Follow Up Appointment Scheduled with?: Primary Care Provider    Future Appointments   Date Time Provider Department Center   8/8/2024  3:00 PM Mohan Larose MD [facility]       Outpatient Plan as outlined on AVS reviewed with patient.      For any urgent concerns, please contact our 24 hour nurse triage line: [phone]       VALERIO Butler

## 2024-08-06 NOTE — PROGRESS NOTES
Clinic Care Coordination Contact  [facility]/Voicemail    Clinical Data: Care Coordinator Outreach        Left message on patient's voicemail with call back information and requested return call.    Plan: Care Coordinator  via . Care Coordinator will try to reach patient again in 1-2 business days.

## 2024-08-08 ENCOUNTER — MEDICAL CORRESPONDENCE (OUTPATIENT)
Dept: HEALTH INFORMATION MANAGEMENT | Facility: CLINIC | Age: 59
End: 2024-08-08

## 2024-08-08 ENCOUNTER — TELEPHONE (OUTPATIENT)
Dept: FAMILY MEDICINE | Facility: CLINIC | Age: 59
End: 2024-08-08

## 2024-08-08 NOTE — TELEPHONE ENCOUNTER
Form faxed. Pt sees Dr. Larose today and he ok to sign order. Form also gave to PCP to verify to make sure needs attending physician to sign too.        Forms/Letter Request    Type of form/letter: Home Health Certification   order # [id number]    Do we have the form/letter: Yes:     Who is the form from? Home care-- [facility]     Where did/will the form come from? form was faxed in    When is form/letter needed by: ASAP    How would you like the form/letter returned: Fax : [phone]    Patient Notified form requests are processed in 5-7 business days:No    Okay to leave a detailed message?: No at Other phone number:  no phone call needed

## 2024-08-15 DIAGNOSIS — Z53.9 DIAGNOSIS NOT YET DEFINED: Primary | ICD-10-CM

## 2024-08-15 NOTE — TELEPHONE ENCOUNTER
Form noted below that was faxed was a Physician Order #[id number] date: 5/23/24- faxed on 8/8/24 by [facility] staff to [phone]    The [facility] Cert this encounter was created for was faxed 8/15/24  [phone]

## 2024-08-31 ENCOUNTER — TELEPHONE (OUTPATIENT)
Dept: FAMILY MEDICINE | Facility: CLINIC | Age: 59
End: 2024-08-31
Payer: COMMERCIAL

## 2024-08-31 NOTE — TELEPHONE ENCOUNTER
"Clinic Weekend Hours Page    8/31/24  11:04 AM     Received page: \"Pt having blood in stool - pls call\"    Called patient back. Thinks she is having a Chron's flare. Just had bloody bowel movement. Supposed to be on 8 week prednisone taper after recent hospital discharge for Chron's flare. Has not been taking it over past 5-6 days because afraid of immunosuppression with going to social events and being at risk of getting sick. Having really bad cramping starting today. Not taking any other Chron's meds. Supposed to schedule appointment with [facility] but hasn't done this yet. Having some dizziness and less appetite but no nausea. Has only had one bloody bowel movement. Restarted prednisone yesterday.    Counseled patient to take today's dose of prednisone and see how afternoon goes. If pain worsening, having ongoing bloody stools and/or new symptoms, encouraged her to go into ER. Otherwise encouraged her to stay on taper as prescribed and to schedule GI follow up as soon as possible.       Nahun Thompson MD  "

## 2024-09-04 DIAGNOSIS — E11.65 TYPE 2 DIABETES MELLITUS WITH HYPERGLYCEMIA, WITH LONG-TERM CURRENT USE OF INSULIN (H): ICD-10-CM

## 2024-09-04 DIAGNOSIS — Z79.4 TYPE 2 DIABETES MELLITUS WITH HYPERGLYCEMIA, WITH LONG-TERM CURRENT USE OF INSULIN (H): ICD-10-CM

## 2024-09-04 RX ORDER — ATORVASTATIN CALCIUM 40 MG/1
40 TABLET, FILM COATED ORAL DAILY
Qty: 30 TABLET | Refills: 2 | Status: SHIPPED | OUTPATIENT
Start: 2024-09-04

## 2024-09-04 NOTE — TELEPHONE ENCOUNTER
Patient due for LDL check, last LDL resulted on 08/15/23. Result of 45    Per RN standing orders, sending 90 days of medication to allow for time to schedule with PCP for cholesterol check. Routing to  to call patient and schedule with PCP in 90 days.  Timo SMITH

## 2024-09-20 ENCOUNTER — OFFICE VISIT (OUTPATIENT)
Dept: FAMILY MEDICINE | Facility: CLINIC | Age: 59
End: 2024-09-20
Payer: COMMERCIAL

## 2024-09-20 VITALS
BODY MASS INDEX: 31.11 KG/M2 | HEART RATE: 109 BPM | DIASTOLIC BLOOD PRESSURE: 82 MMHG | TEMPERATURE: 98.4 F | SYSTOLIC BLOOD PRESSURE: 149 MMHG | WEIGHT: 169.08 LBS | OXYGEN SATURATION: 97 % | RESPIRATION RATE: 18 BRPM | HEIGHT: 62 IN

## 2024-09-20 DIAGNOSIS — E11.65 TYPE 2 DIABETES MELLITUS WITH HYPERGLYCEMIA, WITHOUT LONG-TERM CURRENT USE OF INSULIN (H): Primary | ICD-10-CM

## 2024-09-20 DIAGNOSIS — B07.8 OTHER VIRAL WARTS: ICD-10-CM

## 2024-09-20 DIAGNOSIS — Z23 NEED FOR PROPHYLACTIC VACCINATION AND INOCULATION AGAINST INFLUENZA: ICD-10-CM

## 2024-09-20 PROCEDURE — 99213 OFFICE O/P EST LOW 20 MIN: CPT | Mod: 25 | Performed by: STUDENT IN AN ORGANIZED HEALTH CARE EDUCATION/TRAINING PROGRAM

## 2024-09-20 PROCEDURE — 17110 DESTRUCTION B9 LES UP TO 14: CPT | Performed by: STUDENT IN AN ORGANIZED HEALTH CARE EDUCATION/TRAINING PROGRAM
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
## 2024-09-30 ENCOUNTER — PATIENT OUTREACH (OUTPATIENT)
Dept: CARE COORDINATION | Facility: CLINIC | Age: 59
End: 2024-09-30
Payer: COMMERCIAL

## 2024-09-30 NOTE — PROGRESS NOTES
Clinic Care Coordination Contact  Follow Up Progress Note      Assessment: The pt was recently in the ED, I called to check up on the pt and help setup a ED follow up. I called the pt,but got her vm, so I left a vm for the pt to give me a call back.     Care Gaps:    Health Maintenance Due   Topic Date Due    COPD ACTION PLAN  Never done    ZOSTER IMMUNIZATION (3 of 3) 11/30/2018    MAMMO SCREENING  09/10/2021    YEARLY PREVENTIVE VISIT  11/02/2022    RSV VACCINE (1 - Risk 60-74 years 1-dose series) Never done    LIPID  08/15/2024    DIABETIC FOOT EXAM  08/15/2024    EYE EXAM  08/28/2024    INFLUENZA VACCINE (1) 09/01/2024    COVID-19 Vaccine (3 - 2024-25 season) 09/01/2024    A1C  10/08/2024

## 2024-10-01 ENCOUNTER — PATIENT OUTREACH (OUTPATIENT)
Dept: CARE COORDINATION | Facility: CLINIC | Age: 59
End: 2024-10-01
Payer: COMMERCIAL

## 2024-10-02 ENCOUNTER — PATIENT OUTREACH (OUTPATIENT)
Dept: CARE COORDINATION | Facility: CLINIC | Age: 59
End: 2024-10-02
Payer: COMMERCIAL

## 2024-10-02 NOTE — TELEPHONE ENCOUNTER
"  FYI - Status Update    Who is Calling: patient    Update: Patient called back due to miss call, Patient Rep relayed  is calling to schedule ED follow up and will transfer Patient to 's phone. Patient stated \" He needs to stop calling me and stop reminding me to schedule an appointment. Good day!\", Patient disconnect the call.     Does caller want a call/response back: No         "

## 2024-10-09 ENCOUNTER — PATIENT OUTREACH (OUTPATIENT)
Dept: CARE COORDINATION | Facility: CLINIC | Age: 59
End: 2024-10-09
Payer: COMMERCIAL

## 2024-10-09 NOTE — PROGRESS NOTES
Clinic Care Coordination Contact  Follow Up Progress Note      Assessment: I called the pt to inform her that we are having issue with the [employer] that was given to her yesterday. I told her to give us another 24 hours to get things cleared up,and there should be the funds on the card. Pt stated that she will wait.    Care Gaps:    Health Maintenance Due   Topic Date Due    COPD ACTION PLAN  Never done    ZOSTER IMMUNIZATION (3 of 3) 11/30/2018    MAMMO SCREENING  09/10/2021    YEARLY PREVENTIVE VISIT  11/02/2022    RSV VACCINE (1 - Risk 60-74 years 1-dose series) Never done    LIPID  08/15/2024    DIABETIC FOOT EXAM  08/15/2024    EYE EXAM  08/28/2024    INFLUENZA VACCINE (1) 09/01/2024    COVID-19 Vaccine (3 - 2024-25 season) 09/01/2024    A1C  10/08/2024           
No

## 2024-10-12 ENCOUNTER — TELEPHONE (OUTPATIENT)
Dept: FAMILY MEDICINE | Facility: CLINIC | Age: 59
End: 2024-10-12
Payer: COMMERCIAL

## 2024-10-12 NOTE — TELEPHONE ENCOUNTER
Received clinic page to call patient.  Attempted to call, no answer    Called second time and patient answered    Having trouble breathing head stuffy  Has productive cough and chills  Having trouble sleeping  Worried about COVID or pneumonia    Does not sound like she is in acute distress over the phone    Recommended patient be evaluated in ED for possible pneumonia and she was agreeable.

## 2024-10-14 ENCOUNTER — TELEPHONE (OUTPATIENT)
Dept: FAMILY MEDICINE | Facility: CLINIC | Age: 59
End: 2024-10-14

## 2024-10-14 ENCOUNTER — OFFICE VISIT (OUTPATIENT)
Dept: FAMILY MEDICINE | Facility: CLINIC | Age: 59
End: 2024-10-14
Payer: COMMERCIAL

## 2024-10-14 VITALS
HEIGHT: 62 IN | HEART RATE: 114 BPM | SYSTOLIC BLOOD PRESSURE: 116 MMHG | WEIGHT: 163.04 LBS | BODY MASS INDEX: 30 KG/M2 | DIASTOLIC BLOOD PRESSURE: 76 MMHG | OXYGEN SATURATION: 95 % | TEMPERATURE: 98.1 F | RESPIRATION RATE: 18 BRPM

## 2024-10-14 DIAGNOSIS — R21 RASH AND NONSPECIFIC SKIN ERUPTION: ICD-10-CM

## 2024-10-14 DIAGNOSIS — B35.8: ICD-10-CM

## 2024-10-14 DIAGNOSIS — E11.65 TYPE 2 DIABETES MELLITUS WITH HYPERGLYCEMIA, WITHOUT LONG-TERM CURRENT USE OF INSULIN (H): Primary | ICD-10-CM

## 2024-10-14 DIAGNOSIS — Z87.2 HISTORY OF CELLULITIS: ICD-10-CM

## 2024-10-14 DIAGNOSIS — J44.1 COPD EXACERBATION (H): ICD-10-CM

## 2024-10-14 DIAGNOSIS — R06.02 SHORTNESS OF BREATH: ICD-10-CM

## 2024-10-14 DIAGNOSIS — E78.2 MIXED HYPERLIPIDEMIA: ICD-10-CM

## 2024-10-14 LAB
EST. AVERAGE GLUCOSE BLD GHB EST-MCNC: 183 MG/DL
FLUAV RNA SPEC QL NAA+PROBE: NEGATIVE
FLUBV RNA RESP QL NAA+PROBE: NEGATIVE
HBA1C MFR BLD: 8 % (ref 0–5.6)
RSV RNA SPEC NAA+PROBE: NEGATIVE
SARS-COV-2 RNA RESP QL NAA+PROBE: NEGATIVE

## 2024-10-14 PROCEDURE — 36415 COLL VENOUS BLD VENIPUNCTURE: CPT

## 2024-10-14 PROCEDURE — 83036 HEMOGLOBIN GLYCOSYLATED A1C: CPT

## 2024-10-14 PROCEDURE — 80061 LIPID PANEL: CPT

## 2024-10-14 PROCEDURE — 87637 SARSCOV2&INF A&B&RSV AMP PRB: CPT

## 2024-10-14 PROCEDURE — 99214 OFFICE O/P EST MOD 30 MIN: CPT | Mod: GC

## 2024-10-14 RX ORDER — FLUTICASONE PROPIONATE AND SALMETEROL 232; 14 UG/1; UG/1
1 POWDER, METERED RESPIRATORY (INHALATION) 2 TIMES DAILY
Qty: 1 EACH | Refills: 4 | Status: SHIPPED | OUTPATIENT
Start: 2024-10-14 | End: 2024-10-30

## 2024-10-14 RX ORDER — PREDNISONE 20 MG/1
TABLET ORAL
Qty: 10 TABLET | Refills: 0 | Status: SHIPPED | OUTPATIENT
Start: 2024-10-14 | End: 2024-10-22

## 2024-10-14 NOTE — PROGRESS NOTES
Preceptor Attestation:  Patient's case reviewed and discussed with the resident, Giovanna Stout MD, and I personally evaluated the patient. I agree with written assessment and plan of care.    Could consider referral to [facility] in the future for sliding scale care given cost has been a large access burden for this patient.    Supervising Physician:  Danielle Moreno MD   [facility]

## 2024-10-14 NOTE — TELEPHONE ENCOUNTER
"Brief Interval Note  6:42 PM      CC:   PMHx: COPD, asthma, T2DM, Crohn's.    59-year-old female calling regarding elevated blood sugars.  She reports being seen in clinic today for COPD exacerbation and was started on a prednisone taper for 1 week. Unfortunately due to cost issues, she was not initiated on NPH with the prednisone in the setting of known T2DM.    She took the prednisone at around 1 to 1:30 PM.  And she took her BG this evening and was elevated into the 300s and she self corrected with Humalog 15 or 20 units, BG resolving at 225. Reports feeling sweaty and tired, denied chest pain, dyspnea, N/V, abdominal pain, and urination issues.  Patient became very tearful discussing her symptoms and current situation as she is very frustrated with her current financial situation and cost of insulin.  She said that she does not have money until the end of the week when she gets her paycheck. She also has to go to the food shelf tomorrow which she has never done before.  Since only got upset and threatened that she will stop taking the prednisone because she does not want her BG to be severely elevated and make her feel bad.    Allowed patient time to process her feelings and offered empathy and comfort. She was redirectable after and agreed to continue taking her prednisone for her COPD exacerbation.    Unfortunately, this is a very tricky situation due to financial barriers, further limiting treatment options.  She does clearly need prednisone for COPD exacerbation, but also needs insulin coverage, such as NPH, to avoid steroid-induced hyperglycemia.  Offered to give patient GoodRx coupon for NPH to help cut the cost of the insulin, but patient says she has no money left at all. She reports having some humalog left (\"I have 2 viles, but it's not much\").    Plan:  -Counseled patient of sliding scale correction  (approximately MDSS) with remaining humalog. Recommended using 5u humalog for BG > 300, 4u for "  - 300, 3u for BG > 200. Did not recommend large doses of humalog such as 15 or 20u she just took as it could result in severe hypoglycemia.   -Return precautions reviewed and discussed. Also went over reasons to go to ED including, fevers/chills, dyspnea, worsening fatigue, n/v, abdominal pain. Patient expressed understanding and agreeable.   - Advised for close follow-up later this week, but patient was resistant d/t cost. Offered phone or video visit, which she also declined. Did report she will be seeing Dr. Farah next Mon 10/21. Encouraged patient to go to this appointment. Will also plan to message Dr. Farah for further check-in.     Maikel Graham MD PGY2  [facility]

## 2024-10-14 NOTE — PROGRESS NOTES
Assessment & Plan   Mary Ann Olson is a 59 year old medically complex female. PMH notable for COPD/asthma, T2DM, Crohns disease with dermatologic complication, personality disorder. Unable to to fill many medications due to cost constraints, no medication coverage for insurance per patient. Declining social work referral or meeting with PharmD today to discuss possible support options.     Shortness of Breath   Concern for COPD vs Asthma Exacerbation   Patient presenting with 2 weeks of dry cough. Moderate expiratory wheezing notable on physical exam. Presentation most consistent with asthma or COPD exacerbation. Treatment options limited by significant financial constraint. Does not have access to any controller inhaler. She is using only DuoNeb 2-3X per day at home. Despite recently being on steroid course, would opt to start another short steroid taper in the absence of being able to presrbibe a controller inhaler at this time. Will try sending in AirDuo with GoodRx prescription, but suspect even this will be out of the patient's budget. She is adamant she does not want to discuss financial challenges with social work.    - Prednisone 40 mg x 2 days, 30 mg x 2 days, 20 mg x 2 days, 10 mg x 2 days    - AirDuo inhaler sent in with encouragement to use GoodRx coupon    - Covid, flu, RSV swab    - Return precautions provided: increased shortness of breath, wheezing, fever, development of productive cough    - Overall recommend close follow up with PCP and PharmD (if patient is willing) to discuss medication coverage options     Crohn's Disease   Dermatologic Complications of Crohn's Disease,   Patient with history of Crohn's disease and bilateral LE skin findings, originally felt to be bilateral cellulitis but not responsive to antibiotics and now felt to instead be a granulomatous complication of Crohn's disease. Patient tells me she is not yet following with GI on an outpatient basis. She has also developed a  small, erythematous lesion on her left shin without warmth, purulent drainage, or fluctuance. Given extensive history of skin findings and appearance on physical exam today, would favor this lesion to again be a complication of her Crohn's disease as opposed to an infectious picture. Recommending short prednisone tape as described above. Also would recommend establishing with a GI provider, which patient is hesitant to discuss today. Importantly, patient denies any GI symptoms today.    - Overall recommend close follow up with PCP to discuss establishing with outpatient GI     Hyperlipidemia   Last lipid panel one year ago at goal with LDL 45. Currently on atorvastatin 40 mg daily. Will repeat lipid panel today per patient request.    - Lipid panel   - Follow up with PCP to discuss     Type II Diabetes   Overdue for A1c check. Has been on oral steroid courses intermittently over the last several months for Crohn's flares and COPD exacerbations, A1c will likely be above goal.    - A1c    - Follow up with PCP to discuss     Subjective   Mary Ann is a 59 year old, presenting for the following health issues:  Lipids (Pt want her Cholesterol test), Shortness of Breath (Pt is shortness of breath for a while already and wake up in the middle of the night.), and Cough (Pt have dry cough and spitting out thing. )    HPI   - dry cough   - wheezing   - 2 weeks   - no fevers, no subjective chills   - no home tests for covid   - boyfriend is sick with similar symptoms   - restarted smoking, stress because of her neighbor (banging on door, alcoholic)   - using her nebulizer machine, a few times per day   - request we not give her prednisone - short on insulin, can't afford insulin   - has not been able to fill any inhalers, just has DuoNeb   - needs work note   - does not have Symbicort because of cost   - does not want to meet with    - does not want to meet with PharmD     ROS negative aside from those concerns noted  "in the HPI and A+P above.         Objective    /76   Pulse 114   Temp 98.1  F (36.7  C) (Oral)   Resp 18   Ht 1.562 m (5' 1.5\")   Wt 74 kg (163 lb 0.6 oz)   SpO2 95%   BMI 30.31 kg/m    Body mass index is 30.31 kg/m .  Physical Exam   General: Does not appear acutely ill, but does appear distressed. Intermittently tearful throughout visit.   HEENT: Conjunctivae are clear, nonicteric.   Respiratory: Moderate expiratory wheezes throughout, but no crackles or dullness. She is satting comfortably on room air.   Cardiac: RRR.  Extremities: Bilateral lower extremities with scarring from prior dermatologic complications. 1 cm x 1 cm firm and erythematous nodule on left shift without drainage, warmth, or fluctuance.   Skin: Skin in warm without rashes.  Psychiatric: Tearful, understandably frustrated by situation. Alert and oriented x 3.     Signed Electronically by: Giovanna Stout MD    "

## 2024-10-14 NOTE — TELEPHONE ENCOUNTER
Patient requesting a note to excuse from work. Please mail out to patient once completed thank you.

## 2024-10-14 NOTE — LETTER
[facility] [address]  [place] [address]  Phone: [phone]  Fax: [phone]    October 14, 2024        Mary Ann Olson  [address]  [place] [address]          To whom it may concern:    RE: Mary Ann Olson    I evaluated this individual in clinic on 10/14/24. Please excuse their absence from work.     Please contact me for questions or concerns.      Sincerely,      Giovanna Stout

## 2024-10-15 LAB
CHOLEST SERPL-MCNC: 99 MG/DL
FASTING STATUS PATIENT QL REPORTED: ABNORMAL
HDLC SERPL-MCNC: 47 MG/DL
LDLC SERPL CALC-MCNC: 37 MG/DL
NONHDLC SERPL-MCNC: 52 MG/DL
TRIGL SERPL-MCNC: 75 MG/DL

## 2024-10-15 NOTE — TELEPHONE ENCOUNTER
9:59 PM    Patient called again regarding elevated .  Denies having eaten anything. She rechecked it about half an hour ago. Still feeling fatigued, but otherwise asymptomatic.  Denies dyspnea, chest pain, nausea/vomiting, abdominal pain, fever/chills. She is just feeling very anxious and worried about going to sleep and not waking up with these elevated blood sugars.  During her call she was speaking in full sentences comfortably and did not sound to be in acute distress.    -Reiterated and reviewed Humalog sliding scale with patient who expressed understanding and agreeable to plan  -Reviewed ED precautions - patient expressed understanding and agreeable to plan.  -Discouraged continued use of doxycycline for COPD exacerbation as she only has 4 tablets left.  This would be an incomplete course.  Does report possibly increase sputum but no significant change in color, not meeting criteria for antibiotics for COPD exacerbation.  Encouraged her to continue prednisone for now with close follow-up.     Maikel Graham MD PGY2  [facility]

## 2024-10-29 ENCOUNTER — TELEPHONE (OUTPATIENT)
Dept: FAMILY MEDICINE | Facility: CLINIC | Age: 59
End: 2024-10-29
Payer: COMMERCIAL

## 2024-10-30 ENCOUNTER — OFFICE VISIT (OUTPATIENT)
Dept: FAMILY MEDICINE | Facility: CLINIC | Age: 59
End: 2024-10-30
Payer: COMMERCIAL

## 2024-10-30 ENCOUNTER — ANCILLARY PROCEDURE (OUTPATIENT)
Dept: GENERAL RADIOLOGY | Facility: CLINIC | Age: 59
End: 2024-10-30
Attending: FAMILY MEDICINE
Payer: COMMERCIAL

## 2024-10-30 VITALS
TEMPERATURE: 98.6 F | RESPIRATION RATE: 20 BRPM | WEIGHT: 160 LBS | SYSTOLIC BLOOD PRESSURE: 112 MMHG | BODY MASS INDEX: 30.21 KG/M2 | HEART RATE: 94 BPM | DIASTOLIC BLOOD PRESSURE: 72 MMHG | HEIGHT: 61 IN | OXYGEN SATURATION: 96 %

## 2024-10-30 DIAGNOSIS — R06.02 SHORTNESS OF BREATH: ICD-10-CM

## 2024-10-30 DIAGNOSIS — Z51.89 FOLLOW-UP MEDICAL CARE REQUESTED BY PATIENT: Primary | ICD-10-CM

## 2024-10-30 DIAGNOSIS — E11.65 TYPE 2 DIABETES MELLITUS WITH HYPERGLYCEMIA, WITHOUT LONG-TERM CURRENT USE OF INSULIN (H): ICD-10-CM

## 2024-10-30 DIAGNOSIS — R09.82 POST-NASAL DRIP: ICD-10-CM

## 2024-10-30 DIAGNOSIS — Z78.0 POSTMENOPAUSAL STATUS: ICD-10-CM

## 2024-10-30 DIAGNOSIS — E11.9 TYPE 2 DIABETES MELLITUS WITHOUT COMPLICATION, WITHOUT LONG-TERM CURRENT USE OF INSULIN (H): ICD-10-CM

## 2024-10-30 DIAGNOSIS — R05.1 ACUTE COUGH: ICD-10-CM

## 2024-10-30 DIAGNOSIS — E11.65 TYPE 2 DIABETES MELLITUS WITH HYPERGLYCEMIA, WITH LONG-TERM CURRENT USE OF INSULIN (H): ICD-10-CM

## 2024-10-30 DIAGNOSIS — K21.9 GASTROESOPHAGEAL REFLUX DISEASE WITHOUT ESOPHAGITIS: ICD-10-CM

## 2024-10-30 DIAGNOSIS — J44.89 ASTHMA-COPD OVERLAP SYNDROME (H): ICD-10-CM

## 2024-10-30 DIAGNOSIS — Z79.4 TYPE 2 DIABETES MELLITUS WITH HYPERGLYCEMIA, WITH LONG-TERM CURRENT USE OF INSULIN (H): ICD-10-CM

## 2024-10-30 DIAGNOSIS — R52 PAIN: ICD-10-CM

## 2024-10-30 PROCEDURE — 99215 OFFICE O/P EST HI 40 MIN: CPT | Performed by: FAMILY MEDICINE

## 2024-10-30 PROCEDURE — 71046 X-RAY EXAM CHEST 2 VIEWS: CPT | Mod: TC | Performed by: RADIOLOGY

## 2024-10-30 RX ORDER — BUDESONIDE AND FORMOTEROL FUMARATE DIHYDRATE 160; 4.5 UG/1; UG/1
2 AEROSOL RESPIRATORY (INHALATION) 2 TIMES DAILY
Qty: 10.2 G | Refills: 0 | Status: SHIPPED | OUTPATIENT
Start: 2024-10-30

## 2024-10-30 RX ORDER — ATORVASTATIN CALCIUM 40 MG/1
40 TABLET, FILM COATED ORAL DAILY
Qty: 90 TABLET | Refills: 3 | Status: SHIPPED | OUTPATIENT
Start: 2024-10-30

## 2024-10-30 RX ORDER — ERGOCALCIFEROL 1.25 MG/1
50000 CAPSULE, LIQUID FILLED ORAL WEEKLY
Qty: 12 CAPSULE | Refills: 3 | Status: SHIPPED | OUTPATIENT
Start: 2024-10-30

## 2024-10-30 RX ORDER — ACETAMINOPHEN 500 MG
1000 TABLET ORAL EVERY 6 HOURS PRN
Qty: 100 TABLET | Refills: 3 | Status: SHIPPED | OUTPATIENT
Start: 2024-10-30

## 2024-10-30 RX ORDER — ALBUTEROL SULFATE 90 UG/1
2 INHALANT RESPIRATORY (INHALATION) EVERY 4 HOURS PRN
Qty: 18 G | Refills: 3 | Status: SHIPPED | OUTPATIENT
Start: 2024-10-30

## 2024-10-30 RX ORDER — IPRATROPIUM BROMIDE 42 UG/1
2 SPRAY, METERED NASAL 4 TIMES DAILY
Qty: 15 ML | Refills: 0 | Status: SHIPPED | OUTPATIENT
Start: 2024-10-30

## 2024-10-30 RX ORDER — FLUTICASONE PROPIONATE AND SALMETEROL 232; 14 UG/1; UG/1
1 POWDER, METERED RESPIRATORY (INHALATION) 2 TIMES DAILY
Qty: 1 EACH | Refills: 4 | Status: SHIPPED | OUTPATIENT
Start: 2024-10-30
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
## 2024-11-06 ENCOUNTER — TELEPHONE (OUTPATIENT)
Dept: FAMILY MEDICINE | Facility: CLINIC | Age: 59
End: 2024-11-06
Payer: COMMERCIAL

## 2024-11-06 NOTE — TELEPHONE ENCOUNTER
Clinic page  11/06/24  5:49 PM    Received page from patient regarding throbbing foot pain  States that her pain is so severe that her feet are shaking  Has been present for a couple of days  Feet are swollen, but fluctuant and improved   No temperature change in feet  Pain is more sharp, electric shooting pain but just in the feet  Pain is bad enough that she can't sleep much, but not bad enough that it wakes her from sleep  No back pain, no bowel or bladder incontinence  No temperature change, legs are warm to touch per patient report  Skin does not appear shiny  Has not lost blood  Has tried Tylenol with no benefit but otherwise feels comfortable  BG has been ok    Discussed with patient that this kind of evaluation needs a visit  However, given patient appears stable, likely can wait until clinic visit  Did give precautions to go to ED, including worsening pain that may wake her from sleep, worsening discoloration, loss of temperature, color, or sensation, or nonresolution of symptoms with conservative measures  Patient agreeable, understanding of plan  Chart routed to clinic staff to set up visit  Patient will call back if symptoms worsen      Marilee Felipe MD  [facility]

## 2024-11-06 NOTE — LETTER
March 22, 2019      Mary Ann Olson  [address]   [place] [address]        Dear Mary Ann,    Please see below for your test results.  They are normal    Resulted Orders   Vitamin D 25-Hydroxy ([facility])   Result Value Ref Range    Vitamin D,25-Hydroxy 43.3 30.0 - 80.0 ng/mL    Narrative    Test performed by:  [facility]  [address] [place] [address]  Deficiency <10.0 ng/mL  Insufficiency 10.0-29.9 ng/mL  Sufficiency 30.0-80.0 ng/mL  Toxicity (possible) >100.0 ng/mL       If you have any questions, please call the clinic to make an appointment.    Sincerely,    Joanna Farah MD   Pt in device clinic today for routine annual threshold. 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 Pt has a history of AF/flutter and takes warfarin. He had a DCCV in 5/2024.     Check today shows pt has been back in AF since mid-June. It is hard to tell if pt is symptomatic or not. He reports shortness of breath with climbing stairs, but denies shortness of breath with other activities. He did say he had COVID and pneumonia in October and is also dealing with a back and leg injury, so lately he has not been able to be as active because of recovering from pneumonia and because of back/leg pain.     Will notify Dr. Santo that pt is back in AF. OV with Dr. Santo scheduled for 12/19/2024.           AF trend graphs:              Full device check report:    [employer] Accolade (D) Pacemaker Device Check  Patient seen in clinic for device evaluation and iterative programming.   AP: <1 %    : 90 %    Mode: DDD     Underlying Rhythm: AF, V rates 40's-70's    Heart Rate: good variability. Pt was recently ill with COVID and pneumonia and has a leg and back injury, so he is not very active recently. Sensor Trend graph shows appropriate HR increase with activity   Sensing: WNL    Pacing Threshold: not obtained in A (AF), WNL in V    Impedance: WNL    Battery Status: 9 yrs estimated longevity     Device Site: WNL    Atrial Arrhythmia: 100% AF since last remote in August. Pt takes warfarin. V rates controlled. Pt denies shortness of breath or fatigue, his activity is currently limited by back and leg pain. Will notify Dr. Santo of AF since pt had a DCCV in 5/2024.   Ventricular Arrhythmia: 7 episodes since last remote in August. 2 EGMs for review showing NSVT vs AF with RVR, 10-30 beats (3-10 seconds), -200 bpm. Both EGMs show slightly irregular rates with minimal change in near-field morphology. Pt has had NSVT in the past. Echo from 8/2022 shows EF 55-60%.    Setting Change: none    Care Plan: Scheduled  remote in 3 months.  OV with Dr. Santo 12/19/2024.

## 2024-11-07 DIAGNOSIS — J44.1 COPD EXACERBATION (H): ICD-10-CM

## 2024-11-07 NOTE — TELEPHONE ENCOUNTER
Medication is not on the active medication list, only albuterol rescue inhaler. Outside RN standing orders, routing to PCP to review and fill if appropriate. Timo SMITH

## 2024-11-08 RX ORDER — ALBUTEROL SULFATE 0.83 MG/ML
SOLUTION RESPIRATORY (INHALATION)
Qty: 75 ML | Refills: 0 | Status: SHIPPED | OUTPATIENT
Start: 2024-11-08

## 2024-11-14 DIAGNOSIS — J44.1 COPD EXACERBATION (H): ICD-10-CM

## 2024-11-14 RX ORDER — IPRATROPIUM BROMIDE AND ALBUTEROL SULFATE 2.5; .5 MG/3ML; MG/3ML
1 SOLUTION RESPIRATORY (INHALATION) 2 TIMES DAILY PRN
Qty: 90 ML | Refills: 0 | Status: SHIPPED | OUTPATIENT
Start: 2024-11-14

## 2024-11-20 ENCOUNTER — OFFICE VISIT (OUTPATIENT)
Dept: FAMILY MEDICINE | Facility: CLINIC | Age: 59
End: 2024-11-20
Payer: COMMERCIAL

## 2024-11-20 VITALS
DIASTOLIC BLOOD PRESSURE: 70 MMHG | SYSTOLIC BLOOD PRESSURE: 119 MMHG | WEIGHT: 160 LBS | RESPIRATION RATE: 18 BRPM | HEART RATE: 116 BPM | TEMPERATURE: 98.5 F | OXYGEN SATURATION: 94 % | HEIGHT: 61 IN | BODY MASS INDEX: 30.21 KG/M2

## 2024-11-20 DIAGNOSIS — M79.10 MYALGIA: Primary | ICD-10-CM

## 2024-11-20 DIAGNOSIS — Z23 FLU VACCINE NEED: ICD-10-CM

## 2024-11-20 DIAGNOSIS — F19.10 POLYSUBSTANCE ABUSE (H): Primary | ICD-10-CM

## 2024-11-20 DIAGNOSIS — M79.10 MYALGIA: ICD-10-CM

## 2024-11-20 DIAGNOSIS — E11.65 TYPE 2 DIABETES MELLITUS WITH HYPERGLYCEMIA, WITHOUT LONG-TERM CURRENT USE OF INSULIN (H): ICD-10-CM

## 2024-11-20 DIAGNOSIS — Z23 NEED FOR PROPHYLACTIC VACCINATION AND INOCULATION AGAINST INFLUENZA: ICD-10-CM

## 2024-11-20 DIAGNOSIS — Z23 ENCOUNTER FOR IMMUNIZATION: ICD-10-CM

## 2024-11-20 PROCEDURE — 90480 ADMN SARSCOV2 VAC 1/ONLY CMP: CPT

## 2024-11-20 PROCEDURE — 90471 IMMUNIZATION ADMIN: CPT

## 2024-11-20 PROCEDURE — 91320 SARSCV2 VAC 30MCG TRS-SUC IM: CPT

## 2024-11-20 PROCEDURE — 99213 OFFICE O/P EST LOW 20 MIN: CPT | Mod: 25

## 2024-11-20 PROCEDURE — 90673 RIV3 VACCINE NO PRESERV IM: CPT

## 2024-11-20 RX ORDER — CYCLOBENZAPRINE HCL 5 MG
5 TABLET ORAL 3 TIMES DAILY PRN
Qty: 30 TABLET | Refills: 0 | Status: SHIPPED | OUTPATIENT
Start: 2024-11-20
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
## 2024-11-27 ENCOUNTER — TELEPHONE (OUTPATIENT)
Dept: FAMILY MEDICINE | Facility: CLINIC | Age: 59
End: 2024-11-27

## 2024-11-27 NOTE — TELEPHONE ENCOUNTER
Forms/Letter Request    Type of form/letter: OTHER: JUNE REASONABLE ACCOMODATION REQUEST       Do we have the form/letter: Yes:     Who is the form from?  [facility] [employer]     Where did/will the form come from? form was faxed in    When is form/letter needed by:     How would you like the form/letter returned: Fax : [phone]    Patient Notified form requests are processed in 5-7 business days:    Okay to leave a detailed message?:

## 2024-12-09 ENCOUNTER — TELEPHONE (OUTPATIENT)
Dept: FAMILY MEDICINE | Facility: CLINIC | Age: 59
End: 2024-12-09
Payer: MEDICARE

## 2024-12-09 DIAGNOSIS — Z79.4 TYPE 2 DIABETES MELLITUS WITH HYPERGLYCEMIA, WITH LONG-TERM CURRENT USE OF INSULIN (H): ICD-10-CM

## 2024-12-09 DIAGNOSIS — E11.65 TYPE 2 DIABETES MELLITUS WITH HYPERGLYCEMIA, WITH LONG-TERM CURRENT USE OF INSULIN (H): ICD-10-CM

## 2024-12-09 RX ORDER — BLOOD SUGAR DIAGNOSTIC
STRIP MISCELLANEOUS
Qty: 100 EACH | Refills: 3 | Status: SHIPPED | OUTPATIENT
Start: 2024-12-09 | End: 2024-12-11

## 2024-12-09 NOTE — TELEPHONE ENCOUNTER
Call to Mary Ann. Reports NPH (cloudy) insulin 20 units twice daily. Has been without for 2 weeks. Refilling and rescheduled follow up with Dr. Farah. Is without prescription insurance so needs low cost option    Valeria Hall, Pharm.D., [facility]  Phone: [phone]  December 9, 2024 at 1:02 PM

## 2024-12-09 NOTE — TELEPHONE ENCOUNTER
Called for mammo bus and pt stating she needs insulin refills, unsure name of insulin. Stating inject 20 units. Not on active list. Please verify    [Alert] : alert [No Acute Distress] : no acute distress [Normocephalic] : normocephalic [EOMI Bilateral] : EOMI bilateral [Clear tympanic membranes with bony landmarks and light reflex present bilaterally] : clear tympanic membranes with bony landmarks and light reflex present bilaterally  [Pink Nasal Mucosa] : pink nasal mucosa [Nonerythematous Oropharynx] : nonerythematous oropharynx [Supple, full passive range of motion] : supple, full passive range of motion [No Palpable Masses] : no palpable masses [Clear to Auscultation Bilaterally] : clear to auscultation bilaterally [Regular Rate and Rhythm] : regular rate and rhythm [Normal S1, S2 audible] : normal S1, S2 audible [No Murmurs] : no murmurs [+2 Femoral Pulses] : +2 femoral pulses [Soft] : soft [NonTender] : non tender [Non Distended] : non distended [Normoactive Bowel Sounds] : normoactive bowel sounds [No Hepatomegaly] : no hepatomegaly [No Splenomegaly] : no splenomegaly [Jayesh: _____] : Jayesh [unfilled] [Circumcised] : circumcised [No Abnormal Lymph Nodes Palpated] : no abnormal lymph nodes palpated [Normal Muscle Tone] : normal muscle tone [No Gait Asymmetry] : no gait asymmetry [No pain or deformities with palpation of bone, muscles, joints] : no pain or deformities with palpation of bone, muscles, joints [Straight] : straight [+2 Patella DTR] : +2 patella DTR [Cranial Nerves Grossly Intact] : cranial nerves grossly intact [No Rash or Lesions] : no rash or lesions

## 2024-12-09 NOTE — TELEPHONE ENCOUNTER
----- Message from Beatris MAHMOOD sent at 12/4/2024  9:33 AM CST -----  Regarding: RE: Mammo  Pt no show for appt, called for mammo bus appt. No answer  ----- Message -----  From: Beatris Castillo RMA  Sent: 11/25/2024  12:59 PM CST  To: RODRIGO Luciano  Subject: FW: Mammo                                        Pt have an appt on 12/3/24, will ask pt for mammo referral  ----- Message -----  From: Torrie Fuentes CMA  Sent: 11/22/2024   2:42 PM CST  To: Pv Yellow Team  Subject: FW: Mammo                                        Please reach out to patient to schedule Mammo bus for January.     Thank you  Torrie  ----- Message -----  From: Hellen Alford  Sent: 11/20/2024  10:41 AM CST  To: Torrie Fuentes CMA  Subject: Mammo                                            Patient is interested in mammo bus for December

## 2024-12-11 ENCOUNTER — TELEPHONE (OUTPATIENT)
Dept: FAMILY MEDICINE | Facility: CLINIC | Age: 59
End: 2024-12-11
Payer: MEDICARE

## 2024-12-11 DIAGNOSIS — Z79.4 TYPE 2 DIABETES MELLITUS WITH HYPERGLYCEMIA, WITH LONG-TERM CURRENT USE OF INSULIN (H): ICD-10-CM

## 2024-12-11 DIAGNOSIS — E11.65 TYPE 2 DIABETES MELLITUS WITH HYPERGLYCEMIA, WITH LONG-TERM CURRENT USE OF INSULIN (H): ICD-10-CM

## 2024-12-11 RX ORDER — BLOOD SUGAR DIAGNOSTIC
STRIP MISCELLANEOUS
Qty: 100 EACH | Refills: 3 | Status: SHIPPED | OUTPATIENT
Start: 2024-12-11

## 2024-12-11 NOTE — TELEPHONE ENCOUNTER
Patient called in stating Novalog was sent to pharmacy and should have been Humalog (generic). Patient was combative, cursing and disparaging of KMS. Pt rep advised patient that the insulin was ordered by PCP- not K[facility].     insulin  UNIT/ML vial -- what was sent    Please contact the patient if needed - or send corrected prescription to pharmacy if appropriate. Thank you

## 2024-12-11 NOTE — TELEPHONE ENCOUNTER
Called patient and left message.  I do not see an old prescription for humalog in chart.  NPH has been insulin of choice due to insurance issues.  I left meesage that NPH sent would be my recommendation for now until patient has medication coverage.

## 2024-12-19 ENCOUNTER — TELEPHONE (OUTPATIENT)
Dept: FAMILY MEDICINE | Facility: CLINIC | Age: 59
End: 2024-12-19
Payer: MEDICARE

## 2024-12-19 NOTE — TELEPHONE ENCOUNTER
General Call      Reason for Call:  Patient called left voicemail that she is needing a new nebulizer machine to be order, her machine is broken and has no steam coming out. She would like to see if PCP can help get a new one for her at no cost. Please advise further thank you.    What are your questions or concerns:      Date of last appointment with provider:     Okay to leave a detailed message?: Yes at Cell number on file:    Telephone Information:   Mobile [phone]

## 2024-12-26 ENCOUNTER — TELEPHONE (OUTPATIENT)
Dept: FAMILY MEDICINE | Facility: CLINIC | Age: 59
End: 2024-12-26

## 2025-01-16 NOTE — TELEPHONE ENCOUNTER
[facility] phone call message- patient requesting results:    Test: Lab    Date of test: 04/27/2020    Additional Comments: Patient is wondering if lab results are in. Please call and advise.    OK to leave a message on voice mail? Yes    Primary language: English      needed? No    Call taken on April 29, 2020 at 8:28 AM by Noah Felipe       Use arthritis strength Tylenol up to 2 tablets every 12 hours as needed.  Get a thumb spica splint or cool comfort splint on [employer] which will help your thumb pain as well.  I would also get a paraffin wax bath which she can use as needed for symptomatic relief.  Continue home exercise program as before.  Continue the Plaquenil 200 mg daily.  Recheck lab work 2 weeks prior to next office visit.

## 2025-01-21 ENCOUNTER — OFFICE VISIT (OUTPATIENT)
Dept: FAMILY MEDICINE | Facility: CLINIC | Age: 60
End: 2025-01-21
Payer: MEDICARE

## 2025-01-21 VITALS
OXYGEN SATURATION: 94 % | WEIGHT: 159 LBS | RESPIRATION RATE: 16 BRPM | DIASTOLIC BLOOD PRESSURE: 72 MMHG | HEIGHT: 61 IN | HEART RATE: 101 BPM | BODY MASS INDEX: 30.02 KG/M2 | TEMPERATURE: 97.9 F | SYSTOLIC BLOOD PRESSURE: 121 MMHG

## 2025-01-21 DIAGNOSIS — L03.116 CELLULITIS OF LEFT LOWER EXTREMITY: Primary | ICD-10-CM

## 2025-01-21 DIAGNOSIS — L93.2 INFLAMMATORY AUTOIMMUNE DISORDER OF SKIN: ICD-10-CM

## 2025-01-21 RX ORDER — PREDNISONE 20 MG/1
TABLET ORAL
Qty: 11 TABLET | Refills: 0 | Status: SHIPPED | OUTPATIENT
Start: 2025-01-21

## 2025-01-21 RX ORDER — DOXYCYCLINE 100 MG/1
100 CAPSULE ORAL 2 TIMES DAILY
Qty: 14 CAPSULE | Refills: 0 | Status: SHIPPED | OUTPATIENT
Start: 2025-01-21

## 2025-01-21 NOTE — PROGRESS NOTES
"  {PROVIDER CHARTING PREFERENCE:657532}    Subjective   Mary Ann is a 59 year old, presenting for the following health issues:  Sore (Sore on back of right leg/) and Constipation (Constipation)  {(!) Visit Details have not yet been documented.  Please enter Visit Details and then use this list to pull in documentation. (Optional):162313}  HPI     {MA/LPN/RN Pre-Provider Visit Orders- hCG/UA/Strep (Optional):683217}  {SUPERLIST (Optional):394516}    Sore on back of right leg, has been there for a few days, has not tried any compresses, has tried prednisone in the past    History of Crohns colitis:     Constipation: Has senna at home, had a bowel movements yesterday, normal. When her Crohns flares up, some diarrhea, but no blood in stool.   {additonal problems for provider to add (Optional):201857}    {ROS Picklists (Optional):243660}      Objective    /72   Pulse 101   Temp 97.9  F (36.6  C) (Oral)   Resp 16   Ht 1.549 m (5' 1\")   Wt 72.1 kg (159 lb)   SpO2 94%   BMI 30.04 kg/m    Body mass index is 30.04 kg/m .  Physical Exam   {Exam List (Optional):193985}    {Diagnostic Test Results (Optional):655896}        Signed Electronically by: Venita Sorenson DO  {Email feedback regarding this note to [email or web address]   :843379}  "

## 2025-01-25 ENCOUNTER — TELEPHONE (OUTPATIENT)
Dept: FAMILY MEDICINE | Facility: CLINIC | Age: 60
End: 2025-01-25
Payer: MEDICARE

## 2025-01-25 NOTE — TELEPHONE ENCOUNTER
"After Hours Clinic Page  1:06 PM     Received page: \"Really badly constipated, on prednisone.\"    Called patient back. \"I'm really uncomfortable, really bloated and constipated.\" Has been pushing fluids, taken prune juice and generic laxative/softener. Ingredients say docusate sodium and sennasides. Reports history of Chron's, can't afford meds. Got prednisone from clinic provider on 1/21. Has already gone three times today, affirms soft but before today, had gone multiple days without stool. \"I'm all backed up.\" No blood in stool. Not straining significantly. No fevers, chills, nausea, worsened SOB. Doesn't really have an appetite. No urinary sxs or vaginal bleeding. Notes sweating. Asked her to check sugar: 180s. Abd pain in LLQ this whole time started before clinic visit where got abx for other issue.    Unsure what is going on without examining patient. Doesn't sound consistent with constipation. Most worrisome would be Chron's flare but is taking prenisone over past several days, having no blood in stool. Would also be worried about diverticulitis given location of pain but no subjective fevers (though sweating is a little worrisome). Counseled patient to monitor closely for now. If things stay stable, should be seen in clinic Monday. Counseled on red-flag symptoms that would warrant presentation to ED before then. She verbalized understanding.       Nahun Thompson MD  "

## 2025-02-24 DIAGNOSIS — E11.65 TYPE 2 DIABETES MELLITUS WITH HYPERGLYCEMIA, WITHOUT LONG-TERM CURRENT USE OF INSULIN (H): Primary | ICD-10-CM

## 2025-02-26 ENCOUNTER — ANCILLARY PROCEDURE (OUTPATIENT)
Dept: MAMMOGRAPHY | Facility: CLINIC | Age: 60
End: 2025-02-26
Attending: STUDENT IN AN ORGANIZED HEALTH CARE EDUCATION/TRAINING PROGRAM
Payer: COMMERCIAL

## 2025-02-26 ENCOUNTER — TELEPHONE (OUTPATIENT)
Dept: FAMILY MEDICINE | Facility: CLINIC | Age: 60
End: 2025-02-26
Payer: COMMERCIAL

## 2025-02-26 DIAGNOSIS — M79.622 PAIN IN LEFT AXILLA: ICD-10-CM

## 2025-02-26 DIAGNOSIS — R92.1 CALCIFICATION OF LEFT BREAST: ICD-10-CM

## 2025-02-26 PROCEDURE — 272N000431 US BREAST BIOPSY CORE NEEDLE LEFT

## 2025-02-26 PROCEDURE — 77066 DX MAMMO INCL CAD BI: CPT

## 2025-02-26 PROCEDURE — 250N000009 HC RX 250: Performed by: STUDENT IN AN ORGANIZED HEALTH CARE EDUCATION/TRAINING PROGRAM

## 2025-02-26 PROCEDURE — 88360 TUMOR IMMUNOHISTOCHEM/MANUAL: CPT | Mod: TC | Performed by: STUDENT IN AN ORGANIZED HEALTH CARE EDUCATION/TRAINING PROGRAM

## 2025-02-26 PROCEDURE — 88305 TISSUE EXAM BY PATHOLOGIST: CPT | Mod: TC | Performed by: STUDENT IN AN ORGANIZED HEALTH CARE EDUCATION/TRAINING PROGRAM

## 2025-02-26 PROCEDURE — 88342 IMHCHEM/IMCYTCHM 1ST ANTB: CPT | Mod: 26 | Performed by: PATHOLOGY

## 2025-02-26 PROCEDURE — 999N000065 MA POST PROCEDURE LEFT

## 2025-02-26 PROCEDURE — 19083 BX BREAST 1ST LESION US IMAG: CPT | Mod: LT

## 2025-02-26 PROCEDURE — 76642 ULTRASOUND BREAST LIMITED: CPT | Mod: LT

## 2025-02-26 PROCEDURE — 88360 TUMOR IMMUNOHISTOCHEM/MANUAL: CPT | Mod: 26 | Performed by: PATHOLOGY

## 2025-02-26 PROCEDURE — 88341 IMHCHEM/IMCYTCHM EA ADD ANTB: CPT | Mod: 26 | Performed by: PATHOLOGY

## 2025-02-26 PROCEDURE — 88305 TISSUE EXAM BY PATHOLOGIST: CPT | Mod: 26 | Performed by: PATHOLOGY

## 2025-02-26 RX ADMIN — LIDOCAINE HYDROCHLORIDE 7 ML: 10 INJECTION, SOLUTION INFILTRATION; PERINEURAL at 14:33

## 2025-02-26 RX ADMIN — LIDOCAINE HYDROCHLORIDE 10 ML: 10 INJECTION, SOLUTION INFILTRATION; PERINEURAL at 14:24

## 2025-02-26 NOTE — PROGRESS NOTES
Per [facility] Radiologist, Dr. Cevallos, I have assisted the patient with scheduling the following:      Surgical and Oncology Consults:  Dr. Jacob and Dr. Kate 3/6/25 @ 10:30   [address], [address]   [place] [address]  [phone]    I will call patient to discuss breast biopsy pathology results within 1-5 business days.  We will also discuss what to expect at the above appointment and next steps.  She has my direct number if she has further questions.  Patient denies further questions, verbalizes understanding and agrees with this plan.         Andressa Ling, RN, BSN, PHN, CBCN  Breast Center Imaging Nurse Coordinator   [address]  [place] [address]  [phone]

## 2025-02-26 NOTE — DISCHARGE INSTRUCTIONS
After Your Breast Biopsy    Bleeding, bruising, and pain  Breast tenderness and some bruising is normal and may last several days. You may wear your bra overnight to support the breast.  You may use an ice pack for pain. Place it over the area for 15 to 20 minutes, several times a day.  You may take over-the-counter pain medicine:  On the day of the biopsy, we recommend Tylenol (acetaminophen) because it does not raise your risk of bleeding.  The next day, you may take an anti-inflammatory medicine (aspirin, ibuprofen, Motrin, Aleve, Advil), unless your doctor tells you not to.  Bandages and showering  Keep your bandage in place until tomorrow morning. Don't get it wet.  If you have small pieces of tape on the skin, leave them in place. They will fall off on their own, or you can remove them after 5 days.  You may shower the next morning after your biopsy.  Activity  No heavy activity (no running, no gym workouts, no lifting, no vacuuming, etc.) on the day of your biopsy.  You may go back to normal activity the next day. But limit what you do if you still have pain or discomfort.  Infection  Infection is rare. Signs of infection include:  Fever (including sweats and chills)  Redness  Pain that gets worse  Fluid draining from the biopsy site  Biopsy results  Results may take up to 5 business days.  A nurse or doctor from the Breast Center will call with your results. The system sends the results to the doctor that ordered your biopsy & MyChart automatically.     If you have not gotten your results in 5 days, please call the Breast Center.  Call the Breast Center with questions or if:   You have bleeding that lasts more than 20 minutes.  You have pain that you can't control.  You have signs of infection (fever, sweats, chills, redness, increasing pain, or drainage).  After hours, please call the doctor who ordered your biopsy.     Breast Center Nurse  [phone]    For informational purposes only. Not to replace the  advice of your health care provider. Copyright   2010 [employer] [facility]. All rights reserved. Clinically reviewed by Evelyn Colbert, Director, [facility]. [facility] [id number] - REV 08/23.     23-Jul-2021

## 2025-02-26 NOTE — TELEPHONE ENCOUNTER
FYI - Status Update    Who is Calling: patient    Update: Patient requested lab results. Patient Rep relayed message from Dr. Farah. Patient mentioned scheduled appt with [facility] for tomorrow.  No further questions.

## 2025-02-26 NOTE — TELEPHONE ENCOUNTER
RECORDS STATUS - BREAST    RECORDS REQUESTED FROM: [facility]   NOTES DETAILS STATUS   OFFICE NOTE from referring provider Epic 2/21/2025 - Dr. Joanna Farah   MEDICATION LIST [facility]    LABS     PATHOLOGY REPORTS  (Tissue diagnosis, Stage, ER/AZ percentage positive and intensity of staining, HER2 IHC, FISH, and all biopsies from breast and any distant metastasis)                 Epic 2/26/2025   IMAGING (NEED IMAGES & REPORT)     MAMMO PACS 2/26/2025   ULTRASOUND PACS US Breast: 2/26/2025

## 2025-02-27 ENCOUNTER — TRANSFERRED RECORDS (OUTPATIENT)
Dept: HEALTH INFORMATION MANAGEMENT | Facility: CLINIC | Age: 60
End: 2025-02-27
Payer: COMMERCIAL

## 2025-03-03 ENCOUNTER — TELEPHONE (OUTPATIENT)
Dept: MAMMOGRAPHY | Facility: CLINIC | Age: 60
End: 2025-03-03
Payer: COMMERCIAL

## 2025-03-03 LAB
PATH REPORT.COMMENTS IMP SPEC: ABNORMAL
PATH REPORT.COMMENTS IMP SPEC: YES
PATH REPORT.FINAL DX SPEC: ABNORMAL
PATH REPORT.GROSS SPEC: ABNORMAL
PATH REPORT.MICROSCOPIC SPEC OTHER STN: ABNORMAL
PATHOLOGY SYNOPTIC REPORT: ABNORMAL
PHOTO IMAGE: ABNORMAL

## 2025-03-03 NOTE — TELEPHONE ENCOUNTER
[facility] Radiologist, Dr. Cevallos, has reviewed the following breast biopsy pathology report and confirmed that recent breast imaging is concordant with the final surgical pathology results.    I phoned patient, verified full name, date of birth, and informed patient of the following breast biopsy results:      Final Diagnosis     BREAST, LEFT, 1:00, 4 CM FROM NIPPLE, MASS, ULTRASOUND-GUIDED NEEDLE CORE BIOPSY:  -AT LEAST DUCTAL CARCINOMA IN SITU, SOLID AND CRIBRIFORM TYPES, INTERMEDIATE NUCLEAR GRADE (2) WITH COMEDONECROSIS AND CALCIFICATIONS, SEE COMMENT  -ER: NEGATIVE, LESS THAN 1% NUCLEAR STAINING IN DCIS  -KY: NEGATIVE, LESS THAN 1% NUCLEAR STAINING IN DCIS         Patient has already been scheduled for the following:      Surgical & Oncology Consults:  Dr. Jacob & Dr. Kate 3/6/25  [address], [address]   [place] [address] [phone]    Patient denies any concerns at her breast biopsy site.  Informed patient what to expect at the above consultation appointment and that the provider will review the above pathology results and recent breast imaging with patient in great detail in person.  A take home folder including new diagnosis information, resources, and direct contact number to surgery RN Coordinator will be provided to patient during the above visit.  Encouraged patient to invite a family member, friend, or support person to accompany her, to help listen and take notes (no more than two people).   All patient questions were answered and support provided.  Patient has my direct phone number if further questions.  Patient verbalized understanding and agrees with the above plan of care.    Ordering provider has been notified of the results and plan.       Andressa Ling, RN, BSN, PHN, CBCN  Breast Center Imaging Nurse Coordinator   [address]  [place] [address] [phone]

## 2025-03-04 ENCOUNTER — TRANSFERRED RECORDS (OUTPATIENT)
Dept: HEALTH INFORMATION MANAGEMENT | Facility: CLINIC | Age: 60
End: 2025-03-04
Payer: COMMERCIAL

## 2025-03-05 PROBLEM — D05.12 DUCTAL CARCINOMA IN SITU (DCIS) OF LEFT BREAST WITH COMEDONECROSIS: Status: ACTIVE | Noted: 2025-03-05
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
## 2025-03-06 ENCOUNTER — PRE VISIT (OUTPATIENT)
Dept: ONCOLOGY | Facility: HOSPITAL | Age: 60
End: 2025-03-06
Payer: COMMERCIAL

## 2025-03-06 ENCOUNTER — OFFICE VISIT (OUTPATIENT)
Dept: SURGERY | Facility: CLINIC | Age: 60
End: 2025-03-06
Attending: SURGERY
Payer: COMMERCIAL

## 2025-03-06 ENCOUNTER — OFFICE VISIT (OUTPATIENT)
Dept: ONCOLOGY | Facility: HOSPITAL | Age: 60
End: 2025-03-06
Attending: SURGERY
Payer: COMMERCIAL

## 2025-03-06 VITALS — WEIGHT: 150 LBS | HEIGHT: 61 IN | BODY MASS INDEX: 28.32 KG/M2

## 2025-03-06 DIAGNOSIS — D05.12 DUCTAL CARCINOMA IN SITU (DCIS) OF LEFT BREAST WITH COMEDONECROSIS: Primary | ICD-10-CM

## 2025-03-06 PROCEDURE — G0463 HOSPITAL OUTPT CLINIC VISIT: HCPCS | Performed by: SURGERY

## 2025-03-06 PROCEDURE — 99204 OFFICE O/P NEW MOD 45 MIN: CPT | Performed by: SURGERY

## 2025-03-06 RX ORDER — INSULIN GLARGINE-YFGN 100 [IU]/ML
INJECTION, SOLUTION SUBCUTANEOUS
COMMUNITY
Start: 2025-02-21

## 2025-03-06 NOTE — PROGRESS NOTES
[facility] Hematology and Oncology Consult Note    Patient: Mary Ann Olson  MRN: [id number]  Date of Service: Mar 6, 2025           Reason for consultation      Problem List Items Addressed This Visit          Other    Ductal carcinoma in situ (DCIS) of left breast with comedonecrosis - Primary         Assessment / number of problems addressed      High-grade type of DCIS in the left breast.  Status postbiopsy.  Concerned that she might have invasive tumor which may not have been detected by the biopsy.  History of smoking in the past.  She has smoked at least half a pack for 45 years.  Quit in 2024.  History of some COPD as well.  History of diabetes for which she has needed insulin.  Other medical conditions which are also concerning.    Plan and medical decision making      Reviewed notes from several primary care providers including Dr. Farah, Dr. Oviedo and Dr. Gallegos.  Reviewed the biopsy report.    Ordered tests.    Independently interpreted lab tests and radiological exams performed by other physicians.  Personally reviewed the images of the mammogram showing the density in the retroareolar space.  Treatment plan discussed with Dr. Michael Jacob from surgical oncology.    Discussed with the patient that we can proceed with surgical excision of her tumor.  We will leave it up to Dr. Arora to decide if he needs MRI or further evaluation of the breast and/or axilla.  She definitely needs lumpectomy but may need wider excision.  Will see her back after her surgical excision.  If there is no invasive component she will require radiation therapy postoperatively unless she opts for mastectomy.  If she has invasive disease and it is also ER/MS negative then she will need adjuvant chemotherapy.  Advised the patient that she should also get a CT scan of the chest since she has a history of smoking.  Follow-up with me after the above tests are done.  The longitudinal plan of care for the  diagnosis(es)/condition(s) as documented were addressed during this visit. Due to the added complexity in care, I will continue to support Mary Ann in the subsequent management and with ongoing continuity of care.     Clinical/pathological stage       Cancer Staging   No matching staging information was found for the patient.      History of present illness      Ms. Mary Ann Olson is a 59 year old postmenopausal woman who has been referred to multidisciplinary clinic for a diagnosis of high-grade DCIS of the left breast.  The patient in fact presented to her primary care physician for her annual checkup and complained of some left axillary pain.  So diagnostic mammogram was ordered.  The mammogram performed on 26 February 2025 was suspicious for slight architectural distortion at 1 o'clock position in the left breast.  This was located at 1 o'clock position deep to the areola measuring approximately 38 mm in size.  This was further evaluated with the help of an ultrasound which showed irregularly shaped hypoechoic area containing echogenic foci measuring approximately 24 mm in size.  It was then biopsied on the same day and the biopsy has come back positive for ductal carcinoma in situ with solid and cribriform types with the presence of comedonecrosis.  This appears to be ER/NH negative.  The pathologist are concerned that there might be an invasive component.    Patient comes in today for consultation.  She has had multiple medical issues in the past including Crohn's disease, diabetes, COPD, history of smoking etc. she tells me she has prior biopsy on her left breast.  She is not sure what came out of it.  Could not find records about it.  Comes in today accompanied by her boyfriend.    Detailed review of systems      A review of systems was obtained.  Positive findings noted in the history.  Rest of the review of system is otherwise negative.      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    Antihistamines overdose, intentional self-harm, initial encounter (H)     Asthma     Bipolar 1 disorder (H) hx of bipolar listed in h and p    Bipolar 2 disorder (H)     Bipolar I disorder, single manic episode (H)     Created by Conversion  Replacement Utility updated for latest IMO load    Cellulitis 2006 left ankle, 2008 right foot    COPD (chronic obstructive pulmonary disease) (H)     Crohn's disease (H)     arthritis associated with crohn's    Diabetes mellitus (H)     Diabetes mellitus, type 2 (H)     Fibrocystic breast     Heat stroke     when a young child     Hyperlipidemia     Idiopathic acute pancreatitis 07/14/2015    Irritable bowel syndrome     Created by Conversion     Kidney stone     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     Overdose     Pyoderma gangrenosum (H)     2016    Sacroiliitis 2004    Skin lesion of left leg 08/27/2015    Suicidal ideation     Suicide attempt (H)     Traumatic iritis 07/21/2015    Umbilical hernia     Uncomplicated asthma      Past Surgical History:   Procedure Laterality Date    BIOPSY BREAST Left     BIOPSY OF BREAST, INCISIONAL      Description: Biopsy Breast Open;  Recorded: 10/28/2010;     REMOVAL OF TONSILS,<10 Y/O      Description: Tonsillectomy;  Recorded: 07/08/2009;    ZZC LIGATE FALLOPIAN TUBE      Description: Tubal Ligation;  Recorded: 07/08/2009;     Family History   Problem Relation Age of Onset    Coronary Artery Disease Mother     Diabetes Father     Breast Cancer Maternal Grandmother     Asthma Son     Asthma Daughter     Hemophilia Other     Diabetes Type 2  Father     Factor VIII deficiency Other      Social History     Socioeconomic History    Marital status: Single   Tobacco Use    Smoking status: Every Day     Current packs/day: 0.50     Types: Cigarettes     Passive exposure: Past    Smokeless tobacco: Never    Tobacco comments:     2-3 cig/day   Vaping Use     Vaping status: Never Used   Substance and Sexual Activity    Alcohol use: No    Drug use: No     Social Drivers of Health     Financial Resource Strain: Medium Risk (7/27/2024)    Received from [employer] [facility]    Financial Resource Strain     Difficulty of Paying Living Expenses: 1     Difficulty of Paying Living Expenses: 2   Food Insecurity: Food Insecurity Present (7/27/2024)    [facility]    Food Insecurity     Do you worry your food will run out before you are able to buy more?: 2   Transportation Needs: No Transportation Needs (7/27/2024)    [facility]    Transportation Needs     Does lack of transportation keep you from medical appointments?: 1     Does lack of transportation keep you from work, meetings or getting things that you need?: 1   Social Connections: Socially Integrated (7/27/2024)    Received from [facility] [employer] [facility]    Social Connections     Do you often feel lonely or isolated from those around you?: 0   Interpersonal Safety: Low Risk  (10/14/2024)    Interpersonal Safety     Do you feel physically and emotionally safe where you currently live?: Yes     Within the past 12 months, have you been hit, slapped, kicked or otherwise physically hurt by someone?: No     Within the past 12 months, have you been humiliated or emotionally abused in other ways by your partner or ex-partner?: No   Housing Stability: Low Risk  (7/27/2024)    Received from [employer] [facility]    Housing Stability     What is your housing situation today?: 1           Allergies      Allergies   Allergen Reactions    Gadolinium Derivatives Hives    Iodinated Contrast Media Hives    Trimethoprim Hives    Azithromycin Other (See Comments) and Rash     Erythema Nodosum x2    Keflex [Cephalexin] Rash    Aspirin Other (See Comments)    Cefuroxime Itching and  Other (See Comments)    Contrast Dye Hives     IV    Corylus Other (See Comments)    Diatrizoate Hives    Iodixanol Unknown    Nuts Other (See Comments)     hazelnuts    Septra [Sulfamethoxazole-Trimethoprim] Hives    Sulfa Antibiotics Hives    Metronidazole Itching and Rash    Nitrofurantoin Itching and Rash    Quinolones Rash         Physical exam        There were no vitals taken for this visit.      GENERAL: Alert and oriented to time place and person. Seated comfortably. In no distress.    HEAD: Atraumatic and normocephalic.    EYES: SAMARIA, EOMI. No pallor. No icterus.    Oral cavity: no mucosal lesion or tonsillar enlargement.  Poor dental state.    NECK: supple. JVP normal.No thyroid enlargement.    LYMPH NODES: No palpable, cervical, axillary or inguinal lymphadenopathy.    CHEST: clear to auscultation bilaterally. Symmetrical breath movements bilaterally.    CVS: S1 and S2 are Regular rate and rhythm. No murmur or gallop or rub heard. No peripheral edema.    ABDOMEN: Soft. Not tender. Not distended. No palpable hepatomegaly or splenomegaly. No other mass palpable. Bowel sounds heard.    EXTREMITIES: Warm.  Minimal arthritic changes.    NEUROLOGICAL: Alert awake oriented.  Otherwise intact.  Cranial nerves appears to be preserved.    SKIN: no rash, or bruising or purpura.      Laboratory data      No results found for this or any previous visit (from the past week).    Imaging results        US Breast Biopsy Core Needle Left    Addendum Date: 3/4/2025    Pathology shows DCIS. This is consistent with imaging findings. A verbal report was given to the patient. Surgical consultation is recommended.    Result Date: 3/4/2025  US Breast Biopsy Core Needle Left INDICATION: Left breast mass. PROCEDURE: Informed consent was obtained from the patient. Ultrasound was used to localize the mass at the 1 o'clock position of the left breast, 4 cm from the nipple.  The skin was marked, then prepped and draped in a sterile  fashion. 1% lidocaine was used for local anesthesia. Under direct sonographic guidance, a 14-gauge coaxial needle was used to obtain 3 core biopsies.  A biopsy marking clip was then placed.  Digital mammograms performed in a separate room, using separate equipment, to document clip placement. The mammogram showed the clip to be in good position. The patient tolerated this well; there are no immediate complications.    IMPRESSION: 1. Ultrasound-guided biopsy of mass in the left breast. Pathology pending. 2. Post procedure mammogram for marker placement     MA Post Procedure Left    Addendum Date: 3/4/2025    Pathology shows DCIS. This is consistent with imaging findings. A verbal report was given to the patient. Surgical consultation is recommended.    Result Date: 3/4/2025  US Breast Biopsy Core Needle Left INDICATION: Left breast mass. PROCEDURE: Informed consent was obtained from the patient. Ultrasound was used to localize the mass at the 1 o'clock position of the left breast, 4 cm from the nipple.  The skin was marked, then prepped and draped in a sterile fashion. 1% lidocaine was used for local anesthesia. Under direct sonographic guidance, a 14-gauge coaxial needle was used to obtain 3 core biopsies.  A biopsy marking clip was then placed.  Digital mammograms performed in a separate room, using separate equipment, to document clip placement. The mammogram showed the clip to be in good position. The patient tolerated this well; there are no immediate complications.    IMPRESSION: 1. Ultrasound-guided biopsy of mass in the left breast. Pathology pending. 2. Post procedure mammogram for marker placement     US Breast Left Limited 1-3 Quadrants    Result Date: 2/27/2025  EXAM: MA DIAGNOSTIC BILATERAL W/ NELSON, US BREAST LEFT LIMITED 1-3 QUADRANTS DATE: 2/26/2025 HISTORY: Left breast pain COMPARISON 9/10/2019, 10/26/2016, 11/4/2014 MAMMOGRAPHIC FINDINGS: BREAST DENSITY: There are scattered areas of fibroglandular  density. Bilateral full-field digital diagnostic mammograms performed. Images evaluated with the assistance of CAD. Breast tomosynthesis was used in interpretation. There are scattered benign-appearing round calcifications in both breasts. Right breast appears stable and benign. In the left breast in the 1:00 position in the anterior breast almost directly deep to the areola there are pleomorphic microcalcifications following a ductal pattern extending for an area measuring 38 x 23 x 24 mm. The appearance is highly suspicious for malignancy. Areas of benign-appearing asymmetric tissue are again noted in the breast, unchanged compared to the prior exams. ULTRASOUND FINDINGS: Targeted left breast ultrasound was performed by both the ultrasonographer and the radiologist. In the area of pain, and the low left axilla. No abnormality was identified. In the 1:00 position 4 cm from the nipple a irregularly-shaped hypoechoic area containing echogenic foci correlating with the area seen on the patient's mammogram was identified measuring 24 x 5 x 16 mm.     IMPRESSION: BI-RADS CATEGORY: 5 - Highly Suggestive of Malignancy.  No cause for the patient's pain is identified and follow up should be based on clinical findings. Calcifications in the left breast are highly suspicious for malignancy. They can be identified on ultrasound. Ultrasound-guided core biopsy for tissue diagnosis, and surgical consultation are recommended. RECOMMENDED FOLLOW-UP: Biopsy. The findings and the recommendations were discussed with the patient and her caregiver at the time of exam. Ultrasound-guided core biopsy was performed on the same day for patient convenience. We are helping the patient is scheduling surgical consultation. KIMBERLEY BARKSDALE MD   SYSTEM ID:  [id number]    MA Diagnostic Bilateral w/ Nelson    Result Date: 2/26/2025  EXAM: MA DIAGNOSTIC BILATERAL W/ NELSON, US BREAST LEFT LIMITED 1-3 QUADRANTS DATE: 2/26/2025 HISTORY: Left breast  pain COMPARISON 9/10/2019, 10/26/2016, 11/4/2014 MAMMOGRAPHIC FINDINGS: BREAST DENSITY: There are scattered areas of fibroglandular density. Bilateral full-field digital diagnostic mammograms performed. Images evaluated with the assistance of CAD. Breast tomosynthesis was used in interpretation. There are scattered benign-appearing round calcifications in both breasts. Right breast appears stable and benign. In the left breast in the 1:00 position in the anterior breast almost directly deep to the areola there are pleomorphic microcalcifications following a ductal pattern extending for an area measuring 38 x 23 x 24 mm. The appearance is highly suspicious for malignancy. Areas of benign-appearing asymmetric tissue are again noted in the breast, unchanged compared to the prior exams. ULTRASOUND FINDINGS: Targeted left breast ultrasound was performed by both the ultrasonographer and the radiologist. In the area of pain, and the low left axilla. No abnormality was identified. In the 1:00 position 4 cm from the nipple a irregularly-shaped hypoechoic area containing echogenic foci correlating with the area seen on the patient's mammogram was identified measuring 24 x 5 x 16 mm.     IMPRESSION: BI-RADS CATEGORY: 5 - Highly Suggestive of Malignancy.  No cause for the patient's pain is identified and follow up should be based on clinical findings. Calcifications in the left breast are highly suspicious for malignancy. They can be identified on ultrasound. Ultrasound-guided core biopsy for tissue diagnosis, and surgical consultation are recommended. RECOMMENDED FOLLOW-UP: Biopsy. The findings and the recommendations were discussed with the patient and her caregiver at the time of exam. Ultrasound-guided core biopsy was performed on the same day for patient convenience. We are helping the patient is scheduling surgical consultation. KIMBERLEY BARKSDALE MD   SYSTEM ID:  [id number]    CT Abdomen Pelvis wo IV (Oral Contrast =  NO)    Result Date: 2/5/2025  For Patients: As a result of the 21st Century Cures Act, medical imaging exams and procedure reports are released immediately into your electronic medical record. You may view this report before your referring provider. If you have questions, please contact your health care provider. EXAM: CT ABDOMEN PELVIS WO LOCATION: [facility] MEDICAL IMAGING DATE: 2/5/2025 INDICATION: Abdominal or pelvic pain, Hx of contrast allergy, LLQ pain COMPARISON: 06/26/2024 TECHNIQUE: CT scan of the abdomen and pelvis was performed without IV contrast. Multiplanar reformats were obtained. Dose reduction techniques were used. CONTRAST: None. FINDINGS: LOWER CHEST: Normal. HEPATOBILIARY: Normal. PANCREAS: Normal. SPLEEN: Normal. ADRENAL GLANDS: 1.7 x 1.3 cm low-attenuation nodule noted within the left adrenal gland which likely represents an adenoma. Right adrenal gland within normal limits. KIDNEYS/BLADDER: No significant mass, stone, or hydronephrosis. BOWEL: No obstruction or inflammatory change. LYMPH NODES: Normal. VASCULATURE: Mild atherosclerotic disease of the abdominal aorta and its branches. PELVIC ORGANS: Bladder within normal limits. Uterus within normal limits. Small fat-containing umbilical hernia. MUSCULOSKELETAL: Degenerative changes of the spine.    1.  No acute findings in the abdomen and pelvis.         This note has been dictated using voice recognition software. Any grammatical or context distortions are unintentional and inherent to the software      Signed by: Sarwat Kate MD

## 2025-03-06 NOTE — NURSING NOTE
Mary Ann presents to [facility] for a surgical consult with Dr. Jacob and a medical oncology consult with Dr. Kate regarding her newly diagnosed  breast cancer.  She is accompanied by her boyfriend, Connie,  for consult.  RN assessment and EMR update.  Patient given a Breast Cancer Packet, contents reviewed.  She met with Dr. Jacob and Dr. Kate  see dictation for details of visit. She will plan to discuss surgery plan after Dr. Jacob speaks to radiologist.   Support provided, invited calls.

## 2025-03-06 NOTE — LETTER
3/6/2025      Mary Ann Olson  [address]  [place] [address]      Dear Colleague,    Thank you for referring your patient, Mary Ann Olson, to the [facility]. Please see a copy of my visit note below.    [facility] Consult Note    Patient: Mary Ann Olson  MRN: [id number]  Date of Service: Mar 6, 2025           Reason for consultation      Problem List Items Addressed This Visit          Other    Ductal carcinoma in situ (DCIS) of left breast with comedonecrosis - Primary         Assessment / number of problems addressed      High-grade type of DCIS in the left breast.  Status postbiopsy.  Concerned that she might have invasive tumor which may not have been detected by the biopsy.  History of smoking in the past.  She has smoked at least half a pack for 45 years.  Quit in 2024.  History of some COPD as well.  History of diabetes for which she has needed insulin.  Other medical conditions which are also concerning.    Plan and medical decision making      Reviewed notes from several primary care providers including Dr. Farah, Dr. Oviedo and Dr. Gallegos.  Reviewed the biopsy report.    Ordered tests.    Independently interpreted lab tests and radiological exams performed by other physicians.  Personally reviewed the images of the mammogram showing the density in the retroareolar space.  Treatment plan discussed with Dr. Michael Jacob from surgical oncology.    Discussed with the patient that we can proceed with surgical excision of her tumor.  We will leave it up to Dr. Arora to decide if he needs MRI or further evaluation of the breast and/or axilla.  She definitely needs lumpectomy but may need wider excision.  Will see her back after her surgical excision.  If there is no invasive component she will require radiation therapy postoperatively unless she opts for mastectomy.  If she has invasive disease and it is also ER/DE negative then she will  need adjuvant chemotherapy.  Advised the patient that she should also get a CT scan of the chest since she has a history of smoking.  Follow-up with me after the above tests are done.  The longitudinal plan of care for the diagnosis(es)/condition(s) as documented were addressed during this visit. Due to the added complexity in care, I will continue to support Mary Ann in the subsequent management and with ongoing continuity of care.     Clinical/pathological stage       Cancer Staging   No matching staging information was found for the patient.      History of present illness      Ms. Mary Ann Olson is a 59 year old postmenopausal woman who has been referred to multidisciplinary clinic for a diagnosis of high-grade DCIS of the left breast.  The patient in fact presented to her primary care physician for her annual checkup and complained of some left axillary pain.  So diagnostic mammogram was ordered.  The mammogram performed on 26 February 2025 was suspicious for slight architectural distortion at 1 o'clock position in the left breast.  This was located at 1 o'clock position deep to the areola measuring approximately 38 mm in size.  This was further evaluated with the help of an ultrasound which showed irregularly shaped hypoechoic area containing echogenic foci measuring approximately 24 mm in size.  It was then biopsied on the same day and the biopsy has come back positive for ductal carcinoma in situ with solid and cribriform types with the presence of comedonecrosis.  This appears to be ER/NV negative.  The pathologist are concerned that there might be an invasive component.    Patient comes in today for consultation.  She has had multiple medical issues in the past including Crohn's disease, diabetes, COPD, history of smoking etc. she tells me she has prior biopsy on her left breast.  She is not sure what came out of it.  Could not find records about it.  Comes in today accompanied by her  boyfriend.    Detailed review of systems      A review of systems was obtained.  Positive findings noted in the history.  Rest of the review of system is otherwise negative.      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     Antihistamines overdose, intentional self-harm, initial encounter (H)      Asthma      Bipolar 1 disorder (H) hx of bipolar listed in h and p     Bipolar 2 disorder (H)      Bipolar I disorder, single manic episode (H)     Created by Conversion  Replacement Utility updated for latest IMO load     Cellulitis 2006 left ankle, 2008 right foot     COPD (chronic obstructive pulmonary disease) (H)      Crohn's disease (H)     arthritis associated with crohn's     Diabetes mellitus (H)      Diabetes mellitus, type 2 (H)      Fibrocystic breast      Heat stroke     when a young child      Hyperlipidemia      Idiopathic acute pancreatitis 07/14/2015     Irritable bowel syndrome     Created by Conversion      Kidney stone      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      Overdose      Pyoderma gangrenosum (H)     2016     Sacroiliitis 2004     Skin lesion of left leg 08/27/2015     Suicidal ideation      Suicide attempt (H)      Traumatic iritis 07/21/2015     Umbilical hernia      Uncomplicated asthma      Past Surgical History:   Procedure Laterality Date     BIOPSY BREAST Left      BIOPSY OF BREAST, INCISIONAL      Description: Biopsy Breast Open;  Recorded: 10/28/2010;     HC REMOVAL OF TONSILS,<13 Y/O      Description: Tonsillectomy;  Recorded: 07/08/2009;     ZZC LIGATE FALLOPIAN TUBE      Description: Tubal Ligation;  Recorded: 07/08/2009;     Family History   Problem Relation Age of Onset     Coronary Artery Disease Mother      Diabetes Father      Breast Cancer Maternal Grandmother      Asthma Son      Asthma Daughter      Hemophilia Other      Diabetes Type 2  Father      Factor VIII  deficiency Other      Social History     Socioeconomic History     Marital status: Single   Tobacco Use     Smoking status: Every Day     Current packs/day: 0.50     Types: Cigarettes     Passive exposure: Past     Smokeless tobacco: Never     Tobacco comments:     2-3 cig/day   Vaping Use     Vaping status: Never Used   Substance and Sexual Activity     Alcohol use: No     Drug use: No     Social Drivers of Health     Financial Resource Strain: Medium Risk (7/27/2024)    [facility]    Financial Resource Strain      Difficulty of Paying Living Expenses: 1      Difficulty of Paying Living Expenses: 2   Food Insecurity: Food Insecurity Present (7/27/2024)    Received from [facility]    Food Insecurity      Do you worry your food will run out before you are able to buy more?: 2   Transportation Needs: No Transportation Needs (7/27/2024)    [facility]    Transportation Needs      Does lack of transportation keep you from medical appointments?: 1      Does lack of transportation keep you from work, meetings or getting things that you need?: 1   Social Connections: Socially Integrated (7/27/2024)    Received from [facility]    Social Connections      Do you often feel lonely or isolated from those around you?: 0   Interpersonal Safety: Low Risk  (10/14/2024)    Interpersonal Safety      Do you feel physically and emotionally safe where you currently live?: Yes      Within the past 12 months, have you been hit, slapped, kicked or otherwise physically hurt by someone?: No      Within the past 12 months, have you been humiliated or emotionally abused in other ways by your partner or ex-partner?: No   Housing Stability: Low Risk  (7/27/2024)    Received from [facility]    Housing Stability      What is your housing situation  today?: 1           Allergies      Allergies   Allergen Reactions     Gadolinium Derivatives Hives     Iodinated Contrast Media Hives     Trimethoprim Hives     Azithromycin Other (See Comments) and Rash     Erythema Nodosum x2     Keflex [Cephalexin] Rash     Aspirin Other (See Comments)     Cefuroxime Itching and Other (See Comments)     Contrast Dye Hives     IV     Corylus Other (See Comments)     Diatrizoate Hives     Iodixanol Unknown     Nuts Other (See Comments)     hazelnuts     Septra [Sulfamethoxazole-Trimethoprim] Hives     Sulfa Antibiotics Hives     Metronidazole Itching and Rash     Nitrofurantoin Itching and Rash     Quinolones Rash         Physical exam        There were no vitals taken for this visit.      GENERAL: Alert and oriented to time place and person. Seated comfortably. In no distress.    HEAD: Atraumatic and normocephalic.    EYES: SAMARIA, EOMI. No pallor. No icterus.    Oral cavity: no mucosal lesion or tonsillar enlargement.  Poor dental state.    NECK: supple. JVP normal.No thyroid enlargement.    LYMPH NODES: No palpable, cervical, axillary or inguinal lymphadenopathy.    CHEST: clear to auscultation bilaterally. Symmetrical breath movements bilaterally.    CVS: S1 and S2 are Regular rate and rhythm. No murmur or gallop or rub heard. No peripheral edema.    ABDOMEN: Soft. Not tender. Not distended. No palpable hepatomegaly or splenomegaly. No other mass palpable. Bowel sounds heard.    EXTREMITIES: Warm.  Minimal arthritic changes.    NEUROLOGICAL: Alert awake oriented.  Otherwise intact.  Cranial nerves appears to be preserved.    SKIN: no rash, or bruising or purpura.      Laboratory data      No results found for this or any previous visit (from the past week).    Imaging results        US Breast Biopsy Core Needle Left    Addendum Date: 3/4/2025    Pathology shows DCIS. This is consistent with imaging findings. A verbal report was given to the patient. Surgical consultation is  recommended.    Result Date: 3/4/2025  US Breast Biopsy Core Needle Left INDICATION: Left breast mass. PROCEDURE: Informed consent was obtained from the patient. Ultrasound was used to localize the mass at the 1 o'clock position of the left breast, 4 cm from the nipple.  The skin was marked, then prepped and draped in a sterile fashion. 1% lidocaine was used for local anesthesia. Under direct sonographic guidance, a 14-gauge coaxial needle was used to obtain 3 core biopsies.  A biopsy marking clip was then placed.  Digital mammograms performed in a separate room, using separate equipment, to document clip placement. The mammogram showed the clip to be in good position. The patient tolerated this well; there are no immediate complications.    IMPRESSION: 1. Ultrasound-guided biopsy of mass in the left breast. Pathology pending. 2. Post procedure mammogram for marker placement     MA Post Procedure Left    Addendum Date: 3/4/2025    Pathology shows DCIS. This is consistent with imaging findings. A verbal report was given to the patient. Surgical consultation is recommended.    Result Date: 3/4/2025  US Breast Biopsy Core Needle Left INDICATION: Left breast mass. PROCEDURE: Informed consent was obtained from the patient. Ultrasound was used to localize the mass at the 1 o'clock position of the left breast, 4 cm from the nipple.  The skin was marked, then prepped and draped in a sterile fashion. 1% lidocaine was used for local anesthesia. Under direct sonographic guidance, a 14-gauge coaxial needle was used to obtain 3 core biopsies.  A biopsy marking clip was then placed.  Digital mammograms performed in a separate room, using separate equipment, to document clip placement. The mammogram showed the clip to be in good position. The patient tolerated this well; there are no immediate complications.    IMPRESSION: 1. Ultrasound-guided biopsy of mass in the left breast. Pathology pending. 2. Post procedure mammogram for  marker placement     US Breast Left Limited 1-3 Quadrants    Result Date: 2/27/2025  EXAM: MA DIAGNOSTIC BILATERAL W/ NELSON, US BREAST LEFT LIMITED 1-3 QUADRANTS DATE: 2/26/2025 HISTORY: Left breast pain COMPARISON 9/10/2019, 10/26/2016, 11/4/2014 MAMMOGRAPHIC FINDINGS: BREAST DENSITY: There are scattered areas of fibroglandular density. Bilateral full-field digital diagnostic mammograms performed. Images evaluated with the assistance of CAD. Breast tomosynthesis was used in interpretation. There are scattered benign-appearing round calcifications in both breasts. Right breast appears stable and benign. In the left breast in the 1:00 position in the anterior breast almost directly deep to the areola there are pleomorphic microcalcifications following a ductal pattern extending for an area measuring 38 x 23 x 24 mm. The appearance is highly suspicious for malignancy. Areas of benign-appearing asymmetric tissue are again noted in the breast, unchanged compared to the prior exams. ULTRASOUND FINDINGS: Targeted left breast ultrasound was performed by both the ultrasonographer and the radiologist. In the area of pain, and the low left axilla. No abnormality was identified. In the 1:00 position 4 cm from the nipple a irregularly-shaped hypoechoic area containing echogenic foci correlating with the area seen on the patient's mammogram was identified measuring 24 x 5 x 16 mm.     IMPRESSION: BI-RADS CATEGORY: 5 - Highly Suggestive of Malignancy.  No cause for the patient's pain is identified and follow up should be based on clinical findings. Calcifications in the left breast are highly suspicious for malignancy. They can be identified on ultrasound. Ultrasound-guided core biopsy for tissue diagnosis, and surgical consultation are recommended. RECOMMENDED FOLLOW-UP: Biopsy. The findings and the recommendations were discussed with the patient and her caregiver at the time of exam. Ultrasound-guided core biopsy was performed  on the same day for patient convenience. We are helping the patient is scheduling surgical consultation. KIMBERLEY BARKSDALE MD   SYSTEM ID:  [id number]    MA Diagnostic Bilateral w/ Nelson    Result Date: 2/26/2025  EXAM: MA DIAGNOSTIC BILATERAL W/ NELSON, US BREAST LEFT LIMITED 1-3 QUADRANTS DATE: 2/26/2025 HISTORY: Left breast pain COMPARISON 9/10/2019, 10/26/2016, 11/4/2014 MAMMOGRAPHIC FINDINGS: BREAST DENSITY: There are scattered areas of fibroglandular density. Bilateral full-field digital diagnostic mammograms performed. Images evaluated with the assistance of CAD. Breast tomosynthesis was used in interpretation. There are scattered benign-appearing round calcifications in both breasts. Right breast appears stable and benign. In the left breast in the 1:00 position in the anterior breast almost directly deep to the areola there are pleomorphic microcalcifications following a ductal pattern extending for an area measuring 38 x 23 x 24 mm. The appearance is highly suspicious for malignancy. Areas of benign-appearing asymmetric tissue are again noted in the breast, unchanged compared to the prior exams. ULTRASOUND FINDINGS: Targeted left breast ultrasound was performed by both the ultrasonographer and the radiologist. In the area of pain, and the low left axilla. No abnormality was identified. In the 1:00 position 4 cm from the nipple a irregularly-shaped hypoechoic area containing echogenic foci correlating with the area seen on the patient's mammogram was identified measuring 24 x 5 x 16 mm.     IMPRESSION: BI-RADS CATEGORY: 5 - Highly Suggestive of Malignancy.  No cause for the patient's pain is identified and follow up should be based on clinical findings. Calcifications in the left breast are highly suspicious for malignancy. They can be identified on ultrasound. Ultrasound-guided core biopsy for tissue diagnosis, and surgical consultation are recommended. RECOMMENDED FOLLOW-UP: Biopsy. The findings and the  recommendations were discussed with the patient and her caregiver at the time of exam. Ultrasound-guided core biopsy was performed on the same day for patient convenience. We are helping the patient is scheduling surgical consultation. KIMBERLEY BARKSDALE MD   SYSTEM ID:  [id number]    CT Abdomen Pelvis wo IV (Oral Contrast = NO)    Result Date: 2/5/2025  For Patients: As a result of the 21st Century Cures Act, medical imaging exams and procedure reports are released immediately into your electronic medical record. You may view this report before your referring provider. If you have questions, please contact your health care provider. EXAM: CT ABDOMEN PELVIS WO LOCATION: [facility] MEDICAL IMAGING DATE: 2/5/2025 INDICATION: Abdominal or pelvic pain, Hx of contrast allergy, LLQ pain COMPARISON: 06/26/2024 TECHNIQUE: CT scan of the abdomen and pelvis was performed without IV contrast. Multiplanar reformats were obtained. Dose reduction techniques were used. CONTRAST: None. FINDINGS: LOWER CHEST: Normal. HEPATOBILIARY: Normal. PANCREAS: Normal. SPLEEN: Normal. ADRENAL GLANDS: 1.7 x 1.3 cm low-attenuation nodule noted within the left adrenal gland which likely represents an adenoma. Right adrenal gland within normal limits. KIDNEYS/BLADDER: No significant mass, stone, or hydronephrosis. BOWEL: No obstruction or inflammatory change. LYMPH NODES: Normal. VASCULATURE: Mild atherosclerotic disease of the abdominal aorta and its branches. PELVIC ORGANS: Bladder within normal limits. Uterus within normal limits. Small fat-containing umbilical hernia. MUSCULOSKELETAL: Degenerative changes of the spine.    1.  No acute findings in the abdomen and pelvis.         This note has been dictated using voice recognition software. Any grammatical or context distortions are unintentional and inherent to the software      Signed by: Sarwat Kate MD       Again, thank you for allowing me to participate in the care of your patient.         Sincerely,        Sarwat Kate MD    Electronically signed
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
## 2025-03-12 ENCOUNTER — TELEPHONE (OUTPATIENT)
Dept: FAMILY MEDICINE | Facility: CLINIC | Age: 60
End: 2025-03-12
Payer: COMMERCIAL

## 2025-03-12 NOTE — TELEPHONE ENCOUNTER
Returned clinic page.     Patient calling about GI symptoms after starting antibiotic yesterday.  She was seen at [facility] emergency department for a reported finger infection on her third right digit.  She was started on clindamycin.  Since this time she has developed stomach cramping and occasional loose stools; denies watery diarrhea, blood in her stool, nausea, vomiting.  Has not had a fever.  No rash.  No shortness of breath or other anaphylactic symptoms.    The patient has a history of IBD and voices concern that this antibiotic could be causing a flare.  Per chart review, she seems to have been on IV clindamycin in the past and has tolerated this without concern.  I discussed with the patient that I would cannot recommend she stop the antibiotic as I have not been able to evaluate her wound in person.  Given the significance of her symptoms, we discussed the patient either returning to the emergency room for an in person evaluation versus being seen in clinic with close follow-up.  I recommended the patient come into the emergency department but she declines this, as she does not have transportation.  As an alternative I will message our scheduling team to reach out to her tomorrow morning to try to set up a same-day appointment.    Patient was agreeable with this plan.     Giovanna Stout MD PGY-3  [facility]

## 2025-03-13 NOTE — TELEPHONE ENCOUNTER
FYI - Status Update    Who is Calling: Patient Rep Baptiste     Update: Patient Rep left a detailed VM for patient to call back to schedule an appointment for finger infection/discuss antibiotics. If patient calls back please schedule an appt for today, or add on to appt tomorrow and explain split billing. Thank you!

## 2025-03-16 ENCOUNTER — DOCUMENTATION ONLY (OUTPATIENT)
Dept: FAMILY MEDICINE | Facility: CLINIC | Age: 60
End: 2025-03-16
Payer: COMMERCIAL

## 2025-03-16 NOTE — CONFIDENTIAL NOTE
Received after hours clinic page.     Patient is calling in about constipation, she has not had a bowel movement x 2 days. She has chron's with intermittent diarrhea and constipation. She has tried prune juice and one dose of mirilax today so far. She is agreeable to trying another dose of miralax.     She denies fevers, chills, nausea or vomiting. I do not think the patient needs to be emergently evaluated at this time based on the lack of any red flag symptoms.     Giovanna Stout MD PGY-3  [facility]

## 2025-03-20 ENCOUNTER — TELEPHONE (OUTPATIENT)
Dept: SURGERY | Facility: CLINIC | Age: 60
End: 2025-03-20
Payer: COMMERCIAL

## 2025-03-20 NOTE — TELEPHONE ENCOUNTER
Called Mary Ann to check in on her and see how she is doing making a surgery decision for her breast cancer.  LMOM for her to return my call.

## 2025-03-24 NOTE — PATIENT INSTRUCTIONS
Today start Victoza 0.6 mg every day    After 1 week, increase Victoza to 1.2 mg daily    Change your NPH to 15 units twice daily     Start taking Claritin every day  
Bed in lowest position, wheels locked, appropriate side rails in place/Call bell, personal items and telephone in reach/Instruct patient to call for assistance before getting out of bed or chair/Non-slip footwear when patient is out of bed/[place] to call system/Physically safe environment - no spills, clutter or unnecessary equipment/Purposeful Proactive Rounding/Room/bathroom lighting operational, light cord in reach

## 2025-03-26 ENCOUNTER — TELEPHONE (OUTPATIENT)
Dept: FAMILY MEDICINE | Facility: CLINIC | Age: 60
End: 2025-03-26
Payer: COMMERCIAL

## 2025-03-26 ENCOUNTER — TELEPHONE (OUTPATIENT)
Dept: SURGERY | Facility: CLINIC | Age: 60
End: 2025-03-26
Payer: COMMERCIAL

## 2025-03-26 NOTE — TELEPHONE ENCOUNTER
Reason for Call:  Appointment Request    Patient requesting this type of appt:  Therapy Intake    Requested provider:  Alvarez Canseco    Reason patient unable to be scheduled: Needs to be scheduled by clinic    When does patient want to be seen/preferred time:  As available    Comments: FRANCISCO telling patient to call back    Call taken on 3/26/2025 at 9:31 AM by Wild Reynoso

## 2025-03-26 NOTE — TELEPHONE ENCOUNTER
Called Mary Ann to check in with her to see if she had any questions or needed any help with moving forward making a surgery plan.  LMOM to call me to check in and let me know where she stands right now.

## 2025-03-31 ENCOUNTER — TELEPHONE (OUTPATIENT)
Dept: SURGERY | Facility: CLINIC | Age: 60
End: 2025-03-31
Payer: COMMERCIAL

## 2025-03-31 DIAGNOSIS — D05.12 DUCTAL CARCINOMA IN SITU (DCIS) OF LEFT BREAST WITH COMEDONECROSIS: Primary | ICD-10-CM

## 2025-03-31 NOTE — TELEPHONE ENCOUNTER
Mary Ann returned my call to discuss her plans for surgery for her breast cancer. She said she is planning a mastectomy. She would like reconstruction. She is not sure if she will be doing bilateral or not. Told her I will check with Dr. Jacob about the plastic surgeon office to refer her to. Per Dr. Jacob, he would like her to see Dr. Patiño at the [facility]astic surgery group. Called Mary Ann to make sure she was ok with me faxing over the referral. Left her a message to return my call.

## 2025-04-01 ENCOUNTER — TELEPHONE (OUTPATIENT)
Dept: SURGERY | Facility: CLINIC | Age: 60
End: 2025-04-01
Payer: COMMERCIAL

## 2025-04-01 NOTE — TELEPHONE ENCOUNTER
Talked with patient, told her Dr. Jacob is recommending she see Dr. Patiño at the [facility]astic surgery group. She agrees. Told her I will fax over a referral and she will get a call to schedule a consult.

## 2025-04-14 ENCOUNTER — TELEPHONE (OUTPATIENT)
Dept: SURGERY | Facility: CLINIC | Age: 60
End: 2025-04-14
Payer: COMMERCIAL

## 2025-04-14 NOTE — TELEPHONE ENCOUNTER
Per Dr. Jacob's request, called patient to clarify exactly what surgery she is planning for her breast cancer.  She saw Dr. Patiño last week.  Mary Ann said she is planning a unilateral mastectomy with [place] reconstruction. Told her I will update Dr. Jacob and his , Jignesh.      Mary Ann said she is currently on steroids for her recent Crohn's flare up. Was told she will be on steroids for 8 weeks.  Told her I will let Dr. Jacob and his  know that as well.     Support provided, invited calls.

## 2025-05-01 ENCOUNTER — TELEPHONE (OUTPATIENT)
Dept: SURGERY | Facility: CLINIC | Age: 60
End: 2025-05-01

## 2025-05-01 ENCOUNTER — OFFICE VISIT (OUTPATIENT)
Dept: FAMILY MEDICINE | Facility: CLINIC | Age: 60
End: 2025-05-01
Payer: COMMERCIAL

## 2025-05-01 ENCOUNTER — ANCILLARY PROCEDURE (OUTPATIENT)
Dept: GENERAL RADIOLOGY | Facility: CLINIC | Age: 60
End: 2025-05-01
Attending: FAMILY MEDICINE
Payer: COMMERCIAL

## 2025-05-01 VITALS
OXYGEN SATURATION: 94 % | BODY MASS INDEX: 25.49 KG/M2 | SYSTOLIC BLOOD PRESSURE: 131 MMHG | TEMPERATURE: 97.5 F | HEART RATE: 77 BPM | WEIGHT: 135 LBS | DIASTOLIC BLOOD PRESSURE: 82 MMHG | HEIGHT: 61 IN | RESPIRATION RATE: 16 BRPM

## 2025-05-01 DIAGNOSIS — M79.672 LEFT FOOT PAIN: ICD-10-CM

## 2025-05-01 DIAGNOSIS — N89.8 VAGINAL DISCHARGE: Primary | ICD-10-CM

## 2025-05-01 DIAGNOSIS — L98.9 SORE ON LEG: ICD-10-CM

## 2025-05-01 DIAGNOSIS — Z59.89: ICD-10-CM

## 2025-05-01 PROCEDURE — 73620 X-RAY EXAM OF FOOT: CPT | Mod: TC | Performed by: RADIOLOGY

## 2025-05-01 SDOH — ECONOMIC STABILITY - INCOME SECURITY: OTHER PROBLEMS RELATED TO HOUSING AND ECONOMIC CIRCUMSTANCES: Z59.89

## 2025-05-01 NOTE — TELEPHONE ENCOUNTER
Spoke with patient today to schedule surgery as ordered by the provider.    WC/MVA Related?: No    Went over details/instructions as written on the letter.    Pre Op By: H&P by Primary MD  Post Op Scheduled : YES    Medications:  Blood Thinners? No  Weight Loss Meds? No  Diabetes Meds? YES    Surgery Letter sent via Mail  Is this the correct address?: Yes  [address]  [place] [address]      (Please see LETTERS TAB in chart to retrieve a copy of this letter)

## 2025-05-01 NOTE — LETTER
Pre-op Physical: Schedule a pre-op physical with your primary care doctor. The pre-op physical must be 10-30 days before surgery and since it is required by anesthesia, your surgery will be cancelled if it's not done.    Surgery Date: 6/6/2025     Location: [place] [address]    Approximate Arrival Time: 11:30 am  (Unless instructed differently by the pre-op call nurse)     Post op Appointment: 6/24/2025 at  12:45 pm with  Dr Jacob  [facility] [address] [place] [address].    Pre-Surgical Tasks:     Review all medications with your primary care or prescribing physician; they will advise you which meds to stop and when, and when you can resume taking.  Certain medications like blood thinners and weight loss medications need to be stopped in advance of surgery to proceed safely.      Blood thinners including but not exclusive to drugs like Xarelto, Eliquis, Warfarin and Aspirin, should be stopped five days before surgery, if your prescribing provider agrees. Follow your provider's advice on stopping blood thinners because they know you best.  If you are unsure if your medication is a blood thinner, ask your prescribing provider.    Weight loss medications: There are multiple medications being used for weight management and diabetes today, and the list is growing.  Phentermine, Ozempic, Wegovy, Trulicity, and other similar medications need to be stopped one week before surgery to avoid being cancelled.  Victoza and Saxenda can be continued longer but must be stopped one full day before surgery.  Please ask your prescribing provider for advice.    Diabetic medications: in addition to the medications talked about above that are used for either weight loss or diabetes, some people are on insulin that may require adjustment.  Please discuss managing diabetic medications with your prescribing doctor as  these medications may require modification prior to surgery.     Please shower the evening before and morning of surgery with Hibiclens soap.  Any [facility] can provide this to you at no cost, or it can be found at your local pharmacy.     Fasting instructions will be provided by the pre-op nurse who will call you 1-3 days before surgery.  Typically, we advise normal food up to 8 hours before you arrive for surgery. Clear liquids only from then until 2 hours before you arrive surgery, then nothing at all by mouth.  The nurse will review your specific instructions with you at the call.      Smoking impacts your body's ability to heal properly so we advise patients to quit if possible before surgery.  Plastic Surgery patients are required to be nicotine free for at least 8 weeks before surgery.      You will need an adult to drive you home and stay with you 24 hours after surgery. Public transportation or Medical Van Services are not permitted.    Visitor restrictions are subject to change, please verify with the pre-op nurse when they call how many people are permitted to accompany you.    We always encourage you to notify your insurance any time you have medical tests or procedures scheduled including surgery. The number is usually right on the back of your insurance card. To obtain pricing for surgery, please call [facility] Cost of Care at [phone] or email [email or web address].        Call our office if you have any questions! Thank you!     Jignesh Benson  Complex   [facility]  [phone]    Electronically signed

## 2025-05-01 NOTE — PROGRESS NOTES
Vaginal discharge: noticed about of 1-2 weeks ago, no vaginal dryness, odor noted, no urinary incontinence, itchy, hygiene has not been good due to depression    Has crohns flare- feels it was brought on by stress of medical bills, on budesonide,   sore on right lower leg, has had chills, red spot with blister in the middle,     bottom of left foot, base of 5th toe, at distal metatarsal, no injury   to auscultation - no rales, rhonchi or wheezes  CV: regular rate and rhythm, normal S1 S2, no S3 or S4, no murmur, click or rub, no peripheral edema  MS: no gross musculoskeletal defects noted, no edema. At base of 5th toe has tenderness with palpation. There is no tenderness at base of 5th metatarsal the pain is more distal.   Skin: right lower leg, quarter size red lesion at medial aspect of calf region. Nontender to touch, no induration.        Xray of feet bilateral. No arthritis, no foreign body noted.    Signed Electronically by: Venita Sorenson DO

## 2025-05-10 ENCOUNTER — TELEPHONE (OUTPATIENT)
Dept: FAMILY MEDICINE | Facility: CLINIC | Age: 60
End: 2025-05-10
Payer: COMMERCIAL

## 2025-05-10 NOTE — TELEPHONE ENCOUNTER
5/10/2025 at 9:30 AM    Patient called into clinic to discuss new abdominal discomfort.  Patient has a history of Crohn's disease, is currently on steroids for Crohn's flare.  She reports that about 10 minutes ago, she began having 2-3/10 LLQ abdominal discomfort.  She worries that she is constipated.  She reports that she had a large soft bowel movement earlier this morning.  The pain began shortly before this bowel movement.  The pain is a little bit better now than it was when it first started.  She denies right sided abdominal pain, nausea, vomiting, diarrhea, fever, chills.    Advised patient to continue taking her steroids for her Crohn's flare and her antibiotics for her cellulitis as prescribed.  Discussed return precautions/reasons to call back including persistence of pain after an hour, significant worsening of pain, development of right sided abdominal pain, development of nausea/vomiting/fever, new blood in stool.    Patient will continue to monitor and call back as needed.    Elizabeth Ontiveros MD

## 2025-05-13 ENCOUNTER — TELEPHONE (OUTPATIENT)
Dept: SURGERY | Facility: CLINIC | Age: 60
End: 2025-05-13
Payer: COMMERCIAL

## 2025-05-13 NOTE — TELEPHONE ENCOUNTER
Mary Ann called and left a message that she has a cellulitis in her leg. She also said she does not want to be in pain on her birthday, [birth year], so she wants to push the surgery for her breast cancer out until July 2025.  Called her back, told her I checked with Dr. Jacob and he does not want her to delay her surgery any further. Patient raised her voice, said she does not want surgery on 6-6-25 and it is her choice.  Dr. Jacob notified again, his plan is to call patient as well.

## 2025-05-15 ENCOUNTER — TELEPHONE (OUTPATIENT)
Dept: SURGERY | Facility: CLINIC | Age: 60
End: 2025-05-15
Payer: COMMERCIAL

## 2025-05-15 NOTE — TELEPHONE ENCOUNTER
Called to discuss her requesting we cancel her surgery.  She says she does not want to be in pain on her birthday.  I discussed my concern that she has already delayed surgery and the longer we wait the worst oncologic outcomes I expect.  I said in very clear terms if this is invasive cancer this could shorten her life.  I also shared with her that I am happy to find other resources for her if there is something at home causing her delay.  She then started to have the other reasons for her to delay including a cellulitis of the leg.  I told her that the most important thing in her life in terms of longevity right now is taking care of this cancer.  She was able to very clearly expressed she understands this.  She would like to me to keep the surgery on the schedule for now and will make her final decision by next week.

## 2025-05-20 ENCOUNTER — OFFICE VISIT (OUTPATIENT)
Dept: FAMILY MEDICINE | Facility: CLINIC | Age: 60
End: 2025-05-20
Payer: COMMERCIAL

## 2025-05-20 VITALS
SYSTOLIC BLOOD PRESSURE: 127 MMHG | OXYGEN SATURATION: 96 % | TEMPERATURE: 98.1 F | HEIGHT: 61 IN | DIASTOLIC BLOOD PRESSURE: 85 MMHG | RESPIRATION RATE: 22 BRPM | BODY MASS INDEX: 27.94 KG/M2 | HEART RATE: 92 BPM | WEIGHT: 148 LBS

## 2025-05-20 DIAGNOSIS — L03.115 CELLULITIS OF RIGHT LOWER LEG: ICD-10-CM

## 2025-05-20 DIAGNOSIS — F51.01 PRIMARY INSOMNIA: ICD-10-CM

## 2025-05-20 DIAGNOSIS — R06.02 SHORTNESS OF BREATH: ICD-10-CM

## 2025-05-20 DIAGNOSIS — E11.65 TYPE 2 DIABETES MELLITUS WITH HYPERGLYCEMIA, WITHOUT LONG-TERM CURRENT USE OF INSULIN (H): ICD-10-CM

## 2025-05-20 DIAGNOSIS — F19.10 POLYSUBSTANCE ABUSE (H): Primary | ICD-10-CM

## 2025-05-20 RX ORDER — DOXYCYCLINE 100 MG/1
100 CAPSULE ORAL 2 TIMES DAILY
Qty: 20 CAPSULE | Refills: 0 | Status: SHIPPED | OUTPATIENT
Start: 2025-05-20

## 2025-05-20 RX ORDER — AMOXICILLIN 875 MG/1
875 TABLET, COATED ORAL 2 TIMES DAILY
Qty: 20 TABLET | Refills: 0 | Status: SHIPPED | OUTPATIENT
Start: 2025-05-20

## 2025-05-20 RX ORDER — FLUTICASONE PROPIONATE AND SALMETEROL 232; 14 UG/1; UG/1
1 POWDER, METERED RESPIRATORY (INHALATION) 2 TIMES DAILY
Qty: 1 EACH | Refills: 4 | Status: SHIPPED | OUTPATIENT
Start: 2025-05-20

## 2025-05-20 RX ORDER — FLUCONAZOLE 150 MG/1
150 TABLET ORAL ONCE
Qty: 1 TABLET | Refills: 0 | Status: SHIPPED | OUTPATIENT
Start: 2025-05-20 | End: 2025-05-20

## 2025-05-20 RX ORDER — TRAZODONE HYDROCHLORIDE 50 MG/1
50 TABLET ORAL AT BEDTIME
Qty: 30 TABLET | Refills: 0 | Status: SHIPPED | OUTPATIENT
Start: 2025-05-20

## 2025-05-20 NOTE — PROGRESS NOTES
Assessment & Plan           Type 2 diabetes mellitus with hyperglycemia, without long-term current use of insulin (H)-well-controlled.  Continue current.    Cellulitis of right lower leg-continued cellulitis right leg.  Patient has been pushing on the area and advised her against this.  Recommend routine hygiene for this.  Continue to cover with gauze.  I agreed to try adding on amoxicillin to current doxycycline after review of allergies.  Patient certainly is at risk for spread and worsening of cellulitis.  Our plan today is to try amoxicillin and back with doxycycline together orally.  If this is not helping in the next 2 days patient should seek emergency room care for possible IV antibiotics.  She is in agreement with this plan.  - amoxicillin (AMOXIL) 875 MG tablet; Take 1 tablet (875 mg) by mouth 2 times daily.  - fluconazole (DIFLUCAN) 150 MG tablet; Take 1 tablet (150 mg) by mouth once for 1 dose.  - doxycycline hyclate (VIBRAMYCIN) 100 MG capsule; Take 1 capsule (100 mg) by mouth 2 times daily.    Shortness of breath-due for refill of AirDuo.  - fluticasone-salmeterol (AIRDUO RESPICLICK) 232-14 MCG/ACT inhaler; Inhale 1 puff into the lungs 2 times daily.    Primary insomnia-complaining of increased issues with insomnia especially given upcoming surgery.  Would like something for sleep.  Metformin is not working.  - traZODone (DESYREL) 50 MG tablet; Take 1 tablet (50 mg) by mouth at bedtime.      Follow-up for preop physical for mastectomy in the end of May.  Would need to reschedule surgery if cellulitis is uncontrolled at the time of preop    Subjective   Mary Ann is a 59 year old, presenting for the following health issues:  Wound Check    HPI      Leg wound:  Patient returns show follow-up cellulitis right calf.  She reports that she has been taking the Doxy for almost 10 days.  She does not notice significant improvement in the wound.  She has been covering with gauze.  Patient with several drug  "allergies particularly to antibiotics.  She denies fever sweats chills nausea vomiting.  She thinks that the wound is about the same as it has been.      Objective    /85   Pulse 92   Temp 98.1  F (36.7  C)   Resp 22   Ht 1.549 m (5' 1\")   Wt 67.1 kg (148 lb)   SpO2 96%   BMI 27.96 kg/m    Body mass index is 27.96 kg/m .  Physical Exam   GEN: NAD  HEENT: head atraumatic, normocephalic, moist mucous membranes, eyes anicteric  CV: RRR w/o M/R/G  PULM: CTAB  ABD: soft, non-tender, no appreciable masses, no guarding, BS present  Neuro: alert and oriented x 3, CN II-XII intact, normal gross motor movements  Psych: appropriate  Ext: no peripheral edema  Skin: Inflamed cellulitic area right calf see imaging in media tab.        Signed Electronically by: Joanna aFrah MD    "

## 2025-05-22 ENCOUNTER — TELEPHONE (OUTPATIENT)
Dept: SURGERY | Facility: CLINIC | Age: 60
End: 2025-05-22
Payer: COMMERCIAL

## 2025-05-22 NOTE — LETTER
Pre-op Physical: Schedule a pre-op physical with your primary care doctor. The pre-op physical must be 10-30 days before surgery and since it is required by anesthesia, your surgery will be cancelled if it's not done.      Surgery Date: 7/9/2025     Location: [place] [address]    Approximate Arrival Time: 10:30 am  (Unless instructed differently by the pre-op call nurse)     Post op Appointment: 7/24/2025 at  8:30 am with Dr. Jacob [facility], [address] [place] [address].    Pre-Surgical Tasks:     Review all medications with your primary care or prescribing physician; they will advise you which meds to stop and when, and when you can resume taking.  Certain medications like blood thinners and weight loss medications need to be stopped in advance of surgery to proceed safely.      Blood thinners including but not exclusive to drugs like Xarelto, Eliquis, Warfarin and Aspirin, should be stopped five days before surgery, if your prescribing provider agrees. Follow your provider's advice on stopping blood thinners because they know you best.  If you are unsure if your medication is a blood thinner, ask your prescribing provider.    Weight loss medications: There are multiple medications being used for weight management and diabetes today, and the list is growing.  Phentermine, Ozempic, Wegovy, Trulicity, and other similar medications need to be stopped one week before surgery to avoid being cancelled.  Victoza and Saxenda can be continued longer but must be stopped one full day before surgery.  Please ask your prescribing provider for advice.    Diabetic medications: in addition to the medications talked about above that are used for either weight loss or diabetes, some people are on insulin that may require adjustment.  Please discuss managing diabetic medications with your prescribing doctor  as these medications may require modification prior to surgery.     Please shower the evening before and morning of surgery with Hibiclens soap.  Any [facility] can provide this to you at no cost, or it can be found at your local pharmacy.     Fasting instructions will be provided by the pre-op nurse who will call you 1-3 days before surgery.  Typically, we advise normal food up to 8 hours before you arrive for surgery. Clear liquids only from then until 2 hours before you arrive surgery, then nothing at all by mouth.  The nurse will review your specific instructions with you at the call.      Smoking impacts your body's ability to heal properly so we advise patients to quit if possible before surgery.  Plastic Surgery patients are required to be nicotine free for at least 8 weeks before surgery.      You will need an adult to drive you home and stay with you 24 hours after surgery. Public transportation or Medical Van Services are not permitted.    Visitor restrictions are subject to change, please verify with the pre-op nurse when they call how many people are permitted to accompany you.    We always encourage you to notify your insurance any time you have medical tests or procedures scheduled including surgery. The number is usually right on the back of your insurance card. To obtain pricing for surgery, please call [facility] Cost of Care at [phone] or email [email or web address].        Call our office if you have any questions! Thank you!     Kimberly Morrow MA  [facility]   (General Surgery/ ENT/ Plastics)  Direct Office: [phone]        Electronically signed

## 2025-05-22 NOTE — TELEPHONE ENCOUNTER
Spoke with Unaaravind over the phone today regarding the rescheduling of her surgery with Dr. Jacob and Dr. Patiño. Patient requested to wait until July for surgery, Additionally Dr. Patiño was not going to be available on the original date of 6/6.      Working with the plastics team we have rescheduled surgery on 7/9 at [facility] Side note: Mary Ann said today she was heading to the ED for an infection in her leg, she said her PCP told her to go in today and be hospitalized for IV ABX due to the infection.    Surgery and SLN Injection have been rescheduled to 7/9. New letter mailed out to patient with details.

## 2025-05-24 ENCOUNTER — HOSPITAL ENCOUNTER (EMERGENCY)
Facility: HOSPITAL | Age: 60
Discharge: HOME | End: 2025-05-24
Attending: FAMILY MEDICINE | Admitting: FAMILY MEDICINE
Payer: COMMERCIAL

## 2025-05-24 VITALS
WEIGHT: 148 LBS | TEMPERATURE: 98.7 F | HEART RATE: 105 BPM | SYSTOLIC BLOOD PRESSURE: 143 MMHG | OXYGEN SATURATION: 93 % | DIASTOLIC BLOOD PRESSURE: 65 MMHG | BODY MASS INDEX: 25.27 KG/M2 | HEIGHT: 64 IN | RESPIRATION RATE: 22 BRPM

## 2025-05-24 DIAGNOSIS — K50.10 CROHN'S DISEASE OF LARGE INTESTINE WITHOUT COMPLICATION (H): Chronic | ICD-10-CM

## 2025-05-24 DIAGNOSIS — K52.9: ICD-10-CM

## 2025-05-24 DIAGNOSIS — L88: ICD-10-CM

## 2025-05-24 LAB
ANION GAP SERPL CALCULATED.3IONS-SCNC: 9 MMOL/L (ref 7–15)
BASOPHILS # BLD AUTO: 0 10E3/UL (ref 0–0.2)
BASOPHILS NFR BLD AUTO: 0 %
BUN SERPL-MCNC: 12.7 MG/DL (ref 8–23)
CALCIUM SERPL-MCNC: 9 MG/DL (ref 8.8–10.4)
CHLORIDE SERPL-SCNC: 103 MMOL/L (ref 98–107)
CREAT SERPL-MCNC: 0.49 MG/DL (ref 0.51–0.95)
CRP SERPL-MCNC: 55 MG/L
EGFRCR SERPLBLD CKD-EPI 2021: >90 ML/MIN/1.73M2
EOSINOPHIL # BLD AUTO: 0.2 10E3/UL (ref 0–0.7)
EOSINOPHIL NFR BLD AUTO: 2 %
ERYTHROCYTE [DISTWIDTH] IN BLOOD BY AUTOMATED COUNT: 17.4 % (ref 10–15)
GLUCOSE SERPL-MCNC: 181 MG/DL (ref 70–99)
HCO3 SERPL-SCNC: 25 MMOL/L (ref 22–29)
HCT VFR BLD AUTO: 32.9 % (ref 35–47)
HGB BLD-MCNC: 9.8 G/DL (ref 11.7–15.7)
IMM GRANULOCYTES # BLD: 0 10E3/UL
IMM GRANULOCYTES NFR BLD: 0 %
LYMPHOCYTES # BLD AUTO: 1.7 10E3/UL (ref 0.8–5.3)
LYMPHOCYTES NFR BLD AUTO: 18 %
MAGNESIUM SERPL-MCNC: 1.9 MG/DL (ref 1.7–2.3)
MCH RBC QN AUTO: 23.8 PG (ref 26.5–33)
MCHC RBC AUTO-ENTMCNC: 29.8 G/DL (ref 31.5–36.5)
MCV RBC AUTO: 80 FL (ref 78–100)
MONOCYTES # BLD AUTO: 0.6 10E3/UL (ref 0–1.3)
MONOCYTES NFR BLD AUTO: 6 %
NEUTROPHILS # BLD AUTO: 6.7 10E3/UL (ref 1.6–8.3)
NEUTROPHILS NFR BLD AUTO: 73 %
NRBC # BLD AUTO: 0 10E3/UL
NRBC BLD AUTO-RTO: 0 /100
PLATELET # BLD AUTO: 384 10E3/UL (ref 150–450)
POTASSIUM SERPL-SCNC: 6 MMOL/L (ref 3.4–5.3)
RBC # BLD AUTO: 4.12 10E6/UL (ref 3.8–5.2)
SODIUM SERPL-SCNC: 137 MMOL/L (ref 135–145)
WBC # BLD AUTO: 9.2 10E3/UL (ref 4–11)

## 2025-05-24 PROCEDURE — 80048 BASIC METABOLIC PNL TOTAL CA: CPT | Performed by: FAMILY MEDICINE

## 2025-05-24 PROCEDURE — 250N000009 HC RX 250: Performed by: FAMILY MEDICINE

## 2025-05-24 PROCEDURE — 99284 EMERGENCY DEPT VISIT MOD MDM: CPT | Mod: 25 | Performed by: FAMILY MEDICINE

## 2025-05-24 PROCEDURE — 83735 ASSAY OF MAGNESIUM: CPT | Performed by: FAMILY MEDICINE

## 2025-05-24 PROCEDURE — 86140 C-REACTIVE PROTEIN: CPT | Performed by: FAMILY MEDICINE

## 2025-05-24 PROCEDURE — 87040 BLOOD CULTURE FOR BACTERIA: CPT | Performed by: FAMILY MEDICINE

## 2025-05-24 PROCEDURE — 258N000003 HC RX IP 258 OP 636: Performed by: FAMILY MEDICINE

## 2025-05-24 PROCEDURE — 36415 COLL VENOUS BLD VENIPUNCTURE: CPT | Performed by: FAMILY MEDICINE

## 2025-05-24 PROCEDURE — 96375 TX/PRO/DX INJ NEW DRUG ADDON: CPT | Performed by: FAMILY MEDICINE

## 2025-05-24 PROCEDURE — 96374 THER/PROPH/DIAG INJ IV PUSH: CPT | Performed by: FAMILY MEDICINE

## 2025-05-24 PROCEDURE — 96361 HYDRATE IV INFUSION ADD-ON: CPT | Performed by: FAMILY MEDICINE

## 2025-05-24 PROCEDURE — 250N000013 HC RX MED GY IP 250 OP 250 PS 637: Performed by: FAMILY MEDICINE

## 2025-05-24 PROCEDURE — 85048 AUTOMATED LEUKOCYTE COUNT: CPT | Performed by: FAMILY MEDICINE

## 2025-05-24 PROCEDURE — 250N000011 HC RX IP 250 OP 636: Mod: JZ | Performed by: FAMILY MEDICINE

## 2025-05-24 RX ORDER — CLOBETASOL PROPIONATE 0.5 MG/G
CREAM TOPICAL 2 TIMES DAILY
Qty: 45 G | Refills: 0 | Status: SHIPPED | OUTPATIENT
Start: 2025-05-24 | End: 2025-05-29

## 2025-05-24 RX ORDER — HYDROMORPHONE HYDROCHLORIDE 1 MG/ML
0.5 INJECTION, SOLUTION INTRAMUSCULAR; INTRAVENOUS; SUBCUTANEOUS ONCE
Refills: 0 | Status: COMPLETED | OUTPATIENT
Start: 2025-05-24 | End: 2025-05-24

## 2025-05-24 RX ORDER — PREDNISONE 10 MG/1
TABLET ORAL
Qty: 22 TABLET | Refills: 0 | Status: SHIPPED | OUTPATIENT
Start: 2025-05-24 | End: 2025-06-04

## 2025-05-24 RX ORDER — CLOBETASOL PROPIONATE 0.5 MG/G
OINTMENT TOPICAL 2 TIMES DAILY
Status: DISCONTINUED | OUTPATIENT
Start: 2025-05-24 | End: 2025-05-24 | Stop reason: HOSPADM

## 2025-05-24 RX ORDER — OXYCODONE HYDROCHLORIDE 5 MG/1
5 TABLET ORAL EVERY 4 HOURS PRN
Qty: 12 TABLET | Refills: 0 | Status: SHIPPED | OUTPATIENT
Start: 2025-05-24 | End: 2025-05-29

## 2025-05-24 RX ORDER — CLINDAMYCIN HYDROCHLORIDE 150 MG/1
300 CAPSULE ORAL ONCE
Status: COMPLETED | OUTPATIENT
Start: 2025-05-24 | End: 2025-05-24

## 2025-05-24 RX ORDER — OXYCODONE HYDROCHLORIDE 5 MG/1
5 TABLET ORAL ONCE
Refills: 0 | Status: COMPLETED | OUTPATIENT
Start: 2025-05-24 | End: 2025-05-24

## 2025-05-24 RX ORDER — IPRATROPIUM BROMIDE AND ALBUTEROL SULFATE 2.5; .5 MG/3ML; MG/3ML
3 SOLUTION RESPIRATORY (INHALATION) ONCE
Status: COMPLETED | OUTPATIENT
Start: 2025-05-24 | End: 2025-05-24

## 2025-05-24 RX ORDER — METHYLPREDNISOLONE SODIUM SUCCINATE 125 MG/2ML
125 INJECTION INTRAMUSCULAR; INTRAVENOUS ONCE
Status: COMPLETED | OUTPATIENT
Start: 2025-05-24 | End: 2025-05-24

## 2025-05-24 RX ADMIN — CLOBETASOL PROPIONATE: 0.5 OINTMENT TOPICAL at 20:30

## 2025-05-24 RX ADMIN — METHYLPREDNISOLONE SODIUM SUCCINATE 125 MG: 125 INJECTION, POWDER, FOR SOLUTION INTRAMUSCULAR; INTRAVENOUS at 19:34

## 2025-05-24 RX ADMIN — OXYCODONE HYDROCHLORIDE 5 MG: 5 TABLET ORAL at 19:18

## 2025-05-24 RX ADMIN — IPRATROPIUM BROMIDE AND ALBUTEROL SULFATE 3 ML: .5; 3 SOLUTION RESPIRATORY (INHALATION) at 20:04

## 2025-05-24 RX ADMIN — HYDROMORPHONE HYDROCHLORIDE 0.5 MG: 1 INJECTION, SOLUTION INTRAMUSCULAR; INTRAVENOUS; SUBCUTANEOUS at 20:21

## 2025-05-24 RX ADMIN — SODIUM CHLORIDE 1000 ML: 0.9 INJECTION, SOLUTION INTRAVENOUS at 20:03

## 2025-05-24 ASSESSMENT — COLUMBIA-SUICIDE SEVERITY RATING SCALE - C-SSRS
6. HAVE YOU EVER DONE ANYTHING, STARTED TO DO ANYTHING, OR PREPARED TO DO ANYTHING TO END YOUR LIFE?: NO
1. IN THE PAST MONTH, HAVE YOU WISHED YOU WERE DEAD OR WISHED YOU COULD GO TO SLEEP AND NOT WAKE UP?: NO
2. HAVE YOU ACTUALLY HAD ANY THOUGHTS OF KILLING YOURSELF IN THE PAST MONTH?: NO

## 2025-05-24 ASSESSMENT — ACTIVITIES OF DAILY LIVING (ADL)
ADLS_ACUITY_SCORE: 58
ADLS_ACUITY_SCORE: 58

## 2025-05-24 NOTE — ED NOTES
"Pt reports, \" that she cannot be in this fucking room. This bed sucks. I need some good pain medications as the meds that the MD last time DIDN'T FUCKING WORK\", writer encouraged pt to adjust bed for comfort. Pt requested new room for her claustrophobia and that she needs to go upstairs. Writer notified pt that this room is for ED pt's that there are no ED rooms upstairs. Notified primary RN  "

## 2025-05-24 NOTE — ED PROVIDER NOTES
"EMERGENCY DEPARTMENT ENCOUNTER      NAME: Mary Ann Olson  AGE: 59 year old female  YOB: 1965  MRN: [id number]  EVALUATION DATE & TIME: 5/24/2025  6:11 PM    PCP: Joanna Farah    ED PROVIDER: Trey Mckeon M.D.    Chief Complaint   Patient presents with    Cellulitis       FINAL IMPRESSION:  1. Pyoderma gangrenosum due to inflammatory bowel disease (H)    2. Crohn's disease of large intestine without complication (H)        ED COURSE & MEDICAL DECISION MAKING:    Pertinent Labs & Imaging studies independently interpreted by me. (See chart for details)  Reviewed emergency department visit from May 22 when patient was seen for cellulitis of the right leg, also bipolar disorder and possible pyoderma gangrenosum.  At that time had been on doxycycline and Silla by her report, no improvement.  ED Course as of 05/24/25 2057   Sat May 24, 2025   1840 Patient seen and examined, presents today with pain and reported cellulitis to the right lower leg.  She says this been going on for a couple of weeks, says it started as a pimple that she tried to pop it has gotten progressively worse.  Was on doxycycline, then amoxicillin and doxycycline with no improvement.  Presented to [facility] a couple days ago with plan for admission but left \"because they were rude.\"  On exam here, patient is tachycardic but otherwise vitally stable, afebrile.  Very short with answering questions, repeatedly asking when she will get pain medication.  On the right lower leg there is an area of erythema and purplish discoloration which is circumferential, erythema does extend distally, very tender although patient tends to wince and try to push my hand away even before I touch it.  Labs are ordered along with antibiotics.  Consider cellulitis although this likely represents pyoderma gangrenosum.  Solu-Medrol and topical steroid ordered.   1940 Independently interpreted by me with normal white blood cell count, CRP and " basic panel pending.   1949 Labs independently interpreted by me with elevated CRP at 55, 2 days ago this was 44, minimal change of April may be related to Crohns disease.  Basic panel with mild hypokalemia although sample was hemolyzed.  Given appearance of the wound and history consistent with pyoderma gangrenosum, patient was started on topical steroid.  There may be a secondary bacterial infection and patient will be started on clindamycin.   2006 Rechecked and updated with plan for discharge.  She declines clindamycin saying it gives her diarrhea.  Discussed plan for oral and topical steroid, patient can restart amoxicillin doxycycline at home.     At the conclusion of the encounter I discussed the results of all of the tests and the disposition. The questions were answered. The patient or family acknowledged understanding and was agreeable with the care plan.     Medical Decision Making      Discharge. I prescribed additional prescription strength medication(s) as charted. See documentation for any additional details.    MEDICATIONS GIVEN IN THE EMERGENCY:  Medications   clobetasol (TEMOVATE) 0.05 % ointment ( Topical $Given 5/24/25 2030)   oxyCODONE (ROXICODONE) tablet 5 mg (5 mg Oral $Given 5/24/25 1918)   methylPREDNISolone Na Suc (solu-MEDROL) injection 125 mg (125 mg Intravenous $Given 5/24/25 1934)   ipratropium - albuterol 0.5 mg/2.5 mg/3 mL (DUONEB) neb solution 3 mL (3 mLs Nebulization $Given 5/24/25 2004)   sodium chloride 0.9% BOLUS 1,000 mL (0 mLs Intravenous Stopped 5/24/25 2035)   clindamycin (CLEOCIN) capsule 300 mg (300 mg Oral Not Given 5/24/25 2007)   HYDROmorphone (PF) (DILAUDID) injection 0.5 mg (0.5 mg Intravenous $Given 5/24/25 2021)       NEW PRESCRIPTIONS STARTED AT TODAY'S ER VISIT  Discharge Medication List as of 5/24/2025  8:35 PM        START taking these medications    Details   clobetasol propionate (TEMOVATE) 0.05 % external cream Apply topically 2 times daily.Disp-45 g,  "[id number]      oxyCODONE (ROXICODONE) 5 MG tablet Take 1 tablet (5 mg) by mouth every 4 hours as needed. If pain is not improved with acetaminophen and ibuprofen., Disp-12 tablet, R-0, E-Prescribe      predniSONE (DELTASONE) 10 MG tablet Take 4 tablets (40 mg) by mouth daily for 3 days, THEN 2 tablets (20 mg) daily for 3 days, THEN 1 tablet (10 mg) daily for 3 days, THEN 0.5 tablets (5 mg) daily for 2 days., Disp-22 tablet, R-0, E-Prescribe             =================================================================    HPI    Patient information was obtained from: The patient      Mary Ann Olson is a 59 year old female with a pertinent history of T2DM, left lower leg cellulitis, Crohn's disease, anxiety, bipolar affective disorder and inflammatory autoimmune disorder of skin, who presents to this ED via walk-in for evaluation of persistent cellulitis.    The patient reports that she has a cellulitis on her right lower leg for a couple weeks now, and has been taking oral antibiotics for 10 days now, but she has not noticed any improvement. She indicates that this first started out as a pimple that looked like it had pus in it, she then attempted to pop it, and since then it has gotten progressively worse.    She presented to [place] ED on 05/22/2025 and notes that she was supposed to be admitted then to receive IV antibiotics, but she she left \"because they were too rude\". She has not been doing anything else to help treat this cellulitis since she was last seen at [place] ED 2 days ago besides taking her antibiotics. She was previously on doxycycline, then amoxicillin and doxycycline with no improvement. She did place a nonstick dressing over the cellulitis. Mentions that the doctor she saw at the  initially for this thought that the cellulitis on her RLE looked like a bite, but the patient does not recall getting bit at any point. She denies having any fevers or vomiting. States that she has Crohn's " "colitis and was previously on a steroid for her Crohn's, however, she decided to stop taking this medications because \"it made [her] poop too much\". She explains that her Crohn's colitis has not been doing good lately. She is not currently on any medications to treat her Crohn's colitis. No additional complaints or concerns reported at this time.      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    Antihistamines overdose, intentional self-harm, initial encounter (H)     Asthma     Bipolar 1 disorder (H) hx of bipolar listed in h and p    Bipolar 2 disorder (H)     Bipolar I disorder, single manic episode (H)     Created by Conversion  Replacement Utility updated for latest IMO load    Cellulitis 2006 left ankle, 2008 right foot    COPD (chronic obstructive pulmonary disease) (H)     Crohn's disease (H)     arthritis associated with crohn's    Diabetes mellitus (H)     Diabetes mellitus, type 2 (H)     Fibrocystic breast     Heat stroke     when a young child     Hyperlipidemia     Idiopathic acute pancreatitis 07/14/2015    Irritable bowel syndrome     Created by Conversion     Kidney stone     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     Overdose     Pyoderma gangrenosum (H)     2016    Sacroiliitis 2004    Skin lesion of left leg 08/27/2015    Suicidal ideation     Suicide attempt (H)     Traumatic iritis 07/21/2015    Umbilical hernia     Uncomplicated asthma        PAST SURGICAL HISTORY:  Past Surgical History:   Procedure Laterality Date    BIOPSY BREAST Left     BIOPSY OF BREAST, INCISIONAL      Description: Biopsy Breast Open;  Recorded: 10/28/2010;     REMOVAL OF TONSILS,<13 Y/O      Description: Tonsillectomy;  Recorded: 07/08/2009;    ZZC LIGATE FALLOPIAN TUBE      Description: Tubal Ligation;  Recorded: 07/08/2009;       CURRENT MEDICATIONS:  " "  Current Facility-Administered Medications   Medication Dose Route Frequency Provider Last Rate Last Admin    clobetasol (TEMOVATE) 0.05 % ointment   Topical BID Trey Mckeon MD   Given at 05/24/25 2030     Current Outpatient Medications   Medication Sig Dispense Refill    clobetasol propionate (TEMOVATE) 0.05 % external cream Apply topically 2 times daily. 45 g 0    oxyCODONE (ROXICODONE) 5 MG tablet Take 1 tablet (5 mg) by mouth every 4 hours as needed. If pain is not improved with acetaminophen and ibuprofen. 12 tablet 0    predniSONE (DELTASONE) 10 MG tablet Take 4 tablets (40 mg) by mouth daily for 3 days, THEN 2 tablets (20 mg) daily for 3 days, THEN 1 tablet (10 mg) daily for 3 days, THEN 0.5 tablets (5 mg) daily for 2 days. 22 tablet 0    acetaminophen (TYLENOL) 500 MG tablet Take 2 tablets (1,000 mg) by mouth every 6 hours as needed for pain. 100 tablet 3    albuterol (PROAIR HFA/PROVENTIL HFA/VENTOLIN HFA) 108 (90 Base) MCG/ACT inhaler Inhale 2 puffs into the lungs every 4 hours as needed for shortness of breath, wheezing or cough. 18 g 3    albuterol (PROVENTIL) (2.5 MG/3ML) 0.083% neb solution USE 1 VIAL IN NEBULIZER EVERY 6 HOURS AS NEEDED FOR SHORTNESS OF BREATH /  DYSPNEA  OR  WHEEZING 75 mL 0    amoxicillin (AMOXIL) 875 MG tablet Take 1 tablet (875 mg) by mouth 2 times daily. 20 tablet 0    atorvastatin (LIPITOR) 40 MG tablet Take 1 tablet (40 mg) by mouth daily. 90 tablet 3    doxycycline hyclate (VIBRAMYCIN) 100 MG capsule Take 1 capsule (100 mg) by mouth 2 times daily. 20 capsule 0    fluticasone-salmeterol (AIRDUO RESPICLICK) 232-14 MCG/ACT inhaler Inhale 1 puff into the lungs 2 times daily. 1 each 4    insulin glargine (LANTUS PEN) 100 UNIT/ML pen Inject 10 Units subcutaneously at bedtime. 15 mL 0    insulin pen needle (31G X 5 MM) 31G X 5 MM miscellaneous Use 1 pen needles daily or as directed. 90 each 3    insulin syringe-needle U-100 (31G X 5/16\" 0.3 ML) 31G X 5/16\" 0.3 ML " miscellaneous Use 2 syringes daily 100 each 3    ipratropium - albuterol 0.5 mg/2.5 mg/3 mL (DUONEB) 0.5-2.5 (3) MG/3ML neb solution Take 1 vial (3 mLs) by nebulization 2 times daily as needed for shortness of breath, wheezing or cough. 90 mL 0    metFORMIN (GLUCOPHAGE) 1000 MG tablet Take 1 tablet (1,000 mg) by mouth 2 times daily (with meals). 180 tablet 3    omeprazole (PRILOSEC) 20 MG DR capsule Take 1 capsule (20 mg) by mouth daily. 90 capsule 3    SEMGLEE, YFGN, 100 UNIT/ML SOPN       traZODone (DESYREL) 50 MG tablet Take 1 tablet (50 mg) by mouth at bedtime. 30 tablet 0    vitamin D2 (ERGOCALCIFEROL) 75927 units (1250 mcg) capsule Take 1 capsule (50,000 Units) by mouth once a week. 12 capsule 3       ALLERGIES:  Allergies   Allergen Reactions    Gadolinium Derivatives Hives    Iodinated Contrast Media Hives    Trimethoprim Hives    Azithromycin Other (See Comments) and Rash     Erythema Nodosum x2    Keflex [Cephalexin] Rash    Aspirin Other (See Comments)    Cefuroxime Itching and Other (See Comments)    Contrast Dye Hives     IV    Corylus Other (See Comments)    Diatrizoate Hives    Iodixanol Unknown    Nuts Other (See Comments)     hazelnuts    Septra [Sulfamethoxazole-Trimethoprim] Hives    Sulfa Antibiotics Hives    Metronidazole Itching and Rash    Nitrofurantoin Itching and Rash    Quinolones Rash       FAMILY HISTORY:  Family History   Problem Relation Age of Onset    Coronary Artery Disease Mother     Diabetes Father     Breast Cancer Maternal Grandmother     Asthma Son     Asthma Daughter     Hemophilia Other     Diabetes Type 2  Father     Factor VIII deficiency Other        SOCIAL HISTORY:   Social History     Socioeconomic History    Marital status: Single   Tobacco Use    Smoking status: Former     Current packs/day: 0.00     Types: Cigarettes     Quit date:      Years since quittin.3     Passive exposure: Past    Smokeless tobacco: Never    Tobacco comments:     2-3 cig/day   Vaping  "Use    Vaping status: Never Used   Substance and Sexual Activity    Alcohol use: No    Drug use: No     Social Drivers of Health     Financial Resource Strain: Medium Risk (7/27/2024)    Received from [facility]    Financial Resource Strain     Difficulty of Paying Living Expenses: 1     Difficulty of Paying Living Expenses: 2   Food Insecurity: Food Insecurity Present (7/27/2024)    Received from [facility]    Food Insecurity     Do you worry your food will run out before you are able to buy more?: 2   Transportation Needs: No Transportation Needs (7/27/2024)    Received from [facility]    Transportation Needs     Does lack of transportation keep you from medical appointments?: 1     Does lack of transportation keep you from work, meetings or getting things that you need?: 1   Social Connections: Socially Integrated (7/27/2024)    Received from [facility]    Social Connections     Do you often feel lonely or isolated from those around you?: 0   Interpersonal Safety: Low Risk  (5/20/2025)    Interpersonal Safety     Do you feel physically and emotionally safe where you currently live?: Yes     Within the past 12 months, have you been hit, slapped, kicked or otherwise physically hurt by someone?: No     Within the past 12 months, have you been humiliated or emotionally abused in other ways by your partner or ex-partner?: No   Housing Stability: Low Risk  (7/27/2024)    Received from [facility]    Housing Stability     What is your housing situation today?: 1       VITALS:  BP (!) 143/65   Pulse 105   Temp 98.7  F (37.1  C) (Oral)   Resp 22   Ht 1.626 m (5' 4\")   Wt 67.1 kg (148 lb)   SpO2 93%   BMI 25.40 kg/m      PHYSICAL EXAM:  Physical Exam  Vitals and nursing note reviewed.   Constitutional:       Appearance: Normal appearance.   HENT:     "  Head: Normocephalic and atraumatic.      Right Ear: External ear normal.      Left Ear: External ear normal.      Nose: Nose normal.      Mouth/Throat:      Mouth: Mucous membranes are moist.   Eyes:      Extraocular Movements: Extraocular movements intact.      Conjunctiva/sclera: Conjunctivae normal.      Pupils: Pupils are equal, round, and reactive to light.   Cardiovascular:      Rate and Rhythm: Regular rhythm. Tachycardia present.   Pulmonary:      Effort: Pulmonary effort is normal.      Breath sounds: Normal breath sounds. No wheezing or rales.   Abdominal:      General: Abdomen is flat. There is no distension.      Palpations: Abdomen is soft.      Tenderness: There is no abdominal tenderness. There is no guarding.   Musculoskeletal:         General: Normal range of motion.      Cervical back: Normal range of motion and neck supple.      Right lower leg: No edema.      Left lower leg: No edema.   Lymphadenopathy:      Cervical: No cervical adenopathy.   Skin:     General: Skin is warm and dry.      Findings: Erythema present.      Comments: There is an 5 cm areas of erythema with purplish discoloration which is circumferential. Surrounding erythema that is tracking distally on the posterior medial right lower leg. It is very tender.   Neurological:      General: No focal deficit present.      Mental Status: She is alert and oriented to person, place, and time. Mental status is at baseline.      Comments: No gross focal neurologic deficits   Psychiatric:         Mood and Affect: Mood normal.         Behavior: Behavior normal.         Thought Content: Thought content normal.           LAB:  All pertinent labs reviewed and interpreted.  Results for orders placed or performed during the hospital encounter of 05/24/25   Basic metabolic panel   Result Value Ref Range    Sodium 137 135 - 145 mmol/L    Potassium 6.0 (H) 3.4 - 5.3 mmol/L    Chloride 103 98 - 107 mmol/L    Carbon Dioxide (CO2) 25 22 - 29 mmol/L     Anion Gap 9 7 - 15 mmol/L    Urea Nitrogen 12.7 8.0 - 23.0 mg/dL    Creatinine 0.49 (L) 0.51 - 0.95 mg/dL    GFR Estimate >90 >60 mL/min/1.73m2    Calcium 9.0 8.8 - 10.4 mg/dL    Glucose 181 (H) 70 - 99 mg/dL   Result Value Ref Range    Magnesium 1.9 1.7 - 2.3 mg/dL   Result Value Ref Range    CRP Inflammation 55.00 (H) <5.00 mg/L   CBC with platelets and differential   Result Value Ref Range    WBC Count 9.2 4.0 - 11.0 10e3/uL    RBC Count 4.12 3.80 - 5.20 10e6/uL    Hemoglobin 9.8 (L) 11.7 - 15.7 g/dL    Hematocrit 32.9 (L) 35.0 - 47.0 %    MCV 80 78 - 100 fL    MCH 23.8 (L) 26.5 - 33.0 pg    MCHC 29.8 (L) 31.5 - 36.5 g/dL    RDW 17.4 (H) 10.0 - 15.0 %    Platelet Count 384 150 - 450 10e3/uL    % Neutrophils 73 %    % Lymphocytes 18 %    % Monocytes 6 %    % Eosinophils 2 %    % Basophils 0 %    % Immature Granulocytes 0 %    NRBCs per 100 WBC 0 <1 /100    Absolute Neutrophils 6.7 1.6 - 8.3 10e3/uL    Absolute Lymphocytes 1.7 0.8 - 5.3 10e3/uL    Absolute Monocytes 0.6 0.0 - 1.3 10e3/uL    Absolute Eosinophils 0.2 0.0 - 0.7 10e3/uL    Absolute Basophils 0.0 0.0 - 0.2 10e3/uL    Absolute Immature Granulocytes 0.0 <=0.4 10e3/uL    Absolute NRBCs 0.0 10e3/uL       RADIOLOGY:  Reviewed all pertinent imaging. Please see official radiology report.  No orders to display       I, Aundrea Sol, am serving as a scribe to document services personally performed by Dr. Mckeon based on my observation and the provider's statements to me. I, Trey Mckeon MD attest that Aundrea Sol is acting in a scribe capacity, has observed my performance of the services and has documented them in accordance with my direction.    Trey Mckeon M.D.  Emergency Medicine  [facility] EMERGENCY DEPARTMENT  [facility] [facility] [id number] [phone]  Dept: [phone]       Trey Mckeon MD  05/24/25 2053

## 2025-05-24 NOTE — ED TRIAGE NOTES
"Pt reports cellulitis on her right lower leg. [facility] at [facility] ago and was suppose to be admitted but left \"because they were too rude\"    Pt is refusing to answer questions for registration and asking for pain medications. Pt is cussing and reports a lot of pain.       Triage Assessment (Adult)       Row Name 05/24/25 0346          Triage Assessment    Airway WDL WDL        Respiratory WDL    Respiratory WDL WDL        Skin Circulation/Temperature WDL    Skin Circulation/Temperature WDL WDL        Cardiac WDL    Cardiac WDL WDL        Peripheral/Neurovascular WDL    Peripheral Neurovascular WDL WDL        Cognitive/Neuro/Behavioral WDL    Cognitive/Neuro/Behavioral WDL WDL        Deepak Coma Scale    Best Eye Response 4-->(E4) spontaneous     Best Motor Response 6-->(M6) obeys commands     Best Verbal Response 5-->(V5) oriented     [place] Coma Scale Score 15                     "

## 2025-05-25 ENCOUNTER — TELEPHONE (OUTPATIENT)
Dept: FAMILY MEDICINE | Facility: CLINIC | Age: 60
End: 2025-05-25
Payer: COMMERCIAL

## 2025-05-25 NOTE — TELEPHONE ENCOUNTER
Clinic Page: 6:31PM    Contacted Mary Ann regarding the clinic page for leg pain. She was seen in the ED and diagnosed with cellulitis/pyoderma gangrenosum, sent home on amoxicillin/doxycycline (she declined clindamycin) and oxycodone 5mg PRN. She reports having 10/10 pain yesterday when coming in to ED, oxycodone and tylenol brought it down to 8/10, and today it is still 10/10. She reports being unable to walk, both today and yesterday, without severe pain. She has not been having fevers, and feels that the infection appears a little bit better today than yesterday. She feels that she should have stayed in the ED for IV abx and pain medications.     Recommendation was given to return to the ED for uncontrolled pain and inability to ambulate secondary to infection, to which she declined for transportation and financial reasons. She would not allow an ambulance to pick her up.     We discussed strict return precautions to the ED, including worsening pain, spreading skin infection, new uncontrolled fevers, palpitations, dizziness/lightheadedness that may suggest sepsis is developing, though with her report of an improved infection today I suspect her abx are appropriately covering the infection.     Offered an early appointment on Tuesday, May 27 as the clinic is not open on Monday due to memorial day. She accepted this offer. She will contact her  for any assistance that he/she will be able to offer. I sent a message to the [facility]  staff to schedule an appointment on Tuesday for hospital follow up.     Jame Dumont MD, PGY1  [facility] Medicine Residency

## 2025-05-25 NOTE — DISCHARGE INSTRUCTIONS
Doxycycline and amoxicillin as previously prescribed    Apply topical steroid twice a day and also start oral steroid

## 2025-05-29 ENCOUNTER — OFFICE VISIT (OUTPATIENT)
Dept: FAMILY MEDICINE | Facility: CLINIC | Age: 60
End: 2025-05-29
Payer: COMMERCIAL

## 2025-05-29 VITALS
DIASTOLIC BLOOD PRESSURE: 80 MMHG | TEMPERATURE: 98 F | OXYGEN SATURATION: 98 % | WEIGHT: 154 LBS | SYSTOLIC BLOOD PRESSURE: 125 MMHG | HEART RATE: 94 BPM | HEIGHT: 64 IN | BODY MASS INDEX: 26.29 KG/M2 | RESPIRATION RATE: 18 BRPM

## 2025-05-29 DIAGNOSIS — E11.65 TYPE 2 DIABETES MELLITUS WITH HYPERGLYCEMIA, WITHOUT LONG-TERM CURRENT USE OF INSULIN (H): ICD-10-CM

## 2025-05-29 DIAGNOSIS — K50.10 CROHN'S DISEASE OF LARGE INTESTINE WITHOUT COMPLICATION (H): ICD-10-CM

## 2025-05-29 DIAGNOSIS — L88 PYODERMA GANGRENOSA (H): ICD-10-CM

## 2025-05-29 DIAGNOSIS — F19.10 POLYSUBSTANCE ABUSE (H): ICD-10-CM

## 2025-05-29 DIAGNOSIS — Z23 NEED FOR VACCINATION: ICD-10-CM

## 2025-05-29 DIAGNOSIS — Z09 HOSPITAL DISCHARGE FOLLOW-UP: Primary | ICD-10-CM

## 2025-05-29 PROBLEM — L03.119 CELLULITIS OF LOWER EXTREMITY, UNSPECIFIED LATERALITY: Status: RESOLVED | Noted: 2024-05-24 | Resolved: 2025-05-29

## 2025-05-29 PROBLEM — L03.90 CELLULITIS, UNSPECIFIED CELLULITIS SITE: Status: RESOLVED | Noted: 2024-03-22 | Resolved: 2025-05-29

## 2025-05-29 PROBLEM — R10.32 LLQ ABDOMINAL PAIN: Status: RESOLVED | Noted: 2023-06-11 | Resolved: 2025-05-29

## 2025-05-29 PROBLEM — L03.116 CELLULITIS OF LEFT LOWER LEG: Status: RESOLVED | Noted: 2024-04-02 | Resolved: 2025-05-29

## 2025-05-29 LAB
BACTERIA SPEC CULT: NO GROWTH
BACTERIA SPEC CULT: NO GROWTH

## 2025-05-29 RX ORDER — HALOBETASOL PROPIONATE 0.5 MG/G
1 CREAM TOPICAL 2 TIMES DAILY
COMMUNITY
Start: 2025-05-27

## 2025-05-29 RX ORDER — HYDROCHLOROTHIAZIDE 12.5 MG/1
CAPSULE ORAL
Qty: 6 EACH | Refills: 3 | Status: SHIPPED | OUTPATIENT
Start: 2025-05-29

## 2025-05-29 RX ORDER — KETOROLAC TROMETHAMINE 30 MG/ML
1 INJECTION, SOLUTION INTRAMUSCULAR; INTRAVENOUS ONCE
Qty: 1 EACH | Refills: 0 | Status: SHIPPED | OUTPATIENT
Start: 2025-05-29 | End: 2025-05-29

## 2025-05-29 NOTE — PATIENT INSTRUCTIONS
Call [facility] to set up time for delivery of wound care supplies  [phone]

## 2025-05-29 NOTE — NURSING NOTE
DME ORDER  What was ordered? WOUND SUPPLY  What location did patient pick? [facility]  Was copy given to patient? YES  Was DME order copy faxed to patient preferred DME location? YES  What is the fax number for preferred location? [phone]     [facility] for top 3 Addresses
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
## 2025-06-03 ENCOUNTER — TELEPHONE (OUTPATIENT)
Dept: FAMILY MEDICINE | Facility: CLINIC | Age: 60
End: 2025-06-03

## 2025-06-03 NOTE — TELEPHONE ENCOUNTER
Gabbi from [facility] called to reschedule patient as she did not have a ride due to them making a mistake to today's visit.  Rescheduled for 6/9- next available for Dr Farah-- Gabbi wants to be sure that is okay for a follow up for Cellulitis to wait another 6 days for the Dr -- if not wants us to reschedule with another provider-- please advise -- thank you

## 2025-06-09 ENCOUNTER — OFFICE VISIT (OUTPATIENT)
Dept: FAMILY MEDICINE | Facility: CLINIC | Age: 60
End: 2025-06-09
Payer: COMMERCIAL

## 2025-06-09 VITALS
HEART RATE: 100 BPM | HEIGHT: 64 IN | DIASTOLIC BLOOD PRESSURE: 70 MMHG | RESPIRATION RATE: 19 BRPM | SYSTOLIC BLOOD PRESSURE: 126 MMHG | OXYGEN SATURATION: 96 % | WEIGHT: 156 LBS | BODY MASS INDEX: 26.63 KG/M2 | TEMPERATURE: 98 F

## 2025-06-09 DIAGNOSIS — F19.10 POLYSUBSTANCE ABUSE (H): Primary | ICD-10-CM

## 2025-06-09 DIAGNOSIS — L88 PYODERMA GANGRENOSA (H): ICD-10-CM

## 2025-06-09 DIAGNOSIS — K50.10 CROHN'S DISEASE OF LARGE INTESTINE WITHOUT COMPLICATION (H): Chronic | ICD-10-CM

## 2025-06-09 DIAGNOSIS — E11.65 TYPE 2 DIABETES MELLITUS WITH HYPERGLYCEMIA, WITHOUT LONG-TERM CURRENT USE OF INSULIN (H): ICD-10-CM

## 2025-06-09 LAB
EST. AVERAGE GLUCOSE BLD GHB EST-MCNC: 194 MG/DL
HBA1C MFR BLD: 8.4 % (ref 0–5.6)

## 2025-06-09 PROCEDURE — 99214 OFFICE O/P EST MOD 30 MIN: CPT | Performed by: STUDENT IN AN ORGANIZED HEALTH CARE EDUCATION/TRAINING PROGRAM

## 2025-06-09 PROCEDURE — 80048 BASIC METABOLIC PNL TOTAL CA: CPT | Performed by: STUDENT IN AN ORGANIZED HEALTH CARE EDUCATION/TRAINING PROGRAM

## 2025-06-09 PROCEDURE — 83036 HEMOGLOBIN GLYCOSYLATED A1C: CPT | Performed by: STUDENT IN AN ORGANIZED HEALTH CARE EDUCATION/TRAINING PROGRAM

## 2025-06-09 PROCEDURE — 86140 C-REACTIVE PROTEIN: CPT | Performed by: STUDENT IN AN ORGANIZED HEALTH CARE EDUCATION/TRAINING PROGRAM

## 2025-06-09 PROCEDURE — 36415 COLL VENOUS BLD VENIPUNCTURE: CPT | Performed by: STUDENT IN AN ORGANIZED HEALTH CARE EDUCATION/TRAINING PROGRAM

## 2025-06-09 PROCEDURE — 3074F SYST BP LT 130 MM HG: CPT | Performed by: STUDENT IN AN ORGANIZED HEALTH CARE EDUCATION/TRAINING PROGRAM

## 2025-06-09 PROCEDURE — 3078F DIAST BP <80 MM HG: CPT | Performed by: STUDENT IN AN ORGANIZED HEALTH CARE EDUCATION/TRAINING PROGRAM

## 2025-06-09 PROCEDURE — 3052F HG A1C>EQUAL 8.0%<EQUAL 9.0%: CPT | Performed by: STUDENT IN AN ORGANIZED HEALTH CARE EDUCATION/TRAINING PROGRAM
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
## 2025-06-10 LAB
ANION GAP SERPL CALCULATED.3IONS-SCNC: 13 MMOL/L (ref 7–15)
BUN SERPL-MCNC: 20.3 MG/DL (ref 8–23)
CALCIUM SERPL-MCNC: 10.2 MG/DL (ref 8.8–10.4)
CHLORIDE SERPL-SCNC: 100 MMOL/L (ref 98–107)
CREAT SERPL-MCNC: 0.51 MG/DL (ref 0.51–0.95)
CRP SERPL-MCNC: 6.16 MG/L
EGFRCR SERPLBLD CKD-EPI 2021: >90 ML/MIN/1.73M2
GLUCOSE SERPL-MCNC: 152 MG/DL (ref 70–99)
HCO3 SERPL-SCNC: 24 MMOL/L (ref 22–29)
POTASSIUM SERPL-SCNC: 4.6 MMOL/L (ref 3.4–5.3)
SODIUM SERPL-SCNC: 137 MMOL/L (ref 135–145)

## 2025-06-11 ENCOUNTER — PATIENT OUTREACH (OUTPATIENT)
Dept: CARE COORDINATION | Facility: CLINIC | Age: 60
End: 2025-06-11
Payer: COMMERCIAL

## 2025-06-11 DIAGNOSIS — Z79.4 TYPE 2 DIABETES MELLITUS WITH HYPERGLYCEMIA, WITH LONG-TERM CURRENT USE OF INSULIN (H): ICD-10-CM

## 2025-06-11 DIAGNOSIS — J44.89 ASTHMA-COPD OVERLAP SYNDROME (H): Primary | ICD-10-CM

## 2025-06-11 DIAGNOSIS — J44.1 COPD EXACERBATION (H): ICD-10-CM

## 2025-06-11 DIAGNOSIS — E11.65 TYPE 2 DIABETES MELLITUS WITH HYPERGLYCEMIA, WITH LONG-TERM CURRENT USE OF INSULIN (H): ICD-10-CM

## 2025-06-11 RX ORDER — ALBUTEROL SULFATE 90 UG/1
2 INHALANT RESPIRATORY (INHALATION) EVERY 4 HOURS PRN
Qty: 18 G | Refills: 3 | Status: SHIPPED | OUTPATIENT
Start: 2025-06-11

## 2025-06-11 NOTE — TELEPHONE ENCOUNTER
Message to physician:     Date of last visit: 6/9/2025    Date of next visit if scheduled: 6/17/2025    Potassium   Date Value Ref Range Status   06/09/2025 4.6 3.4 - 5.3 mmol/L Final   03/12/2024 4.4 3.4 - 5.3 mmol/L Final   11/16/2020 4.5 3.4 - 5.3 mmol/L Final     Creatinine   Date Value Ref Range Status   06/09/2025 0.51 0.51 - 0.95 mg/dL Final   11/16/2020 0.5 (L) 0.6 - 1.3 mg/dL Final     GFR Estimate   Date Value Ref Range Status   06/09/2025 >90 >60 mL/min/1.73m2 Final     Comment:     eGFR calculated using 2021 CKD-EPI equation.   04/06/2021 >60 >60 mL/min/1.73m2 Final   08/29/2014 90 >60 mL/min/1.7m2 Final     Comment:     Non  GFR Calc       BP Readings from Last 3 Encounters:   06/09/25 126/70   05/29/25 125/80   05/24/25 (!) 143/65       Hemoglobin A1C   Date Value Ref Range Status   06/09/2025 8.4 (H) 0.0 - 5.6 % Final     Comment:     Normal <5.7%   Prediabetes 5.7-6.4%    Diabetes 6.5% or higher     Note: Adopted from ADA consensus guidelines.   11/16/2020 5.9 (H) 4.1 - 5.7 % Final       Please complete refill and CLOSE ENCOUNTER.  Closing the encounter signifies the refill is complete.

## 2025-06-12 RX ORDER — ALBUTEROL SULFATE 0.83 MG/ML
SOLUTION RESPIRATORY (INHALATION)
Qty: 75 ML | Refills: 0 | Status: SHIPPED | OUTPATIENT
Start: 2025-06-12

## 2025-06-12 RX ORDER — INSULIN GLARGINE-YFGN 100 [IU]/ML
INJECTION, SOLUTION SUBCUTANEOUS
Qty: 15 ML | Refills: 0 | Status: SHIPPED | OUTPATIENT
Start: 2025-06-12

## 2025-06-12 NOTE — TELEPHONE ENCOUNTER
Glargine instructions/sig/dosing match current active med list. No indicated per chart review patient is using an insulin pump. Filling per RN standing orders. Timo SMITH

## 2025-06-24 ENCOUNTER — OFFICE VISIT (OUTPATIENT)
Dept: FAMILY MEDICINE | Facility: CLINIC | Age: 60
End: 2025-06-24
Payer: COMMERCIAL

## 2025-06-24 VITALS
WEIGHT: 157.75 LBS | HEIGHT: 61 IN | OXYGEN SATURATION: 95 % | HEART RATE: 105 BPM | BODY MASS INDEX: 29.78 KG/M2 | DIASTOLIC BLOOD PRESSURE: 83 MMHG | TEMPERATURE: 98 F | RESPIRATION RATE: 16 BRPM | SYSTOLIC BLOOD PRESSURE: 122 MMHG

## 2025-06-24 DIAGNOSIS — L88 PYODERMA GANGRENOSA (H): Primary | ICD-10-CM

## 2025-06-24 DIAGNOSIS — L03.116 CELLULITIS OF LEFT LOWER EXTREMITY: ICD-10-CM

## 2025-06-24 DIAGNOSIS — F19.10 POLYSUBSTANCE ABUSE (H): Primary | ICD-10-CM

## 2025-06-24 DIAGNOSIS — L88 PYODERMA GANGRENOSA (H): ICD-10-CM

## 2025-06-24 DIAGNOSIS — J30.89 SEASONAL ALLERGIC RHINITIS DUE TO OTHER ALLERGIC TRIGGER: ICD-10-CM

## 2025-06-24 DIAGNOSIS — E11.65 TYPE 2 DIABETES MELLITUS WITH HYPERGLYCEMIA, WITH LONG-TERM CURRENT USE OF INSULIN (H): ICD-10-CM

## 2025-06-24 DIAGNOSIS — Z23 NEED FOR VACCINATION: ICD-10-CM

## 2025-06-24 DIAGNOSIS — Z79.4 TYPE 2 DIABETES MELLITUS WITH HYPERGLYCEMIA, WITH LONG-TERM CURRENT USE OF INSULIN (H): ICD-10-CM

## 2025-06-24 PROCEDURE — 3079F DIAST BP 80-89 MM HG: CPT

## 2025-06-24 PROCEDURE — 3074F SYST BP LT 130 MM HG: CPT

## 2025-06-24 PROCEDURE — 99214 OFFICE O/P EST MOD 30 MIN: CPT | Mod: GC

## 2025-06-24 RX ORDER — LORATADINE 10 MG/1
10 TABLET ORAL DAILY
Qty: 90 TABLET | Refills: 0 | Status: SHIPPED | OUTPATIENT
Start: 2025-06-24

## 2025-06-24 RX ORDER — DOXYCYCLINE 100 MG/1
100 CAPSULE ORAL 2 TIMES DAILY
Qty: 10 CAPSULE | Refills: 0 | Status: SHIPPED | OUTPATIENT
Start: 2025-06-24

## 2025-06-24 RX ORDER — LORATADINE 10 MG/1
10 TABLET ORAL DAILY
COMMUNITY

## 2025-06-24 RX ORDER — BUDESONIDE AND FORMOTEROL FUMARATE DIHYDRATE 160; 4.5 UG/1; UG/1
2 AEROSOL RESPIRATORY (INHALATION)
COMMUNITY
Start: 2025-05-27 | End: 2025-07-01

## 2025-06-24 NOTE — PROGRESS NOTES
"  Assessment & Plan     Need for vaccination  ***    Polysubstance abuse (H)  ***    60 yo F w/ pmh of DM II, crohn's disease, pyoderma gangrenosa  - scheduled for mastectomy 7/7 at [facility]. May need to reschedule if unable to better control crohn's and diabetes  - At last visit w/ Dr. Farah stated she had not established w/ [facility] yet, strongly encouraged to call and schedule this    Follow up, DM II  - Last A1C 8.4 two weeks ago. Currently prescribed insulin glargine 12u at bedtime and metformin  - Does not have glucometer with her today. Will return for pre op on the 6/30  - Does check BGs at home.   - Fasting AM sugars reportedly between 110 and 130s  - Post prandial sugars have been variable. Typically mostly in the 200s, sometimes up to 300    Crohn's diease  Pyoderma gangrenosa  - As of last visit 6/9 w/ Dr. Farah had not established w/ GI yet. Strongly encouraged this, pt declines assistance w/ setting this up  - Currently on prednisone taper for pyoderma gangrenosa of the R shin. Have been following CRPs which have been down trending the last month  - States [facility] [employer] will supply gauze wraps if we send over letter w/ leg diameter measurement and recs  - Reports completed steroid taper just a few days after last visit earlier this month  - Leg diameter is 31.5 cm    Care gaps: Medicare annual, zoster, PCV, RSV, DM foot/eye, CODP AP    BMI  Estimated body mass index is 29.81 kg/m  as calculated from the following:    Height as of this encounter: 1.549 m (5' 1\").    Weight as of this encounter: 71.6 kg (157 lb 12 oz).     Rasheeda Reese is a 59 year old, presenting for the following health issues:  RECHECK (Wound, rt below knee)        6/24/2025     2:34 PM   Additional Questions   Roomed by Shakila + Adelaida         6/24/2025    Information    services provided? No     HPI      {MA/LPN/RN Pre-Provider Visit Orders- hCG/UA/Strep (Optional):783442}  {SUPERLIST " "(Optional):415023}  {additonal problems for provider to add (Optional):351603}    {ROS Picklists (Optional):720283}      Objective    /83   Pulse 105   Temp 98  F (36.7  C)   Resp 16   Ht 1.549 m (5' 1\")   Wt 71.6 kg (157 lb 12 oz)   SpO2 95%   BMI 29.81 kg/m    Body mass index is 29.81 kg/m .  Physical Exam   {Exam List (Optional):993649}    {Diagnostic Test Results (Optional):730184}        Signed Electronically by: Mohan Larose MD  {Email feedback regarding this note to [email or web address]   :[id number]}  " Mohan Larose MD

## 2025-06-24 NOTE — PROGRESS NOTES
Preceptor Attestation:   Patient seen, evaluated and discussed with the resident Dr. Mohan Larose. I have verified the content of the note, which accurately reflects my assessment of the patient and the plan of care.    Supervising Physician:  Benjamin Rosenstein, MD, MA  [facility]  [facility]

## 2025-06-24 NOTE — LETTER
To whom it may concern,    This letter is with regard to Mary Ann Olson and ongoing plan of care for lower extremity wound we are treating her for. This is located on the R medial shin. Leg diameter at the widest portion here is 31.5 cm. It my recommendation that she keep this wound wrapped w/ appropriate bandage such as gauze or similar alternative. Feel free to reach out to my office with any specific questions or concerns regarding these recommendations.     Sincerely,  Mohan Larose MD

## 2025-06-25 ENCOUNTER — RESULTS FOLLOW-UP (OUTPATIENT)
Dept: FAMILY MEDICINE | Facility: CLINIC | Age: 60
End: 2025-06-25

## 2025-06-30 ENCOUNTER — OFFICE VISIT (OUTPATIENT)
Dept: FAMILY MEDICINE | Facility: CLINIC | Age: 60
End: 2025-06-30
Payer: COMMERCIAL

## 2025-06-30 VITALS
BODY MASS INDEX: 29.83 KG/M2 | SYSTOLIC BLOOD PRESSURE: 115 MMHG | RESPIRATION RATE: 20 BRPM | HEIGHT: 61 IN | OXYGEN SATURATION: 96 % | WEIGHT: 158 LBS | TEMPERATURE: 98.3 F | HEART RATE: 92 BPM | DIASTOLIC BLOOD PRESSURE: 78 MMHG

## 2025-06-30 DIAGNOSIS — Z23 NEED FOR VACCINATION: ICD-10-CM

## 2025-06-30 DIAGNOSIS — Z00.00 ENCOUNTER FOR PREVENTATIVE ADULT HEALTH CARE EXAMINATION: ICD-10-CM

## 2025-06-30 DIAGNOSIS — Z01.818 PREOP GENERAL PHYSICAL EXAM: Primary | ICD-10-CM

## 2025-06-30 DIAGNOSIS — R06.02 SHORTNESS OF BREATH: ICD-10-CM

## 2025-06-30 DIAGNOSIS — F19.10 POLYSUBSTANCE ABUSE (H): ICD-10-CM

## 2025-06-30 DIAGNOSIS — J44.1 COPD EXACERBATION (H): ICD-10-CM

## 2025-06-30 DIAGNOSIS — E11.65 TYPE 2 DIABETES MELLITUS WITH HYPERGLYCEMIA, WITHOUT LONG-TERM CURRENT USE OF INSULIN (H): ICD-10-CM

## 2025-06-30 LAB
EST. AVERAGE GLUCOSE BLD GHB EST-MCNC: 177 MG/DL
HBA1C MFR BLD: 7.8 % (ref 0–5.6)
HGB BLD-MCNC: 12.1 G/DL (ref 11.7–15.7)
MCV RBC AUTO: 81 FL (ref 78–100)

## 2025-06-30 PROCEDURE — 3078F DIAST BP <80 MM HG: CPT | Performed by: STUDENT IN AN ORGANIZED HEALTH CARE EDUCATION/TRAINING PROGRAM

## 2025-06-30 PROCEDURE — 3048F LDL-C <100 MG/DL: CPT | Performed by: STUDENT IN AN ORGANIZED HEALTH CARE EDUCATION/TRAINING PROGRAM

## 2025-06-30 PROCEDURE — 85018 HEMOGLOBIN: CPT | Performed by: STUDENT IN AN ORGANIZED HEALTH CARE EDUCATION/TRAINING PROGRAM

## 2025-06-30 PROCEDURE — 80061 LIPID PANEL: CPT | Performed by: STUDENT IN AN ORGANIZED HEALTH CARE EDUCATION/TRAINING PROGRAM

## 2025-06-30 PROCEDURE — 36415 COLL VENOUS BLD VENIPUNCTURE: CPT | Performed by: STUDENT IN AN ORGANIZED HEALTH CARE EDUCATION/TRAINING PROGRAM

## 2025-06-30 PROCEDURE — 80048 BASIC METABOLIC PNL TOTAL CA: CPT | Performed by: STUDENT IN AN ORGANIZED HEALTH CARE EDUCATION/TRAINING PROGRAM

## 2025-06-30 PROCEDURE — 99214 OFFICE O/P EST MOD 30 MIN: CPT | Mod: 25 | Performed by: STUDENT IN AN ORGANIZED HEALTH CARE EDUCATION/TRAINING PROGRAM

## 2025-06-30 PROCEDURE — 93000 ELECTROCARDIOGRAM COMPLETE: CPT | Performed by: STUDENT IN AN ORGANIZED HEALTH CARE EDUCATION/TRAINING PROGRAM

## 2025-06-30 PROCEDURE — 3074F SYST BP LT 130 MM HG: CPT | Performed by: STUDENT IN AN ORGANIZED HEALTH CARE EDUCATION/TRAINING PROGRAM

## 2025-06-30 PROCEDURE — 83036 HEMOGLOBIN GLYCOSYLATED A1C: CPT | Performed by: STUDENT IN AN ORGANIZED HEALTH CARE EDUCATION/TRAINING PROGRAM

## 2025-06-30 PROCEDURE — 3051F HG A1C>EQUAL 7.0%<8.0%: CPT | Performed by: STUDENT IN AN ORGANIZED HEALTH CARE EDUCATION/TRAINING PROGRAM

## 2025-06-30 PROCEDURE — 90471 IMMUNIZATION ADMIN: CPT | Performed by: STUDENT IN AN ORGANIZED HEALTH CARE EDUCATION/TRAINING PROGRAM

## 2025-06-30 PROCEDURE — 86140 C-REACTIVE PROTEIN: CPT | Performed by: STUDENT IN AN ORGANIZED HEALTH CARE EDUCATION/TRAINING PROGRAM

## 2025-06-30 PROCEDURE — 90677 PCV20 VACCINE IM: CPT | Performed by: STUDENT IN AN ORGANIZED HEALTH CARE EDUCATION/TRAINING PROGRAM

## 2025-06-30 RX ORDER — FLUTICASONE PROPIONATE AND SALMETEROL 232; 14 UG/1; UG/1
1 POWDER, METERED RESPIRATORY (INHALATION) 2 TIMES DAILY
Qty: 1 EACH | Refills: 4 | Status: SHIPPED | OUTPATIENT
Start: 2025-06-30

## 2025-06-30 RX ORDER — IPRATROPIUM BROMIDE AND ALBUTEROL SULFATE 2.5; .5 MG/3ML; MG/3ML
1 SOLUTION RESPIRATORY (INHALATION) 2 TIMES DAILY PRN
Qty: 90 ML | Refills: 0 | Status: SHIPPED | OUTPATIENT
Start: 2025-06-30

## 2025-06-30 RX ORDER — TRIAMCINOLONE ACETONIDE 1 MG/G
OINTMENT TOPICAL 2 TIMES DAILY
Qty: 80 G | Refills: 1 | Status: SHIPPED | OUTPATIENT
Start: 2025-06-30

## 2025-06-30 ASSESSMENT — PATIENT HEALTH QUESTIONNAIRE - PHQ9: SUM OF ALL RESPONSES TO PHQ QUESTIONS 1-9: 4

## 2025-06-30 NOTE — NURSING NOTE
Prior to immunization administration, verified patients identity using patient s name and date of birth. Please see Immunization Activity for additional information.     Screening Questionnaire for Adult Immunization    Are you sick today?   No   Do you have allergies to medications, food, a vaccine component or latex?   No   Have you ever had a serious reaction after receiving a vaccination?   No   Do you have a long-term health problem with heart, lung, kidney, or metabolic disease (e.g., diabetes), asthma, a blood disorder, no spleen, complement component deficiency, a cochlear implant, or a spinal fluid leak?  Are you on long-term aspirin therapy?   No   Do you have cancer, leukemia, HIV/AIDS, or any other immune system problem?   No   Do you have a parent, brother, or sister with an immune system problem?   No   In the past 3 months, have you taken medications that affect  your immune system, such as prednisone, other steroids, or anticancer drugs; drugs for the treatment of rheumatoid arthritis, Crohn s disease, or psoriasis; or have you had radiation treatments?   No   Have you had a seizure, or a brain or other nervous system problem?   No   During the past year, have you received a transfusion of blood or blood    products, or been given immune (gamma) globulin or antiviral drug?   No   For women: Are you pregnant or is there a chance you could become       pregnant during the next month?   No   Have you received any vaccinations in the past 4 weeks?   No     Immunization questionnaire answers were all negative.      Patient instructed to remain in clinic for 15 minutes afterwards, and to report any adverse reactions.     Screening performed by Dahiana Abdalla CMA on 6/30/2025 at 2:40 PM.
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
## 2025-07-01 ENCOUNTER — RESULTS FOLLOW-UP (OUTPATIENT)
Dept: FAMILY MEDICINE | Facility: CLINIC | Age: 60
End: 2025-07-01

## 2025-07-01 LAB
ANION GAP SERPL CALCULATED.3IONS-SCNC: 12 MMOL/L (ref 7–15)
BUN SERPL-MCNC: 17.1 MG/DL (ref 8–23)
CALCIUM SERPL-MCNC: 10 MG/DL (ref 8.8–10.4)
CHLORIDE SERPL-SCNC: 100 MMOL/L (ref 98–107)
CHOLEST SERPL-MCNC: 157 MG/DL
CREAT SERPL-MCNC: 0.49 MG/DL (ref 0.51–0.95)
CRP SERPL-MCNC: 19.2 MG/L
EGFRCR SERPLBLD CKD-EPI 2021: >90 ML/MIN/1.73M2
FASTING STATUS PATIENT QL REPORTED: ABNORMAL
FASTING STATUS PATIENT QL REPORTED: NORMAL
GLUCOSE SERPL-MCNC: 142 MG/DL (ref 70–99)
HCO3 SERPL-SCNC: 24 MMOL/L (ref 22–29)
HDLC SERPL-MCNC: 65 MG/DL
LDLC SERPL CALC-MCNC: 78 MG/DL
NONHDLC SERPL-MCNC: 92 MG/DL
POTASSIUM SERPL-SCNC: 4.7 MMOL/L (ref 3.4–5.3)
SODIUM SERPL-SCNC: 136 MMOL/L (ref 135–145)
TRIGL SERPL-MCNC: 68 MG/DL

## 2025-07-02 ENCOUNTER — VIRTUAL VISIT (OUTPATIENT)
Dept: FAMILY MEDICINE | Facility: CLINIC | Age: 60
End: 2025-07-02
Payer: COMMERCIAL

## 2025-07-02 ENCOUNTER — TELEPHONE (OUTPATIENT)
Dept: FAMILY MEDICINE | Facility: CLINIC | Age: 60
End: 2025-07-02

## 2025-07-02 DIAGNOSIS — J41.1 MUCOPURULENT CHRONIC BRONCHITIS (H): Primary | ICD-10-CM

## 2025-07-02 DIAGNOSIS — E11.65 TYPE 2 DIABETES MELLITUS WITH HYPERGLYCEMIA, WITH LONG-TERM CURRENT USE OF INSULIN (H): ICD-10-CM

## 2025-07-02 DIAGNOSIS — Z79.4 TYPE 2 DIABETES MELLITUS WITH HYPERGLYCEMIA, WITH LONG-TERM CURRENT USE OF INSULIN (H): ICD-10-CM

## 2025-07-02 RX ORDER — INSULIN GLARGINE-YFGN 100 [IU]/ML
14 INJECTION, SOLUTION SUBCUTANEOUS AT BEDTIME
Qty: 15 ML | Refills: 0 | Status: SHIPPED | OUTPATIENT
Start: 2025-07-02

## 2025-07-02 NOTE — PROGRESS NOTES
Mary Ann is a 60 year old who is being evaluated via a billable telephone visit.    What phone number would you like to be contacted at? [phone]  How would you like to obtain your AVS? Mail a copy  Originating Location (pt. Location): Home    Distant Location (provider location):  On-site  Telephone visit completed due to the patient did not have access to video, while the distant provider did.    Assessment & Plan     Mucopurulent chronic bronchitis (H)-air conditioning ordered.  I do think this is reasonable given the extent of COPD.  Continue inhalers.  - Miscellaneous DME Order    Type 2 diabetes mellitus with hyperglycemia, with long-term current use of insulin (H)-increase insulin to 14 units at bedtime.  A1c 7.8 at last visit.  With a need to optimize glycemic management in preparation for mastectomy next week.  - Insulin Glargine-yfgn 100 UNIT/ML SOPN; Inject 14 Units subcutaneously at bedtime.    Crohn's disease with pyoderma growing gangrenosum.-Patient has had difficulty maintaining continuity of care with GI provider.  She is not currently on anything for this but has previously been on a biologic and did well.  She recently finished her prednisone taper prescribed at [facility] at last admission.  She is not currently on steroid at this time.        Subjective   Mary Ann is a 60 year old, presenting for the following health issues:  Follow Up (Follow up. ) and note (Pt stated that she need a note for her therapy dog )      7/2/2025    11:24 AM   Additional Questions   Roomed by Hser   Accompanied by Self         7/2/2025    Information    services provided? No     HPI    Patient presents today for general follow-up.  She was seen by me last Monday for preoperative exam.  She would like to discuss results.  She is also in need for a letter for  animal and a DME order for air conditioning given chronic COPD and flare with humid hot weather.  She reports that she is in  "her typical state of health today.  No acute concerns      Objective    Vitals - Patient Reported  Systolic (Patient Reported):  (pt doesn't have a cuff)  Diastolic (Patient Reported):  (pt doesn't have a cuff)  Weight (Patient Reported): 70.3 kg (155 lb)  Height (Patient Reported): 156.2 cm (5' 1.5\")  BMI (Based on Pt Reported Ht/Wt): 28.81  SpO2 (Patient Reported):  (pt doesn't anything to check for O2)  Temperature (Patient Reported):  (pt doesn't anything to check for temperature)  Pulse (Patient Reported):  (pt doesn't anything to check for pulse)        Physical Exam   General: Alert and no distress //Respiratory: No audible wheeze, cough, or shortness of breath // Psychiatric:  Appropriate affect, tone, and pace of words          Phone call duration: 12 minutes  Signed Electronically by: Joanna Farah MD    "

## 2025-07-02 NOTE — LETTER
2025    Mary Ann Olson   1965        To Whom it May Concern;    Patient has a pet as a  animal for known depression and anxiety.  Please allow one  dog to live with resident.     Sincerely,            Joanna Farah MD

## 2025-07-02 NOTE — PROGRESS NOTES
DME (Durable Medical Equipment) Orders and Documentation  Orders Placed This Encounter   Procedures     Miscellaneous DME Order      The patient was assessed and it was determined the patient is in need of the following listed DME Supplies/Equipment. Please complete supporting documentation below to demonstrate medical necessity.          I will STOP taking the medications listed below when I get home from the hospital:  None

## 2025-07-02 NOTE — H&P (VIEW-ONLY)
Mary Ann is a 60 year old who is being evaluated via a billable telephone visit.    What phone number would you like to be contacted at? [phone]  How would you like to obtain your AVS? Mail a copy  Originating Location (pt. Location): Home    Distant Location (provider location):  On-site  Telephone visit completed due to the patient did not have access to video, while the distant provider did.    Assessment & Plan     Mucopurulent chronic bronchitis (H)-air conditioning ordered.  I do think this is reasonable given the extent of COPD.  Continue inhalers.  - Miscellaneous DME Order    Type 2 diabetes mellitus with hyperglycemia, with long-term current use of insulin (H)-increase insulin to 14 units at bedtime.  A1c 7.8 at last visit.  With a need to optimize glycemic management in preparation for mastectomy next week.  - Insulin Glargine-yfgn 100 UNIT/ML SOPN; Inject 14 Units subcutaneously at bedtime.    Crohn's disease with pyoderma growing gangrenosum.-Patient has had difficulty maintaining continuity of care with GI provider.  She is not currently on anything for this but has previously been on a biologic and did well.  She recently finished her prednisone taper prescribed at [facility] at last admission.  She is not currently on steroid at this time.        Subjective   Mary Ann is a 60 year old, presenting for the following health issues:  Follow Up (Follow up. ) and note (Pt stated that she need a note for her therapy dog )      7/2/2025    11:24 AM   Additional Questions   Roomed by Hser   Accompanied by Self         7/2/2025    Information    services provided? No     HPI    Patient presents today for general follow-up.  She was seen by me last Monday for preoperative exam.  She would like to discuss results.  She is also in need for a letter for  animal and a DME order for air conditioning given chronic COPD and flare with humid hot weather.  She reports that she is in  "her typical state of health today.  No acute concerns      Objective    Vitals - Patient Reported  Systolic (Patient Reported):  (pt doesn't have a cuff)  Diastolic (Patient Reported):  (pt doesn't have a cuff)  Weight (Patient Reported): 70.3 kg (155 lb)  Height (Patient Reported): 156.2 cm (5' 1.5\")  BMI (Based on Pt Reported Ht/Wt): 28.81  SpO2 (Patient Reported):  (pt doesn't anything to check for O2)  Temperature (Patient Reported):  (pt doesn't anything to check for temperature)  Pulse (Patient Reported):  (pt doesn't anything to check for pulse)        Physical Exam   General: Alert and no distress //Respiratory: No audible wheeze, cough, or shortness of breath // Psychiatric:  Appropriate affect, tone, and pace of words          Phone call duration: 12 minutes  Signed Electronically by: Joanna Farah MD    "

## 2025-07-02 NOTE — TELEPHONE ENCOUNTER
"  Forms/Letter Request    Type of form/letter: DME (Supplies)    Type of DME requested:     - Dressing Telfa NON- ADHERENT 3x8 Sterile 50/BX    - Bandage, conforming stretch 6\" x 4-1/10 YDS    Do we have the form/letter: Yes:     Who is the form from? [facility] (if other please explain)    Where did/will the form come from? form was faxed in    When is form/letter needed by:     How would you like the form/letter returned: Fax : [phone]    Patient Notified form requests are processed in 5-7 business days:Yes      "

## 2025-07-08 ENCOUNTER — ANESTHESIA EVENT (OUTPATIENT)
Dept: SURGERY | Facility: HOSPITAL | Age: 60
End: 2025-07-08
Payer: COMMERCIAL

## 2025-07-09 ENCOUNTER — HOSPITAL ENCOUNTER (OUTPATIENT)
Facility: HOSPITAL | Age: 60
Discharge: HOME | End: 2025-07-10
Attending: SURGERY | Admitting: PLASTIC SURGERY
Payer: COMMERCIAL

## 2025-07-09 ENCOUNTER — ANESTHESIA (OUTPATIENT)
Dept: SURGERY | Facility: HOSPITAL | Age: 60
End: 2025-07-09
Payer: COMMERCIAL

## 2025-07-09 ENCOUNTER — HOSPITAL ENCOUNTER (OUTPATIENT)
Dept: NUCLEAR MEDICINE | Facility: HOSPITAL | Age: 60
Discharge: HOME | End: 2025-07-09
Attending: SURGERY | Admitting: SURGERY
Payer: COMMERCIAL

## 2025-07-09 DIAGNOSIS — D05.12 NEOPLASM OF LEFT BREAST, PRIMARY TUMOR STAGING CATEGORY TIS: DUCTAL CARCINOMA IN SITU (DCIS): ICD-10-CM

## 2025-07-09 DIAGNOSIS — Z90.12 STATUS POST LEFT MASTECTOMY: ICD-10-CM

## 2025-07-09 DIAGNOSIS — D05.12 DUCTAL CARCINOMA IN SITU (DCIS) OF LEFT BREAST WITH COMEDONECROSIS: Primary | ICD-10-CM

## 2025-07-09 PROBLEM — Z90.10 S/P MASTECTOMY: Status: ACTIVE | Noted: 2025-07-09

## 2025-07-09 LAB
GLUCOSE BLDC GLUCOMTR-MCNC: 168 MG/DL (ref 70–99)
GLUCOSE BLDC GLUCOMTR-MCNC: 194 MG/DL (ref 70–99)
GLUCOSE BLDC GLUCOMTR-MCNC: 344 MG/DL (ref 70–99)
GLUCOSE BLDC GLUCOMTR-MCNC: 360 MG/DL (ref 70–99)

## 2025-07-09 PROCEDURE — 272N000001 HC OR GENERAL SUPPLY STERILE: Performed by: SURGERY

## 2025-07-09 PROCEDURE — 710N000009 HC RECOVERY PHASE 1, LEVEL 1, PER MIN: Performed by: SURGERY

## 2025-07-09 PROCEDURE — 250N000009 HC RX 250: Performed by: ANESTHESIOLOGY

## 2025-07-09 PROCEDURE — 343N000001 HC RX 343 MED OP 636: Performed by: SURGERY

## 2025-07-09 PROCEDURE — 82962 GLUCOSE BLOOD TEST: CPT

## 2025-07-09 PROCEDURE — 250N000012 HC RX MED GY IP 250 OP 636 PS 637

## 2025-07-09 PROCEDURE — 370N000017 HC ANESTHESIA TECHNICAL FEE, PER MIN: Performed by: SURGERY

## 2025-07-09 PROCEDURE — 250N000009 HC RX 250: Performed by: SURGERY

## 2025-07-09 PROCEDURE — 250N000011 HC RX IP 250 OP 636: Performed by: ANESTHESIOLOGY

## 2025-07-09 PROCEDURE — 250N000011 HC RX IP 250 OP 636: Mod: JW | Performed by: SURGERY

## 2025-07-09 PROCEDURE — 360N000076 HC SURGERY LEVEL 3, PER MIN: Performed by: SURGERY

## 2025-07-09 PROCEDURE — 250N000009 HC RX 250: Performed by: PHYSICIAN ASSISTANT

## 2025-07-09 PROCEDURE — 258N000003 HC RX IP 258 OP 636: Performed by: ANESTHESIOLOGY

## 2025-07-09 PROCEDURE — 999N000157 HC STATISTIC RCP TIME EA 10 MIN

## 2025-07-09 PROCEDURE — 38900 IO MAP OF SENT LYMPH NODE: CPT | Mod: LT | Performed by: SURGERY

## 2025-07-09 PROCEDURE — 19303 MAST SIMPLE COMPLETE: CPT | Mod: LT | Performed by: SURGERY

## 2025-07-09 PROCEDURE — 250N000013 HC RX MED GY IP 250 OP 250 PS 637: Performed by: PHYSICIAN ASSISTANT

## 2025-07-09 PROCEDURE — 250N000011 HC RX IP 250 OP 636: Performed by: PLASTIC SURGERY

## 2025-07-09 PROCEDURE — 250N000011 HC RX IP 250 OP 636: Performed by: SURGERY

## 2025-07-09 PROCEDURE — 88342 IMHCHEM/IMCYTCHM 1ST ANTB: CPT | Mod: TC | Performed by: SURGERY

## 2025-07-09 PROCEDURE — 38792 RA TRACER ID OF SENTINL NODE: CPT

## 2025-07-09 PROCEDURE — 88342 IMHCHEM/IMCYTCHM 1ST ANTB: CPT | Mod: 26 | Performed by: PATHOLOGY

## 2025-07-09 PROCEDURE — 38525 BIOPSY/REMOVAL LYMPH NODES: CPT | Mod: LT | Performed by: SURGERY

## 2025-07-09 PROCEDURE — 999N000141 HC STATISTIC PRE-PROCEDURE NURSING ASSESSMENT: Performed by: SURGERY

## 2025-07-09 PROCEDURE — 999N000156 HC STATISTIC RCP CONSULT EA 30 MIN

## 2025-07-09 PROCEDURE — A9520 TC99 TILMANOCEPT DIAG 0.5MCI: HCPCS | Performed by: SURGERY

## 2025-07-09 PROCEDURE — 88307 TISSUE EXAM BY PATHOLOGIST: CPT | Mod: 26 | Performed by: PATHOLOGY

## 2025-07-09 PROCEDURE — 94640 AIRWAY INHALATION TREATMENT: CPT

## 2025-07-09 PROCEDURE — 258N000003 HC RX IP 258 OP 636: Performed by: PHYSICIAN ASSISTANT

## 2025-07-09 PROCEDURE — 250N000025 HC SEVOFLURANE, PER MIN: Performed by: SURGERY

## 2025-07-09 RX ORDER — PROCHLORPERAZINE MALEATE 10 MG
10 TABLET ORAL EVERY 6 HOURS PRN
Status: DISCONTINUED | OUTPATIENT
Start: 2025-07-09 | End: 2025-07-10 | Stop reason: HOSPADM

## 2025-07-09 RX ORDER — ISOSULFAN BLUE 50 MG/5ML
5 INJECTION, SOLUTION SUBCUTANEOUS ONCE
Status: DISCONTINUED | OUTPATIENT
Start: 2025-07-09 | End: 2025-07-09 | Stop reason: HOSPADM

## 2025-07-09 RX ORDER — CHLORHEXIDINE GLUCONATE 4 %
1 LIQUID (ML) TOPICAL AT BEDTIME
COMMUNITY

## 2025-07-09 RX ORDER — THERA TABS 400 MCG
1 TAB ORAL DAILY
Status: DISCONTINUED | OUTPATIENT
Start: 2025-07-09 | End: 2025-07-10 | Stop reason: HOSPADM

## 2025-07-09 RX ORDER — THERA TABS 400 MCG
1 TAB ORAL DAILY
COMMUNITY

## 2025-07-09 RX ORDER — OXYCODONE HYDROCHLORIDE 5 MG/1
5 TABLET ORAL
Refills: 0 | Status: CANCELLED | OUTPATIENT
Start: 2025-07-09

## 2025-07-09 RX ORDER — CLINDAMYCIN PHOSPHATE 900 MG/50ML
900 INJECTION, SOLUTION INTRAVENOUS SEE ADMIN INSTRUCTIONS
Status: DISCONTINUED | OUTPATIENT
Start: 2025-07-09 | End: 2025-07-09

## 2025-07-09 RX ORDER — HYDROMORPHONE HCL IN WATER/PF 6 MG/30 ML
0.2 PATIENT CONTROLLED ANALGESIA SYRINGE INTRAVENOUS EVERY 5 MIN PRN
Status: DISCONTINUED | OUTPATIENT
Start: 2025-07-09 | End: 2025-07-09 | Stop reason: HOSPADM

## 2025-07-09 RX ORDER — ATORVASTATIN CALCIUM 40 MG/1
40 TABLET, FILM COATED ORAL DAILY
Status: DISCONTINUED | OUTPATIENT
Start: 2025-07-10 | End: 2025-07-10 | Stop reason: HOSPADM

## 2025-07-09 RX ORDER — IPRATROPIUM BROMIDE AND ALBUTEROL SULFATE 2.5; .5 MG/3ML; MG/3ML
3 SOLUTION RESPIRATORY (INHALATION)
Status: DISCONTINUED | OUTPATIENT
Start: 2025-07-09 | End: 2025-07-10 | Stop reason: HOSPADM

## 2025-07-09 RX ORDER — MAGNESIUM HYDROXIDE 1200 MG/15ML
LIQUID ORAL PRN
Status: DISCONTINUED | OUTPATIENT
Start: 2025-07-09 | End: 2025-07-09 | Stop reason: HOSPADM

## 2025-07-09 RX ORDER — LIDOCAINE 40 MG/G
CREAM TOPICAL
Status: DISCONTINUED | OUTPATIENT
Start: 2025-07-09 | End: 2025-07-10 | Stop reason: HOSPADM

## 2025-07-09 RX ORDER — ONDANSETRON 2 MG/ML
4 INJECTION INTRAMUSCULAR; INTRAVENOUS EVERY 30 MIN PRN
Status: DISCONTINUED | OUTPATIENT
Start: 2025-07-09 | End: 2025-07-09 | Stop reason: HOSPADM

## 2025-07-09 RX ORDER — NALOXONE HYDROCHLORIDE 0.4 MG/ML
0.4 INJECTION, SOLUTION INTRAMUSCULAR; INTRAVENOUS; SUBCUTANEOUS
Status: DISCONTINUED | OUTPATIENT
Start: 2025-07-09 | End: 2025-07-10 | Stop reason: HOSPADM

## 2025-07-09 RX ORDER — CEFAZOLIN SODIUM/WATER 2 G/20 ML
2 SYRINGE (ML) INTRAVENOUS
Status: COMPLETED | OUTPATIENT
Start: 2025-07-09 | End: 2025-07-09

## 2025-07-09 RX ORDER — CEFAZOLIN SODIUM 1 G/3ML
1 INJECTION, POWDER, FOR SOLUTION INTRAMUSCULAR; INTRAVENOUS EVERY 8 HOURS
Status: CANCELLED | OUTPATIENT
Start: 2025-07-10 | End: 2025-07-10

## 2025-07-09 RX ORDER — PROPOFOL 10 MG/ML
INJECTION, EMULSION INTRAVENOUS CONTINUOUS PRN
Status: DISCONTINUED | OUTPATIENT
Start: 2025-07-09 | End: 2025-07-09

## 2025-07-09 RX ORDER — PANTOPRAZOLE SODIUM 40 MG/1
40 TABLET, DELAYED RELEASE ORAL DAILY
Status: DISCONTINUED | OUTPATIENT
Start: 2025-07-10 | End: 2025-07-10 | Stop reason: HOSPADM

## 2025-07-09 RX ORDER — DEXAMETHASONE SODIUM PHOSPHATE 10 MG/ML
INJECTION, SOLUTION INTRAMUSCULAR; INTRAVENOUS PRN
Status: DISCONTINUED | OUTPATIENT
Start: 2025-07-09 | End: 2025-07-09

## 2025-07-09 RX ORDER — ONDANSETRON 2 MG/ML
4 INJECTION INTRAMUSCULAR; INTRAVENOUS EVERY 6 HOURS PRN
Status: DISCONTINUED | OUTPATIENT
Start: 2025-07-09 | End: 2025-07-10 | Stop reason: HOSPADM

## 2025-07-09 RX ORDER — DEXAMETHASONE SODIUM PHOSPHATE 10 MG/ML
4 INJECTION, SOLUTION INTRAMUSCULAR; INTRAVENOUS
Status: CANCELLED | OUTPATIENT
Start: 2025-07-09

## 2025-07-09 RX ORDER — ONDANSETRON 4 MG/1
4 TABLET, ORALLY DISINTEGRATING ORAL EVERY 30 MIN PRN
Status: DISCONTINUED | OUTPATIENT
Start: 2025-07-09 | End: 2025-07-09 | Stop reason: HOSPADM

## 2025-07-09 RX ORDER — FENTANYL CITRATE 50 UG/ML
INJECTION, SOLUTION INTRAMUSCULAR; INTRAVENOUS PRN
Status: DISCONTINUED | OUTPATIENT
Start: 2025-07-09 | End: 2025-07-09

## 2025-07-09 RX ORDER — ERGOCALCIFEROL 1.25 MG/1
50000 CAPSULE, LIQUID FILLED ORAL WEEKLY
Status: DISCONTINUED | OUTPATIENT
Start: 2025-07-09 | End: 2025-07-10 | Stop reason: HOSPADM

## 2025-07-09 RX ORDER — PROPOFOL 10 MG/ML
INJECTION, EMULSION INTRAVENOUS PRN
Status: DISCONTINUED | OUTPATIENT
Start: 2025-07-09 | End: 2025-07-09

## 2025-07-09 RX ORDER — IPRATROPIUM BROMIDE AND ALBUTEROL SULFATE 2.5; .5 MG/3ML; MG/3ML
1 SOLUTION RESPIRATORY (INHALATION) 2 TIMES DAILY PRN
Status: DISCONTINUED | OUTPATIENT
Start: 2025-07-09 | End: 2025-07-10 | Stop reason: HOSPADM

## 2025-07-09 RX ORDER — NICOTINE POLACRILEX 4 MG
15-30 LOZENGE BUCCAL
Status: DISCONTINUED | OUTPATIENT
Start: 2025-07-09 | End: 2025-07-10 | Stop reason: HOSPADM

## 2025-07-09 RX ORDER — DEXTROSE MONOHYDRATE 25 G/50ML
25-50 INJECTION, SOLUTION INTRAVENOUS
Status: DISCONTINUED | OUTPATIENT
Start: 2025-07-09 | End: 2025-07-10 | Stop reason: HOSPADM

## 2025-07-09 RX ORDER — LORATADINE 10 MG/1
10 TABLET ORAL DAILY PRN
Status: DISCONTINUED | OUTPATIENT
Start: 2025-07-09 | End: 2025-07-10 | Stop reason: HOSPADM

## 2025-07-09 RX ORDER — OXYCODONE HYDROCHLORIDE 5 MG/1
10 TABLET ORAL
Refills: 0 | Status: CANCELLED | OUTPATIENT
Start: 2025-07-09

## 2025-07-09 RX ORDER — CEFAZOLIN SODIUM/WATER 2 G/20 ML
2 SYRINGE (ML) INTRAVENOUS SEE ADMIN INSTRUCTIONS
Status: DISCONTINUED | OUTPATIENT
Start: 2025-07-09 | End: 2025-07-09 | Stop reason: HOSPADM

## 2025-07-09 RX ORDER — SODIUM CHLORIDE 9 MG/ML
INJECTION, SOLUTION INTRAVENOUS CONTINUOUS
Status: DISCONTINUED | OUTPATIENT
Start: 2025-07-09 | End: 2025-07-10 | Stop reason: HOSPADM

## 2025-07-09 RX ORDER — SODIUM CHLORIDE, SODIUM LACTATE, POTASSIUM CHLORIDE, CALCIUM CHLORIDE 600; 310; 30; 20 MG/100ML; MG/100ML; MG/100ML; MG/100ML
INJECTION, SOLUTION INTRAVENOUS CONTINUOUS
Status: DISCONTINUED | OUTPATIENT
Start: 2025-07-09 | End: 2025-07-09 | Stop reason: HOSPADM

## 2025-07-09 RX ORDER — ONDANSETRON 2 MG/ML
4 INJECTION INTRAMUSCULAR; INTRAVENOUS EVERY 30 MIN PRN
Status: CANCELLED | OUTPATIENT
Start: 2025-07-09

## 2025-07-09 RX ORDER — DEXAMETHASONE SODIUM PHOSPHATE 10 MG/ML
4 INJECTION, SOLUTION INTRAMUSCULAR; INTRAVENOUS
Status: DISCONTINUED | OUTPATIENT
Start: 2025-07-09 | End: 2025-07-09 | Stop reason: HOSPADM

## 2025-07-09 RX ORDER — ONDANSETRON 4 MG/1
4 TABLET, ORALLY DISINTEGRATING ORAL EVERY 30 MIN PRN
Status: CANCELLED | OUTPATIENT
Start: 2025-07-09

## 2025-07-09 RX ORDER — LIDOCAINE 40 MG/G
CREAM TOPICAL
Status: DISCONTINUED | OUTPATIENT
Start: 2025-07-09 | End: 2025-07-09 | Stop reason: HOSPADM

## 2025-07-09 RX ORDER — CLINDAMYCIN PHOSPHATE 900 MG/50ML
900 INJECTION, SOLUTION INTRAVENOUS
Status: DISCONTINUED | OUTPATIENT
Start: 2025-07-09 | End: 2025-07-09

## 2025-07-09 RX ORDER — METHOCARBAMOL 500 MG/1
500 TABLET, FILM COATED ORAL EVERY 6 HOURS PRN
Status: DISCONTINUED | OUTPATIENT
Start: 2025-07-09 | End: 2025-07-10 | Stop reason: HOSPADM

## 2025-07-09 RX ORDER — DEXTROSE MONOHYDRATE 25 G/50ML
25-50 INJECTION, SOLUTION INTRAVENOUS
Status: DISCONTINUED | OUTPATIENT
Start: 2025-07-09 | End: 2025-07-09

## 2025-07-09 RX ORDER — NALOXONE HYDROCHLORIDE 0.4 MG/ML
0.2 INJECTION, SOLUTION INTRAMUSCULAR; INTRAVENOUS; SUBCUTANEOUS
Status: DISCONTINUED | OUTPATIENT
Start: 2025-07-09 | End: 2025-07-10 | Stop reason: HOSPADM

## 2025-07-09 RX ORDER — HYDROMORPHONE HCL IN WATER/PF 6 MG/30 ML
0.4 PATIENT CONTROLLED ANALGESIA SYRINGE INTRAVENOUS EVERY 5 MIN PRN
Status: DISCONTINUED | OUTPATIENT
Start: 2025-07-09 | End: 2025-07-09 | Stop reason: HOSPADM

## 2025-07-09 RX ORDER — FLUTICASONE PROPIONATE AND SALMETEROL 232; 14 UG/1; UG/1
1 POWDER, METERED RESPIRATORY (INHALATION) 2 TIMES DAILY
Status: DISCONTINUED | OUTPATIENT
Start: 2025-07-09 | End: 2025-07-09

## 2025-07-09 RX ORDER — FENTANYL CITRATE 50 UG/ML
25 INJECTION, SOLUTION INTRAMUSCULAR; INTRAVENOUS EVERY 5 MIN PRN
Status: DISCONTINUED | OUTPATIENT
Start: 2025-07-09 | End: 2025-07-09 | Stop reason: HOSPADM

## 2025-07-09 RX ORDER — POLYETHYLENE GLYCOL 3350 17 G/17G
17 POWDER, FOR SOLUTION ORAL DAILY
Status: DISCONTINUED | OUTPATIENT
Start: 2025-07-10 | End: 2025-07-10 | Stop reason: HOSPADM

## 2025-07-09 RX ORDER — OXYCODONE HYDROCHLORIDE 5 MG/1
5 TABLET ORAL EVERY 4 HOURS PRN
Status: DISCONTINUED | OUTPATIENT
Start: 2025-07-09 | End: 2025-07-10 | Stop reason: HOSPADM

## 2025-07-09 RX ORDER — TRIAMCINOLONE ACETONIDE 1 MG/G
OINTMENT TOPICAL 2 TIMES DAILY PRN
COMMUNITY

## 2025-07-09 RX ORDER — BUPIVACAINE HYDROCHLORIDE 2.5 MG/ML
INJECTION, SOLUTION INFILTRATION; PERINEURAL PRN
Status: DISCONTINUED | OUTPATIENT
Start: 2025-07-09 | End: 2025-07-09 | Stop reason: HOSPADM

## 2025-07-09 RX ORDER — OXYCODONE HYDROCHLORIDE 5 MG/1
10 TABLET ORAL EVERY 4 HOURS PRN
Status: DISCONTINUED | OUTPATIENT
Start: 2025-07-09 | End: 2025-07-10 | Stop reason: HOSPADM

## 2025-07-09 RX ORDER — TRAZODONE HYDROCHLORIDE 50 MG/1
50 TABLET ORAL
COMMUNITY

## 2025-07-09 RX ORDER — NALOXONE HYDROCHLORIDE 1 MG/ML
0.1 INJECTION INTRAMUSCULAR; INTRAVENOUS; SUBCUTANEOUS
Status: DISCONTINUED | OUTPATIENT
Start: 2025-07-09 | End: 2025-07-09 | Stop reason: HOSPADM

## 2025-07-09 RX ORDER — LIDOCAINE HYDROCHLORIDE 10 MG/ML
INJECTION, SOLUTION INFILTRATION; PERINEURAL PRN
Status: DISCONTINUED | OUTPATIENT
Start: 2025-07-09 | End: 2025-07-09

## 2025-07-09 RX ORDER — BISACODYL 10 MG
10 SUPPOSITORY, RECTAL RECTAL DAILY PRN
Status: DISCONTINUED | OUTPATIENT
Start: 2025-07-12 | End: 2025-07-10 | Stop reason: HOSPADM

## 2025-07-09 RX ORDER — FENTANYL CITRATE 50 UG/ML
50 INJECTION, SOLUTION INTRAMUSCULAR; INTRAVENOUS EVERY 5 MIN PRN
Status: DISCONTINUED | OUTPATIENT
Start: 2025-07-09 | End: 2025-07-09 | Stop reason: HOSPADM

## 2025-07-09 RX ORDER — AMOXICILLIN 250 MG
1 CAPSULE ORAL 2 TIMES DAILY
Status: DISCONTINUED | OUTPATIENT
Start: 2025-07-09 | End: 2025-07-10 | Stop reason: HOSPADM

## 2025-07-09 RX ORDER — ACETAMINOPHEN 325 MG/1
975 TABLET ORAL EVERY 8 HOURS
Status: DISCONTINUED | OUTPATIENT
Start: 2025-07-09 | End: 2025-07-10 | Stop reason: HOSPADM

## 2025-07-09 RX ORDER — NICOTINE POLACRILEX 4 MG
15-30 LOZENGE BUCCAL
Status: DISCONTINUED | OUTPATIENT
Start: 2025-07-09 | End: 2025-07-09

## 2025-07-09 RX ORDER — NALOXONE HYDROCHLORIDE 1 MG/ML
0.1 INJECTION INTRAMUSCULAR; INTRAVENOUS; SUBCUTANEOUS
Status: CANCELLED | OUTPATIENT
Start: 2025-07-09

## 2025-07-09 RX ORDER — ONDANSETRON 2 MG/ML
INJECTION INTRAMUSCULAR; INTRAVENOUS PRN
Status: DISCONTINUED | OUTPATIENT
Start: 2025-07-09 | End: 2025-07-09

## 2025-07-09 RX ORDER — ONDANSETRON 4 MG/1
4 TABLET, ORALLY DISINTEGRATING ORAL EVERY 6 HOURS PRN
Status: DISCONTINUED | OUTPATIENT
Start: 2025-07-09 | End: 2025-07-10 | Stop reason: HOSPADM

## 2025-07-09 RX ADMIN — PROPOFOL 200 MCG/KG/MIN: 10 INJECTION, EMULSION INTRAVENOUS at 12:17

## 2025-07-09 RX ADMIN — SODIUM CHLORIDE, SODIUM LACTATE, POTASSIUM CHLORIDE, AND CALCIUM CHLORIDE: .6; .31; .03; .02 INJECTION, SOLUTION INTRAVENOUS at 14:03

## 2025-07-09 RX ADMIN — HYDROMORPHONE HYDROCHLORIDE 0.5 MG: 1 INJECTION, SOLUTION INTRAMUSCULAR; INTRAVENOUS; SUBCUTANEOUS at 13:46

## 2025-07-09 RX ADMIN — PHENYLEPHRINE HYDROCHLORIDE 100 MCG: 10 INJECTION INTRAVENOUS at 12:27

## 2025-07-09 RX ADMIN — ONDANSETRON 4 MG: 2 INJECTION INTRAMUSCULAR; INTRAVENOUS at 14:14

## 2025-07-09 RX ADMIN — DEXAMETHASONE SODIUM PHOSPHATE 5 MG: 10 INJECTION, SOLUTION INTRAMUSCULAR; INTRAVENOUS at 12:37

## 2025-07-09 RX ADMIN — IPRATROPIUM BROMIDE AND ALBUTEROL SULFATE 3 ML: .5; 3 SOLUTION RESPIRATORY (INHALATION) at 17:17

## 2025-07-09 RX ADMIN — IPRATROPIUM BROMIDE AND ALBUTEROL SULFATE 3 ML: .5; 3 SOLUTION RESPIRATORY (INHALATION) at 20:27

## 2025-07-09 RX ADMIN — OXYCODONE HYDROCHLORIDE 10 MG: 5 TABLET ORAL at 17:13

## 2025-07-09 RX ADMIN — FENTANYL CITRATE 50 MCG: 50 INJECTION, SOLUTION INTRAMUSCULAR; INTRAVENOUS at 12:09

## 2025-07-09 RX ADMIN — LIDOCAINE HYDROCHLORIDE 50 MG: 10 INJECTION, SOLUTION INFILTRATION; PERINEURAL at 12:15

## 2025-07-09 RX ADMIN — PROPOFOL 130 MG: 10 INJECTION, EMULSION INTRAVENOUS at 12:15

## 2025-07-09 RX ADMIN — SODIUM CHLORIDE: 0.9 INJECTION, SOLUTION INTRAVENOUS at 18:05

## 2025-07-09 RX ADMIN — MIDAZOLAM HYDROCHLORIDE 2 MG: 1 INJECTION, SOLUTION INTRAMUSCULAR; INTRAVENOUS at 12:01

## 2025-07-09 RX ADMIN — PROPOFOL 50 MG: 10 INJECTION, EMULSION INTRAVENOUS at 13:39

## 2025-07-09 RX ADMIN — Medication 2 G: at 12:01

## 2025-07-09 RX ADMIN — SUGAMMADEX 200 MG: 100 INJECTION, SOLUTION INTRAVENOUS at 14:18

## 2025-07-09 RX ADMIN — TILMANOCEPT 0.52 MILLICURIE: KIT at 10:49

## 2025-07-09 RX ADMIN — SODIUM CHLORIDE 4 MCG: 9 INJECTION, SOLUTION INTRAVENOUS at 12:11

## 2025-07-09 RX ADMIN — ACETAMINOPHEN 975 MG: 325 TABLET ORAL at 17:13

## 2025-07-09 RX ADMIN — SODIUM CHLORIDE 8 MCG: 9 INJECTION, SOLUTION INTRAVENOUS at 12:09

## 2025-07-09 RX ADMIN — INSULIN GLARGINE 10 UNITS: 100 INJECTION, SOLUTION SUBCUTANEOUS at 21:50

## 2025-07-09 RX ADMIN — FENTANYL CITRATE 50 MCG: 50 INJECTION, SOLUTION INTRAMUSCULAR; INTRAVENOUS at 15:25

## 2025-07-09 RX ADMIN — METHOCARBAMOL 500 MG: 500 TABLET ORAL at 21:54

## 2025-07-09 RX ADMIN — FENTANYL CITRATE 50 MCG: 50 INJECTION, SOLUTION INTRAMUSCULAR; INTRAVENOUS at 12:07

## 2025-07-09 RX ADMIN — FENTANYL CITRATE 25 MCG: 50 INJECTION, SOLUTION INTRAMUSCULAR; INTRAVENOUS at 16:07

## 2025-07-09 RX ADMIN — ROCURONIUM 40 MG: 50 INJECTION, SOLUTION INTRAVENOUS at 12:15

## 2025-07-09 RX ADMIN — SODIUM CHLORIDE, SODIUM LACTATE, POTASSIUM CHLORIDE, AND CALCIUM CHLORIDE: .6; .31; .03; .02 INJECTION, SOLUTION INTRAVENOUS at 11:33

## 2025-07-09 RX ADMIN — HYDROMORPHONE HYDROCHLORIDE 0.5 MG: 1 INJECTION, SOLUTION INTRAMUSCULAR; INTRAVENOUS; SUBCUTANEOUS at 13:14

## 2025-07-09 ASSESSMENT — ACTIVITIES OF DAILY LIVING (ADL)
ADLS_ACUITY_SCORE: 39
ADLS_ACUITY_SCORE: 38
ADLS_ACUITY_SCORE: 39
ADLS_ACUITY_SCORE: 39
ADLS_ACUITY_SCORE: 38
ADLS_ACUITY_SCORE: 38
ADLS_ACUITY_SCORE: 39
ADLS_ACUITY_SCORE: 38
ADLS_ACUITY_SCORE: 39
ADLS_ACUITY_SCORE: 38
ADLS_ACUITY_SCORE: 58

## 2025-07-09 ASSESSMENT — COPD QUESTIONNAIRES: COPD: 1

## 2025-07-09 NOTE — PHARMACY-ADMISSION MEDICATION HISTORY
Pharmacist Admission Medication History    Admission medication history is complete. The information provided in this note is only as accurate as the sources available at the time of the update.    Information Source(s): Patient and CareEverywhere/SureScripts via in-person    Pertinent Information: patient states she doesn't currently have maintenance inhaler, only using albuterol HFA and duonebs as needed, patient took 10.5 units lantus yesterday evening as instructed, recently completed course of doxycycline for leg wounds.     Changes made to PTA medication list:  Added: melatonin, multivitamin, calcium-vitamin D3  Deleted: doxycycline, albuterol nebs  Changed: trazodone and claritin to as needed     Allergies reviewed with patient and updates made in EHR: yes    Medication History Completed By: JUNIOR VARMA RPH 7/9/2025 10:44 AM    PTA Med List   Medication Sig Note Last Dose/Taking    acetaminophen (TYLENOL) 500 MG tablet Take 2 tablets (1,000 mg) by mouth every 6 hours as needed for pain.  7/8/2025 Evening    albuterol (PROAIR HFA/PROVENTIL HFA/VENTOLIN HFA) 108 (90 Base) MCG/ACT inhaler Inhale 2 puffs into the lungs every 4 hours as needed for shortness of breath, wheezing or cough.  7/9/2025 Morning    atorvastatin (LIPITOR) 40 MG tablet Take 1 tablet (40 mg) by mouth daily.  Past Week Morning    calcium citrate-vitamin D (CITRACAL) 200-6.25 MG-MCG TABS per tablet Take 1 tablet by mouth daily.  Past Week Morning    fluticasone-salmeterol (AIRDUO RESPICLICK) 232-14 MCG/ACT inhaler Inhale 1 puff into the lungs 2 times daily. 7/9/2025: Has not picked up yet Taking    Insulin Glargine-yfgn 100 UNIT/ML SOPN Inject 14 Units subcutaneously at bedtime.  7/8/2025 Bedtime    ipratropium - albuterol 0.5 mg/2.5 mg, 3mg,/3 mL (DUONEB) 0.5-2.5 (3) MG/3ML neb solution Take 1 vial (3 mLs) by nebulization 2 times daily as needed for shortness of breath, wheezing or cough.  7/9/2025 Morning    loratadine (CLARITIN) 10 MG  tablet Take 10 mg by mouth daily as needed for allergies.  Past Month    Melatonin 12 MG TABS Take 1 tablet by mouth at bedtime.  7/8/2025 Bedtime    metFORMIN (GLUCOPHAGE) 1000 MG tablet Take 1 tablet (1,000 mg) by mouth 2 times daily (with meals).  7/8/2025 Morning    multivitamin, therapeutic (THERA-VIT) TABS tablet Take 1 tablet by mouth daily.  Past Week Morning    omeprazole (PRILOSEC) 20 MG DR capsule Take 1 capsule (20 mg) by mouth daily.  Past Week Morning    traZODone (DESYREL) 50 MG tablet Take 50 mg by mouth nightly as needed for sleep.  Past Month    triamcinolone (KENALOG) 0.1 % external ointment Apply topically 2 times daily as needed for irritation.  Taking As Needed    vitamin D2 (ERGOCALCIFEROL) 16533 units (1250 mcg) capsule Take 1 capsule (50,000 Units) by mouth once a week.  Past Month

## 2025-07-09 NOTE — ANESTHESIA PREPROCEDURE EVALUATION
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    Antihistamines overdose, intentional self-harm, initial encounter (H)     Asthma     Bipolar 1 disorder (H) hx of bipolar listed in h and p    Bipolar 2 disorder (H)     Bipolar I disorder, single manic episode (H)     Created by Conversion  Replacement Utility updated for latest IMO load    Cellulitis 2006 left ankle, 2008 right foot    COPD (chronic obstructive pulmonary disease) (H)     Crohn's disease (H)     arthritis associated with crohn's    Diabetes mellitus (H)     Diabetes mellitus, type 2 (H)     Fibrocystic breast     Heat stroke     when a young child     Hyperlipidemia     Idiopathic acute pancreatitis 2015    Irritable bowel syndrome     Created by Conversion     Kidney stone     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     Overdose     Pyoderma gangrenosum (H)     2016    Sacroiliitis 2004    Skin lesion of left leg 2015    Suicidal ideation     Suicide attempt (H)     Traumatic iritis 2015    Umbilical hernia     Uncomplicated asthma       Past Surgical History:   Procedure Laterality Date    BIOPSY BREAST Left     BIOPSY OF BREAST, INCISIONAL      Description: Biopsy Breast Open;  Recorded: 10/28/2010;    HC REMOVAL OF TONSILS,<11 Y/O      Description: Tonsillectomy;  Recorded: 2009;    ZZC LIGATE FALLOPIAN TUBE      Description: Tubal Ligation;  Recorded: 2009;      Allergies   Allergen Reactions    Gadolinium Derivatives Hives    Iodinated Contrast Media Hives    Trimethoprim Hives    Azithromycin Other (See Comments) and Rash     Erythema Nodosum x2    Keflex [Cephalexin] Rash    Aspirin Other (See Comments)     Cefuroxime Itching and Other (See Comments)    Contrast Dye Hives     IV    Corylus Other (See Comments)    Diatrizoate Hives    Iodixanol Unknown    Nuts Other (See Comments)     hazelnuts    Septra [Sulfamethoxazole-Trimethoprim] Hives    Sulfa Antibiotics Hives    Metronidazole Itching and Rash    Nitrofurantoin Itching and Rash    Quinolones Rash      Social History     Tobacco Use    Smoking status: Every Day     Current packs/day: 0.00     Types: Cigarettes     Last attempt to quit:      Years since quittin.5     Passive exposure: Past    Smokeless tobacco: Never    Tobacco comments:     2-3 cig/day   Substance Use Topics    Alcohol use: No      Wt Readings from Last 1 Encounters:   25 73.3 kg (161 lb 8 oz)        Anesthesia Evaluation            ROS/MED HX  ENT/Pulmonary:     (+)                      asthma    COPD,              Neurologic:       Cardiovascular:       METS/Exercise Tolerance:     Hematologic:       Musculoskeletal:       GI/Hepatic:     (+) GERD,                   Renal/Genitourinary:     (+) renal disease,             Endo:     (+) type I DM,              Obesity,       Psychiatric/Substance Use:       Infectious Disease:       Malignancy:       Other:              Physical Exam  Airway  Mallampati: II  TM distance: >3 FB  Neck ROM: full  Mouth opening: >= 4 cm    Cardiovascular - normal exam   Dental   (+) Multiple visibly decayed, broken teeth      Pulmonary - normal exam      Neurological - normal exam  She appears awake, alert and oriented x3.    Other Findings       OUTSIDE LABS:  CBC:   Lab Results   Component Value Date    WBC 9.2 2025    WBC 8.4 2024    HGB 12.1 2025    HGB 9.8 (L) 2025    HCT 32.9 (L) 2025    HCT 32.9 (L) 2024     2025     2024     BMP:   Lab Results   Component Value Date     2025     2025    POTASSIUM 4.7 2025    POTASSIUM 4.6 2025    CHLORIDE 100  06/30/2025    CHLORIDE 100 06/09/2025    CO2 24 06/30/2025    CO2 24 06/09/2025    BUN 17.1 06/30/2025    BUN 20.3 06/09/2025    CR 0.49 (L) 06/30/2025    CR 0.51 06/09/2025     (H) 07/09/2025     (H) 06/30/2025     COAGS:   Lab Results   Component Value Date    PTT 28 03/11/2019    INR 0.90 09/01/2023     POC:   Lab Results   Component Value Date     (H) 01/04/2016    HCG Negative 12/03/2019    HCGS Negative 05/03/2018     HEPATIC:   Lab Results   Component Value Date    ALBUMIN 3.9 05/26/2024    PROTTOTAL 6.8 05/26/2024    ALT 12 05/26/2024    AST 13 05/26/2024    ALKPHOS 104 05/26/2024    BILITOTAL 0.2 05/26/2024     OTHER:   Lab Results   Component Value Date    PH 7.34 (L) 02/08/2019    LACT 2.3 (H) 05/24/2024    A1C 7.8 (H) 06/30/2025    GHAZAL 10.0 06/30/2025    MAG 1.9 05/24/2025    LIPASE 21 08/14/2023    TSH 2.95 01/24/2019    CRP 0.1 05/03/2018    SED 19 09/01/2023       Anesthesia Plan    ASA Status:  3      NPO Status: NPO Appropriate   Anesthesia Type: General.  Airway: oral, supraglottic airway.  Induction: intravenous.  Maintenance: Balanced.   Techniques and Equipment:     - Airway:  Planned airway equipment includes supraglottic airway.     - Monitoring Plan: standard ASA monitoring     Consents    Anesthesia Plan(s) and associated risks, benefits, and realistic alternatives discussed. Questions answered and patient/representative(s) expressed understanding.     - Discussed:     - Discussed with:  Patient          Blood Consent:      - Discussed with: patient.     Postoperative Care    Pain management: multimodal analgesia.     Comments:                   Miryam Thomas MD    I have reviewed the pertinent notes and labs in the chart from the past 30 days and (re)examined the patient.  Any updates or changes from those notes are reflected in this note.    Clinically Significant Risk Factors Present on Admission                            # DMII: A1C = 7.8 % (Ref range: 0.0 -  "5.6 %) within past 6 months    # Obesity: Estimated body mass index is 30.89 kg/m  as calculated from the following:    Height as of 6/30/25: 1.54 m (5' 0.63\").    Weight as of this encounter: 73.3 kg (161 lb 8 oz).       # Financial/Environmental Concerns:                 "

## 2025-07-09 NOTE — ANESTHESIA PROCEDURE NOTES
Airway       Patient location during procedure: OR       Procedure Start/Stop Times: 7/9/2025 12:17 PM  Staff -        CRNA: Mamie Carrillo APRN CRNA       Other Anesthesia Staff: Katerin Aguilar       Performed By: SRNA  Consent for Airway        Urgency: elective  Indications and Patient Condition       Indications for airway management: yudy-procedural       Induction type:intravenous       Mask difficulty assessment: 1 - vent by mask    Final Airway Details       Final airway type: endotracheal airway       Successful airway: ETT - single and Oral  Endotracheal Airway Details        ETT size (mm): 7.0       Cuffed: yes       Cuff volume (mL): 10       Successful intubation technique: video laryngoscopy       VL Blade Size: Glidescope 3       Grade View of Cords: 1       Adjucts: stylet       Measured from: lips       Secured at (cm): 22       Bite block used: None    Post intubation assessment        Placement verified by: capnometry, equal breath sounds and chest rise        Number of attempts at approach: 1       Secured with: tape       Ease of procedure: easy       Dentition: Intact and Unchanged    Medication(s) Administered   Medication Administration Time: 7/9/2025 12:17 PM

## 2025-07-09 NOTE — ANESTHESIA CARE TRANSFER NOTE
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    Anesthesia Type:   General     Note:    Oropharynx: spontaneously breathing and oropharynx clear of all foreign objects  Level of Consciousness: drowsy  Oxygen Supplementation: face mask  Level of Supplemental Oxygen (L/min / FiO2): 6  Independent Airway: airway patency satisfactory and stable  Dentition: dentition unchanged  Vital Signs Stable: post-procedure vital signs reviewed and stable  Report to RN Given: handoff report given  Patient transferred to: PACU    Handoff Report: Identifed the Patient, Identified the Reponsible Provider, Reviewed the pertinent medical history, Discussed the surgical course, Reviewed Intra-OP anesthesia mangement and issues during anesthesia, Set expectations for post-procedure period and Allowed opportunity for questions and acknowledgement of understanding      Vitals:  Vitals Value Taken Time   /60 07/09/25 14:46   Temp 37.1  C (98.8  F) 07/09/25 14:46   Pulse 83 07/09/25 14:52   Resp 15 07/09/25 14:52   SpO2 100 % 07/09/25 14:52   Vitals shown include unfiled device data.    Electronically Signed By: JOELLEN Pappas CRNA  July 9, 2025  2:54 PM

## 2025-07-09 NOTE — INTERVAL H&P NOTE
"I have reviewed the surgical (or preoperative) H&P that is linked to this encounter, and examined the patient. Noted changes include: Heart valve disease remains uncontrolled and she has not started on any medication for this.  That said she otherwise feels well    Again discussed her plan for a left sided only mastectomy, we will be removing the nipple, and I will be doing a sentinel lymph node biopsy.  Discussed risk, benefits, alternatives including but not limited to bleeding, infection, further surgery under therapies, among others.  Expressed understanding would like to proceed..    Clinical Conditions Present on Arrival:  Clinically Significant Risk Factors Present on Admission                       # DMII: A1C = 7.8 % (Ref range: 0.0 - 5.6 %) within past 6 months  # Obesity: Estimated body mass index is 30.89 kg/m  as calculated from the following:    Height as of 6/30/25: 1.54 m (5' 0.63\").    Weight as of this encounter: 73.3 kg (161 lb 8 oz).       "

## 2025-07-09 NOTE — ANESTHESIA POSTPROCEDURE EVALUATION
Patient: Mary Ann Olson    Procedure: Procedure(s):  LEFT SIMPLE MASTECTOMY, WITH  LEFT SENTINEL LYMPH NODE BIOPSY, AND  LEFT BREAST RECONSTRUCTION WITH GOLDILOCKS DERMAL FLAP (NO IMPLANTS)       Anesthesia Type:  General    Note:  Disposition: Outpatient   Postop Pain Control: Uneventful            Sign Out: Well controlled pain   PONV: No   Neuro/Psych: Uneventful            Sign Out: Acceptable/Baseline neuro status   Airway/Respiratory: Uneventful            Sign Out: Acceptable/Baseline resp. status   CV/Hemodynamics: Uneventful            Sign Out: Acceptable CV status; No obvious hypovolemia; No obvious fluid overload   Other NRE: NONE   DID A NON-ROUTINE EVENT OCCUR? No           Last vitals:  Vitals Value Taken Time   /60 07/09/25 14:46   Temp 37.1  C (98.8  F) 07/09/25 14:46   Pulse 83 07/09/25 14:58   Resp 13 07/09/25 14:58   SpO2 100 % 07/09/25 14:58   Vitals shown include unfiled device data.    Electronically Signed By: Miryam Thomas MD  July 9, 2025  3:00 PM

## 2025-07-09 NOTE — TREATMENT PLAN
"RCAT Treatment Plan    Patient Score: 10  Patient Acuity: 4    Clinical Indication for Therapy: bronchospasm, productive cough    Therapy Ordered: duoneb BID PRN, duoneb QID, IS    Assessment Summary: S/p left mastectomy today. Patient has pulmonary history of asthma/COPD and is a current smoker. She uses duoneb BID PRN, AirDuo inhaler BID, and albuterol inhaler Q4 PRN at home. Patient is on 2 lpm nasal cannula with SpO2 in the low to mid 90s. Crackles auscultated bilaterally with expiratory wheezes throughout left lung field. Breath sounds and breathing improved post PRN duoneb x1. Patient has a productive cough. Unable to assess consistency and color. However, patient reports coughing up \"green\" sputum. Respirations in the low 20s.      Due to recent surgery, bronchospasm, and history of asthma, will add scheduled duoneb treatments QID and because of the productive cough, will add flutter valve BID to help mobilize secretions. Continue IS to prevent atelectasis.     RT to re-evaluate within 72 hours to determine effectiveness of therapy.     Kyung Castillo, RT  7/9/2025    "

## 2025-07-09 NOTE — OP NOTE
Preoperative Diagnosis:  Left breast cancer    Postoperative Diagnosis:  Left breast cancer    Operating Surgeon:  Katherine Patiño MD    First Assistant:  Cari Frnaco PAC, cari assisted in retraction, suturing to decrease and facilitate intraoperative time, no residents were available.    Indications for procedure:  61 y/o F with left breast cancer planning to undergo left side mastectomy has opted for reconstruction with a dermal/chest wall flap.  Risks & benefits reviewed, all questions answered    Procedure:  1:  left breast reconstruction with dermal/chest wall flap [id number] (total 360cm2)    Procedure:  Patient was identified, marked in the preoperative area, taken to the operating room.  After and adequate level of anestehsia was obtained patient underwent left side mastectomy with DR Jacob please see separate dictated note.  Breast weight 709gm.  A large lateral chest wall/dermal flap was marked out and de-epithelized, the flap was detached laterally 50% of the flap was released along the inframammary fold.Dimensions of the flap are 84u38xs (360cm2). This created a decent tissue volume to create a small breast mound. The flap was completely released separate from the skin envelope.  The flap was sutured on itself and anchored to the chest wall with 2-0 vicryl.  Skin closed with 3-0 pds deep dermal and 3-0 stratafix along the IMF, verical closed with 3-0 monocryl deep and 4-0 subcuticular.  A 15 round drain was placed.  Prineo was placed over the incision followed by soft dressing and a bra.  Patient was awaken and taken to recovery room.    EBL:  5cc    Drains and Packs are correct:

## 2025-07-09 NOTE — SIGNIFICANT EVENT
Significant Event Note    Time of event: 6:01 PM July 9, 2025    Description of event:  Patient and nursing regarding plastic surgery requiring consult for medical management of this patient who had a mastectomy.    Patient is a type II diabetic.  And also uses inhalers at home.    Plan:  T2DM  - Ordered reduced lantus dose (home 14) of 10 in setting of CLD  - 15:1 carb count and RADHA  - Holding metformin    Asthma COPD overlap syndrome  Duonebs per RT    I otherwise agree with the other med rec orders already placed and patient will be seen in AM by our team      Discussed with: bedside nurse    Petar Castillo MD

## 2025-07-09 NOTE — OP NOTE
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]  Status post bilateral mastectomy [Z90.13]  Personal history of malignant neoplasm of breast [Z85.3]    Surgeon:  Michael Jacob MD    Assist:  Lilli De León PA-C    Anesthesia Type:    GET    Estimated Blood Loss:   30cc    Specimens:    Left breast  Left sentinel lymph node       Drains:   Plastics    Complications:    None    [place] Node Biopsy for Breast Cancer - Left  Operation performed with curative intent Yes   Tracer(s) used to identify sentinel nodes in the upfront surgery (non-neoadjuvant) setting Dye and Radioactive tracer   Tracer(s) used to identify sentinel nodes in the neoadjuvant setting N/A   All nodes (colored or non-colored) present at the end of a dye-filled lymphatic channel were removed Yes   All significantly radioactive nodes were removed Yes   All palpably suspicious nodes were removed N/A   Biopsy-proven positive nodes marked with clips prior to chemotherapy were identified and removed N/A       Indication for procedure:  This is a 60-year-old female with mammogram identified left breast ductal carcinoma in situ.  After discussion of risks, benefits, alternatives, she elected to proceed with left-sided mastectomy with sentinel lymph node biopsy.  She was hoping to avoid radiation if possible.    Operative Report:    The patient was properly identified and brought to the operating  suite where she was placed in the supine position.  General anesthesia and perioperative antibiotics were administered.  Preoperatively the patient was injected with Lymphoseek by nuclear medicine and methylene blue by myself for her sentinel lymph node identification.  The patient was prepped and draped in a sterile fashion.  An incision encompassing the left nipple was made and skin flaps raised superiorly to the clavicle, posteriorly to the pectoralis major, medial to the sternum, inferior to the rectus sheath and laterally to the lateral chest wall.  The skin was healthy.  The breast was then dissected off of the pectoralis major muscle, taking its fascia en bloc with the specimen. It was marked for orientation.  Hemostasis was assured within the wound.  The clavipectoral fascia was incised and the axilla was palpated for abnormalities.  No abnormal lymph nodes were palpated.  Using the gamma probe I identified 1 sentinel lymph nodes, with a count of 542.  This was removed with electrocautery and sent for permanent sectioning.  There was no significant remaining gamma activity.      The procedure was turned over to Dr. Patiño for immediate reconstruction.    Sarthak MOSQUERA was present during the case to assist with retraction and exposure.    Disposition:  Dr. Patiño took over the procedure with the patient in stable condition.    Michael Jacob MD

## 2025-07-10 VITALS
DIASTOLIC BLOOD PRESSURE: 65 MMHG | HEART RATE: 98 BPM | BODY MASS INDEX: 32.43 KG/M2 | OXYGEN SATURATION: 98 % | WEIGHT: 169.53 LBS | TEMPERATURE: 98.4 F | SYSTOLIC BLOOD PRESSURE: 130 MMHG | RESPIRATION RATE: 20 BRPM

## 2025-07-10 LAB
GLUCOSE BLDC GLUCOMTR-MCNC: 151 MG/DL (ref 70–99)
GLUCOSE BLDC GLUCOMTR-MCNC: 195 MG/DL (ref 70–99)
GLUCOSE BLDC GLUCOMTR-MCNC: 230 MG/DL (ref 70–99)
GLUCOSE BLDC GLUCOMTR-MCNC: 259 MG/DL (ref 70–99)
HGB BLD-MCNC: 8.5 G/DL (ref 11.7–15.7)
HOLD SPECIMEN: NORMAL
MCV RBC AUTO: 77 FL (ref 78–100)

## 2025-07-10 PROCEDURE — 36415 COLL VENOUS BLD VENIPUNCTURE: CPT | Performed by: PHYSICIAN ASSISTANT

## 2025-07-10 PROCEDURE — 82962 GLUCOSE BLOOD TEST: CPT

## 2025-07-10 PROCEDURE — 85018 HEMOGLOBIN: CPT | Performed by: PHYSICIAN ASSISTANT

## 2025-07-10 PROCEDURE — 250N000012 HC RX MED GY IP 250 OP 636 PS 637

## 2025-07-10 PROCEDURE — 94640 AIRWAY INHALATION TREATMENT: CPT

## 2025-07-10 PROCEDURE — 250N000013 HC RX MED GY IP 250 OP 250 PS 637: Performed by: PHYSICIAN ASSISTANT

## 2025-07-10 PROCEDURE — 99222 1ST HOSP IP/OBS MODERATE 55: CPT | Mod: GC

## 2025-07-10 PROCEDURE — 250N000009 HC RX 250: Performed by: SURGERY

## 2025-07-10 PROCEDURE — 999N000157 HC STATISTIC RCP TIME EA 10 MIN

## 2025-07-10 RX ORDER — OXYCODONE HYDROCHLORIDE 5 MG/1
5 TABLET ORAL EVERY 6 HOURS PRN
Qty: 10 TABLET | Refills: 0 | Status: SHIPPED | OUTPATIENT
Start: 2025-07-10

## 2025-07-10 RX ORDER — ACETAMINOPHEN 325 MG/1
650 TABLET ORAL EVERY 6 HOURS PRN
Qty: 50 TABLET | Refills: 0 | Status: SHIPPED | OUTPATIENT
Start: 2025-07-10

## 2025-07-10 RX ORDER — AMOXICILLIN 250 MG
1-2 CAPSULE ORAL 2 TIMES DAILY PRN
Qty: 30 TABLET | Refills: 0 | Status: SHIPPED | OUTPATIENT
Start: 2025-07-10

## 2025-07-10 RX ADMIN — ACETAMINOPHEN 975 MG: 325 TABLET ORAL at 01:52

## 2025-07-10 RX ADMIN — ACETAMINOPHEN 975 MG: 325 TABLET ORAL at 09:28

## 2025-07-10 RX ADMIN — IPRATROPIUM BROMIDE AND ALBUTEROL SULFATE 3 ML: .5; 3 SOLUTION RESPIRATORY (INHALATION) at 08:17

## 2025-07-10 RX ADMIN — Medication 1 TABLET: at 07:42

## 2025-07-10 RX ADMIN — THERA TABS 1 TABLET: TAB at 07:41

## 2025-07-10 RX ADMIN — POLYETHYLENE GLYCOL 3350 17 G: 17 POWDER, FOR SOLUTION ORAL at 07:41

## 2025-07-10 RX ADMIN — IPRATROPIUM BROMIDE AND ALBUTEROL SULFATE 3 ML: .5; 3 SOLUTION RESPIRATORY (INHALATION) at 11:19

## 2025-07-10 RX ADMIN — ATORVASTATIN CALCIUM 40 MG: 40 TABLET, FILM COATED ORAL at 07:41

## 2025-07-10 RX ADMIN — DOCUSATE SODIUM 50 MG AND SENNOSIDES 8.6 MG 1 TABLET: 8.6; 5 TABLET, FILM COATED ORAL at 07:41

## 2025-07-10 RX ADMIN — OXYCODONE HYDROCHLORIDE 5 MG: 5 TABLET ORAL at 02:05

## 2025-07-10 RX ADMIN — PANTOPRAZOLE SODIUM 40 MG: 40 TABLET, DELAYED RELEASE ORAL at 07:41

## 2025-07-10 RX ADMIN — INSULIN ASPART 6 UNITS: 100 INJECTION, SOLUTION INTRAVENOUS; SUBCUTANEOUS at 07:43

## 2025-07-10 ASSESSMENT — ACTIVITIES OF DAILY LIVING (ADL)
ADLS_ACUITY_SCORE: 39

## 2025-07-10 NOTE — PROGRESS NOTES
General Surgery Progress Note:    Hospital Day # 0    ASSESSMENT:  1. 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 Status post left mastectomy      Mary Ann Olson is a 60 year old female who is s/p left mastectomy with SLNB and reconstruction on 7/9/25. Patient progressing well post-op and pain adequately controlled. Drain with sanguinous OP and discussed drain cares. OK to discharge from a surgical standpoint.      PLAN:  - Regular diet as tolerated  - Drain cares x 1, appreciate further teachings  - PO pain medications  - Appreciate plastic and HMS recs  - OK to discharge from surgical standpoint when deemed medically appropriate     SUBJECTIVE:   She is feeling well today. Reports some pain in her left axilla which is tolerable with PO pain medications. Tolerating a regular diet with no N/V. Denies fever, chills, SOB, chest pain. Feels okay about discharging today but is needing more information regarding drain cares.     Patient Vitals for the past 24 hrs:   BP Temp Temp src Pulse Resp SpO2 Weight   07/10/25 0817 -- -- -- -- -- 95 % --   07/10/25 0812 121/55 99.3  F (37.4  C) Oral 97 18 96 % --   07/10/25 0339 92/55 97.6  F (36.4  C) Oral 84 16 94 % --   07/10/25 0205 -- -- -- -- -- -- 76.9 kg (169 lb 8.5 oz)   07/10/25 0015 104/62 97.7  F (36.5  C) Oral 85 18 95 % --   07/09/25 2200 -- -- -- -- -- 94 % --   07/09/25 2027 -- -- -- -- -- 97 % --   07/09/25 1957 109/67 97.8  F (36.6  C) Oral 89 12 95 % --   07/09/25 1850 121/59 97.3  F (36.3  C) Axillary 101 20 96 % --   07/09/25 1747 117/70 -- -- 92 (!) 8 (!) 88 % --   07/09/25 1722 -- -- -- -- -- 94 % --   07/09/25 1717 -- -- -- 92 22 96 % --   07/09/25 1706 134/74 97.6  F (36.4  C) Oral 93 22 94 % --   07/09/25 1630 120/66 -- -- 83 11 98 % --   07/09/25 1615 120/71 -- -- 85 14 98 % --   07/09/25 1600 101/61 -- -- 86 15 99 % --   07/09/25 1545 100/59 -- -- 81 11 95 % --   07/09/25 1530 98/53 -- -- 83 15 99 % --   07/09/25 1515  111/59 -- -- 85 16 93 % --   07/09/25 1500 105/58 -- -- 82 14 99 % --   07/09/25 1446 127/60 98.8  F (37.1  C) Temporal 82 -- 98 % --     PHYSICAL EXAM:  General: patient seen resting in bed, no acute distress  Resp: no respiratory distress, breathing comfortably on RA  Chest: left breast incision CDI with prineo, mild TTP  Drains: Bulb on suction with sanguinous OP   Output by Drain (mL) 07/08/25 0700 - 07/08/25 1459 07/08/25 1500 - 07/08/25 2259 07/08/25 2300 - 07/09/25 0659 07/09/25 0700 - 07/09/25 1459 07/09/25 1500 - 07/09/25 2259 07/09/25 2300 - 07/10/25 0659 07/10/25 0700 - 07/10/25 1024   Drain Closed/Suction 1 Inferior;Lateral;Left Breast Bulb 15 Tamazight     50 80 25      Extremities: warm and well perfused    07/09 0700 - 07/10 0659  In: 2034.83 [P.O.:1440; I.V.:594.83]  Out: 2765 [Urine:2600; Drains:130]    Admission on 07/09/2025   Component Date Value    GLUCOSE BY METER POCT 07/09/2025 168 (H)     GLUCOSE BY METER POCT 07/09/2025 194 (H)     GLUCOSE BY METER POCT 07/09/2025 344 (H)     GLUCOSE BY METER POCT 07/09/2025 360 (H)     Hemoglobin 07/10/2025 8.5 (L)     MCV 07/10/2025 77 (L)     GLUCOSE BY METER POCT 07/10/2025 259 (H)     GLUCOSE BY METER POCT 07/10/2025 230 (H)     Hold Specimen 07/10/2025 JIC     GLUCOSE BY METER POCT 07/10/2025 195 (H)         Serena Shrestha PA-C  [facility] General Surgery  [address]  [place] [address]

## 2025-07-10 NOTE — PLAN OF CARE
Vitally stable, pain managed with PO medication and cold packs, scant drainage on surgical dressing, IDRIS-bloody output, 80ml overnight.  Voiding frequently, last BM yesterday, endorses hunger, blood sugars elevated, SF snack provided.     Goal Outcome Evaluation:  Problem: Surgery Nonspecified  Goal: Blood Glucose Level Within Targeted Range  Outcome: Not Progressing       Problem: Surgery Nonspecified  Goal: Effective Urinary Elimination  Outcome: Progressing     Problem: Surgery Nonspecified  Goal: Optimal Pain Control and Function  Intervention: Prevent or Manage Pain  Recent Flowsheet Documentation  Taken 7/10/2025 0152 by Bernice Gonzalez, RN  Pain Management Interventions:   medication (see MAR)   cold applied

## 2025-07-10 NOTE — PROGRESS NOTES
SPIRITUAL HEALTH SERVICES Note     Saw pt Mary Ann Olson and administered Buddhist sacrament of anointing for the healing of the sick.    Fr. Rojelio Soria

## 2025-07-10 NOTE — PROGRESS NOTES
PLASTIC SURGERY  Progress Note    Place of Service:  [facility]     Date: 07/10/25    SUBJECTIVE:  HPI: Patient reports she has been doing well. Notes pain in the left axillary area. Pain managed with oral pain medication. She has not yet had a bowel movement but feels she may soon. Tolerating diet without nausea or vomiting. She had some wheezing after surgery which has improved. Denies fever or chills.      OBJECTIVE:  PHYSICAL EXAM:  BP 92/55 (BP Location: Right arm)   Pulse 84   Temp 97.6  F (36.4  C) (Oral)   Resp 16   Wt 76.9 kg (169 lb 8.5 oz)   SpO2 94%   BMI 32.43 kg/m     General: NAD, alert, cooperative  Head: normocephalic, without abnormality / atraumatic  Breast: left breast is soft without significant ecchymosis or edema. Wise-pattern incisions are clean, dry, and intact and covered with Prineo. IDRIS drain with dark sanguinous output.   Abdomen: soft, non tender, non distended. No suprapubic fullness, no suprapubic tenderness.   Skin: No rashes or lesions  Musculoskeletal: moves all four extremities equally; no calf edema or tenderness  Psychological: alert and oriented, answers questions appropriately      LABS:  Lab Results   Component Value Date    WBC 9.2 05/24/2025    HGB 8.5 (L) 07/10/2025    HCT 32.9 (L) 05/24/2025     05/24/2025    CHOL 157 06/30/2025    TRIG 68 06/30/2025    HDL 65 06/30/2025    ALT 12 05/26/2024    AST 13 05/26/2024     06/30/2025    BUN 17.1 06/30/2025    CO2 24 06/30/2025    TSH 2.95 01/24/2019    INR 0.90 09/01/2023    MICROALBUR 1.73 09/17/2021      Lab Results: personally reviewed.       ASSESSMENT/PLAN:  Mary Ann Olson is a 60 year old female who is status post left mastectomy and left axillary sentinel lymph node biopsy with Dr. Jacob and left breast reconstruction with dermal flap and Wise pattern closure with Dr. Patiño on 7/9/25. She is progressing appropriately without concerns.    - Diet - regular diet as tolerated.  - Drains -  130  mL output since surgery, continue to strip and record output every 8 hours. Patient to be discharged home with drain.  - Labs ordered - none.  - Medications - continue acetaminophen, oxycodone as needed for pain. No need for antibiotic prophylaxis.   - Activity - encourage ambulation and incentive spirometer. No lifting >10 pounds left upper extremity, no overhead reaching.  - DVT prophylaxis - SCDs.  - Anticipated discharge: discharge home later today.     The above plan was discussed with: Dr. Patiño.        Merritt Franco PA-C   [facility] Plastic Surgery

## 2025-07-10 NOTE — PROGRESS NOTES
PRIMARY DIAGNOSIS: Left Mastectomy  OUTPATIENT/OBSERVATION GOALS TO BE MET BEFORE DISCHARGE:  1. Stable vital signs Yes  2. Tolerating diet:Yes  3. Pain controlled with oral pain medications:  Yes  4. Positive bowel sounds:  Yes  5. Voiding without difficulty:  No  6. Able to ambulate:  Yes  7. Provider specific discharge goals met:  No    Discharge Planner Nurse   Safe discharge environment identified: No  Barriers to discharge: Yes       Entered by: Gayathri Riddle RN 07/09/2025 7:30 PM     Please review provider order for any additional goals.   Nurse to notify provider when observation goals have been met and patient is ready for discharge.

## 2025-07-10 NOTE — DISCHARGE INSTRUCTIONS
Caring for a Drain After Surgery (02:36)  Your health professional recommends that you watch this short online health video.  Learn how to care for a surgical drain at home.   Purpose: Educates patients about caring for a surgical drain, including how to empty its container, clear the tube, and change the bandage.  Goal: Users will learn how to care for a surgical drain at home.    Watch: Scan the QR code or visit the link to view video       https://hwi.[email or web address]  Current as of: July 31, 2024  Content Version: 14.5    [id number] [employer].   Care instructions adapted under license by your healthcare professional. If you have questions about a medical condition or this instruction, always ask your healthcare professional. [employer] disclaims any warranty or liability for your use of this information.        POST OPERATIVE PATIENT INFORMATION  Caring For Your Juan David Montano Drain      What is a Juan David Montano drain?    This is a small, flexible, plastic bulb that creates a gentle suction to remove extra fluid from your surgical wound.  The color and amount of fluid varies.  Immediately after surgery, the fluid is bright red and it gradually becomes a straw color.  When the amount decreases, your physician will remove it.    Daily care for your Juan David Montano:    Keep the bulb compressed at all times except while you empty it.  This creates the suction.  During the first few days after surgery, there is usually more fluid in the container.  Empty at least two times a day or more often if it becomes half full.  If it becomes too full, there will not be enough suction.    Where the tubes enter, keep the skin dry and covered with a light dressing.  If you notice any drainage around the tube sites, change the dressing.  If you see a clot in the tube, strip it (your nurse will show you how) and if the tube does not appear to be draining, call your physician.    Tape the tubes to the skin  with paper tape 1-2 inches below the insertion sites.  The tubes are stitched in place and will not slip out.  Taping the tubes to the skin helps prevent pulling on the stitches.    You may take a shower, but keep the tubes and container dry.    Secure the container to your clothing with tape and a safety pin.    Always empty the bulb at the same time every day as instructed by your physician (or as necessary if it becomes   full) and keep track of the amount of drainage.    To empty the container:  Wash your hands.  Open the stopper to release the suction.  Hold the stopper out of the way and empty the drainage in the measuring container that has been sent home with you.  Measure and write down the amount.  If there are two drains, write down both amounts separately.  Compress the bulb by folding it in half.  Replace the stopper.  Check to see that the tubing is taped to your skin.  Pin the bulb to your clothing.  Flush the drainage down the toilet and rinse the measuring cup.  Wash your hands.    Call you doctor if:  The drainage increases or has a bad odor  There is increased redness, warmth, or  pus-like  drainage around the tube insertion sites   The tube does not appear to be draining or falls out  You have problems or concerns    After clinic hours, call the Emergency Room.          Record your drainage amounts and bring this with you to your office visit.    DATE TIME TUBE 1 TUBE 2

## 2025-07-10 NOTE — PLAN OF CARE
"PRIMARY DIAGNOSIS: \"GENERIC\" NURSING  OUTPATIENT/OBSERVATION GOALS TO BE MET BEFORE DISCHARGE:  ADLs back to baseline: Yes    Activity and level of assistance: Up with standby assistance.    Pain status: Improved-controlled with oral pain medications.    Return to near baseline physical activity: No     Discharge Planner Nurse   Safe discharge environment identified: Yes  Barriers to discharge: Yes, pain control, IDRIS teaching, discharge plannning.       Entered by: Bernice Gonzalez RN 07/10/2025 8:16 AM     Please review provider order for any additional goals.   Nurse to notify provider when observation goals have been met and patient is ready for discharge.Goal Outcome Evaluation:                            "

## 2025-07-10 NOTE — PROGRESS NOTES
PRIMARY DIAGNOSIS: left mastectomy  OUTPATIENT/OBSERVATION GOALS TO BE MET BEFORE DISCHARGE:  1. Stable vital signs Yes  2. Tolerating diet:Yes, tolerating regular diet   3. Pain controlled with oral pain medications:  Yes  4. Positive bowel sounds:  Yes  5. Voiding without difficulty:  Yes  6. Able to ambulate:  Yes  7. Provider specific discharge goals met:  No    Discharge Planner Nurse   Safe discharge environment identified: No  Barriers to discharge: Yes       Entered by: Gayathri Riddle RN 07/09/2025 10:28 PM     Please review provider order for any additional goals.   Nurse to notify provider when observation goals have been met and patient is ready for discharge.

## 2025-07-10 NOTE — CONSULTS
"[facility]  Consult Note - Hospitalist Service  Date of Admission:  7/9/2025  Consult Requested by: Dr. Katherine Patiño  Reason for Consult: diabetes, asthma management    Assessment & Plan   Mary Ann Olson is a 60 year old female admitted on 7/9/2025. She is s/p left mastectomy with SLNB and reconstruction on 7/9/25. We were consulted for chronic disease management.     Type 2 diabetes mellitus  A1c of 7.8. Outpatient regimen is 14 units Lantus, metformin 1000 BID.  Lantus slightly reduced to 10 units in addition to carb count and MDSSI in setting of hospitalization, recent surgery overnight.   - Resume PTA regimen of 14 units of Lantus at bedtime and metformin 1000 BID at time of discharge    COPD  - continue PTA inhaler (Airduo) and Duonebs as needed     The patient's care was discussed with the Attending Physician, Dr. Sorenson.    Clinically Significant Risk Factors Present on Admission                        # Anemia: based on hgb <11      # DMII: A1C = 7.8 % (Ref range: 0.0 - 5.6 %) within past 6 months    # Obesity: Estimated body mass index is 32.43 kg/m  as calculated from the following:    Height as of 6/30/25: 1.54 m (5' 0.63\").    Weight as of this encounter: 76.9 kg (169 lb 8.5 oz).       # Financial/Environmental Concerns:           Nita Arteaga MD  Hospitalist Service  Securely message with [employer] (more info)  Text page via [facility] Paging/Directory   ______________________________________________________________________    History of Present Illness   Mary Ann Olson is a 60 year old female who was admitted for left mastectomy and left axillary sentinel lymph node biopsy with Dr. Jacob and left breast reconstruction with dermal flap and Wise pattern closure with Dr. Patiño.    Doing well at this time. Anticipating she will go home today. No additional concerns.     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    Antihistamines overdose, intentional self-harm, initial encounter (H)     Asthma     Bipolar 1 disorder (H) hx of bipolar listed in h and p    Bipolar 2 disorder (H)     Bipolar I disorder, single manic episode (H)     Created by Conversion  Replacement Utility updated for latest IMO load    Cellulitis 2006 left ankle, 2008 right foot    COPD (chronic obstructive pulmonary disease) (H)     Crohn's disease (H)     arthritis associated with crohn's    Diabetes mellitus (H)     Diabetes mellitus, type 2 (H)     Fibrocystic breast     Heat stroke     when a young child     Hyperlipidemia     Idiopathic acute pancreatitis 07/14/2015    Irritable bowel syndrome     Created by Conversion     Kidney stone     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     Overdose     Pyoderma gangrenosum (H)     2016    Sacroiliitis 2004    Skin lesion of left leg 08/27/2015    Suicidal ideation     Suicide attempt (H)     Traumatic iritis 07/21/2015    Umbilical hernia     Uncomplicated asthma      Past Surgical History   Past Surgical History:   Procedure Laterality Date    BIOPSY BREAST Left     BIOPSY OF BREAST, INCISIONAL      Description: Biopsy Breast Open;  Recorded: 10/28/2010;     REMOVAL OF TONSILS,<13 Y/O      Description: Tonsillectomy;  Recorded: 07/08/2009;    ZZC LIGATE FALLOPIAN TUBE      Description: Tubal Ligation;  Recorded: 07/08/2009;     Medications   I have reviewed this patient's current medications          Physical Exam   Vital Signs: Temp: 99.3  F (37.4  C) Temp src: Oral BP: 121/55 Pulse: 97   Resp: 18 SpO2: 95 % O2 Device: None (Room air) Oxygen Delivery: 1 LPM  Weight: 169 lbs 8.54 oz    GENERAL: alert and no distress  RESP: No audible wheeze, cough, or visible cyanosis.  No visible retractions or increased work of breathing.    Chest: bandages in place  MS: Normal range of motion.  No visible edema.        Medical Decision Making           Data   ------------------------- PAST 24 HR  DATA REVIEWED -----------------------------------------------  Recent Labs   Lab 07/10/25  0656 07/10/25  0534 07/10/25  0204 07/09/25  2149 07/09/25  1849 07/09/25  1640   * 230* 259* 360* 344* 194*

## 2025-07-10 NOTE — DISCHARGE SUMMARY
Plastic Surgery  Discharge Summary    Name: Mary Ann Olson  : 1965    Place of service: [facility]    Admission date: 2025  Discharge date: 7/10/2025     Surgeon: Dr. Michael Jacob, Dr. 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]  Status post bilateral mastectomy [Z90.13]  Personal history of malignant neoplasm of breast [Z85.3]      Other diagnosis addressed during hospitalization:  Type 2 diabetes mellitus  Hyperlipidemia  Asthma and COPD    Consults:  Hospitalist    Diagnostic Studies :  None    Complications while in the Hospital:  None    Physical Exam:  /55 (BP Location: Right arm)   Pulse 97   Temp 99.3  F (37.4  C) (Oral)   Resp 18   Wt 76.9 kg (169 lb 8.5 oz)   SpO2 96%   BMI 32.43 kg/m    General: NAD, alert, cooperative  Head: normocephalic, without abnormality / atraumatic  Breast: left breast is soft without significant ecchymosis or edema. Wise-pattern incisions are clean, dry, and intact and covered with Prineo. IDRIS drain with dark sanguinous output.   Abdomen: soft, non tender, non distended. No suprapubic fullness, no suprapubic tenderness.   Skin: No rashes or lesions  Musculoskeletal: moves all four extremities equally; no calf edema or tenderness  Psychological: alert and oriented, answers questions appropriately      Brief history of hospital course:  This is a 60 year old female admitted post-operatively after the above procedure. Patient tolerated the procedure well. Post operative course was remarkable for wheezing after surgery which improved with respiratory therapy. By the day of discharge, the patient was ambulatory, tolerating diet, pain was controlled with oral  analgesics, labs and vitals stable.     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  Quantity: 30 tablet  Refills: 0            CONTINUE these medicines which may have CHANGED, or have new prescriptions. If we are uncertain of the size of tablets/capsules you have at home, strength may be listed as something that might have changed.        Dose / Directions   * acetaminophen 500 MG tablet  Commonly known as: TYLENOL  This may have changed: Another medication with the same name was added. Make sure you understand how and when to take each.  Used for: Pain      Dose: 1,000 mg  Take 2 tablets (1,000 mg) by mouth every 6 hours as needed for pain.  Quantity: 100 tablet  Refills: 3     * acetaminophen 325 MG tablet  Commonly known as: TYLENOL  This may have changed: You were already taking a medication with the same name, and this prescription was added. Make sure you understand how and when to take each.  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  Quantity: 50 tablet  Refills: 0           * This list has 2 medication(s) that are the same as other medications prescribed for you. Read the  "directions carefully, and ask your doctor or other care provider to review them with you.                CONTINUE these medicines which have NOT CHANGED        Dose / Directions   albuterol 108 (90 Base) MCG/ACT inhaler  Commonly known as: PROAIR HFA/PROVENTIL HFA/VENTOLIN HFA  Used for: Asthma-COPD overlap syndrome (H)      Dose: 2 puff  Inhale 2 puffs into the lungs every 4 hours as needed for shortness of breath, wheezing or cough.  Quantity: 18 g  Refills: 3     atorvastatin 40 MG tablet  Commonly known as: Lipitor  Used for: Type 2 diabetes mellitus with hyperglycemia, with long-term current use of insulin (H)      Dose: 40 mg  Take 1 tablet (40 mg) by mouth daily.  Quantity: 90 tablet  Refills: 3     calcium citrate-vitamin D 200-6.25 MG-MCG Tabs per tablet  Commonly known as: CITRACAL      Dose: 1 tablet  Take 1 tablet by mouth daily.  Refills: 0     fluticasone-salmeterol 232-14 MCG/ACT inhaler  Commonly known as: AIRDUO RESPICLICK  Used for: Shortness of breath      Dose: 1 puff  Inhale 1 puff into the lungs 2 times daily.  Quantity: 1 each  Refills: 4     FreeStyle Liliana 3 Plus Sensor Misc  Used for: Type 2 diabetes mellitus with hyperglycemia, without long-term current use of insulin (H)      Use 1 sensor every 15 days. Use to read blood sugars per 's instructions.  Quantity: 6 each  Refills: 3     Insulin Glargine-yfgn 100 UNIT/ML Sopn  Used for: Type 2 diabetes mellitus with hyperglycemia, with long-term current use of insulin (H)      Dose: 14 Units  Inject 14 Units subcutaneously at bedtime.  Quantity: 15 mL  Refills: 0     insulin pen needle 31G X 5 MM miscellaneous  Commonly known as: 31G X 5 MM  Used for: Type 2 diabetes mellitus with hyperglycemia, without long-term current use of insulin (H)      Use 1 pen needles daily or as directed.  Quantity: 90 each  Refills: 3     insulin syringe-needle U-100 31G X 5/16\" 0.3 ML miscellaneous  Commonly known as: 31G X 5/16\" 0.3 ML  Used for: Type " 2 diabetes mellitus with hyperglycemia, with long-term current use of insulin (H)      Use 2 syringes daily  Quantity: 100 each  Refills: 3     ipratropium - albuterol 0.5 mg/2.5 mg (3mg)/3 mL 0.5-2.5 (3) MG/3ML neb solution  Commonly known as: DUONEB  Used for: COPD exacerbation (H)      Dose: 1 vial  Take 1 vial (3 mLs) by nebulization 2 times daily as needed for shortness of breath, wheezing or cough.  Quantity: 90 mL  Refills: 0     loratadine 10 MG tablet  Commonly known as: CLARITIN      Dose: 10 mg  Take 10 mg by mouth daily as needed for allergies.  Refills: 0     Melatonin 12 MG Tabs      Dose: 1 tablet  Take 1 tablet by mouth at bedtime.  Refills: 0     metFORMIN 1000 MG tablet  Commonly known as: GLUCOPHAGE  Used for: Type 2 diabetes mellitus without complication, without long-term current use of insulin (H)      Dose: 1,000 mg  Take 1 tablet (1,000 mg) by mouth 2 times daily (with meals).  Quantity: 180 tablet  Refills: 3     multivitamin, therapeutic Tabs tablet      Dose: 1 tablet  Take 1 tablet by mouth daily.  Refills: 0     omeprazole 20 MG DR capsule  Commonly known as: PriLOSEC  Used for: Gastroesophageal reflux disease without esophagitis      Dose: 20 mg  Take 1 capsule (20 mg) by mouth daily.  Quantity: 90 capsule  Refills: 3     traZODone 50 MG tablet  Commonly known as: DESYREL      Dose: 50 mg  Take 50 mg by mouth nightly as needed for sleep.  Refills: 0     triamcinolone 0.1 % external ointment  Commonly known as: KENALOG      Apply topically 2 times daily as needed for irritation.  Refills: 0     vitamin D2 74241 units (1250 mcg) capsule  Commonly known as: ERGOCALCIFEROL  Used for: Postmenopausal status      Dose: 50,000 Units  Take 1 capsule (50,000 Units) by mouth once a week.  Quantity: 12 capsule  Refills: 3               Where to get your medicines        These medications were sent to [facility] [address]  [address] [place] [id number]      Phone: [phone]   senna-docusate 8.6-50 MG tablet       Some of these will need a paper prescription and others can be bought over the counter. Ask your nurse if you have questions.    Bring a paper prescription for each of these medications  acetaminophen 325 MG tablet  oxyCODONE 5 MG tablet           DISCHARGE PLAN:   - Diet - regular diet as tolerated.  - Drains -  patient to be discharged home with drain, strip and record output 2-3 times per day.   - Medications - acetaminophen as needed for pain, oxycodone for moderate to severe pain. Senna-docusate for constipation as needed. No need for antibiotic prophylaxis.  - Activity - encourage ambulation and incentive spirometer. No lifting >10 pounds left upper extremity, no overhead reaching x 4 weeks.  - Warning signs discussed with patient about when to call the clinic/hospital. All questions and concerns were answered for the patient prior to discharge. Patient verbalized understanding.     Discussed patient with Dr. Patiño on day of discharge.     Merritt Franco PA-C  [facility]astic Surgery     I saw the patient on the date of discharge  Total time spent for discharge on date of discharge: 20 minutes          ?

## 2025-07-10 NOTE — PLAN OF CARE
Goal Outcome Evaluation:                        A&Ox4. Assist of 1 to the bathroom. Voiding without difficulty. Bowel sounds audible. Tolerating regular diet. Pain controlled with scheduled Tylenol and PRN Oxycodone and Robaxin. Blood sugars were 344 and 360, insulin administered per orders. Pt was audibly wheezing and tachypneic upon arrival to the floor. Wheezing improved after PRN neb given by RT. LS clear since scheduled nebs ordered. Pt refused capnography. Continuous pulse ox in place. O2 sats 94% 1L NC. Surgical dressing clean, dry, and intact. IDRIS putting out bloody output.

## 2025-07-12 NOTE — PROGRESS NOTES
Care Manager Received a message that Gabbi Care Coordinator from [facility] did not receive both side of patients orders. Orders faxed to [phone]

## 2025-07-13 ENCOUNTER — TELEPHONE (OUTPATIENT)
Dept: FAMILY MEDICINE | Facility: CLINIC | Age: 60
End: 2025-07-13
Payer: COMMERCIAL

## 2025-07-13 NOTE — TELEPHONE ENCOUNTER
Returning page.    Breast removal; 7/9/24    Patient reports that there has been some leakage around her drain through the tape that has come off some around her IDRIS drain. There is some increased pain but not overly severe.    Advised patient to try to reapply/approximate the tape. IDRIS is still functioning/draining.    Advised to call surgery team to ask for their advice as soon as she can.    If the pain becomes intense, especially after the drain occludes, present to the ER.

## 2025-07-14 LAB
PATH REPORT.COMMENTS IMP SPEC: ABNORMAL
PATH REPORT.COMMENTS IMP SPEC: ABNORMAL
PATH REPORT.COMMENTS IMP SPEC: YES
PATH REPORT.FINAL DX SPEC: ABNORMAL
PATH REPORT.GROSS SPEC: ABNORMAL
PATH REPORT.MICROSCOPIC SPEC OTHER STN: ABNORMAL
PATH REPORT.MICROSCOPIC SPEC OTHER STN: ABNORMAL
PATH REPORT.RELEVANT HX SPEC: ABNORMAL
PATHOLOGY SYNOPTIC REPORT: ABNORMAL
PHOTO IMAGE: ABNORMAL

## 2025-08-05 ENCOUNTER — OFFICE VISIT (OUTPATIENT)
Dept: SURGERY | Facility: CLINIC | Age: 60
End: 2025-08-05
Attending: SURGERY
Payer: COMMERCIAL

## 2025-08-05 DIAGNOSIS — Z12.31 ENCOUNTER FOR SCREENING MAMMOGRAM FOR BREAST CANCER: Primary | ICD-10-CM

## 2025-08-05 PROCEDURE — G0463 HOSPITAL OUTPT CLINIC VISIT: HCPCS | Performed by: SURGERY

## 2025-08-14 ENCOUNTER — TELEPHONE (OUTPATIENT)
Dept: FAMILY MEDICINE | Facility: CLINIC | Age: 60
End: 2025-08-14
Payer: COMMERCIAL

## 2025-08-22 DIAGNOSIS — Z79.4 TYPE 2 DIABETES MELLITUS WITH HYPERGLYCEMIA, WITH LONG-TERM CURRENT USE OF INSULIN (H): ICD-10-CM

## 2025-08-22 DIAGNOSIS — J44.89 ASTHMA-COPD OVERLAP SYNDROME (H): ICD-10-CM

## 2025-08-22 DIAGNOSIS — E11.65 TYPE 2 DIABETES MELLITUS WITH HYPERGLYCEMIA, WITH LONG-TERM CURRENT USE OF INSULIN (H): ICD-10-CM

## 2025-08-25 RX ORDER — INSULIN GLARGINE-YFGN 100 [IU]/ML
14 INJECTION, SOLUTION SUBCUTANEOUS AT BEDTIME
Qty: 15 ML | Refills: 3 | Status: SHIPPED | OUTPATIENT
Start: 2025-08-25

## 2025-08-25 RX ORDER — ALBUTEROL SULFATE 90 UG/1
2 INHALANT RESPIRATORY (INHALATION) EVERY 4 HOURS PRN
Qty: 18 G | Refills: 3 | Status: SHIPPED | OUTPATIENT
Start: 2025-08-25 | End: 2025-08-28

## 2025-08-27 ENCOUNTER — TELEPHONE (OUTPATIENT)
Dept: FAMILY MEDICINE | Facility: CLINIC | Age: 60
End: 2025-08-27
Payer: COMMERCIAL

## 2025-08-27 DIAGNOSIS — J44.89 ASTHMA-COPD OVERLAP SYNDROME (H): ICD-10-CM

## 2025-08-28 RX ORDER — ALBUTEROL SULFATE 90 UG/1
INHALANT RESPIRATORY (INHALATION)
Qty: 18 G | Refills: 3 | Status: SHIPPED | OUTPATIENT
Start: 2025-08-28

## (undated) DEVICE — SYR 10ML FINGER CONTROL W/O NDL 309695

## (undated) DEVICE — GLOVE BIOGEL PI SZ 6.5 40865

## (undated) DEVICE — BLADE KNIFE SURG 10 371110

## (undated) DEVICE — GLOVE PI ULTRATCH M LF SZ 6.5 PF CUFF TEXT STRL LF 42665

## (undated) DEVICE — DRSG KERLIX FLUFFS X5

## (undated) DEVICE — NEEDLE HYPO 25X1 SAFETY 305916

## (undated) DEVICE — DRAIN RESERVOIR 100ML JP 0070740

## (undated) DEVICE — UNDERPAD 36X30 PREMIERPRO MAX ABS NS LF 676111

## (undated) DEVICE — DRAPE CHEST 100X72X124 89227

## (undated) DEVICE — NEEDLE MNJCT HYPO AL HUB 21GAX2IN 8881200318

## (undated) DEVICE — BRA STYLE 1524 SZ 40

## (undated) DEVICE — BLADE KNIFE SURG 15 371115

## (undated) DEVICE — SU ETHILON 3-0 PS-1 18" 1663G

## (undated) DEVICE — SUCTION TIP YANKAUER W/O VENT K86

## (undated) DEVICE — SPONGE LAP 18X18" X8435

## (undated) DEVICE — SOL WATER IRRIG 1000ML BOTTLE 2F7114

## (undated) DEVICE — SUTURE MONOCRYL+ 4-0 PS-2 27IN MCP426H

## (undated) DEVICE — ESU ELEC BLADE 2.75" COATED/INSULATED E1455

## (undated) DEVICE — NEEDLE HYPO MAGELLAN SAFETY 22GA 1 1/2IN 8881850215

## (undated) DEVICE — GLOVE UNDER INDICATOR PI SZ 7.0 LF 41670

## (undated) DEVICE — PROBE ELECTROSURGICAL TRUNODE GAMMA 120-807605

## (undated) DEVICE — DRAPE SHEET REV FOLD 3/4 9349

## (undated) DEVICE — GLOVE BIOGEL PI ULTRATOUCH G SZ 6.0 42160

## (undated) DEVICE — CUSTOM PACK GEN MAJOR SBA5BGMHEA

## (undated) DEVICE — SU DERMABOND PRINEO 42CM CLR422US

## (undated) DEVICE — SUTURE VICRYL+ 2-0 27IN SH UND VCP417H

## (undated) DEVICE — SUTURE MONOCRYL 3-0 18 PS2 UND MCP497G

## (undated) DEVICE — DRSG BIOPATCH GERMICIDAL SPLIT SPONGE 4MM MED 4150

## (undated) DEVICE — DRSG TEGADERM 4X4 3/4" 1626W

## (undated) DEVICE — SOL NACL 0.9% IRRIG 1000ML BOTTLE 2F7124

## (undated) DEVICE — DRAIN BLAKE 15FR SIL 2229

## (undated) DEVICE — GOWN LG DISP 9515

## (undated) DEVICE — SUTURE PDS 3-0 18IN PS-2 + UND PDP497G

## (undated) DEVICE — SU DERMABOND PRINEO 22CM CLR222US

## (undated) DEVICE — DRSG ABD TNDRSRB WET PRUF 8IN X 10IN STRL  9194A

## (undated) DEVICE — SYR 10ML LL W/O NDL 302995

## (undated) DEVICE — VIAL DECANTER STERILE WHITE DYNJDEC06

## (undated) DEVICE — PREP CHLORAPREP 26ML TINTED HI-LITE ORANGE 930815

## (undated) DEVICE — SUCTION MANIFOLD NEPTUNE 2 SYS 1 PORT 702-025-000

## (undated) DEVICE — ESU PENCIL SMOKE EVAC W/ROCKER SWITCH 0703-047-000

## (undated) DEVICE — SU SILK 2-0 SH 30" K833H

## (undated) DEVICE — ESU GROUND PAD ADULT REM W/15' CORD E7507DB

## (undated) DEVICE — KIT TURNOVER FAIRVIEW SOUTHDALE FULL SP3889

## (undated) DEVICE — LIGACLIP MEDIUM AESCULAP B2180-1

## (undated) DEVICE — A3 SUPPLIES- SEE NURSING INFO PAGE

## (undated) DEVICE — SU ETHICON STRATAFIX SPR PS-2 3-0 30X30CM SXMP2B408

## (undated) DEVICE — GLOVE UNDER INDICATOR PI SZ 6.5 LF 41665

## (undated) RX ORDER — PROPOFOL 10 MG/ML
INJECTION, EMULSION INTRAVENOUS
Status: DISPENSED
Start: 2025-07-09

## (undated) RX ORDER — DEXAMETHASONE SODIUM PHOSPHATE 10 MG/ML
INJECTION, SOLUTION INTRAMUSCULAR; INTRAVENOUS
Status: DISPENSED
Start: 2025-07-09

## (undated) RX ORDER — BUPIVACAINE HCL/EPINEPHRINE 0.25-.0005
VIAL (ML) INJECTION
Status: DISPENSED
Start: 2025-07-09

## (undated) RX ORDER — CEFAZOLIN SODIUM 1 G/3ML
INJECTION, POWDER, FOR SOLUTION INTRAMUSCULAR; INTRAVENOUS
Status: DISPENSED
Start: 2025-07-09

## (undated) RX ORDER — ONDANSETRON 2 MG/ML
INJECTION INTRAMUSCULAR; INTRAVENOUS
Status: DISPENSED
Start: 2025-07-09

## (undated) RX ORDER — BUPIVACAINE HYDROCHLORIDE 2.5 MG/ML
INJECTION, SOLUTION EPIDURAL; INFILTRATION; INTRACAUDAL; PERINEURAL
Status: DISPENSED
Start: 2025-07-09

## (undated) RX ORDER — FENTANYL CITRATE 50 UG/ML
INJECTION, SOLUTION INTRAMUSCULAR; INTRAVENOUS
Status: DISPENSED
Start: 2025-07-09

## (undated) RX ORDER — LIDOCAINE HYDROCHLORIDE 10 MG/ML
INJECTION, SOLUTION EPIDURAL; INFILTRATION; INTRACAUDAL; PERINEURAL
Status: DISPENSED
Start: 2025-07-09